# Patient Record
Sex: MALE | Race: WHITE | NOT HISPANIC OR LATINO | Employment: FULL TIME | URBAN - METROPOLITAN AREA
[De-identification: names, ages, dates, MRNs, and addresses within clinical notes are randomized per-mention and may not be internally consistent; named-entity substitution may affect disease eponyms.]

---

## 2020-10-31 ENCOUNTER — HOSPITAL ENCOUNTER (EMERGENCY)
Facility: HOSPITAL | Age: 60
Discharge: HOME | End: 2020-10-31
Attending: EMERGENCY MEDICINE | Admitting: EMERGENCY MEDICINE

## 2020-10-31 VITALS
DIASTOLIC BLOOD PRESSURE: 87 MMHG | HEIGHT: 70 IN | BODY MASS INDEX: 31.5 KG/M2 | SYSTOLIC BLOOD PRESSURE: 143 MMHG | HEART RATE: 79 BPM | TEMPERATURE: 97.6 F | OXYGEN SATURATION: 94 % | WEIGHT: 220 LBS | RESPIRATION RATE: 18 BRPM

## 2020-10-31 DIAGNOSIS — H53.9 VISUAL DISTURBANCE OF ONE EYE: Primary | ICD-10-CM

## 2020-10-31 DIAGNOSIS — I10 ESSENTIAL HYPERTENSION: ICD-10-CM

## 2020-10-31 PROCEDURE — 99283 EMERGENCY DEPT VISIT LOW MDM: CPT

## 2020-10-31 RX ADMIN — LISINOPRIL 30 MG: 20 TABLET ORAL at 19:35

## 2022-03-08 ENCOUNTER — APPOINTMENT (OUTPATIENT)
Dept: GENERAL RADIOLOGY | Facility: HOSPITAL | Age: 62
End: 2022-03-08

## 2022-03-08 ENCOUNTER — HOSPITAL ENCOUNTER (EMERGENCY)
Facility: HOSPITAL | Age: 62
Discharge: HOME | End: 2022-03-08
Attending: EMERGENCY MEDICINE | Admitting: EMERGENCY MEDICINE

## 2022-03-08 VITALS
DIASTOLIC BLOOD PRESSURE: 90 MMHG | HEART RATE: 82 BPM | TEMPERATURE: 96.8 F | RESPIRATION RATE: 20 BRPM | OXYGEN SATURATION: 99 % | SYSTOLIC BLOOD PRESSURE: 170 MMHG

## 2022-03-08 DIAGNOSIS — S61.411A LACERATION OF RIGHT HAND WITHOUT FOREIGN BODY, INITIAL ENCOUNTER: Primary | ICD-10-CM

## 2022-03-08 PROCEDURE — 73130 X-RAY EXAM OF HAND: CPT

## 2022-03-08 PROCEDURE — 63710000001 ONDANSETRON ODT 4 MG TABLET DISPERSIBLE: Performed by: NURSE PRACTITIONER

## 2022-03-08 PROCEDURE — 90715 TDAP VACCINE 7 YRS/> IM: CPT | Performed by: NURSE PRACTITIONER

## 2022-03-08 PROCEDURE — 0 LIDOCAINE 1 % SOLUTION: Performed by: NURSE PRACTITIONER

## 2022-03-08 PROCEDURE — 99283 EMERGENCY DEPT VISIT LOW MDM: CPT

## 2022-03-08 PROCEDURE — 25010000002 TETANUS-DIPHTH-ACELL PERTUSSIS 5-2.5-18.5 LF-MCG/0.5 SUSPENSION PREFILLED SYRINGE: Performed by: NURSE PRACTITIONER

## 2022-03-08 PROCEDURE — 90471 IMMUNIZATION ADMIN: CPT | Performed by: NURSE PRACTITIONER

## 2022-03-08 RX ORDER — ACETAMINOPHEN 500 MG
500 TABLET ORAL ONCE
Status: COMPLETED | OUTPATIENT
Start: 2022-03-08 | End: 2022-03-08

## 2022-03-08 RX ORDER — CEPHALEXIN 500 MG/1
500 CAPSULE ORAL 3 TIMES DAILY
Qty: 21 CAPSULE | Refills: 0 | Status: SHIPPED | OUTPATIENT
Start: 2022-03-08 | End: 2022-03-15

## 2022-03-08 RX ORDER — GINSENG 100 MG
1 CAPSULE ORAL 2 TIMES DAILY
Qty: 14 G | Refills: 0 | Status: SHIPPED | OUTPATIENT
Start: 2022-03-08 | End: 2022-03-15

## 2022-03-08 RX ORDER — ONDANSETRON 4 MG/1
4 TABLET, ORALLY DISINTEGRATING ORAL ONCE
Status: COMPLETED | OUTPATIENT
Start: 2022-03-08 | End: 2022-03-08

## 2022-03-08 RX ORDER — LIDOCAINE HYDROCHLORIDE 10 MG/ML
10 INJECTION, SOLUTION INFILTRATION; PERINEURAL ONCE
Status: COMPLETED | OUTPATIENT
Start: 2022-03-08 | End: 2022-03-08

## 2022-03-08 RX ADMIN — LIDOCAINE HYDROCHLORIDE 10 ML: 10 INJECTION, SOLUTION INFILTRATION; PERINEURAL at 21:20

## 2022-03-08 RX ADMIN — ONDANSETRON 4 MG: 4 TABLET, ORALLY DISINTEGRATING ORAL at 19:28

## 2022-03-08 RX ADMIN — ACETAMINOPHEN 500 MG: 500 TABLET ORAL at 19:28

## 2022-03-08 RX ADMIN — LIDOCAINE-EPINEPHRINE-TETRACAINE GEL 4-0.05-0.5% 3 ML: 4-0.05-0.5 GEL at 19:28

## 2022-03-08 RX ADMIN — TETANUS TOXOID, REDUCED DIPHTHERIA TOXOID AND ACELLULAR PERTUSSIS VACCINE, ADSORBED 0.5 ML: 5; 2.5; 8; 8; 2.5 SUSPENSION INTRAMUSCULAR at 19:28

## 2022-03-08 NOTE — ED TRIAGE NOTES
Patient states he was using a saw and cut his right hand. States that when he normally sees blood or gets hurt he gets lightheaded and nauseated.

## 2022-03-09 NOTE — ED PROVIDER NOTES
EMERGENCY DEPARTMENT ENCOUNTER    Room Number:  A01/01  Date of encounter:  3/8/2022  PCP: Provider, No Known  Historian: Patient      HPI:  Chief Complaint: Right hand laceration  A complete HPI/ROS/PMH/PSH/SH/FH are unobtainable due to: Nothing    Context: Louis Andino is a 61 y.o. male who presents to the ED c/o right hand laceration per JPA.  Patient states he was feeding a table saw with the plastic feeder.  The plastic feeder kicked back lacerating the palm of his hand.  Pain is said to be localized to the site of the laceration does not radiate.  Patient's last Tdap is unknown.  He is here for further evaluation wound closure.      PAST MEDICAL HISTORY  Active Ambulatory Problems     Diagnosis Date Noted   • No Active Ambulatory Problems     Resolved Ambulatory Problems     Diagnosis Date Noted   • No Resolved Ambulatory Problems     No Additional Past Medical History         PAST SURGICAL HISTORY  No past surgical history on file.      FAMILY HISTORY  No family history on file.      SOCIAL HISTORY  Social History     Socioeconomic History   • Marital status:          ALLERGIES  Patient has no known allergies.        REVIEW OF SYSTEMS  Review of Systems   Skin:        Hand laceration        All systems reviewed and negative except for those discussed in HPI.       PHYSICAL EXAM    I have reviewed the triage vital signs and nursing notes.    ED Triage Vitals   Temp Heart Rate Resp BP SpO2   03/08/22 1849 03/08/22 1849 03/08/22 1849 03/08/22 1850 03/08/22 1849   96.8 °F (36 °C) 80 18 (!) 176/111 96 %      Temp src Heart Rate Source Patient Position BP Location FiO2 (%)   -- -- -- -- --              Physical Exam  GENERAL: WDWN male no acute distress  HENT: nares patent  EYES: no scleral icterus  CV: regular rhythm, regular rate, no rubs or gallops  RESPIRATORY: normal effort, lungs CTA B  ABDOMEN: soft  MUSCULOSKELETAL: no deformity  NEURO: alert oriented x4, moves all extremities, follows  commands  SKIN: 4 and half centimeter laceration in a V-shaped flap to the volar surface of the right hand.  The laceration extends in the subcutaneous tissue but does not appear to have any major tendon or nerve involvement.        LAB RESULTS  No results found for this or any previous visit (from the past 24 hour(s)).    Ordered the above labs and independently reviewed the results.        RADIOLOGY  XR Hand 3+ View Right    Result Date: 3/8/2022  XR HAND 3+ VW RIGHT-clinical: Pulmonary laceration  FINDINGS: No fracture or cortical abnormality. No dislocation seen. Articulations are preserved. No radiopaque foreign body seen.  CONCLUSION: No acute osseous or articular abnormality.  This report was finalized on 3/8/2022 7:49 PM by Dr. Adrien Aranda M.D.        I ordered the above noted radiological studies. Reviewed by me and discussed with radiologist.  See dictation for official radiology interpretation.      PROCEDURES    Laceration Repair    Date/Time: 3/8/2022 9:42 PM  Performed by: Clifford Lindsey III, PA  Authorized by: Matt Buitrago MD     Consent:     Consent obtained:  Verbal    Consent given by:  Patient    Risks discussed:  Infection and pain  Universal protocol:     Patient identity confirmed:  Verbally with patient  Anesthesia:     Anesthesia method:  Local infiltration and topical application    Topical anesthetic:  LET    Local anesthetic:  Lidocaine 1% w/o epi  Laceration details:     Location:  Hand    Hand location:  R palm    Length (cm):  4.5  Pre-procedure details:     Preparation:  Patient was prepped and draped in usual sterile fashion and imaging obtained to evaluate for foreign bodies  Exploration:     Wound exploration: wound explored through full range of motion and entire depth of wound visualized    Treatment:     Area cleansed with:  Saline and Shur-Clens    Amount of cleaning:  Extensive    Irrigation solution:  Sterile saline    Irrigation volume:  1000    Irrigation  method:  Pressure wash    Debridement:  None  Skin repair:     Repair method:  Sutures    Suture size:  4-0    Suture material:  Nylon    Suture technique:  Simple interrupted    Number of sutures:  7  Approximation:     Approximation:  Close  Repair type:     Repair type:  Simple  Post-procedure details:     Dressing:  Adhesive bandage and antibiotic ointment    Procedure completion:  Tolerated well, no immediate complications          MEDICATIONS GIVEN IN ER    Medications   lidocaine (XYLOCAINE) 1 % injection 10 mL (has no administration in time range)   ondansetron ODT (ZOFRAN-ODT) disintegrating tablet 4 mg (4 mg Oral Given 3/8/22 1928)   acetaminophen (TYLENOL) tablet 500 mg (500 mg Oral Given 3/8/22 1928)   Tetanus-Diphth-Acell Pertussis (BOOSTRIX) injection 0.5 mL (0.5 mL Intramuscular Given 3/8/22 1928)   Lido-EPINEPHrine-Tetracaine 4-0.05-0.5 % gel 3 mL (3 mL Topical Given 3/8/22 1928)         PROGRESS, DATA ANALYSIS, CONSULTS, AND MEDICAL DECISION MAKING    All labs have been independently reviewed by me.  All radiology studies have been reviewed by me and discussed with radiologist dictating the report.   EKG's independently viewed and interpreted by me.  Discussion below represents my analysis of pertinent findings related to patient's condition, differential diagnosis, treatment plan and final disposition.    DDx includes but is not limited to: Hand laceration with tendon involvement, hand laceration with bony involvement, hand laceration with foreign body, uncomplicated hand laceration, hand laceration and major nerve involvement.  Will order three-view x-ray to rule out foreign body.    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  He states the saw kicked and he sustained a v shaped laceration to right palm.  He has no loss of sensation or motor function.  Tdad needs to be administered.  Will check right hand  imaging. Update Tdap and plan for repair of wound.  [EW]   2145 No foreign body or fracture present on 3 view of the right hand.  Plan to clean wound, close, set patient up with hand specialist, update the patient's Tdap, and DC home at this time.  We will have him return with any signs of infections or any further concerns. [RC]      ED Course User Index  [EW] Denisse Whitney APRN  [RC] Clifford Lindsey III, PA           PPE: The patient wore a surgical mask throughout the entire patient encounter. I wore an N95.    AS OF 21:46 EST VITALS:    BP - (!) 176/111  HR - 80  TEMP - 96.8 °F (36 °C)  O2 SATS - 96%        DIAGNOSIS  Final diagnoses:   Laceration of right hand without foreign body, initial encounter         DISPOSITION  DISCHARGE    Patient discharged in stable condition.    Reviewed implications of results, diagnosis, meds, responsibility to follow up, warning signs and symptoms of possible worsening, potential complications and reasons to return to ER.    Patient/Family voiced understanding of above instructions.    Discussed plan for discharge, as there is no emergent indication for admission. Patient referred to primary care provider for BP management due to today's BP. Pt/family is agreeable and understands need for follow up and repeat testing.  Pt is aware that discharge does not mean that nothing is wrong but it indicates no emergency is present that requires admission and they must continue care with follow-up as given below or physician of their choice.     FOLLOW-UP  Higinio Chauhan MD  [address] [facility], [place] [id number] [phone]    Schedule an appointment as soon as possible for a visit in 12 days  For further evaluation and treatment, For suture removal         Medication List      New Prescriptions    bacitracin 500 UNIT/GM ointment  Apply 1 application topically to the appropriate area as directed 2 (Two) Times a Day for 7 days.     cephalexin 500 MG  capsule  Commonly known as: KEFLEX  Take 1 capsule by mouth 3 (Three) Times a Day for 7 days.           Where to Get Your Medications      You can get these medications from any pharmacy    Bring a paper prescription for each of these medications  · bacitracin 500 UNIT/GM ointment  · cephalexin 500 MG capsule                Clifford Lindsey III, PA  03/08/22 4253

## 2022-03-09 NOTE — ED PROVIDER NOTES
I supervised care provided by the midlevel provider.   We have discussed this patient's history, physical exam, and treatment plan.  I have reviewed the note and personally saw and examined the patient and agree with the plan of care.   I have seen and evaluated this gentleman.  Patient was holding his plastic maurice.  He was using a table saw.  The table saw kicked back causing the plastic maurice to cut the palm of his right hand.  There was not much bleeding.  He was not cut by the saw or the sawblade.  This happened early this afternoon.  He denies any motor or sensory changes to his extremities.  Denies any pain at this time.    GENERAL: not distressed  HENT: nares patent  Head/neck/ face are symmetric without gross deformity or swelling  EYES: no scleral icterus  CV: regular rhythm, regular rate with intact distal pulses  RESPIRATORY: normal effort and no respiratory distress  ABDOMEN: soft and nontender  MUSCULOSKELETAL: Palmar aspect of right hand there is no active bleeding.  Has a laceration with the appearance of a flap.  He has intact Apley refill to all fingers.  There is no motor or sensory impairment.  There is no significant swelling and compartments are soft.  NEURO: alert and appropriate, moves all extremities, follows commands  SKIN: warm, dry    Vital signs and nursing notes reviewed.    Plan I talked with the patient and spouse at length.  Also looked at the x-ray and reviewed the radiologist report.  No fracture or foreign body seen.  Felipe is going to take a look and observe the depth of the laceration in verify that it is a flap.  He will irrigate the laceration well.  We will discharge the patient on antibiotics and follow-up with hand surgeon.  All questions answered.    We are currently under a pandemic from the COVID19 infection.  The patient presented to the emergency department by ambulance or personal vehicle. I followed the current protocols required by Infection Control at [facility] in my evaluation and treatment of the patient. The patient was wearing a face mask during my evaluation and throughout my encounter. During my whole encounter with this patient I used appropriate personal protective equipment.  This equipment consisted of eye protection, facemask, gown, and gloves.  I applied this equipment before entering the room.    When rest explored the laceration it was a skin flap.  It was superficial.  Please see his laceration report.       Matt Buitrago MD  03/08/22 4877

## 2022-03-09 NOTE — ED NOTES
Laceration wound care completed. Vasoline gauze applied, wrapped in kurlex, and wrapped in ace bandage. Dressing change instructions and supplies provided.     Lala Guerra RN  03/08/22 3112

## 2022-03-09 NOTE — DISCHARGE INSTRUCTIONS
Keep laceration clean and dry, apply antibiotic ointment once or twice daily, watch carefully for signs of infection, sutures need to be removed in 12 days. Return to care if any complications.

## 2023-11-04 ENCOUNTER — HOSPITAL ENCOUNTER (EMERGENCY)
Facility: HOSPITAL | Age: 63
Discharge: HOME | End: 2023-11-04
Attending: EMERGENCY MEDICINE | Admitting: EMERGENCY MEDICINE
Payer: MEDICARE

## 2023-11-04 ENCOUNTER — APPOINTMENT (OUTPATIENT)
Dept: CT IMAGING | Facility: HOSPITAL | Age: 63
End: 2023-11-04
Payer: MEDICARE

## 2023-11-04 VITALS
DIASTOLIC BLOOD PRESSURE: 97 MMHG | HEART RATE: 77 BPM | HEIGHT: 70 IN | SYSTOLIC BLOOD PRESSURE: 164 MMHG | RESPIRATION RATE: 17 BRPM | OXYGEN SATURATION: 95 % | WEIGHT: 220.02 LBS | TEMPERATURE: 98.3 F | BODY MASS INDEX: 31.5 KG/M2

## 2023-11-04 DIAGNOSIS — N28.89 LEFT KIDNEY MASS: Primary | ICD-10-CM

## 2023-11-04 LAB
ALBUMIN SERPL-MCNC: 3.8 G/DL (ref 3.5–5.2)
ALBUMIN/GLOB SERPL: 1.2 G/DL
ALP SERPL-CCNC: 79 U/L (ref 39–117)
ALT SERPL W P-5'-P-CCNC: 18 U/L (ref 1–41)
ANION GAP SERPL CALCULATED.3IONS-SCNC: 9.3 MMOL/L (ref 5–15)
AST SERPL-CCNC: 16 U/L (ref 1–40)
BACTERIA UR QL AUTO: ABNORMAL /HPF
BASOPHILS # BLD AUTO: 0.03 10*3/MM3 (ref 0–0.2)
BASOPHILS NFR BLD AUTO: 0.3 % (ref 0–1.5)
BILIRUB SERPL-MCNC: 0.3 MG/DL (ref 0–1.2)
BILIRUB UR QL STRIP: NEGATIVE
BUN SERPL-MCNC: 12 MG/DL (ref 8–23)
BUN/CREAT SERPL: 10.9 (ref 7–25)
CALCIUM SPEC-SCNC: 9.6 MG/DL (ref 8.6–10.5)
CHLORIDE SERPL-SCNC: 95 MMOL/L (ref 98–107)
CLARITY UR: CLEAR
CO2 SERPL-SCNC: 27.7 MMOL/L (ref 22–29)
COLOR UR: YELLOW
CREAT SERPL-MCNC: 1.1 MG/DL (ref 0.76–1.27)
DEPRECATED RDW RBC AUTO: 43.4 FL (ref 37–54)
EGFRCR SERPLBLD CKD-EPI 2021: 75 ML/MIN/1.73
EOSINOPHIL # BLD AUTO: 0.17 10*3/MM3 (ref 0–0.4)
EOSINOPHIL NFR BLD AUTO: 1.5 % (ref 0.3–6.2)
ERYTHROCYTE [DISTWIDTH] IN BLOOD BY AUTOMATED COUNT: 14.9 % (ref 12.3–15.4)
GLOBULIN UR ELPH-MCNC: 3.1 GM/DL
GLUCOSE SERPL-MCNC: 99 MG/DL (ref 65–99)
GLUCOSE UR STRIP-MCNC: NEGATIVE MG/DL
HCT VFR BLD AUTO: 34.3 % (ref 37.5–51)
HGB BLD-MCNC: 10.8 G/DL (ref 13–17.7)
HGB UR QL STRIP.AUTO: ABNORMAL
HYALINE CASTS UR QL AUTO: ABNORMAL /LPF
IMM GRANULOCYTES # BLD AUTO: 0.01 10*3/MM3 (ref 0–0.05)
IMM GRANULOCYTES NFR BLD AUTO: 0.1 % (ref 0–0.5)
KETONES UR QL STRIP: NEGATIVE
LEUKOCYTE ESTERASE UR QL STRIP.AUTO: NEGATIVE
LYMPHOCYTES # BLD AUTO: 2.52 10*3/MM3 (ref 0.7–3.1)
LYMPHOCYTES NFR BLD AUTO: 22.4 % (ref 19.6–45.3)
MCH RBC QN AUTO: 24.9 PG (ref 26.6–33)
MCHC RBC AUTO-ENTMCNC: 31.5 G/DL (ref 31.5–35.7)
MCV RBC AUTO: 79 FL (ref 79–97)
MONOCYTES # BLD AUTO: 1.38 10*3/MM3 (ref 0.1–0.9)
MONOCYTES NFR BLD AUTO: 12.3 % (ref 5–12)
NEUTROPHILS NFR BLD AUTO: 63.4 % (ref 42.7–76)
NEUTROPHILS NFR BLD AUTO: 7.14 10*3/MM3 (ref 1.7–7)
NITRITE UR QL STRIP: NEGATIVE
PH UR STRIP.AUTO: 6.5 [PH] (ref 5–8)
PLATELET # BLD AUTO: 341 10*3/MM3 (ref 140–450)
PMV BLD AUTO: 9.2 FL (ref 6–12)
POTASSIUM SERPL-SCNC: 4.4 MMOL/L (ref 3.5–5.2)
PROT SERPL-MCNC: 6.9 G/DL (ref 6–8.5)
PROT UR QL STRIP: NEGATIVE
RBC # BLD AUTO: 4.34 10*6/MM3 (ref 4.14–5.8)
RBC # UR STRIP: ABNORMAL /HPF
REF LAB TEST METHOD: ABNORMAL
SODIUM SERPL-SCNC: 132 MMOL/L (ref 136–145)
SP GR UR STRIP: 1.01 (ref 1–1.03)
SQUAMOUS #/AREA URNS HPF: ABNORMAL /HPF
UROBILINOGEN UR QL STRIP: ABNORMAL
WBC # UR STRIP: ABNORMAL /HPF
WBC NRBC COR # BLD: 11.25 10*3/MM3 (ref 3.4–10.8)

## 2023-11-04 PROCEDURE — 25510000001 IOPAMIDOL PER 1 ML: Performed by: EMERGENCY MEDICINE

## 2023-11-04 PROCEDURE — 80053 COMPREHEN METABOLIC PANEL: CPT | Performed by: EMERGENCY MEDICINE

## 2023-11-04 PROCEDURE — 85025 COMPLETE CBC W/AUTO DIFF WBC: CPT | Performed by: EMERGENCY MEDICINE

## 2023-11-04 PROCEDURE — 81001 URINALYSIS AUTO W/SCOPE: CPT | Performed by: EMERGENCY MEDICINE

## 2023-11-04 PROCEDURE — 99285 EMERGENCY DEPT VISIT HI MDM: CPT

## 2023-11-04 PROCEDURE — 36415 COLL VENOUS BLD VENIPUNCTURE: CPT

## 2023-11-04 PROCEDURE — 74176 CT ABD & PELVIS W/O CONTRAST: CPT

## 2023-11-04 PROCEDURE — 74177 CT ABD & PELVIS W/CONTRAST: CPT

## 2023-11-04 RX ORDER — SODIUM CHLORIDE 0.9 % (FLUSH) 0.9 %
10 SYRINGE (ML) INJECTION AS NEEDED
Status: DISCONTINUED | OUTPATIENT
Start: 2023-11-04 | End: 2023-11-04

## 2023-11-04 RX ADMIN — IOPAMIDOL 100 ML: 755 INJECTION, SOLUTION INTRAVENOUS at 21:49

## 2023-11-04 NOTE — FSED PROVIDER NOTE
Subjective   History of Present Illness  Patient is a very nice 62-year-old male.  He presents with 1 day history of ethan hematuria at home and some burning with urination.  He denies any passage of blood clots.  No fever no chills.  No flank pain.  No personal history of UTI, kidney stones, any other difficulties.  No trauma.  No fever no chills      Review of Systems  Constitutional: No fevers, chills, sweats unless otherwise documented in HPI  Eyes: No recent visual problems, eye discharge, eye pain, redness unless otherwise documented in HPI  HEENT: No ear pain, nasal congestion, sore throat, voice changes unless otherwise documented in HPI  Respiratory: No shortness of breath, cough, pain on breathing, sputum production unless otherwise documented in HPI  Cardiovascular: No chest pain, palpitations, syncope, orthopnea unless otherwise documented in HPI  Gastrointestinal: No nausea, vomiting, diarrhea, constipation unless otherwise documented in HPI  Genitourinary: No hematuria, dysuria, incontinence unless otherwise documented in HPI  Endocrine: Negative for excessive thirst, excessive hunger, excessive urination, heat or cold intolerance unless otherwise documented in HPI  Musculoskeletal: No back pain, neck pain, joint pain, muscle pain, decreased range of motion unless otherwise documented in HPI  Integumentary: No rash, pruritus, abrasion, lesions unless otherwise documented in HPI  Neurologic: No weakness, numbness, frequent headaches, tremors unless otherwise documented in HPI  Psychiatric: No anxiety, depression, mood changes, hallucinations unless otherwise documented in HPI        History reviewed. No pertinent past medical history.    No Known Allergies    History reviewed. No pertinent surgical history.    History reviewed. No pertinent family history.    Social History     Socioeconomic History    Marital status:            Objective   Physical Exam  General: Awake alert no acute  distress  Vital signs: Reviewed in nurses notes      HEENT: Pupils equal round responsive to light.  Extra-ocular movements are intact.  No scleral icterus.  Nasopharynx is clear.  Oropharynx is clear with moist mucous membranes.  No masses noted    Neck:   Supple without lymphadenopathy    Respiratory:   Nonlabored respirations.  Clear to auscultation bilaterally.  Equal breath sounds bilaterally.  No wheezes or stridor noted.    Cardiovascular: Regular rate and rhythm.  No murmur.  Equal pulses in bilateral lower extremities without edema.    Abdomen: Soft, nontender to palpation.  Normal bowel sounds noted.  No masses noted.    Back: No CVA tenderness    Skin:   Warm and dry.  No rashes noted    Neurological examination: Patient is awake alert oriented x4.  Speech is normal.  No facial palsy.  No focal motor or sensory deficits.    Procedures           ED Course      CT Abdomen Pelvis With Contrast    Result Date: 11/4/2023  CT ABDOMEN PELVIS W CONTRAST-  Radiation dose reduction techniques were utilized, including automated exposure control and exposure modulation based on body size.  Clinical: Left renal tumor  FINDINGS: Arising from the lower pole of the left kidney is again a sizable mass. It is heterogenous in attenuation with areas of contrast enhancement. The overall appearance is most consistent with renal cell carcinoma. There is uroepithelial thickening of the renal pelvis and proximal left ureter. The distal left ureter is satisfactory in appearance. Perinephric fat stranding as before. Enhancing left adrenal nodule. It is indeterminate.  CONCLUSION: Mass arising from the lower pole of the left kidney is most consistent with renal cell carcinoma with associated uroepithelial thickening of the pelvis and proximal ureter. Enhancing indeterminate left adrenal nodule, potential metastasis. No retroperitoneal lymphadenopathy. Urology consultation advised.     This report was finalized on 11/4/2023 10:13 PM  by Dr. Adrien Aranda M.D on Workstation: [id number]      CT Abdomen Pelvis Stone Protocol    Result Date: 11/4/2023  CT ABDOMEN PELVIS STONE PROTOCOL-  Radiation dose reduction techniques were utilized, including automated exposure control and exposure modulation based on body size.  Clinical: Hematuria, lower abdominal discomfort  COMPARISON: None  FINDINGS: 1. There is a mass arising from the lower pole of the left kidney which is suspicious for tumor, measuring 3.0 cm AP, 8.8 cm transverse and 7.1 cm cranial caudal. Dedicated renal protocol CT would be the best means for further evaluation. No left-sided obstructive uropathy or calculus seen. No renal hilar adenopathy. There is a retroaortic left renal vein. There is a left adrenal nodule which is indeterminant, measuring 19 mm. The right adrenal gland is satisfactory in appearance. There is perinephric fat stranding on the left. The right kidney and renal collecting system is normal. Mild bladder wall thickening/trabeculation. Prostate and seminal vesicles unremarkable.  2. The liver, gallbladder, pancreas and spleen are satisfactory in appearance. Diameter of the aorta is within normal limits.  3. Diverticulosis of the colon without diverticulitis. The small bowel is satisfactory in appearance. The stomach is collapsed contour within normal limits.  4. No lytic nor sclerotic bone lesion is demonstrated. There is considerable right hip joint degeneration. The remainder is unremarkable.    This report was finalized on 11/4/2023 9:02 PM by Dr. Adrien Aranda M.D on Workstation: [id number]              PLAN:  I did make a referral for [facility] in the [facility] system and also gave Mr. Andino appropriate informateion.                          Differential diagnosis includes kidney stone, mass, UTI  Medical Decision Making  Problems Addressed:  Left kidney mass: complicated acute illness or injury    Amount and/or Complexity of Data Reviewed  Labs:  ordered.  Radiology: ordered.    Risk  Prescription drug management.    The CAT scan was independently reviewed in addition to the review of the radiologist interpretation  Upon discharge patient noted to be very stable.  Appropriate treatment plan and return precautions discussed    Final diagnoses:   Left kidney mass       ED Disposition  ED Disposition       ED Disposition   Discharge    Condition   Stable    Comment   --               James Esquivel MD  [address] [place] [address] [place] [address]  [phone]          [facility]  [address] [place], [place] [id number] [phone]             Medication List      No changes were made to your prescriptions during this visit.

## 2023-11-05 NOTE — DISCHARGE INSTRUCTIONS
Today on the CAT scan you do have a large mass on the left kidney.  This does have the appearance of renal cell carcinoma.  The exact determination however cannot be obtained until a tissue diagnosis is obtained with a sample.    Please continue adequate fluid hydration over the next several days to prevent any blood in your urine from clotting.    I did send a referral to [facility] [place] [facility].  I also gave you their name and number as well as their specific physician that is on-call this weekend.  I would like you to be seen in the office this week to formulate a follow-up plan.    Please read all of the instructions in this handout.  If you receive prescriptions please fill them and take them as directed.  Please call your primary care physician for follow-up appointment in the next 5 to 7 days.  If you do not have a physician you may call the [facility] referral line at [phone].    You may return to the emergency department at any time for any concerns such as worsening symptoms.  If you received a work or school note it will be printed at the back of this packet.

## 2023-11-29 RX ORDER — FAMOTIDINE 40 MG/1
40 TABLET, FILM COATED ORAL DAILY
COMMUNITY
End: 2023-12-30 | Stop reason: HOSPADM

## 2023-11-29 RX ORDER — POTASSIUM CHLORIDE 20 MEQ/1
20 TABLET, EXTENDED RELEASE ORAL DAILY
COMMUNITY

## 2023-11-29 RX ORDER — MAGNESIUM OXIDE 400 MG/1
400 TABLET ORAL DAILY
COMMUNITY

## 2023-11-29 RX ORDER — LISINOPRIL 10 MG/1
15 TABLET ORAL 2 TIMES DAILY
COMMUNITY
End: 2023-12-30 | Stop reason: HOSPADM

## 2023-11-29 RX ORDER — BUDESONIDE AND FORMOTEROL FUMARATE DIHYDRATE 160; 4.5 UG/1; UG/1
2 AEROSOL RESPIRATORY (INHALATION)
COMMUNITY
Start: 2023-08-09

## 2023-11-29 RX ORDER — ASPIRIN 81 MG/1
81 TABLET ORAL DAILY
COMMUNITY

## 2023-11-29 RX ORDER — CELECOXIB 200 MG/1
200 CAPSULE ORAL DAILY PRN
COMMUNITY

## 2023-11-29 RX ORDER — HYDROCHLOROTHIAZIDE 25 MG/1
25 TABLET ORAL DAILY
COMMUNITY
End: 2023-12-30 | Stop reason: HOSPADM

## 2023-11-29 RX ORDER — OMEPRAZOLE 40 MG/1
40 CAPSULE, DELAYED RELEASE ORAL DAILY
COMMUNITY
End: 2023-12-30 | Stop reason: HOSPADM

## 2023-12-12 ENCOUNTER — LAB (OUTPATIENT)
Dept: LAB | Facility: HOSPITAL | Age: 63
End: 2023-12-12
Payer: COMMERCIAL

## 2023-12-12 ENCOUNTER — HOSPITAL ENCOUNTER (OUTPATIENT)
Dept: CARDIOLOGY | Facility: HOSPITAL | Age: 63
Discharge: HOME | End: 2023-12-12
Payer: COMMERCIAL

## 2023-12-12 LAB
ABO GROUP BLD: NORMAL
ANION GAP SERPL CALCULATED.3IONS-SCNC: 11 MMOL/L (ref 5–15)
BLD GP AB SCN SERPL QL: NEGATIVE
BUN SERPL-MCNC: 15 MG/DL (ref 8–23)
BUN/CREAT SERPL: 14.7 (ref 7–25)
CALCIUM SPEC-SCNC: 9.9 MG/DL (ref 8.6–10.5)
CHLORIDE SERPL-SCNC: 98 MMOL/L (ref 98–107)
CO2 SERPL-SCNC: 27 MMOL/L (ref 22–29)
CREAT SERPL-MCNC: 1.02 MG/DL (ref 0.76–1.27)
DEPRECATED RDW RBC AUTO: 39.1 FL (ref 37–54)
EGFRCR SERPLBLD CKD-EPI 2021: 81.6 ML/MIN/1.73
ERYTHROCYTE [DISTWIDTH] IN BLOOD BY AUTOMATED COUNT: 14.3 % (ref 12.3–15.4)
GLUCOSE SERPL-MCNC: 103 MG/DL (ref 65–99)
HCT VFR BLD AUTO: 33.9 % (ref 37.5–51)
HGB BLD-MCNC: 10.8 G/DL (ref 13–17.7)
MCH RBC QN AUTO: 24.7 PG (ref 26.6–33)
MCHC RBC AUTO-ENTMCNC: 31.9 G/DL (ref 31.5–35.7)
MCV RBC AUTO: 77.4 FL (ref 79–97)
PLATELET # BLD AUTO: 376 10*3/MM3 (ref 140–450)
PMV BLD AUTO: 9.7 FL (ref 6–12)
POTASSIUM SERPL-SCNC: 3.9 MMOL/L (ref 3.5–5.2)
RBC # BLD AUTO: 4.38 10*6/MM3 (ref 4.14–5.8)
RH BLD: POSITIVE
SODIUM SERPL-SCNC: 136 MMOL/L (ref 136–145)
T&S EXPIRATION DATE: NORMAL
WBC NRBC COR # BLD AUTO: 9.99 10*3/MM3 (ref 3.4–10.8)

## 2023-12-12 PROCEDURE — 86850 RBC ANTIBODY SCREEN: CPT

## 2023-12-12 PROCEDURE — 86900 BLOOD TYPING SEROLOGIC ABO: CPT

## 2023-12-12 PROCEDURE — 80048 BASIC METABOLIC PNL TOTAL CA: CPT

## 2023-12-12 PROCEDURE — 86923 COMPATIBILITY TEST ELECTRIC: CPT

## 2023-12-12 PROCEDURE — 86901 BLOOD TYPING SEROLOGIC RH(D): CPT

## 2023-12-12 PROCEDURE — 85027 COMPLETE CBC AUTOMATED: CPT

## 2023-12-12 PROCEDURE — 93005 ELECTROCARDIOGRAM TRACING: CPT | Performed by: UROLOGY

## 2023-12-15 ENCOUNTER — OFFICE VISIT (OUTPATIENT)
Dept: CARDIOLOGY | Facility: CLINIC | Age: 63
End: 2023-12-15
Payer: MEDICARE

## 2023-12-15 VITALS
SYSTOLIC BLOOD PRESSURE: 142 MMHG | HEIGHT: 70 IN | BODY MASS INDEX: 32.21 KG/M2 | HEART RATE: 94 BPM | OXYGEN SATURATION: 95 % | WEIGHT: 225 LBS | DIASTOLIC BLOOD PRESSURE: 86 MMHG

## 2023-12-15 DIAGNOSIS — I49.1 PREMATURE ATRIAL COMPLEXES: Primary | ICD-10-CM

## 2023-12-15 DIAGNOSIS — Z01.810 PREOP CARDIOVASCULAR EXAM: ICD-10-CM

## 2023-12-15 DIAGNOSIS — I45.2 BIFASCICULAR BLOCK: ICD-10-CM

## 2023-12-15 DIAGNOSIS — I10 PRIMARY HYPERTENSION: ICD-10-CM

## 2023-12-15 NOTE — Clinical Note
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 The surgery is being done at [facility] but he's [facility] and they're not on Epic.  Also, will you call Dr Andino and give him the patient liaison phone number to find a new PCP? I forgot to do that.  Jason szymanski

## 2023-12-15 NOTE — H&P (VIEW-ONLY)
I called Dr. Esquivel office to provide staff the update that he is cleared for surgery.  I was unable to get in contact with someone but I left a detailed VM on the confidential line relaying this information.  I encouraged them to call our office back if they had further questions.    I called and spoke with pt, providing him the pt appt liaison number for PCP- [phone].    Thank you,    Lisa STREET RN  Triage [facility]  12/15/23 13:29 EST

## 2023-12-15 NOTE — PROGRESS NOTES
I called Dr. Esquivel office to provide staff the update that he is cleared for surgery.  I was unable to get in contact with someone but I left a detailed VM on the confidential line relaying this information.  I encouraged them to call our office back if they had further questions.    I called and spoke with pt, providing him the pt appt liaison number for PCP- [phone].    Thank you,    Lisa STREET RN  Triage [facility]  12/15/23 13:29 EST

## 2023-12-15 NOTE — PROGRESS NOTES
Date of Office Visit: 12/15/23  Encounter Provider: Bradley Ahuja MD  Place of Service: [facility] CARDIOLOGY  Patient Name: Louis Andino  :1960    Chief Complaint   Patient presents with    Pre-op Exam       HPI:     Mr. Andino is 63 y.o. and presents today for urgent preoperative risk assessment. 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 He has hypertension. He has a long standing history of premature atrial contractions, going back to young adulthood. He only feels them when he drinks too much caffeine.     He has a large kidney/adrenal mass and is undergoing open resection in three days. An EKG was performed in [facility] on the  and it reported a right bundle branch block and left anterior fascicular block.  These were new when compared to an EKG from .    Chest pain, shortness of breath, significant leg swelling, persistent tachycardia, lightheadedness, or syncope. He has good functional status and no worrisome cardiac symptoms.     Past Medical History:   Diagnosis Date    Asthma     Emphysema/COPD     GERD (gastroesophageal reflux disease)     Hypertension     Prostate disease     Urination disorder        Past Surgical History:   Procedure Laterality Date    SKIN LESION EXCISION         Social History     Socioeconomic History    Marital status:     Number of children: 6   Tobacco Use    Smoking status: Never     Passive exposure: Past    Smokeless tobacco: Never    Tobacco comments:     SECOND HAND SMOKE EXPOSURE AS A CHILD    Vaping Use    Vaping Use: Never used   Substance and Sexual Activity    Alcohol use: Yes     Alcohol/week: 1.0 standard drink of alcohol     Types: 1 Glasses of wine per week     Comment: rare    Drug use: Not Currently    Sexual activity: Defer       History reviewed. No pertinent family history.    Review of Systems   All other systems reviewed and are negative.      No Known Allergies      Current Outpatient Medications:  "    aspirin 81 MG EC tablet, Take 1 tablet by mouth Daily., Disp: , Rfl:     celecoxib (CeleBREX) 200 MG capsule, Take 1 capsule by mouth Daily., Disp: , Rfl:     famotidine (PEPCID) 40 MG tablet, Take 1 tablet by mouth Daily., Disp: , Rfl:     hydroCHLOROthiazide (HYDRODIURIL) 25 MG tablet, Take 1 tablet by mouth Daily., Disp: , Rfl:     lisinopril (PRINIVIL,ZESTRIL) 10 MG tablet, Take 1.5 tablets by mouth 2 (Two) Times a Day., Disp: , Rfl:     magnesium oxide (MAG-OX) 400 MG tablet, Take 1 tablet by mouth Daily., Disp: , Rfl:     omeprazole (priLOSEC) 40 MG capsule, Take 1 capsule by mouth Daily., Disp: , Rfl:     potassium chloride (K-DUR,KLOR-CON) 20 MEQ CR tablet, Take 1 tablet by mouth Daily., Disp: , Rfl:     Symbicort 160-4.5 MCG/ACT inhaler, Inhale 2 puffs 2 (Two) Times a Day., Disp: , Rfl:     vitamin D3 125 MCG (5000 UT) capsule capsule, Take 1 capsule by mouth Daily., Disp: , Rfl:       Objective:     Vitals:    12/15/23 1218   BP: 142/86   Pulse: 94   SpO2: 95%   Weight: 102 kg (225 lb)   Height: 177.8 cm (70\")     Body mass index is 32.28 kg/m².    Vitals reviewed.   Constitutional:       Appearance: Well-developed and not in distress.   Eyes:      Conjunctiva/sclera: Conjunctivae normal.   HENT:      Head: Normocephalic.      Nose: Nose normal.   Neck:      Vascular: No JVD. JVD normal.      Lymphadenopathy: No cervical adenopathy.   Pulmonary:      Effort: Pulmonary effort is normal.      Breath sounds: Normal breath sounds.   Cardiovascular:      Normal rate. Regular rhythm.      Murmurs: There is no murmur.   Pulses:     Intact distal pulses.   Edema:     Peripheral edema absent.   Abdominal:      Palpations: Abdomen is soft.      Tenderness: There is no abdominal tenderness.   Musculoskeletal: Normal range of motion.      Cervical back: Normal range of motion. Skin:     General: Skin is warm and dry.      Findings: No rash.   Neurological:      General: No focal deficit present.      Mental " Status: Alert and oriented to person, place, and time.      Cranial Nerves: No cranial nerve deficit.   Psychiatric:         Behavior: Behavior normal.         Thought Content: Thought content normal.         Judgment: Judgment normal.         Procedures EKG --   I have personally reviewed EKG on 12/12/2023 and my interpretation of the tracing is as follows: SR, PAC, RBBB, LAFB        Assessment:       Diagnosis Plan   1. Premature atrial complexes        2. Preop cardiovascular exam        3. Bifascicular block        4. 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 He has hypertension. He was noted to have a bifascicular block (RBBB/LAFB) on EKG but denies symptoms of advanced conduction disease. He has good functional status and no worrisome cardiac symptoms.    We discussed that he will be at higher risk than a member of the general public for ultimately requiring a pacemaker, but that there is absolutely no reason to worry at this point in time.    He does not meet any criteria for additional cardiac testing prior to surgery per ACC AHA perioperative guidelines. He is considered to be at low risk of major adverse CV events with surgery.     Sincerely,       Bradley Ahuja MD

## 2023-12-15 NOTE — PAT
Notified Nisreen at Dr. Esquivel office that pt needs cardiac clearance prior to surgery on 12/18/2023 per anesthesia.

## 2023-12-15 NOTE — PROGRESS NOTES
RM:________     PCP: Provider, No Known    : 1960  AGE: 63 y.o.  EST PATIENT     REASON FOR VISIT/  CC:        BP Readings from Last 3 Encounters:   23 164/97   22 170/90   10/31/20 143/87      Wt Readings from Last 3 Encounters:   23 99.8 kg (220 lb 0.3 oz)   10/31/20 99.8 kg (220 lb)        WT: ____________ BP: __________L __________R HR______    CHEST PAIN: _____________    SOA: _____________PALPS: _______________     LIGHTHEADED: ___________FATIGUE: ________________ EDEMA __________    ALLERGIES:Patient has no known allergies. SMOKING HISTORY:  Social History     Tobacco Use    Smoking status: Never     Passive exposure: Past    Smokeless tobacco: Never    Tobacco comments:     SECOND HAND SMOKE EXPOSURE AS A CHILD    Vaping Use    Vaping Use: Never used   Substance Use Topics    Alcohol use: Yes     Alcohol/week: 1.0 standard drink of alcohol     Types: 1 Glasses of wine per week     Comment: rare    Drug use: Not Currently     CAFFEINE USE_________________  ALCOHOL ______________________

## 2023-12-16 LAB
QT INTERVAL: 400 MS
QTC INTERVAL: 470 MS

## 2023-12-17 ENCOUNTER — ANESTHESIA EVENT (OUTPATIENT)
Dept: PERIOP | Facility: HOSPITAL | Age: 63
End: 2023-12-17
Payer: COMMERCIAL

## 2023-12-17 NOTE — ANESTHESIA PREPROCEDURE EVALUATION
Anesthesia Evaluation     NPO Solid Status: > 8 hours  NPO Liquid Status: > 8 hours           Airway   Mallampati: II  TM distance: >3 FB  Neck ROM: full  No difficulty expected  Dental - normal exam     Pulmonary - normal exam   (+) COPD,  Cardiovascular - normal exam    ECG reviewed    (+) hypertension      Neuro/Psych  GI/Hepatic/Renal/Endo    (+) GERD    Musculoskeletal     Abdominal  - normal exam    Bowel sounds: normal.   Substance History      OB/GYN          Other        ROS/Med Hx Other: FINDINGS: Arising from the lower pole of the left kidney is again a  sizable mass. It is heterogenous in attenuation with areas of contrast  enhancement. The overall appearance is most consistent with renal cell  carcinoma.                       Anesthesia Plan    ASA 3     general     (Oxycontin, Tylenol ordered for preop)  intravenous induction     Anesthetic plan, risks, benefits, and alternatives have been provided, discussed and informed consent has been obtained with: patient.    Plan discussed with CRNA.        CODE STATUS:

## 2023-12-18 ENCOUNTER — APPOINTMENT (OUTPATIENT)
Dept: GENERAL RADIOLOGY | Facility: HOSPITAL | Age: 63
End: 2023-12-18
Payer: COMMERCIAL

## 2023-12-18 ENCOUNTER — ANESTHESIA (OUTPATIENT)
Dept: PERIOP | Facility: HOSPITAL | Age: 63
End: 2023-12-18
Payer: COMMERCIAL

## 2023-12-18 ENCOUNTER — HOSPITAL ENCOUNTER (INPATIENT)
Facility: HOSPITAL | Age: 63
LOS: 12 days | Discharge: HOME | End: 2023-12-30
Attending: UROLOGY | Admitting: UROLOGY
Payer: COMMERCIAL

## 2023-12-18 DIAGNOSIS — N28.89 OTHER SPECIFIED DISORDERS OF KIDNEY AND URETER: ICD-10-CM

## 2023-12-18 DIAGNOSIS — C64.2 RENAL CELL CARCINOMA OF LEFT KIDNEY: Primary | ICD-10-CM

## 2023-12-18 DIAGNOSIS — N28.89 RENAL MASS: ICD-10-CM

## 2023-12-18 PROBLEM — H43.811 VITREOUS DEGENERATION OF RIGHT EYE: Status: ACTIVE | Noted: 2023-10-12

## 2023-12-18 PROBLEM — I10 HYPERTENSION: Status: ACTIVE | Noted: 2023-10-12

## 2023-12-18 PROBLEM — J45.909 ASTHMA: Status: ACTIVE | Noted: 2023-10-12

## 2023-12-18 PROBLEM — R73.9 HYPERGLYCEMIA: Status: RESOLVED | Noted: 2023-10-12 | Resolved: 2023-12-18

## 2023-12-18 PROBLEM — N17.9 AKI (ACUTE KIDNEY INJURY): Status: ACTIVE | Noted: 2023-12-18

## 2023-12-18 PROBLEM — J45.909 ASTHMA: Chronic | Status: ACTIVE | Noted: 2023-10-12

## 2023-12-18 PROBLEM — N40.0 BPH (BENIGN PROSTATIC HYPERPLASIA): Chronic | Status: ACTIVE | Noted: 2023-12-18

## 2023-12-18 PROBLEM — K21.9 GASTROESOPHAGEAL REFLUX DISEASE: Status: ACTIVE | Noted: 2023-10-12

## 2023-12-18 PROBLEM — E78.5 HYPERLIPIDEMIA: Chronic | Status: ACTIVE | Noted: 2023-10-12

## 2023-12-18 PROBLEM — H43.811 VITREOUS DEGENERATION OF RIGHT EYE: Status: RESOLVED | Noted: 2023-10-12 | Resolved: 2023-12-18

## 2023-12-18 PROBLEM — R73.9 HYPERGLYCEMIA: Chronic | Status: ACTIVE | Noted: 2023-10-12

## 2023-12-18 PROBLEM — I10 HYPERTENSION: Chronic | Status: ACTIVE | Noted: 2023-10-12

## 2023-12-18 PROBLEM — R73.9 HYPERGLYCEMIA: Status: ACTIVE | Noted: 2023-10-12

## 2023-12-18 PROBLEM — J44.9 COPD (CHRONIC OBSTRUCTIVE PULMONARY DISEASE): Chronic | Status: ACTIVE | Noted: 2023-12-18

## 2023-12-18 PROBLEM — E78.5 HYPERLIPIDEMIA: Status: ACTIVE | Noted: 2023-10-12

## 2023-12-18 PROBLEM — J96.01 ACUTE RESPIRATORY FAILURE WITH HYPOXIA: Status: ACTIVE | Noted: 2023-12-18

## 2023-12-18 PROBLEM — K21.9 GASTROESOPHAGEAL REFLUX DISEASE: Chronic | Status: ACTIVE | Noted: 2023-10-12

## 2023-12-18 LAB
ALBUMIN SERPL-MCNC: 2.9 G/DL (ref 3.5–5.2)
ALBUMIN/GLOB SERPL: 1.2 G/DL
ALP SERPL-CCNC: 53 U/L (ref 39–117)
ALT SERPL W P-5'-P-CCNC: 64 U/L (ref 1–41)
ANION GAP SERPL CALCULATED.3IONS-SCNC: 11 MMOL/L (ref 5–15)
APTT PPP: 28.7 SECONDS (ref 24–31)
APTT PPP: 58.7 SECONDS (ref 24–31)
AST SERPL-CCNC: 67 U/L (ref 1–40)
BASE DEFICIT: ABNORMAL
BASE DEFICIT: ABNORMAL
BASE EXCESS BLDA CALC-SCNC: <0 MMOL/L (ref 0–3)
BASE EXCESS BLDA CALC-SCNC: <0 MMOL/L (ref 0–3)
BASOPHILS # BLD AUTO: 0 10*3/MM3 (ref 0–0.2)
BASOPHILS NFR BLD AUTO: 0.2 % (ref 0–1.5)
BILIRUB SERPL-MCNC: 1.4 MG/DL (ref 0–1.2)
BUN SERPL-MCNC: 14 MG/DL (ref 8–23)
BUN/CREAT SERPL: 9.5 (ref 7–25)
CA-I BLDA-SCNC: 0.87 MMOL/L (ref 1.12–1.32)
CA-I BLDA-SCNC: 1.14 MMOL/L (ref 1.12–1.32)
CA-I SERPL ISE-MCNC: 1.07 MMOL/L (ref 1.2–1.3)
CALCIUM SPEC-SCNC: 7.6 MG/DL (ref 8.6–10.5)
CHLORIDE SERPL-SCNC: 104 MMOL/L (ref 98–107)
CO2 BLDA-SCNC: 22 MMOL/L (ref 23–27)
CO2 BLDA-SCNC: 24 MMOL/L (ref 23–27)
CO2 SERPL-SCNC: 21 MMOL/L (ref 22–29)
CREAT SERPL-MCNC: 1.48 MG/DL (ref 0.76–1.27)
DEPRECATED RDW RBC AUTO: 49 FL (ref 37–54)
DEPRECATED RDW RBC AUTO: 49.4 FL (ref 37–54)
EGFRCR SERPLBLD CKD-EPI 2021: 52.2 ML/MIN/1.73
EOSINOPHIL # BLD AUTO: 0 10*3/MM3 (ref 0–0.4)
EOSINOPHIL NFR BLD AUTO: 0 % (ref 0.3–6.2)
ERYTHROCYTE [DISTWIDTH] IN BLOOD BY AUTOMATED COUNT: 17 % (ref 12.3–15.4)
ERYTHROCYTE [DISTWIDTH] IN BLOOD BY AUTOMATED COUNT: 17.5 % (ref 12.3–15.4)
FIBRINOGEN PPP-MCNC: 283 MG/DL (ref 210–450)
FIBRINOGEN PPP-MCNC: 381 MG/DL (ref 210–450)
GLOBULIN UR ELPH-MCNC: 2.5 GM/DL
GLUCOSE BLDC GLUCOMTR-MCNC: 188 MG/DL (ref 70–105)
GLUCOSE BLDC GLUCOMTR-MCNC: 216 MG/DL (ref 70–105)
GLUCOSE SERPL-MCNC: 169 MG/DL (ref 65–99)
HCO3 BLDA-SCNC: 20.8 MMOL/L (ref 22–26)
HCO3 BLDA-SCNC: 22.6 MMOL/L (ref 22–26)
HCT VFR BLD AUTO: 28.3 % (ref 37.5–51)
HCT VFR BLD AUTO: 30.6 % (ref 37.5–51)
HCT VFR BLDA CALC: 25 % (ref 38–51)
HCT VFR BLDA CALC: 30 % (ref 38–51)
HGB BLD-MCNC: 9.1 G/DL (ref 13–17.7)
HGB BLD-MCNC: 9.9 G/DL (ref 13–17.7)
HGB BLDA-MCNC: 10.2 G/DL (ref 12–17)
HGB BLDA-MCNC: 8.5 G/DL (ref 12–17)
INR PPP: 1.08 (ref 0.93–1.1)
INR PPP: 1.24 (ref 0.93–1.1)
LYMPHOCYTES # BLD AUTO: 0.9 10*3/MM3 (ref 0.7–3.1)
LYMPHOCYTES NFR BLD AUTO: 5.2 % (ref 19.6–45.3)
MAGNESIUM SERPL-MCNC: 1.5 MG/DL (ref 1.6–2.4)
MCH RBC QN AUTO: 25.8 PG (ref 26.6–33)
MCH RBC QN AUTO: 26.1 PG (ref 26.6–33)
MCHC RBC AUTO-ENTMCNC: 32.2 G/DL (ref 31.5–35.7)
MCHC RBC AUTO-ENTMCNC: 32.4 G/DL (ref 31.5–35.7)
MCV RBC AUTO: 79.6 FL (ref 79–97)
MCV RBC AUTO: 80.9 FL (ref 79–97)
MONOCYTES # BLD AUTO: 2 10*3/MM3 (ref 0.1–0.9)
MONOCYTES NFR BLD AUTO: 11.2 % (ref 5–12)
NEUTROPHILS NFR BLD AUTO: 14.6 10*3/MM3 (ref 1.7–7)
NEUTROPHILS NFR BLD AUTO: 83.4 % (ref 42.7–76)
NRBC BLD AUTO-RTO: 0 /100 WBC (ref 0–0.2)
PCO2 BLDA: 41.4 MM HG (ref 35–45)
PCO2 BLDA: 42.5 MM HG (ref 35–45)
PH BLDA: 7.3 PH UNITS (ref 7.35–7.45)
PH BLDA: 7.35 PH UNITS (ref 7.35–7.45)
PHOSPHATE SERPL-MCNC: 4.6 MG/DL (ref 2.5–4.5)
PLATELET # BLD AUTO: 227 10*3/MM3 (ref 140–450)
PLATELET # BLD AUTO: 228 10*3/MM3 (ref 140–450)
PMV BLD AUTO: 7.2 FL (ref 6–12)
PMV BLD AUTO: 7.4 FL (ref 6–12)
PO2 BLDA: 189 MM HG (ref 80–105)
PO2 BLDA: 216 MM HG (ref 80–105)
POTASSIUM BLDA-SCNC: 4.5 MMOL/L (ref 3.5–4.9)
POTASSIUM BLDA-SCNC: 4.7 MMOL/L (ref 3.5–4.9)
POTASSIUM SERPL-SCNC: 4.9 MMOL/L (ref 3.5–5.2)
PROT SERPL-MCNC: 5.4 G/DL (ref 6–8.5)
PROTHROMBIN TIME: 11.7 SECONDS (ref 9.6–11.7)
PROTHROMBIN TIME: 13.3 SECONDS (ref 9.6–11.7)
RBC # BLD AUTO: 3.49 10*6/MM3 (ref 4.14–5.8)
RBC # BLD AUTO: 3.85 10*6/MM3 (ref 4.14–5.8)
SAO2 % BLDCOA: 100 % (ref 95–98)
SAO2 % BLDCOA: 100 % (ref 95–98)
SODIUM BLD-SCNC: 134 MMOL/L (ref 138–146)
SODIUM BLD-SCNC: 136 MMOL/L (ref 138–146)
SODIUM SERPL-SCNC: 136 MMOL/L (ref 136–145)
WBC NRBC COR # BLD AUTO: 14.7 10*3/MM3 (ref 3.4–10.8)
WBC NRBC COR # BLD AUTO: 17.5 10*3/MM3 (ref 3.4–10.8)

## 2023-12-18 PROCEDURE — 25010000002 PROPOFOL 10 MG/ML EMULSION: Performed by: NURSE PRACTITIONER

## 2023-12-18 PROCEDURE — 25010000002 HYDROMORPHONE 1 MG/ML SOLUTION: Performed by: NURSE ANESTHETIST, CERTIFIED REGISTERED

## 2023-12-18 PROCEDURE — 83735 ASSAY OF MAGNESIUM: CPT | Performed by: NURSE PRACTITIONER

## 2023-12-18 PROCEDURE — 25810000003 SODIUM CHLORIDE 0.9 % SOLUTION: Performed by: NURSE ANESTHETIST, CERTIFIED REGISTERED

## 2023-12-18 PROCEDURE — 82947 ASSAY GLUCOSE BLOOD QUANT: CPT

## 2023-12-18 PROCEDURE — 88307 TISSUE EXAM BY PATHOLOGIST: CPT | Performed by: UROLOGY

## 2023-12-18 PROCEDURE — 25810000003 LACTATED RINGERS PER 1000 ML: Performed by: ANESTHESIOLOGY

## 2023-12-18 PROCEDURE — 85027 COMPLETE CBC AUTOMATED: CPT | Performed by: UROLOGY

## 2023-12-18 PROCEDURE — 25010000002 FUROSEMIDE PER 20 MG: Performed by: NURSE ANESTHETIST, CERTIFIED REGISTERED

## 2023-12-18 PROCEDURE — 25810000003 SODIUM CHLORIDE 0.9 % SOLUTION: Performed by: STUDENT IN AN ORGANIZED HEALTH CARE EDUCATION/TRAINING PROGRAM

## 2023-12-18 PROCEDURE — 25010000002 MIDAZOLAM PER 1 MG: Performed by: NURSE ANESTHETIST, CERTIFIED REGISTERED

## 2023-12-18 PROCEDURE — 94799 UNLISTED PULMONARY SVC/PX: CPT

## 2023-12-18 PROCEDURE — 71045 X-RAY EXAM CHEST 1 VIEW: CPT

## 2023-12-18 PROCEDURE — P9041 ALBUMIN (HUMAN),5%, 50ML: HCPCS | Performed by: NURSE ANESTHETIST, CERTIFIED REGISTERED

## 2023-12-18 PROCEDURE — 85014 HEMATOCRIT: CPT

## 2023-12-18 PROCEDURE — 25010000002 DEXAMETHASONE PER 1 MG: Performed by: NURSE ANESTHETIST, CERTIFIED REGISTERED

## 2023-12-18 PROCEDURE — 0TT10ZZ RESECTION OF LEFT KIDNEY, OPEN APPROACH: ICD-10-PCS | Performed by: UROLOGY

## 2023-12-18 PROCEDURE — 84132 ASSAY OF SERUM POTASSIUM: CPT

## 2023-12-18 PROCEDURE — 06C00ZZ EXTIRPATION OF MATTER FROM INFERIOR VENA CAVA, OPEN APPROACH: ICD-10-PCS | Performed by: UROLOGY

## 2023-12-18 PROCEDURE — P9059 PLASMA, FRZ BETWEEN 8-24HOUR: HCPCS

## 2023-12-18 PROCEDURE — C1751 CATH, INF, PER/CENT/MIDLINE: HCPCS

## 2023-12-18 PROCEDURE — 25010000002 PROPOFOL 200 MG/20ML EMULSION: Performed by: NURSE ANESTHETIST, CERTIFIED REGISTERED

## 2023-12-18 PROCEDURE — 82330 ASSAY OF CALCIUM: CPT

## 2023-12-18 PROCEDURE — 02HV33Z INSERTION OF INFUSION DEVICE INTO SUPERIOR VENA CAVA, PERCUTANEOUS APPROACH: ICD-10-PCS | Performed by: UROLOGY

## 2023-12-18 PROCEDURE — 86927 PLASMA FRESH FROZEN: CPT

## 2023-12-18 PROCEDURE — 84100 ASSAY OF PHOSPHORUS: CPT | Performed by: NURSE PRACTITIONER

## 2023-12-18 PROCEDURE — 84295 ASSAY OF SERUM SODIUM: CPT

## 2023-12-18 PROCEDURE — P9016 RBC LEUKOCYTES REDUCED: HCPCS

## 2023-12-18 PROCEDURE — 86900 BLOOD TYPING SEROLOGIC ABO: CPT

## 2023-12-18 PROCEDURE — 25810000003 SODIUM CHLORIDE 0.9 % SOLUTION: Performed by: UROLOGY

## 2023-12-18 PROCEDURE — 25810000003 LACTATED RINGERS PER 1000 ML: Performed by: NURSE ANESTHETIST, CERTIFIED REGISTERED

## 2023-12-18 PROCEDURE — 25010000002 CEFAZOLIN PER 500 MG: Performed by: UROLOGY

## 2023-12-18 PROCEDURE — 74018 RADEX ABDOMEN 1 VIEW: CPT

## 2023-12-18 PROCEDURE — 80053 COMPREHEN METABOLIC PANEL: CPT | Performed by: NURSE PRACTITIONER

## 2023-12-18 PROCEDURE — 85384 FIBRINOGEN ACTIVITY: CPT | Performed by: NURSE PRACTITIONER

## 2023-12-18 PROCEDURE — 85384 FIBRINOGEN ACTIVITY: CPT | Performed by: UROLOGY

## 2023-12-18 PROCEDURE — 88342 IMHCHEM/IMCYTCHM 1ST ANTB: CPT | Performed by: UROLOGY

## 2023-12-18 PROCEDURE — 94002 VENT MGMT INPAT INIT DAY: CPT

## 2023-12-18 PROCEDURE — 25010000002 ALBUMIN HUMAN 5% PER 50 ML: Performed by: NURSE ANESTHETIST, CERTIFIED REGISTERED

## 2023-12-18 PROCEDURE — 25010000002 BUPIVACAINE 0.5 % SOLUTION: Performed by: UROLOGY

## 2023-12-18 PROCEDURE — 85610 PROTHROMBIN TIME: CPT | Performed by: NURSE PRACTITIONER

## 2023-12-18 PROCEDURE — 25010000002 FENTANYL CITRATE (PF) 100 MCG/2ML SOLUTION: Performed by: NURSE ANESTHETIST, CERTIFIED REGISTERED

## 2023-12-18 PROCEDURE — 36430 TRANSFUSION BLD/BLD COMPNT: CPT

## 2023-12-18 PROCEDURE — 82803 BLOOD GASES ANY COMBINATION: CPT

## 2023-12-18 PROCEDURE — C1889 IMPLANT/INSERT DEVICE, NOC: HCPCS | Performed by: UROLOGY

## 2023-12-18 PROCEDURE — 82330 ASSAY OF CALCIUM: CPT | Performed by: NURSE PRACTITIONER

## 2023-12-18 PROCEDURE — 25010000002 MAGNESIUM SULFATE IN D5W 1G/100ML (PREMIX) 1-5 GM/100ML-% SOLUTION: Performed by: STUDENT IN AN ORGANIZED HEALTH CARE EDUCATION/TRAINING PROGRAM

## 2023-12-18 PROCEDURE — 85025 COMPLETE CBC W/AUTO DIFF WBC: CPT | Performed by: NURSE PRACTITIONER

## 2023-12-18 PROCEDURE — 85730 THROMBOPLASTIN TIME PARTIAL: CPT | Performed by: NURSE PRACTITIONER

## 2023-12-18 PROCEDURE — 85730 THROMBOPLASTIN TIME PARTIAL: CPT | Performed by: UROLOGY

## 2023-12-18 PROCEDURE — 85610 PROTHROMBIN TIME: CPT | Performed by: UROLOGY

## 2023-12-18 DEVICE — CURVED TIP INTELLIGENT RELOAD AND INTRODUCER
Type: IMPLANTABLE DEVICE | Site: ABDOMEN | Status: FUNCTIONAL
Brand: TRI-STAPLE 2.0

## 2023-12-18 DEVICE — CLIP LIGAT VASC HORIZON TI LG ORNG 6CT: Type: IMPLANTABLE DEVICE | Site: ABDOMEN | Status: FUNCTIONAL

## 2023-12-18 DEVICE — FLOSEAL WITH RECOTHROM - 10ML.
Type: IMPLANTABLE DEVICE | Site: ABDOMEN | Status: FUNCTIONAL
Brand: FLOSEAL HEMOSTATIC MATRIX

## 2023-12-18 DEVICE — SEAL HEMO SURG ARISTA/AH ABS/PWDR 3GM: Type: IMPLANTABLE DEVICE | Site: ABDOMEN | Status: FUNCTIONAL

## 2023-12-18 DEVICE — LIGACLIP MCA MULTIPLE CLIP APPLIERS, 20 MEDIUM CLIPS
Type: IMPLANTABLE DEVICE | Site: ABDOMEN | Status: FUNCTIONAL
Brand: LIGACLIP

## 2023-12-18 RX ORDER — ACETAMINOPHEN 325 MG/1
650 TABLET ORAL EVERY 4 HOURS PRN
Status: DISCONTINUED | OUTPATIENT
Start: 2023-12-18 | End: 2023-12-19

## 2023-12-18 RX ORDER — PROMETHAZINE HYDROCHLORIDE 25 MG/1
25 TABLET ORAL ONCE AS NEEDED
Status: DISCONTINUED | OUTPATIENT
Start: 2023-12-18 | End: 2023-12-18 | Stop reason: HOSPADM

## 2023-12-18 RX ORDER — PROMETHAZINE HYDROCHLORIDE 25 MG/1
25 SUPPOSITORY RECTAL ONCE AS NEEDED
Status: DISCONTINUED | OUTPATIENT
Start: 2023-12-18 | End: 2023-12-18 | Stop reason: HOSPADM

## 2023-12-18 RX ORDER — DIPHENHYDRAMINE HYDROCHLORIDE 50 MG/ML
12.5 INJECTION INTRAMUSCULAR; INTRAVENOUS ONCE AS NEEDED
Status: DISCONTINUED | OUTPATIENT
Start: 2023-12-18 | End: 2023-12-18 | Stop reason: HOSPADM

## 2023-12-18 RX ORDER — POLYETHYLENE GLYCOL 3350 17 G/17G
17 POWDER, FOR SOLUTION ORAL DAILY PRN
Status: DISCONTINUED | OUTPATIENT
Start: 2023-12-18 | End: 2023-12-18

## 2023-12-18 RX ORDER — SODIUM CHLORIDE 9 MG/ML
INJECTION, SOLUTION INTRAVENOUS CONTINUOUS PRN
Status: DISCONTINUED | OUTPATIENT
Start: 2023-12-18 | End: 2023-12-18 | Stop reason: SURG

## 2023-12-18 RX ORDER — SODIUM CHLORIDE 0.9 % (FLUSH) 0.9 %
10 SYRINGE (ML) INJECTION AS NEEDED
Status: DISCONTINUED | OUTPATIENT
Start: 2023-12-18 | End: 2023-12-30

## 2023-12-18 RX ORDER — POLYETHYLENE GLYCOL 3350 17 G/17G
17 POWDER, FOR SOLUTION ORAL DAILY PRN
Status: DISCONTINUED | OUTPATIENT
Start: 2023-12-18 | End: 2023-12-19

## 2023-12-18 RX ORDER — BISACODYL 5 MG/1
5 TABLET, DELAYED RELEASE ORAL DAILY PRN
Status: DISCONTINUED | OUTPATIENT
Start: 2023-12-18 | End: 2023-12-18

## 2023-12-18 RX ORDER — HYDROCODONE BITARTRATE AND ACETAMINOPHEN 7.5; 325 MG/1; MG/1
2 TABLET ORAL EVERY 4 HOURS PRN
Status: DISCONTINUED | OUTPATIENT
Start: 2023-12-18 | End: 2023-12-18 | Stop reason: HOSPADM

## 2023-12-18 RX ORDER — IPRATROPIUM BROMIDE AND ALBUTEROL SULFATE 2.5; .5 MG/3ML; MG/3ML
3 SOLUTION RESPIRATORY (INHALATION) EVERY 6 HOURS PRN
Status: DISCONTINUED | OUTPATIENT
Start: 2023-12-18 | End: 2023-12-30

## 2023-12-18 RX ORDER — ACETAMINOPHEN 325 MG/1
650 TABLET ORAL EVERY 4 HOURS PRN
Status: DISCONTINUED | OUTPATIENT
Start: 2023-12-18 | End: 2023-12-18

## 2023-12-18 RX ORDER — BUDESONIDE AND FORMOTEROL FUMARATE DIHYDRATE 160; 4.5 UG/1; UG/1
2 AEROSOL RESPIRATORY (INHALATION)
Status: DISCONTINUED | OUTPATIENT
Start: 2023-12-18 | End: 2023-12-18

## 2023-12-18 RX ORDER — PROCHLORPERAZINE EDISYLATE 5 MG/ML
10 INJECTION INTRAMUSCULAR; INTRAVENOUS ONCE AS NEEDED
Status: DISCONTINUED | OUTPATIENT
Start: 2023-12-18 | End: 2023-12-18 | Stop reason: HOSPADM

## 2023-12-18 RX ORDER — ACETAMINOPHEN 650 MG/1
650 SUPPOSITORY RECTAL EVERY 4 HOURS PRN
Status: DISCONTINUED | OUTPATIENT
Start: 2023-12-18 | End: 2023-12-30

## 2023-12-18 RX ORDER — MEPERIDINE HYDROCHLORIDE 25 MG/ML
12.5 INJECTION INTRAMUSCULAR; INTRAVENOUS; SUBCUTANEOUS
Status: DISCONTINUED | OUTPATIENT
Start: 2023-12-18 | End: 2023-12-18 | Stop reason: HOSPADM

## 2023-12-18 RX ORDER — SODIUM CHLORIDE 0.9 % (FLUSH) 0.9 %
10 SYRINGE (ML) INJECTION AS NEEDED
Status: DISCONTINUED | OUTPATIENT
Start: 2023-12-18 | End: 2023-12-18 | Stop reason: HOSPADM

## 2023-12-18 RX ORDER — HYDRALAZINE HYDROCHLORIDE 20 MG/ML
5 INJECTION INTRAMUSCULAR; INTRAVENOUS
Status: DISCONTINUED | OUTPATIENT
Start: 2023-12-18 | End: 2023-12-18 | Stop reason: HOSPADM

## 2023-12-18 RX ORDER — BISACODYL 5 MG/1
5 TABLET, DELAYED RELEASE ORAL DAILY PRN
Status: DISCONTINUED | OUTPATIENT
Start: 2023-12-18 | End: 2023-12-19

## 2023-12-18 RX ORDER — LIDOCAINE HYDROCHLORIDE 10 MG/ML
0.5 INJECTION, SOLUTION INFILTRATION; PERINEURAL ONCE AS NEEDED
Status: DISCONTINUED | OUTPATIENT
Start: 2023-12-18 | End: 2023-12-18 | Stop reason: HOSPADM

## 2023-12-18 RX ORDER — DEXAMETHASONE SODIUM PHOSPHATE 4 MG/ML
INJECTION, SOLUTION INTRA-ARTICULAR; INTRALESIONAL; INTRAMUSCULAR; INTRAVENOUS; SOFT TISSUE AS NEEDED
Status: DISCONTINUED | OUTPATIENT
Start: 2023-12-18 | End: 2023-12-18 | Stop reason: SURG

## 2023-12-18 RX ORDER — SODIUM CHLORIDE 0.9 % (FLUSH) 0.9 %
10 SYRINGE (ML) INJECTION EVERY 12 HOURS SCHEDULED
Status: DISCONTINUED | OUTPATIENT
Start: 2023-12-18 | End: 2023-12-25

## 2023-12-18 RX ORDER — BISACODYL 10 MG
10 SUPPOSITORY, RECTAL RECTAL DAILY PRN
Status: DISCONTINUED | OUTPATIENT
Start: 2023-12-18 | End: 2023-12-19

## 2023-12-18 RX ORDER — MIDAZOLAM HYDROCHLORIDE 1 MG/ML
INJECTION INTRAMUSCULAR; INTRAVENOUS AS NEEDED
Status: DISCONTINUED | OUTPATIENT
Start: 2023-12-18 | End: 2023-12-18 | Stop reason: SURG

## 2023-12-18 RX ORDER — ONDANSETRON 2 MG/ML
4 INJECTION INTRAMUSCULAR; INTRAVENOUS EVERY 6 HOURS PRN
Status: DISCONTINUED | OUTPATIENT
Start: 2023-12-18 | End: 2023-12-18

## 2023-12-18 RX ORDER — DOCUSATE SODIUM 100 MG/1
100 CAPSULE, LIQUID FILLED ORAL 2 TIMES DAILY PRN
Status: DISCONTINUED | OUTPATIENT
Start: 2023-12-18 | End: 2023-12-18

## 2023-12-18 RX ORDER — PANTOPRAZOLE SODIUM 40 MG/1
40 TABLET, DELAYED RELEASE ORAL
Status: DISCONTINUED | OUTPATIENT
Start: 2023-12-19 | End: 2023-12-19 | Stop reason: SDUPTHER

## 2023-12-18 RX ORDER — ONDANSETRON 2 MG/ML
4 INJECTION INTRAMUSCULAR; INTRAVENOUS EVERY 6 HOURS PRN
Status: DISCONTINUED | OUTPATIENT
Start: 2023-12-18 | End: 2023-12-30

## 2023-12-18 RX ORDER — SODIUM CHLORIDE, SODIUM LACTATE, POTASSIUM CHLORIDE, CALCIUM CHLORIDE 600; 310; 30; 20 MG/100ML; MG/100ML; MG/100ML; MG/100ML
INJECTION, SOLUTION INTRAVENOUS CONTINUOUS PRN
Status: DISCONTINUED | OUTPATIENT
Start: 2023-12-18 | End: 2023-12-18 | Stop reason: SURG

## 2023-12-18 RX ORDER — LIDOCAINE HYDROCHLORIDE 20 MG/ML
INJECTION, SOLUTION EPIDURAL; INFILTRATION; INTRACAUDAL; PERINEURAL AS NEEDED
Status: DISCONTINUED | OUTPATIENT
Start: 2023-12-18 | End: 2023-12-18 | Stop reason: SURG

## 2023-12-18 RX ORDER — PHENYLEPHRINE HCL IN 0.9% NACL 1 MG/10 ML
SYRINGE (ML) INTRAVENOUS AS NEEDED
Status: DISCONTINUED | OUTPATIENT
Start: 2023-12-18 | End: 2023-12-18 | Stop reason: SURG

## 2023-12-18 RX ORDER — ROCURONIUM BROMIDE 10 MG/ML
INJECTION, SOLUTION INTRAVENOUS AS NEEDED
Status: DISCONTINUED | OUTPATIENT
Start: 2023-12-18 | End: 2023-12-18 | Stop reason: SURG

## 2023-12-18 RX ORDER — HYDROCODONE BITARTRATE AND ACETAMINOPHEN 5; 325 MG/1; MG/1
1 TABLET ORAL EVERY 4 HOURS PRN
Status: DISCONTINUED | OUTPATIENT
Start: 2023-12-18 | End: 2023-12-18

## 2023-12-18 RX ORDER — FUROSEMIDE 10 MG/ML
INJECTION INTRAMUSCULAR; INTRAVENOUS AS NEEDED
Status: DISCONTINUED | OUTPATIENT
Start: 2023-12-18 | End: 2023-12-18 | Stop reason: SURG

## 2023-12-18 RX ORDER — HYDROCODONE BITARTRATE AND ACETAMINOPHEN 5; 325 MG/1; MG/1
1 TABLET ORAL ONCE AS NEEDED
Status: DISCONTINUED | OUTPATIENT
Start: 2023-12-18 | End: 2023-12-18 | Stop reason: HOSPADM

## 2023-12-18 RX ORDER — ENOXAPARIN SODIUM 100 MG/ML
40 INJECTION SUBCUTANEOUS DAILY
Status: DISCONTINUED | OUTPATIENT
Start: 2023-12-19 | End: 2023-12-28

## 2023-12-18 RX ORDER — HYDROCHLOROTHIAZIDE 12.5 MG/1
25 TABLET ORAL DAILY
Status: DISCONTINUED | OUTPATIENT
Start: 2023-12-18 | End: 2023-12-19

## 2023-12-18 RX ORDER — AMOXICILLIN 250 MG
2 CAPSULE ORAL 2 TIMES DAILY
Status: DISCONTINUED | OUTPATIENT
Start: 2023-12-18 | End: 2023-12-18

## 2023-12-18 RX ORDER — NITROGLYCERIN 0.4 MG/1
0.4 TABLET SUBLINGUAL
Status: DISCONTINUED | OUTPATIENT
Start: 2023-12-18 | End: 2023-12-30 | Stop reason: HOSPADM

## 2023-12-18 RX ORDER — SODIUM CHLORIDE 0.9 % (FLUSH) 0.9 %
20 SYRINGE (ML) INJECTION AS NEEDED
Status: DISCONTINUED | OUTPATIENT
Start: 2023-12-18 | End: 2023-12-30

## 2023-12-18 RX ORDER — NALOXONE HCL 0.4 MG/ML
0.1 VIAL (ML) INJECTION
Status: DISCONTINUED | OUTPATIENT
Start: 2023-12-18 | End: 2023-12-30 | Stop reason: HOSPADM

## 2023-12-18 RX ORDER — ONDANSETRON 4 MG/1
4 TABLET, FILM COATED ORAL EVERY 6 HOURS PRN
Status: DISCONTINUED | OUTPATIENT
Start: 2023-12-18 | End: 2023-12-30 | Stop reason: HOSPADM

## 2023-12-18 RX ORDER — SODIUM CHLORIDE 9 MG/ML
250 INJECTION, SOLUTION INTRAVENOUS CONTINUOUS
Status: DISCONTINUED | OUTPATIENT
Start: 2023-12-18 | End: 2023-12-19

## 2023-12-18 RX ORDER — MAGNESIUM SULFATE 1 G/100ML
1 INJECTION INTRAVENOUS ONCE
Status: COMPLETED | OUTPATIENT
Start: 2023-12-18 | End: 2023-12-18

## 2023-12-18 RX ORDER — ACETAMINOPHEN 325 MG/1
650 TABLET ORAL ONCE AS NEEDED
Status: DISCONTINUED | OUTPATIENT
Start: 2023-12-18 | End: 2023-12-18 | Stop reason: HOSPADM

## 2023-12-18 RX ORDER — NOREPINEPHRINE BITARTRATE/D5W 8 MG/250ML
PLASTIC BAG, INJECTION (ML) INTRAVENOUS CONTINUOUS PRN
Status: DISCONTINUED | OUTPATIENT
Start: 2023-12-18 | End: 2023-12-18 | Stop reason: SURG

## 2023-12-18 RX ORDER — SODIUM CHLORIDE 0.9 % (FLUSH) 0.9 %
10 SYRINGE (ML) INJECTION EVERY 12 HOURS SCHEDULED
Status: DISCONTINUED | OUTPATIENT
Start: 2023-12-18 | End: 2023-12-30

## 2023-12-18 RX ORDER — PROPOFOL 10 MG/ML
INJECTION, EMULSION INTRAVENOUS AS NEEDED
Status: DISCONTINUED | OUTPATIENT
Start: 2023-12-18 | End: 2023-12-18 | Stop reason: SURG

## 2023-12-18 RX ORDER — FENTANYL CITRATE 50 UG/ML
INJECTION, SOLUTION INTRAMUSCULAR; INTRAVENOUS AS NEEDED
Status: DISCONTINUED | OUTPATIENT
Start: 2023-12-18 | End: 2023-12-18 | Stop reason: SURG

## 2023-12-18 RX ORDER — ACETAMINOPHEN 650 MG/1
325 SUPPOSITORY RECTAL EVERY 4 HOURS PRN
Status: DISCONTINUED | OUTPATIENT
Start: 2023-12-18 | End: 2023-12-18 | Stop reason: HOSPADM

## 2023-12-18 RX ORDER — BUDESONIDE 0.5 MG/2ML
0.5 INHALANT ORAL
Status: DISCONTINUED | OUTPATIENT
Start: 2023-12-18 | End: 2023-12-30 | Stop reason: HOSPADM

## 2023-12-18 RX ORDER — SODIUM CHLORIDE, SODIUM LACTATE, POTASSIUM CHLORIDE, CALCIUM CHLORIDE 600; 310; 30; 20 MG/100ML; MG/100ML; MG/100ML; MG/100ML
1000 INJECTION, SOLUTION INTRAVENOUS CONTINUOUS
Status: DISCONTINUED | OUTPATIENT
Start: 2023-12-18 | End: 2023-12-19

## 2023-12-18 RX ORDER — ACETAMINOPHEN 650 MG/1
650 SUPPOSITORY RECTAL EVERY 4 HOURS PRN
Status: DISCONTINUED | OUTPATIENT
Start: 2023-12-18 | End: 2023-12-18

## 2023-12-18 RX ORDER — CHLORHEXIDINE GLUCONATE ORAL RINSE 1.2 MG/ML
15 SOLUTION DENTAL EVERY 12 HOURS SCHEDULED
Status: DISCONTINUED | OUTPATIENT
Start: 2023-12-18 | End: 2023-12-28

## 2023-12-18 RX ORDER — BISACODYL 10 MG
10 SUPPOSITORY, RECTAL RECTAL DAILY PRN
Status: DISCONTINUED | OUTPATIENT
Start: 2023-12-18 | End: 2023-12-18

## 2023-12-18 RX ORDER — LABETALOL HYDROCHLORIDE 5 MG/ML
5 INJECTION, SOLUTION INTRAVENOUS
Status: DISCONTINUED | OUTPATIENT
Start: 2023-12-18 | End: 2023-12-18 | Stop reason: HOSPADM

## 2023-12-18 RX ORDER — SODIUM CHLORIDE 9 MG/ML
40 INJECTION, SOLUTION INTRAVENOUS AS NEEDED
Status: DISCONTINUED | OUTPATIENT
Start: 2023-12-18 | End: 2023-12-30

## 2023-12-18 RX ORDER — OXYCODONE HCL 10 MG/1
10 TABLET, FILM COATED, EXTENDED RELEASE ORAL EVERY 12 HOURS SCHEDULED
Status: DISCONTINUED | OUTPATIENT
Start: 2023-12-18 | End: 2023-12-18 | Stop reason: HOSPADM

## 2023-12-18 RX ORDER — EPHEDRINE SULFATE 5 MG/ML
INJECTION INTRAVENOUS AS NEEDED
Status: DISCONTINUED | OUTPATIENT
Start: 2023-12-18 | End: 2023-12-18 | Stop reason: SURG

## 2023-12-18 RX ORDER — ACETAMINOPHEN 500 MG
1000 TABLET ORAL ONCE
Status: COMPLETED | OUTPATIENT
Start: 2023-12-18 | End: 2023-12-18

## 2023-12-18 RX ORDER — BUPIVACAINE HYDROCHLORIDE 5 MG/ML
INJECTION, SOLUTION PERINEURAL AS NEEDED
Status: DISCONTINUED | OUTPATIENT
Start: 2023-12-18 | End: 2023-12-18 | Stop reason: HOSPADM

## 2023-12-18 RX ORDER — NOREPINEPHRINE BITARTRATE 0.03 MG/ML
.02-.5 INJECTION, SOLUTION INTRAVENOUS
Status: DISCONTINUED | OUTPATIENT
Start: 2023-12-18 | End: 2023-12-19

## 2023-12-18 RX ORDER — AMOXICILLIN 250 MG
2 CAPSULE ORAL 2 TIMES DAILY
Status: DISCONTINUED | OUTPATIENT
Start: 2023-12-18 | End: 2023-12-19

## 2023-12-18 RX ORDER — ALBUMIN, HUMAN INJ 5% 5 %
SOLUTION INTRAVENOUS CONTINUOUS PRN
Status: DISCONTINUED | OUTPATIENT
Start: 2023-12-18 | End: 2023-12-18 | Stop reason: SURG

## 2023-12-18 RX ORDER — ONDANSETRON 4 MG/1
4 TABLET, FILM COATED ORAL EVERY 6 HOURS PRN
Status: DISCONTINUED | OUTPATIENT
Start: 2023-12-18 | End: 2023-12-18

## 2023-12-18 RX ADMIN — PROPOFOL INJECTABLE EMULSION 40 MCG/KG/MIN: 10 INJECTION, EMULSION INTRAVENOUS at 21:57

## 2023-12-18 RX ADMIN — DEXAMETHASONE SODIUM PHOSPHATE 4 MG: 4 INJECTION, SOLUTION INTRAMUSCULAR; INTRAVENOUS at 07:41

## 2023-12-18 RX ADMIN — ALBUMIN (HUMAN): 12.5 INJECTION, SOLUTION INTRAVENOUS at 08:52

## 2023-12-18 RX ADMIN — Medication 100 MCG: at 11:41

## 2023-12-18 RX ADMIN — SODIUM CHLORIDE 100 ML/HR: 9 INJECTION, SOLUTION INTRAVENOUS at 21:57

## 2023-12-18 RX ADMIN — MAGNESIUM SULFATE IN DEXTROSE 1 G: 10 INJECTION, SOLUTION INTRAVENOUS at 21:55

## 2023-12-18 RX ADMIN — FUROSEMIDE 20 MG: 10 INJECTION, SOLUTION INTRAMUSCULAR; INTRAVENOUS at 12:17

## 2023-12-18 RX ADMIN — Medication 10 ML: at 21:58

## 2023-12-18 RX ADMIN — Medication 100 MCG: at 07:53

## 2023-12-18 RX ADMIN — Medication 100 MCG: at 09:50

## 2023-12-18 RX ADMIN — MIDAZOLAM 2 MG: 1 INJECTION INTRAMUSCULAR; INTRAVENOUS at 07:30

## 2023-12-18 RX ADMIN — ALBUMIN (HUMAN): 12.5 INJECTION, SOLUTION INTRAVENOUS at 11:08

## 2023-12-18 RX ADMIN — ROCURONIUM BROMIDE 20 MG: 10 INJECTION, SOLUTION INTRAVENOUS at 10:21

## 2023-12-18 RX ADMIN — ROCURONIUM BROMIDE 10 MG: 10 INJECTION, SOLUTION INTRAVENOUS at 13:09

## 2023-12-18 RX ADMIN — ROCURONIUM BROMIDE 30 MG: 10 INJECTION, SOLUTION INTRAVENOUS at 08:09

## 2023-12-18 RX ADMIN — SODIUM CHLORIDE 1000 ML: 9 INJECTION, SOLUTION INTRAVENOUS at 21:56

## 2023-12-18 RX ADMIN — SODIUM CHLORIDE, SODIUM LACTATE, POTASSIUM CHLORIDE, AND CALCIUM CHLORIDE: .6; .31; .03; .02 INJECTION, SOLUTION INTRAVENOUS at 13:00

## 2023-12-18 RX ADMIN — EPHEDRINE SULFATE 5 MG: 5 INJECTION INTRAVENOUS at 07:53

## 2023-12-18 RX ADMIN — SODIUM CHLORIDE, SODIUM LACTATE, POTASSIUM CHLORIDE, AND CALCIUM CHLORIDE: .6; .31; .03; .02 INJECTION, SOLUTION INTRAVENOUS at 07:28

## 2023-12-18 RX ADMIN — Medication 100 MCG: at 10:44

## 2023-12-18 RX ADMIN — ROCURONIUM BROMIDE 20 MG: 10 INJECTION, SOLUTION INTRAVENOUS at 07:59

## 2023-12-18 RX ADMIN — ROCURONIUM BROMIDE 10 MG: 10 INJECTION, SOLUTION INTRAVENOUS at 11:01

## 2023-12-18 RX ADMIN — CEFAZOLIN 2 G: 2 INJECTION, POWDER, FOR SOLUTION INTRAMUSCULAR; INTRAVENOUS at 11:37

## 2023-12-18 RX ADMIN — Medication 100 MCG: at 11:44

## 2023-12-18 RX ADMIN — PROPOFOL INJECTABLE EMULSION 40 MCG/KG/MIN: 10 INJECTION, EMULSION INTRAVENOUS at 18:18

## 2023-12-18 RX ADMIN — LIDOCAINE HYDROCHLORIDE 100 MG: 20 INJECTION, SOLUTION EPIDURAL; INFILTRATION; INTRACAUDAL; PERINEURAL at 07:36

## 2023-12-18 RX ADMIN — ROCURONIUM BROMIDE 50 MG: 10 INJECTION, SOLUTION INTRAVENOUS at 07:36

## 2023-12-18 RX ADMIN — ROCURONIUM BROMIDE 10 MG: 10 INJECTION, SOLUTION INTRAVENOUS at 10:31

## 2023-12-18 RX ADMIN — HYDROMORPHONE HYDROCHLORIDE 1 MG: 1 INJECTION, SOLUTION INTRAMUSCULAR; INTRAVENOUS; SUBCUTANEOUS at 13:39

## 2023-12-18 RX ADMIN — Medication 100 MCG: at 08:41

## 2023-12-18 RX ADMIN — NOREPINEPHRINE BITARTRATE 0.04 MCG/KG/MIN: 8 INJECTION, SOLUTION INTRAVENOUS at 12:30

## 2023-12-18 RX ADMIN — OXYCODONE HYDROCHLORIDE 10 MG: 10 TABLET, FILM COATED, EXTENDED RELEASE ORAL at 06:49

## 2023-12-18 RX ADMIN — Medication 100 MCG: at 07:46

## 2023-12-18 RX ADMIN — ROCURONIUM BROMIDE 10 MG: 10 INJECTION, SOLUTION INTRAVENOUS at 09:37

## 2023-12-18 RX ADMIN — FENTANYL CITRATE 100 MCG: 50 INJECTION, SOLUTION INTRAMUSCULAR; INTRAVENOUS at 07:32

## 2023-12-18 RX ADMIN — SODIUM CHLORIDE 100 ML/HR: 9 INJECTION, SOLUTION INTRAVENOUS at 17:39

## 2023-12-18 RX ADMIN — FENTANYL CITRATE 50 MCG: 50 INJECTION, SOLUTION INTRAMUSCULAR; INTRAVENOUS at 07:59

## 2023-12-18 RX ADMIN — DOCUSATE SODIUM 50MG AND SENNOSIDES 8.6MG 2 TABLET: 8.6; 5 TABLET, FILM COATED ORAL at 21:54

## 2023-12-18 RX ADMIN — Medication 100 MCG: at 11:55

## 2023-12-18 RX ADMIN — CEFAZOLIN 2 G: 2 INJECTION, POWDER, FOR SOLUTION INTRAMUSCULAR; INTRAVENOUS at 07:37

## 2023-12-18 RX ADMIN — CHLORHEXIDINE GLUCONATE 15 ML: 1.2 RINSE ORAL at 21:58

## 2023-12-18 RX ADMIN — SODIUM CHLORIDE: 9 INJECTION, SOLUTION INTRAVENOUS at 07:44

## 2023-12-18 RX ADMIN — Medication 100 MCG: at 08:04

## 2023-12-18 RX ADMIN — Medication 100 MCG: at 09:34

## 2023-12-18 RX ADMIN — SODIUM CHLORIDE, POTASSIUM CHLORIDE, SODIUM LACTATE AND CALCIUM CHLORIDE 1000 ML: 600; 310; 30; 20 INJECTION, SOLUTION INTRAVENOUS at 06:47

## 2023-12-18 RX ADMIN — Medication 100 MCG: at 11:37

## 2023-12-18 RX ADMIN — ACETAMINOPHEN 1000 MG: 500 TABLET, FILM COATED ORAL at 06:48

## 2023-12-18 RX ADMIN — SODIUM CHLORIDE: 9 INJECTION, SOLUTION INTRAVENOUS at 11:40

## 2023-12-18 RX ADMIN — Medication 100 MCG: at 08:20

## 2023-12-18 RX ADMIN — FENTANYL CITRATE 50 MCG: 50 INJECTION, SOLUTION INTRAMUSCULAR; INTRAVENOUS at 13:38

## 2023-12-18 RX ADMIN — Medication 100 MCG: at 08:09

## 2023-12-18 RX ADMIN — PROPOFOL 200 MG: 10 INJECTION, EMULSION INTRAVENOUS at 07:36

## 2023-12-18 RX ADMIN — SODIUM CHLORIDE: 9 INJECTION, SOLUTION INTRAVENOUS at 13:01

## 2023-12-18 RX ADMIN — ROCURONIUM BROMIDE 10 MG: 10 INJECTION, SOLUTION INTRAVENOUS at 09:05

## 2023-12-18 RX ADMIN — Medication 100 MCG: at 08:45

## 2023-12-18 NOTE — ANESTHESIA POSTPROCEDURE EVALUATION
Patient: Louis Andino    Procedure Summary       Date: 12/18/23 Room / Location: [facility] OR  / [facility] MAIN OR    Anesthesia Start: 0727 Anesthesia Stop: 1425    Procedure: NEPHRECTOMY RADICAL WITH CAVAL THROMBECTOMY (Left: Abdomen) Diagnosis:       Other specified disorders of kidney and ureter      Renal mass      (Other specified disorders of kidney and ureter [N28.89])      (Renal mass [N28.89])    Surgeons: James Esquivel MD Provider: Migue Garces MD    Anesthesia Type: general ASA Status: 3            Anesthesia Type: general    Vitals  Vitals Value Taken Time   /69 12/18/23 1425   Temp 98.4 °F (36.9 °C) 12/18/23 1410   Pulse 90 12/18/23 1434   Resp 14 12/18/23 1425   SpO2 100 % 12/18/23 1434   Vitals shown include unfiled device data.        Post Anesthesia Care and Evaluation    Patient location during evaluation: PACU  Patient participation: complete - patient cannot participate  Level of consciousness: obtunded/minimal responses  Pain scale: See nurse's notes for pain score.  Pain management: adequate    Airway patency: patent  Anesthetic complications: No anesthetic complications  PONV Status: none  Cardiovascular status: acceptable  Respiratory status: acceptable, ventilator and ETT  Hydration status: acceptable    Comments: Patient seen and examined postoperatively; vital signs stable; SpO2 greater than or equal to 90%; cardiopulmonary status stable; nausea/vomiting adequately controlled; pain adequately controlled; no apparent anesthesia complications; patient discharged from anesthesia care when discharge criteria were met

## 2023-12-18 NOTE — ANESTHESIA PROCEDURE NOTES
Arterial Line      Patient location during procedure: OR  Start time: 12/18/2023 10:30 AM  Stop Time:12/18/2023 10:42 AM       Line placed for hemodynamic monitoring and ABGs/Labs/ISTAT.  Performed By   Anesthesiologist: Migue Garces MD   Preanesthetic Checklist  Completed: patient identified, IV checked, site marked, risks and benefits discussed, surgical consent, monitors and equipment checked, pre-op evaluation and timeout performed  Arterial Line Prep    Sterile Tech: cap, mask and gloves  Prep: ChloraPrep  Patient monitoring: blood pressure monitoring, continuous pulse oximetry and EKG  Arterial Line Procedure   Laterality:right  Location:  radial artery  Catheter size: 20 G   Guidance: ultrasound guided  Number of attempts: 2  Successful placement: yes Images: still images not obtained  Post Assessment   Dressing Type: occlusive dressing applied, secured with tape and wrist guard applied.   Complications no  Circ/Move/Sens Assessment: normal and unchanged.   Patient Tolerance: patient tolerated the procedure well with no apparent complications  Additional Notes  A-line place for significant blood loss due to large renal tumor

## 2023-12-18 NOTE — ANESTHESIA PROCEDURE NOTES
Airway  Urgency: elective    Date/Time: 12/18/2023 7:37 AM  Airway not difficult    General Information and Staff    Patient location during procedure: OR  Anesthesiologist: Migue Garces MD  CRNA/CAA: Marlon Dumont CRNA    Indications and Patient Condition  Indications for airway management: airway protection    Preoxygenated: yes  MILS maintained throughout  Mask difficulty assessment: 2 - vent by mask + OA or adjuvant +/- NMBA    Final Airway Details  Final airway type: endotracheal airway      Successful airway: ETT  Cuffed: yes   Successful intubation technique: video laryngoscopy  Endotracheal tube insertion site: oral  Blade: Brownlee  Blade size: 3  ETT size (mm): 7.5  Cormack-Lehane Classification: grade I - full view of glottis  Placement verified by: chest auscultation and capnometry   Cuff volume (mL): 8  Measured from: lips  ETT/EBT  to lips (cm): 23  Number of attempts at approach: 1  Assessment: lips, teeth, and gum same as pre-op and atraumatic intubation

## 2023-12-18 NOTE — OP NOTE
Urology Operative Note    12/18/2023    Louis Andino  63 y.o.  1960  male  [id number]      Surgeon(s) and Role:  James Esquivel MD - Primary, Dr MIGDALIA Jones MD - Assist, Dr. Baumann - Assist    Pre-operative Diagnosis: Large left renal mass    Post-operative Diagnosis: Same + IVC tumor thrombus    Complications: None    Procedures:    Left open radical nephrectomy and IVC thrombectomy    Indications   Louis Andino is a 63 y.o. male who was found to have a large left renal mass    During the informed consent process, the procedure was discussed in detail including but not limited to the risks of bleeding, infection, damage to surrounding structures and perioperative mortality.    Description of procedure:  The patient was properly identified in the preoperative holding area and taken to the operating room were general anesthesia was induced. The patient was positioned, secured to the table, prepped and draped in a sterile fashion. The patient was given antibiotics intravenously before the start of the surgery. After ensuring that all of the required equipment was ready and available a surgical timeout was performed.     Incision was made at the subcostal margin 2 fingerbreadths below the rib cage.  Layers sequentially dissected down through the fat and the fascia using Bovie cautery.  Once the muscle was divided the posterior sheath and peritoneum were incised sharply.  Colon was then swept medially and Bookwalter placed.  Good retraction was able to expose the kidney.  This was carefully dissected medially but there was significant sticky fat and accessory blood vessels making dissection difficult. The kidney was poorly mobile due to this.  The inferior pole was brought up and dissection continued towards the hilum.  Spleen was also retracted and out of the operative field.  The hilum was then identified with significant thick tissue surrounding and not easily dissected clear. There was persistent  oozing during the case and blood transfusions began after one liter of blood loss. Brisk bleeding was encountered at the hilum and pressure applied to control. At this point we called for extra assistance from Dr Baumann who arrived and scrubbed in. Dr Lopes with general surgery also scrubbed in prior to this to assist with controlling the bleeding. Clamp was placed and hemostasis was achieved across the hilum.  We then decided to kocherized the duodenum to get the renal vein at a more proximal point to better control the bleeding.  After kocherizing the duodenum and replacing the Bookwalter retractors the IVC was dissected clear and the right and left renal vein identified.  In the IVC with palpation there was tumor thrombus.  Therefore the IVC was skeletonized and Vesseloops passed inferiorly and superiorly to the tumor thrombus as well as around the right renal vein.  Attention was then turned back to the hilum and the Bookwalter was again repositioned.  The artery was then dissected there was some bleeding encountered off the aorta which was controlled with silk suture.  The artery was then identified and taken with a 45 mm vascular load stapler.  Multiple staple loads were taken to take out the medial upper pole attachments as well.  Once hemostasis was obtained and the artery had been ligated the Bookwalter was repositioned for the tumor thrombectomy.  Anesthesia was given time to prepare and blood was being transfused and labs checked regularly.  Vascular clamp was then placed inferiorly then superiorly on the IVC.  15 blade was then used to open the IVC in the longitudinal position.  The tumor thrombus then popped through the incision.  Care was taken to further open the IVC up down to the left renal vein and the tumor thrombus was removed.  4-0 Prolene was then used to close the IVC in a running fashion x 2.  The clamps were then removed there was minimal bleeding which was further controlled with clips  and additional Prolene.  Floseal was placed over the IVC and the Bookwalter repositioned to complete the nephrectomy.  The left renal vein was then free and hilum was completely free.  The upper pole attachments were then removed and the kidney was freed and sent to pathology.  The left adrenal gland was then removed separately using clips for hemostasis.  Multiple additional clips used to control further bleeding in the renal fossa.  There was no injury to the spleen, the pancreas was freed from the area of dissection and there was no further bleeding from the aorta.  Tamanna was then placed in the renal fossa.  Attention was turned back to the IVC to ensure adequate hemostasis.  There was additional bleeding seen which was then controlled by applying pressure and clips to some posterior bleeding.  Tamanna was then applied.  The bowel was then replaced in the anatomic position. The fascia was closed with looped PDS in 2 layers.  Staples applied to the skin.    The procedure was complicated by IVC tumor thrombus and large volume blood loss. The patient remained intubated and will be admitted to the ICU.     I was present and scrubbed for the entire procedure.     Specimens: Left kidney, left adrenal gland, IVC tumor thrombus    Estimated Blood Loss: 3500mL  Fluids: 5U PRBCs, 2U FFP         James Esquivel MD  [facility]  [facility], [address]  [place] [address] [phone]

## 2023-12-18 NOTE — BRIEF OP NOTE
NEPHRECTOMY RADICAL  Progress Note    Louis Andino  12/18/2023    Pre-op Diagnosis:   Other specified disorders of kidney and ureter [N28.89]  Renal mass [N28.89]       Post-Op Diagnosis Codes:     * Other specified disorders of kidney and ureter [N28.89]     * Renal mass [N28.89]    Procedure/CPT® Codes:        Procedure(s):  NEPHRECTOMY RADICAL WITH CAVAL THROMBECTOMY              Surgeon(s):  Feng Lopes MD Goodwin, MD Alvin Alanis Daniel, MD Witten, Jonathan L, MD    Anesthesia: General    Staff:   Circulator: Ilana Barrow RN; Cassie Carrasco RN  Scrub Person: Rhea Matos; Krystina Pfeiffer         Estimated Blood Loss: 3500 mL    Urine Voided: 450 mL    Specimens:                Specimens       ID Source Type Tests Collected By Collected At Frozen?    1 Blood, Arterial Line Blood CBC (NO DIFF)  FIBRINOGEN  PROTIME-INR  APTT   James Esquivel MD 12/18/23 1203     A Kidney, Left Tissue TISSUE PATHOLOGY EXAM   James Esquivel MD 12/18/23 1230 No    Description: LEFT KIDNEY TUMOR THROMBUS    B Kidney, Left Tissue TISSUE PATHOLOGY EXAM   James Esquivel MD 12/18/23 1300 No    Description: LEFT KIDNEY AND TUMOR THROMBIS    C Gland, Adrenal Left Tissue TISSUE PATHOLOGY EXAM   James Esquivel MD 12/18/23 1307 No                  Drains:   Urethral Catheter Silicone 16 Fr. (Active)   Daily Indications Placement by  physician 12/18/23 1455   Site Assessment Clean;Skin intact 12/18/23 1440   Collection Container Standard drainage bag 12/18/23 1455   Securement Method Securing device 12/18/23 1455   Catheter care complete Yes 12/18/23 1410       Findings: IVC thrombus        Complications: IVC thromus           James Esquivel MD     Date: 12/18/2023  Time: 14:57 EST

## 2023-12-19 ENCOUNTER — APPOINTMENT (OUTPATIENT)
Dept: GENERAL RADIOLOGY | Facility: HOSPITAL | Age: 63
End: 2023-12-19
Payer: COMMERCIAL

## 2023-12-19 LAB
ALBUMIN SERPL-MCNC: 2.7 G/DL (ref 3.5–5.2)
ALBUMIN/GLOB SERPL: 1.1 G/DL
ALP SERPL-CCNC: 50 U/L (ref 39–117)
ALT SERPL W P-5'-P-CCNC: 46 U/L (ref 1–41)
ANION GAP SERPL CALCULATED.3IONS-SCNC: 13 MMOL/L (ref 5–15)
ANION GAP SERPL CALCULATED.3IONS-SCNC: 9 MMOL/L (ref 5–15)
ARTERIAL PATENCY WRIST A: ABNORMAL
ARTERIAL PATENCY WRIST A: ABNORMAL
AST SERPL-CCNC: 59 U/L (ref 1–40)
ATMOSPHERIC PRESS: ABNORMAL MM[HG]
ATMOSPHERIC PRESS: ABNORMAL MM[HG]
BACTERIA UR QL AUTO: ABNORMAL /HPF
BASE EXCESS BLDA CALC-SCNC: -6 MMOL/L (ref 0–3)
BASE EXCESS BLDA CALC-SCNC: -6.4 MMOL/L (ref 0–3)
BASOPHILS # BLD AUTO: 0 10*3/MM3 (ref 0–0.2)
BASOPHILS NFR BLD AUTO: 0.1 % (ref 0–1.5)
BDY SITE: ABNORMAL
BDY SITE: ABNORMAL
BH BB BLOOD EXPIRATION DATE: NORMAL
BH BB BLOOD TYPE BARCODE: 6200
BH BB DISPENSE STATUS: NORMAL
BH BB PRODUCT CODE: NORMAL
BH BB UNIT NUMBER: NORMAL
BILIRUB SERPL-MCNC: 0.4 MG/DL (ref 0–1.2)
BILIRUB UR QL STRIP: NEGATIVE
BUN SERPL-MCNC: 18 MG/DL (ref 8–23)
BUN SERPL-MCNC: 18 MG/DL (ref 8–23)
BUN/CREAT SERPL: 10.5 (ref 7–25)
BUN/CREAT SERPL: 8.1 (ref 7–25)
CA-I SERPL ISE-MCNC: 1.04 MMOL/L (ref 1.2–1.3)
CALCIUM SPEC-SCNC: 7.4 MG/DL (ref 8.6–10.5)
CALCIUM SPEC-SCNC: 7.5 MG/DL (ref 8.6–10.5)
CHLORIDE SERPL-SCNC: 104 MMOL/L (ref 98–107)
CHLORIDE SERPL-SCNC: 106 MMOL/L (ref 98–107)
CHOLEST SERPL-MCNC: 75 MG/DL (ref 0–200)
CLARITY UR: CLEAR
CO2 BLDA-SCNC: 19.4 MMOL/L (ref 22–29)
CO2 BLDA-SCNC: 19.5 MMOL/L (ref 22–29)
CO2 SERPL-SCNC: 18 MMOL/L (ref 22–29)
CO2 SERPL-SCNC: 24 MMOL/L (ref 22–29)
COLOR UR: ABNORMAL
CREAT SERPL-MCNC: 1.71 MG/DL (ref 0.76–1.27)
CREAT SERPL-MCNC: 2.23 MG/DL (ref 0.76–1.27)
CROSSMATCH INTERPRETATION: NORMAL
D-LACTATE SERPL-SCNC: 1.5 MMOL/L (ref 0.5–2)
DEPRECATED RDW RBC AUTO: 50.3 FL (ref 37–54)
EGFRCR SERPLBLD CKD-EPI 2021: 31.9 ML/MIN/1.73
EGFRCR SERPLBLD CKD-EPI 2021: 43.9 ML/MIN/1.73
EOSINOPHIL # BLD AUTO: 0 10*3/MM3 (ref 0–0.4)
EOSINOPHIL NFR BLD AUTO: 0 % (ref 0.3–6.2)
ERYTHROCYTE [DISTWIDTH] IN BLOOD BY AUTOMATED COUNT: 17.2 % (ref 12.3–15.4)
GLOBULIN UR ELPH-MCNC: 2.4 GM/DL
GLUCOSE BLDC GLUCOMTR-MCNC: 106 MG/DL (ref 70–105)
GLUCOSE BLDC GLUCOMTR-MCNC: 139 MG/DL (ref 74–100)
GLUCOSE SERPL-MCNC: 108 MG/DL (ref 65–99)
GLUCOSE SERPL-MCNC: 139 MG/DL (ref 65–99)
GLUCOSE UR STRIP-MCNC: NEGATIVE MG/DL
GRAN CASTS URNS QL MICRO: ABNORMAL /LPF
HCO3 BLDA-SCNC: 18.4 MMOL/L (ref 21–28)
HCO3 BLDA-SCNC: 18.5 MMOL/L (ref 21–28)
HCT VFR BLD AUTO: 22.9 % (ref 37.5–51)
HCT VFR BLD AUTO: 27.3 % (ref 37.5–51)
HDLC SERPL-MCNC: 24 MG/DL (ref 40–60)
HEMODILUTION: NO
HEMODILUTION: NO
HGB BLD-MCNC: 7.4 G/DL (ref 13–17.7)
HGB BLD-MCNC: 8.7 G/DL (ref 13–17.7)
HGB UR QL STRIP.AUTO: ABNORMAL
HYALINE CASTS UR QL AUTO: ABNORMAL /LPF
INHALED O2 CONCENTRATION: 30 %
INHALED O2 CONCENTRATION: 30 %
KETONES UR QL STRIP: NEGATIVE
LDLC SERPL CALC-MCNC: 30 MG/DL (ref 0–100)
LDLC/HDLC SERPL: 1.22 {RATIO}
LEUKOCYTE ESTERASE UR QL STRIP.AUTO: NEGATIVE
LYMPHOCYTES # BLD AUTO: 2.1 10*3/MM3 (ref 0.7–3.1)
LYMPHOCYTES NFR BLD AUTO: 12.3 % (ref 19.6–45.3)
MAGNESIUM SERPL-MCNC: 1.8 MG/DL (ref 1.6–2.4)
MCH RBC QN AUTO: 25.6 PG (ref 26.6–33)
MCHC RBC AUTO-ENTMCNC: 31.9 G/DL (ref 31.5–35.7)
MCV RBC AUTO: 80.2 FL (ref 79–97)
MODALITY: ABNORMAL
MODALITY: ABNORMAL
MONOCYTES # BLD AUTO: 2 10*3/MM3 (ref 0.1–0.9)
MONOCYTES NFR BLD AUTO: 11.8 % (ref 5–12)
NEUTROPHILS NFR BLD AUTO: 12.7 10*3/MM3 (ref 1.7–7)
NEUTROPHILS NFR BLD AUTO: 75.8 % (ref 42.7–76)
NITRITE UR QL STRIP: NEGATIVE
NRBC BLD AUTO-RTO: 0 /100 WBC (ref 0–0.2)
PCO2 BLDA: 32.1 MM HG (ref 35–48)
PCO2 BLDA: 33 MM HG (ref 35–48)
PEEP RESPIRATORY: 5 CM[H2O]
PEEP RESPIRATORY: 5 CM[H2O]
PH BLDA: 7.36 PH UNITS (ref 7.35–7.45)
PH BLDA: 7.37 PH UNITS (ref 7.35–7.45)
PH UR STRIP.AUTO: 5.5 [PH] (ref 5–8)
PHOSPHATE SERPL-MCNC: 4.5 MG/DL (ref 2.5–4.5)
PLATELET # BLD AUTO: 203 10*3/MM3 (ref 140–450)
PMV BLD AUTO: 7.6 FL (ref 6–12)
PO2 BLDA: 106.8 MM HG (ref 83–108)
PO2 BLDA: 99 MM HG (ref 83–108)
POTASSIUM SERPL-SCNC: 4 MMOL/L (ref 3.5–5.2)
POTASSIUM SERPL-SCNC: 4.8 MMOL/L (ref 3.5–5.2)
PROT SERPL-MCNC: 5.1 G/DL (ref 6–8.5)
PROT UR QL STRIP: ABNORMAL
PSV: 8 CMH2O
RBC # BLD AUTO: 3.4 10*6/MM3 (ref 4.14–5.8)
RBC # UR STRIP: ABNORMAL /HPF
REF LAB TEST METHOD: ABNORMAL
RESPIRATORY RATE: 14
SAO2 % BLDCOA: 97.6 % (ref 94–98)
SAO2 % BLDCOA: 98 % (ref 94–98)
SODIUM SERPL-SCNC: 137 MMOL/L (ref 136–145)
SODIUM SERPL-SCNC: 137 MMOL/L (ref 136–145)
SP GR UR STRIP: 1.02 (ref 1–1.03)
SQUAMOUS #/AREA URNS HPF: ABNORMAL /HPF
TRIGL SERPL-MCNC: 109 MG/DL (ref 0–150)
TSH SERPL DL<=0.05 MIU/L-ACNC: 0.53 UIU/ML (ref 0.27–4.2)
UNIT  ABO: NORMAL
UNIT  RH: NORMAL
UROBILINOGEN UR QL STRIP: ABNORMAL
VENTILATOR MODE: ABNORMAL
VENTILATOR MODE: AC
VLDLC SERPL-MCNC: 21 MG/DL (ref 5–40)
VT ON VENT VENT: 500 ML
WBC # UR STRIP: ABNORMAL /HPF
WBC NRBC COR # BLD AUTO: 16.8 10*3/MM3 (ref 3.4–10.8)

## 2023-12-19 PROCEDURE — 25010000002 PROPOFOL 10 MG/ML EMULSION: Performed by: NURSE PRACTITIONER

## 2023-12-19 PROCEDURE — 25010000002 ENOXAPARIN PER 10 MG: Performed by: UROLOGY

## 2023-12-19 PROCEDURE — 80050 GENERAL HEALTH PANEL: CPT | Performed by: STUDENT IN AN ORGANIZED HEALTH CARE EDUCATION/TRAINING PROGRAM

## 2023-12-19 PROCEDURE — 94640 AIRWAY INHALATION TREATMENT: CPT

## 2023-12-19 PROCEDURE — 82948 REAGENT STRIP/BLOOD GLUCOSE: CPT

## 2023-12-19 PROCEDURE — P9016 RBC LEUKOCYTES REDUCED: HCPCS

## 2023-12-19 PROCEDURE — 94761 N-INVAS EAR/PLS OXIMETRY MLT: CPT

## 2023-12-19 PROCEDURE — 85014 HEMATOCRIT: CPT | Performed by: NURSE PRACTITIONER

## 2023-12-19 PROCEDURE — 25810000003 SODIUM CHLORIDE 0.9 % SOLUTION: Performed by: STUDENT IN AN ORGANIZED HEALTH CARE EDUCATION/TRAINING PROGRAM

## 2023-12-19 PROCEDURE — 94799 UNLISTED PULMONARY SVC/PX: CPT

## 2023-12-19 PROCEDURE — 81001 URINALYSIS AUTO W/SCOPE: CPT | Performed by: INTERNAL MEDICINE

## 2023-12-19 PROCEDURE — 0 DEXTROSE 5 % SOLUTION 1,000 ML FLEX CONT: Performed by: NURSE PRACTITIONER

## 2023-12-19 PROCEDURE — 80061 LIPID PANEL: CPT | Performed by: STUDENT IN AN ORGANIZED HEALTH CARE EDUCATION/TRAINING PROGRAM

## 2023-12-19 PROCEDURE — 84100 ASSAY OF PHOSPHORUS: CPT | Performed by: STUDENT IN AN ORGANIZED HEALTH CARE EDUCATION/TRAINING PROGRAM

## 2023-12-19 PROCEDURE — 82803 BLOOD GASES ANY COMBINATION: CPT

## 2023-12-19 PROCEDURE — 83735 ASSAY OF MAGNESIUM: CPT | Performed by: STUDENT IN AN ORGANIZED HEALTH CARE EDUCATION/TRAINING PROGRAM

## 2023-12-19 PROCEDURE — 71045 X-RAY EXAM CHEST 1 VIEW: CPT

## 2023-12-19 PROCEDURE — 82330 ASSAY OF CALCIUM: CPT | Performed by: STUDENT IN AN ORGANIZED HEALTH CARE EDUCATION/TRAINING PROGRAM

## 2023-12-19 PROCEDURE — 80048 BASIC METABOLIC PNL TOTAL CA: CPT | Performed by: NURSE PRACTITIONER

## 2023-12-19 PROCEDURE — 83605 ASSAY OF LACTIC ACID: CPT | Performed by: INTERNAL MEDICINE

## 2023-12-19 PROCEDURE — 36430 TRANSFUSION BLD/BLD COMPNT: CPT

## 2023-12-19 PROCEDURE — 85018 HEMOGLOBIN: CPT | Performed by: NURSE PRACTITIONER

## 2023-12-19 PROCEDURE — 25010000002 HYDROMORPHONE 1 MG/ML SOLUTION: Performed by: UROLOGY

## 2023-12-19 PROCEDURE — 94003 VENT MGMT INPAT SUBQ DAY: CPT

## 2023-12-19 PROCEDURE — 86900 BLOOD TYPING SEROLOGIC ABO: CPT

## 2023-12-19 PROCEDURE — 25010000002 CALCIUM GLUCONATE-NACL 1-0.675 GM/50ML-% SOLUTION: Performed by: NURSE PRACTITIONER

## 2023-12-19 PROCEDURE — 87040 BLOOD CULTURE FOR BACTERIA: CPT | Performed by: INTERNAL MEDICINE

## 2023-12-19 PROCEDURE — 25010000002 CEFTRIAXONE PER 250 MG: Performed by: INTERNAL MEDICINE

## 2023-12-19 RX ORDER — CALCIUM GLUCONATE 20 MG/ML
1000 INJECTION, SOLUTION INTRAVENOUS ONCE
Status: COMPLETED | OUTPATIENT
Start: 2023-12-19 | End: 2023-12-19

## 2023-12-19 RX ORDER — BISACODYL 5 MG/1
5 TABLET, DELAYED RELEASE ORAL DAILY PRN
Status: DISCONTINUED | OUTPATIENT
Start: 2023-12-19 | End: 2023-12-29

## 2023-12-19 RX ORDER — BISACODYL 10 MG
10 SUPPOSITORY, RECTAL RECTAL DAILY PRN
Status: DISCONTINUED | OUTPATIENT
Start: 2023-12-19 | End: 2023-12-29

## 2023-12-19 RX ORDER — SODIUM CHLORIDE 9 MG/ML
125 INJECTION, SOLUTION INTRAVENOUS CONTINUOUS
Status: DISCONTINUED | OUTPATIENT
Start: 2023-12-19 | End: 2023-12-30

## 2023-12-19 RX ORDER — AMOXICILLIN 250 MG
2 CAPSULE ORAL 2 TIMES DAILY
Status: DISCONTINUED | OUTPATIENT
Start: 2023-12-19 | End: 2023-12-29

## 2023-12-19 RX ORDER — CALCIUM CHLORIDE 100 MG/ML
1 INJECTION INTRAVENOUS; INTRAVENTRICULAR ONCE
Status: DISCONTINUED | OUTPATIENT
Start: 2023-12-19 | End: 2023-12-19

## 2023-12-19 RX ORDER — PANTOPRAZOLE SODIUM 40 MG/1
40 TABLET, DELAYED RELEASE ORAL
Status: DISCONTINUED | OUTPATIENT
Start: 2023-12-20 | End: 2023-12-20

## 2023-12-19 RX ORDER — OXYCODONE HYDROCHLORIDE 5 MG/1
5 TABLET ORAL EVERY 4 HOURS PRN
Status: DISCONTINUED | OUTPATIENT
Start: 2023-12-19 | End: 2023-12-20

## 2023-12-19 RX ORDER — POLYETHYLENE GLYCOL 3350 17 G/17G
17 POWDER, FOR SOLUTION ORAL DAILY PRN
Status: DISCONTINUED | OUTPATIENT
Start: 2023-12-19 | End: 2023-12-29

## 2023-12-19 RX ORDER — ACETAMINOPHEN 160 MG/5ML
650 SOLUTION ORAL EVERY 6 HOURS PRN
Status: DISCONTINUED | OUTPATIENT
Start: 2023-12-19 | End: 2023-12-30 | Stop reason: HOSPADM

## 2023-12-19 RX ADMIN — CEFTRIAXONE 2000 MG: 2 INJECTION, POWDER, FOR SOLUTION INTRAMUSCULAR; INTRAVENOUS at 11:17

## 2023-12-19 RX ADMIN — BUDESONIDE INHALATION 0.5 MG: 0.5 SUSPENSION RESPIRATORY (INHALATION) at 07:20

## 2023-12-19 RX ADMIN — CHLORHEXIDINE GLUCONATE 15 ML: 1.2 RINSE ORAL at 08:22

## 2023-12-19 RX ADMIN — SENNOSIDES AND DOCUSATE SODIUM 2 TABLET: 50; 8.6 TABLET ORAL at 22:40

## 2023-12-19 RX ADMIN — Medication 10 ML: at 08:22

## 2023-12-19 RX ADMIN — HYDROMORPHONE HYDROCHLORIDE 0.5 MG: 1 INJECTION, SOLUTION INTRAMUSCULAR; INTRAVENOUS; SUBCUTANEOUS at 06:11

## 2023-12-19 RX ADMIN — SODIUM CHLORIDE 1000 ML: 9 INJECTION, SOLUTION INTRAVENOUS at 02:10

## 2023-12-19 RX ADMIN — SODIUM CHLORIDE 125 ML/HR: 9 INJECTION, SOLUTION INTRAVENOUS at 22:40

## 2023-12-19 RX ADMIN — BUDESONIDE INHALATION 0.5 MG: 0.5 SUSPENSION RESPIRATORY (INHALATION) at 19:04

## 2023-12-19 RX ADMIN — Medication 10 ML: at 22:41

## 2023-12-19 RX ADMIN — HYDROMORPHONE HYDROCHLORIDE 0.5 MG: 1 INJECTION, SOLUTION INTRAMUSCULAR; INTRAVENOUS; SUBCUTANEOUS at 15:17

## 2023-12-19 RX ADMIN — HYDROMORPHONE HYDROCHLORIDE 0.5 MG: 1 INJECTION, SOLUTION INTRAMUSCULAR; INTRAVENOUS; SUBCUTANEOUS at 19:52

## 2023-12-19 RX ADMIN — LANSOPRAZOLE 15 MG: 15 TABLET, ORALLY DISINTEGRATING, DELAYED RELEASE ORAL at 05:20

## 2023-12-19 RX ADMIN — SODIUM CHLORIDE 250 ML/HR: 9 INJECTION, SOLUTION INTRAVENOUS at 08:18

## 2023-12-19 RX ADMIN — CHLORHEXIDINE GLUCONATE 15 ML: 1.2 RINSE ORAL at 23:33

## 2023-12-19 RX ADMIN — ENOXAPARIN SODIUM 40 MG: 100 INJECTION SUBCUTANEOUS at 15:16

## 2023-12-19 RX ADMIN — PROPOFOL INJECTABLE EMULSION 40 MCG/KG/MIN: 10 INJECTION, EMULSION INTRAVENOUS at 02:10

## 2023-12-19 RX ADMIN — HYDROMORPHONE HYDROCHLORIDE 0.5 MG: 1 INJECTION, SOLUTION INTRAMUSCULAR; INTRAVENOUS; SUBCUTANEOUS at 08:36

## 2023-12-19 RX ADMIN — SODIUM BICARBONATE 150 MEQ: 84 INJECTION, SOLUTION INTRAVENOUS at 10:39

## 2023-12-19 RX ADMIN — HYDROMORPHONE HYDROCHLORIDE 0.5 MG: 1 INJECTION, SOLUTION INTRAMUSCULAR; INTRAVENOUS; SUBCUTANEOUS at 17:11

## 2023-12-19 RX ADMIN — Medication 10 ML: at 08:21

## 2023-12-19 RX ADMIN — ACETAMINOPHEN 650 MG: 650 SOLUTION ORAL at 23:33

## 2023-12-19 RX ADMIN — CALCIUM GLUCONATE 1000 MG: 20 INJECTION, SOLUTION INTRAVENOUS at 15:16

## 2023-12-19 RX ADMIN — OXYCODONE HYDROCHLORIDE 5 MG: 5 TABLET ORAL at 21:30

## 2023-12-19 RX ADMIN — HYDROMORPHONE HYDROCHLORIDE 0.5 MG: 1 INJECTION, SOLUTION INTRAMUSCULAR; INTRAVENOUS; SUBCUTANEOUS at 11:18

## 2023-12-19 NOTE — PROGRESS NOTES
Critical Care Progress Note     Louis Andino : 1960 MRN:[id number] LOS:1  Rm: 2308/1     Principal Problem: Renal mass     Reason for follow up: All the medical problems listed below    Summary     Louis Andino is a 63 y.o. male with PMH of hypertension, hyperlipidemia, COPD, BPH, and GERD presented to the hospital for nephrectomy, and was admitted with a principal diagnosis of Renal mass.  HPI information is limited due to patient intubated and sedated at time of assessment; information obtained from chart review and patient's spouse at bedside.  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  Post procedure patient was to remain on vent overnight and admitted to the intensive care unit for further evaluation and treatment.     Significant Events     23 : Extubated this AM.  Downgrade out of ICU once off of Levophed.  If renal function continues to decline, will consult nephrology.    Assessment / Plan     Hypovolemic shock  Secondary to 3500 mL EBL in OR on .  Aggressive IVF resuscitation overnight.  Continue IVF with bicarb gtt.  Even though WBC count is elevated, unlikely septic response.  Blood cultures ordered, UA negative.  Given 1 dose of Rocephin, discontinued on .  Follow blood cultures.  Levophed gtt weaning, titrate for MAP > 65 mm Hg.  Will downgrade out of ICU once hemodynamically stable off vasopressors.    Acute blood loss anemia  Received 6 units of PRBC in OR.  Hemoglobin down to 8.7 from 10.8; recheck this afternoon.  Transfuse for hemoglobin < 7.0.  Check Iron profile in AM.    Metabolic acidosis  CO2 dropping on labs, continue to monitor and trend labs.  IVF changed to sodium bicarb gtt.    Acute Kidney Injury:   Remains nonoliguric. Baseline creatinine 1.02, up to 2.23.  If further elevation will consult nephrology.    Likely prerenal.  Possible intrinsic component due to ATN from infection, drugs and hypotension.   C/w IV fluids.   Monitor Input/Output very closely.   Net IO Since Admission: 8,861 mL [12/19/23 1421]     Renal mass, complicated by IVC thrombus  Underwent left open radical nephrectomy and IVC thrombectomy on 12/18.   following.  Follow pathology.  Analgesia as ordered.    Hypocalcemia  Monitor and trend labs.  Replacement ordered.    On  ventilation, post-operatively  Successfully extubated.  Wean oxygen supplementation as tolerated to maintain oxygen saturation > 90%.    Essential Hypertension: well controlled.   Hold hydrochlorothiazide & lisinopril, due to hypotension; resume when clinically appropriate.  Titrate medications as needed.    COPD: Chronic and stable, not in exacerbation.  Continue budesonide and duonebs as needed.  Dyslipidemia: On no statin therapy; LDL 30.  GERD: Continue PPI.    Disposition:  Remains on vasopressor, downgrade out of ICU if able to wean off vasopressors.    Code status:   Code Status (Patient has no pulse and is not breathing): CPR (Attempt to Resuscitate)  Medical Interventions (Patient has pulse or is breathing): Full Support       Nutrition:   NPO Diet NPO Type: Strict NPO   Patient isn't on Tube Feeding     DVT prophylaxis:  Medical and mechanical DVT prophylaxis orders are present.     Subjective / Review of systems     Review of Systems   Constitutional:  Negative for chills and fever.   HENT:  Negative for congestion and sore throat.    Respiratory:  Negative for cough and shortness of breath.    Cardiovascular:  Negative for chest pain and palpitations.   Gastrointestinal:  Positive for abdominal pain (post-operative.). Negative for nausea and vomiting.   Endocrine: Negative for heat intolerance and polyuria.   Genitourinary:  Negative for dysuria and urgency.   Musculoskeletal:  Negative for arthralgias and myalgias.   Skin:  Negative for rash and wound.   Neurological:   Negative for weakness and numbness.   Hematological:  Negative for adenopathy. Does not bruise/bleed easily.   Psychiatric/Behavioral:  Negative for confusion. The patient is not nervous/anxious.         Objective / Physical Exam     Vital signs:  Temp: 98.4 °F (36.9 °C)  BP: 122/76  Heart Rate: 112  Resp: 20  SpO2: 96 %  Weight: 106 kg (233 lb 7.5 oz)    Admission Weight: Weight: 101 kg (223 lb)  Current Weight: Weight: 106 kg (233 lb 7.5 oz)    Input/Output in last 24 hours:    Intake/Output Summary (Last 24 hours) at 12/19/2023 1411  Last data filed at 12/19/2023 1050  Gross per 24 hour   Intake 3499 ml   Output 850 ml   Net 2649 ml      Net IO Since Admission: 8,861 mL [12/19/23 1411]     Physical Exam  Vitals and nursing note reviewed.   Constitutional:       General: He is not in acute distress.     Appearance: He is obese. He is not ill-appearing, toxic-appearing or diaphoretic.   HENT:      Head: Normocephalic and atraumatic.      Nose: No congestion or rhinorrhea.      Mouth/Throat:      Mouth: Mucous membranes are moist.      Pharynx: Oropharynx is clear. No oropharyngeal exudate or posterior oropharyngeal erythema.   Eyes:      General: No scleral icterus.     Extraocular Movements: Extraocular movements intact.      Conjunctiva/sclera: Conjunctivae normal.      Pupils: Pupils are equal, round, and reactive to light.   Cardiovascular:      Rate and Rhythm: Regular rhythm. Tachycardia present.      Pulses: Normal pulses.      Heart sounds: Normal heart sounds. No murmur heard.     No gallop.   Pulmonary:      Effort: Pulmonary effort is normal. No respiratory distress.      Breath sounds: Normal breath sounds. No wheezing, rhonchi or rales.   Abdominal:      General: Bowel sounds are normal. There is no distension.      Palpations: Abdomen is soft.      Tenderness: There is no guarding or rebound.      Comments: Abdominal dressing with shadow drainage.  Generalized post-operative tenderness.    Musculoskeletal:         General: No tenderness.      Cervical back: Normal range of motion. No rigidity.      Right lower leg: No edema.      Left lower leg: No edema.   Skin:     General: Skin is warm and dry.   Neurological:      General: No focal deficit present.      Mental Status: He is alert and oriented to person, place, and time.   Psychiatric:         Mood and Affect: Mood normal.         Behavior: Behavior normal.         Thought Content: Thought content normal.         Judgment: Judgment normal.          Radiology and Labs     Results from last 7 days   Lab Units 12/19/23  0541 12/18/23  1722 12/18/23  1318 12/18/23  1203 12/18/23  1051   WBC 10*3/mm3 16.80* 17.50*  --  14.70*  --    HEMOGLOBIN g/dL 8.7* 9.9*  --  9.1*  --    HEMOGLOBIN, POC g/dL  --   --  8.5*  --  10.2*   HEMATOCRIT % 27.3* 30.6*  --  28.3*  --    HEMATOCRIT POC %  --   --  25*  --  30*   PLATELETS 10*3/mm3 203 228  --  227  --       Results from last 7 days   Lab Units 12/18/23  1722 12/18/23  1203   PROTIME Seconds 11.7 13.3*   INR  1.08 1.24*   APTT seconds 28.7 58.7*      Results from last 7 days   Lab Units 12/19/23  0541 12/18/23  1722   SODIUM mmol/L 137 136   POTASSIUM mmol/L 4.8 4.9   CHLORIDE mmol/L 106 104   CO2 mmol/L 18.0* 21.0*   BUN mg/dL 18 14   CREATININE mg/dL 2.23* 1.48*   GLUCOSE mg/dL 139* 169*   MAGNESIUM mg/dL 1.8 1.5*   PHOSPHORUS mg/dL 4.5 4.6*      Results from last 7 days   Lab Units 12/19/23  0541 12/18/23  1722   ALK PHOS U/L 50 53   AST (SGOT) U/L 59* 67*   ALT (SGPT) U/L 46* 64*     Results from last 7 days   Lab Units 12/19/23  0708 12/19/23  0400 12/18/23  1318 12/18/23  1051   PH, ARTERIAL pH units 7.355 7.370 7.300* 7.350   PCO2, ARTERIAL mm Hg 33.0* 32.1* 42.5 41.4   PO2 ART mm Hg 106.8 99.0 216.0* 189.0*   O2 SATURATION ART % 98.0 97.6 100.0* 100.0*   FIO2 % 30 30  --   --    HCO3 ART mmol/L 18.4* 18.5* 20.8* 22.6   BASE EXCESS ART mmol/L -6.4* -6.0* <0.0* <0.0*     XR Chest 1 View    Result Date:  12/19/2023  Impression: 1. Placement of a right upper extremity PICC with the tip at the mid SVC. 2. Placement of an esophagogastric tube with tip below the diaphragm. Stable ET tube above the greg.  3. No pneumothorax. 4. Minimal left basilar atelectasis. Electronically Signed: Cedrick Ordonez MD  12/19/2023 7:06 AM EST  Workstation ID: [id number]    XR Abdomen KUB    Result Date: 12/18/2023  Impression: NG tube projects over the expected position of the distal body, antrum of the stomach. Electronically Signed: Ervin Howell MD  12/18/2023 5:58 PM EST  Workstation ID: [id number]    XR Chest 1 View    Result Date: 12/18/2023  Impression: Endotracheal tube in place Mild right infrahilar discoid atelectasis. Otherwise clear lungs Electronically Signed: Farhat Fletcher MD  12/18/2023 3:06 PM EST  Workstation ID: [id number]    XR Lost Needle / Instrument    Result Date: 12/18/2023  Impression: Postsurgical changes with multiple surgical clips and skin staple line in the upper abdomen. No definite radiopaque instrument or other unexpected foreign body is identified. Results were called to Suzette Carrasco RN in the OR by Dr. Moya at 12/18/2023 2:00 PM EST. Electronically Signed: Nestor Moya  12/18/2023 2:10 PM EST  Workstation ID: [id number]       Current medications     Scheduled Meds:   budesonide, 0.5 mg, Nebulization, BID - RT  cefTRIAXone, 2,000 mg, Intravenous, Q24H  chlorhexidine, 15 mL, Mouth/Throat, Q12H  enoxaparin, 40 mg, Subcutaneous, Daily  lansoprazole, 15 mg, Oral, Q AM  senna-docusate sodium, 2 tablet, Nasogastric, BID  sodium chloride, 10 mL, Intravenous, Q12H  sodium chloride, 10 mL, Intravenous, Q12H  sodium chloride, 10 mL, Intravenous, Q12H  sodium chloride, 10 mL, Intravenous, Q12H        Continuous Infusions:   norepinephrine, 0.02-0.5 mcg/kg/min, Last Rate: 0.02 mcg/kg/min (12/19/23 1050)  sodium bicarbonate 8.4 % 150 mEq in dextrose (D5W) 5 % 1,000 mL infusion (greater than 75 mEq), 150 mEq, Last Rate:  150 mEq (12/19/23 1039)          Plan discussed with RN. Reviewed all other data in the last 24 hours, including but not limited to vitals, labs, microbiology, imaging and pertinent notes from other providers.  Plan also discussed with patient and family member at the bedside.      Nahun Torres, DUANE   Critical Care  12/19/23   14:11 EST

## 2023-12-19 NOTE — CASE MANAGEMENT/SOCIAL WORK
Discharge Planning Assessment   Jacques     Patient Name: Louis Andino  MRN: [id number]  Today's Date: 12/19/2023    Admit Date: 12/18/2023    Plan: DC Plan: Anticipate Routin Home with spouse. Wishes to set up his own PCP.   Discharge Needs Assessment       Row Name 12/19/23 1316       Living Environment    People in Home spouse    Name(s) of People in Home Dahlia Andino    Current Living Arrangements home    Potentially Unsafe Housing Conditions none    Primary Care Provided by self    Provides Primary Care For no one    Family Caregiver if Needed spouse    Quality of Family Relationships helpful;involved;supportive    Able to Return to Prior Arrangements yes       Resource/Environmental Concerns    Resource/Environmental Concerns none    Transportation Concerns none       Food Insecurity    Within the past 12 months, you worried that your food would run out before you got the money to buy more. Never true    Within the past 12 months, the food you bought just didn't last and you didn't have money to get more. Never true       Transition Planning    Patient/Family Anticipates Transition to home with family    Patient/Family Anticipated Services at Transition none    Transportation Anticipated car, drives self;family or friend will provide       Discharge Needs Assessment    Readmission Within the Last 30 Days no previous admission in last 30 days    Equipment Currently Used at Home nebulizer    Concerns to be Addressed discharge planning    Anticipated Changes Related to Illness none    Equipment Needed After Discharge none    Provided Post Acute Provider List? N/A    Provided Post Acute Provider Quality & Resource List? N/A                   Discharge Plan       Row Name 12/19/23 4452       Plan    Plan DC Plan: Anticipate Routin Home with spouse. Wishes to set up his own PCP.    Provided Post Acute Provider List? N/A    Provided Post Acute Provider Quality & Resource List? N/A    Plan Comments CM  spoke with patient spouse Dahlia at bedside to discuss admission assessment and discharge planning. Dahlia confirms patient currently looking for a new [employer] Internist and is working with [facility] to set it up. They would prefer to set up themselves. Spouse confirmed [facility]. Patient already enrolled in meds to bed program.  Spouse denies any difficulty affording medications at this time. Spouse denies any additional needs for services or DME at this time. CM will continue to follow for any further needs and adjust discharge plan accordingly. DC Barriers: cardiac monitoring, O2@4Lnc, PICC, Arterial line, IVF's, post op monitoring, and abnormal labs.               Expected Discharge Date and Time       Expected Discharge Date Expected Discharge Time    Dec 23, 2023            Demographic Summary       Row Name 12/19/23 5615       General Information    Admission Type inpatient    Arrived From home;operating room    Referral Source admission list    Reason for Consult discharge planning    Preferred Language English       Contact Information    Permission Granted to Share Info With                    Functional Status       Row Name 12/19/23 1315       Functional Status    Usual Activity Tolerance good    Current Activity Tolerance good       Physical Activity    On average, how many days per week do you engage in moderate to strenuous exercise (like a brisk walk)? 3 days    On average, how many minutes do you engage in exercise at this level? 120 min    Number of minutes of exercise per week 360       Functional Status, IADL    Medications independent    Meal Preparation independent    Housekeeping independent    Laundry independent    Shopping independent       Mental Status    General Appearance WDL WDL       Mental Status Summary    Recent Changes in Mental Status/Cognitive Functioning no changes       Employment/    Employment Status employed part-time;, previous  service    Current or Previous Occupation education    Employment/ Comments [employer] a few times a month           Current or Previous  Service active duty, past     [employer]               Luiza Joya, DELVIN     Office Phone: [phone]  Office Cell:     [phone]

## 2023-12-19 NOTE — PLAN OF CARE
Goal Outcome Evaluation:      Pt status and vital signs remained stable entire shift. Levophed gtt remains on, but has been titrated downwards. Pt remains on the ventilator, passed SAT and plan to extubate late today. Pt alert after the SAT occurred. No BM this shift, but decent urine output, although darker in color. Spouse and pt hopeful and ready for extubation. Restraints remain on and pt nodded in understanding about the reasons for them and that they will come off with extubation, remained without injury.

## 2023-12-19 NOTE — H&P
Critical Care History and Physical     Louis Andino : 1960 MRN:[id number] LOS:0 ROOM: 2308/1     Reason for admission: Renal mass     Assessment / Plan     Acute Respiratory Failure; intubated for surgery  -CXR Reviewed  -EKG Reviewed  -ABG, monitor as ordered  -BNP, monitor as ordered  -Duoneb  -Continue sedation and pain medication, titrate to effect  -Continue mechanical ventilation support with weaning as tolerated to baseline  -Titrate oxygen support delivery method for O2 SAT > 92%    Renal mass    --Status post left open radical nephrectomy with IVC thrombectomy    --3500 EBL  -Urology following    Kidney injury, acute  -UA reviewed  -Continue IVF resuscitation  -Monitor and trend labs  -Avoid nephrotoxic medications, hypotension, and NSAIDS  -Renally dose medications  -Monitor urine output  -Consider Nephrology consult if creatinine fails to improve    Essential Hypertension, Chronic, Controlledl; Hyperlipidemia  -Continue home medications as appropriate   - Monitor with routine vital signs     COPD, not in exacerbation  -Continue home inhalers as tolerated  -Incentive spirometry  -Titrate O2 for sats > 90%    GERD  -Continue PPI      Code Status (Patient has no pulse and is not breathing): CPR (Attempt to Resuscitate)  Medical Interventions (Patient has pulse or is breathing): Full Support       Nutrition:   NPO Diet NPO Type: Strict NPO     DVT prophylaxis:  Medical and mechanical DVT prophylaxis orders are present.     History of Present illness     Louis Andino is a 63 y.o. male with PMH of hypertension, hyperlipidemia, COPD, BPH, and GERD presented to the hospital for nephrectomy, and was admitted with a principal diagnosis of Renal mass.  HPI information is limited due to patient intubated and sedated at time of assessment; information obtained from chart review and patient's spouse at bedside.  Patient was scheduled for an left thrombectomy due to large mass surrounding left kidney  however procedure was complicated by IVC tumor thrombus and large volume blood loss.  Post procedure patient was to remain on vent overnight and admitted to the intensive care unit for further evaluation and treatment.      ACP: Patient unable to answer questions due to intubation; wife is decision-maker if he is unable he will remain full code with full intervention.    Patient was seen and examined on 12/18/23 at 20:11 EST .    Subjective / Review of systems     Review of Systems   Unable to perform ROS: Intubated        Past Medical/Surgical/Social/Family History & Allergies     Past Medical History:   Diagnosis Date    Asthma     Emphysema/COPD     GERD (gastroesophageal reflux disease)     Hypertension     Prostate disease       Past Surgical History:   Procedure Laterality Date    SKIN LESION EXCISION  1990      Social History     Socioeconomic History    Marital status:     Number of children: 6   Tobacco Use    Smoking status: Never     Passive exposure: Past    Smokeless tobacco: Never    Tobacco comments:     SECOND HAND SMOKE EXPOSURE AS A CHILD    Vaping Use    Vaping Use: Never used   Substance and Sexual Activity    Alcohol use: Yes     Alcohol/week: 1.0 standard drink of alcohol     Types: 1 Glasses of wine per week     Comment: rare    Drug use: Not Currently    Sexual activity: Defer      History reviewed. No pertinent family history.   No Known Allergies   Social Determinants of Health     Tobacco Use: Low Risk  (12/18/2023)    Patient History     Smoking Tobacco Use: Never     Smokeless Tobacco Use: Never     Passive Exposure: Past   Alcohol Use: Not on file   Financial Resource Strain: Not on file   Food Insecurity: Not on file   Transportation Needs: Not on file   Physical Activity: Not on file   Stress: Not on file   Social Connections: Unknown (10/11/2023)    Family and Community Support     Help with Day-to-Day Activities: Not on file     Lonely or Isolated: Not on file   Interpersonal  Safety: Not At Risk (12/18/2023)    Abuse Screen     Unsafe at Home or Work/School: no     Feels Threatened by Someone?: no     Does Anyone Keep You from Contacting Others or Doint Things Outside the Home?: no     Physical Sign of Abuse Present: no   Depression: Not on file   Housing Stability: Unknown (12/18/2023)    Housing Stability     Current Living Arrangements: home     Potentially Unsafe Housing Conditions: Not on file   Utilities: Not on file   Health Literacy: Unknown (10/11/2023)    Education     Help with school or training?: Not on file     Preferred Language: Not on file   Employment: Unknown (10/11/2023)    Employment     Do you want help finding or keeping work or a job?: Not on file   Disabilities: Not At Risk (12/18/2023)    Disabilities     Concentrating, Remembering, or Making Decisions Difficulty: no     Doing Errands Independently Difficulty: no        Home Medications     Prior to Admission medications    Medication Sig Start Date End Date Taking? Authorizing Provider   aspirin 81 MG EC tablet Take 1 tablet by mouth Daily.   Yes Toño Garcia MD   celecoxib (CeleBREX) 200 MG capsule Take 1 capsule by mouth Daily.   Yes Toño Garcia MD   famotidine (PEPCID) 40 MG tablet Take 1 tablet by mouth Daily.   Yes Toño Garcia MD   hydroCHLOROthiazide (HYDRODIURIL) 25 MG tablet Take 1 tablet by mouth Daily.   Yes Toño Garcia MD   lisinopril (PRINIVIL,ZESTRIL) 10 MG tablet Take 1.5 tablets by mouth 2 (Two) Times a Day.   Yes Toño Garcia MD   magnesium oxide (MAG-OX) 400 MG tablet Take 1 tablet by mouth Daily.   Yes Toño Garcia MD   omeprazole (priLOSEC) 40 MG capsule Take 1 capsule by mouth Daily.   Yes Toño Garcia MD   potassium chloride (K-DUR,KLOR-CON) 20 MEQ CR tablet Take 1 tablet by mouth Daily.   Yes Toño Garcia MD   Symbicort 160-4.5 MCG/ACT inhaler Inhale 2 puffs 2 (Two) Times a Day. 8/9/23  Yes Toño Garcia MD    vitamin D3 125 MCG (5000 UT) capsule capsule Take 1 capsule by mouth Daily.   Yes Provider, MD Toño        Objective / Physical Exam     Vital signs:  Temp: 98.1 °F (36.7 °C)  BP: 98/67  Heart Rate: 87  Resp: 17  SpO2: 100 %  Weight: 102 kg (224 lb 13.9 oz)    Admission Weight: Weight: 101 kg (223 lb)    Physical Exam  Vitals and nursing note reviewed.   Constitutional:       Appearance: Normal appearance.   HENT:      Head: Normocephalic.      Nose: Nose normal.      Mouth/Throat:      Mouth: Mucous membranes are moist.      Pharynx: Oropharynx is clear.   Eyes:      Pupils: Pupils are equal, round, and reactive to light.   Cardiovascular:      Rate and Rhythm: Normal rate and regular rhythm.   Pulmonary:      Comments: ET tube in place  Abdominal:      General: There is distension.      Comments: Transverse surgical incision with island dressing C, D, I   Musculoskeletal:         General: Normal range of motion.      Cervical back: Normal range of motion.   Skin:     General: Skin is warm and dry.      Capillary Refill: Capillary refill takes 2 to 3 seconds.   Neurological:      Comments: Intubated and sedated   Psychiatric:      Comments: Intubated and sedated          Labs     Results from last 7 days   Lab Units 12/18/23  1722 12/18/23  1318 12/18/23  1203 12/18/23  1051 12/12/23  1319   WBC 10*3/mm3 17.50*  --  14.70*  --  9.99   HEMATOCRIT % 30.6*  --  28.3*  --  33.9*   HEMATOCRIT POC %  --  25*  --  30*  --    PLATELETS 10*3/mm3 228  --  227  --  376      Results from last 7 days   Lab Units 12/18/23  1722 12/12/23  1319   SODIUM mmol/L 136 136   POTASSIUM mmol/L 4.9 3.9   CHLORIDE mmol/L 104 98   CO2 mmol/L 21.0* 27.0   BUN mg/dL 14 15   CREATININE mg/dL 1.48* 1.02        Imaging     XR Abdomen KUB    Result Date: 12/18/2023  Impression: NG tube projects over the expected position of the distal body, antrum of the stomach. Electronically Signed: Ervin Howell MD  12/18/2023 5:58 PM EST   Workstation ID: [id number]    XR Chest 1 View    Result Date: 12/18/2023  Impression: Endotracheal tube in place Mild right infrahilar discoid atelectasis. Otherwise clear lungs Electronically Signed: Farhat Fletcher MD  12/18/2023 3:06 PM EST  Workstation ID: [id number]    XR Lost Needle / Instrument    Result Date: 12/18/2023  Impression: Postsurgical changes with multiple surgical clips and skin staple line in the upper abdomen. No definite radiopaque instrument or other unexpected foreign body is identified. Results were called to Suzette Carrasco RN in the OR by Dr. Moya at 12/18/2023 2:00 PM EST. Electronically Signed: Nestor Moya  12/18/2023 2:10 PM EST  Workstation ID: [id number]       Chest X ray: My independent assessment showed no infiltrates or effusions        Current Medications     Scheduled Meds:  budesonide-formoterol, 2 puff, Inhalation, BID - RT  chlorhexidine, 15 mL, Mouth/Throat, Q12H  [START ON 12/19/2023] enoxaparin, 40 mg, Subcutaneous, Daily  hydroCHLOROthiazide, 25 mg, Oral, Daily  magnesium sulfate, 1 g, Intravenous, Once  [START ON 12/19/2023] pantoprazole, 40 mg, Oral, Q AM  senna-docusate sodium, 2 tablet, Oral, BID  sodium chloride, 1,000 mL, Intravenous, Once  sodium chloride, 10 mL, Intravenous, Q12H  sodium chloride, 10 mL, Intravenous, Q12H  sodium chloride, 10 mL, Intravenous, Q12H  sodium chloride, 10 mL, Intravenous, Q12H         Continuous Infusions:  lactated ringers, 1,000 mL, Last Rate: 1,000 mL (12/18/23 0647)  norepinephrine, 0.02-0.5 mcg/kg/min, Last Rate: 0.04 mcg/kg/min (12/18/23 1715)  propofol, 5-50 mcg/kg/min, Last Rate: 40 mcg/kg/min (12/18/23 1818)  sodium chloride, 100 mL/hr, Last Rate: 100 mL/hr (12/18/23 1739)         Plan discussed with RN. Reviewed all other data in the last 24 hours, including but not limited to vitals, labs, microbiology, imaging and pertinent notes from other providers.  Plan also discussed with patient's wife and son at the bedside.      Erika  DUANE Hawkins   Critical Care  12/18/23   20:11 EST

## 2023-12-19 NOTE — PAYOR COMM NOTE
"CLINICAL UPDATE 12/19/2023:      AUTH # [id number]   Louis Andino (63 y.o. Male) 1960        AUTHORIZATION PENDING:   PLEASE CALL OR FAX DETERMINATION TO CONTACT BELOW. THANK YOU.        Swapna Nobles RN MSN  /UR  [facility]  [phone] office  [phone] fax  [email or web address]    [facility]  NPI: [id number]  Tax: [id number]          Louis Andino (63 y.o. Male)       Date of Birth   1960    Social Security Number       Address   [address] [place] [address]    Home Phone   [phone]    MRN   [id number]       Holiness   Sikh    Marital Status                               Admission Date   12/18/23    Admission Type   Elective    Admitting Provider   James Esquivel MD    Attending Provider   Yuniel Lai MD    Department, Room/Bed   [facility] INTENSIVE CARE UNIT, 2308/1       Discharge Date       Discharge Disposition       Discharge Destination                                 Attending Provider: Yuniel Lai MD    Allergies: No Known Allergies    Isolation: None   Infection: None   Code Status: CPR    Ht: 177.8 cm (70\")   Wt: 106 kg (233 lb 7.5 oz)    Admission Cmt: None   Principal Problem: Renal mass [N28.89]                   Active Insurance as of 12/18/2023       Primary Coverage       Payor Plan Insurance Group Employer/Plan Group    ANTHEM BLUE CROSS ANTHEM BLUE CROSS BLUE SHIELD PPO [id number]       Payor Plan Address Payor Plan Phone Number Payor Plan Fax Number Effective Dates    [address] [phone]  1/1/2022 - None Entered    [facility] [id number]         Subscriber Name Subscriber Birth Date Member ID       MARK ANDINO [birth year]/1961 [id number]               Secondary Coverage       Payor Plan Insurance Group Employer/Plan Group    MISC MC SUP   MISC MC SUP              NGN       Coverage Address Coverage Phone Number Coverage Fax Number Effective Dates    [phone] " [phone]  2023 - None Entered    [facility] [id number]         Subscriber Name Subscriber Birth Date Member ID       LOUIS ANDINO 1960 [id number]               Tertiary Coverage       Payor Plan Insurance Group Employer/Plan Group    MEDICARE MEDICARE A & B        Payor Plan Address Payor Plan Phone Number Payor Plan Fax Number Effective Dates    [address] [phone]  2023 - None Entered    [facility] [id number]         Subscriber Name Subscriber Birth Date Member ID       LOUIS ANDINO 1960 [id number]                     Emergency Contacts        (Rel.) Home Phone Work Phone Mobile Phone    Dahlia Andino (Spouse) -- -- [phone]          23 1451  Inpatient Admission  Once     Completed     Level of Care: Critical Care  Diagnosis: Renal mass [319171]  Certification: I certify that inpatient hospital services are medically necessary for greater than 2 midnights.             History & Physical        Erika Hawkins APRN at 23       Attestation signed by Yuniel Lai MD at 23 0850    I have reviewed this documentation and agree.                    Critical Care History and Physical     Louis Andino : 1960 MRN:[id number] LOS:0 ROOM: [facility]/     Reason for admission: Renal mass     Assessment / Plan     Acute Respiratory Failure; intubated for surgery  -CXR Reviewed  -EKG Reviewed  -ABG, monitor as ordered  -BNP, monitor as ordered  -Duoneb  -Continue sedation and pain medication, titrate to effect  -Continue mechanical ventilation support with weaning as tolerated to baseline  -Titrate oxygen support delivery method for O2 SAT > 92%    Renal mass    --Status post left open radical nephrectomy with IVC thrombectomy    --3500 EBL  -Urology following    Kidney injury, acute  -UA reviewed  -Continue IVF resuscitation  -Monitor and trend labs  -Avoid nephrotoxic medications, hypotension, and NSAIDS  -Renally dose  medications  -Monitor urine output  -Consider Nephrology consult if creatinine fails to improve    Essential Hypertension, Chronic, Controlledl; Hyperlipidemia  -Continue home medications as appropriate   - Monitor with routine vital signs     COPD, not in exacerbation  -Continue home inhalers as tolerated  -Incentive spirometry  -Titrate O2 for sats > 90%    GERD  -Continue PPI      Code Status (Patient has no pulse and is not breathing): CPR (Attempt to Resuscitate)  Medical Interventions (Patient has pulse or is breathing): Full Support       Nutrition:   NPO Diet NPO Type: Strict NPO     DVT prophylaxis:  Medical and mechanical DVT prophylaxis orders are present.     History of Present illness     Louis Andino is a 63 y.o. male with PMH of hypertension, hyperlipidemia, COPD, BPH, and GERD presented to the hospital for nephrectomy, and was admitted with a principal diagnosis of Renal mass.  HPI information is limited due to patient intubated and sedated at time of assessment; information obtained from chart review and patient's spouse at bedside.  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  Post procedure patient was to remain on vent overnight and admitted to the intensive care unit for further evaluation and treatment.      ACP: Patient unable to answer questions due to intubation; wife is decision-maker if he is unable he will remain full code with full intervention.    Patient was seen and examined on 12/18/23 at 20:11 EST .    Subjective / Review of systems     Review of Systems   Unable to perform ROS: Intubated        Past Medical/Surgical/Social/Family History & Allergies     Past Medical History:   Diagnosis Date    Asthma     Emphysema/COPD     GERD (gastroesophageal reflux disease)     Hypertension     Prostate disease       Past Surgical History:   Procedure  Laterality Date    SKIN LESION EXCISION  1990      Social History     Socioeconomic History    Marital status:     Number of children: 6   Tobacco Use    Smoking status: Never     Passive exposure: Past    Smokeless tobacco: Never    Tobacco comments:     SECOND HAND SMOKE EXPOSURE AS A CHILD    Vaping Use    Vaping Use: Never used   Substance and Sexual Activity    Alcohol use: Yes     Alcohol/week: 1.0 standard drink of alcohol     Types: 1 Glasses of wine per week     Comment: rare    Drug use: Not Currently    Sexual activity: Defer      History reviewed. No pertinent family history.   No Known Allergies   Social Determinants of Health     Tobacco Use: Low Risk  (12/18/2023)    Patient History     Smoking Tobacco Use: Never     Smokeless Tobacco Use: Never     Passive Exposure: Past   Alcohol Use: Not on file   Financial Resource Strain: Not on file   Food Insecurity: Not on file   Transportation Needs: Not on file   Physical Activity: Not on file   Stress: Not on file   Social Connections: Unknown (10/11/2023)    Family and Community Support     Help with Day-to-Day Activities: Not on file     Lonely or Isolated: Not on file   Interpersonal Safety: Not At Risk (12/18/2023)    Abuse Screen     Unsafe at Home or Work/School: no     Feels Threatened by Someone?: no     Does Anyone Keep You from Contacting Others or Doint Things Outside the Home?: no     Physical Sign of Abuse Present: no   Depression: Not on file   Housing Stability: Unknown (12/18/2023)    Housing Stability     Current Living Arrangements: home     Potentially Unsafe Housing Conditions: Not on file   Utilities: Not on file   Health Literacy: Unknown (10/11/2023)    Education     Help with school or training?: Not on file     Preferred Language: Not on file   Employment: Unknown (10/11/2023)    Employment     Do you want help finding or keeping work or a job?: Not on file   Disabilities: Not At Risk (12/18/2023)    Disabilities      Concentrating, Remembering, or Making Decisions Difficulty: no     Doing Errands Independently Difficulty: no        Home Medications     Prior to Admission medications    Medication Sig Start Date End Date Taking? Authorizing Provider   aspirin 81 MG EC tablet Take 1 tablet by mouth Daily.   Yes Toño Garcia MD   celecoxib (CeleBREX) 200 MG capsule Take 1 capsule by mouth Daily.   Yes Toño Garcia MD   famotidine (PEPCID) 40 MG tablet Take 1 tablet by mouth Daily.   Yes Toño Garcia MD   hydroCHLOROthiazide (HYDRODIURIL) 25 MG tablet Take 1 tablet by mouth Daily.   Yes Toño Garcia MD   lisinopril (PRINIVIL,ZESTRIL) 10 MG tablet Take 1.5 tablets by mouth 2 (Two) Times a Day.   Yes Toño Garcia MD   magnesium oxide (MAG-OX) 400 MG tablet Take 1 tablet by mouth Daily.   Yes Toño Garcia MD   omeprazole (priLOSEC) 40 MG capsule Take 1 capsule by mouth Daily.   Yes Toño Garcia MD   potassium chloride (K-DUR,KLOR-CON) 20 MEQ CR tablet Take 1 tablet by mouth Daily.   Yes Toño Garcia MD   Symbicort 160-4.5 MCG/ACT inhaler Inhale 2 puffs 2 (Two) Times a Day. 8/9/23  Yes Toño Garcia MD   vitamin D3 125 MCG (5000 UT) capsule capsule Take 1 capsule by mouth Daily.   Yes Toño Garcia MD        Objective / Physical Exam     Vital signs:  Temp: 98.1 °F (36.7 °C)  BP: 98/67  Heart Rate: 87  Resp: 17  SpO2: 100 %  Weight: 102 kg (224 lb 13.9 oz)    Admission Weight: Weight: 101 kg (223 lb)    Physical Exam  Vitals and nursing note reviewed.   Constitutional:       Appearance: Normal appearance.   HENT:      Head: Normocephalic.      Nose: Nose normal.      Mouth/Throat:      Mouth: Mucous membranes are moist.      Pharynx: Oropharynx is clear.   Eyes:      Pupils: Pupils are equal, round, and reactive to light.   Cardiovascular:      Rate and Rhythm: Normal rate and regular rhythm.   Pulmonary:      Comments: ET tube in place  Abdominal:       General: There is distension.      Comments: Transverse surgical incision with island dressing C, D, I   Musculoskeletal:         General: Normal range of motion.      Cervical back: Normal range of motion.   Skin:     General: Skin is warm and dry.      Capillary Refill: Capillary refill takes 2 to 3 seconds.   Neurological:      Comments: Intubated and sedated   Psychiatric:      Comments: Intubated and sedated          Labs     Results from last 7 days   Lab Units 12/18/23  1722 12/18/23  1318 12/18/23  1203 12/18/23  1051 12/12/23  1319   WBC 10*3/mm3 17.50*  --  14.70*  --  9.99   HEMATOCRIT % 30.6*  --  28.3*  --  33.9*   HEMATOCRIT POC %  --  25*  --  30*  --    PLATELETS 10*3/mm3 228  --  227  --  376      Results from last 7 days   Lab Units 12/18/23  1722 12/12/23  1319   SODIUM mmol/L 136 136   POTASSIUM mmol/L 4.9 3.9   CHLORIDE mmol/L 104 98   CO2 mmol/L 21.0* 27.0   BUN mg/dL 14 15   CREATININE mg/dL 1.48* 1.02        Imaging     XR Abdomen KUB    Result Date: 12/18/2023  Impression: NG tube projects over the expected position of the distal body, antrum of the stomach. Electronically Signed: Ervin Howell MD  12/18/2023 5:58 PM EST  Workstation ID: [id number]    XR Chest 1 View    Result Date: 12/18/2023  Impression: Endotracheal tube in place Mild right infrahilar discoid atelectasis. Otherwise clear lungs Electronically Signed: Farhat Fletcher MD  12/18/2023 3:06 PM EST  Workstation ID: [id number]    XR Lost Needle / Instrument    Result Date: 12/18/2023  Impression: Postsurgical changes with multiple surgical clips and skin staple line in the upper abdomen. No definite radiopaque instrument or other unexpected foreign body is identified. Results were called to Suzette Carrasco RN in the OR by Dr. Moya at 12/18/2023 2:00 PM EST. Electronically Signed: Nestor Moya  12/18/2023 2:10 PM EST  Workstation ID: [id number]       Chest X ray: My independent assessment showed no infiltrates or  effusions        Current Medications     Scheduled Meds:  budesonide-formoterol, 2 puff, Inhalation, BID - RT  chlorhexidine, 15 mL, Mouth/Throat, Q12H  [START ON 12/19/2023] enoxaparin, 40 mg, Subcutaneous, Daily  hydroCHLOROthiazide, 25 mg, Oral, Daily  magnesium sulfate, 1 g, Intravenous, Once  [START ON 12/19/2023] pantoprazole, 40 mg, Oral, Q AM  senna-docusate sodium, 2 tablet, Oral, BID  sodium chloride, 1,000 mL, Intravenous, Once  sodium chloride, 10 mL, Intravenous, Q12H  sodium chloride, 10 mL, Intravenous, Q12H  sodium chloride, 10 mL, Intravenous, Q12H  sodium chloride, 10 mL, Intravenous, Q12H         Continuous Infusions:  lactated ringers, 1,000 mL, Last Rate: 1,000 mL (12/18/23 0647)  norepinephrine, 0.02-0.5 mcg/kg/min, Last Rate: 0.04 mcg/kg/min (12/18/23 1715)  propofol, 5-50 mcg/kg/min, Last Rate: 40 mcg/kg/min (12/18/23 1818)  sodium chloride, 100 mL/hr, Last Rate: 100 mL/hr (12/18/23 1739)         Plan discussed with RN. Reviewed all other data in the last 24 hours, including but not limited to vitals, labs, microbiology, imaging and pertinent notes from other providers.  Plan also discussed with patient's wife and son at the bedside.      DUANE Jimenez   Critical Care  12/18/23   20:11 EST       Electronically signed by Yuniel Lai MD at 12/19/23 0850       James Esquivel MD at 12/18/23 0718          H&P reviewed.  The patient was examined and there are no changes to the H&P   Electronically signed by James Esquivel MD at 12/18/23 0718   Source Note          I called Dr. Esquivel office to provide staff the update that he is cleared for surgery.  I was unable to get in contact with someone but I left a detailed VM on the confidential line relaying this information.  I encouraged them to call our office back if they had further questions.    I called and spoke with pt, providing him the pt appt liaison number for PCP- [phone].    Thank you,    Lisa STREET RN  Triage  [facility]  12/15/23 13:29 EST      Electronically signed by Lisa Gaytan, RN at 12/15/23 1329                 Lisa Gaytan RN at 12/15/23 1200          I called Dr. Esquivel office to provide staff the update that he is cleared for surgery.  I was unable to get in contact with someone but I left a detailed VM on the confidential line relaying this information.  I encouraged them to call our office back if they had further questions.    I called and spoke with pt, providing him the pt appt liaison number for PCP- [phone].    Thank you,    Lisa STREET RN  Triage [facility]  12/15/23 13:29 EST      Electronically signed by Lisa Gaytan RN at 12/15/23 1329          Operative/Procedure Notes (all)        James Esquivel MD at 12/18/23 0758  Version 1 of 1         NEPHRECTOMY RADICAL  Progress Note    Louis Thu  12/18/2023    Pre-op Diagnosis:   Other specified disorders of kidney and ureter [N28.89]  Renal mass [N28.89]       Post-Op Diagnosis Codes:     * Other specified disorders of kidney and ureter [N28.89]     * Renal mass [N28.89]    Procedure/CPT® Codes:        Procedure(s):  NEPHRECTOMY RADICAL WITH CAVAL THROMBECTOMY              Surgeon(s):  Feng Lopes MD Goodwin, MD Alvin Alanis Daniel, MD Witten, Ervin GILL MD    Anesthesia: General    Staff:   Circulator: Ilana Barrow RN; Cassie Carrasco RN  Scrub Person: Rhea Matos; Krystina Pfeiffer         Estimated Blood Loss: 3500 mL    Urine Voided: 450 mL    Specimens:                Specimens       ID Source Type Tests Collected By Collected At Frozen?    1 Blood, Arterial Line Blood CBC (NO DIFF)  FIBRINOGEN  PROTIME-INR  APTT   James Esquivel MD 12/18/23 1203     A Kidney, Left Tissue TISSUE PATHOLOGY EXAM   James Esquivel MD 12/18/23 1230 No    Description: LEFT KIDNEY TUMOR THROMBUS    B Kidney, Left Tissue TISSUE PATHOLOGY EXAM   James Esquivel MD 12/18/23 1300 No    Description: LEFT KIDNEY AND TUMOR THROMBIS    C  Gland, Adrenal Left Tissue TISSUE PATHOLOGY EXAM   James Esquivel MD 12/18/23 1307 No                  Drains:   Urethral Catheter Silicone 16 Fr. (Active)   Daily Indications Placement by  physician 12/18/23 1455   Site Assessment Clean;Skin intact 12/18/23 1440   Collection Container Standard drainage bag 12/18/23 1455   Securement Method Securing device 12/18/23 1455   Catheter care complete Yes 12/18/23 1410       Findings: IVC thrombus        Complications: IVC thromus           James Esquivel MD     Date: 12/18/2023  Time: 14:57 EST          Electronically signed by James Esquivel MD at 12/18/23 1458       James Esquivel MD at 12/18/23 6260  Version 1 of 1         Urology Operative Note    12/18/2023    Louis Andino  63 y.o.  1960  male  [id number]      Surgeon(s) and Role:  James Esquivel MD - Primary, Dr MIGDALIA Jones MD - Assist, Dr. Baumann - Assist    Pre-operative Diagnosis: Large left renal mass    Post-operative Diagnosis: Same + IVC tumor thrombus    Complications: None    Procedures:    Left open radical nephrectomy and IVC thrombectomy    Indications   Louis Andino is a 63 y.o. male who was found to have a large left renal mass    During the informed consent process, the procedure was discussed in detail including but not limited to the risks of bleeding, infection, damage to surrounding structures and perioperative mortality.    Description of procedure:  The patient was properly identified in the preoperative holding area and taken to the operating room were general anesthesia was induced. The patient was positioned, secured to the table, prepped and draped in a sterile fashion. The patient was given antibiotics intravenously before the start of the surgery. After ensuring that all of the required equipment was ready and available a surgical timeout was performed.     Incision was made at the subcostal margin 2 fingerbreadths below the rib cage.  Layers sequentially dissected  down through the fat and the fascia using Bovie cautery.  Once the muscle was divided the posterior sheath and peritoneum were incised sharply.  Colon was then swept medially and Bookwalter placed.  Good retraction was able to expose the kidney.  This was carefully dissected medially but there was significant sticky fat and accessory blood vessels making dissection difficult. The kidney was poorly mobile due to this.  The inferior pole was brought up and dissection continued towards the hilum.  Spleen was also retracted and out of the operative field.  The hilum was then identified with significant thick tissue surrounding and not easily dissected clear. There was persistent oozing during the case and blood transfusions began after one liter of blood loss. Brisk bleeding was encountered at the hilum and pressure applied to control. At this point we called for extra assistance from Dr Baumann who arrived and scrubbed in. Dr Lopes with general surgery also scrubbed in prior to this to assist with controlling the bleeding. Clamp was placed and hemostasis was achieved across the hilum.  We then decided to kocherized the duodenum to get the renal vein at a more proximal point to better control the bleeding.  After kocherizing the duodenum and replacing the Bookwalter retractors the IVC was dissected clear and the right and left renal vein identified.  In the IVC with palpation there was tumor thrombus.  Therefore the IVC was skeletonized and Vesseloops passed inferiorly and superiorly to the tumor thrombus as well as around the right renal vein.  Attention was then turned back to the hilum and the Bookwalter was again repositioned.  The artery was then dissected there was some bleeding encountered off the aorta which was controlled with silk suture.  The artery was then identified and taken with a 45 mm vascular load stapler.  Multiple staple loads were taken to take out the medial upper pole attachments as well.   Once hemostasis was obtained and the artery had been ligated the Bookwalter was repositioned for the tumor thrombectomy.  Anesthesia was given time to prepare and blood was being transfused and labs checked regularly.  Vascular clamp was then placed inferiorly then superiorly on the IVC.  15 blade was then used to open the IVC in the longitudinal position.  The tumor thrombus then popped through the incision.  Care was taken to further open the IVC up down to the left renal vein and the tumor thrombus was removed.  4-0 Prolene was then used to close the IVC in a running fashion x 2.  The clamps were then removed there was minimal bleeding which was further controlled with clips and additional Prolene.  Floseal was placed over the IVC and the Bookwalter repositioned to complete the nephrectomy.  The left renal vein was then free and hilum was completely free.  The upper pole attachments were then removed and the kidney was freed and sent to pathology.  The left adrenal gland was then removed separately using clips for hemostasis.  Multiple additional clips used to control further bleeding in the renal fossa.  There was no injury to the spleen, the pancreas was freed from the area of dissection and there was no further bleeding from the aorta.  Tamanna was then placed in the renal fossa.  Attention was turned back to the IVC to ensure adequate hemostasis.  There was additional bleeding seen which was then controlled by applying pressure and clips to some posterior bleeding.  Tamanna was then applied.  The bowel was then replaced in the anatomic position. The fascia was closed with looped PDS in 2 layers.  Staples applied to the skin.    The procedure was complicated by IVC tumor thrombus and large volume blood loss. The patient remained intubated and will be admitted to the ICU.     I was present and scrubbed for the entire procedure.     Specimens: Left kidney, left adrenal gland, IVC tumor thrombus    Estimated Blood  Loss: 3500mL  Fluids: 5U PRBCs, 2U FFP         James Esquivel MD  [facility]  [address], [address]  [place] [address]  [phone]            Electronically signed by James Esquivel MD at 23 1638          Physician Progress Notes (all)        James Esquivel MD at 23 0742            [facility] DAILY PROGRESS NOTE    Patient Identification  Name: Louis Andino  Age: 63 y.o.  Sex: male  :  1960  MRN: [id number]    Date: 2023             Subjective:  Interval History: Extubated this morning, +pain    Objective:    Scheduled Meds:budesonide, 0.5 mg, Nebulization, BID - RT  chlorhexidine, 15 mL, Mouth/Throat, Q12H  enoxaparin, 40 mg, Subcutaneous, Daily  hydroCHLOROthiazide, 25 mg, Oral, Daily  lansoprazole, 15 mg, Oral, Q AM  senna-docusate sodium, 2 tablet, Nasogastric, BID  sodium chloride, 10 mL, Intravenous, Q12H  sodium chloride, 10 mL, Intravenous, Q12H  sodium chloride, 10 mL, Intravenous, Q12H  sodium chloride, 10 mL, Intravenous, Q12H      Continuous Infusions:norepinephrine, 0.02-0.5 mcg/kg/min, Last Rate: 0.04 mcg/kg/min (23)  propofol, 5-50 mcg/kg/min, Last Rate: 40 mcg/kg/min (23)  sodium chloride, 250 mL/hr, Last Rate: 250 mL/hr (23)      PRN Meds:  acetaminophen **OR** acetaminophen    senna-docusate sodium **AND** polyethylene glycol **AND** bisacodyl **AND** bisacodyl    Calcium Replacement - Follow Nurse / BPA Driven Protocol    HYDROmorphone **AND** naloxone    influenza vaccine    ipratropium-albuterol    Magnesium Low Dose Replacement - Follow Nurse / BPA Driven Protocol    nitroglycerin    ondansetron **OR** ondansetron    Phosphorus Replacement - Follow Nurse / BPA Driven Protocol    Potassium Replacement - Follow Nurse / BPA Driven Protocol    sodium chloride    sodium chloride    sodium chloride    sodium chloride    sodium chloride    Vital signs in last 24 hours:  Temp:  [97.4 °F (36.3 °C)-98.4 °F (36.9 °C)]  "97.6 °F (36.4 °C)  Heart Rate:  [] 110  Resp:  [14-23] 15  BP: ()/(56-76) 98/67  FiO2 (%):  [0.5 %-70 %] 30 %    Intake/Output:    Intake/Output Summary (Last 24 hours) at 12/19/2023 0743  Last data filed at 12/19/2023 0100  Gross per 24 hour   Intake 66586 ml   Output 2987 ml   Net 7691 ml       Exam:  BP 98/67 (BP Location: Right arm, Patient Position: Lying)   Pulse 110   Temp 97.6 °F (36.4 °C) (Oral)   Resp 15   Ht 177.8 cm (70\")   Wt 102 kg (224 lb 13.9 oz)   SpO2 96%   BMI 32.27 kg/m²     General Appearance:    Alert, cooperative, NAD   Lungs:     Respirations unlabored, no audible wheezing    Heart:    No cyanosis   Abdomen:     Soft, ND, incision clean dry intact   :  Harley dark yellow            Data Review:  All labs (24hrs):   Recent Results (from the past 24 hour(s))   POC Chem 8, arterial (ISTAT)    Collection Time: 12/18/23 10:51 AM    Specimen: Arterial Blood   Result Value Ref Range    Ionized Calcium 1.14 1.12 - 1.32 mmol/L    pH, Arterial 7.350 7.350 - 7.450 pH units    pCO2, Arterial 41.4 35.0 - 45.0 mm Hg    pO2, Arterial 189.0 (H) 80.0 - 105.0 mm Hg    HCO3, Arterial 22.6 22.0 - 26.0 mmol/L    Base Excess, Arterial <0.0 (L) 0.0 - 3.0 mmol/L    Base Deficit      O2 Saturation, Arterial 100.0 (H) 95.0 - 98.0 %    Glucose 188 (H) 70 - 105 mg/dL    Sodium 134 (L) 138 - 146 mmol/L    POC Potassium 4.5 3.5 - 4.9 mmol/L    Hematocrit 30 (L) 38 - 51 %    Hemoglobin 10.2 (L) 12.0 - 17.0 g/dL    CO2 Content 24 23 - 27 mmol/L   CBC (No Diff)    Collection Time: 12/18/23 12:03 PM    Specimen: Blood, Arterial Line   Result Value Ref Range    WBC 14.70 (H) 3.40 - 10.80 10*3/mm3    RBC 3.49 (L) 4.14 - 5.80 10*6/mm3    Hemoglobin 9.1 (L) 13.0 - 17.7 g/dL    Hematocrit 28.3 (L) 37.5 - 51.0 %    MCV 80.9 79.0 - 97.0 fL    MCH 26.1 (L) 26.6 - 33.0 pg    MCHC 32.2 31.5 - 35.7 g/dL    RDW 17.5 (H) 12.3 - 15.4 %    RDW-SD 49.0 37.0 - 54.0 fl    MPV 7.2 6.0 - 12.0 fL    Platelets 227 140 - 450 " 10*3/mm3   Fibrinogen    Collection Time: 12/18/23 12:03 PM    Specimen: Blood, Arterial Line   Result Value Ref Range    Fibrinogen 283 210 - 450 mg/dL   Protime-INR    Collection Time: 12/18/23 12:03 PM    Specimen: Blood, Arterial Line   Result Value Ref Range    Protime 13.3 (H) 9.6 - 11.7 Seconds    INR 1.24 (H) 0.93 - 1.10   aPTT    Collection Time: 12/18/23 12:03 PM    Specimen: Blood, Arterial Line   Result Value Ref Range    PTT 58.7 (C) 24.0 - 31.0 seconds   POC Chem 8, arterial (ISTAT)    Collection Time: 12/18/23  1:18 PM    Specimen: Arterial Blood   Result Value Ref Range    Ionized Calcium 0.87 (C) 1.12 - 1.32 mmol/L    pH, Arterial 7.300 (L) 7.350 - 7.450 pH units    pCO2, Arterial 42.5 35.0 - 45.0 mm Hg    pO2, Arterial 216.0 (H) 80.0 - 105.0 mm Hg    HCO3, Arterial 20.8 (L) 22.0 - 26.0 mmol/L    Base Excess, Arterial <0.0 (L) 0.0 - 3.0 mmol/L    Base Deficit      O2 Saturation, Arterial 100.0 (H) 95.0 - 98.0 %    Glucose 216 (H) 70 - 105 mg/dL    Sodium 136 (L) 138 - 146 mmol/L    POC Potassium 4.7 3.5 - 4.9 mmol/L    Hematocrit 25 (L) 38 - 51 %    Hemoglobin 8.5 (L) 12.0 - 17.0 g/dL    CO2 Content 22 (L) 23 - 27 mmol/L   Comprehensive Metabolic Panel    Collection Time: 12/18/23  5:22 PM    Specimen: Blood   Result Value Ref Range    Glucose 169 (H) 65 - 99 mg/dL    BUN 14 8 - 23 mg/dL    Creatinine 1.48 (H) 0.76 - 1.27 mg/dL    Sodium 136 136 - 145 mmol/L    Potassium 4.9 3.5 - 5.2 mmol/L    Chloride 104 98 - 107 mmol/L    CO2 21.0 (L) 22.0 - 29.0 mmol/L    Calcium 7.6 (L) 8.6 - 10.5 mg/dL    Total Protein 5.4 (L) 6.0 - 8.5 g/dL    Albumin 2.9 (L) 3.5 - 5.2 g/dL    ALT (SGPT) 64 (H) 1 - 41 U/L    AST (SGOT) 67 (H) 1 - 40 U/L    Alkaline Phosphatase 53 39 - 117 U/L    Total Bilirubin 1.4 (H) 0.0 - 1.2 mg/dL    Globulin 2.5 gm/dL    A/G Ratio 1.2 g/dL    BUN/Creatinine Ratio 9.5 7.0 - 25.0    Anion Gap 11.0 5.0 - 15.0 mmol/L    eGFR 52.2 (L) >60.0 mL/min/1.73   Magnesium    Collection Time:  12/18/23  5:22 PM    Specimen: Blood   Result Value Ref Range    Magnesium 1.5 (L) 1.6 - 2.4 mg/dL   Phosphorus    Collection Time: 12/18/23  5:22 PM    Specimen: Blood   Result Value Ref Range    Phosphorus 4.6 (H) 2.5 - 4.5 mg/dL   Fibrinogen    Collection Time: 12/18/23  5:22 PM    Specimen: Blood   Result Value Ref Range    Fibrinogen 381 210 - 450 mg/dL   Protime-INR    Collection Time: 12/18/23  5:22 PM    Specimen: Blood   Result Value Ref Range    Protime 11.7 9.6 - 11.7 Seconds    INR 1.08 0.93 - 1.10   aPTT    Collection Time: 12/18/23  5:22 PM    Specimen: Blood   Result Value Ref Range    PTT 28.7 24.0 - 31.0 seconds   CBC Auto Differential    Collection Time: 12/18/23  5:22 PM    Specimen: Blood   Result Value Ref Range    WBC 17.50 (H) 3.40 - 10.80 10*3/mm3    RBC 3.85 (L) 4.14 - 5.80 10*6/mm3    Hemoglobin 9.9 (L) 13.0 - 17.7 g/dL    Hematocrit 30.6 (L) 37.5 - 51.0 %    MCV 79.6 79.0 - 97.0 fL    MCH 25.8 (L) 26.6 - 33.0 pg    MCHC 32.4 31.5 - 35.7 g/dL    RDW 17.0 (H) 12.3 - 15.4 %    RDW-SD 49.4 37.0 - 54.0 fl    MPV 7.4 6.0 - 12.0 fL    Platelets 228 140 - 450 10*3/mm3    Neutrophil % 83.4 (H) 42.7 - 76.0 %    Lymphocyte % 5.2 (L) 19.6 - 45.3 %    Monocyte % 11.2 5.0 - 12.0 %    Eosinophil % 0.0 (L) 0.3 - 6.2 %    Basophil % 0.2 0.0 - 1.5 %    Neutrophils, Absolute 14.60 (H) 1.70 - 7.00 10*3/mm3    Lymphocytes, Absolute 0.90 0.70 - 3.10 10*3/mm3    Monocytes, Absolute 2.00 (H) 0.10 - 0.90 10*3/mm3    Eosinophils, Absolute 0.00 0.00 - 0.40 10*3/mm3    Basophils, Absolute 0.00 0.00 - 0.20 10*3/mm3    nRBC 0.0 0.0 - 0.2 /100 WBC   Calcium, Ionized    Collection Time: 12/18/23  5:23 PM    Specimen: Blood   Result Value Ref Range    Ionized Calcium 1.07 (L) 1.20 - 1.30 mmol/L   Prepare RBC, 2 Units    Collection Time: 12/19/23  2:00 AM   Result Value Ref Range    Product Code P4875Y66     Unit Number [id number]     UNIT  ABO A     UNIT  RH POS     Crossmatch Interpretation Compatible     Dispense  Status PT     Blood Expiration Date 202401222359     Blood Type Barcode 6200     Product Code M1885C36     Unit Number [id number]     UNIT  ABO A     UNIT  RH POS     Crossmatch Interpretation Compatible     Dispense Status PT     Blood Expiration Date 202401222359     Blood Type Barcode 6200     Product Code F0002J76     Unit Number [id number]     UNIT  ABO A     UNIT  RH POS     Crossmatch Interpretation Compatible     Dispense Status PT     Blood Expiration Date 202401222359     Blood Type Barcode 6200     Product Code M4770L19     Unit Number [id number]     UNIT  ABO A     UNIT  RH POS     Crossmatch Interpretation Compatible     Dispense Status PT     Blood Expiration Date 202401222359     Blood Type Barcode 6200     Product Code J5216Z19     Unit Number [id number]     UNIT  ABO A     UNIT  RH POS     Crossmatch Interpretation Compatible     Dispense Status PT     Blood Expiration Date 202401162359     Blood Type Barcode 6200     Product Code A5869L72     Unit Number [id number]     UNIT  ABO A     UNIT  RH POS     Crossmatch Interpretation Compatible     Dispense Status RE     Blood Expiration Date 202401172359     Blood Type Barcode 6200    Prepare Fresh Frozen Plasma, 2 Units    Collection Time: 12/19/23  2:00 AM   Result Value Ref Range    Product Code M4618M36     Unit Number [id number]     UNIT  ABO A     UNIT  RH POS     Dispense Status PT     Blood Expiration Date 202312222359     Blood Type Barcode 6200     Product Code U5953H53     Unit Number [id number]     UNIT  ABO A     UNIT  RH POS     Dispense Status PT     Blood Expiration Date 202312222359     Blood Type Barcode 6200    Blood Gas, Arterial -    Collection Time: 12/19/23  4:00 AM    Specimen: Arterial Blood   Result Value Ref Range    Site Arterial Line     Devin's Test N/A     pH, Arterial 7.370 7.350 - 7.450 pH units    pCO2, Arterial 32.1 (L) 35.0 - 48.0 mm Hg    pO2, Arterial 99.0 83.0 - 108.0 mm Hg     HCO3, Arterial 18.5 (L) 21.0 - 28.0 mmol/L    Base Excess, Arterial -6.0 (L) 0.0 - 3.0 mmol/L    O2 Saturation, Arterial 97.6 94.0 - 98.0 %    CO2 Content 19.5 (L) 22 - 29 mmol/L    Barometric Pressure for Blood Gas      Modality Adult Vent     FIO2 30 %    Ventilator Mode AC     Set Tidal Volume 500     PEEP 5     Hemodilution No     Respiratory Rate 14    POC Glucose Once    Collection Time: 12/19/23  4:00 AM    Specimen: Arterial Blood   Result Value Ref Range    Glucose 139 (H) 74 - 100 mg/dL   Magnesium    Collection Time: 12/19/23  5:41 AM    Specimen: Blood   Result Value Ref Range    Magnesium 1.8 1.6 - 2.4 mg/dL   Phosphorus    Collection Time: 12/19/23  5:41 AM    Specimen: Blood   Result Value Ref Range    Phosphorus 4.5 2.5 - 4.5 mg/dL   Comprehensive Metabolic Panel    Collection Time: 12/19/23  5:41 AM    Specimen: Blood   Result Value Ref Range    Glucose 139 (H) 65 - 99 mg/dL    BUN 18 8 - 23 mg/dL    Creatinine 2.23 (H) 0.76 - 1.27 mg/dL    Sodium 137 136 - 145 mmol/L    Potassium 4.8 3.5 - 5.2 mmol/L    Chloride 106 98 - 107 mmol/L    CO2 18.0 (L) 22.0 - 29.0 mmol/L    Calcium 7.4 (L) 8.6 - 10.5 mg/dL    Total Protein 5.1 (L) 6.0 - 8.5 g/dL    Albumin 2.7 (L) 3.5 - 5.2 g/dL    ALT (SGPT) 46 (H) 1 - 41 U/L    AST (SGOT) 59 (H) 1 - 40 U/L    Alkaline Phosphatase 50 39 - 117 U/L    Total Bilirubin 0.4 0.0 - 1.2 mg/dL    Globulin 2.4 gm/dL    A/G Ratio 1.1 g/dL    BUN/Creatinine Ratio 8.1 7.0 - 25.0    Anion Gap 13.0 5.0 - 15.0 mmol/L    eGFR 31.9 (L) >60.0 mL/min/1.73   Calcium, Ionized    Collection Time: 12/19/23  5:41 AM    Specimen: Blood   Result Value Ref Range    Ionized Calcium 1.04 (L) 1.20 - 1.30 mmol/L   Lipid Panel    Collection Time: 12/19/23  5:41 AM    Specimen: Blood   Result Value Ref Range    Total Cholesterol 75 0 - 200 mg/dL    Triglycerides 109 0 - 150 mg/dL    HDL Cholesterol 24 (L) 40 - 60 mg/dL    LDL Cholesterol  30 0 - 100 mg/dL    VLDL Cholesterol 21 5 - 40 mg/dL    LDL/HDL  Ratio 1.22    TSH    Collection Time: 12/19/23  5:41 AM    Specimen: Blood   Result Value Ref Range    TSH 0.532 0.270 - 4.200 uIU/mL   CBC Auto Differential    Collection Time: 12/19/23  5:41 AM    Specimen: Blood   Result Value Ref Range    WBC 16.80 (H) 3.40 - 10.80 10*3/mm3    RBC 3.40 (L) 4.14 - 5.80 10*6/mm3    Hemoglobin 8.7 (L) 13.0 - 17.7 g/dL    Hematocrit 27.3 (L) 37.5 - 51.0 %    MCV 80.2 79.0 - 97.0 fL    MCH 25.6 (L) 26.6 - 33.0 pg    MCHC 31.9 31.5 - 35.7 g/dL    RDW 17.2 (H) 12.3 - 15.4 %    RDW-SD 50.3 37.0 - 54.0 fl    MPV 7.6 6.0 - 12.0 fL    Platelets 203 140 - 450 10*3/mm3    Neutrophil % 75.8 42.7 - 76.0 %    Lymphocyte % 12.3 (L) 19.6 - 45.3 %    Monocyte % 11.8 5.0 - 12.0 %    Eosinophil % 0.0 (L) 0.3 - 6.2 %    Basophil % 0.1 0.0 - 1.5 %    Neutrophils, Absolute 12.70 (H) 1.70 - 7.00 10*3/mm3    Lymphocytes, Absolute 2.10 0.70 - 3.10 10*3/mm3    Monocytes, Absolute 2.00 (H) 0.10 - 0.90 10*3/mm3    Eosinophils, Absolute 0.00 0.00 - 0.40 10*3/mm3    Basophils, Absolute 0.00 0.00 - 0.20 10*3/mm3    nRBC 0.0 0.0 - 0.2 /100 WBC   Blood Gas, Arterial -    Collection Time: 12/19/23  7:08 AM    Specimen: Arterial Blood   Result Value Ref Range    Site Arterial Line     Devin's Test N/A     pH, Arterial 7.355 7.350 - 7.450 pH units    pCO2, Arterial 33.0 (L) 35.0 - 48.0 mm Hg    pO2, Arterial 106.8 83.0 - 108.0 mm Hg    HCO3, Arterial 18.4 (L) 21.0 - 28.0 mmol/L    Base Excess, Arterial -6.4 (L) 0.0 - 3.0 mmol/L    O2 Saturation, Arterial 98.0 94.0 - 98.0 %    CO2 Content 19.4 (L) 22 - 29 mmol/L    Barometric Pressure for Blood Gas      Modality Adult Vent     FIO2 30 %    Ventilator Mode PS     PEEP 5     PSV 8 cmH2O    Hemodilution No       Imaging Results (Last 24 Hours)       Procedure Component Value Units Date/Time    XR Chest 1 View [[id number]] Collected: 12/19/23 0702     Updated: 12/19/23 0708    Narrative:      XR CHEST 1 VW    Date of Exam: 12/19/2023 1:28 AM EST    Indication: Intubated  Patient    Comparison: 12/18/2023    Findings:  ET tube above the greg. Esophagogastric tube tip below the diaphragm. Placement of a right upper extremity PICC tip is at the mid SVC. Heart size normal. Negative for pneumothorax or pleural effusion. Minimal left basilar atelectasis. Skinfold   suspected overlying the right apex.      Impression:      Impression:  1. Placement of a right upper extremity PICC with the tip at the mid SVC.  2. Placement of an esophagogastric tube with tip below the diaphragm. Stable ET tube above the greg.   3. No pneumothorax.  4. Minimal left basilar atelectasis.        Electronically Signed: Cedrick Ordonez MD    12/19/2023 7:06 AM EST    Workstation ID: [id number]    XR Abdomen KUB [[id number]] Collected: 12/18/23 1757     Updated: 12/18/23 1800    Narrative:      XR ABDOMEN KUB    Date of Exam: 12/18/2023 5:55 PM EST    Indication: OGT    Comparison: CT abdomen pelvis 11/4/2023.    Findings:   NG tube projects over the right upper abdomen in the expected position of the distal body, antrum of the stomach.    Postsurgical changes are noted of the abdomen. There is a nonspecific nonobstructive bowel gas pattern. Osseous structures are unremarkable        Impression:      Impression:  NG tube projects over the expected position of the distal body, antrum of the stomach.      Electronically Signed: Ervin Howell MD    12/18/2023 5:58 PM EST    Workstation ID: [id number]    XR Chest 1 View [[id number]] Collected: 12/18/23 1505     Updated: 12/18/23 1508    Narrative:      XR CHEST 1 VW    Date of Exam: 12/18/2023 2:58 PM EST    Indication: et tube placement    Comparison: None available.    Findings:  There is an endotracheal tube with the tip 3.6 cm above the greg. The cardiopulmonary silhouette is borderline in size. Mild right infrahilar discoid atelectasis. The rest of the lungs are clear. There are no pleural effusions      Impression:      Impression:  Endotracheal tube in  place    Mild right infrahilar discoid atelectasis. Otherwise clear lungs      Electronically Signed: Farhat Fletcher MD    12/18/2023 3:06 PM EST    Workstation ID: [id number]    XR Lost Needle / Instrument [[id number]] Collected: 12/18/23 1406     Updated: 12/18/23 1412    Narrative:      XR LOST NEEDLE/INSTRUMENT    Date of Exam: 12/18/2023 1:50 PM EST    Indication: Instrument count. Left radical nephrectomy. Needle and sponge counts were correct,    Comparison: CT abdomen pelvis November 4, 2023.    Findings:  Upper abdominal skin staple line. Multiple surgical clips are seen in the mid upper and left upper abdomen consistent with history of left nephrectomy. Harley catheter is present in the pelvis. No definite radiopaque instrument or other definite   unexpected foreign body is identified. Visualized bowel gas pattern appears grossly unremarkable.      Impression:      Impression:  Postsurgical changes with multiple surgical clips and skin staple line in the upper abdomen. No definite radiopaque instrument or other unexpected foreign body is identified.        Results were called to Suzette Carrasco RN in the OR by Dr. Moya at 12/18/2023 2:00 PM EST.    Electronically Signed: Nestor Moya    12/18/2023 2:10 PM EST    Workstation ID: [id number]             Assessment:    Renal mass    Asthma    Gastroesophageal reflux disease    Hyperlipidemia    Hypertension    BPH (benign prostatic hyperplasia)    COPD (chronic obstructive pulmonary disease)    KARON (acute kidney injury)    Acute respiratory failure with hypoxia      Left nephrectomy and IVC thrombectomy 12/18    Plan:    Extubated this morning  Hemoglobin overall stable 8.7  KARON, monitor for recovery, continue IV fluids consider nephrology consult  Start Lovenox prophylaxis today  SCDs  Up to chair, slowly increase activity  Pain control  Ok to start clears today if no sig nausea or vomiting after extubation    James Esquivel MD  [facility]  [address],  [address]  [place] [address]  Office: [phone]  Available via [employer] Secure Chat  12/19/23  07:43 EST       Electronically signed by James Esquivel MD at 12/19/23 7006       Consult Notes (all)    No notes of this type exist for this encounter.

## 2023-12-19 NOTE — PROGRESS NOTES
[facility] DAILY PROGRESS NOTE    Patient Identification  Name: Louis Andino  Age: 63 y.o.  Sex: male  :  1960  MRN: [id number]    Date: 2023             Subjective:  Interval History: Extubated this morning, +pain    Objective:    Scheduled Meds:budesonide, 0.5 mg, Nebulization, BID - RT  chlorhexidine, 15 mL, Mouth/Throat, Q12H  enoxaparin, 40 mg, Subcutaneous, Daily  hydroCHLOROthiazide, 25 mg, Oral, Daily  lansoprazole, 15 mg, Oral, Q AM  senna-docusate sodium, 2 tablet, Nasogastric, BID  sodium chloride, 10 mL, Intravenous, Q12H  sodium chloride, 10 mL, Intravenous, Q12H  sodium chloride, 10 mL, Intravenous, Q12H  sodium chloride, 10 mL, Intravenous, Q12H      Continuous Infusions:norepinephrine, 0.02-0.5 mcg/kg/min, Last Rate: 0.04 mcg/kg/min (23)  propofol, 5-50 mcg/kg/min, Last Rate: 40 mcg/kg/min (23)  sodium chloride, 250 mL/hr, Last Rate: 250 mL/hr (23)      PRN Meds:  acetaminophen **OR** acetaminophen    senna-docusate sodium **AND** polyethylene glycol **AND** bisacodyl **AND** bisacodyl    Calcium Replacement - Follow Nurse / BPA Driven Protocol    HYDROmorphone **AND** naloxone    influenza vaccine    ipratropium-albuterol    Magnesium Low Dose Replacement - Follow Nurse / BPA Driven Protocol    nitroglycerin    ondansetron **OR** ondansetron    Phosphorus Replacement - Follow Nurse / BPA Driven Protocol    Potassium Replacement - Follow Nurse / BPA Driven Protocol    sodium chloride    sodium chloride    sodium chloride    sodium chloride    sodium chloride    Vital signs in last 24 hours:  Temp:  [97.4 °F (36.3 °C)-98.4 °F (36.9 °C)] 97.6 °F (36.4 °C)  Heart Rate:  [] 110  Resp:  [14-23] 15  BP: ()/(56-76) 98/67  FiO2 (%):  [0.5 %-70 %] 30 %    Intake/Output:    Intake/Output Summary (Last 24 hours) at 2023 0743  Last data filed at 2023 0100  Gross per 24 hour   Intake 72925 ml   Output 2987 ml   Net 7691 ml  "      Exam:  BP 98/67 (BP Location: Right arm, Patient Position: Lying)   Pulse 110   Temp 97.6 °F (36.4 °C) (Oral)   Resp 15   Ht 177.8 cm (70\")   Wt 102 kg (224 lb 13.9 oz)   SpO2 96%   BMI 32.27 kg/m²     General Appearance:    Alert, cooperative, NAD   Lungs:     Respirations unlabored, no audible wheezing    Heart:    No cyanosis   Abdomen:     Soft, ND, incision clean dry intact   :  Harley dark yellow            Data Review:  All labs (24hrs):   Recent Results (from the past 24 hour(s))   POC Chem 8, arterial (ISTAT)    Collection Time: 12/18/23 10:51 AM    Specimen: Arterial Blood   Result Value Ref Range    Ionized Calcium 1.14 1.12 - 1.32 mmol/L    pH, Arterial 7.350 7.350 - 7.450 pH units    pCO2, Arterial 41.4 35.0 - 45.0 mm Hg    pO2, Arterial 189.0 (H) 80.0 - 105.0 mm Hg    HCO3, Arterial 22.6 22.0 - 26.0 mmol/L    Base Excess, Arterial <0.0 (L) 0.0 - 3.0 mmol/L    Base Deficit      O2 Saturation, Arterial 100.0 (H) 95.0 - 98.0 %    Glucose 188 (H) 70 - 105 mg/dL    Sodium 134 (L) 138 - 146 mmol/L    POC Potassium 4.5 3.5 - 4.9 mmol/L    Hematocrit 30 (L) 38 - 51 %    Hemoglobin 10.2 (L) 12.0 - 17.0 g/dL    CO2 Content 24 23 - 27 mmol/L   CBC (No Diff)    Collection Time: 12/18/23 12:03 PM    Specimen: Blood, Arterial Line   Result Value Ref Range    WBC 14.70 (H) 3.40 - 10.80 10*3/mm3    RBC 3.49 (L) 4.14 - 5.80 10*6/mm3    Hemoglobin 9.1 (L) 13.0 - 17.7 g/dL    Hematocrit 28.3 (L) 37.5 - 51.0 %    MCV 80.9 79.0 - 97.0 fL    MCH 26.1 (L) 26.6 - 33.0 pg    MCHC 32.2 31.5 - 35.7 g/dL    RDW 17.5 (H) 12.3 - 15.4 %    RDW-SD 49.0 37.0 - 54.0 fl    MPV 7.2 6.0 - 12.0 fL    Platelets 227 140 - 450 10*3/mm3   Fibrinogen    Collection Time: 12/18/23 12:03 PM    Specimen: Blood, Arterial Line   Result Value Ref Range    Fibrinogen 283 210 - 450 mg/dL   Protime-INR    Collection Time: 12/18/23 12:03 PM    Specimen: Blood, Arterial Line   Result Value Ref Range    Protime 13.3 (H) 9.6 - 11.7 Seconds    " INR 1.24 (H) 0.93 - 1.10   aPTT    Collection Time: 12/18/23 12:03 PM    Specimen: Blood, Arterial Line   Result Value Ref Range    PTT 58.7 (C) 24.0 - 31.0 seconds   POC Chem 8, arterial (ISTAT)    Collection Time: 12/18/23  1:18 PM    Specimen: Arterial Blood   Result Value Ref Range    Ionized Calcium 0.87 (C) 1.12 - 1.32 mmol/L    pH, Arterial 7.300 (L) 7.350 - 7.450 pH units    pCO2, Arterial 42.5 35.0 - 45.0 mm Hg    pO2, Arterial 216.0 (H) 80.0 - 105.0 mm Hg    HCO3, Arterial 20.8 (L) 22.0 - 26.0 mmol/L    Base Excess, Arterial <0.0 (L) 0.0 - 3.0 mmol/L    Base Deficit      O2 Saturation, Arterial 100.0 (H) 95.0 - 98.0 %    Glucose 216 (H) 70 - 105 mg/dL    Sodium 136 (L) 138 - 146 mmol/L    POC Potassium 4.7 3.5 - 4.9 mmol/L    Hematocrit 25 (L) 38 - 51 %    Hemoglobin 8.5 (L) 12.0 - 17.0 g/dL    CO2 Content 22 (L) 23 - 27 mmol/L   Comprehensive Metabolic Panel    Collection Time: 12/18/23  5:22 PM    Specimen: Blood   Result Value Ref Range    Glucose 169 (H) 65 - 99 mg/dL    BUN 14 8 - 23 mg/dL    Creatinine 1.48 (H) 0.76 - 1.27 mg/dL    Sodium 136 136 - 145 mmol/L    Potassium 4.9 3.5 - 5.2 mmol/L    Chloride 104 98 - 107 mmol/L    CO2 21.0 (L) 22.0 - 29.0 mmol/L    Calcium 7.6 (L) 8.6 - 10.5 mg/dL    Total Protein 5.4 (L) 6.0 - 8.5 g/dL    Albumin 2.9 (L) 3.5 - 5.2 g/dL    ALT (SGPT) 64 (H) 1 - 41 U/L    AST (SGOT) 67 (H) 1 - 40 U/L    Alkaline Phosphatase 53 39 - 117 U/L    Total Bilirubin 1.4 (H) 0.0 - 1.2 mg/dL    Globulin 2.5 gm/dL    A/G Ratio 1.2 g/dL    BUN/Creatinine Ratio 9.5 7.0 - 25.0    Anion Gap 11.0 5.0 - 15.0 mmol/L    eGFR 52.2 (L) >60.0 mL/min/1.73   Magnesium    Collection Time: 12/18/23  5:22 PM    Specimen: Blood   Result Value Ref Range    Magnesium 1.5 (L) 1.6 - 2.4 mg/dL   Phosphorus    Collection Time: 12/18/23  5:22 PM    Specimen: Blood   Result Value Ref Range    Phosphorus 4.6 (H) 2.5 - 4.5 mg/dL   Fibrinogen    Collection Time: 12/18/23  5:22 PM    Specimen: Blood   Result  Value Ref Range    Fibrinogen 381 210 - 450 mg/dL   Protime-INR    Collection Time: 12/18/23  5:22 PM    Specimen: Blood   Result Value Ref Range    Protime 11.7 9.6 - 11.7 Seconds    INR 1.08 0.93 - 1.10   aPTT    Collection Time: 12/18/23  5:22 PM    Specimen: Blood   Result Value Ref Range    PTT 28.7 24.0 - 31.0 seconds   CBC Auto Differential    Collection Time: 12/18/23  5:22 PM    Specimen: Blood   Result Value Ref Range    WBC 17.50 (H) 3.40 - 10.80 10*3/mm3    RBC 3.85 (L) 4.14 - 5.80 10*6/mm3    Hemoglobin 9.9 (L) 13.0 - 17.7 g/dL    Hematocrit 30.6 (L) 37.5 - 51.0 %    MCV 79.6 79.0 - 97.0 fL    MCH 25.8 (L) 26.6 - 33.0 pg    MCHC 32.4 31.5 - 35.7 g/dL    RDW 17.0 (H) 12.3 - 15.4 %    RDW-SD 49.4 37.0 - 54.0 fl    MPV 7.4 6.0 - 12.0 fL    Platelets 228 140 - 450 10*3/mm3    Neutrophil % 83.4 (H) 42.7 - 76.0 %    Lymphocyte % 5.2 (L) 19.6 - 45.3 %    Monocyte % 11.2 5.0 - 12.0 %    Eosinophil % 0.0 (L) 0.3 - 6.2 %    Basophil % 0.2 0.0 - 1.5 %    Neutrophils, Absolute 14.60 (H) 1.70 - 7.00 10*3/mm3    Lymphocytes, Absolute 0.90 0.70 - 3.10 10*3/mm3    Monocytes, Absolute 2.00 (H) 0.10 - 0.90 10*3/mm3    Eosinophils, Absolute 0.00 0.00 - 0.40 10*3/mm3    Basophils, Absolute 0.00 0.00 - 0.20 10*3/mm3    nRBC 0.0 0.0 - 0.2 /100 WBC   Calcium, Ionized    Collection Time: 12/18/23  5:23 PM    Specimen: Blood   Result Value Ref Range    Ionized Calcium 1.07 (L) 1.20 - 1.30 mmol/L   Prepare RBC, 2 Units    Collection Time: 12/19/23  2:00 AM   Result Value Ref Range    Product Code Z7621L13     Unit Number [id number]     UNIT  ABO A     UNIT  RH POS     Crossmatch Interpretation Compatible     Dispense Status PT     Blood Expiration Date 202401222359     Blood Type Barcode 6200     Product Code Z7430J97     Unit Number [id number]     UNIT  ABO A     UNIT  RH POS     Crossmatch Interpretation Compatible     Dispense Status PT     Blood Expiration Date 202401222359     Blood Type Barcode 6200     Product  Code N7151Z25     Unit Number [id number]     UNIT  ABO A     UNIT  RH POS     Crossmatch Interpretation Compatible     Dispense Status PT     Blood Expiration Date 202401222359     Blood Type Barcode 6200     Product Code A4886T56     Unit Number [id number]     UNIT  ABO A     UNIT  RH POS     Crossmatch Interpretation Compatible     Dispense Status PT     Blood Expiration Date 202401222359     Blood Type Barcode 6200     Product Code H0844C32     Unit Number [id number]     UNIT  ABO A     UNIT  RH POS     Crossmatch Interpretation Compatible     Dispense Status PT     Blood Expiration Date 202401162359     Blood Type Barcode 6200     Product Code S6740T06     Unit Number [id number]     UNIT  ABO A     UNIT  RH POS     Crossmatch Interpretation Compatible     Dispense Status RE     Blood Expiration Date 202401172359     Blood Type Barcode 6200    Prepare Fresh Frozen Plasma, 2 Units    Collection Time: 12/19/23  2:00 AM   Result Value Ref Range    Product Code U6026N74     Unit Number [id number]     UNIT  ABO A     UNIT  RH POS     Dispense Status PT     Blood Expiration Date 202312222359     Blood Type Barcode 6200     Product Code B4535B65     Unit Number [id number]     UNIT  ABO A     UNIT  RH POS     Dispense Status PT     Blood Expiration Date 202312222359     Blood Type Barcode 6200    Blood Gas, Arterial -    Collection Time: 12/19/23  4:00 AM    Specimen: Arterial Blood   Result Value Ref Range    Site Arterial Line     Devin's Test N/A     pH, Arterial 7.370 7.350 - 7.450 pH units    pCO2, Arterial 32.1 (L) 35.0 - 48.0 mm Hg    pO2, Arterial 99.0 83.0 - 108.0 mm Hg    HCO3, Arterial 18.5 (L) 21.0 - 28.0 mmol/L    Base Excess, Arterial -6.0 (L) 0.0 - 3.0 mmol/L    O2 Saturation, Arterial 97.6 94.0 - 98.0 %    CO2 Content 19.5 (L) 22 - 29 mmol/L    Barometric Pressure for Blood Gas      Modality Adult Vent     FIO2 30 %    Ventilator Mode AC     Set Tidal Volume 500     PEEP 5      Hemodilution No     Respiratory Rate 14    POC Glucose Once    Collection Time: 12/19/23  4:00 AM    Specimen: Arterial Blood   Result Value Ref Range    Glucose 139 (H) 74 - 100 mg/dL   Magnesium    Collection Time: 12/19/23  5:41 AM    Specimen: Blood   Result Value Ref Range    Magnesium 1.8 1.6 - 2.4 mg/dL   Phosphorus    Collection Time: 12/19/23  5:41 AM    Specimen: Blood   Result Value Ref Range    Phosphorus 4.5 2.5 - 4.5 mg/dL   Comprehensive Metabolic Panel    Collection Time: 12/19/23  5:41 AM    Specimen: Blood   Result Value Ref Range    Glucose 139 (H) 65 - 99 mg/dL    BUN 18 8 - 23 mg/dL    Creatinine 2.23 (H) 0.76 - 1.27 mg/dL    Sodium 137 136 - 145 mmol/L    Potassium 4.8 3.5 - 5.2 mmol/L    Chloride 106 98 - 107 mmol/L    CO2 18.0 (L) 22.0 - 29.0 mmol/L    Calcium 7.4 (L) 8.6 - 10.5 mg/dL    Total Protein 5.1 (L) 6.0 - 8.5 g/dL    Albumin 2.7 (L) 3.5 - 5.2 g/dL    ALT (SGPT) 46 (H) 1 - 41 U/L    AST (SGOT) 59 (H) 1 - 40 U/L    Alkaline Phosphatase 50 39 - 117 U/L    Total Bilirubin 0.4 0.0 - 1.2 mg/dL    Globulin 2.4 gm/dL    A/G Ratio 1.1 g/dL    BUN/Creatinine Ratio 8.1 7.0 - 25.0    Anion Gap 13.0 5.0 - 15.0 mmol/L    eGFR 31.9 (L) >60.0 mL/min/1.73   Calcium, Ionized    Collection Time: 12/19/23  5:41 AM    Specimen: Blood   Result Value Ref Range    Ionized Calcium 1.04 (L) 1.20 - 1.30 mmol/L   Lipid Panel    Collection Time: 12/19/23  5:41 AM    Specimen: Blood   Result Value Ref Range    Total Cholesterol 75 0 - 200 mg/dL    Triglycerides 109 0 - 150 mg/dL    HDL Cholesterol 24 (L) 40 - 60 mg/dL    LDL Cholesterol  30 0 - 100 mg/dL    VLDL Cholesterol 21 5 - 40 mg/dL    LDL/HDL Ratio 1.22    TSH    Collection Time: 12/19/23  5:41 AM    Specimen: Blood   Result Value Ref Range    TSH 0.532 0.270 - 4.200 uIU/mL   CBC Auto Differential    Collection Time: 12/19/23  5:41 AM    Specimen: Blood   Result Value Ref Range    WBC 16.80 (H) 3.40 - 10.80 10*3/mm3    RBC 3.40 (L) 4.14 - 5.80  10*6/mm3    Hemoglobin 8.7 (L) 13.0 - 17.7 g/dL    Hematocrit 27.3 (L) 37.5 - 51.0 %    MCV 80.2 79.0 - 97.0 fL    MCH 25.6 (L) 26.6 - 33.0 pg    MCHC 31.9 31.5 - 35.7 g/dL    RDW 17.2 (H) 12.3 - 15.4 %    RDW-SD 50.3 37.0 - 54.0 fl    MPV 7.6 6.0 - 12.0 fL    Platelets 203 140 - 450 10*3/mm3    Neutrophil % 75.8 42.7 - 76.0 %    Lymphocyte % 12.3 (L) 19.6 - 45.3 %    Monocyte % 11.8 5.0 - 12.0 %    Eosinophil % 0.0 (L) 0.3 - 6.2 %    Basophil % 0.1 0.0 - 1.5 %    Neutrophils, Absolute 12.70 (H) 1.70 - 7.00 10*3/mm3    Lymphocytes, Absolute 2.10 0.70 - 3.10 10*3/mm3    Monocytes, Absolute 2.00 (H) 0.10 - 0.90 10*3/mm3    Eosinophils, Absolute 0.00 0.00 - 0.40 10*3/mm3    Basophils, Absolute 0.00 0.00 - 0.20 10*3/mm3    nRBC 0.0 0.0 - 0.2 /100 WBC   Blood Gas, Arterial -    Collection Time: 12/19/23  7:08 AM    Specimen: Arterial Blood   Result Value Ref Range    Site Arterial Line     Devin's Test N/A     pH, Arterial 7.355 7.350 - 7.450 pH units    pCO2, Arterial 33.0 (L) 35.0 - 48.0 mm Hg    pO2, Arterial 106.8 83.0 - 108.0 mm Hg    HCO3, Arterial 18.4 (L) 21.0 - 28.0 mmol/L    Base Excess, Arterial -6.4 (L) 0.0 - 3.0 mmol/L    O2 Saturation, Arterial 98.0 94.0 - 98.0 %    CO2 Content 19.4 (L) 22 - 29 mmol/L    Barometric Pressure for Blood Gas      Modality Adult Vent     FIO2 30 %    Ventilator Mode PS     PEEP 5     PSV 8 cmH2O    Hemodilution No       Imaging Results (Last 24 Hours)       Procedure Component Value Units Date/Time    XR Chest 1 View [[id number]] Collected: 12/19/23 0702     Updated: 12/19/23 0708    Narrative:      XR CHEST 1 VW    Date of Exam: 12/19/2023 1:28 AM EST    Indication: Intubated Patient    Comparison: 12/18/2023    Findings:  ET tube above the greg. Esophagogastric tube tip below the diaphragm. Placement of a right upper extremity PICC tip is at the mid SVC. Heart size normal. Negative for pneumothorax or pleural effusion. Minimal left basilar atelectasis. Skinfold   suspected  overlying the right apex.      Impression:      Impression:  1. Placement of a right upper extremity PICC with the tip at the mid SVC.  2. Placement of an esophagogastric tube with tip below the diaphragm. Stable ET tube above the greg.   3. No pneumothorax.  4. Minimal left basilar atelectasis.        Electronically Signed: Cedrick Ordonez MD    12/19/2023 7:06 AM EST    Workstation ID: [id number]    XR Abdomen KUB [[id number]] Collected: 12/18/23 1757     Updated: 12/18/23 1800    Narrative:      XR ABDOMEN KUB    Date of Exam: 12/18/2023 5:55 PM EST    Indication: OGT    Comparison: CT abdomen pelvis 11/4/2023.    Findings:   NG tube projects over the right upper abdomen in the expected position of the distal body, antrum of the stomach.    Postsurgical changes are noted of the abdomen. There is a nonspecific nonobstructive bowel gas pattern. Osseous structures are unremarkable        Impression:      Impression:  NG tube projects over the expected position of the distal body, antrum of the stomach.      Electronically Signed: Ervin Howell MD    12/18/2023 5:58 PM EST    Workstation ID: [id number]    XR Chest 1 View [[id number]] Collected: 12/18/23 1505     Updated: 12/18/23 1508    Narrative:      XR CHEST 1 VW    Date of Exam: 12/18/2023 2:58 PM EST    Indication: et tube placement    Comparison: None available.    Findings:  There is an endotracheal tube with the tip 3.6 cm above the greg. The cardiopulmonary silhouette is borderline in size. Mild right infrahilar discoid atelectasis. The rest of the lungs are clear. There are no pleural effusions      Impression:      Impression:  Endotracheal tube in place    Mild right infrahilar discoid atelectasis. Otherwise clear lungs      Electronically Signed: Farhat Fletcher MD    12/18/2023 3:06 PM EST    Workstation ID: [id number]    XR Lost Needle / Instrument [[id number]] Collected: 12/18/23 1406     Updated: 12/18/23 1412    Narrative:      XR LOST  NEEDLE/INSTRUMENT    Date of Exam: 12/18/2023 1:50 PM EST    Indication: Instrument count. Left radical nephrectomy. Needle and sponge counts were correct,    Comparison: CT abdomen pelvis November 4, 2023.    Findings:  Upper abdominal skin staple line. Multiple surgical clips are seen in the mid upper and left upper abdomen consistent with history of left nephrectomy. Harley catheter is present in the pelvis. No definite radiopaque instrument or other definite   unexpected foreign body is identified. Visualized bowel gas pattern appears grossly unremarkable.      Impression:      Impression:  Postsurgical changes with multiple surgical clips and skin staple line in the upper abdomen. No definite radiopaque instrument or other unexpected foreign body is identified.        Results were called to Suzette Carrasco RN in the OR by Dr. Moya at 12/18/2023 2:00 PM EST.    Electronically Signed: Nestor Moya    12/18/2023 2:10 PM EST    Workstation ID: [id number]             Assessment:    Renal mass    Asthma    Gastroesophageal reflux disease    Hyperlipidemia    Hypertension    BPH (benign prostatic hyperplasia)    COPD (chronic obstructive pulmonary disease)    KARON (acute kidney injury)    Acute respiratory failure with hypoxia      Left nephrectomy and IVC thrombectomy 12/18    Plan:    Extubated this morning  Hemoglobin overall stable 8.7  KARON, monitor for recovery, continue IV fluids consider nephrology consult  Start Lovenox prophylaxis today  SCDs  Up to chair, slowly increase activity  Pain control  Ok to start clears today if no sig nausea or vomiting after extubation    James Esquivel MD  [facility]  [facility], [address]  [place] [address]  Office: [phone]  Available via [email or web address] Secure Chat  12/19/23  07:43 EST

## 2023-12-19 NOTE — CONSULTS
PICC Line Insertion Procedure Note    Procedure: Insertion of #5 FR/16G PICC    Indications:  Pt./ Preference, Other (vesicants)    Active Time Out:  Correct patient: Yes  Correct procedure: Yes  Correct site: Yes  Verified with: Pt's RN    Procedure Details:  Informed consent was obtained for the procedure.  Risk include, but are not limited to infection, air embolism, catheter tip moving, catheter blockage and phlebitis.     Maximum sterile technique was used including antiseptics, cap, gloves, gown, hand hygiene, mask, and sheet.    Ultrasound Guidance: Yes    #5 FR/16G PICC inserted to the R Brachial vein per hospital protocol by Misael Ashley RN.   Non-pulsatile blood return: yes    Lot #: [id number]  Expiration date: 2025-01-31    Complications:  none    Findings:  Catheter inserted to 40 cm, with 2 cm exposed.   Mid upper arm circumference is 335 cm.   Catheter was flushed with 30 cc NS and sterile dressing applied.  Patient tolerated procedure well.  PICC tip verified by:       [x] Sapiens 3cg       [] Chest X-ray    Recommendations:  Verbal and/or written Care/Maintenance instructions provided to patient.   Primary nurse notified.    Caio Ashley RN  12/18/23  19:48 EST

## 2023-12-20 ENCOUNTER — APPOINTMENT (OUTPATIENT)
Dept: GENERAL RADIOLOGY | Facility: HOSPITAL | Age: 63
End: 2023-12-20
Payer: COMMERCIAL

## 2023-12-20 LAB
ALBUMIN SERPL-MCNC: 2.6 G/DL (ref 3.5–5.2)
ALBUMIN/GLOB SERPL: 1 G/DL
ALP SERPL-CCNC: 51 U/L (ref 39–117)
ALT SERPL W P-5'-P-CCNC: 40 U/L (ref 1–41)
ANION GAP SERPL CALCULATED.3IONS-SCNC: 10 MMOL/L (ref 5–15)
ARTERIAL PATENCY WRIST A: ABNORMAL
AST SERPL-CCNC: 86 U/L (ref 1–40)
ATMOSPHERIC PRESS: ABNORMAL MM[HG]
BASE EXCESS BLDA CALC-SCNC: -2 MMOL/L (ref 0–3)
BASOPHILS # BLD AUTO: 0 10*3/MM3 (ref 0–0.2)
BASOPHILS NFR BLD AUTO: 0.2 % (ref 0–1.5)
BDY SITE: ABNORMAL
BILIRUB SERPL-MCNC: 0.7 MG/DL (ref 0–1.2)
BUN SERPL-MCNC: 16 MG/DL (ref 8–23)
BUN/CREAT SERPL: 10.3 (ref 7–25)
CA-I SERPL ISE-MCNC: 1.12 MMOL/L (ref 1.2–1.3)
CALCIUM SPEC-SCNC: 7.7 MG/DL (ref 8.6–10.5)
CHLORIDE SERPL-SCNC: 102 MMOL/L (ref 98–107)
CO2 BLDA-SCNC: 24.1 MMOL/L (ref 22–29)
CO2 SERPL-SCNC: 24 MMOL/L (ref 22–29)
CREAT SERPL-MCNC: 1.56 MG/DL (ref 0.76–1.27)
DEPRECATED RDW RBC AUTO: 49.4 FL (ref 37–54)
EGFRCR SERPLBLD CKD-EPI 2021: 49 ML/MIN/1.73
EOSINOPHIL # BLD AUTO: 0 10*3/MM3 (ref 0–0.4)
EOSINOPHIL NFR BLD AUTO: 0 % (ref 0.3–6.2)
ERYTHROCYTE [DISTWIDTH] IN BLOOD BY AUTOMATED COUNT: 17.4 % (ref 12.3–15.4)
GLOBULIN UR ELPH-MCNC: 2.6 GM/DL
GLUCOSE SERPL-MCNC: 97 MG/DL (ref 65–99)
HCO3 BLDA-SCNC: 22.9 MMOL/L (ref 21–28)
HCT VFR BLD AUTO: 23.4 % (ref 37.5–51)
HCT VFR BLD AUTO: 24.2 % (ref 37.5–51)
HCT VFR BLD AUTO: 24.4 % (ref 37.5–51)
HCT VFR BLD AUTO: 27.3 % (ref 37.5–51)
HEMODILUTION: NO
HGB BLD-MCNC: 7.5 G/DL (ref 13–17.7)
HGB BLD-MCNC: 7.8 G/DL (ref 13–17.7)
HGB BLD-MCNC: 7.9 G/DL (ref 13–17.7)
HGB BLD-MCNC: 8.7 G/DL (ref 13–17.7)
INHALED O2 CONCENTRATION: 21 %
IRON 24H UR-MRATE: 12 MCG/DL (ref 59–158)
IRON SATN MFR SERPL: 8 % (ref 20–50)
LYMPHOCYTES # BLD AUTO: 1.2 10*3/MM3 (ref 0.7–3.1)
LYMPHOCYTES NFR BLD AUTO: 6.9 % (ref 19.6–45.3)
MAGNESIUM SERPL-MCNC: 1.7 MG/DL (ref 1.6–2.4)
MCH RBC QN AUTO: 26.7 PG (ref 26.6–33)
MCHC RBC AUTO-ENTMCNC: 32.6 G/DL (ref 31.5–35.7)
MCV RBC AUTO: 81.8 FL (ref 79–97)
MODALITY: ABNORMAL
MONOCYTES # BLD AUTO: 2 10*3/MM3 (ref 0.1–0.9)
MONOCYTES NFR BLD AUTO: 11.7 % (ref 5–12)
NEUTROPHILS NFR BLD AUTO: 14.2 10*3/MM3 (ref 1.7–7)
NEUTROPHILS NFR BLD AUTO: 81.2 % (ref 42.7–76)
NRBC BLD AUTO-RTO: 0 /100 WBC (ref 0–0.2)
NT-PROBNP SERPL-MCNC: 499.9 PG/ML (ref 0–900)
PCO2 BLDA: 38.4 MM HG (ref 35–48)
PH BLDA: 7.38 PH UNITS (ref 7.35–7.45)
PHOSPHATE SERPL-MCNC: 2.7 MG/DL (ref 2.5–4.5)
PLATELET # BLD AUTO: 157 10*3/MM3 (ref 140–450)
PMV BLD AUTO: 7.6 FL (ref 6–12)
PO2 BLDA: 57.3 MM HG (ref 83–108)
POTASSIUM SERPL-SCNC: 3.8 MMOL/L (ref 3.5–5.2)
PROT SERPL-MCNC: 5.2 G/DL (ref 6–8.5)
RBC # BLD AUTO: 2.96 10*6/MM3 (ref 4.14–5.8)
SAO2 % BLDCOA: 89 % (ref 94–98)
SODIUM SERPL-SCNC: 136 MMOL/L (ref 136–145)
TIBC SERPL-MCNC: 145 MCG/DL (ref 298–536)
TRANSFERRIN SERPL-MCNC: 97 MG/DL (ref 200–360)
WBC NRBC COR # BLD AUTO: 17.5 10*3/MM3 (ref 3.4–10.8)

## 2023-12-20 PROCEDURE — 94799 UNLISTED PULMONARY SVC/PX: CPT

## 2023-12-20 PROCEDURE — 83540 ASSAY OF IRON: CPT | Performed by: NURSE PRACTITIONER

## 2023-12-20 PROCEDURE — 82330 ASSAY OF CALCIUM: CPT | Performed by: STUDENT IN AN ORGANIZED HEALTH CARE EDUCATION/TRAINING PROGRAM

## 2023-12-20 PROCEDURE — 25810000003 SODIUM CHLORIDE 0.9 % SOLUTION: Performed by: STUDENT IN AN ORGANIZED HEALTH CARE EDUCATION/TRAINING PROGRAM

## 2023-12-20 PROCEDURE — 83735 ASSAY OF MAGNESIUM: CPT | Performed by: STUDENT IN AN ORGANIZED HEALTH CARE EDUCATION/TRAINING PROGRAM

## 2023-12-20 PROCEDURE — 94761 N-INVAS EAR/PLS OXIMETRY MLT: CPT

## 2023-12-20 PROCEDURE — 94664 DEMO&/EVAL PT USE INHALER: CPT

## 2023-12-20 PROCEDURE — 80053 COMPREHEN METABOLIC PANEL: CPT | Performed by: STUDENT IN AN ORGANIZED HEALTH CARE EDUCATION/TRAINING PROGRAM

## 2023-12-20 PROCEDURE — 25010000002 CALCIUM GLUCONATE 2-0.675 GM/100ML-% SOLUTION: Performed by: INTERNAL MEDICINE

## 2023-12-20 PROCEDURE — 85014 HEMATOCRIT: CPT | Performed by: STUDENT IN AN ORGANIZED HEALTH CARE EDUCATION/TRAINING PROGRAM

## 2023-12-20 PROCEDURE — 84466 ASSAY OF TRANSFERRIN: CPT | Performed by: NURSE PRACTITIONER

## 2023-12-20 PROCEDURE — 71045 X-RAY EXAM CHEST 1 VIEW: CPT

## 2023-12-20 PROCEDURE — 83880 ASSAY OF NATRIURETIC PEPTIDE: CPT | Performed by: INTERNAL MEDICINE

## 2023-12-20 PROCEDURE — 85018 HEMOGLOBIN: CPT | Performed by: STUDENT IN AN ORGANIZED HEALTH CARE EDUCATION/TRAINING PROGRAM

## 2023-12-20 PROCEDURE — 25010000002 ONDANSETRON PER 1 MG: Performed by: STUDENT IN AN ORGANIZED HEALTH CARE EDUCATION/TRAINING PROGRAM

## 2023-12-20 PROCEDURE — 82803 BLOOD GASES ANY COMBINATION: CPT

## 2023-12-20 PROCEDURE — 84100 ASSAY OF PHOSPHORUS: CPT | Performed by: STUDENT IN AN ORGANIZED HEALTH CARE EDUCATION/TRAINING PROGRAM

## 2023-12-20 PROCEDURE — 25010000002 ENOXAPARIN PER 10 MG: Performed by: UROLOGY

## 2023-12-20 PROCEDURE — 25010000002 HYDROMORPHONE 1 MG/ML SOLUTION: Performed by: UROLOGY

## 2023-12-20 PROCEDURE — 85025 COMPLETE CBC W/AUTO DIFF WBC: CPT | Performed by: STUDENT IN AN ORGANIZED HEALTH CARE EDUCATION/TRAINING PROGRAM

## 2023-12-20 RX ORDER — LOSARTAN POTASSIUM 50 MG/1
50 TABLET ORAL
Status: DISCONTINUED | OUTPATIENT
Start: 2023-12-21 | End: 2023-12-30 | Stop reason: HOSPADM

## 2023-12-20 RX ORDER — OXYCODONE HYDROCHLORIDE 5 MG/1
5 TABLET ORAL EVERY 4 HOURS PRN
Status: DISPENSED | OUTPATIENT
Start: 2023-12-20 | End: 2023-12-27

## 2023-12-20 RX ORDER — LOSARTAN POTASSIUM 50 MG/1
50 TABLET ORAL
Status: DISCONTINUED | OUTPATIENT
Start: 2023-12-20 | End: 2023-12-20

## 2023-12-20 RX ORDER — PANTOPRAZOLE SODIUM 40 MG/1
40 TABLET, DELAYED RELEASE ORAL
Status: DISCONTINUED | OUTPATIENT
Start: 2023-12-20 | End: 2023-12-28

## 2023-12-20 RX ORDER — HYDRALAZINE HYDROCHLORIDE 20 MG/ML
20 INJECTION INTRAMUSCULAR; INTRAVENOUS EVERY 6 HOURS PRN
Status: DISCONTINUED | OUTPATIENT
Start: 2023-12-20 | End: 2023-12-30

## 2023-12-20 RX ORDER — CALCIUM GLUCONATE 20 MG/ML
2000 INJECTION, SOLUTION INTRAVENOUS ONCE
Status: COMPLETED | OUTPATIENT
Start: 2023-12-20 | End: 2023-12-20

## 2023-12-20 RX ORDER — METOPROLOL SUCCINATE 25 MG/1
25 TABLET, EXTENDED RELEASE ORAL
Status: DISCONTINUED | OUTPATIENT
Start: 2023-12-20 | End: 2023-12-30 | Stop reason: HOSPADM

## 2023-12-20 RX ORDER — SUCRALFATE 1 G/1
1 TABLET ORAL
Status: DISCONTINUED | OUTPATIENT
Start: 2023-12-20 | End: 2023-12-24

## 2023-12-20 RX ADMIN — OXYCODONE HYDROCHLORIDE 5 MG: 5 TABLET ORAL at 15:36

## 2023-12-20 RX ADMIN — PANTOPRAZOLE SODIUM 40 MG: 40 TABLET, DELAYED RELEASE ORAL at 06:08

## 2023-12-20 RX ADMIN — Medication 10 ML: at 08:54

## 2023-12-20 RX ADMIN — CALCIUM GLUCONATE 2000 MG: 20 INJECTION, SOLUTION INTRAVENOUS at 11:34

## 2023-12-20 RX ADMIN — Medication 10 ML: at 08:55

## 2023-12-20 RX ADMIN — HYDROMORPHONE HYDROCHLORIDE 0.5 MG: 1 INJECTION, SOLUTION INTRAMUSCULAR; INTRAVENOUS; SUBCUTANEOUS at 01:52

## 2023-12-20 RX ADMIN — Medication 10 ML: at 20:05

## 2023-12-20 RX ADMIN — HYDROMORPHONE HYDROCHLORIDE 0.5 MG: 1 INJECTION, SOLUTION INTRAMUSCULAR; INTRAVENOUS; SUBCUTANEOUS at 21:41

## 2023-12-20 RX ADMIN — OXYCODONE HYDROCHLORIDE 5 MG: 5 TABLET ORAL at 10:33

## 2023-12-20 RX ADMIN — METOPROLOL SUCCINATE 25 MG: 25 TABLET, EXTENDED RELEASE ORAL at 11:34

## 2023-12-20 RX ADMIN — SENNOSIDES AND DOCUSATE SODIUM 2 TABLET: 50; 8.6 TABLET ORAL at 20:44

## 2023-12-20 RX ADMIN — BUDESONIDE INHALATION 0.5 MG: 0.5 SUSPENSION RESPIRATORY (INHALATION) at 07:10

## 2023-12-20 RX ADMIN — SODIUM CHLORIDE 125 ML/HR: 9 INJECTION, SOLUTION INTRAVENOUS at 07:11

## 2023-12-20 RX ADMIN — HYDROMORPHONE HYDROCHLORIDE 0.5 MG: 1 INJECTION, SOLUTION INTRAMUSCULAR; INTRAVENOUS; SUBCUTANEOUS at 13:01

## 2023-12-20 RX ADMIN — PANTOPRAZOLE SODIUM 40 MG: 40 TABLET, DELAYED RELEASE ORAL at 17:18

## 2023-12-20 RX ADMIN — ENOXAPARIN SODIUM 40 MG: 100 INJECTION SUBCUTANEOUS at 15:36

## 2023-12-20 RX ADMIN — SUCRALFATE 1 G: 1 TABLET ORAL at 11:01

## 2023-12-20 RX ADMIN — HYDROMORPHONE HYDROCHLORIDE 0.5 MG: 1 INJECTION, SOLUTION INTRAMUSCULAR; INTRAVENOUS; SUBCUTANEOUS at 17:21

## 2023-12-20 RX ADMIN — BUDESONIDE INHALATION 0.5 MG: 0.5 SUSPENSION RESPIRATORY (INHALATION) at 20:26

## 2023-12-20 RX ADMIN — HYDROMORPHONE HYDROCHLORIDE 0.5 MG: 1 INJECTION, SOLUTION INTRAMUSCULAR; INTRAVENOUS; SUBCUTANEOUS at 07:59

## 2023-12-20 RX ADMIN — SODIUM CHLORIDE 125 ML/HR: 9 INJECTION, SOLUTION INTRAVENOUS at 23:51

## 2023-12-20 RX ADMIN — HYDROMORPHONE HYDROCHLORIDE 0.5 MG: 1 INJECTION, SOLUTION INTRAMUSCULAR; INTRAVENOUS; SUBCUTANEOUS at 19:51

## 2023-12-20 RX ADMIN — ONDANSETRON 4 MG: 2 INJECTION INTRAMUSCULAR; INTRAVENOUS at 04:46

## 2023-12-20 RX ADMIN — SENNOSIDES AND DOCUSATE SODIUM 2 TABLET: 50; 8.6 TABLET ORAL at 08:54

## 2023-12-20 RX ADMIN — SODIUM CHLORIDE 125 ML/HR: 9 INJECTION, SOLUTION INTRAVENOUS at 15:36

## 2023-12-20 RX ADMIN — CHLORHEXIDINE GLUCONATE 15 ML: 1.2 RINSE ORAL at 20:44

## 2023-12-20 RX ADMIN — SUCRALFATE 1 G: 1 TABLET ORAL at 17:18

## 2023-12-20 RX ADMIN — HYDROMORPHONE HYDROCHLORIDE 0.5 MG: 1 INJECTION, SOLUTION INTRAMUSCULAR; INTRAVENOUS; SUBCUTANEOUS at 23:51

## 2023-12-20 NOTE — PLAN OF CARE
Goal Outcome Evaluation:         Pt remained alert and oriented entire shift. Nasal cannula weaned off. Pt status and vital signs stable entire shift. Decent urine output but no BM this shift. Pt complained of pain for majority of shift related to incision in abdomen, treated with pain meds. Pt educated about importance of self-weaning of pain meds due to approaching discharge closer every shift.

## 2023-12-20 NOTE — PROGRESS NOTES
"[facility] MEDICINE SERVICE  DAILY PROGRESS NOTE    NAME: Louis Andino  : 1960  MRN: [id number]      LOS: 2 days     PROVIDER OF SERVICE: Clifford Sanchez MD    Chief Complaint: Renal mass    Subjective:     Interval History:  History taken from: patient  Patient Complaints: renal mass    Per the documentation by ICU, dated 2023,\"Louis Andino is a 63 y.o. male with PMH of hypertension, hyperlipidemia, COPD, BPH, and GERD presented to the hospital for nephrectomy, and was admitted with a principal diagnosis of Renal mass.  HPI information is limited due to patient intubated and sedated at time of assessment; information obtained from chart review and patient's spouse at bedside.  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  Post procedure patient was to remain on vent overnight and admitted to the intensive care unit for further evaluation and treatment.  \"     2023-Patient extubated on the morning of 2023 and Levophed weaned   23 patient seen and examined in bed no acute distress, vital signs blood pressure stable, heart rate 105, discussed with RN.  Patient complaining of heartburn.  On Protonix and Carafate added.      Review of Systems  12 point ROS reviewed and negative except as mentioned above    Objective:     Vital Signs  Temp:  [97.9 °F (36.6 °C)-99.3 °F (37.4 °C)] 98 °F (36.7 °C)  Heart Rate:  [] 105  Resp:  [18-20] 20  BP: ()/(46-77) 119/72  Arterial Line BP: (111-143)/(46-58) 143/46  Flow (L/min):  [1.5-2] 1.5   Body mass index is 33.5 kg/m².    Physical Exam  Constitutional:       General: He is not in acute distress.     Appearance: He is obese. He is not toxic-appearing.   HENT:      Head: Normocephalic and atraumatic.      Nose: Nose normal. No congestion.      Mouth/Throat:      Pharynx: Oropharynx is " clear. No oropharyngeal exudate.   Eyes:      General: No scleral icterus.  Cardiovascular:      Rate and Rhythm: Normal rate and regular rhythm.      Heart sounds: No murmur heard.     No friction rub. No gallop.   Pulmonary:      Effort: No respiratory distress.      Breath sounds: No wheezing or rales.      Comments: Patient on 1.5L NC  Abdominal:      General: There is no distension.      Tenderness: There is no abdominal tenderness. There is no guarding.   Musculoskeletal:         General: No swelling or deformity.      Cervical back: Normal range of motion. No rigidity.      Right lower leg: No edema.      Left lower leg: No edema.   Skin:     Coloration: Skin is pale. Skin is not jaundiced.      Findings: No bruising or lesion.   Neurological:      General: No focal deficit present.      Mental Status: He is alert and oriented to person, place, and time.      Motor: Weakness present.   Scheduled Meds   budesonide, 0.5 mg, Nebulization, BID - RT  chlorhexidine, 15 mL, Mouth/Throat, Q12H  enoxaparin, 40 mg, Subcutaneous, Daily  pantoprazole, 40 mg, Oral, Q AM  senna-docusate sodium, 2 tablet, Nasogastric, BID  sodium chloride, 10 mL, Intravenous, Q12H  sodium chloride, 10 mL, Intravenous, Q12H  sodium chloride, 10 mL, Intravenous, Q12H  sodium chloride, 10 mL, Intravenous, Q12H       PRN Meds     acetaminophen    [DISCONTINUED] acetaminophen **OR** acetaminophen    senna-docusate sodium **AND** polyethylene glycol **AND** bisacodyl **AND** bisacodyl    Calcium Replacement - Follow Nurse / BPA Driven Protocol    HYDROmorphone **AND** naloxone    influenza vaccine    ipratropium-albuterol    Magnesium Low Dose Replacement - Follow Nurse / BPA Driven Protocol    nitroglycerin    ondansetron **OR** ondansetron    oxyCODONE    Phosphorus Replacement - Follow Nurse / BPA Driven Protocol    Potassium Replacement - Follow Nurse / BPA Driven Protocol    sodium chloride    sodium chloride    sodium chloride    sodium  chloride    sodium chloride   Infusions  sodium chloride, 125 mL/hr, Last Rate: 125 mL/hr (12/20/23 0711)          Diagnostic Data    Results from last 7 days   Lab Units 12/20/23  0606   WBC 10*3/mm3 17.50*   HEMOGLOBIN g/dL 7.9*   HEMATOCRIT % 24.2*   PLATELETS 10*3/mm3 157   GLUCOSE mg/dL 97   CREATININE mg/dL 1.56*   BUN mg/dL 16   SODIUM mmol/L 136   POTASSIUM mmol/L 3.8   AST (SGOT) U/L 86*   ALT (SGPT) U/L 40   ALK PHOS U/L 51   BILIRUBIN mg/dL 0.7   ANION GAP mmol/L 10.0       XR Chest 1 View    Result Date: 12/19/2023  Impression: 1. Placement of a right upper extremity PICC with the tip at the mid SVC. 2. Placement of an esophagogastric tube with tip below the diaphragm. Stable ET tube above the greg.  3. No pneumothorax. 4. Minimal left basilar atelectasis. Electronically Signed: Cedrick Ordonez MD  12/19/2023 7:06 AM EST  Workstation ID: [id number]    XR Abdomen KUB    Result Date: 12/18/2023  Impression: NG tube projects over the expected position of the distal body, antrum of the stomach. Electronically Signed: Ervin Howell MD  12/18/2023 5:58 PM EST  Workstation ID: [id number]    XR Chest 1 View    Result Date: 12/18/2023  Impression: Endotracheal tube in place Mild right infrahilar discoid atelectasis. Otherwise clear lungs Electronically Signed: Farhat Fletcher MD  12/18/2023 3:06 PM EST  Workstation ID: [id number]    XR Lost Needle / Instrument    Result Date: 12/18/2023  Impression: Postsurgical changes with multiple surgical clips and skin staple line in the upper abdomen. No definite radiopaque instrument or other unexpected foreign body is identified. Results were called to Suzette Carrasco RN in the OR by Dr. Moya at 12/18/2023 2:00 PM EST. Electronically Signed: Nestor Moya  12/18/2023 2:10 PM EST  Workstation ID: [id number]       I reviewed the patient's new clinical results.    Assessment/Plan:     Active and Resolved Problems  Active Hospital Problems    Diagnosis  POA    **Renal mass [N28.89]   Yes    BPH (benign prostatic hyperplasia) [N40.0]  Yes    COPD (chronic obstructive pulmonary disease) [J44.9]  Yes    KARON (acute kidney injury) [N17.9]  Yes    Acute respiratory failure with hypoxia [J96.01]  Yes    Asthma [J45.909]  Yes    Gastroesophageal reflux disease [K21.9]  Yes    Hyperlipidemia [E78.5]  Yes    Hypertension [I10]  Yes      Resolved Hospital Problems   No resolved problems to display.       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     #Acute hypoxic respiratory failure  #COPD    - Patient remained intubated post op, was transferred to ICU    - Patient extubated on 12/19/2023    - Dueoneb QID    - Pulmicort BID    - wean oxygen as tolerated     #Hypovolemic shock    - 2/2 blood loss from surgery    - Levophed weaned    - cleared for downgrade from ICU     #Acute blood loss anemia    - 2/2 blood loss from surgery    - s/p 5u prbc and 2u FFP given per op note    - Hgb 7.4 this afternoon, no overt bleeding, 1u prbc ordered in ICU     - h/h q6h    - transfuse to maintain hgb > 7.0    - pt     #KARON  #Metabolic acidosis    - suspect 2/2 blood loss and possible post op ATN    - bicarb improved from 18 > 24    - Cr 2.23 > 1.71, base Cr is 1.0    - as bicarb normalized, changed IVF to NS @ 125 hour    - monitor    - check ionized calcium and replace PRN     #GERD    - PPI     #HTN    - hold hypertensive meds due to recent shock, monitor             DVT prophylaxis:  Medical and mechanical DVT prophylaxis orders are present.     Code status is   Code Status and Medical Interventions:   Ordered at: 12/18/23 2010     Code Status (Patient has no pulse and is not breathing):    CPR (Attempt to Resuscitate)      Medical Interventions (Patient has pulse or is breathing):    Full Support       Plan for disposition: Per clinical course in 2 days    Time: 30 minutes    Signature: Electronically signed by Clifford Sanchez MD, 12/20/23, 08:06 EST.  [facility]ist Team

## 2023-12-20 NOTE — CASE MANAGEMENT/SOCIAL WORK
Continued Stay Note   [facility]     Patient Name: Louis Andino  MRN: [id number]  Today's Date: 12/20/2023    Admit Date: 12/18/2023    Plan: DC Plan: Anticipate Routine Home with spouse. Wishes to set up his own PCP.   Discharge Plan       Row Name 12/20/23 1436       Plan    Plan DC Plan: Anticipate Routine Home with spouse. Wishes to set up his own PCP.    Provided Post Acute Provider List? N/A    Provided Post Acute Provider Quality & Resource List? N/A    Plan Comments CM reviewed chart documentation to obtain clinical updates. CM will continue to follow for any further needs and adjust discharge plan accordingly. DC Barriers: Cardiac monitoring, O2@1.5 liters nc, PICC, F/C, IVF's, elevated WBC count trending up, and pending cultures.               Expected Discharge Date and Time       Expected Discharge Date Expected Discharge Time    Dec 22, 2023               Luiza Joya RN .    Office Phone: [phone]  Office Cell:     [phone]

## 2023-12-20 NOTE — PROGRESS NOTES
[facility] DAILY PROGRESS NOTE    Patient Identification  Name: Louis Andino  Age: 63 y.o.  Sex: male  :  1960  MRN: [id number]    Date: 2023             Subjective:  Interval History: Cr down, no appetite, no nausea, UOP good    Objective:    Scheduled Meds:budesonide, 0.5 mg, Nebulization, BID - RT  chlorhexidine, 15 mL, Mouth/Throat, Q12H  enoxaparin, 40 mg, Subcutaneous, Daily  [START ON 2023] losartan, 50 mg, Oral, Q24H  metoprolol succinate XL, 25 mg, Oral, Q24H  pantoprazole, 40 mg, Oral, BID AC  senna-docusate sodium, 2 tablet, Nasogastric, BID  sodium chloride, 10 mL, Intravenous, Q12H  sodium chloride, 10 mL, Intravenous, Q12H  sodium chloride, 10 mL, Intravenous, Q12H  sodium chloride, 10 mL, Intravenous, Q12H  sucralfate, 1 g, Oral, TID AC      Continuous Infusions:sodium chloride, 125 mL/hr, Last Rate: 125 mL/hr (23 1536)      PRN Meds:  acetaminophen    [DISCONTINUED] acetaminophen **OR** acetaminophen    senna-docusate sodium **AND** polyethylene glycol **AND** bisacodyl **AND** bisacodyl    Calcium Replacement - Follow Nurse / BPA Driven Protocol    hydrALAZINE    HYDROmorphone **AND** naloxone    influenza vaccine    ipratropium-albuterol    Magnesium Low Dose Replacement - Follow Nurse / BPA Driven Protocol    nitroglycerin    ondansetron **OR** ondansetron    oxyCODONE    Phosphorus Replacement - Follow Nurse / BPA Driven Protocol    Potassium Replacement - Follow Nurse / BPA Driven Protocol    sodium chloride    sodium chloride    sodium chloride    sodium chloride    sodium chloride    Vital signs in last 24 hours:  Temp:  [97.9 °F (36.6 °C)-99.8 °F (37.7 °C)] 99 °F (37.2 °C)  Heart Rate:  [] 94  Resp:  [18-20] 20  BP: ()/() 145/73  Arterial Line BP: (111-143)/(46-58) 143/46    Intake/Output:    Intake/Output Summary (Last 24 hours) at 2023 1652  Last data filed at 2023 1300  Gross per 24 hour   Intake 5141.55 ml   Output 1200 ml  "  Net 3941.55 ml       Exam:  /73   Pulse 94   Temp 99 °F (37.2 °C) (Oral)   Resp 20   Ht 177.8 cm (70\")   Wt 106 kg (233 lb 7.5 oz)   SpO2 93%   BMI 33.50 kg/m²     General Appearance:    Alert, cooperative, NAD   Lungs:     Respirations unlabored, no audible wheezing    Heart:    No cyanosis   Abdomen:     Soft, ND, incision clean dry intact   :  Harley clear yellow            Data Review:  All labs (24hrs):   Recent Results (from the past 24 hour(s))   Hemoglobin & Hematocrit, Blood    Collection Time: 12/19/23  5:59 PM    Specimen: Blood   Result Value Ref Range    Hemoglobin 7.4 (L) 13.0 - 17.7 g/dL    Hematocrit 22.9 (L) 37.5 - 51.0 %   Basic Metabolic Panel    Collection Time: 12/19/23  5:59 PM    Specimen: Blood   Result Value Ref Range    Glucose 108 (H) 65 - 99 mg/dL    BUN 18 8 - 23 mg/dL    Creatinine 1.71 (H) 0.76 - 1.27 mg/dL    Sodium 137 136 - 145 mmol/L    Potassium 4.0 3.5 - 5.2 mmol/L    Chloride 104 98 - 107 mmol/L    CO2 24.0 22.0 - 29.0 mmol/L    Calcium 7.5 (L) 8.6 - 10.5 mg/dL    BUN/Creatinine Ratio 10.5 7.0 - 25.0    Anion Gap 9.0 5.0 - 15.0 mmol/L    eGFR 43.9 (L) >60.0 mL/min/1.73   Prepare RBC, 1 Units    Collection Time: 12/19/23  8:34 PM   Result Value Ref Range    Product Code P3242R17     Unit Number [id number]     UNIT  ABO A     UNIT  RH POS     Crossmatch Interpretation Compatible     Dispense Status IS     Blood Expiration Date [id number]     Blood Type Barcode 6200    Hemoglobin & Hematocrit, Blood    Collection Time: 12/20/23  2:00 AM    Specimen: Blood   Result Value Ref Range    Hemoglobin 7.8 (L) 13.0 - 17.7 g/dL    Hematocrit 24.4 (L) 37.5 - 51.0 %   Blood Gas, Arterial -    Collection Time: 12/20/23  4:04 AM    Specimen: Arterial Blood   Result Value Ref Range    Site Arterial Line     Devin's Test N/A     pH, Arterial 7.383 7.350 - 7.450 pH units    pCO2, Arterial 38.4 35.0 - 48.0 mm Hg    pO2, Arterial 57.3 (L) 83.0 - 108.0 mm Hg    HCO3, Arterial " 22.9 21.0 - 28.0 mmol/L    Base Excess, Arterial -2.0 (L) 0.0 - 3.0 mmol/L    O2 Saturation, Arterial 89.0 (L) 94.0 - 98.0 %    CO2 Content 24.1 22 - 29 mmol/L    Barometric Pressure for Blood Gas      Modality Room Air     FIO2 21 %    Hemodilution No    Magnesium    Collection Time: 12/20/23  6:06 AM    Specimen: Blood   Result Value Ref Range    Magnesium 1.7 1.6 - 2.4 mg/dL   Phosphorus    Collection Time: 12/20/23  6:06 AM    Specimen: Blood   Result Value Ref Range    Phosphorus 2.7 2.5 - 4.5 mg/dL   Comprehensive Metabolic Panel    Collection Time: 12/20/23  6:06 AM    Specimen: Blood   Result Value Ref Range    Glucose 97 65 - 99 mg/dL    BUN 16 8 - 23 mg/dL    Creatinine 1.56 (H) 0.76 - 1.27 mg/dL    Sodium 136 136 - 145 mmol/L    Potassium 3.8 3.5 - 5.2 mmol/L    Chloride 102 98 - 107 mmol/L    CO2 24.0 22.0 - 29.0 mmol/L    Calcium 7.7 (L) 8.6 - 10.5 mg/dL    Total Protein 5.2 (L) 6.0 - 8.5 g/dL    Albumin 2.6 (L) 3.5 - 5.2 g/dL    ALT (SGPT) 40 1 - 41 U/L    AST (SGOT) 86 (H) 1 - 40 U/L    Alkaline Phosphatase 51 39 - 117 U/L    Total Bilirubin 0.7 0.0 - 1.2 mg/dL    Globulin 2.6 gm/dL    A/G Ratio 1.0 g/dL    BUN/Creatinine Ratio 10.3 7.0 - 25.0    Anion Gap 10.0 5.0 - 15.0 mmol/L    eGFR 49.0 (L) >60.0 mL/min/1.73   Calcium, Ionized    Collection Time: 12/20/23  6:06 AM    Specimen: Blood   Result Value Ref Range    Ionized Calcium 1.12 (L) 1.20 - 1.30 mmol/L   Iron Profile    Collection Time: 12/20/23  6:06 AM    Specimen: Blood   Result Value Ref Range    Iron 12 (L) 59 - 158 mcg/dL    Iron Saturation (TSAT) 8 (L) 20 - 50 %    Transferrin 97 (L) 200 - 360 mg/dL    TIBC 145 (L) 298 - 536 mcg/dL   CBC Auto Differential    Collection Time: 12/20/23  6:06 AM    Specimen: Blood   Result Value Ref Range    WBC 17.50 (H) 3.40 - 10.80 10*3/mm3    RBC 2.96 (L) 4.14 - 5.80 10*6/mm3    Hemoglobin 7.9 (L) 13.0 - 17.7 g/dL    Hematocrit 24.2 (L) 37.5 - 51.0 %    MCV 81.8 79.0 - 97.0 fL    MCH 26.7 26.6 - 33.0 pg     MCHC 32.6 31.5 - 35.7 g/dL    RDW 17.4 (H) 12.3 - 15.4 %    RDW-SD 49.4 37.0 - 54.0 fl    MPV 7.6 6.0 - 12.0 fL    Platelets 157 140 - 450 10*3/mm3    Neutrophil % 81.2 (H) 42.7 - 76.0 %    Lymphocyte % 6.9 (L) 19.6 - 45.3 %    Monocyte % 11.7 5.0 - 12.0 %    Eosinophil % 0.0 (L) 0.3 - 6.2 %    Basophil % 0.2 0.0 - 1.5 %    Neutrophils, Absolute 14.20 (H) 1.70 - 7.00 10*3/mm3    Lymphocytes, Absolute 1.20 0.70 - 3.10 10*3/mm3    Monocytes, Absolute 2.00 (H) 0.10 - 0.90 10*3/mm3    Eosinophils, Absolute 0.00 0.00 - 0.40 10*3/mm3    Basophils, Absolute 0.00 0.00 - 0.20 10*3/mm3    nRBC 0.0 0.0 - 0.2 /100 WBC   BNP    Collection Time: 12/20/23  6:06 AM    Specimen: Blood   Result Value Ref Range    proBNP 499.9 0.0 - 900.0 pg/mL   Hemoglobin & Hematocrit, Blood    Collection Time: 12/20/23 12:12 PM    Specimen: Blood   Result Value Ref Range    Hemoglobin 7.5 (L) 13.0 - 17.7 g/dL    Hematocrit 23.4 (L) 37.5 - 51.0 %      Imaging Results (Last 24 Hours)       ** No results found for the last 24 hours. **             Assessment:    Renal mass    Asthma    Gastroesophageal reflux disease    Hyperlipidemia    Hypertension    BPH (benign prostatic hyperplasia)    COPD (chronic obstructive pulmonary disease)    KARON (acute kidney injury)    Acute respiratory failure with hypoxia      Left nephrectomy and IVC thrombectomy 12/18    Plan:    Hgb stable in 7s, suspect may need more pRBCs in the next day or 2  KARON, improved, cont IVJ  Cont Lovenox prophylaxis  SCDs  Up to chair, slowly increase activity  Pain control  Clears  Harley removal once more recovered and active, likely tomorrow    James Esquivel MD  [facility]  [address], [address]  [place] [address]  Office: [phone]  Available via Epic Secure Chat  12/20/23  16:52 EST

## 2023-12-20 NOTE — CONSULTS
"[facility] Medicine Services  Consult Note    Patient Name: Louis Andino  : 1960  MRN: [id number]  Primary Care Physician:  Provider, No Known  Referring Physician: James Esquivel MD  Date of admission: 2023  Date and Time of Care: 2023 at 2100    Inpatient Hospitalist Consult  Consult performed by: Xavier Boston DO  Consult ordered by: Nahun Torres APRN            Reason for Consult/ Chief Complaint : medical management    Consult Requested By: Dr. Lai    Subjective:     History of Present Illness:   Per the documentation by ICU, dated 2023,  \"Louis Andino is a 63 y.o. male with PMH of hypertension, hyperlipidemia, COPD, BPH, and GERD presented to the hospital for nephrectomy, and was admitted with a principal diagnosis of Renal mass.  HPI information is limited due to patient intubated and sedated at time of assessment; information obtained from chart review and patient's spouse at bedside.  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  Post procedure patient was to remain on vent overnight and admitted to the intensive care unit for further evaluation and treatment.  \"    2023  Patient extubated on the morning of 2023 and Levophed weaned    Review of Systems:   Review of Systems  12 point ROS reviewed and negative except as mentioned above      Personal History:     Past Medical History:   Diagnosis Date    Asthma     Emphysema/COPD     GERD (gastroesophageal reflux disease)     Hyperglycemia 10/12/2023    Hyperlipidemia 10/12/2023    Hypertension     Prostate disease     Vitreous degeneration of right eye     Formatting of this note might be different from the original. Retinal tear and detachment warning symptoms reviewed and patient instructed to call immediately if increasing floaters, flashes, or decreasing " peripheral vision. No retinal detachment or retinal tear noted. Recheck in 1 month with dilated exam.       Past Surgical History:   Procedure Laterality Date    NEPHRECTOMY Left 12/18/2023    Procedure: NEPHRECTOMY RADICAL WITH CAVAL THROMBECTOMY;  Surgeon: James Esquivel MD;  Location: [facility] MAIN OR;  Service: Urology;  Laterality: Left;    SKIN LESION EXCISION  1990       Family History: family history is not on file. Otherwise pertinent FHx was reviewed and not pertinent to current issue.    Social History:  reports that he has never smoked. He has been exposed to tobacco smoke. He has never used smokeless tobacco. He reports current alcohol use of about 1.0 standard drink of alcohol per week. He reports that he does not currently use drugs.    Home Medications:   aspirin, budesonide-formoterol, celecoxib, famotidine, hydroCHLOROthiazide, lisinopril, magnesium oxide, omeprazole, potassium chloride, and vitamin D3    Allergies:  No Known Allergies      Objective:     Vital Signs  Temp:  [97.6 °F (36.4 °C)-99.3 °F (37.4 °C)] 99 °F (37.2 °C)  Heart Rate:  [] 119  Resp:  [15-23] 20  BP: ()/(48-77) 111/49  Arterial Line BP: (111)/(49) 111/49  Flow (L/min):  [1.5-4] 1.5  FiO2 (%):  [30 %] 30 %   Body mass index is 33.5 kg/m².    Physical Exam  Physical Exam  Constitutional:       General: He is not in acute distress.     Appearance: He is obese. He is not toxic-appearing.   HENT:      Head: Normocephalic and atraumatic.      Nose: Nose normal. No congestion.      Mouth/Throat:      Pharynx: Oropharynx is clear. No oropharyngeal exudate.   Eyes:      General: No scleral icterus.  Cardiovascular:      Rate and Rhythm: Normal rate and regular rhythm.      Heart sounds: No murmur heard.     No friction rub. No gallop.   Pulmonary:      Effort: No respiratory distress.      Breath sounds: No wheezing or rales.      Comments: Patient on 1.5L NC  Abdominal:      General: There is no distension.      Tenderness:  There is no abdominal tenderness. There is no guarding.   Musculoskeletal:         General: No swelling or deformity.      Cervical back: Normal range of motion. No rigidity.      Right lower leg: No edema.      Left lower leg: No edema.   Skin:     Coloration: Skin is pale. Skin is not jaundiced.      Findings: No bruising or lesion.   Neurological:      General: No focal deficit present.      Mental Status: He is alert and oriented to person, place, and time.      Motor: Weakness present.         Scheduled Meds   budesonide, 0.5 mg, Nebulization, BID - RT  chlorhexidine, 15 mL, Mouth/Throat, Q12H  enoxaparin, 40 mg, Subcutaneous, Daily  [START ON 12/20/2023] pantoprazole, 40 mg, Oral, Q AM  senna-docusate sodium, 2 tablet, Nasogastric, BID  sodium chloride, 10 mL, Intravenous, Q12H  sodium chloride, 10 mL, Intravenous, Q12H  sodium chloride, 10 mL, Intravenous, Q12H  sodium chloride, 10 mL, Intravenous, Q12H       PRN Meds     acetaminophen **OR** acetaminophen    senna-docusate sodium **AND** polyethylene glycol **AND** bisacodyl **AND** bisacodyl    Calcium Replacement - Follow Nurse / BPA Driven Protocol    HYDROmorphone **AND** naloxone    influenza vaccine    ipratropium-albuterol    Magnesium Low Dose Replacement - Follow Nurse / BPA Driven Protocol    nitroglycerin    ondansetron **OR** ondansetron    oxyCODONE    Phosphorus Replacement - Follow Nurse / BPA Driven Protocol    Potassium Replacement - Follow Nurse / BPA Driven Protocol    sodium chloride    sodium chloride    sodium chloride    sodium chloride    sodium chloride   Infusions  sodium bicarbonate 8.4 % 150 mEq in dextrose (D5W) 5 % 1,000 mL infusion (greater than 75 mEq), 150 mEq, Last Rate: 150 mEq (12/19/23 1039)          Diagnostic Data    Results from last 7 days   Lab Units 12/19/23  1759 12/19/23  0541   WBC 10*3/mm3  --  16.80*   HEMOGLOBIN g/dL 7.4* 8.7*   HEMATOCRIT % 22.9* 27.3*   PLATELETS 10*3/mm3  --  203   GLUCOSE mg/dL 108* 139*    CREATININE mg/dL 1.71* 2.23*   BUN mg/dL 18 18   SODIUM mmol/L 137 137   POTASSIUM mmol/L 4.0 4.8   AST (SGOT) U/L  --  59*   ALT (SGPT) U/L  --  46*   ALK PHOS U/L  --  50   BILIRUBIN mg/dL  --  0.4   ANION GAP mmol/L 9.0 13.0       XR Chest 1 View    Result Date: 12/19/2023  Impression: 1. Placement of a right upper extremity PICC with the tip at the mid SVC. 2. Placement of an esophagogastric tube with tip below the diaphragm. Stable ET tube above the greg.  3. No pneumothorax. 4. Minimal left basilar atelectasis. Electronically Signed: Cedrick Ordonez MD  12/19/2023 7:06 AM EST  Workstation ID: [id number]    XR Abdomen KUB    Result Date: 12/18/2023  Impression: NG tube projects over the expected position of the distal body, antrum of the stomach. Electronically Signed: Ervin Howell MD  12/18/2023 5:58 PM EST  Workstation ID: [id number]    XR Chest 1 View    Result Date: 12/18/2023  Impression: Endotracheal tube in place Mild right infrahilar discoid atelectasis. Otherwise clear lungs Electronically Signed: Farhat Fletcher MD  12/18/2023 3:06 PM EST  Workstation ID: [id number]    XR Lost Needle / Instrument    Result Date: 12/18/2023  Impression: Postsurgical changes with multiple surgical clips and skin staple line in the upper abdomen. No definite radiopaque instrument or other unexpected foreign body is identified. Results were called to Suzette Carrasco RN in the OR by Dr. Moya at 12/18/2023 2:00 PM EST. Electronically Signed: Nestor Moya  12/18/2023 2:10 PM EST  Workstation ID: [id number]       I reviewed the patient's new clinical results.  I reviewed the patient's new imaging results and agree with the interpretation.  I reviewed the patient's other test results and agree with the interpretation  I personally viewed and interpreted the patient's EKG/Telemetry data    Assessment/Plan:     Active and Resolved Problems  Active Hospital Problems    Diagnosis  POA    **Renal mass [N28.89]  Yes    BPH (benign  prostatic hyperplasia) [N40.0]  Yes    COPD (chronic obstructive pulmonary disease) [J44.9]  Yes    KARON (acute kidney injury) [N17.9]  Yes    Acute respiratory failure with hypoxia [J96.01]  Yes    Asthma [J45.909]  Yes    Gastroesophageal reflux disease [K21.9]  Yes    Hyperlipidemia [E78.5]  Yes    Hypertension [I10]  Yes      Resolved Hospital Problems   No resolved problems to display.       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 and large volume blood loss    - extensive blood loss during surgery    - s/p 5u prbc and 2u FFP given per op note    - Patient remained intubated post op and admitted to ICU    #Acute hypoxic respiratory failure  #COPD    - Patient remained intubated post op, was transferred to ICU    - Patient extubated on 12/19/2023    - Dueoneb QID    - Pulmicort BID    - wean oxygen as tolerated    #Hypovolemic shock    - 2/2 blood loss from surgery    - Levophed weaned    - cleared for downgrade from ICU    #Acute blood loss anemia    - 2/2 blood loss from surgery    - s/p 5u prbc and 2u FFP given per op note    - Hgb 7.4 this afternoon, no overt bleeding, 1u prbc ordered in ICU     - h/h q6h    - transfuse to maintain hgb > 7.0    - pt    #KARON  #Metabolic acidosis    - suspect 2/2 blood loss and possible post op ATN    - bicarb improved from 18 > 24    - Cr 2.23 > 1.71, base Cr is 1.0    - as bicarb normalized, changed IVF to NS @ 125 hour    - monitor    - check ionized calcium and replace PRN    #GERD    - PPI    #HTN    - hold hypertensive meds due to recent shock, monitor        DVT prophylaxis: Lovenox  Medical and mechanical DVT prophylaxis orders are present.     Code status is   Code Status and Medical Interventions:   Ordered at: 12/18/23 2010     Code Status (Patient has no pulse and is not breathing):    CPR (Attempt to Resuscitate)     Medical Interventions (Patient has pulse or is  breathing):    Full Support       Plan for disposition:home in 2-3 days        Signature: Electronically signed by Xavier Boston DO, 12/19/23, 20:52 EST.  [facility] Hospitalist Team

## 2023-12-20 NOTE — PLAN OF CARE
Goal Outcome Evaluation:     Patient has had a lot of pain today. He's received several different pain medications. His BP has been high a couple times. Dr. Sanchez ordered some scheduled and PRN BP medications. Wife has been at bedside all day and helping pt turn.

## 2023-12-21 ENCOUNTER — APPOINTMENT (OUTPATIENT)
Dept: CT IMAGING | Facility: HOSPITAL | Age: 63
End: 2023-12-21
Payer: COMMERCIAL

## 2023-12-21 LAB
ALBUMIN SERPL-MCNC: 3 G/DL (ref 3.5–5.2)
ALBUMIN/GLOB SERPL: 0.9 G/DL
ALP SERPL-CCNC: 400 U/L (ref 39–117)
ALT SERPL W P-5'-P-CCNC: 42 U/L (ref 1–41)
ANION GAP SERPL CALCULATED.3IONS-SCNC: 8 MMOL/L (ref 5–15)
AST SERPL-CCNC: 84 U/L (ref 1–40)
BASOPHILS # BLD AUTO: 0.1 10*3/MM3 (ref 0–0.2)
BASOPHILS NFR BLD AUTO: 0.3 % (ref 0–1.5)
BH BB BLOOD EXPIRATION DATE: NORMAL
BH BB BLOOD TYPE BARCODE: 6200
BH BB DISPENSE STATUS: NORMAL
BH BB PRODUCT CODE: NORMAL
BH BB UNIT NUMBER: NORMAL
BILIRUB SERPL-MCNC: 1.1 MG/DL (ref 0–1.2)
BUN SERPL-MCNC: 16 MG/DL (ref 8–23)
BUN/CREAT SERPL: 10.9 (ref 7–25)
CA-I SERPL ISE-MCNC: 1.19 MMOL/L (ref 1.2–1.3)
CALCIUM SPEC-SCNC: 8.7 MG/DL (ref 8.6–10.5)
CHLORIDE SERPL-SCNC: 104 MMOL/L (ref 98–107)
CO2 SERPL-SCNC: 25 MMOL/L (ref 22–29)
CREAT SERPL-MCNC: 1.47 MG/DL (ref 0.76–1.27)
CROSSMATCH INTERPRETATION: NORMAL
DEPRECATED RDW RBC AUTO: 49.9 FL (ref 37–54)
EGFRCR SERPLBLD CKD-EPI 2021: 52.6 ML/MIN/1.73
EOSINOPHIL # BLD AUTO: 0 10*3/MM3 (ref 0–0.4)
EOSINOPHIL NFR BLD AUTO: 0.2 % (ref 0.3–6.2)
ERYTHROCYTE [DISTWIDTH] IN BLOOD BY AUTOMATED COUNT: 17.1 % (ref 12.3–15.4)
GLOBULIN UR ELPH-MCNC: 3.3 GM/DL
GLUCOSE SERPL-MCNC: 80 MG/DL (ref 65–99)
HCT VFR BLD AUTO: 22.9 % (ref 37.5–51)
HGB BLD-MCNC: 7.3 G/DL (ref 13–17.7)
LYMPHOCYTES # BLD AUTO: 1.5 10*3/MM3 (ref 0.7–3.1)
LYMPHOCYTES NFR BLD AUTO: 6.5 % (ref 19.6–45.3)
MAGNESIUM SERPL-MCNC: 2.2 MG/DL (ref 1.6–2.4)
MCH RBC QN AUTO: 26.6 PG (ref 26.6–33)
MCHC RBC AUTO-ENTMCNC: 31.9 G/DL (ref 31.5–35.7)
MCV RBC AUTO: 83.2 FL (ref 79–97)
MONOCYTES # BLD AUTO: 2.2 10*3/MM3 (ref 0.1–0.9)
MONOCYTES NFR BLD AUTO: 9.6 % (ref 5–12)
NEUTROPHILS NFR BLD AUTO: 19.4 10*3/MM3 (ref 1.7–7)
NEUTROPHILS NFR BLD AUTO: 83.4 % (ref 42.7–76)
NRBC BLD AUTO-RTO: 0.1 /100 WBC (ref 0–0.2)
PHOSPHATE SERPL-MCNC: 2.8 MG/DL (ref 2.5–4.5)
PLATELET # BLD AUTO: 225 10*3/MM3 (ref 140–450)
PMV BLD AUTO: 8.4 FL (ref 6–12)
POTASSIUM SERPL-SCNC: 4.3 MMOL/L (ref 3.5–5.2)
PROT SERPL-MCNC: 6.3 G/DL (ref 6–8.5)
RBC # BLD AUTO: 2.75 10*6/MM3 (ref 4.14–5.8)
SODIUM SERPL-SCNC: 137 MMOL/L (ref 136–145)
UNIT  ABO: NORMAL
UNIT  RH: NORMAL
WBC NRBC COR # BLD AUTO: 23.3 10*3/MM3 (ref 3.4–10.8)

## 2023-12-21 PROCEDURE — 80053 COMPREHEN METABOLIC PANEL: CPT | Performed by: STUDENT IN AN ORGANIZED HEALTH CARE EDUCATION/TRAINING PROGRAM

## 2023-12-21 PROCEDURE — 83735 ASSAY OF MAGNESIUM: CPT | Performed by: STUDENT IN AN ORGANIZED HEALTH CARE EDUCATION/TRAINING PROGRAM

## 2023-12-21 PROCEDURE — 25810000003 SODIUM CHLORIDE 0.9 % SOLUTION: Performed by: STUDENT IN AN ORGANIZED HEALTH CARE EDUCATION/TRAINING PROGRAM

## 2023-12-21 PROCEDURE — 74176 CT ABD & PELVIS W/O CONTRAST: CPT

## 2023-12-21 PROCEDURE — 85025 COMPLETE CBC W/AUTO DIFF WBC: CPT | Performed by: STUDENT IN AN ORGANIZED HEALTH CARE EDUCATION/TRAINING PROGRAM

## 2023-12-21 PROCEDURE — 25010000002 HYDROMORPHONE 1 MG/ML SOLUTION: Performed by: UROLOGY

## 2023-12-21 PROCEDURE — 94799 UNLISTED PULMONARY SVC/PX: CPT

## 2023-12-21 PROCEDURE — 84100 ASSAY OF PHOSPHORUS: CPT | Performed by: STUDENT IN AN ORGANIZED HEALTH CARE EDUCATION/TRAINING PROGRAM

## 2023-12-21 PROCEDURE — 82330 ASSAY OF CALCIUM: CPT | Performed by: STUDENT IN AN ORGANIZED HEALTH CARE EDUCATION/TRAINING PROGRAM

## 2023-12-21 PROCEDURE — 25010000002 CALCIUM GLUCONATE 2-0.675 GM/100ML-% SOLUTION: Performed by: INTERNAL MEDICINE

## 2023-12-21 PROCEDURE — 97162 PT EVAL MOD COMPLEX 30 MIN: CPT

## 2023-12-21 PROCEDURE — 25010000002 CEFTRIAXONE PER 250 MG: Performed by: INTERNAL MEDICINE

## 2023-12-21 PROCEDURE — 25010000002 HYDRALAZINE PER 20 MG: Performed by: INTERNAL MEDICINE

## 2023-12-21 PROCEDURE — 87040 BLOOD CULTURE FOR BACTERIA: CPT | Performed by: INTERNAL MEDICINE

## 2023-12-21 PROCEDURE — 94761 N-INVAS EAR/PLS OXIMETRY MLT: CPT

## 2023-12-21 PROCEDURE — 25010000002 ENOXAPARIN PER 10 MG: Performed by: UROLOGY

## 2023-12-21 PROCEDURE — 94664 DEMO&/EVAL PT USE INHALER: CPT

## 2023-12-21 RX ORDER — CALCIUM GLUCONATE 20 MG/ML
2000 INJECTION, SOLUTION INTRAVENOUS ONCE
Status: COMPLETED | OUTPATIENT
Start: 2023-12-21 | End: 2023-12-21

## 2023-12-21 RX ADMIN — BUDESONIDE INHALATION 0.5 MG: 0.5 SUSPENSION RESPIRATORY (INHALATION) at 07:17

## 2023-12-21 RX ADMIN — PANTOPRAZOLE SODIUM 40 MG: 40 TABLET, DELAYED RELEASE ORAL at 08:53

## 2023-12-21 RX ADMIN — LOSARTAN POTASSIUM 50 MG: 50 TABLET, FILM COATED ORAL at 08:53

## 2023-12-21 RX ADMIN — SUCRALFATE 1 G: 1 TABLET ORAL at 16:43

## 2023-12-21 RX ADMIN — HYDROMORPHONE HYDROCHLORIDE 0.5 MG: 1 INJECTION, SOLUTION INTRAMUSCULAR; INTRAVENOUS; SUBCUTANEOUS at 12:54

## 2023-12-21 RX ADMIN — HYDROMORPHONE HYDROCHLORIDE 0.5 MG: 1 INJECTION, SOLUTION INTRAMUSCULAR; INTRAVENOUS; SUBCUTANEOUS at 08:39

## 2023-12-21 RX ADMIN — HYDRALAZINE HYDROCHLORIDE 20 MG: 20 INJECTION INTRAMUSCULAR; INTRAVENOUS at 22:12

## 2023-12-21 RX ADMIN — HYDROMORPHONE HYDROCHLORIDE 0.5 MG: 1 INJECTION, SOLUTION INTRAMUSCULAR; INTRAVENOUS; SUBCUTANEOUS at 02:02

## 2023-12-21 RX ADMIN — HYDROMORPHONE HYDROCHLORIDE 0.5 MG: 1 INJECTION, SOLUTION INTRAMUSCULAR; INTRAVENOUS; SUBCUTANEOUS at 10:46

## 2023-12-21 RX ADMIN — HYDROMORPHONE HYDROCHLORIDE 0.5 MG: 1 INJECTION, SOLUTION INTRAMUSCULAR; INTRAVENOUS; SUBCUTANEOUS at 18:11

## 2023-12-21 RX ADMIN — HYDROMORPHONE HYDROCHLORIDE 0.5 MG: 1 INJECTION, SOLUTION INTRAMUSCULAR; INTRAVENOUS; SUBCUTANEOUS at 06:10

## 2023-12-21 RX ADMIN — Medication 10 ML: at 20:34

## 2023-12-21 RX ADMIN — HYDROMORPHONE HYDROCHLORIDE 0.5 MG: 1 INJECTION, SOLUTION INTRAMUSCULAR; INTRAVENOUS; SUBCUTANEOUS at 03:56

## 2023-12-21 RX ADMIN — BUDESONIDE INHALATION 0.5 MG: 0.5 SUSPENSION RESPIRATORY (INHALATION) at 19:09

## 2023-12-21 RX ADMIN — SENNOSIDES AND DOCUSATE SODIUM 2 TABLET: 50; 8.6 TABLET ORAL at 20:21

## 2023-12-21 RX ADMIN — OXYCODONE HYDROCHLORIDE 5 MG: 5 TABLET ORAL at 22:08

## 2023-12-21 RX ADMIN — PANTOPRAZOLE SODIUM 40 MG: 40 TABLET, DELAYED RELEASE ORAL at 16:43

## 2023-12-21 RX ADMIN — HYDROMORPHONE HYDROCHLORIDE 0.5 MG: 1 INJECTION, SOLUTION INTRAMUSCULAR; INTRAVENOUS; SUBCUTANEOUS at 20:18

## 2023-12-21 RX ADMIN — CEFTRIAXONE 2000 MG: 2 INJECTION, POWDER, FOR SOLUTION INTRAMUSCULAR; INTRAVENOUS at 08:54

## 2023-12-21 RX ADMIN — OXYCODONE HYDROCHLORIDE 5 MG: 5 TABLET ORAL at 08:59

## 2023-12-21 RX ADMIN — Medication 10 ML: at 22:13

## 2023-12-21 RX ADMIN — SODIUM CHLORIDE 125 ML/HR: 9 INJECTION, SOLUTION INTRAVENOUS at 18:11

## 2023-12-21 RX ADMIN — METOPROLOL SUCCINATE 25 MG: 25 TABLET, EXTENDED RELEASE ORAL at 08:53

## 2023-12-21 RX ADMIN — SENNOSIDES AND DOCUSATE SODIUM 2 TABLET: 50; 8.6 TABLET ORAL at 08:53

## 2023-12-21 RX ADMIN — SUCRALFATE 1 G: 1 TABLET ORAL at 11:47

## 2023-12-21 RX ADMIN — OXYCODONE HYDROCHLORIDE 5 MG: 5 TABLET ORAL at 12:54

## 2023-12-21 RX ADMIN — CHLORHEXIDINE GLUCONATE 15 ML: 1.2 RINSE ORAL at 20:21

## 2023-12-21 RX ADMIN — CHLORHEXIDINE GLUCONATE 15 ML: 1.2 RINSE ORAL at 08:54

## 2023-12-21 RX ADMIN — SUCRALFATE 1 G: 1 TABLET ORAL at 08:54

## 2023-12-21 RX ADMIN — OXYCODONE HYDROCHLORIDE 5 MG: 5 TABLET ORAL at 16:43

## 2023-12-21 RX ADMIN — CALCIUM GLUCONATE 2000 MG: 20 INJECTION, SOLUTION INTRAVENOUS at 09:03

## 2023-12-21 RX ADMIN — ENOXAPARIN SODIUM 40 MG: 100 INJECTION SUBCUTANEOUS at 15:34

## 2023-12-21 RX ADMIN — HYDROMORPHONE HYDROCHLORIDE 0.5 MG: 1 INJECTION, SOLUTION INTRAMUSCULAR; INTRAVENOUS; SUBCUTANEOUS at 15:35

## 2023-12-21 NOTE — CONSULTS
Infectious Diseases Consult Note    Referring Provider: Clifford Sanchez MD    Reason for Consultation: Leukocytosis    Patient Care Team:  Provider, No Known as PCP - General    Chief complaint weakness    Subjective     History of present illness:      This is 63-year-old male who was diagnosed recently with left renal mass suspected to be renal cell carcinoma who required left radical nephrectomy on December 18, 2023.  The patient was on the ventilator for a day after the procedure and was extubated after that.  His white count has been elevated.  The patient hemoglobin dropped and required to have blood product transfusion.  The patient is hemodynamically stable requiring no vasopressors.  The patient denied having any significant complaints today except for chronic back and hip pain.  The patient has been constipated since admission    Review of Systems   Review of Systems   Constitutional:  Positive for fatigue.   HENT: Negative.     Eyes: Negative.    Respiratory: Negative.     Cardiovascular: Negative.    Gastrointestinal: Negative.    Genitourinary: Negative.    Musculoskeletal: Negative.    Skin: Negative.    Neurological: Negative.    Hematological: Negative.    Psychiatric/Behavioral: Negative.         Medications  Medications Prior to Admission   Medication Sig Dispense Refill Last Dose    aspirin 81 MG EC tablet Take 1 tablet by mouth Daily.   12/11/2023    celecoxib (CeleBREX) 200 MG capsule Take 1 capsule by mouth Daily As Needed for Mild Pain.   12/11/2023    famotidine (PEPCID) 40 MG tablet Take 1 tablet by mouth Daily.   12/17/2023    hydroCHLOROthiazide (HYDRODIURIL) 25 MG tablet Take 1 tablet by mouth Daily.   12/17/2023    magnesium oxide (MAG-OX) 400 MG tablet Take 1 tablet by mouth Daily.   12/11/2023    omeprazole (priLOSEC) 40 MG capsule Take 1 capsule by mouth Daily.   12/17/2023    potassium chloride (K-DUR,KLOR-CON) 20 MEQ CR tablet Take 1 tablet by mouth Daily.   12/17/2023     Symbicort 160-4.5 MCG/ACT inhaler Inhale 2 puffs 2 (Two) Times a Day.   12/17/2023    vitamin D3 125 MCG (5000 UT) capsule capsule Take 1 capsule by mouth Daily.   12/17/2023    lisinopril (PRINIVIL,ZESTRIL) 10 MG tablet Take 1.5 tablets by mouth 2 (Two) Times a Day.          History  Past Medical History:   Diagnosis Date    Asthma     Emphysema/COPD     GERD (gastroesophageal reflux disease)     Hyperglycemia 10/12/2023    Hyperlipidemia 10/12/2023    Hypertension     Prostate disease     Vitreous degeneration of right eye     Formatting of this note might be different from the original. Retinal tear and detachment warning symptoms reviewed and patient instructed to call immediately if increasing floaters, flashes, or decreasing peripheral vision. No retinal detachment or retinal tear noted. Recheck in 1 month with dilated exam.     Past Surgical History:   Procedure Laterality Date    NEPHRECTOMY Left 12/18/2023    Procedure: NEPHRECTOMY RADICAL WITH CAVAL THROMBECTOMY;  Surgeon: James Esquivel MD;  Location: [facility];  Service: Urology;  Laterality: Left;    SKIN LESION EXCISION  1990       Family History  History reviewed. No pertinent family history.    Social History   reports that he has never smoked. He has been exposed to tobacco smoke. He has never used smokeless tobacco. He reports current alcohol use of about 1.0 standard drink of alcohol per week. He reports that he does not currently use drugs.    Allergies  Patient has no known allergies.    Objective     Vital Signs   Vital Signs (last 24 hours)         12/20 0700  12/21 0659 12/21 0700  12/21 1316   Most Recent      Temp (°F) 99 -  99.8    97.6 -  97.8     97.6 (36.4) 12/21 1200    Heart Rate 76 -  109    78 -  90     84 12/21 1000    Resp 12 -  20      16     16 12/21 0717    /51 -  186/71    124/64 -  144/71     124/64 12/21 1000    SpO2 (%) 90 -  100    96 -  98     98 12/21 1000    Flow (L/min) 1.5 -  4.5    2 -  4     2 12/21 1145             Physical Exam:  Physical Exam  Vitals and nursing note reviewed.   Constitutional:       Appearance: He is well-developed.   HENT:      Head: Normocephalic and atraumatic.   Eyes:      Pupils: Pupils are equal, round, and reactive to light.   Cardiovascular:      Rate and Rhythm: Normal rate and regular rhythm.      Heart sounds: Normal heart sounds.   Pulmonary:      Effort: Pulmonary effort is normal. No respiratory distress.      Breath sounds: Normal breath sounds. No wheezing or rales.   Abdominal:      General: Bowel sounds are normal. There is no distension.      Palpations: Abdomen is soft. There is no mass.      Tenderness: There is abdominal tenderness. There is no guarding or rebound.   Musculoskeletal:         General: No deformity. Normal range of motion.      Cervical back: Normal range of motion and neck supple.   Skin:     General: Skin is warm.      Findings: No erythema or rash.   Neurological:      Mental Status: He is alert and oriented to person, place, and time.      Cranial Nerves: No cranial nerve deficit.         Microbiology  Microbiology Results (last 10 days)       Procedure Component Value - Date/Time    Blood Culture - Blood, Arm, Left [[id number]]  (Normal) Collected: 12/19/23 1107    Lab Status: Preliminary result Specimen: Blood from Arm, Left Updated: 12/21/23 1145     Blood Culture No growth at 2 days    Blood Culture - Blood, Arm, Right [[id number]]  (Normal) Collected: 12/19/23 1050    Lab Status: Preliminary result Specimen: Blood from Arm, Right Updated: 12/21/23 1145     Blood Culture No growth at 2 days            Laboratory  Results from last 7 days   Lab Units 12/21/23  0345   WBC 10*3/mm3 23.30*   HEMOGLOBIN g/dL 7.3*   HEMATOCRIT % 22.9*   PLATELETS 10*3/mm3 225     Results from last 7 days   Lab Units 12/21/23  0345   SODIUM mmol/L 137   POTASSIUM mmol/L 4.3   CHLORIDE mmol/L 104   CO2 mmol/L 25.0   BUN mg/dL 16   CREATININE mg/dL 1.47*   GLUCOSE mg/dL 80    CALCIUM mg/dL 8.7     Results from last 7 days   Lab Units 12/21/23  0345   SODIUM mmol/L 137   POTASSIUM mmol/L 4.3   CHLORIDE mmol/L 104   CO2 mmol/L 25.0   BUN mg/dL 16   CREATININE mg/dL 1.47*   GLUCOSE mg/dL 80   CALCIUM mg/dL 8.7                   Radiology  Imaging Results (Last 72 Hours)       Procedure Component Value Units Date/Time    XR Chest 1 View [[id number]] Collected: 12/19/23 0702     Updated: 12/19/23 0708    Narrative:      XR CHEST 1 VW    Date of Exam: 12/19/2023 1:28 AM EST    Indication: Intubated Patient    Comparison: 12/18/2023    Findings:  ET tube above the greg. Esophagogastric tube tip below the diaphragm. Placement of a right upper extremity PICC tip is at the mid SVC. Heart size normal. Negative for pneumothorax or pleural effusion. Minimal left basilar atelectasis. Skinfold   suspected overlying the right apex.      Impression:      Impression:  1. Placement of a right upper extremity PICC with the tip at the mid SVC.  2. Placement of an esophagogastric tube with tip below the diaphragm. Stable ET tube above the greg.   3. No pneumothorax.  4. Minimal left basilar atelectasis.        Electronically Signed: Cedrick Ordonez MD    12/19/2023 7:06 AM EST    Workstation ID: [id number]    XR Abdomen KUB [[id number]] Collected: 12/18/23 1757     Updated: 12/18/23 1800    Narrative:      XR ABDOMEN KUB    Date of Exam: 12/18/2023 5:55 PM EST    Indication: OGT    Comparison: CT abdomen pelvis 11/4/2023.    Findings:   NG tube projects over the right upper abdomen in the expected position of the distal body, antrum of the stomach.    Postsurgical changes are noted of the abdomen. There is a nonspecific nonobstructive bowel gas pattern. Osseous structures are unremarkable        Impression:      Impression:  NG tube projects over the expected position of the distal body, antrum of the stomach.      Electronically Signed: Ervin Howell MD    12/18/2023 5:58 PM EST    Workstation ID: [id number]     XR Chest 1 View [[id number]] Collected: 12/18/23 1505     Updated: 12/18/23 1508    Narrative:      XR CHEST 1 VW    Date of Exam: 12/18/2023 2:58 PM EST    Indication: et tube placement    Comparison: None available.    Findings:  There is an endotracheal tube with the tip 3.6 cm above the greg. The cardiopulmonary silhouette is borderline in size. Mild right infrahilar discoid atelectasis. The rest of the lungs are clear. There are no pleural effusions      Impression:      Impression:  Endotracheal tube in place    Mild right infrahilar discoid atelectasis. Otherwise clear lungs      Electronically Signed: Farhat Fletcher MD    12/18/2023 3:06 PM EST    Workstation ID: [id number]    XR Lost Needle / Instrument [[id number]] Collected: 12/18/23 1406     Updated: 12/18/23 1412    Narrative:      XR LOST NEEDLE/INSTRUMENT    Date of Exam: 12/18/2023 1:50 PM EST    Indication: Instrument count. Left radical nephrectomy. Needle and sponge counts were correct,    Comparison: CT abdomen pelvis November 4, 2023.    Findings:  Upper abdominal skin staple line. Multiple surgical clips are seen in the mid upper and left upper abdomen consistent with history of left nephrectomy. Harley catheter is present in the pelvis. No definite radiopaque instrument or other definite   unexpected foreign body is identified. Visualized bowel gas pattern appears grossly unremarkable.      Impression:      Impression:  Postsurgical changes with multiple surgical clips and skin staple line in the upper abdomen. No definite radiopaque instrument or other unexpected foreign body is identified.        Results were called to Suzette Carrasco RN in the OR by Dr. Moya at 12/18/2023 2:00 PM EST.    Electronically Signed: Nestor Moya    12/18/2023 2:10 PM EST    Workstation ID: [id number]            Cardiology      Results Review:  I have reviewed all clinical data, test, lab, and imaging results.       Schedule Meds  budesonide, 0.5 mg, Nebulization,  BID - RT  cefTRIAXone, 2,000 mg, Intravenous, Q24H  chlorhexidine, 15 mL, Mouth/Throat, Q12H  enoxaparin, 40 mg, Subcutaneous, Daily  losartan, 50 mg, Oral, Q24H  metoprolol succinate XL, 25 mg, Oral, Q24H  pantoprazole, 40 mg, Oral, BID AC  senna-docusate sodium, 2 tablet, Nasogastric, BID  sodium chloride, 10 mL, Intravenous, Q12H  sodium chloride, 10 mL, Intravenous, Q12H  sodium chloride, 10 mL, Intravenous, Q12H  sodium chloride, 10 mL, Intravenous, Q12H  sucralfate, 1 g, Oral, TID AC        Infusion Meds  sodium chloride, 125 mL/hr, Last Rate: 125 mL/hr (12/20/23 6137)        PRN Meds    acetaminophen    [DISCONTINUED] acetaminophen **OR** acetaminophen    senna-docusate sodium **AND** polyethylene glycol **AND** bisacodyl **AND** bisacodyl    Calcium Replacement - Follow Nurse / BPA Driven Protocol    hydrALAZINE    HYDROmorphone **AND** naloxone    influenza vaccine    ipratropium-albuterol    Magnesium Low Dose Replacement - Follow Nurse / BPA Driven Protocol    nitroglycerin    ondansetron **OR** ondansetron    oxyCODONE    Phosphorus Replacement - Follow Nurse / BPA Driven Protocol    Potassium Replacement - Follow Nurse / BPA Driven Protocol    sodium chloride    sodium chloride    sodium chloride    sodium chloride    sodium chloride      Assessment & Plan       Assessment    Reactive leukocytosis.  Patient has no obvious clinical signs of sepsis.  Concerning for intra abdomen cause such as post surgical hematoma    S/p left radical nephrectomy on December 18, 2023 secondary to left kidney mass probably renal cell carcinoma    History of borderline diabetes mellitus    History of degenerative joint disease with a chronic back and hip pain    Elevated creatinine.  Patient is s/p recent left-sided nephrectomy    Plan    Waiting on repeat blood culture results  Repeat labs in a.m.  Request CT scan of abdomen pelvis without contrast  Can discontinue antibiotics tomorrow if blood cultures are negative and CT  scan shows no signs of infection  Case was discussed with the patient and his wife at bedside    Jcarlos Wilkins MD  12/21/23  13:16 EST    Note is dictated utilizing voice recognition software/Dragon     Eucrisa Counseling: Patient may experience a mild burning sensation during topical application. Eucrisa is not approved in children less than 2 years of age.

## 2023-12-21 NOTE — THERAPY EVALUATION
Patient Name: Louis Andino  : 1960    MRN: [id number]                              Today's Date: 2023       Admit Date: 2023    Visit Dx:     ICD-10-CM ICD-9-CM   1. Other specified disorders of kidney and ureter  N28.89 593.89   2. Renal mass  N28.89 593.9     Patient Active Problem List   Diagnosis    Renal mass    Asthma    Gastroesophageal reflux disease    Hyperlipidemia    Hypertension    BPH (benign prostatic hyperplasia)    COPD (chronic obstructive pulmonary disease)    KARON (acute kidney injury)    Acute respiratory failure with hypoxia     Past Medical History:   Diagnosis Date    Asthma     Emphysema/COPD     GERD (gastroesophageal reflux disease)     Hyperglycemia 10/12/2023    Hyperlipidemia 10/12/2023    Hypertension     Prostate disease     Vitreous degeneration of right eye     Formatting of this note might be different from the original. Retinal tear and detachment warning symptoms reviewed and patient instructed to call immediately if increasing floaters, flashes, or decreasing peripheral vision. No retinal detachment or retinal tear noted. Recheck in 1 month with dilated exam.     Past Surgical History:   Procedure Laterality Date    NEPHRECTOMY Left 2023    Procedure: NEPHRECTOMY RADICAL WITH CAVAL THROMBECTOMY;  Surgeon: James Esquivel MD;  Location: [facility];  Service: Urology;  Laterality: Left;    SKIN LESION EXCISION        General Information       Row Name 23 1548          Physical Therapy Time and Intention    Document Type evaluation  -OD     Mode of Treatment physical therapy  -OD       Row Name 23 1548          General Information    Patient Profile Reviewed yes  -OD     Prior Level of Function independent:;ADL's  -OD     Existing Precautions/Restrictions fall;oxygen therapy device and L/min  -OD     Barriers to Rehab medically complex  -OD       Row Name 23 1548          Living Environment    People in Home spouse  -OD        Row Name 12/21/23 1548          Cognition    Orientation Status (Cognition) oriented x 4  -OD       Row Name 12/21/23 1548          Safety Issues, Functional Mobility    Impairments Affecting Function (Mobility) balance;endurance/activity tolerance;strength  -OD               User Key  (r) = Recorded By, (t) = Taken By, (c) = Cosigned By      Initials Name Provider Type    OD Isabel Portillo, PT Physical Therapist                   Mobility       Row Name 12/21/23 1551          Bed Mobility    Bed Mobility bed mobility (all) activities  -OD     All Activities, [place] (Bed Mobility) moderate assist (50% patient effort)  -OD     Comment, (Bed Mobility) RN reports pt drowsy d/t dose of dilaudid. Pt able to roll bilat with Tushar, modA given for attempting to sit EOB, but pt began to desat.  -OD               User Key  (r) = Recorded By, (t) = Taken By, (c) = Cosigned By      Initials Name Provider Type    OD Isabel Portillo, PT Physical Therapist                   Obj/Interventions       Row Name 12/21/23 1552          Range of Motion Comprehensive    General Range of Motion no range of motion deficits identified  -OD       Row Name 12/21/23 1552          Strength Comprehensive (MMT)    General Manual Muscle Testing (MMT) Assessment lower extremity strength deficits identified  -OD     Comment, General Manual Muscle Testing (MMT) Assessment No formal MMT, but pt demo weakness BLE  -OD       Row Name 12/21/23 1552          Sensory Assessment (Somatosensory)    Sensory Assessment (Somatosensory) unable/difficult to assess  -OD               User Key  (r) = Recorded By, (t) = Taken By, (c) = Cosigned By      Initials Name Provider Type    OD Isabel Portillo PT Physical Therapist                   Goals/Plan       Row Name 12/21/23 1559          Bed Mobility Goal 1 (PT)    Activity/Assistive Device (Bed Mobility Goal 1, PT) bed mobility activities, all  -OD     [place] Level/Cues Needed (Bed Mobility Goal 1, PT)  independent  -OD     Time Frame (Bed Mobility Goal 1, PT) long term goal (LTG);2 weeks  -OD       Row Name 12/21/23 1551          Transfer Goal 1 (PT)    Activity/Assistive Device (Transfer Goal 1, PT) transfers, all  -OD     [place] Level/Cues Needed (Transfer Goal 1, PT) independent  -OD     Time Frame (Transfer Goal 1, PT) long term goal (LTG);2 weeks  -OD       Row Name 12/21/23 1550          Gait Training Goal 1 (PT)    Activity/Assistive Device (Gait Training Goal 1, PT) gait (walking locomotion)  -OD     [place] Level (Gait Training Goal 1, PT) independent  -OD     Distance (Gait Training Goal 1, PT) 100 feet  -OD     Time Frame (Gait Training Goal 1, PT) long term goal (LTG);2 weeks  -OD       Row Name 12/21/23 1555          Therapy Assessment/Plan (PT)    Planned Therapy Interventions (PT) balance training;bed mobility training;gait training;home exercise program;neuromuscular re-education;postural re-education;transfer training;ROM (range of motion);stair training;strengthening;stretching;patient/family education  -OD               User Key  (r) = Recorded By, (t) = Taken By, (c) = Cosigned By      Initials Name Provider Type    OD Isabel Portillo, PT Physical Therapist                   Clinical Impression       Row Name 12/21/23 1559          Pain    Pretreatment Pain Rating 0/10 - no pain  -OD     Posttreatment Pain Rating 0/10 - no pain  -OD       Row Name 12/21/23 155          Plan of Care Review    Plan of Care Reviewed With patient  -OD     Progress no change  -OD     Outcome Evaluation Louis Andino is a 62 y/o M admitted to [facility] on 12/18/23 d/t having L renal mass with plans for nephrectomy, 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 Pt is s/p open radical nephrectomy and IVC thrombectomy with Dr. Esquivel and transferred to ICU. PMH includes HTN, HLD, COPD, BPH, GERD. Pt limited in mobility and answering PLOF questions this day d/t recent dose of dilaudid. Pt  reports he lives with his spouse, who works full-time. He reports indep with mobility and ADLs at baseline, pt denies use of AD. Pt requires min-modA with bed mobility for rolling bilat, and attempting to sit EOB. Pt began desat'ing, so repositioned pt supine and donned the NC. Pt kept his eyes closed most of the session, and was pleasant throughout. Repositioned bed into chair position for improving upright tolerance. PT will follow up for OOB mobility assessment. Recommend home with home health PT at this time, but pending further mobility assessment regarding safety at home.  -OD       Row Name 12/21/23 0377          Therapy Assessment/Plan (PT)    Rehab Potential (PT) good, to achieve stated therapy goals  -OD     Criteria for Skilled Interventions Met (PT) meets criteria;yes  -OD     Therapy Frequency (PT) 3 times/wk  -OD     Predicted Duration of Therapy Intervention (PT) until d/c  -OD       Row Name 12/21/23 7074          Vital Signs    Pre SpO2 (%) 78  -OD     O2 Delivery Pre Treatment room air  -OD     Intra SpO2 (%) 96  -OD     O2 Delivery Intra Treatment nasal cannula  2L  -OD     Post SpO2 (%) 96  -OD     O2 Delivery Post Treatment nasal cannula  -OD     Pre Patient Position Side Lying  -OD     Intra Patient Position Side Lying  -OD     Post Patient Position Supine  -OD       Row Name 12/21/23 1955          Positioning and Restraints    Pre-Treatment Position in bed  -OD     Post Treatment Position bed  -OD     In Bed notified nsg;call light within reach;supine;encouraged to call for assist;exit alarm on  -OD               User Key  (r) = Recorded By, (t) = Taken By, (c) = Cosigned By      Initials Name Provider Type    OD Isabel Portillo, PT Physical Therapist                   Outcome Measures       Row Name 12/21/23 8616          How much help from another person do you currently need...    Turning from your back to your side while in flat bed without using bedrails? 3  -OD     Moving from lying on  back to sitting on the side of a flat bed without bedrails? 3  -OD     Moving to and from a bed to a chair (including a wheelchair)? 2  -OD     Standing up from a chair using your arms (e.g., wheelchair, bedside chair)? 2  -OD     Climbing 3-5 steps with a railing? 2  -OD     To walk in hospital room? 2  -OD     AM-PAC 6 Clicks Score (PT) 14  -OD     Highest Level of Mobility Goal 4 --> Transfer to chair/commode  -OD       Row Name 12/21/23 1559          Functional Assessment    Outcome Measure Options AM-PAC 6 Clicks Basic Mobility (PT)  -OD               User Key  (r) = Recorded By, (t) = Taken By, (c) = Cosigned By      Initials Name Provider Type    OD Isabel Portillo, PT Physical Therapist                                 Physical Therapy Education       Title: PT OT SLP Therapies (Done)       Topic: Physical Therapy (Done)       Point: Mobility training (Done)       Learning Progress Summary             Patient Acceptance, E, VU by OD at 12/21/2023 1600                         Point: Home exercise program (Done)       Learning Progress Summary             Patient Acceptance, E, VU by OD at 12/21/2023 1600                         Point: Body mechanics (Done)       Learning Progress Summary             Patient Acceptance, E, VU by OD at 12/21/2023 1600                         Point: Precautions (Done)       Learning Progress Summary             Patient Acceptance, E, VU by OD at 12/21/2023 1600                                         User Key       Initials Effective Dates Name Provider Type Discipline    OD 05/11/23 -  Isabel Portillo, DANIEL Physical Therapist PT                  PT Recommendation and Plan  Planned Therapy Interventions (PT): balance training, bed mobility training, gait training, home exercise program, neuromuscular re-education, postural re-education, transfer training, ROM (range of motion), stair training, strengthening, stretching, patient/family education  Plan of Care Reviewed With:  patient  Progress: no change  Outcome Evaluation: Louis Andino is a 62 y/o M admitted to [facility] on 12/18/23 d/t having L renal mass with plans for nephrectomy, 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 Pt is s/p open radical nephrectomy and IVC thrombectomy with Dr. Esquivel and transferred to ICU. PMH includes HTN, HLD, COPD, BPH, GERD. Pt limited in mobility and answering PLOF questions this day d/t recent dose of dilaudid. Pt reports he lives with his spouse, who works full-time. He reports indep with mobility and ADLs at baseline, pt denies use of AD. Pt requires min-modA with bed mobility for rolling bilat, and attempting to sit EOB. Pt began desat'ing, so repositioned pt supine and donned the NC. Pt kept his eyes closed most of the session, and was pleasant throughout. Repositioned bed into chair position for improving upright tolerance. PT will follow up for OOB mobility assessment. Recommend home with home health PT at this time, but pending further mobility assessment regarding safety at home.     Time Calculation:         PT Charges       Row Name 12/21/23 1600             Time Calculation    Start Time 1039  -OD      Stop Time 1057  -OD      Time Calculation (min) 18 min  -OD      PT Received On 12/21/23  -OD      PT - Next Appointment 12/22/23  -OD      PT Goal Re-Cert Due Date 01/04/24  -OD         Time Calculation- PT    Total Timed Code Minutes- PT 0 minute(s)  -OD                User Key  (r) = Recorded By, (t) = Taken By, (c) = Cosigned By      Initials Name Provider Type    OD Isabel Portillo, PT Physical Therapist                  Therapy Charges for Today       Code Description Service Date Service Provider Modifiers Qty    [id number]  PT EVAL MOD COMPLEXITY 4 12/21/2023 Isabel Portillo, PT GP 1            PT G-Codes  Outcome Measure Options: AM-PAC 6 Clicks Basic Mobility (PT)  AM-PAC 6 Clicks Score (PT): 14  PT Discharge Summary  Anticipated Discharge Disposition (PT):  home with home health, other (see comments) (pending furhter mobility as pt was on dilaudid and not appropriate.)    Isabel Malcolm, PT  12/21/2023

## 2023-12-21 NOTE — PROGRESS NOTES
[facility] DAILY PROGRESS NOTE    Patient Identification  Name: Louis Andino  Age: 63 y.o.  Sex: male  :  1960  MRN: [id number]    Date: 2023             Subjective:  Interval History: Cr down, no appetite, no nausea, UOP good    Objective:    Scheduled Meds:budesonide, 0.5 mg, Nebulization, BID - RT  cefTRIAXone, 2,000 mg, Intravenous, Q24H  chlorhexidine, 15 mL, Mouth/Throat, Q12H  enoxaparin, 40 mg, Subcutaneous, Daily  losartan, 50 mg, Oral, Q24H  metoprolol succinate XL, 25 mg, Oral, Q24H  pantoprazole, 40 mg, Oral, BID AC  senna-docusate sodium, 2 tablet, Nasogastric, BID  sodium chloride, 10 mL, Intravenous, Q12H  sodium chloride, 10 mL, Intravenous, Q12H  sodium chloride, 10 mL, Intravenous, Q12H  sodium chloride, 10 mL, Intravenous, Q12H  sucralfate, 1 g, Oral, TID AC      Continuous Infusions:sodium chloride, 125 mL/hr, Last Rate: 125 mL/hr (23 7631)      PRN Meds:  acetaminophen    [DISCONTINUED] acetaminophen **OR** acetaminophen    senna-docusate sodium **AND** polyethylene glycol **AND** bisacodyl **AND** bisacodyl    Calcium Replacement - Follow Nurse / BPA Driven Protocol    hydrALAZINE    HYDROmorphone **AND** naloxone    influenza vaccine    ipratropium-albuterol    Magnesium Low Dose Replacement - Follow Nurse / BPA Driven Protocol    nitroglycerin    ondansetron **OR** ondansetron    oxyCODONE    Phosphorus Replacement - Follow Nurse / BPA Driven Protocol    Potassium Replacement - Follow Nurse / BPA Driven Protocol    sodium chloride    sodium chloride    sodium chloride    sodium chloride    sodium chloride    Vital signs in last 24 hours:  Temp:  [97.6 °F (36.4 °C)-99.6 °F (37.6 °C)] 97.6 °F (36.4 °C)  Heart Rate:  [] 84  Resp:  [12-16] 16  BP: (106-168)/() 124/64    Intake/Output:    Intake/Output Summary (Last 24 hours) at 2023 1235  Last data filed at 2023 0000  Gross per 24 hour   Intake 2742.55 ml   Output 1600 ml   Net 1142.55 ml  "      Exam:  /64   Pulse 84   Temp 97.6 °F (36.4 °C) (Axillary)   Resp 16   Ht 177.8 cm (70\")   Wt 106 kg (233 lb 7.5 oz)   SpO2 98%   BMI 33.50 kg/m²     General Appearance:    Alert, cooperative, NAD   Lungs:     Respirations unlabored, no audible wheezing    Heart:    No cyanosis   Abdomen:     Soft, ND, incision clean dry intact   :  Har[place] clear yellow            Data Review:  All labs (24hrs):   Recent Results (from the past 24 hour(s))   Hemoglobin & Hematocrit, Blood    Collection Time: 12/20/23  7:55 PM    Specimen: Blood   Result Value Ref Range    Hemoglobin 8.7 (L) 13.0 - 17.7 g/dL    Hematocrit 27.3 (L) 37.5 - 51.0 %   Prepare RBC, 1 Units    Collection Time: 12/21/23  2:00 AM   Result Value Ref Range    Product Code A3230O14     Unit Number [id number]     UNIT  ABO A     UNIT  RH POS     Crossmatch Interpretation Compatible     Dispense Status PT     Blood Expiration Date [id number]     Blood Type Barcode 6200    Magnesium    Collection Time: 12/21/23  3:45 AM    Specimen: Blood   Result Value Ref Range    Magnesium 2.2 1.6 - 2.4 mg/dL   Phosphorus    Collection Time: 12/21/23  3:45 AM    Specimen: Blood   Result Value Ref Range    Phosphorus 2.8 2.5 - 4.5 mg/dL   Comprehensive Metabolic Panel    Collection Time: 12/21/23  3:45 AM    Specimen: Blood   Result Value Ref Range    Glucose 80 65 - 99 mg/dL    BUN 16 8 - 23 mg/dL    Creatinine 1.47 (H) 0.76 - 1.27 mg/dL    Sodium 137 136 - 145 mmol/L    Potassium 4.3 3.5 - 5.2 mmol/L    Chloride 104 98 - 107 mmol/L    CO2 25.0 22.0 - 29.0 mmol/L    Calcium 8.7 8.6 - 10.5 mg/dL    Total Protein 6.3 6.0 - 8.5 g/dL    Albumin 3.0 (L) 3.5 - 5.2 g/dL    ALT (SGPT) 42 (H) 1 - 41 U/L    AST (SGOT) 84 (H) 1 - 40 U/L    Alkaline Phosphatase 400 (H) 39 - 117 U/L    Total Bilirubin 1.1 0.0 - 1.2 mg/dL    Globulin 3.3 gm/dL    A/G Ratio 0.9 g/dL    BUN/Creatinine Ratio 10.9 7.0 - 25.0    Anion Gap 8.0 5.0 - 15.0 mmol/L    eGFR 52.6 (L) >60.0 " mL/min/1.73   Calcium, Ionized    Collection Time: 12/21/23  3:45 AM    Specimen: Blood   Result Value Ref Range    Ionized Calcium 1.19 (L) 1.20 - 1.30 mmol/L   CBC Auto Differential    Collection Time: 12/21/23  3:45 AM    Specimen: Blood   Result Value Ref Range    WBC 23.30 (H) 3.40 - 10.80 10*3/mm3    RBC 2.75 (L) 4.14 - 5.80 10*6/mm3    Hemoglobin 7.3 (L) 13.0 - 17.7 g/dL    Hematocrit 22.9 (L) 37.5 - 51.0 %    MCV 83.2 79.0 - 97.0 fL    MCH 26.6 26.6 - 33.0 pg    MCHC 31.9 31.5 - 35.7 g/dL    RDW 17.1 (H) 12.3 - 15.4 %    RDW-SD 49.9 37.0 - 54.0 fl    MPV 8.4 6.0 - 12.0 fL    Platelets 225 140 - 450 10*3/mm3    Neutrophil % 83.4 (H) 42.7 - 76.0 %    Lymphocyte % 6.5 (L) 19.6 - 45.3 %    Monocyte % 9.6 5.0 - 12.0 %    Eosinophil % 0.2 (L) 0.3 - 6.2 %    Basophil % 0.3 0.0 - 1.5 %    Neutrophils, Absolute 19.40 (H) 1.70 - 7.00 10*3/mm3    Lymphocytes, Absolute 1.50 0.70 - 3.10 10*3/mm3    Monocytes, Absolute 2.20 (H) 0.10 - 0.90 10*3/mm3    Eosinophils, Absolute 0.00 0.00 - 0.40 10*3/mm3    Basophils, Absolute 0.10 0.00 - 0.20 10*3/mm3    nRBC 0.1 0.0 - 0.2 /100 WBC      Imaging Results (Last 24 Hours)       ** No results found for the last 24 hours. **             Assessment:    Renal mass    Asthma    Gastroesophageal reflux disease    Hyperlipidemia    Hypertension    BPH (benign prostatic hyperplasia)    COPD (chronic obstructive pulmonary disease)    KARON (acute kidney injury)    Acute respiratory failure with hypoxia      Left nephrectomy and IVC thrombectomy 12/18    Plan:    Hgb stable cont to monitor and transfuse PRN  KARON, improved  Cont Lovenox prophylaxis  SCDs  Up to chair, slowly increase activity  Pain control  Clears  Har[place] removal once more recovered and active, this afternoon vs tomorrow    James Esquivel MD  [facility]  [address], [address]  [place] [address]  Office: [phone]  Available via Epic Secure Chat  12/21/23  12:35 EST

## 2023-12-21 NOTE — PROGRESS NOTES
"[facility] MEDICINE SERVICE  DAILY PROGRESS NOTE    NAME: Louis Andino  : 1960  MRN: [id number]      LOS: 3 days     PROVIDER OF SERVICE: Clifford Sanchez MD    Chief Complaint: Renal mass    Subjective:     Interval History:  History taken from: patient  Patient Complaints: renal mass    Per the documentation by ICU, dated 2023,\"Louis Andino is a 63 y.o. male with PMH of hypertension, hyperlipidemia, COPD, BPH, and GERD presented to the hospital for nephrectomy, and was admitted with a principal diagnosis of Renal mass.  HPI information is limited due to patient intubated and sedated at time of assessment; information obtained from chart review and patient's spouse at bedside.  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  Post procedure patient was to remain on vent overnight and admitted to the intensive care unit for further evaluation and treatment.  \"     2023-Patient extubated on the morning of 2023 and Levophed weaned  23 patient seen and examined in bed no acute distress, vital signs blood pressure stable, heart rate 105, discussed with RN.  Patient complaining of heartburn.  On Protonix and Carafate added.  23 patient seen and examined in bed no acute distress feels better today, pain better controlled.  Vital signs stable, discussed with RN.  Urine looks clear.      Review of Systems  12 point ROS reviewed and negative except as mentioned above    Objective:     Vital Signs  Temp:  [97.6 °F (36.4 °C)-99.6 °F (37.6 °C)] 97.6 °F (36.4 °C)  Heart Rate:  [] 84  Resp:  [12-16] 16  BP: (106-166)/() 124/64  Flow (L/min):  [1.5-4.5] 2   Body mass index is 33.5 kg/m².    Physical Exam  Constitutional:       General: He is not in acute distress.     Appearance: He is obese. He is not toxic-appearing.   HENT:      " Head: Normocephalic and atraumatic.      Nose: Nose normal. No congestion.      Mouth/Throat:      Pharynx: Oropharynx is clear. No oropharyngeal exudate.   Eyes:      General: No scleral icterus.  Cardiovascular:      Rate and Rhythm: Normal rate and regular rhythm.      Heart sounds: No murmur heard.     No friction rub. No gallop.   Pulmonary:      Effort: No respiratory distress.      Breath sounds: No wheezing or rales.      Comments: Patient on 1.5L NC  Abdominal:      General: There is no distension.      Tenderness: There is no abdominal tenderness. There is no guarding.   Musculoskeletal:         General: No swelling or deformity.      Cervical back: Normal range of motion. No rigidity.      Right lower leg: No edema.      Left lower leg: No edema.   Skin:     Coloration: Skin is pale. Skin is not jaundiced.      Findings: No bruising or lesion.   Neurological:      General: No focal deficit present.      Mental Status: He is alert and oriented to person, place, and time.      Motor: Weakness present.   Scheduled Meds   budesonide, 0.5 mg, Nebulization, BID - RT  cefTRIAXone, 2,000 mg, Intravenous, Q24H  chlorhexidine, 15 mL, Mouth/Throat, Q12H  enoxaparin, 40 mg, Subcutaneous, Daily  losartan, 50 mg, Oral, Q24H  metoprolol succinate XL, 25 mg, Oral, Q24H  pantoprazole, 40 mg, Oral, BID AC  senna-docusate sodium, 2 tablet, Nasogastric, BID  sodium chloride, 10 mL, Intravenous, Q12H  sodium chloride, 10 mL, Intravenous, Q12H  sodium chloride, 10 mL, Intravenous, Q12H  sodium chloride, 10 mL, Intravenous, Q12H  sucralfate, 1 g, Oral, TID AC       PRN Meds     acetaminophen    [DISCONTINUED] acetaminophen **OR** acetaminophen    senna-docusate sodium **AND** polyethylene glycol **AND** bisacodyl **AND** bisacodyl    Calcium Replacement - Follow Nurse / BPA Driven Protocol    hydrALAZINE    HYDROmorphone **AND** naloxone    influenza vaccine    ipratropium-albuterol    Magnesium Low Dose Replacement - Follow  Nurse / BPA Driven Protocol    nitroglycerin    ondansetron **OR** ondansetron    oxyCODONE    Phosphorus Replacement - Follow Nurse / BPA Driven Protocol    Potassium Replacement - Follow Nurse / BPA Driven Protocol    sodium chloride    sodium chloride    sodium chloride    sodium chloride    sodium chloride   Infusions  sodium chloride, 125 mL/hr, Last Rate: 125 mL/hr (12/20/23 5181)          Diagnostic Data    Results from last 7 days   Lab Units 12/21/23  0345   WBC 10*3/mm3 23.30*   HEMOGLOBIN g/dL 7.3*   HEMATOCRIT % 22.9*   PLATELETS 10*3/mm3 225   GLUCOSE mg/dL 80   CREATININE mg/dL 1.47*   BUN mg/dL 16   SODIUM mmol/L 137   POTASSIUM mmol/L 4.3   AST (SGOT) U/L 84*   ALT (SGPT) U/L 42*   ALK PHOS U/L 400*   BILIRUBIN mg/dL 1.1   ANION GAP mmol/L 8.0       No radiology results for the last day      I reviewed the patient's new clinical results.    Assessment/Plan:     Active and Resolved Problems  Active Hospital Problems    Diagnosis  POA    **Renal mass [N28.89]  Yes    BPH (benign prostatic hyperplasia) [N40.0]  Yes    COPD (chronic obstructive pulmonary disease) [J44.9]  Yes    KARON (acute kidney injury) [N17.9]  Yes    Acute respiratory failure with hypoxia [J96.01]  Yes    Asthma [J45.909]  Yes    Gastroesophageal reflux disease [K21.9]  Yes    Hyperlipidemia [E78.5]  Yes    Hypertension [I10]  Yes      Resolved Hospital Problems   No resolved problems to display.       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 and large volume blood loss    - extensive blood loss during surgery    - s/p 5u prbc and 2u FFP given per op note    -  #Acute hypoxic respiratory failure  #COPD    - Patient remained intubated post op, was transferred to ICU    - Patient extubated on 12/19/2023    - Dueoneb QID    - Pulmicort BID    - wean oxygen as tolerated     #Hypovolemic shock    - 2/2 blood loss from surgery    - Levophed  weaned    - cleared for downgrade from ICU     #Acute blood loss anemia    - 2/2 blood loss from surgery    - s/p 5u prbc and 2u FFP given per op note    - Hgb 7.4 this afternoon, no overt bleeding, 1u prbc ordered in ICU     - h/h q6h    - transfuse to maintain hgb > 7.0    - pt     #KARON  #Metabolic acidosis    - suspect 2/2 blood loss and possible post op ATN    - bicarb improved from 18 > 24    - Cr 2.23 > 1.71, base Cr is 1.0    - as bicarb normalized, changed IVF to NS @ 125 hour    - monitor    - check ionized calcium and replace PRN     #GERD    - PPI     #HTN    - hold hypertensive meds due to recent shock, monitor        DVT prophylaxis:  Medical and mechanical DVT prophylaxis orders are present.     Code status is   Code Status and Medical Interventions:   Ordered at: 12/18/23 2010     Code Status (Patient has no pulse and is not breathing):    CPR (Attempt to Resuscitate)     Medical Interventions (Patient has pulse or is breathing):    Full Support       Plan for disposition: Per clinical course in 2 days    Time: 30 minutes    Signature: Electronically signed by Clifford Sanchez MD, 12/21/23, 13:41 EST.  [facility] Hospitalist Team

## 2023-12-21 NOTE — CASE MANAGEMENT/SOCIAL WORK
Continued Stay Note  MILLIE Jacques     Patient Name: Louis Andino  MRN: [id number]  Today's Date: 12/21/2023    Admit Date: 12/18/2023    Plan: DC Plan: Anticipate Routine Home with spouse. Wishes to set up his own PCP.   Discharge Plan       Row Name 12/21/23 1435       Plan    Plan DC Plan: Anticipate Routine Home with spouse. Wishes to set up his own PCP.    Provided Post Acute Provider List? N/A    Provided Post Acute Provider Quality & Resource List? N/A    Plan Comments CM reviewed chart documentation to obtain clinical updates. MD anticipates DC readiness in approximately two days. CM will continue to follow for any further needs and adjust discharge plan accordingly. DC Barriers: Cardiac monitoring, O2@4L nc, IVF's, IV abx, Monitor H/H, elevated WBC's and pending cultures.               Expected Discharge Date and Time       Expected Discharge Date Expected Discharge Time    Dec 23, 2023               Luiza Joya RN     Office Phone: [phone]  Office Cell:     [phone]

## 2023-12-21 NOTE — PLAN OF CARE
Goal Outcome Evaluation:  Plan of Care Reviewed With: patient        Progress: no change  Outcome Evaluation: Louis Andino is a 62 y/o M admitted to [facility] on 12/18/23 d/t having L renal mass with plans for nephrectomy, 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 Pt is s/p open radical nephrectomy and IVC thrombectomy with Dr. Esquivel and transferred to ICU. PMH includes HTN, HLD, COPD, BPH, GERD. Pt limited in mobility and answering PLOF questions this day d/t recent dose of dilaudid. Pt reports he lives with his spouse, who works full-time. He reports indep with mobility and ADLs at baseline, pt denies use of AD. Pt requires min-modA with bed mobility for rolling bilat, and attempting to sit EOB. Pt began desat'ing, so repositioned pt supine and donned the NC. Pt kept his eyes closed most of the session, and was pleasant throughout. Repositioned bed into chair position for improving upright tolerance. PT will follow up for OOB mobility assessment. Recommend home with home health PT at this time, but pending further mobility assessment regarding safety at home.      Anticipated Discharge Disposition (PT): home with home health, other (see comments) (pending furhter mobility as pt was on dilaudid and not appropriate.)

## 2023-12-22 LAB
ALBUMIN SERPL-MCNC: 2.6 G/DL (ref 3.5–5.2)
ALBUMIN/GLOB SERPL: 0.8 G/DL
ALP SERPL-CCNC: 119 U/L (ref 39–117)
ALT SERPL W P-5'-P-CCNC: 54 U/L (ref 1–41)
ANION GAP SERPL CALCULATED.3IONS-SCNC: 9 MMOL/L (ref 5–15)
AST SERPL-CCNC: 71 U/L (ref 1–40)
BASOPHILS # BLD AUTO: 0.1 10*3/MM3 (ref 0–0.2)
BASOPHILS NFR BLD AUTO: 0.4 % (ref 0–1.5)
BILIRUB SERPL-MCNC: 2.7 MG/DL (ref 0–1.2)
BUN SERPL-MCNC: 16 MG/DL (ref 8–23)
BUN/CREAT SERPL: 12.9 (ref 7–25)
CA-I SERPL ISE-MCNC: 1.21 MMOL/L (ref 1.2–1.3)
CALCIUM SPEC-SCNC: 8.6 MG/DL (ref 8.6–10.5)
CHLORIDE SERPL-SCNC: 103 MMOL/L (ref 98–107)
CO2 SERPL-SCNC: 25 MMOL/L (ref 22–29)
CREAT SERPL-MCNC: 1.24 MG/DL (ref 0.76–1.27)
DEPRECATED RDW RBC AUTO: 50.8 FL (ref 37–54)
EGFRCR SERPLBLD CKD-EPI 2021: 64.5 ML/MIN/1.73
EOSINOPHIL # BLD AUTO: 0.2 10*3/MM3 (ref 0–0.4)
EOSINOPHIL NFR BLD AUTO: 1.1 % (ref 0.3–6.2)
ERYTHROCYTE [DISTWIDTH] IN BLOOD BY AUTOMATED COUNT: 16.9 % (ref 12.3–15.4)
GLOBULIN UR ELPH-MCNC: 3.4 GM/DL
GLUCOSE SERPL-MCNC: 77 MG/DL (ref 65–99)
HCT VFR BLD AUTO: 24.9 % (ref 37.5–51)
HGB BLD-MCNC: 7.9 G/DL (ref 13–17.7)
LYMPHOCYTES # BLD AUTO: 1.1 10*3/MM3 (ref 0.7–3.1)
LYMPHOCYTES NFR BLD AUTO: 6.6 % (ref 19.6–45.3)
MAGNESIUM SERPL-MCNC: 2.2 MG/DL (ref 1.6–2.4)
MCH RBC QN AUTO: 25.7 PG (ref 26.6–33)
MCHC RBC AUTO-ENTMCNC: 31.5 G/DL (ref 31.5–35.7)
MCV RBC AUTO: 81.5 FL (ref 79–97)
MONOCYTES # BLD AUTO: 1.9 10*3/MM3 (ref 0.1–0.9)
MONOCYTES NFR BLD AUTO: 11 % (ref 5–12)
NEUTROPHILS NFR BLD AUTO: 13.8 10*3/MM3 (ref 1.7–7)
NEUTROPHILS NFR BLD AUTO: 80.9 % (ref 42.7–76)
NRBC BLD AUTO-RTO: 0.1 /100 WBC (ref 0–0.2)
PHOSPHATE SERPL-MCNC: 2 MG/DL (ref 2.5–4.5)
PHOSPHATE SERPL-MCNC: 2.2 MG/DL (ref 2.5–4.5)
PLATELET # BLD AUTO: 235 10*3/MM3 (ref 140–450)
PMV BLD AUTO: 7.4 FL (ref 6–12)
POTASSIUM SERPL-SCNC: 3.8 MMOL/L (ref 3.5–5.2)
PROT SERPL-MCNC: 6 G/DL (ref 6–8.5)
RBC # BLD AUTO: 3.06 10*6/MM3 (ref 4.14–5.8)
SODIUM SERPL-SCNC: 137 MMOL/L (ref 136–145)
WBC NRBC COR # BLD AUTO: 17.1 10*3/MM3 (ref 3.4–10.8)

## 2023-12-22 PROCEDURE — 25010000002 HYDROMORPHONE 1 MG/ML SOLUTION: Performed by: UROLOGY

## 2023-12-22 PROCEDURE — 80053 COMPREHEN METABOLIC PANEL: CPT | Performed by: STUDENT IN AN ORGANIZED HEALTH CARE EDUCATION/TRAINING PROGRAM

## 2023-12-22 PROCEDURE — 94799 UNLISTED PULMONARY SVC/PX: CPT

## 2023-12-22 PROCEDURE — 84100 ASSAY OF PHOSPHORUS: CPT | Performed by: STUDENT IN AN ORGANIZED HEALTH CARE EDUCATION/TRAINING PROGRAM

## 2023-12-22 PROCEDURE — 94761 N-INVAS EAR/PLS OXIMETRY MLT: CPT

## 2023-12-22 PROCEDURE — 25810000003 SODIUM CHLORIDE 0.9 % SOLUTION: Performed by: STUDENT IN AN ORGANIZED HEALTH CARE EDUCATION/TRAINING PROGRAM

## 2023-12-22 PROCEDURE — 83735 ASSAY OF MAGNESIUM: CPT | Performed by: STUDENT IN AN ORGANIZED HEALTH CARE EDUCATION/TRAINING PROGRAM

## 2023-12-22 PROCEDURE — 25010000002 HYDRALAZINE PER 20 MG: Performed by: INTERNAL MEDICINE

## 2023-12-22 PROCEDURE — 85025 COMPLETE CBC W/AUTO DIFF WBC: CPT | Performed by: STUDENT IN AN ORGANIZED HEALTH CARE EDUCATION/TRAINING PROGRAM

## 2023-12-22 PROCEDURE — 25010000002 CEFTRIAXONE PER 250 MG: Performed by: INTERNAL MEDICINE

## 2023-12-22 PROCEDURE — 94664 DEMO&/EVAL PT USE INHALER: CPT

## 2023-12-22 PROCEDURE — 84100 ASSAY OF PHOSPHORUS: CPT | Performed by: INTERNAL MEDICINE

## 2023-12-22 PROCEDURE — 82330 ASSAY OF CALCIUM: CPT | Performed by: STUDENT IN AN ORGANIZED HEALTH CARE EDUCATION/TRAINING PROGRAM

## 2023-12-22 PROCEDURE — 25010000002 ENOXAPARIN PER 10 MG: Performed by: UROLOGY

## 2023-12-22 RX ORDER — OXYCODONE HYDROCHLORIDE AND ACETAMINOPHEN 5; 325 MG/1; MG/1
1-2 TABLET ORAL EVERY 6 HOURS PRN
Qty: 30 TABLET | Refills: 0 | Status: SHIPPED | OUTPATIENT
Start: 2023-12-22

## 2023-12-22 RX ORDER — DOCUSATE SODIUM 100 MG/1
100 CAPSULE, LIQUID FILLED ORAL 2 TIMES DAILY PRN
Qty: 30 CAPSULE | Refills: 1 | Status: SHIPPED | OUTPATIENT
Start: 2023-12-22

## 2023-12-22 RX ADMIN — SODIUM CHLORIDE 125 ML/HR: 9 INJECTION, SOLUTION INTRAVENOUS at 02:01

## 2023-12-22 RX ADMIN — OXYCODONE HYDROCHLORIDE 5 MG: 5 TABLET ORAL at 23:26

## 2023-12-22 RX ADMIN — SUCRALFATE 1 G: 1 TABLET ORAL at 16:38

## 2023-12-22 RX ADMIN — HYDRALAZINE HYDROCHLORIDE 20 MG: 20 INJECTION INTRAMUSCULAR; INTRAVENOUS at 22:30

## 2023-12-22 RX ADMIN — HYDROMORPHONE HYDROCHLORIDE 0.5 MG: 1 INJECTION, SOLUTION INTRAMUSCULAR; INTRAVENOUS; SUBCUTANEOUS at 04:16

## 2023-12-22 RX ADMIN — OXYCODONE HYDROCHLORIDE 5 MG: 5 TABLET ORAL at 14:32

## 2023-12-22 RX ADMIN — PANTOPRAZOLE SODIUM 40 MG: 40 TABLET, DELAYED RELEASE ORAL at 16:38

## 2023-12-22 RX ADMIN — Medication 10 ML: at 00:04

## 2023-12-22 RX ADMIN — ENOXAPARIN SODIUM 40 MG: 100 INJECTION SUBCUTANEOUS at 16:38

## 2023-12-22 RX ADMIN — HYDROMORPHONE HYDROCHLORIDE 0.5 MG: 1 INJECTION, SOLUTION INTRAMUSCULAR; INTRAVENOUS; SUBCUTANEOUS at 21:20

## 2023-12-22 RX ADMIN — SUCRALFATE 1 G: 1 TABLET ORAL at 08:12

## 2023-12-22 RX ADMIN — SENNOSIDES AND DOCUSATE SODIUM 2 TABLET: 50; 8.6 TABLET ORAL at 08:12

## 2023-12-22 RX ADMIN — HYDROMORPHONE HYDROCHLORIDE 0.5 MG: 1 INJECTION, SOLUTION INTRAMUSCULAR; INTRAVENOUS; SUBCUTANEOUS at 00:04

## 2023-12-22 RX ADMIN — Medication 10 ML: at 20:25

## 2023-12-22 RX ADMIN — SUCRALFATE 1 G: 1 TABLET ORAL at 10:32

## 2023-12-22 RX ADMIN — CEFTRIAXONE 2000 MG: 2 INJECTION, POWDER, FOR SOLUTION INTRAMUSCULAR; INTRAVENOUS at 08:11

## 2023-12-22 RX ADMIN — BUDESONIDE INHALATION 0.5 MG: 0.5 SUSPENSION RESPIRATORY (INHALATION) at 18:37

## 2023-12-22 RX ADMIN — LOSARTAN POTASSIUM 50 MG: 50 TABLET, FILM COATED ORAL at 08:13

## 2023-12-22 RX ADMIN — HYDROMORPHONE HYDROCHLORIDE 0.5 MG: 1 INJECTION, SOLUTION INTRAMUSCULAR; INTRAVENOUS; SUBCUTANEOUS at 15:37

## 2023-12-22 RX ADMIN — Medication 10 ML: at 08:13

## 2023-12-22 RX ADMIN — Medication 2 PACKET: at 08:12

## 2023-12-22 RX ADMIN — HYDROMORPHONE HYDROCHLORIDE 0.5 MG: 1 INJECTION, SOLUTION INTRAMUSCULAR; INTRAVENOUS; SUBCUTANEOUS at 12:37

## 2023-12-22 RX ADMIN — OXYCODONE HYDROCHLORIDE 5 MG: 5 TABLET ORAL at 06:33

## 2023-12-22 RX ADMIN — BUDESONIDE INHALATION 0.5 MG: 0.5 SUSPENSION RESPIRATORY (INHALATION) at 07:36

## 2023-12-22 RX ADMIN — OXYCODONE HYDROCHLORIDE 5 MG: 5 TABLET ORAL at 10:32

## 2023-12-22 RX ADMIN — Medication 10 ML: at 08:14

## 2023-12-22 RX ADMIN — METOPROLOL SUCCINATE 25 MG: 25 TABLET, EXTENDED RELEASE ORAL at 08:12

## 2023-12-22 RX ADMIN — OXYCODONE HYDROCHLORIDE 5 MG: 5 TABLET ORAL at 18:33

## 2023-12-22 RX ADMIN — PANTOPRAZOLE SODIUM 40 MG: 40 TABLET, DELAYED RELEASE ORAL at 08:12

## 2023-12-22 RX ADMIN — CHLORHEXIDINE GLUCONATE 15 ML: 1.2 RINSE ORAL at 08:13

## 2023-12-22 RX ADMIN — HYDROMORPHONE HYDROCHLORIDE 0.5 MG: 1 INJECTION, SOLUTION INTRAMUSCULAR; INTRAVENOUS; SUBCUTANEOUS at 08:08

## 2023-12-22 RX ADMIN — OXYCODONE HYDROCHLORIDE 5 MG: 5 TABLET ORAL at 02:05

## 2023-12-22 RX ADMIN — Medication 10 ML: at 04:16

## 2023-12-22 NOTE — DISCHARGE PLACEMENT REQUEST
"Louis Andino (63 y.o. Male)       Date of Birth   1960    Social Security Number       Address   [address] [place] [address]    Home Phone   [phone]    MRN   [id number]       Congregation   [employer]    Marital Status                               Admission Date   12/18/23    Admission Type   Elective    Admitting Provider   James Esquivel MD    Attending Provider   Clifford Sanchez MD    Department, Room/Bed   [facility] INTENSIVE CARE UNIT, 2308/1       Discharge Date       Discharge Disposition       Discharge Destination                                 Attending Provider: Clifford Sanchez MD    Allergies: No Known Allergies    Isolation: None   Infection: None   Code Status: CPR    Ht: 177.8 cm (70\")   Wt: 106 kg (233 lb 7.5 oz)    Admission Cmt: None   Principal Problem: Renal mass [N28.89]                   Active Insurance as of 12/18/2023       Primary Coverage       Payor Plan Insurance Group Employer/Plan Group    ANTHEM BLUE CROSS ANTHEM BLUE CROSS BLUE SHIELD PPO [id number]       Payor Plan Address Payor Plan Phone Number Payor Plan Fax Number Effective Dates    [address] [phone]  1/1/2022 - None Entered    [facility] [address]         Subscriber Name Subscriber Birth Date Member ID       MARK ANDINO [birth year]1961 [id number]               Secondary Coverage       Payor Plan Insurance Group Employer/Plan Group    MISC MC SUP   MISC MC SUP              [id number]       Coverage Address Coverage Phone Number Coverage Fax Number Effective Dates    [address] [phone]  11/1/2023 - None Entered    [facility] [id number]         Subscriber Name Subscriber Birth Date Member ID       LOUIS ANDINO 1960 [id number]               Tertiary Coverage       Payor Plan Insurance Group Employer/Plan Group    MEDICARE MEDICARE A & B        Payor Plan Address Payor Plan Phone Number Payor Plan Fax Number Effective Dates    [address] [phone]  " 2023 - None Entered    [facility] [id number]         Subscriber Name Subscriber Birth Date Member ID       LOUIS ANDINO 1960 [id number]                     Emergency Contacts        (Rel.) Home Phone Work Phone Mobile Phone    Dahlia Andino (Spouse) -- -- [phone]                 History & Physical        Erika Hawkins APRN at 23       Attestation signed by Yuniel Lai MD at 23 0810    I have reviewed this documentation and agree.                    Critical Care History and Physical     Louis Andino : 1960 MRN:[id number] LOS:0 ROOM: 2308/1     Reason for admission: Renal mass     Assessment / Plan     Acute Respiratory Failure; intubated for surgery  -CXR Reviewed  -EKG Reviewed  -ABG, monitor as ordered  -BNP, monitor as ordered  -Duoneb  -Continue sedation and pain medication, titrate to effect  -Continue mechanical ventilation support with weaning as tolerated to baseline  -Titrate oxygen support delivery method for O2 SAT > 92%    Renal mass    --Status post left open radical nephrectomy with IVC thrombectomy    --3500 EBL  -Urology following    Kidney injury, acute  -UA reviewed  -Continue IVF resuscitation  -Monitor and trend labs  -Avoid nephrotoxic medications, hypotension, and NSAIDS  -Renally dose medications  -Monitor urine output  -Consider Nephrology consult if creatinine fails to improve    Essential Hypertension, Chronic, Controlledl; Hyperlipidemia  -Continue home medications as appropriate   - Monitor with routine vital signs     COPD, not in exacerbation  -Continue home inhalers as tolerated  -Incentive spirometry  -Titrate O2 for sats > 90%    GERD  -Continue PPI      Code Status (Patient has no pulse and is not breathing): CPR (Attempt to Resuscitate)  Medical Interventions (Patient has pulse or is breathing): Full Support       Nutrition:   NPO Diet NPO Type: Strict NPO     DVT prophylaxis:  Medical and mechanical DVT  prophylaxis orders are present.     History of Present illness     Louis Andino is a 63 y.o. male with PMH of hypertension, hyperlipidemia, COPD, BPH, and GERD presented to the hospital for nephrectomy, and was admitted with a principal diagnosis of Renal mass.  HPI information is limited due to patient intubated and sedated at time of assessment; information obtained from chart review and patient's spouse at bedside.  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  Post procedure patient was to remain on vent overnight and admitted to the intensive care unit for further evaluation and treatment.      ACP: Patient unable to answer questions due to intubation; wife is decision-maker if he is unable he will remain full code with full intervention.    Patient was seen and examined on 12/18/23 at 20:11 EST .    Subjective / Review of systems     Review of Systems   Unable to perform ROS: Intubated        Past Medical/Surgical/Social/Family History & Allergies     Past Medical History:   Diagnosis Date    Asthma     Emphysema/COPD     GERD (gastroesophageal reflux disease)     Hypertension     Prostate disease       Past Surgical History:   Procedure Laterality Date    SKIN LESION EXCISION  1990      Social History     Socioeconomic History    Marital status:     Number of children: 6   Tobacco Use    Smoking status: Never     Passive exposure: Past    Smokeless tobacco: Never    Tobacco comments:     SECOND HAND SMOKE EXPOSURE AS A CHILD    Vaping Use    Vaping Use: Never used   Substance and Sexual Activity    Alcohol use: Yes     Alcohol/week: 1.0 standard drink of alcohol     Types: 1 Glasses of wine per week     Comment: rare    Drug use: Not Currently    Sexual activity: Defer      History reviewed. No pertinent family history.   No Known Allergies   Social Determinants of  Health     Tobacco Use: Low Risk  (12/18/2023)    Patient History     Smoking Tobacco Use: Never     Smokeless Tobacco Use: Never     Passive Exposure: Past   Alcohol Use: Not on file   Financial Resource Strain: Not on file   Food Insecurity: Not on file   Transportation Needs: Not on file   Physical Activity: Not on file   Stress: Not on file   Social Connections: Unknown (10/11/2023)    Family and Community Support     Help with Day-to-Day Activities: Not on file     Lonely or Isolated: Not on file   Interpersonal Safety: Not At Risk (12/18/2023)    Abuse Screen     Unsafe at Home or Work/School: no     Feels Threatened by Someone?: no     Does Anyone Keep You from Contacting Others or Doint Things Outside the Home?: no     Physical Sign of Abuse Present: no   Depression: Not on file   Housing Stability: Unknown (12/18/2023)    Housing Stability     Current Living Arrangements: home     Potentially Unsafe Housing Conditions: Not on file   Utilities: Not on file   Health Literacy: Unknown (10/11/2023)    Education     Help with school or training?: Not on file     Preferred Language: Not on file   Employment: Unknown (10/11/2023)    Employment     Do you want help finding or keeping work or a job?: Not on file   Disabilities: Not At Risk (12/18/2023)    Disabilities     Concentrating, Remembering, or Making Decisions Difficulty: no     Doing Errands Independently Difficulty: no        Home Medications     Prior to Admission medications    Medication Sig Start Date End Date Taking? Authorizing Provider   aspirin 81 MG EC tablet Take 1 tablet by mouth Daily.   Yes Toño Garcia MD   celecoxib (CeleBREX) 200 MG capsule Take 1 capsule by mouth Daily.   Yes Toño Garcia MD   famotidine (PEPCID) 40 MG tablet Take 1 tablet by mouth Daily.   Yes Toño Garcia MD   hydroCHLOROthiazide (HYDRODIURIL) 25 MG tablet Take 1 tablet by mouth Daily.   Yes Toño Garcia MD   lisinopril  (PRINIVIL,ZESTRIL) 10 MG tablet Take 1.5 tablets by mouth 2 (Two) Times a Day.   Yes Toño Garcia MD   magnesium oxide (MAG-OX) 400 MG tablet Take 1 tablet by mouth Daily.   Yes Toño Garcia MD   omeprazole (priLOSEC) 40 MG capsule Take 1 capsule by mouth Daily.   Yes Toño Garcia MD   potassium chloride (K-DUR,KLOR-CON) 20 MEQ CR tablet Take 1 tablet by mouth Daily.   Yes Toño Garcia MD   Symbicort 160-4.5 MCG/ACT inhaler Inhale 2 puffs 2 (Two) Times a Day. 8/9/23  Yes Toño Garcia MD   vitamin D3 125 MCG (5000 UT) capsule capsule Take 1 capsule by mouth Daily.   Yes Toño Garcia MD        Objective / Physical Exam     Vital signs:  Temp: 98.1 °F (36.7 °C)  BP: 98/67  Heart Rate: 87  Resp: 17  SpO2: 100 %  Weight: 102 kg (224 lb 13.9 oz)    Admission Weight: Weight: 101 kg (223 lb)    Physical Exam  Vitals and nursing note reviewed.   Constitutional:       Appearance: Normal appearance.   HENT:      Head: Normocephalic.      Nose: Nose normal.      Mouth/Throat:      Mouth: Mucous membranes are moist.      Pharynx: Oropharynx is clear.   Eyes:      Pupils: Pupils are equal, round, and reactive to light.   Cardiovascular:      Rate and Rhythm: Normal rate and regular rhythm.   Pulmonary:      Comments: ET tube in place  Abdominal:      General: There is distension.      Comments: Transverse surgical incision with island dressing C, D, I   Musculoskeletal:         General: Normal range of motion.      Cervical back: Normal range of motion.   Skin:     General: Skin is warm and dry.      Capillary Refill: Capillary refill takes 2 to 3 seconds.   Neurological:      Comments: Intubated and sedated   Psychiatric:      Comments: Intubated and sedated          Labs     Results from last 7 days   Lab Units 12/18/23  1722 12/18/23  1318 12/18/23  1203 12/18/23  1051 12/12/23  1319   WBC 10*3/mm3 17.50*  --  14.70*  --  9.99   HEMATOCRIT % 30.6*  --  28.3*  --  33.9*    HEMATOCRIT POC %  --  25*  --  30*  --    PLATELETS 10*3/mm3 228  --  227  --  376      Results from last 7 days   Lab Units 12/18/23  1722 12/12/23  1319   SODIUM mmol/L 136 136   POTASSIUM mmol/L 4.9 3.9   CHLORIDE mmol/L 104 98   CO2 mmol/L 21.0* 27.0   BUN mg/dL 14 15   CREATININE mg/dL 1.48* 1.02        Imaging     XR Abdomen KUB    Result Date: 12/18/2023  Impression: NG tube projects over the expected position of the distal body, antrum of the stomach. Electronically Signed: Ervin Howell MD  12/18/2023 5:58 PM EST  Workstation ID: [id number]    XR Chest 1 View    Result Date: 12/18/2023  Impression: Endotracheal tube in place Mild right infrahilar discoid atelectasis. Otherwise clear lungs Electronically Signed: Farhat Fletcher MD  12/18/2023 3:06 PM EST  Workstation ID: [id number]    XR Lost Needle / Instrument    Result Date: 12/18/2023  Impression: Postsurgical changes with multiple surgical clips and skin staple line in the upper abdomen. No definite radiopaque instrument or other unexpected foreign body is identified. Results were called to Suzette Carrasco RN in the OR by Dr. Moya at 12/18/2023 2:00 PM EST. Electronically Signed: Nestor Moya  12/18/2023 2:10 PM EST  Workstation ID: [id number]       Chest X ray: My independent assessment showed no infiltrates or effusions        Current Medications     Scheduled Meds:  budesonide-formoterol, 2 puff, Inhalation, BID - RT  chlorhexidine, 15 mL, Mouth/Throat, Q12H  [START ON 12/19/2023] enoxaparin, 40 mg, Subcutaneous, Daily  hydroCHLOROthiazide, 25 mg, Oral, Daily  magnesium sulfate, 1 g, Intravenous, Once  [START ON 12/19/2023] pantoprazole, 40 mg, Oral, Q AM  senna-docusate sodium, 2 tablet, Oral, BID  sodium chloride, 1,000 mL, Intravenous, Once  sodium chloride, 10 mL, Intravenous, Q12H  sodium chloride, 10 mL, Intravenous, Q12H  sodium chloride, 10 mL, Intravenous, Q12H  sodium chloride, 10 mL, Intravenous, Q12H         Continuous Infusions:  lactated  ringers, 1,000 mL, Last Rate: 1,000 mL (12/18/23 0647)  norepinephrine, 0.02-0.5 mcg/kg/min, Last Rate: 0.04 mcg/kg/min (12/18/23 1715)  propofol, 5-50 mcg/kg/min, Last Rate: 40 mcg/kg/min (12/18/23 1818)  sodium chloride, 100 mL/hr, Last Rate: 100 mL/hr (12/18/23 1739)         Plan discussed with RN. Reviewed all other data in the last 24 hours, including but not limited to vitals, labs, microbiology, imaging and pertinent notes from other providers.  Plan also discussed with patient's wife and son at the bedside.      DUANE Jimenez   Critical Care  12/18/23   20:11 EST       Electronically signed by Yuniel Lai MD at 12/19/23 0850       James Esquivel MD at 12/18/23 0718          H&P reviewed.  The patient was examined and there are no changes to the H&P   Electronically signed by James Esquivel MD at 12/18/23 0718   Source Note          I called Dr. Esquivel office to provide staff the update that he is cleared for surgery.  I was unable to get in contact with someone but I left a detailed VM on the confidential line relaying this information.  I encouraged them to call our office back if they had further questions.    I called and spoke with pt, providing him the pt appt liaison number for PCP- [phone].    Thank you,    Lisa STREET RN  Triage LC  12/15/23 13:29 EST      Electronically signed by Lisa Gaytan RN at 12/15/23 1329                 Lisa Gaytan RN at 12/15/23 1200          I called Dr. Esquivel office to provide staff the update that he is cleared for surgery.  I was unable to get in contact with someone but I left a detailed VM on the confidential line relaying this information.  I encouraged them to call our office back if they had further questions.    I called and spoke with pt, providing him the pt appt liaison number for PCP- [phone].    Thank you,    Lisa STREET RN  Triage [facility]  12/15/23 13:29 EST      Electronically signed by Lisa Gaytan RN at 12/15/23 5511

## 2023-12-22 NOTE — PROGRESS NOTES
[facility] DAILY PROGRESS NOTE    Patient Identification  Name: Louis Andino  Age: 63 y.o.  Sex: male  :  1960  MRN: [id number]    Date: 2023             Subjective:  Interval History: Cr down, no appetite, no nausea, positive flatus, UOP good    Objective:    Scheduled Meds:budesonide, 0.5 mg, Nebulization, BID - RT  cefTRIAXone, 2,000 mg, Intravenous, Q24H  chlorhexidine, 15 mL, Mouth/Throat, Q12H  enoxaparin, 40 mg, Subcutaneous, Daily  losartan, 50 mg, Oral, Q24H  metoprolol succinate XL, 25 mg, Oral, Q24H  pantoprazole, 40 mg, Oral, BID AC  senna-docusate sodium, 2 tablet, Nasogastric, BID  sodium chloride, 10 mL, Intravenous, Q12H  sodium chloride, 10 mL, Intravenous, Q12H  sodium chloride, 10 mL, Intravenous, Q12H  sodium chloride, 10 mL, Intravenous, Q12H  sucralfate, 1 g, Oral, TID AC      Continuous Infusions:sodium chloride, 125 mL/hr, Last Rate: 125 mL/hr (23 0201)      PRN Meds:  acetaminophen    [DISCONTINUED] acetaminophen **OR** acetaminophen    senna-docusate sodium **AND** polyethylene glycol **AND** bisacodyl **AND** bisacodyl    Calcium Replacement - Follow Nurse / BPA Driven Protocol    hydrALAZINE    HYDROmorphone **AND** naloxone    influenza vaccine    ipratropium-albuterol    Magnesium Low Dose Replacement - Follow Nurse / BPA Driven Protocol    nitroglycerin    ondansetron **OR** ondansetron    oxyCODONE    Phosphorus Replacement - Follow Nurse / BPA Driven Protocol    Potassium Replacement - Follow Nurse / BPA Driven Protocol    sodium chloride    sodium chloride    sodium chloride    sodium chloride    sodium chloride    Vital signs in last 24 hours:  Temp:  [97.6 °F (36.4 °C)-98.7 °F (37.1 °C)] 98.6 °F (37 °C)  Heart Rate:  [] 89  Resp:  [9-16] 15  BP: (124-176)/(63-95) 176/95    Intake/Output:    Intake/Output Summary (Last 24 hours) at 2023 0816  Last data filed at 2023 0545  Gross per 24 hour   Intake 5039 ml   Output 2535 ml   Net 2504  "ml       Exam:  /95   Pulse 89   Temp 98.6 °F (37 °C) (Oral)   Resp 15   Ht 177.8 cm (70\")   Wt 106 kg (233 lb 7.5 oz)   SpO2 97%   BMI 33.50 kg/m²     General Appearance:    Alert, cooperative, NAD   Lungs:     Respirations unlabored, no audible wheezing    Heart:    No cyanosis   Abdomen:     Soft, ND, incision clean dry intact   :  Harley clear yellow            Data Review:  All labs (24hrs):   Recent Results (from the past 24 hour(s))   Magnesium    Collection Time: 12/22/23  6:25 AM    Specimen: Blood   Result Value Ref Range    Magnesium 2.2 1.6 - 2.4 mg/dL   Phosphorus    Collection Time: 12/22/23  6:25 AM    Specimen: Blood   Result Value Ref Range    Phosphorus 2.2 (L) 2.5 - 4.5 mg/dL   Comprehensive Metabolic Panel    Collection Time: 12/22/23  6:25 AM    Specimen: Blood   Result Value Ref Range    Glucose 77 65 - 99 mg/dL    BUN 16 8 - 23 mg/dL    Creatinine 1.24 0.76 - 1.27 mg/dL    Sodium 137 136 - 145 mmol/L    Potassium 3.8 3.5 - 5.2 mmol/L    Chloride 103 98 - 107 mmol/L    CO2 25.0 22.0 - 29.0 mmol/L    Calcium 8.6 8.6 - 10.5 mg/dL    Total Protein 6.0 6.0 - 8.5 g/dL    Albumin 2.6 (L) 3.5 - 5.2 g/dL    ALT (SGPT) 54 (H) 1 - 41 U/L    AST (SGOT) 71 (H) 1 - 40 U/L    Alkaline Phosphatase 119 (H) 39 - 117 U/L    Total Bilirubin 2.7 (H) 0.0 - 1.2 mg/dL    Globulin 3.4 gm/dL    A/G Ratio 0.8 g/dL    BUN/Creatinine Ratio 12.9 7.0 - 25.0    Anion Gap 9.0 5.0 - 15.0 mmol/L    eGFR 64.5 >60.0 mL/min/1.73   Calcium, Ionized    Collection Time: 12/22/23  6:25 AM    Specimen: Blood   Result Value Ref Range    Ionized Calcium 1.21 1.20 - 1.30 mmol/L   CBC Auto Differential    Collection Time: 12/22/23  6:25 AM    Specimen: Blood   Result Value Ref Range    WBC 17.10 (H) 3.40 - 10.80 10*3/mm3    RBC 3.06 (L) 4.14 - 5.80 10*6/mm3    Hemoglobin 7.9 (L) 13.0 - 17.7 g/dL    Hematocrit 24.9 (L) 37.5 - 51.0 %    MCV 81.5 79.0 - 97.0 fL    MCH 25.7 (L) 26.6 - 33.0 pg    MCHC 31.5 31.5 - 35.7 g/dL    RDW " 16.9 (H) 12.3 - 15.4 %    RDW-SD 50.8 37.0 - 54.0 fl    MPV 7.4 6.0 - 12.0 fL    Platelets 235 140 - 450 10*3/mm3    Neutrophil % 80.9 (H) 42.7 - 76.0 %    Lymphocyte % 6.6 (L) 19.6 - 45.3 %    Monocyte % 11.0 5.0 - 12.0 %    Eosinophil % 1.1 0.3 - 6.2 %    Basophil % 0.4 0.0 - 1.5 %    Neutrophils, Absolute 13.80 (H) 1.70 - 7.00 10*3/mm3    Lymphocytes, Absolute 1.10 0.70 - 3.10 10*3/mm3    Monocytes, Absolute 1.90 (H) 0.10 - 0.90 10*3/mm3    Eosinophils, Absolute 0.20 0.00 - 0.40 10*3/mm3    Basophils, Absolute 0.10 0.00 - 0.20 10*3/mm3    nRBC 0.1 0.0 - 0.2 /100 WBC      Imaging Results (Last 24 Hours)       Procedure Component Value Units Date/Time    CT Abdomen Pelvis Without Contrast [[id number]] Collected: 12/21/23 1639     Updated: 12/21/23 1653    Narrative:      CT ABDOMEN PELVIS WO CONTRAST    Date of Exam: 12/21/2023 4:31 PM EST    Indication: Severe leukocytosis after nephrectomy.  Possible hematoma.    Comparison: CT abdomen pelvis 11/4/2023.    Technique: Axial CT images were obtained of the abdomen and pelvis without the administration of contrast. Sagittal and coronal reconstructions were performed.  Automated exposure control and iterative reconstruction methods were used.      Findings:  Midline anterior abdominal wall skin staples are present. Left nephrectomy changes are present. 5.5 x 7.0 x 9.4 cm fluid collection is seen within the left nephrectomy bed with tiny locules of air, favored to represent postoperative seroma.    Locules of fluid is seen surrounding the pancreatic head, with adjacent peripancreatic stranding. A fluid collection medial to the pancreatic head measures 2.5 x 4.7 cm (series 5 image 67, and another anterior to the pancreatic head measures 3.0 x 3.9 cm   (series 5 image 74). Surgical clips are seen adjacent to the IVC.    Small quantity ascites is seen adjacent to the liver, extending along the paracolic gutters, and layering dependently within the pelvis.    There is  moderate to generalized gas distention of large and small bowel loops suggesting postoperative ileus, without transition zone or indication of high-grade bowel obstruction.    High density material is seen within the gallbladder which may represent sludge. No gallstones are identified.  The liver, spleen, right adrenal, and right kidney have a normal noncontrast appearance. Query left adrenalectomy. Urinary bladder is decompressed by Harley catheter. There is mild distal left ureterectasis. Prostate and rectum are within the limits.   Small fat-containing inguinal hernias. Generalized body wall edema.    Small bilateral pleural effusions are present. Dense airspace disease in the right middle lobe with air bronchograms, and within the medial bilateral lower lobes, may represent atelectasis or developing pneumonia. Heart size is mildly enlarged. Coronary   calcifications are present.    No acute or suspicious osseous abnormalities are identified.        Impression:      Impression:    1. Left nephrectomy. Query left adrenalectomy. Surgical changes adjacent to the IVC.  2. Fluid collections in the right upper quadrant of the abdomen adjacent to the pancreas, and fluid collection within the left nephrectomy bed. These are thought most likely to represent postoperative seromas or hematomas which have evolved into   low-density products. Small quantity ascites is seen within the abdomen and pelvis.  3. Dense right middle lobe and bibasilar airspace disease may represent atelectasis or developing pneumonia. Small bilateral pleural effusions.  4. Generalized ileus pattern of the large and small bowel.      Electronically Signed: Esther Valentine MD    12/21/2023 4:51 PM EST    Workstation ID: [id number]             Assessment:    Renal mass    Asthma    Gastroesophageal reflux disease    Hyperlipidemia    Hypertension    BPH (benign prostatic hyperplasia)    COPD (chronic obstructive pulmonary disease)    KARON (acute kidney  injury)    Acute respiratory failure with hypoxia      Left nephrectomy and IVC thrombectomy 12/18    Plan:    Hgb stable slightly up today  KARON, improved  Cont Lovenox prophylaxis  SCDs  Up to chair, needs to increase activity  Remove Harley today  Pain control  Continue clears until appetite returns  CT with expected postoperative changes, mild ileus  Appreciate consultants care    James Esquivel MD  [facility]  [address], [address]  [place] [address]  Office: [phone]  Available via [facility] Secure Chat  12/22/23  08:16 EST

## 2023-12-22 NOTE — PROGRESS NOTES
"[facility] MEDICINE SERVICE  DAILY PROGRESS NOTE    NAME: Louis Andino  : 1960  MRN: [id number]      LOS: 4 days     PROVIDER OF SERVICE: Clifford Sanchez MD    Chief Complaint: Renal mass    Subjective:     Interval History:  History taken from: patient  Patient Complaints: renal mass    Per the documentation by ICU, dated 2023,\"Louis Andino is a 63 y.o. male with PMH of hypertension, hyperlipidemia, COPD, BPH, and GERD presented to the hospital for nephrectomy, and was admitted with a principal diagnosis of Renal mass.  HPI information is limited due to patient intubated and sedated at time of assessment; information obtained from chart review and patient's spouse at bedside.  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  Post procedure patient was to remain on vent overnight and admitted to the intensive care unit for further evaluation and treatment.  \"     2023-Patient extubated on the morning of 2023 and Levophed weaned  23 patient seen and examined in bed no acute distress, vital signs blood pressure stable, heart rate 105, discussed with RN.  Patient complaining of heartburn.  On Protonix and Carafate added.  23 patient seen and examined in bed no acute distress feels better today, pain better controlled.  Vital signs stable, discussed with RN.  Urine looks clear.  23 patient seen and examined in bed no acute distress, vital signs stable, patient complaining about shoulder pain back pain.  Discussed with RN.  Apparently has been out of bed.  Harley catheter discontinued.  Tolerating antibiotics.  Discussed with ID discussed with family    Review of Systems  12 point ROS reviewed and negative except as mentioned above    Objective:     Vital Signs  Temp:  [98.2 °F (36.8 °C)-99.3 °F (37.4 °C)] 99.3 °F (37.4 °C)  Heart " Rate:  [] 85  Resp:  [9-18] 18  BP: (130-176)/() 142/79  Flow (L/min):  [1] 1   Body mass index is 33.5 kg/m².    Physical Exam  Constitutional:       General: He is not in acute distress.     Appearance: He is obese. He is not toxic-appearing.   HENT:      Head: Normocephalic and atraumatic.      Nose: Nose normal. No congestion.      Mouth/Throat:      Pharynx: Oropharynx is clear. No oropharyngeal exudate.   Eyes:      General: No scleral icterus.  Cardiovascular:      Rate and Rhythm: Normal rate and regular rhythm.      Heart sounds: No murmur heard.     No friction rub. No gallop.   Pulmonary:      Effort: No respiratory distress.      Breath sounds: No wheezing or rales.      Comments: Patient on 1.5L NC  Abdominal:      General: There is no distension.      Tenderness: There is no abdominal tenderness. There is no guarding.   Musculoskeletal:         General: No swelling or deformity.      Cervical back: Normal range of motion. No rigidity.      Right lower leg: No edema.      Left lower leg: No edema.   Skin:     Coloration: Skin is pale. Skin is not jaundiced.      Findings: No bruising or lesion.   Neurological:      General: No focal deficit present.      Mental Status: He is alert and oriented to person, place, and time.      Motor: Weakness present.   Scheduled Meds   budesonide, 0.5 mg, Nebulization, BID - RT  [START ON 12/23/2023] cefTRIAXone, 2,000 mg, Intravenous, Q24H  chlorhexidine, 15 mL, Mouth/Throat, Q12H  enoxaparin, 40 mg, Subcutaneous, Daily  losartan, 50 mg, Oral, Q24H  metoprolol succinate XL, 25 mg, Oral, Q24H  pantoprazole, 40 mg, Oral, BID AC  senna-docusate sodium, 2 tablet, Nasogastric, BID  sodium chloride, 10 mL, Intravenous, Q12H  sodium chloride, 10 mL, Intravenous, Q12H  sodium chloride, 10 mL, Intravenous, Q12H  sodium chloride, 10 mL, Intravenous, Q12H  sucralfate, 1 g, Oral, TID AC       PRN Meds     acetaminophen    [DISCONTINUED] acetaminophen **OR**  acetaminophen    senna-docusate sodium **AND** polyethylene glycol **AND** bisacodyl **AND** bisacodyl    Calcium Replacement - Follow Nurse / BPA Driven Protocol    hydrALAZINE    HYDROmorphone **AND** naloxone    influenza vaccine    ipratropium-albuterol    Magnesium Low Dose Replacement - Follow Nurse / BPA Driven Protocol    nitroglycerin    ondansetron **OR** ondansetron    oxyCODONE    Phosphorus Replacement - Follow Nurse / BPA Driven Protocol    Potassium Replacement - Follow Nurse / BPA Driven Protocol    sodium chloride    sodium chloride    sodium chloride    sodium chloride    sodium chloride   Infusions  sodium chloride, 125 mL/hr, Last Rate: 125 mL/hr (12/22/23 0201)          Diagnostic Data    Results from last 7 days   Lab Units 12/22/23  0625   WBC 10*3/mm3 17.10*   HEMOGLOBIN g/dL 7.9*   HEMATOCRIT % 24.9*   PLATELETS 10*3/mm3 235   GLUCOSE mg/dL 77   CREATININE mg/dL 1.24   BUN mg/dL 16   SODIUM mmol/L 137   POTASSIUM mmol/L 3.8   AST (SGOT) U/L 71*   ALT (SGPT) U/L 54*   ALK PHOS U/L 119*   BILIRUBIN mg/dL 2.7*   ANION GAP mmol/L 9.0       CT Abdomen Pelvis Without Contrast    Result Date: 12/21/2023  Impression: 1. Left nephrectomy. Query left adrenalectomy. Surgical changes adjacent to the IVC. 2. Fluid collections in the right upper quadrant of the abdomen adjacent to the pancreas, and fluid collection within the left nephrectomy bed. These are thought most likely to represent postoperative seromas or hematomas which have evolved into low-density products. Small quantity ascites is seen within the abdomen and pelvis. 3. Dense right middle lobe and bibasilar airspace disease may represent atelectasis or developing pneumonia. Small bilateral pleural effusions. 4. Generalized ileus pattern of the large and small bowel. Electronically Signed: Esther Valentine MD  12/21/2023 4:51 PM EST  Workstation ID: [id number]       I reviewed the patient's new clinical results.    Assessment/Plan:     Active and  Resolved Problems  Active Hospital Problems    Diagnosis  POA    **Renal mass [N28.89]  Yes    BPH (benign prostatic hyperplasia) [N40.0]  Yes    COPD (chronic obstructive pulmonary disease) [J44.9]  Yes    KARON (acute kidney injury) [N17.9]  Yes    Acute respiratory failure with hypoxia [J96.01]  Yes    Asthma [J45.909]  Yes    Gastroesophageal reflux disease [K21.9]  Yes    Hyperlipidemia [E78.5]  Yes    Hypertension [I10]  Yes      Resolved Hospital Problems   No resolved problems to display.       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 and large volume blood loss    - extensive blood loss during surgery    - s/p 5u prbc and 2u FFP given per op note    -  #Acute hypoxic respiratory failure  #COPD    - Patient remained intubated post op, was transferred to ICU    - Patient extubated on 12/19/2023    - Dueoneb QID    - Pulmicort BID    - wean oxygen as tolerated     #Hypovolemic shock    - 2/2 blood loss from surgery    - Levophed weaned    - cleared for downgrade from ICU     #Acute blood loss anemia    - 2/2 blood loss from surgery    - s/p 5u prbc and 2u FFP given per op note    - Hgb 7.4 this afternoon, no overt bleeding, 1u prbc ordered in ICU     - h/h q6h    - transfuse to maintain hgb > 7.0    - pt     #KARON  #Metabolic acidosis    - suspect 2/2 blood loss and possible post op ATN    - bicarb improved from 18 > 24    - Cr 2.23 > 1.71, base Cr is 1.0    - as bicarb normalized, changed IVF to NS @ 125 hour    - monitor    - ionized calcium 1.2, replace PRN     #GERD    - PPI     #HTN    - hold hypertensive meds due to recent shock, monitor        DVT prophylaxis:  Medical and mechanical DVT prophylaxis orders are present.     Code status is   Code Status and Medical Interventions:   Ordered at: 12/18/23 2010     Code Status (Patient has no pulse and is not breathing):    CPR (Attempt to Resuscitate)     Medical  Interventions (Patient has pulse or is breathing):    Full Support       Plan for disposition: Per clinical course in 2 days    Time: 30 minutes    Signature: Electronically signed by Clifford Sanchez MD, 12/22/23, 13:45 EST.  [facility] Hospitalist Team

## 2023-12-22 NOTE — CASE MANAGEMENT/SOCIAL WORK
Continued Stay Note   Jacques     Patient Name: Louis Andino  MRN: [id number]  Today's Date: 12/22/2023    Admit Date: 12/18/2023    Plan: DC Plan: Home with [facility] pending acceptance. Lives with spouse. Wishes to set up his own PCP.   Discharge Plan       Row Name 12/22/23 1624       Plan    Plan DC Plan: Home with [facility] pending acceptance. Lives with spouse. Wishes to set up his own PCP.    Patient/Family in Agreement with Plan yes    Provided Post Acute Provider List? Yes    Post Acute Provider List Home Health    Provided Post Acute Provider Quality & Resource List? Yes    Post Acute Provider Quality and Resource List Home Health    Delivered To Patient;Support Person    Method of Delivery In person    Plan Comments CM reviewed chart documentation to obtain clinical updates. PT/OT recommending home with home health at [place]. CM spoke with patient spouse at bedside and discussed options available for home health services. Patient spouse selected 1) [facility] and 2) [facility]. CM placed referrals to both services and both have declined secondary to inadequate staffing. CM placed referral to [facility] and awaiting confirmation of acceptance at this time. CM informed patient and spouse of status. Dr. Esquivel has agreed to be following provider for Home Health order, and states patient will go home on oral antibiotics. CM will continue to follow for any further needs and adjust discharge plan accordingly. DC Barriers: Cardiac monitor, O2@1L nc, IVF's, IV abx, monitor labs, and monitor Ileus (needs BM).               Expected Discharge Date and Time       Expected Discharge Date Expected Discharge Time    Dec 24, 2023               Luiza Joya RN     Office Phone: [phone]  Office Cell:     [phone]

## 2023-12-22 NOTE — PROGRESS NOTES
Infectious Diseases Progress Note      LOS: 4 days   Patient Care Team:  Provider, Emerald Known as PCP - General    Chief Complaint: Weakness    Subjective     Had no fever during the last 24 hours.  He remained hemodynamically stable.  He is currently on room air.    Review of Systems:   Review of Systems   Constitutional:  Positive for fatigue.   HENT: Negative.     Eyes: Negative.    Respiratory: Negative.     Cardiovascular: Negative.    Gastrointestinal: Negative.    Genitourinary: Negative.    Musculoskeletal: Negative.    Skin: Negative.    Neurological: Negative.    Hematological: Negative.    Psychiatric/Behavioral: Negative.          Objective     Vital Signs  Temp:  [98.2 °F (36.8 °C)-98.7 °F (37.1 °C)] 98.3 °F (36.8 °C)  Heart Rate:  [] 75  Resp:  [9-18] 18  BP: (130-176)/() 133/70    Physical Exam:  Physical Exam  Vitals and nursing note reviewed.   Constitutional:       Appearance: He is well-developed.   HENT:      Head: Normocephalic and atraumatic.   Eyes:      Pupils: Pupils are equal, round, and reactive to light.   Cardiovascular:      Rate and Rhythm: Normal rate and regular rhythm.      Heart sounds: Normal heart sounds.   Pulmonary:      Effort: Pulmonary effort is normal. No respiratory distress.      Breath sounds: Rales present. No wheezing.   Abdominal:      General: Bowel sounds are normal. There is no distension.      Palpations: Abdomen is soft. There is no mass.      Tenderness: There is no abdominal tenderness. There is no guarding or rebound.   Musculoskeletal:         General: No deformity. Normal range of motion.      Cervical back: Normal range of motion and neck supple.   Skin:     General: Skin is warm.      Findings: No erythema or rash.   Neurological:      Mental Status: He is alert and oriented to person, place, and time.      Cranial Nerves: No cranial nerve deficit.          Results Review:    I have reviewed all clinical data, test, lab, and imaging results.      Radiology  CT Abdomen Pelvis Without Contrast    Result Date: 12/21/2023  CT ABDOMEN PELVIS WO CONTRAST Date of Exam: 12/21/2023 4:31 PM EST Indication: Severe leukocytosis after nephrectomy.  Possible hematoma. Comparison: CT abdomen pelvis 11/4/2023. Technique: Axial CT images were obtained of the abdomen and pelvis without the administration of contrast. Sagittal and coronal reconstructions were performed.  Automated exposure control and iterative reconstruction methods were used. Findings: Midline anterior abdominal wall skin staples are present. Left nephrectomy changes are present. 5.5 x 7.0 x 9.4 cm fluid collection is seen within the left nephrectomy bed with tiny locules of air, favored to represent postoperative seroma. Locules of fluid is seen surrounding the pancreatic head, with adjacent peripancreatic stranding. A fluid collection medial to the pancreatic head measures 2.5 x 4.7 cm (series 5 image 67, and another anterior to the pancreatic head measures 3.0 x 3.9 cm  (series 5 image 74). Surgical clips are seen adjacent to the IVC. Small quantity ascites is seen adjacent to the liver, extending along the paracolic gutters, and layering dependently within the pelvis. There is moderate to generalized gas distention of large and small bowel loops suggesting postoperative ileus, without transition zone or indication of high-grade bowel obstruction. High density material is seen within the gallbladder which may represent sludge. No gallstones are identified. The liver, spleen, right adrenal, and right kidney have a normal noncontrast appearance. Query left adrenalectomy. Urinary bladder is decompressed by Harley catheter. There is mild distal left ureterectasis. Prostate and rectum are within the limits. Small fat-containing inguinal hernias. Generalized body wall edema. Small bilateral pleural effusions are present. Dense airspace disease in the right middle lobe with air bronchograms, and within the  medial bilateral lower lobes, may represent atelectasis or developing pneumonia. Heart size is mildly enlarged. Coronary calcifications are present. No acute or suspicious osseous abnormalities are identified.     Impression: 1. Left nephrectomy. Query left adrenalectomy. Surgical changes adjacent to the IVC. 2. Fluid collections in the right upper quadrant of the abdomen adjacent to the pancreas, and fluid collection within the left nephrectomy bed. These are thought most likely to represent postoperative seromas or hematomas which have evolved into low-density products. Small quantity ascites is seen within the abdomen and pelvis. 3. Dense right middle lobe and bibasilar airspace disease may represent atelectasis or developing pneumonia. Small bilateral pleural effusions. 4. Generalized ileus pattern of the large and small bowel. Electronically Signed: Esther Valentine MD  12/21/2023 4:51 PM EST  Workstation ID: [id number]     Cardiology    Laboratory    Results from last 7 days   Lab Units 12/22/23  0625 12/21/23  0345 12/20/23  1955 12/20/23  1212 12/20/23  0606 12/20/23  0200 12/19/23  1759 12/19/23  0541 12/18/23  1722 12/18/23  1318 12/18/23  1203   WBC 10*3/mm3 17.10* 23.30*  --   --  17.50*  --   --  16.80* 17.50*  --  14.70*   HEMOGLOBIN g/dL 7.9* 7.3* 8.7* 7.5* 7.9* 7.8* 7.4* 8.7* 9.9*  --  9.1*   HEMOGLOBIN, POC   --   --   --   --   --   --   --   --   --    < >  --    HEMATOCRIT % 24.9* 22.9* 27.3* 23.4* 24.2* 24.4* 22.9* 27.3* 30.6*  --  28.3*   HEMATOCRIT POC   --   --   --   --   --   --   --   --   --    < >  --    PLATELETS 10*3/mm3 235 225  --   --  157  --   --  203 228  --  227    < > = values in this interval not displayed.     Results from last 7 days   Lab Units 12/22/23  0625 12/21/23 0345 12/20/23  0606 12/19/23  1759 12/19/23  0541 12/18/23  1722   SODIUM mmol/L 137 137 136 137 137 136   POTASSIUM mmol/L 3.8 4.3 3.8 4.0 4.8 4.9   CHLORIDE mmol/L 103 104 102 104 106 104   CO2 mmol/L 25.0 25.0  24.0 24.0 18.0* 21.0*   BUN mg/dL 16 16 16 18 18 14   CREATININE mg/dL 1.24 1.47* 1.56* 1.71* 2.23* 1.48*   GLUCOSE mg/dL 77 80 97 108* 139* 169*   ALBUMIN g/dL 2.6* 3.0* 2.6*  --  2.7* 2.9*   BILIRUBIN mg/dL 2.7* 1.1 0.7  --  0.4 1.4*   ALK PHOS U/L 119* 400* 51  --  50 53   AST (SGOT) U/L 71* 84* 86*  --  59* 67*   ALT (SGPT) U/L 54* 42* 40  --  46* 64*   CALCIUM mg/dL 8.6 8.7 7.7* 7.5* 7.4* 7.6*                 Microbiology   Microbiology Results (last 10 days)       Procedure Component Value - Date/Time    Blood Culture - Blood, Hand, Right [[id number]]  (Normal) Collected: 12/21/23 0710    Lab Status: Preliminary result Specimen: Blood from Hand, Right Updated: 12/22/23 0802     Blood Culture No growth at 24 hours    Blood Culture - Blood, Hand, Left [[id number]]  (Normal) Collected: 12/21/23 0710    Lab Status: Preliminary result Specimen: Blood from Hand, Left Updated: 12/22/23 0802     Blood Culture No growth at 24 hours    Narrative:      Less than seven (7) mL's of blood was collected.  Insufficient quantity may yield false negative results.    Blood Culture - Blood, Arm, Left [[id number]]  (Normal) Collected: 12/19/23 1107    Lab Status: Preliminary result Specimen: Blood from Arm, Left Updated: 12/22/23 1145     Blood Culture No growth at 3 days    Blood Culture - Blood, Arm, Right [[id number]]  (Normal) Collected: 12/19/23 1050    Lab Status: Preliminary result Specimen: Blood from Arm, Right Updated: 12/22/23 1145     Blood Culture No growth at 3 days            Medication Review:       Schedule Meds  budesonide, 0.5 mg, Nebulization, BID - RT  [START ON 12/23/2023] cefTRIAXone, 2,000 mg, Intravenous, Q24H  chlorhexidine, 15 mL, Mouth/Throat, Q12H  enoxaparin, 40 mg, Subcutaneous, Daily  losartan, 50 mg, Oral, Q24H  metoprolol succinate XL, 25 mg, Oral, Q24H  pantoprazole, 40 mg, Oral, BID AC  senna-docusate sodium, 2 tablet, Nasogastric, BID  sodium chloride, 10 mL, Intravenous, Q12H  sodium chloride, 10  mL, Intravenous, Q12H  sodium chloride, 10 mL, Intravenous, Q12H  sodium chloride, 10 mL, Intravenous, Q12H  sucralfate, 1 g, Oral, TID AC        Infusion Meds  sodium chloride, 125 mL/hr, Last Rate: 125 mL/hr (12/22/23 0201)        PRN Meds    acetaminophen    [DISCONTINUED] acetaminophen **OR** acetaminophen    senna-docusate sodium **AND** polyethylene glycol **AND** bisacodyl **AND** bisacodyl    Calcium Replacement - Follow Nurse / BPA Driven Protocol    hydrALAZINE    HYDROmorphone **AND** naloxone    influenza vaccine    ipratropium-albuterol    Magnesium Low Dose Replacement - Follow Nurse / BPA Driven Protocol    nitroglycerin    ondansetron **OR** ondansetron    oxyCODONE    Phosphorus Replacement - Follow Nurse / BPA Driven Protocol    Potassium Replacement - Follow Nurse / BPA Driven Protocol    sodium chloride    sodium chloride    sodium chloride    sodium chloride    sodium chloride        Assessment & Plan       Antimicrobial Therapy   1.  IV ceftriaxone        2.        3.        4.        5.            Assessment     Reactive leukocytosis.  Patient has no obvious clinical signs of sepsis.  Probably secondary to intra-abdominal hematoma versus pneumonia    Left lower lobe infiltrate consistent with pneumonia.  Currently on room air     S/p left radical nephrectomy on December 18, 2023 secondary to left kidney mass probably renal cell carcinoma.  CT scan of abdomen pelvis showed collection at the nephrectomy site which possibly seroma versus hematoma     History of borderline diabetes mellitus     History of degenerative joint disease with a chronic back and hip pain     Elevated creatinine.  Patient is s/p recent left-sided nephrectomy.  Resolved     Plan     Waiting on repeat blood culture results  Repeat labs in a.m.  Continue IV ceftriaxone 2 g daily for probably 7 days  Encourage incentive spirometer        Jcarlos Wilkins MD  12/22/23  12:08 EST    Note is dictated utilizing voice recognition  software/Jennifer

## 2023-12-22 NOTE — CONSULTS
Nutrition Services    Patient Name: Louis Andino  YOB: 1960  MRN: [id number]  Admission date: 12/18/2023    Comment:  -- Diet advancement per MD.  RD to continue to monitor for need for nutrition support, recommendations below.      -- Add Boost Breeze BID (provides 500 kcals, 18 g protein if consumed)       PPE Documentation        PPE Worn By Provider gloves   PPE Worn By Patient  None      CLINICAL NUTRITION ASSESSMENT      Reason for Assessment 12/22: NPO/liq x 4 days      H&P      Past Medical History:   Diagnosis Date    Asthma     Emphysema/COPD     GERD (gastroesophageal reflux disease)     Hyperglycemia 10/12/2023    Hyperlipidemia 10/12/2023    Hypertension     Prostate disease     Vitreous degeneration of right eye     Formatting of this note might be different from the original. Retinal tear and detachment warning symptoms reviewed and patient instructed to call immediately if increasing floaters, flashes, or decreasing peripheral vision. No retinal detachment or retinal tear noted. Recheck in 1 month with dilated exam.       Past Surgical History:   Procedure Laterality Date    NEPHRECTOMY Left 12/18/2023    Procedure: NEPHRECTOMY RADICAL WITH CAVAL THROMBECTOMY;  Surgeon: James Esquivel MD;  Location: [facility];  Service: Urology;  Laterality: Left;    SKIN LESION EXCISION  1990        Current Problems   Acute Respiratory Failure  - intubated for surgery, extubated 12/19    Renal mass  - Urology following  - S/P nephrectomy 12/18    Kidney injury     Essential Hypertension     COPD     GERD       Encounter Information        Trending Narrative     12/22: Admitted for planned nephrectomy 12/18.  RD visited patient at bedside.  Patient laying on side, seemed uncomfortable.  RD spoke mostly to spouse.  Noted with Urdu ices at bedside as patient likes them.  Patient does not like broth.  RED encouraged Boost Breeze supplement and brought patient a Peach flavor to try.  RED  "alerted RN of where to find more if patient likes.  RD will continue to defer diet advancement to MD and continue to monitor.  NFPE completed and not consistent with nutrition diagnosis of malnutrition at this time using AND/ASPEN criteria.  Noted Phos replaced today.         Anthropometrics        Current Height, Weight Height: 177.8 cm (70\")  Weight: 106 kg (233 lb 7.5 oz) (12/19/23 0600)       Usual Body Weight (UBW) Unable to obtain from patient        Trending Weight Hx     This admission: 12/22: 233#             PTA: Stable weight history     Wt Readings from Last 30 Encounters:   12/19/23 0600 106 kg (233 lb 7.5 oz)   12/18/23 1718 102 kg (224 lb 13.9 oz)   12/18/23 0540 100 kg (221 lb)   12/04/23 1427 101 kg (223 lb)   12/15/23 1218 102 kg (225 lb)   11/04/23 1913 99.8 kg (220 lb 0.3 oz)   10/31/20 1916 99.8 kg (220 lb)      BMI kg/m2 Body mass index is 33.5 kg/m².       Labs        Pertinent Labs    Results from last 7 days   Lab Units 12/22/23  0625 12/21/23  0345 12/20/23  0606   SODIUM mmol/L 137 137 136   POTASSIUM mmol/L 3.8 4.3 3.8   CHLORIDE mmol/L 103 104 102   CO2 mmol/L 25.0 25.0 24.0   BUN mg/dL 16 16 16   CREATININE mg/dL 1.24 1.47* 1.56*   CALCIUM mg/dL 8.6 8.7 7.7*   BILIRUBIN mg/dL 2.7* 1.1 0.7   ALK PHOS U/L 119* 400* 51   ALT (SGPT) U/L 54* 42* 40   AST (SGOT) U/L 71* 84* 86*   GLUCOSE mg/dL 77 80 97     Results from last 7 days   Lab Units 12/22/23  0625 12/21/23  0345 12/20/23  1212 12/20/23  0606 12/19/23  1759 12/19/23  0541   MAGNESIUM mg/dL 2.2 2.2  --  1.7  --  1.8   PHOSPHORUS mg/dL 2.2* 2.8  --  2.7  --  4.5   HEMOGLOBIN g/dL 7.9* 7.3*   < > 7.9*   < > 8.7*   HEMATOCRIT % 24.9* 22.9*   < > 24.2*   < > 27.3*   TRIGLYCERIDES mg/dL  --   --   --   --   --  109    < > = values in this interval not displayed.     No results found for: \"HGBA1C\"     Medications    Scheduled Medications budesonide, 0.5 mg, Nebulization, BID - RT  [START ON 12/23/2023] cefTRIAXone, 2,000 mg, Intravenous, " Q24H  chlorhexidine, 15 mL, Mouth/Throat, Q12H  enoxaparin, 40 mg, Subcutaneous, Daily  losartan, 50 mg, Oral, Q24H  metoprolol succinate XL, 25 mg, Oral, Q24H  pantoprazole, 40 mg, Oral, BID AC  senna-docusate sodium, 2 tablet, Nasogastric, BID  sodium chloride, 10 mL, Intravenous, Q12H  sodium chloride, 10 mL, Intravenous, Q12H  sodium chloride, 10 mL, Intravenous, Q12H  sodium chloride, 10 mL, Intravenous, Q12H  sucralfate, 1 g, Oral, TID AC        Infusions sodium chloride, 125 mL/hr, Last Rate: 125 mL/hr (12/22/23 0201)        PRN Medications   acetaminophen    [DISCONTINUED] acetaminophen **OR** acetaminophen    senna-docusate sodium **AND** polyethylene glycol **AND** bisacodyl **AND** bisacodyl    Calcium Replacement - Follow Nurse / BPA Driven Protocol    hydrALAZINE    HYDROmorphone **AND** naloxone    influenza vaccine    ipratropium-albuterol    Magnesium Low Dose Replacement - Follow Nurse / BPA Driven Protocol    nitroglycerin    ondansetron **OR** ondansetron    oxyCODONE    Phosphorus Replacement - Follow Nurse / BPA Driven Protocol    Potassium Replacement - Follow Nurse / BPA Driven Protocol    sodium chloride    sodium chloride    sodium chloride    sodium chloride    sodium chloride     Physical Findings        Trending Physical   Appearance, NFPE 12/22: NFPE completed and not consistent with nutrition diagnosis of malnutrition at this time using AND/ASPEN criteria      --  Edema  1+ dependent, generalized, feet  2+ periorbital      Bowel Function No BM since admission (x 4 days ago) - bowel regimen noted      Tubes No feeding tube      Chewing/Swallowing Unknown baseline     Skin Incision      --  Estimated/Assessed Needs    Estimated 12/22/23   Energy Requirements    EST Needs, Method, Wt used 8471-3273 kcal/day (25-30 kcal/day of IBW 77.2 kg)       Protein Requirements    EST Needs, Method, Wt used 77-92 g/day (1.0-1.2 g/kg of IBW 77.2 kg)       Fluid Requirements     Estimated Needs (mL/day)  1 mL/kcal, will monitor hydration status      Current Nutrition Orders & Evaluation of Intake       Oral Nutrition     Food Allergies NKFA   Current PO Diet Diet: Liquid Diets; Clear Liquid; Fluid Consistency: Thin (IDDSI 0)   Supplement None ordered    PO Evaluation     Trending % PO Intake 12/22: Minimal related to liquids    --  Enteral Nutrition    Enteral Route    Order, Modulars, Flushes    Residual/Tolerance    TF Observation         Parenteral Nutrition     TPN Route    Total # Days on TPN    TPN Order, Lipid Details    MVI & Trace Element Freq    TPN Observation         Nutrition Course Details    PO Diets: NPO 12/18  CLD 12/19 to current 12/22   Nutrition Support:   Monitor for need for nutrition support      Nutritional Risk Screening        NRS-2002 Score          Nutrition Diagnosis         Nutrition Dx Problem 1 Inadequate energy intake related to clinical course as evidenced by CLD.        Nutrition Dx Problem 2        Intervention Goal         Intervention Goal(s) Diet advancement per MD  Accept ONS     Nutrition Intervention        RD Action Add ONS  Completed NFPE     Nutrition Prescription          Diet Prescription CLD   Supplement Prescription Boost Breeze BID   --  Enteral Prescription   Initial Goal:  *initial goal conservative d/t risk of RFS     Clinimix 5/20 1L volume + hold lipids      End Goal:    Clinimix 5/20 2000 mL volume + 250 mL 20% lipids 3x/week      Amino Acids   400 kcals (100 g, 107%)    Dextrose  1360 kcals (400 g)    Lipids  215 kcals/day    Total Calories  1975 kcals (105% lower end)    Glucose Infusion Rate 2.62 mg/kg/min    Wt used for  kg          TPN Prescription      Monitor/Evaluation        Monitor Per protocol, I&O, PO intake, Supplement intake, Pertinent labs, Weight, Skin status, GI status, Symptoms, Hemodynamic stability       Electronically signed by:  Capri Rider RD  12/22/23 12:19 EST

## 2023-12-22 NOTE — PLAN OF CARE
Goal Outcome Evaluation:      Pt is alert and oriented. Harley removed today per Urology MD. UOP- 700mL. Pt received several PRNs for pain throughout shift.

## 2023-12-23 LAB
ALBUMIN SERPL-MCNC: 2.9 G/DL (ref 3.5–5.2)
ALBUMIN/GLOB SERPL: 1 G/DL
ALP SERPL-CCNC: 220 U/L (ref 39–117)
ALT SERPL W P-5'-P-CCNC: 60 U/L (ref 1–41)
ANION GAP SERPL CALCULATED.3IONS-SCNC: 13 MMOL/L (ref 5–15)
AST SERPL-CCNC: 63 U/L (ref 1–40)
BASOPHILS # BLD AUTO: 0 10*3/MM3 (ref 0–0.2)
BASOPHILS NFR BLD AUTO: 0.3 % (ref 0–1.5)
BILIRUB SERPL-MCNC: 3.2 MG/DL (ref 0–1.2)
BILIRUB UR QL STRIP: ABNORMAL
BUN SERPL-MCNC: 19 MG/DL (ref 8–23)
BUN/CREAT SERPL: 15.1 (ref 7–25)
CA-I SERPL ISE-MCNC: 1.21 MMOL/L (ref 1.2–1.3)
CALCIUM SPEC-SCNC: 8.6 MG/DL (ref 8.6–10.5)
CHLORIDE SERPL-SCNC: 98 MMOL/L (ref 98–107)
CLARITY UR: CLEAR
CO2 SERPL-SCNC: 25 MMOL/L (ref 22–29)
COLOR UR: ABNORMAL
CREAT SERPL-MCNC: 1.26 MG/DL (ref 0.76–1.27)
DEPRECATED RDW RBC AUTO: 49 FL (ref 37–54)
EGFRCR SERPLBLD CKD-EPI 2021: 63.3 ML/MIN/1.73
EOSINOPHIL # BLD AUTO: 0.2 10*3/MM3 (ref 0–0.4)
EOSINOPHIL NFR BLD AUTO: 1.4 % (ref 0.3–6.2)
ERYTHROCYTE [DISTWIDTH] IN BLOOD BY AUTOMATED COUNT: 17.1 % (ref 12.3–15.4)
GLOBULIN UR ELPH-MCNC: 2.9 GM/DL
GLUCOSE SERPL-MCNC: 81 MG/DL (ref 65–99)
GLUCOSE UR STRIP-MCNC: NEGATIVE MG/DL
HCT VFR BLD AUTO: 24.7 % (ref 37.5–51)
HGB BLD-MCNC: 7.9 G/DL (ref 13–17.7)
HGB UR QL STRIP.AUTO: NEGATIVE
KETONES UR QL STRIP: ABNORMAL
LEUKOCYTE ESTERASE UR QL STRIP.AUTO: ABNORMAL
LYMPHOCYTES # BLD AUTO: 1.1 10*3/MM3 (ref 0.7–3.1)
LYMPHOCYTES NFR BLD AUTO: 7.6 % (ref 19.6–45.3)
MAGNESIUM SERPL-MCNC: 2 MG/DL (ref 1.6–2.4)
MCH RBC QN AUTO: 26.4 PG (ref 26.6–33)
MCHC RBC AUTO-ENTMCNC: 32.1 G/DL (ref 31.5–35.7)
MCV RBC AUTO: 82.1 FL (ref 79–97)
MONOCYTES # BLD AUTO: 1.6 10*3/MM3 (ref 0.1–0.9)
MONOCYTES NFR BLD AUTO: 10.9 % (ref 5–12)
NEUTROPHILS NFR BLD AUTO: 11.7 10*3/MM3 (ref 1.7–7)
NEUTROPHILS NFR BLD AUTO: 79.8 % (ref 42.7–76)
NITRITE UR QL STRIP: NEGATIVE
NRBC BLD AUTO-RTO: 0.1 /100 WBC (ref 0–0.2)
PH UR STRIP.AUTO: 5.5 [PH] (ref 5–8)
PHOSPHATE SERPL-MCNC: 2 MG/DL (ref 2.5–4.5)
PLATELET # BLD AUTO: 310 10*3/MM3 (ref 140–450)
PMV BLD AUTO: 7.5 FL (ref 6–12)
POTASSIUM SERPL-SCNC: 3.3 MMOL/L (ref 3.5–5.2)
PROT SERPL-MCNC: 5.8 G/DL (ref 6–8.5)
PROT UR QL STRIP: ABNORMAL
RBC # BLD AUTO: 3.01 10*6/MM3 (ref 4.14–5.8)
SODIUM SERPL-SCNC: 136 MMOL/L (ref 136–145)
SP GR UR STRIP: 1.01 (ref 1–1.03)
UROBILINOGEN UR QL STRIP: ABNORMAL
WBC NRBC COR # BLD AUTO: 14.6 10*3/MM3 (ref 3.4–10.8)

## 2023-12-23 PROCEDURE — 25010000002 ENOXAPARIN PER 10 MG: Performed by: UROLOGY

## 2023-12-23 PROCEDURE — 25010000002 CEFTRIAXONE PER 250 MG: Performed by: INTERNAL MEDICINE

## 2023-12-23 PROCEDURE — 82330 ASSAY OF CALCIUM: CPT | Performed by: STUDENT IN AN ORGANIZED HEALTH CARE EDUCATION/TRAINING PROGRAM

## 2023-12-23 PROCEDURE — 25010000002 HYDROMORPHONE 1 MG/ML SOLUTION: Performed by: UROLOGY

## 2023-12-23 PROCEDURE — 80053 COMPREHEN METABOLIC PANEL: CPT | Performed by: STUDENT IN AN ORGANIZED HEALTH CARE EDUCATION/TRAINING PROGRAM

## 2023-12-23 PROCEDURE — 25010000002 HYDRALAZINE PER 20 MG: Performed by: INTERNAL MEDICINE

## 2023-12-23 PROCEDURE — 25010000002 ONDANSETRON PER 1 MG: Performed by: STUDENT IN AN ORGANIZED HEALTH CARE EDUCATION/TRAINING PROGRAM

## 2023-12-23 PROCEDURE — 85025 COMPLETE CBC W/AUTO DIFF WBC: CPT | Performed by: STUDENT IN AN ORGANIZED HEALTH CARE EDUCATION/TRAINING PROGRAM

## 2023-12-23 PROCEDURE — 84100 ASSAY OF PHOSPHORUS: CPT | Performed by: STUDENT IN AN ORGANIZED HEALTH CARE EDUCATION/TRAINING PROGRAM

## 2023-12-23 PROCEDURE — 94799 UNLISTED PULMONARY SVC/PX: CPT

## 2023-12-23 PROCEDURE — 83735 ASSAY OF MAGNESIUM: CPT | Performed by: STUDENT IN AN ORGANIZED HEALTH CARE EDUCATION/TRAINING PROGRAM

## 2023-12-23 PROCEDURE — 81001 URINALYSIS AUTO W/SCOPE: CPT | Performed by: INTERNAL MEDICINE

## 2023-12-23 RX ORDER — METOPROLOL SUCCINATE 25 MG/1
50 TABLET, EXTENDED RELEASE ORAL
Qty: 30 TABLET | Refills: 1 | Status: SHIPPED | OUTPATIENT
Start: 2023-12-24

## 2023-12-23 RX ORDER — POLYETHYLENE GLYCOL 3350 17 G/17G
17 POWDER, FOR SOLUTION ORAL DAILY PRN
Qty: 510 G | Refills: 0 | Status: SHIPPED | OUTPATIENT
Start: 2023-12-23

## 2023-12-23 RX ORDER — CEFDINIR 300 MG/1
300 CAPSULE ORAL 2 TIMES DAILY
Qty: 14 CAPSULE | Refills: 0 | Status: SHIPPED | OUTPATIENT
Start: 2023-12-23 | End: 2023-12-30

## 2023-12-23 RX ORDER — SUCRALFATE 1 G/1
1 TABLET ORAL
Qty: 90 TABLET | Refills: 0 | Status: SHIPPED | OUTPATIENT
Start: 2023-12-23

## 2023-12-23 RX ORDER — IPRATROPIUM BROMIDE AND ALBUTEROL 20; 100 UG/1; UG/1
1 SPRAY, METERED RESPIRATORY (INHALATION) 4 TIMES DAILY PRN
Qty: 4 G | Refills: 1 | Status: SHIPPED | OUTPATIENT
Start: 2023-12-23

## 2023-12-23 RX ORDER — AMOXICILLIN 250 MG
1 CAPSULE ORAL DAILY
Qty: 30 TABLET | Refills: 1 | Status: SHIPPED | OUTPATIENT
Start: 2023-12-23 | End: 2023-12-30 | Stop reason: HOSPADM

## 2023-12-23 RX ORDER — PANTOPRAZOLE SODIUM 40 MG/1
40 TABLET, DELAYED RELEASE ORAL DAILY
Qty: 30 TABLET | Refills: 0 | Status: SHIPPED | OUTPATIENT
Start: 2023-12-23

## 2023-12-23 RX ORDER — POTASSIUM CHLORIDE 20 MEQ/1
40 TABLET, EXTENDED RELEASE ORAL EVERY 4 HOURS
Status: COMPLETED | OUTPATIENT
Start: 2023-12-23 | End: 2023-12-23

## 2023-12-23 RX ORDER — LOSARTAN POTASSIUM 100 MG/1
100 TABLET ORAL
Qty: 30 TABLET | Refills: 0 | Status: SHIPPED | OUTPATIENT
Start: 2023-12-24

## 2023-12-23 RX ORDER — ONDANSETRON 4 MG/1
4 TABLET, FILM COATED ORAL EVERY 6 HOURS PRN
Qty: 30 TABLET | Refills: 0 | Status: SHIPPED | OUTPATIENT
Start: 2023-12-23

## 2023-12-23 RX ADMIN — HYDROMORPHONE HYDROCHLORIDE 0.5 MG: 1 INJECTION, SOLUTION INTRAMUSCULAR; INTRAVENOUS; SUBCUTANEOUS at 10:36

## 2023-12-23 RX ADMIN — Medication 10 ML: at 21:06

## 2023-12-23 RX ADMIN — Medication 10 ML: at 08:15

## 2023-12-23 RX ADMIN — CHLORHEXIDINE GLUCONATE 15 ML: 1.2 RINSE ORAL at 08:12

## 2023-12-23 RX ADMIN — HYDROMORPHONE HYDROCHLORIDE 0.5 MG: 1 INJECTION, SOLUTION INTRAMUSCULAR; INTRAVENOUS; SUBCUTANEOUS at 14:00

## 2023-12-23 RX ADMIN — POTASSIUM CHLORIDE 40 MEQ: 1500 TABLET, EXTENDED RELEASE ORAL at 12:19

## 2023-12-23 RX ADMIN — SUCRALFATE 1 G: 1 TABLET ORAL at 12:19

## 2023-12-23 RX ADMIN — BUDESONIDE INHALATION 0.5 MG: 0.5 SUSPENSION RESPIRATORY (INHALATION) at 18:20

## 2023-12-23 RX ADMIN — CEFTRIAXONE 2000 MG: 2 INJECTION, POWDER, FOR SOLUTION INTRAMUSCULAR; INTRAVENOUS at 08:12

## 2023-12-23 RX ADMIN — CHLORHEXIDINE GLUCONATE 15 ML: 1.2 RINSE ORAL at 21:05

## 2023-12-23 RX ADMIN — POTASSIUM CHLORIDE 40 MEQ: 1500 TABLET, EXTENDED RELEASE ORAL at 08:09

## 2023-12-23 RX ADMIN — OXYCODONE HYDROCHLORIDE 5 MG: 5 TABLET ORAL at 23:30

## 2023-12-23 RX ADMIN — PANTOPRAZOLE SODIUM 40 MG: 40 TABLET, DELAYED RELEASE ORAL at 08:11

## 2023-12-23 RX ADMIN — LOSARTAN POTASSIUM 50 MG: 50 TABLET, FILM COATED ORAL at 08:09

## 2023-12-23 RX ADMIN — OXYCODONE HYDROCHLORIDE 5 MG: 5 TABLET ORAL at 03:56

## 2023-12-23 RX ADMIN — OXYCODONE HYDROCHLORIDE 5 MG: 5 TABLET ORAL at 19:35

## 2023-12-23 RX ADMIN — Medication 10 ML: at 08:16

## 2023-12-23 RX ADMIN — HYDROMORPHONE HYDROCHLORIDE 0.5 MG: 1 INJECTION, SOLUTION INTRAMUSCULAR; INTRAVENOUS; SUBCUTANEOUS at 21:58

## 2023-12-23 RX ADMIN — ENOXAPARIN SODIUM 40 MG: 100 INJECTION SUBCUTANEOUS at 17:00

## 2023-12-23 RX ADMIN — Medication 2 PACKET: at 10:09

## 2023-12-23 RX ADMIN — ONDANSETRON 4 MG: 2 INJECTION INTRAMUSCULAR; INTRAVENOUS at 10:09

## 2023-12-23 RX ADMIN — OXYCODONE HYDROCHLORIDE 5 MG: 5 TABLET ORAL at 12:19

## 2023-12-23 RX ADMIN — HYDRALAZINE HYDROCHLORIDE 20 MG: 20 INJECTION INTRAMUSCULAR; INTRAVENOUS at 11:01

## 2023-12-23 RX ADMIN — HYDROMORPHONE HYDROCHLORIDE 0.5 MG: 1 INJECTION, SOLUTION INTRAMUSCULAR; INTRAVENOUS; SUBCUTANEOUS at 05:54

## 2023-12-23 RX ADMIN — PANTOPRAZOLE SODIUM 40 MG: 40 TABLET, DELAYED RELEASE ORAL at 17:02

## 2023-12-23 RX ADMIN — METOPROLOL SUCCINATE 25 MG: 25 TABLET, EXTENDED RELEASE ORAL at 08:09

## 2023-12-23 RX ADMIN — HYDROMORPHONE HYDROCHLORIDE 0.5 MG: 1 INJECTION, SOLUTION INTRAMUSCULAR; INTRAVENOUS; SUBCUTANEOUS at 17:02

## 2023-12-23 RX ADMIN — HYDRALAZINE HYDROCHLORIDE 20 MG: 20 INJECTION INTRAMUSCULAR; INTRAVENOUS at 03:58

## 2023-12-23 RX ADMIN — OXYCODONE HYDROCHLORIDE 5 MG: 5 TABLET ORAL at 08:19

## 2023-12-23 RX ADMIN — HYDROMORPHONE HYDROCHLORIDE 0.5 MG: 1 INJECTION, SOLUTION INTRAMUSCULAR; INTRAVENOUS; SUBCUTANEOUS at 01:57

## 2023-12-23 RX ADMIN — SUCRALFATE 1 G: 1 TABLET ORAL at 08:11

## 2023-12-23 NOTE — PLAN OF CARE
Goal Outcome Evaluation:  Plan of Care Reviewed With: patient         Pt. Able to make needs known. Personal items and call light within reach. Plan of care ongoing.

## 2023-12-23 NOTE — PROGRESS NOTES
Infectious Diseases Progress Note      LOS: 5 days   Patient Care Team:  Provider, Emerald Known as PCP - General    Chief Complaint: Weakness    Subjective     Had no fever during the last 24 hours.  He remained hemodynamically stable.  He is currently on room air.    Review of Systems:   Review of Systems   Constitutional:  Positive for fatigue.   HENT: Negative.     Eyes: Negative.    Respiratory: Negative.     Cardiovascular: Negative.    Gastrointestinal: Negative.    Genitourinary: Negative.    Musculoskeletal: Negative.    Skin: Negative.    Neurological: Negative.    Hematological: Negative.    Psychiatric/Behavioral: Negative.          Objective     Vital Signs  Temp:  [97.6 °F (36.4 °C)-98.6 °F (37 °C)] 98.3 °F (36.8 °C)  Heart Rate:  [] 92  Resp:  [13-18] 13  BP: (142-187)/() 163/100    Physical Exam:  Physical Exam  Vitals and nursing note reviewed.   Constitutional:       Appearance: He is well-developed.   HENT:      Head: Normocephalic and atraumatic.   Eyes:      Pupils: Pupils are equal, round, and reactive to light.   Cardiovascular:      Rate and Rhythm: Normal rate and regular rhythm.      Heart sounds: Normal heart sounds.   Pulmonary:      Effort: Pulmonary effort is normal. No respiratory distress.      Breath sounds: Rales present. No wheezing.   Abdominal:      General: Bowel sounds are normal. There is no distension.      Palpations: Abdomen is soft. There is no mass.      Tenderness: There is no abdominal tenderness. There is no guarding or rebound.   Musculoskeletal:         General: No deformity. Normal range of motion.      Cervical back: Normal range of motion and neck supple.   Skin:     General: Skin is warm.      Findings: No erythema or rash.   Neurological:      Mental Status: He is alert and oriented to person, place, and time.      Cranial Nerves: No cranial nerve deficit.          Results Review:    I have reviewed all clinical data, test, lab, and imaging results.      Radiology  No Radiology Exams Resulted Within Past 24 Hours    Cardiology    Laboratory    Results from last 7 days   Lab Units 12/23/23  0654 12/22/23  0625 12/21/23 0345 12/20/23 1955 12/20/23  1212 12/20/23  0606 12/20/23  0200 12/19/23  1759 12/19/23  0541 12/18/23  1722 12/18/23  1318 12/18/23  1203   WBC 10*3/mm3 14.60* 17.10* 23.30*  --   --  17.50*  --   --  16.80* 17.50*  --  14.70*   HEMOGLOBIN g/dL 7.9* 7.9* 7.3* 8.7* 7.5* 7.9* 7.8*   < > 8.7* 9.9*  --  9.1*   HEMOGLOBIN, POC   --   --   --   --   --   --   --   --   --   --    < >  --    HEMATOCRIT % 24.7* 24.9* 22.9* 27.3* 23.4* 24.2* 24.4*   < > 27.3* 30.6*  --  28.3*   HEMATOCRIT POC   --   --   --   --   --   --   --   --   --   --    < >  --    PLATELETS 10*3/mm3 310 235 225  --   --  157  --   --  203 228  --  227    < > = values in this interval not displayed.     Results from last 7 days   Lab Units 12/23/23  0654 12/22/23  0625 12/21/23 0345 12/20/23  0606 12/19/23  1759 12/19/23  0541 12/18/23  1722   SODIUM mmol/L 136 137 137 136 137 137 136   POTASSIUM mmol/L 3.3* 3.8 4.3 3.8 4.0 4.8 4.9   CHLORIDE mmol/L 98 103 104 102 104 106 104   CO2 mmol/L 25.0 25.0 25.0 24.0 24.0 18.0* 21.0*   BUN mg/dL 19 16 16 16 18 18 14   CREATININE mg/dL 1.26 1.24 1.47* 1.56* 1.71* 2.23* 1.48*   GLUCOSE mg/dL 81 77 80 97 108* 139* 169*   ALBUMIN g/dL 2.9* 2.6* 3.0* 2.6*  --  2.7* 2.9*   BILIRUBIN mg/dL 3.2* 2.7* 1.1 0.7  --  0.4 1.4*   ALK PHOS U/L 220* 119* 400* 51  --  50 53   AST (SGOT) U/L 63* 71* 84* 86*  --  59* 67*   ALT (SGPT) U/L 60* 54* 42* 40  --  46* 64*   CALCIUM mg/dL 8.6 8.6 8.7 7.7* 7.5* 7.4* 7.6*                 Microbiology   Microbiology Results (last 10 days)       Procedure Component Value - Date/Time    Blood Culture - Blood, Hand, Right [[id number]]  (Normal) Collected: 12/21/23 0710    Lab Status: Preliminary result Specimen: Blood from Hand, Right Updated: 12/23/23 0802     Blood Culture No growth at 2 days    Blood Culture - Blood,  Hand, Left [[id number]]  (Normal) Collected: 12/21/23 0710    Lab Status: Preliminary result Specimen: Blood from Hand, Left Updated: 12/23/23 0802     Blood Culture No growth at 2 days    Narrative:      Less than seven (7) mL's of blood was collected.  Insufficient quantity may yield false negative results.    Blood Culture - Blood, Arm, Left [[id number]]  (Normal) Collected: 12/19/23 1107    Lab Status: Preliminary result Specimen: Blood from Arm, Left Updated: 12/23/23 1146     Blood Culture No growth at 4 days    Blood Culture - Blood, Arm, Right [[id number]]  (Normal) Collected: 12/19/23 1050    Lab Status: Preliminary result Specimen: Blood from Arm, Right Updated: 12/23/23 1146     Blood Culture No growth at 4 days            Medication Review:       Schedule Meds  budesonide, 0.5 mg, Nebulization, BID - RT  cefTRIAXone, 2,000 mg, Intravenous, Q24H  chlorhexidine, 15 mL, Mouth/Throat, Q12H  enoxaparin, 40 mg, Subcutaneous, Daily  losartan, 50 mg, Oral, Q24H  metoprolol succinate XL, 25 mg, Oral, Q24H  pantoprazole, 40 mg, Oral, BID AC  senna-docusate sodium, 2 tablet, Nasogastric, BID  sodium chloride, 10 mL, Intravenous, Q12H  sodium chloride, 10 mL, Intravenous, Q12H  sodium chloride, 10 mL, Intravenous, Q12H  sodium chloride, 10 mL, Intravenous, Q12H  sucralfate, 1 g, Oral, TID AC        Infusion Meds  sodium chloride, 125 mL/hr, Last Rate: 125 mL/hr (12/22/23 0201)        PRN Meds    acetaminophen    [DISCONTINUED] acetaminophen **OR** acetaminophen    senna-docusate sodium **AND** polyethylene glycol **AND** bisacodyl **AND** bisacodyl    Calcium Replacement - Follow Nurse / BPA Driven Protocol    hydrALAZINE    HYDROmorphone **AND** naloxone    influenza vaccine    ipratropium-albuterol    Magnesium Low Dose Replacement - Follow Nurse / BPA Driven Protocol    nitroglycerin    ondansetron **OR** ondansetron    oxyCODONE    Phosphorus Replacement - Follow Nurse / BPA Driven Protocol    Potassium Replacement  - Follow Nurse / BPA Driven Protocol    sodium chloride    sodium chloride    sodium chloride    sodium chloride    sodium chloride        Assessment & Plan       Antimicrobial Therapy   1.  IV ceftriaxone        2.        3.        4.        5.            Assessment     Reactive leukocytosis.  Patient has no obvious clinical signs of sepsis.  Probably secondary to intra-abdominal hematoma versus pneumonia.  White blood cell count is trending down.  Blood cultures from 12/21/2023 are negative so far    Left lower lobe infiltrate consistent with pneumonia.  Currently on room air     S/p left radical nephrectomy on December 18, 2023 secondary to left kidney mass probably renal cell carcinoma.  CT scan of abdomen pelvis showed collection at the nephrectomy site which possibly seroma versus hematoma     History of borderline diabetes mellitus     History of degenerative joint disease with a chronic back and hip pain     Elevated creatinine.  Patient is s/p recent left-sided nephrectomy.  Resolved     Plan     Continue IV ceftriaxone 2 g daily for probably 7 days  Encourage incentive spirometer  Any supportive care  A.m. labs        Patricia Knight, APRN  12/23/23  14:16 EST    Note is dictated utilizing voice recognition software/Dragon

## 2023-12-23 NOTE — PROGRESS NOTES
"    [facility] MEDICINE SERVICE  DAILY PROGRESS NOTE    NAME: Louis Andino  : 1960  MRN: [id number]      LOS: 5 days     PROVIDER OF SERVICE: Clifford Sanchez MD    Chief Complaint: Renal mass    Subjective:     Interval History:  History taken from: patient  Patient Complaints: renal mass    Per the documentation by ICU, dated 2023,\"Louis Andino is a 63 y.o. male with PMH of hypertension, hyperlipidemia, COPD, BPH, and GERD presented to the hospital for nephrectomy, and was admitted with a principal diagnosis of Renal mass.  HPI information is limited due to patient intubated and sedated at time of assessment; information obtained from chart review and patient's spouse at bedside.  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  Post procedure patient was to remain on vent overnight and admitted to the intensive care unit for further evaluation and treatment.  \"     2023-Patient extubated on the morning of 2023 and Levophed weaned  23 patient seen and examined in bed no acute distress, vital signs blood pressure stable, heart rate 105, discussed with RN.  Patient complaining of heartburn.  On Protonix and Carafate added.  23 patient seen and examined in bed no acute distress feels better today, pain better controlled.  Vital signs stable, discussed with RN.  Urine looks clear.  23 patient seen and examined in bed no acute distress, vital signs stable, patient complaining about shoulder pain back pain.  Discussed with RN.  Apparently has been out of bed.  Harley catheter discontinued.  Tolerating antibiotics.  Discussed with ID discussed with family  23 patient seen and examined in bed no acute distress patient is complaining of pain weakness nausea.  Will transfer to the surgical unit.  Discussed with RN.    Review of " Systems  12 point ROS reviewed and negative except as mentioned above    Objective:     Vital Signs  Temp:  [97.6 °F (36.4 °C)-98.6 °F (37 °C)] 98.3 °F (36.8 °C)  Heart Rate:  [] 92  Resp:  [18] 18  BP: (146-187)/() 163/82   Body mass index is 33.5 kg/m².    Physical Exam  Constitutional:       General: He is not in acute distress.     Appearance: He is obese. He is not toxic-appearing.   HENT:      Head: Normocephalic and atraumatic.      Nose: Nose normal. No congestion.      Mouth/Throat:      Pharynx: Oropharynx is clear. No oropharyngeal exudate.   Eyes:      General: No scleral icterus.  Cardiovascular:      Rate and Rhythm: Normal rate and regular rhythm.      Heart sounds: No murmur heard.     No friction rub. No gallop.   Pulmonary:      Effort: No respiratory distress.      Breath sounds: No wheezing or rales.      Comments: Patient on 1.5L NC  Abdominal:      General: There is no distension.      Tenderness: There is no abdominal tenderness. There is no guarding.   Musculoskeletal:         General: No swelling or deformity.      Cervical back: Normal range of motion. No rigidity.      Right lower leg: No edema.      Left lower leg: No edema.   Skin:     Coloration: Skin is pale. Skin is not jaundiced.      Findings: No bruising or lesion.   Neurological:      General: No focal deficit present.      Mental Status: He is alert and oriented to person, place, and time.      Motor: Weakness present.   Scheduled Meds   budesonide, 0.5 mg, Nebulization, BID - RT  cefTRIAXone, 2,000 mg, Intravenous, Q24H  chlorhexidine, 15 mL, Mouth/Throat, Q12H  enoxaparin, 40 mg, Subcutaneous, Daily  losartan, 50 mg, Oral, Q24H  metoprolol succinate XL, 25 mg, Oral, Q24H  pantoprazole, 40 mg, Oral, BID AC  senna-docusate sodium, 2 tablet, Nasogastric, BID  sodium chloride, 10 mL, Intravenous, Q12H  sodium chloride, 10 mL, Intravenous, Q12H  sodium chloride, 10 mL, Intravenous, Q12H  sodium chloride, 10 mL,  Intravenous, Q12H  sucralfate, 1 g, Oral, TID AC       PRN Meds     acetaminophen    [DISCONTINUED] acetaminophen **OR** acetaminophen    senna-docusate sodium **AND** polyethylene glycol **AND** bisacodyl **AND** bisacodyl    Calcium Replacement - Follow Nurse / BPA Driven Protocol    hydrALAZINE    HYDROmorphone **AND** naloxone    influenza vaccine    ipratropium-albuterol    Magnesium Low Dose Replacement - Follow Nurse / BPA Driven Protocol    nitroglycerin    ondansetron **OR** ondansetron    oxyCODONE    Phosphorus Replacement - Follow Nurse / BPA Driven Protocol    Potassium Replacement - Follow Nurse / BPA Driven Protocol    sodium chloride    sodium chloride    sodium chloride    sodium chloride    sodium chloride   Infusions  sodium chloride, 125 mL/hr, Last Rate: 125 mL/hr (12/22/23 0201)          Diagnostic Data    Results from last 7 days   Lab Units 12/23/23  0654   WBC 10*3/mm3 14.60*   HEMOGLOBIN g/dL 7.9*   HEMATOCRIT % 24.7*   PLATELETS 10*3/mm3 310   GLUCOSE mg/dL 81   CREATININE mg/dL 1.26   BUN mg/dL 19   SODIUM mmol/L 136   POTASSIUM mmol/L 3.3*   AST (SGOT) U/L 63*   ALT (SGPT) U/L 60*   ALK PHOS U/L 220*   BILIRUBIN mg/dL 3.2*   ANION GAP mmol/L 13.0       CT Abdomen Pelvis Without Contrast    Result Date: 12/21/2023  Impression: 1. Left nephrectomy. Query left adrenalectomy. Surgical changes adjacent to the IVC. 2. Fluid collections in the right upper quadrant of the abdomen adjacent to the pancreas, and fluid collection within the left nephrectomy bed. These are thought most likely to represent postoperative seromas or hematomas which have evolved into low-density products. Small quantity ascites is seen within the abdomen and pelvis. 3. Dense right middle lobe and bibasilar airspace disease may represent atelectasis or developing pneumonia. Small bilateral pleural effusions. 4. Generalized ileus pattern of the large and small bowel. Electronically Signed: Esther Valentine MD  12/21/2023  4:51 PM EST  Workstation ID: [id number]       I reviewed the patient's new clinical results.    Assessment/Plan:     Active and Resolved Problems  Active Hospital Problems    Diagnosis  POA    **Renal mass [N28.89]  Yes    BPH (benign prostatic hyperplasia) [N40.0]  Yes    COPD (chronic obstructive pulmonary disease) [J44.9]  Yes    KARON (acute kidney injury) [N17.9]  Yes    Acute respiratory failure with hypoxia [J96.01]  Yes    Asthma [J45.909]  Yes    Gastroesophageal reflux disease [K21.9]  Yes    Hyperlipidemia [E78.5]  Yes    Hypertension [I10]  Yes      Resolved Hospital Problems   No resolved problems to display.       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 and large volume blood loss    - extensive blood loss during surgery    - s/p 5u prbc and 2u FFP given per op note    -  #Acute hypoxic respiratory failure  #COPD    - Patient remained intubated post op, was transferred to ICU    - Patient extubated on 12/19/2023    - Dueoneb QID    - Pulmicort BID    - wean oxygen as tolerated     #Hypovolemic shock    - 2/2 blood loss from surgery    - Levophed weaned    - cleared for downgrade from ICU     #Acute blood loss anemia    - 2/2 blood loss from surgery    - s/p 5u prbc and 2u FFP given per op note    - Hgb 7.4 this afternoon, no overt bleeding, 1u prbc ordered in ICU     - h/h q6h    - transfuse to maintain hgb > 7.0    - pt     #KARON  #Metabolic acidosis    - suspect 2/2 blood loss and possible post op ATN    - bicarb improved from 18 > 24    - Cr 2.23 > 1.71, base Cr is 1.0    - as bicarb normalized, changed IVF to NS @ 125 hour    - monitor    - ionized calcium 1.2, replace PRN     #GERD    - PPI     #HTN    - hold hypertensive meds due to recent shock, monitor        DVT prophylaxis:  Medical and mechanical DVT prophylaxis orders are present.     Code status is   Code Status and Medical Interventions:    Ordered at: 12/18/23 2010     Code Status (Patient has no pulse and is not breathing):    CPR (Attempt to Resuscitate)     Medical Interventions (Patient has pulse or is breathing):    Full Support       Plan for disposition: Per clinical course in 2 days    Time: 30 minutes    Signature: Electronically signed by Clifford Sanchez MD, 12/23/23, 12:40 EST.  [facility], [place], operated by [facility] Hospitalist Team

## 2023-12-23 NOTE — CASE MANAGEMENT/SOCIAL WORK
Continued Stay Note   Jacques     Patient Name: Louis Andino  MRN: [id number]  Today's Date: 12/23/2023    Admit Date: 12/18/2023    Plan: Home with spouse. Referral to [facility], pending acceptance. Wishes to arrange own PCP.   Discharge Plan       Row Name 12/23/23 1738       Plan    Plan Home with spouse. Referral to [facility], pending acceptance. Wishes to arrange own PCP.    Plan Comments Zachariah  declined. Referral to [facility], pending acceptance. Liaison notified via text.                      Expected Discharge Date and Time       Expected Discharge Date Expected Discharge Time    Dec 25, 2023               Sallie Grajeda RN

## 2023-12-23 NOTE — PROGRESS NOTES
Urology Progress Note    Patient Identification:  Name:  Louis Andino  Age:  63 y.o.  Sex:  male  :  1960  MRN:  [id number]    Chief Complaint:  Renal Mass     Patient resting in bed, wife at bedside    AVSS    NAD   Pain controlled  Still complains of nausea  No appetite, discussed increasing his clears today    Reports weakness, difficulty standing  Discussed having PT see him today again    Urinating spontaneously    Incision C/D and staples intact  Slight abdominal distention, not firm    Cr stable at 1.26  Hgb stable at 7.9  WBC 14.6 with slight decrease from 17.1      Problem List:  Patient Active Problem List   Diagnosis    Renal mass    Asthma    Gastroesophageal reflux disease    Hyperlipidemia    Hypertension    BPH (benign prostatic hyperplasia)    COPD (chronic obstructive pulmonary disease)    KARON (acute kidney injury)    Acute respiratory failure with hypoxia     Past Medical History:  Past Medical History:   Diagnosis Date    Asthma     Emphysema/COPD     GERD (gastroesophageal reflux disease)     Hyperglycemia 10/12/2023    Hyperlipidemia 10/12/2023    Hypertension     Prostate disease     Vitreous degeneration of right eye     Formatting of this note might be different from the original. Retinal tear and detachment warning symptoms reviewed and patient instructed to call immediately if increasing floaters, flashes, or decreasing peripheral vision. No retinal detachment or retinal tear noted. Recheck in 1 month with dilated exam.     Past Surgical History:  Past Surgical History:   Procedure Laterality Date    NEPHRECTOMY Left 2023    Procedure: NEPHRECTOMY RADICAL WITH CAVAL THROMBECTOMY;  Surgeon: James Esquivel MD;  Location: [facility];  Service: Urology;  Laterality: Left;    SKIN LESION EXCISION       Home Meds:  Medications Prior to Admission   Medication Sig Dispense Refill Last Dose    aspirin 81 MG EC tablet Take 1 tablet by mouth Daily.   2023    celecoxib  (CeleBREX) 200 MG capsule Take 1 capsule by mouth Daily As Needed for Mild Pain.   12/11/2023    famotidine (PEPCID) 40 MG tablet Take 1 tablet by mouth Daily.   12/17/2023    hydroCHLOROthiazide (HYDRODIURIL) 25 MG tablet Take 1 tablet by mouth Daily.   12/17/2023    magnesium oxide (MAG-OX) 400 MG tablet Take 1 tablet by mouth Daily.   12/11/2023    omeprazole (priLOSEC) 40 MG capsule Take 1 capsule by mouth Daily.   12/17/2023    potassium chloride (K-DUR,KLOR-CON) 20 MEQ CR tablet Take 1 tablet by mouth Daily.   12/17/2023    Symbicort 160-4.5 MCG/ACT inhaler Inhale 2 puffs 2 (Two) Times a Day.   12/17/2023    vitamin D3 125 MCG (5000 UT) capsule capsule Take 1 capsule by mouth Daily.   12/17/2023    lisinopril (PRINIVIL,ZESTRIL) 10 MG tablet Take 1.5 tablets by mouth 2 (Two) Times a Day.        Current Meds:    Current Facility-Administered Medications:     acetaminophen (TYLENOL) 160 MG/5ML oral solution 650 mg, 650 mg, Oral, Q6H PRN, Xavier Boston DO, 650 mg at 12/19/23 2333    [DISCONTINUED] acetaminophen (TYLENOL) tablet 650 mg, 650 mg, Oral, Q4H PRN **OR** acetaminophen (TYLENOL) suppository 650 mg, 650 mg, Rectal, Q4H PRN, Erika Hawkins APRN    sennosides-docusate (PERICOLACE) 8.6-50 MG per tablet 2 tablet, 2 tablet, Nasogastric, BID, 2 tablet at 12/22/23 0812 **AND** polyethylene glycol (MIRALAX) packet 17 g, 17 g, Oral, Daily PRN **AND** bisacodyl (DULCOLAX) EC tablet 5 mg, 5 mg, Oral, Daily PRN **AND** bisacodyl (DULCOLAX) suppository 10 mg, 10 mg, Rectal, Daily PRN, Yuniel Lai MD    budesonide (PULMICORT) nebulizer solution 0.5 mg, 0.5 mg, Nebulization, BID - RT, Yuniel Lai MD, 0.5 mg at 12/22/23 1837    Calcium Replacement - Follow Nurse / BPA Driven Protocol, , Does not apply, PRN, Erika Hawkins APRN    cefTRIAXone (ROCEPHIN) 2,000 mg in sodium chloride 0.9 % 100 mL IVPB, 2,000 mg, Intravenous, Q24H, Jcarlos Wilkins MD, Last Rate: 200 mL/hr at 12/23/23 0812, 2,000 mg at 12/23/23  0812    chlorhexidine (PERIDEX) 0.12 % solution 15 mL, 15 mL, Mouth/Throat, Q12H, Erika Hawkins APRN, 15 mL at 12/23/23 0812    Enoxaparin Sodium (LOVENOX) syringe 40 mg, 40 mg, Subcutaneous, Daily, James Esquivel MD, 40 mg at 12/22/23 1638    hydrALAZINE (APRESOLINE) injection 20 mg, 20 mg, Intravenous, Q6H PRN, Clifford Sanchez MD, 20 mg at 12/23/23 0358    HYDROmorphone (DILAUDID) injection 0.5 mg, 0.5 mg, Intravenous, Q2H PRN, 0.5 mg at 12/23/23 0554 **AND** naloxone (NARCAN) injection 0.1 mg, 0.1 mg, Intravenous, Q5 Min PRN, James Esquivel MD    Influenza Vac High-Dose Quad (FLUZONE HIGH DOSE) injection 0.7 mL, 0.7 mL, Intramuscular, During Hospitalization, Nahun Torres APRN    ipratropium-albuterol (DUO-NEB) nebulizer solution 3 mL, 3 mL, Nebulization, Q6H PRN, Erika Hawkins APRN    losartan (COZAAR) tablet 50 mg, 50 mg, Oral, Q24H, Clifford Sanchez MD, 50 mg at 12/23/23 0809    Magnesium Low Dose Replacement - Follow Nurse / BPA Driven Protocol, , Does not apply, PRN, Erika Hawkins APRN    metoprolol succinate XL (TOPROL-XL) 24 hr tablet 25 mg, 25 mg, Oral, Q24H, Clifford Sanchez MD, 25 mg at 12/23/23 0809    nitroglycerin (NITROSTAT) SL tablet 0.4 mg, 0.4 mg, Sublingual, Q5 Min PRN, Erika Hawkins APRN    ondansetron (ZOFRAN) tablet 4 mg, 4 mg, Oral, Q6H PRN **OR** ondansetron (ZOFRAN) injection 4 mg, 4 mg, Intravenous, Q6H PRN, Erika Hawkins APRN, 4 mg at 12/20/23 0446    oxyCODONE (ROXICODONE) immediate release tablet 5 mg, 5 mg, Oral, Q4H PRN, Clifford Sanchez MD, 5 mg at 12/23/23 0819    pantoprazole (PROTONIX) EC tablet 40 mg, 40 mg, Oral, BID AC, Clifford Sanchez MD, 40 mg at 12/23/23 0811    Phosphorus Replacement - Follow Nurse / BPA Driven Protocol, , Does not apply, PRN, Erika Hawkins APRN    potassium & sodium phosphates (PHOS-NAK) 280-160-250 MG packet 2 packet, 2 packet, Oral, Once, Clifford Sanchez MD    potassium chloride (K-DUR,KLOR-CON) CR  "tablet 40 mEq, 40 mEq, Oral, Q4H, Clifford Sanchez MD, 40 mEq at 23 0809    Potassium Replacement - Follow Nurse / BPA Driven Protocol, , Does not apply, PRN, Erika Hawkins APRN    sodium chloride 0.9 % flush 10 mL, 10 mL, Intravenous, Q12H, James Esquivel MD, 10 mL at 23 0816    sodium chloride 0.9 % flush 10 mL, 10 mL, Intravenous, PRN, James Esquivel MD    sodium chloride 0.9 % flush 10 mL, 10 mL, Intravenous, Q12H, Yuniel Lai MD, 10 mL at 23 0816    sodium chloride 0.9 % flush 10 mL, 10 mL, Intravenous, Q12H, Yuniel Lai MD, 10 mL at 23 0815    sodium chloride 0.9 % flush 10 mL, 10 mL, Intravenous, Q12H, Yuniel Lai MD, 10 mL at 23 0815    sodium chloride 0.9 % flush 10 mL, 10 mL, Intravenous, PATTIN, Yuniel Lai MD, 10 mL at 23 0416    sodium chloride 0.9 % flush 20 mL, 20 mL, Intravenous, Joelle ZAVALETA Mahmud, MD    sodium chloride 0.9 % infusion 40 mL, 40 mL, Intravenous, PRN, James Esquivel MD    sodium chloride 0.9 % infusion 40 mL, 40 mL, Intravenous, PRNJoelle Mahmud, MD    sodium chloride 0.9 % infusion, 125 mL/hr, Intravenous, Continuous, Xavier Boston, , Last Rate: 125 mL/hr at 23 0201, 125 mL/hr at 23 0201    sucralfate (CARAFATE) tablet 1 g, 1 g, Oral, TID AC, Clifford Sanchez MD, 1 g at 23 0811  Allergies:  Patient has no known allergies.      Objective:  tMax 24 hours:  Temp (24hrs), Av.4 °F (36.9 °C), Min:97.6 °F (36.4 °C), Max:99.3 °F (37.4 °C)    Vital Sign Ranges:  Temp:  [97.6 °F (36.4 °C)-99.3 °F (37.4 °C)] 98.6 °F (37 °C)  Heart Rate:  [] 92  Resp:  [18] 18  BP: (142-187)/() 163/82  Intake and Output Last 3 Shifts:  I/O last 3 completed shifts:  In: 2812 [P.O.:360; I.V.:2452]  Out: 3610 [Urine:3610]      /82   Pulse 92   Temp 98.6 °F (37 °C) (Oral)   Resp 18   Ht 177.8 cm (70\")   Wt 106 kg (233 lb 7.5 oz)   SpO2 92%   BMI 33.50 kg/m²      Data Review:  All labs (24hrs):    Lab " Results (last 24 hours)       Procedure Component Value Units Date/Time    Blood Culture - Blood, Hand, Right [[id number]]  (Normal) Collected: 12/21/23 0710    Specimen: Blood from Hand, Right Updated: 12/23/23 0802     Blood Culture No growth at 2 days    Blood Culture - Blood, Hand, Left [[id number]]  (Normal) Collected: 12/21/23 0710    Specimen: Blood from Hand, Left Updated: 12/23/23 0802     Blood Culture No growth at 2 days    Narrative:      Less than seven (7) mL's of blood was collected.  Insufficient quantity may yield false negative results.    Magnesium [[id number]]  (Normal) Collected: 12/23/23 0654    Specimen: Blood Updated: 12/23/23 0738     Magnesium 2.0 mg/dL     Comprehensive Metabolic Panel [[id number]]  (Abnormal) Collected: 12/23/23 0654    Specimen: Blood Updated: 12/23/23 0738     Glucose 81 mg/dL      BUN 19 mg/dL      Creatinine 1.26 mg/dL      Sodium 136 mmol/L      Potassium 3.3 mmol/L      Chloride 98 mmol/L      CO2 25.0 mmol/L      Calcium 8.6 mg/dL      Total Protein 5.8 g/dL      Albumin 2.9 g/dL      ALT (SGPT) 60 U/L      AST (SGOT) 63 U/L      Alkaline Phosphatase 220 U/L      Total Bilirubin 3.2 mg/dL      Globulin 2.9 gm/dL      A/G Ratio 1.0 g/dL      BUN/Creatinine Ratio 15.1     Anion Gap 13.0 mmol/L      eGFR 63.3 mL/min/1.73     Narrative:      GFR Normal >60  Chronic Kidney Disease <60  Kidney Failure <15      Phosphorus [[id number]]  (Abnormal) Collected: 12/23/23 0654    Specimen: Blood Updated: 12/23/23 0736     Phosphorus 2.0 mg/dL     CBC & Differential [[id number]]  (Abnormal) Collected: 12/23/23 0654    Specimen: Blood Updated: 12/23/23 0722    Narrative:      The following orders were created for panel order CBC & Differential.  Procedure                               Abnormality         Status                     ---------                               -----------         ------                     CBC Auto Differential[[id number]]        Abnormal            Final  result                 Please view results for these tests on the individual orders.    CBC Auto Differential [[id number]]  (Abnormal) Collected: 12/23/23 0654    Specimen: Blood Updated: 12/23/23 0722     WBC 14.60 10*3/mm3      RBC 3.01 10*6/mm3      Hemoglobin 7.9 g/dL      Hematocrit 24.7 %      MCV 82.1 fL      MCH 26.4 pg      MCHC 32.1 g/dL      RDW 17.1 %      RDW-SD 49.0 fl      MPV 7.5 fL      Platelets 310 10*3/mm3      Neutrophil % 79.8 %      Lymphocyte % 7.6 %      Monocyte % 10.9 %      Eosinophil % 1.4 %      Basophil % 0.3 %      Neutrophils, Absolute 11.70 10*3/mm3      Lymphocytes, Absolute 1.10 10*3/mm3      Monocytes, Absolute 1.60 10*3/mm3      Eosinophils, Absolute 0.20 10*3/mm3      Basophils, Absolute 0.00 10*3/mm3      nRBC 0.1 /100 WBC     Calcium, Ionized [[id number]]  (Normal) Collected: 12/23/23 0654    Specimen: Blood Updated: 12/23/23 0720     Ionized Calcium 1.21 mmol/L     Phosphorus [[id number]]  (Abnormal) Collected: 12/22/23 1847    Specimen: Blood Updated: 12/22/23 1918     Phosphorus 2.0 mg/dL     Blood Culture - Blood, Arm, Right [[id number]]  (Normal) Collected: 12/19/23 1050    Specimen: Blood from Arm, Right Updated: 12/22/23 1145     Blood Culture No growth at 3 days    Blood Culture - Blood, Arm, Left [[id number]]  (Normal) Collected: 12/19/23 1107    Specimen: Blood from Arm, Left Updated: 12/22/23 1145     Blood Culture No growth at 3 days          Radiology:   Imaging Results (Last 72 Hours)       Procedure Component Value Units Date/Time    CT Abdomen Pelvis Without Contrast [[id number]] Collected: 12/21/23 1639     Updated: 12/21/23 1653    Narrative:      CT ABDOMEN PELVIS WO CONTRAST    Date of Exam: 12/21/2023 4:31 PM EST    Indication: Severe leukocytosis after nephrectomy.  Possible hematoma.    Comparison: CT abdomen pelvis 11/4/2023.    Technique: Axial CT images were obtained of the abdomen and pelvis without the administration of contrast. Sagittal and coronal  reconstructions were performed.  Automated exposure control and iterative reconstruction methods were used.      Findings:  Midline anterior abdominal wall skin staples are present. Left nephrectomy changes are present. 5.5 x 7.0 x 9.4 cm fluid collection is seen within the left nephrectomy bed with tiny locules of air, favored to represent postoperative seroma.    Locules of fluid is seen surrounding the pancreatic head, with adjacent peripancreatic stranding. A fluid collection medial to the pancreatic head measures 2.5 x 4.7 cm (series 5 image 67, and another anterior to the pancreatic head measures 3.0 x 3.9 cm   (series 5 image 74). Surgical clips are seen adjacent to the IVC.    Small quantity ascites is seen adjacent to the liver, extending along the paracolic gutters, and layering dependently within the pelvis.    There is moderate to generalized gas distention of large and small bowel loops suggesting postoperative ileus, without transition zone or indication of high-grade bowel obstruction.    High density material is seen within the gallbladder which may represent sludge. No gallstones are identified.  The liver, spleen, right adrenal, and right kidney have a normal noncontrast appearance. Query left adrenalectomy. Urinary bladder is decompressed by Harley catheter. There is mild distal left ureterectasis. Prostate and rectum are within the limits.   Small fat-containing inguinal hernias. Generalized body wall edema.    Small bilateral pleural effusions are present. Dense airspace disease in the right middle lobe with air bronchograms, and within the medial bilateral lower lobes, may represent atelectasis or developing pneumonia. Heart size is mildly enlarged. Coronary   calcifications are present.    No acute or suspicious osseous abnormalities are identified.        Impression:      Impression:    1. Left nephrectomy. Query left adrenalectomy. Surgical changes adjacent to the IVC.  2. Fluid collections  in the right upper quadrant of the abdomen adjacent to the pancreas, and fluid collection within the left nephrectomy bed. These are thought most likely to represent postoperative seromas or hematomas which have evolved into   low-density products. Small quantity ascites is seen within the abdomen and pelvis.  3. Dense right middle lobe and bibasilar airspace disease may represent atelectasis or developing pneumonia. Small bilateral pleural effusions.  4. Generalized ileus pattern of the large and small bowel.      Electronically Signed: Esther Valentine MD    12/21/2023 4:51 PM EST    Workstation ID: [id number]            Assessment:  Renal mass    Asthma    Gastroesophageal reflux disease    Hyperlipidemia    Hypertension    BPH (benign prostatic hyperplasia)    COPD (chronic obstructive pulmonary disease)    KARON (acute kidney injury)    Acute respiratory failure with hypoxia    Blood loss with surgery/Anemia    Plan:  Hgb stable   KARON, improved  Cont Lovenox prophylaxis  SCDs  Up to chair, needs to increase activity, PT to see today  Continue pain control  Continue clears until appetite returns then advance his diet slowly  CT with expected postoperative changes, mild ileus  Appreciate consultants care      Bisi Best, DUANE  12/23/2023  10:08 EST

## 2023-12-24 LAB
ALBUMIN SERPL-MCNC: 2.8 G/DL (ref 3.5–5.2)
ALBUMIN/GLOB SERPL: 0.8 G/DL
ALP SERPL-CCNC: 269 U/L (ref 39–117)
ALT SERPL W P-5'-P-CCNC: 50 U/L (ref 1–41)
ANION GAP SERPL CALCULATED.3IONS-SCNC: 12 MMOL/L (ref 5–15)
ANISOCYTOSIS BLD QL: ABNORMAL
AST SERPL-CCNC: 47 U/L (ref 1–40)
BACTERIA SPEC AEROBE CULT: NORMAL
BACTERIA SPEC AEROBE CULT: NORMAL
BACTERIA UR QL AUTO: NORMAL /HPF
BASOPHILS # BLD MANUAL: 0.13 10*3/MM3 (ref 0–0.2)
BASOPHILS NFR BLD MANUAL: 1 % (ref 0–1.5)
BILIRUB SERPL-MCNC: 1.7 MG/DL (ref 0–1.2)
BUN SERPL-MCNC: 20 MG/DL (ref 8–23)
BUN/CREAT SERPL: 14.6 (ref 7–25)
CA-I SERPL ISE-MCNC: 1.21 MMOL/L (ref 1.2–1.3)
CALCIUM SPEC-SCNC: 8.7 MG/DL (ref 8.6–10.5)
CHLORIDE SERPL-SCNC: 99 MMOL/L (ref 98–107)
CO2 SERPL-SCNC: 26 MMOL/L (ref 22–29)
CREAT SERPL-MCNC: 1.37 MG/DL (ref 0.76–1.27)
DEPRECATED RDW RBC AUTO: 53.8 FL (ref 37–54)
EGFRCR SERPLBLD CKD-EPI 2021: 57.2 ML/MIN/1.73
EOSINOPHIL # BLD MANUAL: 0.26 10*3/MM3 (ref 0–0.4)
EOSINOPHIL NFR BLD MANUAL: 2 % (ref 0.3–6.2)
ERYTHROCYTE [DISTWIDTH] IN BLOOD BY AUTOMATED COUNT: 17.8 % (ref 12.3–15.4)
GLOBULIN UR ELPH-MCNC: 3.4 GM/DL
GLUCOSE SERPL-MCNC: 83 MG/DL (ref 65–99)
HCT VFR BLD AUTO: 25.9 % (ref 37.5–51)
HGB BLD-MCNC: 8.2 G/DL (ref 13–17.7)
HYALINE CASTS UR QL AUTO: NORMAL /LPF
LARGE PLATELETS: ABNORMAL
LYMPHOCYTES # BLD MANUAL: 1.31 10*3/MM3 (ref 0.7–3.1)
LYMPHOCYTES NFR BLD MANUAL: 14 % (ref 5–12)
MAGNESIUM SERPL-MCNC: 2.2 MG/DL (ref 1.6–2.4)
MCH RBC QN AUTO: 25.9 PG (ref 26.6–33)
MCHC RBC AUTO-ENTMCNC: 31.7 G/DL (ref 31.5–35.7)
MCV RBC AUTO: 81.7 FL (ref 79–97)
MONOCYTES # BLD: 1.83 10*3/MM3 (ref 0.1–0.9)
MYELOCYTES NFR BLD MANUAL: 1 % (ref 0–0)
NEUTROPHILS # BLD AUTO: 9.43 10*3/MM3 (ref 1.7–7)
NEUTROPHILS NFR BLD MANUAL: 72 % (ref 42.7–76)
PHOSPHATE SERPL-MCNC: 2 MG/DL (ref 2.5–4.5)
PLATELET # BLD AUTO: 311 10*3/MM3 (ref 140–450)
PMV BLD AUTO: 7.8 FL (ref 6–12)
POTASSIUM SERPL-SCNC: 3.6 MMOL/L (ref 3.5–5.2)
POTASSIUM SERPL-SCNC: 3.7 MMOL/L (ref 3.5–5.2)
PROT SERPL-MCNC: 6.2 G/DL (ref 6–8.5)
RBC # BLD AUTO: 3.17 10*6/MM3 (ref 4.14–5.8)
RBC # UR STRIP: NORMAL /HPF
REF LAB TEST METHOD: NORMAL
SCAN SLIDE: NORMAL
SODIUM SERPL-SCNC: 137 MMOL/L (ref 136–145)
SQUAMOUS #/AREA URNS HPF: NORMAL /HPF
VARIANT LYMPHS NFR BLD MANUAL: 10 % (ref 19.6–45.3)
WBC # UR STRIP: NORMAL /HPF
WBC MORPH BLD: NORMAL
WBC NRBC COR # BLD AUTO: 13.1 10*3/MM3 (ref 3.4–10.8)

## 2023-12-24 PROCEDURE — 25010000002 ENOXAPARIN PER 10 MG: Performed by: UROLOGY

## 2023-12-24 PROCEDURE — 82330 ASSAY OF CALCIUM: CPT | Performed by: STUDENT IN AN ORGANIZED HEALTH CARE EDUCATION/TRAINING PROGRAM

## 2023-12-24 PROCEDURE — 80053 COMPREHEN METABOLIC PANEL: CPT | Performed by: STUDENT IN AN ORGANIZED HEALTH CARE EDUCATION/TRAINING PROGRAM

## 2023-12-24 PROCEDURE — 25010000002 CEFTRIAXONE PER 250 MG: Performed by: INTERNAL MEDICINE

## 2023-12-24 PROCEDURE — 94799 UNLISTED PULMONARY SVC/PX: CPT

## 2023-12-24 PROCEDURE — 97110 THERAPEUTIC EXERCISES: CPT

## 2023-12-24 PROCEDURE — 84100 ASSAY OF PHOSPHORUS: CPT | Performed by: INTERNAL MEDICINE

## 2023-12-24 PROCEDURE — 25010000002 HYDROMORPHONE 1 MG/ML SOLUTION: Performed by: UROLOGY

## 2023-12-24 PROCEDURE — 85007 BL SMEAR W/DIFF WBC COUNT: CPT | Performed by: STUDENT IN AN ORGANIZED HEALTH CARE EDUCATION/TRAINING PROGRAM

## 2023-12-24 PROCEDURE — 83735 ASSAY OF MAGNESIUM: CPT | Performed by: STUDENT IN AN ORGANIZED HEALTH CARE EDUCATION/TRAINING PROGRAM

## 2023-12-24 PROCEDURE — 84132 ASSAY OF SERUM POTASSIUM: CPT | Performed by: INTERNAL MEDICINE

## 2023-12-24 PROCEDURE — 94761 N-INVAS EAR/PLS OXIMETRY MLT: CPT

## 2023-12-24 PROCEDURE — 85025 COMPLETE CBC W/AUTO DIFF WBC: CPT | Performed by: STUDENT IN AN ORGANIZED HEALTH CARE EDUCATION/TRAINING PROGRAM

## 2023-12-24 PROCEDURE — 94664 DEMO&/EVAL PT USE INHALER: CPT

## 2023-12-24 RX ORDER — ALUMINA, MAGNESIA, AND SIMETHICONE 2400; 2400; 240 MG/30ML; MG/30ML; MG/30ML
15 SUSPENSION ORAL EVERY 6 HOURS PRN
Status: DISCONTINUED | OUTPATIENT
Start: 2023-12-24 | End: 2023-12-30

## 2023-12-24 RX ADMIN — BUDESONIDE INHALATION 0.5 MG: 0.5 SUSPENSION RESPIRATORY (INHALATION) at 20:31

## 2023-12-24 RX ADMIN — CEFTRIAXONE 2000 MG: 2 INJECTION, POWDER, FOR SOLUTION INTRAMUSCULAR; INTRAVENOUS at 07:41

## 2023-12-24 RX ADMIN — HYDROMORPHONE HYDROCHLORIDE 0.5 MG: 1 INJECTION, SOLUTION INTRAMUSCULAR; INTRAVENOUS; SUBCUTANEOUS at 05:40

## 2023-12-24 RX ADMIN — METOPROLOL SUCCINATE 25 MG: 25 TABLET, EXTENDED RELEASE ORAL at 07:41

## 2023-12-24 RX ADMIN — LOSARTAN POTASSIUM 50 MG: 50 TABLET, FILM COATED ORAL at 07:41

## 2023-12-24 RX ADMIN — SENNOSIDES AND DOCUSATE SODIUM 2 TABLET: 50; 8.6 TABLET ORAL at 21:04

## 2023-12-24 RX ADMIN — BUDESONIDE INHALATION 0.5 MG: 0.5 SUSPENSION RESPIRATORY (INHALATION) at 07:23

## 2023-12-24 RX ADMIN — OXYCODONE HYDROCHLORIDE 5 MG: 5 TABLET ORAL at 11:33

## 2023-12-24 RX ADMIN — HYDROMORPHONE HYDROCHLORIDE 0.5 MG: 1 INJECTION, SOLUTION INTRAMUSCULAR; INTRAVENOUS; SUBCUTANEOUS at 01:34

## 2023-12-24 RX ADMIN — PANTOPRAZOLE SODIUM 40 MG: 40 TABLET, DELAYED RELEASE ORAL at 16:55

## 2023-12-24 RX ADMIN — OXYCODONE HYDROCHLORIDE 5 MG: 5 TABLET ORAL at 21:04

## 2023-12-24 RX ADMIN — OXYCODONE HYDROCHLORIDE 5 MG: 5 TABLET ORAL at 04:15

## 2023-12-24 RX ADMIN — HYDROMORPHONE HYDROCHLORIDE 0.5 MG: 1 INJECTION, SOLUTION INTRAMUSCULAR; INTRAVENOUS; SUBCUTANEOUS at 13:50

## 2023-12-24 RX ADMIN — PANTOPRAZOLE SODIUM 40 MG: 40 TABLET, DELAYED RELEASE ORAL at 07:41

## 2023-12-24 RX ADMIN — CHLORHEXIDINE GLUCONATE 15 ML: 1.2 RINSE ORAL at 07:41

## 2023-12-24 RX ADMIN — HYDROMORPHONE HYDROCHLORIDE 0.5 MG: 1 INJECTION, SOLUTION INTRAMUSCULAR; INTRAVENOUS; SUBCUTANEOUS at 07:42

## 2023-12-24 RX ADMIN — Medication 10 ML: at 21:04

## 2023-12-24 RX ADMIN — OXYCODONE HYDROCHLORIDE 5 MG: 5 TABLET ORAL at 16:55

## 2023-12-24 RX ADMIN — ENOXAPARIN SODIUM 40 MG: 100 INJECTION SUBCUTANEOUS at 16:55

## 2023-12-24 RX ADMIN — HYDROMORPHONE HYDROCHLORIDE 0.5 MG: 1 INJECTION, SOLUTION INTRAMUSCULAR; INTRAVENOUS; SUBCUTANEOUS at 09:55

## 2023-12-24 RX ADMIN — Medication 2 PACKET: at 09:55

## 2023-12-24 NOTE — THERAPY TREATMENT NOTE
"Subjective: Pt agreeable to therapeutic plan of care. Pt stated he just got back to bed and comfortable position and did not want to move. Family asked what exercises they could assist him with at a later time.    Objective:     Bed mobility - N/A or Not attempted.  Transfers - N/A or Not attempted.  Ambulation -  feet N/A or Not attempted.        Vitals: Desaturates when off 2L O2 , incr BP    Pain:  c/o back pain, chronic neck and R hip pain  Intervention for pain: Therapeutic Presence    Education: Provided education on the importance of mobility in the acute care setting and Verbal/Tactile Cues, went over exs all 4 exts they could assist him with and issued pink tb for UE resistance.     Assessment: Louis Andino presents with functional mobility impairments which indicate the need for skilled intervention. Did not Tolerate much this  session but went over HEP he and family could work on when he felt better. Plans on home with HH and assist. Will continue to follow and progress as tolerated.     Plan/Recommendations:   If medically appropriate, Low Intensity Therapy recommended post-acute care - This is recommended as therapy feels this patient would require 2-3 visits per week. OP or HH would be the best option depending on patient's home bound status. Consider, if the patient has other  \"skilled\" needs such as wounds, IV antibiotics, etc. Combined with \"low intensity\" could also equate to a SNF. If patient is medically complex, consider LTAC. Pt requires no DME at discharge.     Pt desires Home with family assist and and Home Health at discharge. Pt cooperative; agreeable to therapeutic recommendations and plan of care.       Post-Tx Position: Call light and personal items within reach. Pt was L sidelying with ice packs on neck  PPE: gloves   "

## 2023-12-24 NOTE — PLAN OF CARE
Assessment: Louis Andino presents with functional mobility impairments which indicate the need for skilled intervention. Did not Tolerate much this  session but went over HEP he and family could work on when he felt better. Plans on home with HH and assist. Will continue to follow and progress as tolerated.

## 2023-12-24 NOTE — PLAN OF CARE
Goal Outcome Evaluation:         Pt alert and able to make needs known. Complaints of pain treated with PRN pain medication. Stand by assist to stand and use urinal at bedside. Patient is passing flatus, has had bowel movements. Tolerating full liquid diet. Incision remains clean, dry, and intact. Wife is at bedside and actively participates in care. Plan of care ongoing.

## 2023-12-24 NOTE — PLAN OF CARE
Goal Outcome Evaluation:               Pt takes prn pain med on schedule. No distress but while asleep does go to 80s on pulse ox. Abd with dsg with no acute bleeding. Abd soft and tender. No falls. No s/s of prob. Family present.

## 2023-12-24 NOTE — PROGRESS NOTES
"    [facility] MEDICINE SERVICE  DAILY PROGRESS NOTE    NAME: Louis Andino  : 1960  MRN: [id number]      LOS: 6 days     PROVIDER OF SERVICE: Clifford Sanchez MD    Chief Complaint: Renal mass    Subjective:     Interval History:  History taken from: patient  Patient Complaints: renal mass    Per the documentation by ICU, dated 2023,\"Louis Andino is a 63 y.o. male with PMH of hypertension, hyperlipidemia, COPD, BPH, and GERD presented to the hospital for nephrectomy, and was admitted with a principal diagnosis of Renal mass.  HPI information is limited due to patient intubated and sedated at time of assessment; information obtained from chart review and patient's spouse at bedside.  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  Post procedure patient was to remain on vent overnight and admitted to the intensive care unit for further evaluation and treatment.  \"     2023-Patient extubated on the morning of 2023 and Levophed weaned  23 patient seen and examined in bed no acute distress, vital signs blood pressure stable, heart rate 105, discussed with RN.  Patient complaining of heartburn.  On Protonix and Carafate added.  23 patient seen and examined in bed no acute distress feels better today, pain better controlled.  Vital signs stable, discussed with RN.  Urine looks clear.  23 patient seen and examined in bed no acute distress, vital signs stable, patient complaining about shoulder pain back pain.  Discussed with RN.  Apparently has been out of bed.  Harley catheter discontinued.  Tolerating antibiotics.  Discussed with ID discussed with family  23 patient seen and examined in bed no acute distress patient is complaining of pain weakness nausea.  Will transfer to the surgical unit.  Discussed with RN.  2023 " patient seen and examined in bed no acute distress, says feels better today, vital signs stable, pain well-controlled, discussed with RN.  Patient still not mobilizing too well.    Review of Systems  12 point ROS reviewed and negative except as mentioned above    Objective:     Vital Signs  Temp:  [98 °F (36.7 °C)-98.2 °F (36.8 °C)] 98.2 °F (36.8 °C)  Heart Rate:  [] 87  Resp:  [15-18] 18  BP: (121-163)/(64-81) 163/81  Flow (L/min):  [2] 2   Body mass index is 33.47 kg/m².    Physical Exam  Constitutional:       General: He is not in acute distress.     Appearance: He is obese. He is not toxic-appearing.   HENT:      Head: Normocephalic and atraumatic.      Nose: Nose normal. No congestion.      Mouth/Throat:      Pharynx: Oropharynx is clear. No oropharyngeal exudate.   Eyes:      General: No scleral icterus.  Cardiovascular:      Rate and Rhythm: Normal rate and regular rhythm.      Heart sounds: No murmur heard.     No friction rub. No gallop.   Pulmonary:      Effort: No respiratory distress.      Breath sounds: No wheezing or rales.      Comments: Patient on 1.5L NC  Abdominal:      General: There is no distension.      Tenderness: There is no abdominal tenderness. There is no guarding.   Musculoskeletal:         General: No swelling or deformity.      Cervical back: Normal range of motion. No rigidity.      Right lower leg: No edema.      Left lower leg: No edema.   Skin:     Coloration: Skin is pale. Skin is not jaundiced.      Findings: No bruising or lesion.   Neurological:      General: No focal deficit present.      Mental Status: He is alert and oriented to person, place, and time.      Motor: Weakness present.   Scheduled Meds   budesonide, 0.5 mg, Nebulization, BID - RT  cefTRIAXone, 2,000 mg, Intravenous, Q24H  chlorhexidine, 15 mL, Mouth/Throat, Q12H  enoxaparin, 40 mg, Subcutaneous, Daily  losartan, 50 mg, Oral, Q24H  metoprolol succinate XL, 25 mg, Oral, Q24H  pantoprazole, 40 mg, Oral, BID  AC  senna-docusate sodium, 2 tablet, Nasogastric, BID  sodium chloride, 10 mL, Intravenous, Q12H  sodium chloride, 10 mL, Intravenous, Q12H  sodium chloride, 10 mL, Intravenous, Q12H  sodium chloride, 10 mL, Intravenous, Q12H       PRN Meds     acetaminophen    [DISCONTINUED] acetaminophen **OR** acetaminophen    aluminum-magnesium hydroxide-simethicone    senna-docusate sodium **AND** polyethylene glycol **AND** bisacodyl **AND** bisacodyl    Calcium Replacement - Follow Nurse / BPA Driven Protocol    hydrALAZINE    HYDROmorphone **AND** naloxone    influenza vaccine    ipratropium-albuterol    Magnesium Low Dose Replacement - Follow Nurse / BPA Driven Protocol    nitroglycerin    ondansetron **OR** ondansetron    oxyCODONE    Phosphorus Replacement - Follow Nurse / BPA Driven Protocol    Potassium Replacement - Follow Nurse / BPA Driven Protocol    sodium chloride    sodium chloride    sodium chloride    sodium chloride    sodium chloride   Infusions  sodium chloride, 125 mL/hr, Last Rate: 125 mL/hr (12/22/23 0201)          Diagnostic Data    Results from last 7 days   Lab Units 12/24/23  0442   WBC 10*3/mm3 13.10*   HEMOGLOBIN g/dL 8.2*   HEMATOCRIT % 25.9*   PLATELETS 10*3/mm3 311   GLUCOSE mg/dL 83   CREATININE mg/dL 1.37*   BUN mg/dL 20   SODIUM mmol/L 137   POTASSIUM mmol/L 3.6  3.7   AST (SGOT) U/L 47*   ALT (SGPT) U/L 50*   ALK PHOS U/L 269*   BILIRUBIN mg/dL 1.7*   ANION GAP mmol/L 12.0       No radiology results for the last day      I reviewed the patient's new clinical results.    Assessment/Plan:     Active and Resolved Problems  Active Hospital Problems    Diagnosis  POA    **Renal mass [N28.89]  Yes    BPH (benign prostatic hyperplasia) [N40.0]  Yes    COPD (chronic obstructive pulmonary disease) [J44.9]  Yes    KARON (acute kidney injury) [N17.9]  Yes    Acute respiratory failure with hypoxia [J96.01]  Yes    Asthma [J45.909]  Yes    Gastroesophageal reflux disease [K21.9]  Yes    Hyperlipidemia  [E78.5]  Yes    Hypertension [I10]  Yes      Resolved Hospital Problems   No resolved problems to display.       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 and large volume blood loss    - extensive blood loss during surgery    - s/p 5u prbc and 2u FFP given per op note    -  #Acute hypoxic respiratory failure  #COPD    - Patient remained intubated post op, was transferred to ICU-Patient extubated on 12/19/2023    - Dueoneb QID    - Pulmicort BID    - wean oxygen as tolerated     #Hypovolemic shock    - 2/2 blood loss from surgery    - Levophed weaned    - cleared for downgrade from ICU     #Acute blood loss anemia    - 2/2 blood loss from surgery    - s/p 5u prbc and 2u FFP given per op note    - Hgb 7.4 this afternoon, no overt bleeding, 1u prbc ordered in ICU     - h/h qday    - transfuse to maintain hgb > 7.0    - pt     #KARON  #Metabolic acidosis    - suspect 2/2 blood loss and possible post op ATN    - bicarb improved from 18 > 24    - Cr 2.23 > 1.71, base Cr is 1.0    - as bicarb normalized, changed IVF to NS @ 125 hour    - monitor    - ionized calcium 1.2, replace PRN     #GERD    - PPI     #HTN    - hold hypertensive meds due to recent shock, monitor      # Weakness, PT OT.  Out of bed to chair.    DVT prophylaxis:  Medical and mechanical DVT prophylaxis orders are present.     Code status is   Code Status and Medical Interventions:   Ordered at: 12/18/23 2010     Code Status (Patient has no pulse and is not breathing):    CPR (Attempt to Resuscitate)     Medical Interventions (Patient has pulse or is breathing):    Full Support       Plan for disposition: Per clinical course in 2 days    Time: 30 minutes    Signature: Electronically signed by Clifford Sanchez MD, 12/24/23, 15:10 EST.  [facility] Hospitalist Team

## 2023-12-24 NOTE — PROGRESS NOTES
Infectious Diseases Progress Note      LOS: 6 days   Patient Care Team:  Radha, Emerald Known as PCP - General    Chief Complaint: Weakness    Subjective     Had no fever during the last 24 hours.  He remained hemodynamically stable.  He is currently on 2 L of oxygen via nasal cannula    Review of Systems:   Review of Systems   Constitutional:  Positive for fatigue.   HENT: Negative.     Eyes: Negative.    Respiratory: Negative.     Cardiovascular: Negative.    Gastrointestinal: Negative.    Genitourinary: Negative.    Musculoskeletal: Negative.    Skin: Negative.    Neurological: Negative.    Hematological: Negative.    Psychiatric/Behavioral: Negative.          Objective     Vital Signs  Temp:  [97.7 °F (36.5 °C)-98.2 °F (36.8 °C)] 98.2 °F (36.8 °C)  Heart Rate:  [] 87  Resp:  [15-18] 18  BP: (121-171)/() 163/81    Physical Exam:  Physical Exam  Vitals and nursing note reviewed.   Constitutional:       Appearance: He is well-developed.   HENT:      Head: Normocephalic and atraumatic.   Eyes:      Pupils: Pupils are equal, round, and reactive to light.   Cardiovascular:      Rate and Rhythm: Normal rate and regular rhythm.      Heart sounds: Normal heart sounds.   Pulmonary:      Effort: Pulmonary effort is normal. No respiratory distress.      Breath sounds: Rales present. No wheezing.   Abdominal:      General: Bowel sounds are normal. There is no distension.      Palpations: Abdomen is soft. There is no mass.      Tenderness: There is no abdominal tenderness. There is no guarding or rebound.   Musculoskeletal:         General: No deformity. Normal range of motion.      Cervical back: Normal range of motion and neck supple.   Skin:     General: Skin is warm.      Findings: No erythema or rash.   Neurological:      Mental Status: He is alert and oriented to person, place, and time.      Cranial Nerves: No cranial nerve deficit.          Results Review:    I have reviewed all clinical data, test, lab, and  imaging results.     Radiology  No Radiology Exams Resulted Within Past 24 Hours    Cardiology    Laboratory    Results from last 7 days   Lab Units 12/24/23  0442 12/23/23  0654 12/22/23  0625 12/21/23  0345 12/20/23  1955 12/20/23  1212 12/20/23  0606 12/19/23  1759 12/19/23  0541 12/18/23  1722   WBC 10*3/mm3 13.10* 14.60* 17.10* 23.30*  --   --  17.50*  --  16.80* 17.50*   HEMOGLOBIN g/dL 8.2* 7.9* 7.9* 7.3* 8.7* 7.5* 7.9*   < > 8.7* 9.9*   HEMATOCRIT % 25.9* 24.7* 24.9* 22.9* 27.3* 23.4* 24.2*   < > 27.3* 30.6*   PLATELETS 10*3/mm3 311 310 235 225  --   --  157  --  203 228    < > = values in this interval not displayed.     Results from last 7 days   Lab Units 12/24/23  0442 12/23/23  0654 12/22/23  0625 12/21/23  0345 12/20/23  0606 12/19/23  1759 12/19/23  0541 12/18/23  1722   SODIUM mmol/L 137 136 137 137 136 137 137 136   POTASSIUM mmol/L 3.6  3.7 3.3* 3.8 4.3 3.8 4.0 4.8 4.9   CHLORIDE mmol/L 99 98 103 104 102 104 106 104   CO2 mmol/L 26.0 25.0 25.0 25.0 24.0 24.0 18.0* 21.0*   BUN mg/dL 20 19 16 16 16 18 18 14   CREATININE mg/dL 1.37* 1.26 1.24 1.47* 1.56* 1.71* 2.23* 1.48*   GLUCOSE mg/dL 83 81 77 80 97 108* 139* 169*   ALBUMIN g/dL 2.8* 2.9* 2.6* 3.0* 2.6*  --  2.7* 2.9*   BILIRUBIN mg/dL 1.7* 3.2* 2.7* 1.1 0.7  --  0.4 1.4*   ALK PHOS U/L 269* 220* 119* 400* 51  --  50 53   AST (SGOT) U/L 47* 63* 71* 84* 86*  --  59* 67*   ALT (SGPT) U/L 50* 60* 54* 42* 40  --  46* 64*   CALCIUM mg/dL 8.7 8.6 8.6 8.7 7.7* 7.5* 7.4* 7.6*                 Microbiology   Microbiology Results (last 10 days)       Procedure Component Value - Date/Time    Blood Culture - Blood, Hand, Right [[id number]]  (Normal) Collected: 12/21/23 0710    Lab Status: Preliminary result Specimen: Blood from Hand, Right Updated: 12/24/23 0801     Blood Culture No growth at 3 days    Blood Culture - Blood, Hand, Left [[id number]]  (Normal) Collected: 12/21/23 0710    Lab Status: Preliminary result Specimen: Blood from Hand, Left Updated:  12/24/23 0801     Blood Culture No growth at 3 days    Narrative:      Less than seven (7) mL's of blood was collected.  Insufficient quantity may yield false negative results.    Blood Culture - Blood, Arm, Left [[id number]]  (Normal) Collected: 12/19/23 1107    Lab Status: Final result Specimen: Blood from Arm, Left Updated: 12/24/23 1146     Blood Culture No growth at 5 days    Blood Culture - Blood, Arm, Right [[id number]]  (Normal) Collected: 12/19/23 1050    Lab Status: Final result Specimen: Blood from Arm, Right Updated: 12/24/23 1146     Blood Culture No growth at 5 days            Medication Review:       Schedule Meds  budesonide, 0.5 mg, Nebulization, BID - RT  cefTRIAXone, 2,000 mg, Intravenous, Q24H  chlorhexidine, 15 mL, Mouth/Throat, Q12H  enoxaparin, 40 mg, Subcutaneous, Daily  losartan, 50 mg, Oral, Q24H  metoprolol succinate XL, 25 mg, Oral, Q24H  pantoprazole, 40 mg, Oral, BID AC  senna-docusate sodium, 2 tablet, Nasogastric, BID  sodium chloride, 10 mL, Intravenous, Q12H  sodium chloride, 10 mL, Intravenous, Q12H  sodium chloride, 10 mL, Intravenous, Q12H  sodium chloride, 10 mL, Intravenous, Q12H        Infusion Meds  sodium chloride, 125 mL/hr, Last Rate: 125 mL/hr (12/22/23 0201)        PRN Meds    acetaminophen    [DISCONTINUED] acetaminophen **OR** acetaminophen    aluminum-magnesium hydroxide-simethicone    senna-docusate sodium **AND** polyethylene glycol **AND** bisacodyl **AND** bisacodyl    Calcium Replacement - Follow Nurse / BPA Driven Protocol    hydrALAZINE    HYDROmorphone **AND** naloxone    influenza vaccine    ipratropium-albuterol    Magnesium Low Dose Replacement - Follow Nurse / BPA Driven Protocol    nitroglycerin    ondansetron **OR** ondansetron    oxyCODONE    Phosphorus Replacement - Follow Nurse / BPA Driven Protocol    Potassium Replacement - Follow Nurse / BPA Driven Protocol    sodium chloride    sodium chloride    sodium chloride    sodium chloride    sodium  chloride        Assessment & Plan       Antimicrobial Therapy   1.  IV ceftriaxone        2.        3.        4.        5.            Assessment     Reactive leukocytosis.  Patient has no obvious clinical signs of sepsis.  Probably secondary to intra-abdominal hematoma versus pneumonia.  Blood cultures from 12/21/2023 are negative so far  White count is trending down slowly    Left lower lobe infiltrate consistent with pneumonia.  Currently on 2 L of oxygen via nasal cannula     S/p left radical nephrectomy on December 18, 2023 secondary to left kidney mass probably renal cell carcinoma.  CT scan of abdomen pelvis showed collection at the nephrectomy site which possibly seroma versus hematoma     History of borderline diabetes mellitus     History of degenerative joint disease with a chronic back and hip pain     Elevated creatinine.  Patient is s/p recent left-sided nephrectomy.  Resolved     Plan     Continue IV ceftriaxone 2 g daily for probably 7 days, may de-escalate to p.o. soon  Encourage incentive spirometer  Any supportive care  A.m. labs        Jcarlos Wilkins MD  12/24/23  13:39 EST    Note is dictated utilizing voice recognition software/Dragon

## 2023-12-24 NOTE — PROGRESS NOTES
POD 6 left nephrectomy and caval thrombectomy    Tolerating clear  (+) BM  Minimal ambulation    AFVSS  S/ND/abby tender  Wound c/d/I  Neg Homans      Cr 1.4  Hgb 8.2 (7.9)    Po 440    A/P:  Full liquids  Monitor Hgn and Cr  Improving

## 2023-12-25 LAB
ALBUMIN SERPL-MCNC: 2.7 G/DL (ref 3.5–5.2)
ALBUMIN/GLOB SERPL: 1 G/DL
ALP SERPL-CCNC: 124 U/L (ref 39–117)
ALT SERPL W P-5'-P-CCNC: 38 U/L (ref 1–41)
ANION GAP SERPL CALCULATED.3IONS-SCNC: 10 MMOL/L (ref 5–15)
ANISOCYTOSIS BLD QL: ABNORMAL
AST SERPL-CCNC: 33 U/L (ref 1–40)
BILIRUB SERPL-MCNC: 1.4 MG/DL (ref 0–1.2)
BUN SERPL-MCNC: 17 MG/DL (ref 8–23)
BUN/CREAT SERPL: 13.5 (ref 7–25)
CA-I SERPL ISE-MCNC: 1.18 MMOL/L (ref 1.2–1.3)
CALCIUM SPEC-SCNC: 8.2 MG/DL (ref 8.6–10.5)
CHLORIDE SERPL-SCNC: 98 MMOL/L (ref 98–107)
CO2 SERPL-SCNC: 28 MMOL/L (ref 22–29)
CREAT SERPL-MCNC: 1.26 MG/DL (ref 0.76–1.27)
DEPRECATED RDW RBC AUTO: 50.8 FL (ref 37–54)
EGFRCR SERPLBLD CKD-EPI 2021: 63.3 ML/MIN/1.73
EOSINOPHIL # BLD MANUAL: 0.25 10*3/MM3 (ref 0–0.4)
EOSINOPHIL NFR BLD MANUAL: 2 % (ref 0.3–6.2)
ERYTHROCYTE [DISTWIDTH] IN BLOOD BY AUTOMATED COUNT: 18 % (ref 12.3–15.4)
GLOBULIN UR ELPH-MCNC: 2.8 GM/DL
GLUCOSE SERPL-MCNC: 115 MG/DL (ref 65–99)
HCT VFR BLD AUTO: 21.6 % (ref 37.5–51)
HCT VFR BLD AUTO: 22.5 % (ref 37.5–51)
HGB BLD-MCNC: 7.2 G/DL (ref 13–17.7)
HGB BLD-MCNC: 7.2 G/DL (ref 13–17.7)
LARGE PLATELETS: ABNORMAL
LYMPHOCYTES # BLD MANUAL: 0.64 10*3/MM3 (ref 0.7–3.1)
LYMPHOCYTES NFR BLD MANUAL: 9 % (ref 5–12)
MAGNESIUM SERPL-MCNC: 1.8 MG/DL (ref 1.6–2.4)
MCH RBC QN AUTO: 26.6 PG (ref 26.6–33)
MCHC RBC AUTO-ENTMCNC: 33 G/DL (ref 31.5–35.7)
MCV RBC AUTO: 80.5 FL (ref 79–97)
METAMYELOCYTES NFR BLD MANUAL: 1 % (ref 0–0)
MONOCYTES # BLD: 1.14 10*3/MM3 (ref 0.1–0.9)
MYELOCYTES NFR BLD MANUAL: 2 % (ref 0–0)
NEUTROPHILS # BLD AUTO: 10.29 10*3/MM3 (ref 1.7–7)
NEUTROPHILS NFR BLD MANUAL: 80 % (ref 42.7–76)
NEUTS BAND NFR BLD MANUAL: 1 % (ref 0–5)
PHOSPHATE SERPL-MCNC: 2.1 MG/DL (ref 2.5–4.5)
PHOSPHATE SERPL-MCNC: 2.2 MG/DL (ref 2.5–4.5)
PLATELET # BLD AUTO: 347 10*3/MM3 (ref 140–450)
PMV BLD AUTO: 6.9 FL (ref 6–12)
POTASSIUM SERPL-SCNC: 3.5 MMOL/L (ref 3.5–5.2)
POTASSIUM SERPL-SCNC: 3.9 MMOL/L (ref 3.5–5.2)
PROT SERPL-MCNC: 5.5 G/DL (ref 6–8.5)
RBC # BLD AUTO: 2.69 10*6/MM3 (ref 4.14–5.8)
SCAN SLIDE: NORMAL
SODIUM SERPL-SCNC: 136 MMOL/L (ref 136–145)
VARIANT LYMPHS NFR BLD MANUAL: 5 % (ref 19.6–45.3)
WBC MORPH BLD: NORMAL
WBC NRBC COR # BLD AUTO: 12.7 10*3/MM3 (ref 3.4–10.8)

## 2023-12-25 PROCEDURE — 83735 ASSAY OF MAGNESIUM: CPT | Performed by: STUDENT IN AN ORGANIZED HEALTH CARE EDUCATION/TRAINING PROGRAM

## 2023-12-25 PROCEDURE — 94761 N-INVAS EAR/PLS OXIMETRY MLT: CPT

## 2023-12-25 PROCEDURE — 94664 DEMO&/EVAL PT USE INHALER: CPT

## 2023-12-25 PROCEDURE — 84100 ASSAY OF PHOSPHORUS: CPT | Performed by: INTERNAL MEDICINE

## 2023-12-25 PROCEDURE — 84132 ASSAY OF SERUM POTASSIUM: CPT | Performed by: INTERNAL MEDICINE

## 2023-12-25 PROCEDURE — 85007 BL SMEAR W/DIFF WBC COUNT: CPT | Performed by: STUDENT IN AN ORGANIZED HEALTH CARE EDUCATION/TRAINING PROGRAM

## 2023-12-25 PROCEDURE — 25010000002 HYDROMORPHONE 1 MG/ML SOLUTION: Performed by: UROLOGY

## 2023-12-25 PROCEDURE — 94799 UNLISTED PULMONARY SVC/PX: CPT

## 2023-12-25 PROCEDURE — 80053 COMPREHEN METABOLIC PANEL: CPT | Performed by: STUDENT IN AN ORGANIZED HEALTH CARE EDUCATION/TRAINING PROGRAM

## 2023-12-25 PROCEDURE — 25010000002 CEFTRIAXONE PER 250 MG: Performed by: INTERNAL MEDICINE

## 2023-12-25 PROCEDURE — 85025 COMPLETE CBC W/AUTO DIFF WBC: CPT | Performed by: STUDENT IN AN ORGANIZED HEALTH CARE EDUCATION/TRAINING PROGRAM

## 2023-12-25 PROCEDURE — 85014 HEMATOCRIT: CPT | Performed by: NURSE PRACTITIONER

## 2023-12-25 PROCEDURE — 82330 ASSAY OF CALCIUM: CPT | Performed by: STUDENT IN AN ORGANIZED HEALTH CARE EDUCATION/TRAINING PROGRAM

## 2023-12-25 PROCEDURE — 25010000002 ENOXAPARIN PER 10 MG: Performed by: UROLOGY

## 2023-12-25 PROCEDURE — 85018 HEMOGLOBIN: CPT | Performed by: NURSE PRACTITIONER

## 2023-12-25 RX ORDER — POTASSIUM CHLORIDE 1.5 G/1.58G
40 POWDER, FOR SOLUTION ORAL EVERY 4 HOURS
Status: COMPLETED | OUTPATIENT
Start: 2023-12-25 | End: 2023-12-25

## 2023-12-25 RX ORDER — CEFDINIR 300 MG/1
300 CAPSULE ORAL EVERY 12 HOURS SCHEDULED
Qty: 6 CAPSULE | Refills: 0 | Status: DISPENSED | OUTPATIENT
Start: 2023-12-25 | End: 2023-12-28

## 2023-12-25 RX ORDER — OXYCODONE HCL 10 MG/1
10 TABLET, FILM COATED, EXTENDED RELEASE ORAL EVERY 12 HOURS SCHEDULED
Status: DISCONTINUED | OUTPATIENT
Start: 2023-12-25 | End: 2023-12-30

## 2023-12-25 RX ADMIN — OXYCODONE HYDROCHLORIDE 5 MG: 5 TABLET ORAL at 09:47

## 2023-12-25 RX ADMIN — Medication 10 ML: at 09:05

## 2023-12-25 RX ADMIN — HYDROMORPHONE HYDROCHLORIDE 0.5 MG: 1 INJECTION, SOLUTION INTRAMUSCULAR; INTRAVENOUS; SUBCUTANEOUS at 06:16

## 2023-12-25 RX ADMIN — BUDESONIDE INHALATION 0.5 MG: 0.5 SUSPENSION RESPIRATORY (INHALATION) at 07:43

## 2023-12-25 RX ADMIN — Medication 2 PACKET: at 06:04

## 2023-12-25 RX ADMIN — SENNOSIDES AND DOCUSATE SODIUM 2 TABLET: 50; 8.6 TABLET ORAL at 09:04

## 2023-12-25 RX ADMIN — OXYCODONE HYDROCHLORIDE 10 MG: 10 TABLET, FILM COATED, EXTENDED RELEASE ORAL at 12:21

## 2023-12-25 RX ADMIN — CEFDINIR 300 MG: 300 CAPSULE ORAL at 14:18

## 2023-12-25 RX ADMIN — OXYCODONE HYDROCHLORIDE 5 MG: 5 TABLET ORAL at 01:19

## 2023-12-25 RX ADMIN — METOPROLOL SUCCINATE 25 MG: 25 TABLET, EXTENDED RELEASE ORAL at 09:05

## 2023-12-25 RX ADMIN — OXYCODONE HYDROCHLORIDE 10 MG: 10 TABLET, FILM COATED, EXTENDED RELEASE ORAL at 21:19

## 2023-12-25 RX ADMIN — CHLORHEXIDINE GLUCONATE 15 ML: 1.2 RINSE ORAL at 09:04

## 2023-12-25 RX ADMIN — PANTOPRAZOLE SODIUM 40 MG: 40 TABLET, DELAYED RELEASE ORAL at 09:05

## 2023-12-25 RX ADMIN — OXYCODONE HYDROCHLORIDE 5 MG: 5 TABLET ORAL at 16:59

## 2023-12-25 RX ADMIN — PANTOPRAZOLE SODIUM 40 MG: 40 TABLET, DELAYED RELEASE ORAL at 16:59

## 2023-12-25 RX ADMIN — CHLORHEXIDINE GLUCONATE 15 ML: 1.2 RINSE ORAL at 21:19

## 2023-12-25 RX ADMIN — CEFTRIAXONE 2000 MG: 2 INJECTION, POWDER, FOR SOLUTION INTRAMUSCULAR; INTRAVENOUS at 09:04

## 2023-12-25 RX ADMIN — LOSARTAN POTASSIUM 50 MG: 50 TABLET, FILM COATED ORAL at 09:04

## 2023-12-25 RX ADMIN — ENOXAPARIN SODIUM 40 MG: 100 INJECTION SUBCUTANEOUS at 16:59

## 2023-12-25 RX ADMIN — SENNOSIDES AND DOCUSATE SODIUM 2 TABLET: 50; 8.6 TABLET ORAL at 21:19

## 2023-12-25 RX ADMIN — ACETAMINOPHEN 650 MG: 650 SOLUTION ORAL at 00:24

## 2023-12-25 RX ADMIN — Medication 10 ML: at 21:20

## 2023-12-25 RX ADMIN — POTASSIUM CHLORIDE 40 MEQ: 1.5 POWDER, FOR SOLUTION ORAL at 09:05

## 2023-12-25 RX ADMIN — OXYCODONE HYDROCHLORIDE 5 MG: 5 TABLET ORAL at 06:04

## 2023-12-25 RX ADMIN — POTASSIUM CHLORIDE 40 MEQ: 1.5 POWDER, FOR SOLUTION ORAL at 06:04

## 2023-12-25 NOTE — PROGRESS NOTES
Infectious Diseases Progress Note      LOS: 7 days   Patient Care Team:  Provider, Emerald Known as PCP - General    Chief Complaint: Weakness    Subjective     Had no fever during the last 24 hours.  He remained hemodynamically stable.  He is currently on room air    Review of Systems:   Review of Systems   Constitutional:  Positive for fatigue.   HENT: Negative.     Eyes: Negative.    Respiratory: Negative.     Cardiovascular: Negative.    Gastrointestinal: Negative.    Genitourinary: Negative.    Musculoskeletal: Negative.    Skin: Negative.    Neurological: Negative.    Hematological: Negative.    Psychiatric/Behavioral: Negative.          Objective     Vital Signs  Temp:  [98.1 °F (36.7 °C)-99.8 °F (37.7 °C)] 99.8 °F (37.7 °C)  Heart Rate:  [75-91] 87  Resp:  [12-20] 18  BP: (138-160)/(73-88) 160/81    Physical Exam:  Physical Exam  Vitals and nursing note reviewed.   Constitutional:       Appearance: He is well-developed.   HENT:      Head: Normocephalic and atraumatic.   Eyes:      Pupils: Pupils are equal, round, and reactive to light.   Cardiovascular:      Rate and Rhythm: Normal rate and regular rhythm.      Heart sounds: Normal heart sounds.   Pulmonary:      Effort: Pulmonary effort is normal. No respiratory distress.      Breath sounds: Rales present. No wheezing.   Abdominal:      General: Bowel sounds are normal. There is no distension.      Palpations: Abdomen is soft. There is no mass.      Tenderness: There is no abdominal tenderness. There is no guarding or rebound.   Musculoskeletal:         General: No deformity. Normal range of motion.      Cervical back: Normal range of motion and neck supple.   Skin:     General: Skin is warm.      Findings: No erythema or rash.   Neurological:      Mental Status: He is alert and oriented to person, place, and time.      Cranial Nerves: No cranial nerve deficit.          Results Review:    I have reviewed all clinical data, test, lab, and imaging results.      Radiology  No Radiology Exams Resulted Within Past 24 Hours    Cardiology    Laboratory    Results from last 7 days   Lab Units 12/25/23  0219 12/24/23  0442 12/23/23  0654 12/22/23  0625 12/21/23  0345 12/20/23  1955 12/20/23  1212 12/20/23  0606 12/19/23  1759 12/19/23  0541   WBC 10*3/mm3 12.70* 13.10* 14.60* 17.10* 23.30*  --   --  17.50*  --  16.80*   HEMOGLOBIN g/dL 7.2* 8.2* 7.9* 7.9* 7.3* 8.7* 7.5* 7.9*   < > 8.7*   HEMATOCRIT % 21.6* 25.9* 24.7* 24.9* 22.9* 27.3* 23.4* 24.2*   < > 27.3*   PLATELETS 10*3/mm3 347 311 310 235 225  --   --  157  --  203    < > = values in this interval not displayed.     Results from last 7 days   Lab Units 12/25/23  0219 12/24/23  0442 12/23/23  0654 12/22/23  0625 12/21/23  0345 12/20/23  0606 12/19/23  1759 12/19/23  0541   SODIUM mmol/L 136 137 136 137 137 136 137 137   POTASSIUM mmol/L 3.5 3.6  3.7 3.3* 3.8 4.3 3.8 4.0 4.8   CHLORIDE mmol/L 98 99 98 103 104 102 104 106   CO2 mmol/L 28.0 26.0 25.0 25.0 25.0 24.0 24.0 18.0*   BUN mg/dL 17 20 19 16 16 16 18 18   CREATININE mg/dL 1.26 1.37* 1.26 1.24 1.47* 1.56* 1.71* 2.23*   GLUCOSE mg/dL 115* 83 81 77 80 97 108* 139*   ALBUMIN g/dL 2.7* 2.8* 2.9* 2.6* 3.0* 2.6*  --  2.7*   BILIRUBIN mg/dL 1.4* 1.7* 3.2* 2.7* 1.1 0.7  --  0.4   ALK PHOS U/L 124* 269* 220* 119* 400* 51  --  50   AST (SGOT) U/L 33 47* 63* 71* 84* 86*  --  59*   ALT (SGPT) U/L 38 50* 60* 54* 42* 40  --  46*   CALCIUM mg/dL 8.2* 8.7 8.6 8.6 8.7 7.7* 7.5* 7.4*                 Microbiology   Microbiology Results (last 10 days)       Procedure Component Value - Date/Time    Blood Culture - Blood, Hand, Right [[id number]]  (Normal) Collected: 12/21/23 0710    Lab Status: Preliminary result Specimen: Blood from Hand, Right Updated: 12/25/23 0800     Blood Culture No growth at 4 days    Blood Culture - Blood, Hand, Left [[id number]]  (Normal) Collected: 12/21/23 0710    Lab Status: Preliminary result Specimen: Blood from Hand, Left Updated: 12/25/23 0800     Blood  Culture No growth at 4 days    Narrative:      Less than seven (7) mL's of blood was collected.  Insufficient quantity may yield false negative results.    Blood Culture - Blood, Arm, Left [[id number]]  (Normal) Collected: 12/19/23 1107    Lab Status: Final result Specimen: Blood from Arm, Left Updated: 12/24/23 1146     Blood Culture No growth at 5 days    Blood Culture - Blood, Arm, Right [[id number]]  (Normal) Collected: 12/19/23 1050    Lab Status: Final result Specimen: Blood from Arm, Right Updated: 12/24/23 1146     Blood Culture No growth at 5 days            Medication Review:       Schedule Meds  budesonide, 0.5 mg, Nebulization, BID - RT  cefTRIAXone, 1,000 mg, Intravenous, Q24H  chlorhexidine, 15 mL, Mouth/Throat, Q12H  enoxaparin, 40 mg, Subcutaneous, Daily  losartan, 50 mg, Oral, Q24H  metoprolol succinate XL, 25 mg, Oral, Q24H  oxyCODONE, 10 mg, Oral, Q12H  pantoprazole, 40 mg, Oral, BID AC  senna-docusate sodium, 2 tablet, Nasogastric, BID  sodium chloride, 10 mL, Intravenous, Q12H        Infusion Meds  sodium chloride, 125 mL/hr, Last Rate: 125 mL/hr (12/22/23 0201)        PRN Meds    acetaminophen    [DISCONTINUED] acetaminophen **OR** acetaminophen    aluminum-magnesium hydroxide-simethicone    senna-docusate sodium **AND** polyethylene glycol **AND** bisacodyl **AND** bisacodyl    Calcium Replacement - Follow Nurse / BPA Driven Protocol    hydrALAZINE    HYDROmorphone **AND** naloxone    influenza vaccine    ipratropium-albuterol    Magnesium Low Dose Replacement - Follow Nurse / BPA Driven Protocol    nitroglycerin    ondansetron **OR** ondansetron    oxyCODONE    Phosphorus Replacement - Follow Nurse / BPA Driven Protocol    Potassium Replacement - Follow Nurse / BPA Driven Protocol    sodium chloride    sodium chloride    sodium chloride    sodium chloride    sodium chloride        Assessment & Plan       Antimicrobial Therapy   1.  IV  ceftriaxone        2.        3.        4.        5.            Assessment     Reactive leukocytosis.  Patient has no obvious clinical signs of sepsis.  Probably secondary to intra-abdominal hematoma versus pneumonia.  Blood cultures from 12/21/2023 are negative so far  White count is trending down slowly    Left lower lobe infiltrate consistent with pneumonia.  Currently on room air     S/p left radical nephrectomy on December 18, 2023 secondary to left kidney mass probably renal cell carcinoma.  CT scan of abdomen pelvis showed collection at the nephrectomy site which possibly seroma versus hematoma     History of borderline diabetes mellitus     History of degenerative joint disease with a chronic back and hip pain     Elevated creatinine.  Patient is s/p recent left-sided nephrectomy.  Resolved     Plan     Discontinue IV ceftriaxone  Omnicef 300 mg p.o. twice daily for 3 days  Encourage incentive spirometer  Any supportive care  A.m. labs        Jcarlos Wilkins MD  12/25/23  12:21 EST    Note is dictated utilizing voice recognition software/Dragon

## 2023-12-25 NOTE — PROGRESS NOTES
POD 7 left nephrectomy/caval thrombectomy    Tolerating full liquids  Some issues with pain control    99.8/99.8 VSS  S/ND/abby tender  Wound c/di  Neg homans, trace seb LE edema    Cr 1.3  Hgb 7.2    A/P:  Pain control  Reg diet  Will likley need rehab  Anticipate discharge 2-3 days

## 2023-12-25 NOTE — PLAN OF CARE
Goal Outcome Evaluation:  Plan of Care Reviewed With: patient, spouse        Progress: no change

## 2023-12-25 NOTE — PROGRESS NOTES
"    [facility] MEDICINE SERVICE  DAILY PROGRESS NOTE    NAME: Louis Andino  : 1960  MRN: [id number]      LOS: 7 days     PROVIDER OF SERVICE: Clifford Sanchez MD    Chief Complaint: Renal mass    Subjective:     Interval History:  History taken from: patient  Patient Complaints: renal mass    Per the documentation by ICU, dated 2023,\"Louis Andino is a 63 y.o. male with PMH of hypertension, hyperlipidemia, COPD, BPH, and GERD presented to the hospital for nephrectomy, and was admitted with a principal diagnosis of Renal mass.  HPI information is limited due to patient intubated and sedated at time of assessment; information obtained from chart review and patient's spouse at bedside.  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  Post procedure patient was to remain on vent overnight and admitted to the intensive care unit for further evaluation and treatment.  \"     2023-Patient extubated on the morning of 2023 and Levophed weaned  23 patient seen and examined in bed no acute distress, vital signs blood pressure stable, heart rate 105, discussed with RN.  Patient complaining of heartburn.  On Protonix and Carafate added.  23 patient seen and examined in bed no acute distress feels better today, pain better controlled.  Vital signs stable, discussed with RN.  Urine looks clear.  23 patient seen and examined in bed no acute distress, vital signs stable, patient complaining about shoulder pain back pain.  Discussed with RN.  Apparently has been out of bed.  Harley catheter discontinued.  Tolerating antibiotics.  Discussed with ID discussed with family  23 patient seen and examined in bed no acute distress patient is complaining of pain weakness nausea.  Will transfer to the surgical unit.  Discussed with RN.  2023 " patient seen and examined in bed no acute distress, says feels better today, vital signs stable, pain well-controlled, discussed with RN.  Patient still not mobilizing too well.  12/25/23 seen in bed, c/o shoulder pain, back pain, per wife he got up but felt dizzy. AMELIE RN vss, consult pt/ot    Review of Systems  12 point ROS reviewed and negative except as mentioned above    Objective:     Vital Signs  Temp:  [98.1 °F (36.7 °C)-99.8 °F (37.7 °C)] 99.8 °F (37.7 °C)  Heart Rate:  [75-91] 87  Resp:  [12-20] 18  BP: (138-160)/(73-88) 160/81  Flow (L/min):  [2] 2   Body mass index is 33.47 kg/m².    Physical Exam  Constitutional:       General: He is not in acute distress.     Appearance: He is obese. He is not toxic-appearing.   HENT:      Head: Normocephalic and atraumatic.      Nose: Nose normal. No congestion.      Mouth/Throat:      Pharynx: Oropharynx is clear. No oropharyngeal exudate.   Eyes:      General: No scleral icterus.  Cardiovascular:      Rate and Rhythm: Normal rate and regular rhythm.      Heart sounds: No murmur heard.     No friction rub. No gallop.   Pulmonary:      Effort: No respiratory distress.      Breath sounds: No wheezing or rales.      Comments: Patient on 1.5L NC  Abdominal:      General: There is no distension.      Tenderness: There is no abdominal tenderness. There is no guarding.   Musculoskeletal:         General: No swelling or deformity.      Cervical back: Normal range of motion. No rigidity.      Right lower leg: No edema.      Left lower leg: No edema.   Skin:     Coloration: Skin is pale. Skin is not jaundiced.      Findings: No bruising or lesion.   Neurological:      General: No focal deficit present.      Mental Status: He is alert and oriented to person, place, and time.      Motor: Weakness present.   Scheduled Meds   budesonide, 0.5 mg, Nebulization, BID - RT  cefdinir, 300 mg, Oral, Q12H  chlorhexidine, 15 mL, Mouth/Throat, Q12H  enoxaparin, 40 mg, Subcutaneous,  Daily  losartan, 50 mg, Oral, Q24H  metoprolol succinate XL, 25 mg, Oral, Q24H  oxyCODONE, 10 mg, Oral, Q12H  pantoprazole, 40 mg, Oral, BID AC  senna-docusate sodium, 2 tablet, Nasogastric, BID  sodium chloride, 10 mL, Intravenous, Q12H       PRN Meds     acetaminophen    [DISCONTINUED] acetaminophen **OR** acetaminophen    aluminum-magnesium hydroxide-simethicone    senna-docusate sodium **AND** polyethylene glycol **AND** bisacodyl **AND** bisacodyl    Calcium Replacement - Follow Nurse / BPA Driven Protocol    hydrALAZINE    HYDROmorphone **AND** naloxone    influenza vaccine    ipratropium-albuterol    Magnesium Low Dose Replacement - Follow Nurse / BPA Driven Protocol    nitroglycerin    ondansetron **OR** ondansetron    oxyCODONE    Phosphorus Replacement - Follow Nurse / BPA Driven Protocol    Potassium Replacement - Follow Nurse / BPA Driven Protocol    sodium chloride    sodium chloride    sodium chloride    sodium chloride    sodium chloride   Infusions  sodium chloride, 125 mL/hr, Last Rate: 125 mL/hr (12/22/23 0201)          Diagnostic Data    Results from last 7 days   Lab Units 12/25/23  0219   WBC 10*3/mm3 12.70*   HEMOGLOBIN g/dL 7.2*   HEMATOCRIT % 21.6*   PLATELETS 10*3/mm3 347   GLUCOSE mg/dL 115*   CREATININE mg/dL 1.26   BUN mg/dL 17   SODIUM mmol/L 136   POTASSIUM mmol/L 3.5   AST (SGOT) U/L 33   ALT (SGPT) U/L 38   ALK PHOS U/L 124*   BILIRUBIN mg/dL 1.4*   ANION GAP mmol/L 10.0       No radiology results for the last day      I reviewed the patient's new clinical results.    Assessment/Plan:     Active and Resolved Problems  Active Hospital Problems    Diagnosis  POA    **Renal mass [N28.89]  Yes    BPH (benign prostatic hyperplasia) [N40.0]  Yes    COPD (chronic obstructive pulmonary disease) [J44.9]  Yes    KARON (acute kidney injury) [N17.9]  Yes    Acute respiratory failure with hypoxia [J96.01]  Yes    Asthma [J45.909]  Yes    Gastroesophageal reflux disease [K21.9]  Yes    Hyperlipidemia  [E78.5]  Yes    Hypertension [I10]  Yes      Resolved Hospital Problems   No resolved problems to display.       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 and large volume blood loss    - extensive blood loss during surgery    - s/p 5u prbc and 2u FFP given per op note    -  #Acute hypoxic respiratory failure  #COPD    - Patient remained intubated post op, was transferred to ICU-Patient extubated on 12/19/2023    - Dueoneb QID    - Pulmicort BID    - wean oxygen as tolerated     #Hypovolemic shock    - 2/2 blood loss from surgery    - Levophed weaned    - cleared for downgrade from ICU     #Acute blood loss anemia    - 2/2 blood loss from surgery    - s/p 5u prbc and 2u FFP given per op note    - Hgb 7.4 this afternoon, no overt bleeding, 1u prbc ordered in ICU     - h/h qday    - transfuse to maintain hgb > 7.0    - pt     #KARON  #Metabolic acidosis    - suspect 2/2 blood loss and possible post op ATN    - bicarb improved from 18 > 24    - Cr 2.23 > 1.71, base Cr is 1.0    - as bicarb normalized, changed IVF to NS @ 125 hour    - monitor    - ionized calcium 1.2, replace PRN     #GERD    - PPI     #HTN    - hold hypertensive meds due to recent shock, monitor      # Weakness, PT OT.  Out of bed to chair.    #obesity advised weight  loss    DVT prophylaxis:  Medical and mechanical DVT prophylaxis orders are present.     Code status is   Code Status and Medical Interventions:   Ordered at: 12/18/23 2010     Code Status (Patient has no pulse and is not breathing):    CPR (Attempt to Resuscitate)     Medical Interventions (Patient has pulse or is breathing):    Full Support       Plan for disposition: Per clinical course in 2 days    Time: 30 minutes    Signature: Electronically signed by Clifford Sanchez MD, 12/25/23, 13:37 EST.  [facility]ist Team

## 2023-12-26 LAB
ALBUMIN SERPL-MCNC: 2.5 G/DL (ref 3.5–5.2)
ALBUMIN/GLOB SERPL: 0.9 G/DL
ALP SERPL-CCNC: 109 U/L (ref 39–117)
ALT SERPL W P-5'-P-CCNC: 31 U/L (ref 1–41)
ANION GAP SERPL CALCULATED.3IONS-SCNC: 9 MMOL/L (ref 5–15)
ANISOCYTOSIS BLD QL: ABNORMAL
AST SERPL-CCNC: 30 U/L (ref 1–40)
BACTERIA SPEC AEROBE CULT: NORMAL
BACTERIA SPEC AEROBE CULT: NORMAL
BASOPHILS # BLD MANUAL: 0.24 10*3/MM3 (ref 0–0.2)
BASOPHILS NFR BLD MANUAL: 2 % (ref 0–1.5)
BILIRUB SERPL-MCNC: 0.8 MG/DL (ref 0–1.2)
BUN SERPL-MCNC: 15 MG/DL (ref 8–23)
BUN/CREAT SERPL: 11.1 (ref 7–25)
CA-I SERPL ISE-MCNC: 1.19 MMOL/L (ref 1.2–1.3)
CALCIUM SPEC-SCNC: 8.5 MG/DL (ref 8.6–10.5)
CHLORIDE SERPL-SCNC: 99 MMOL/L (ref 98–107)
CO2 SERPL-SCNC: 28 MMOL/L (ref 22–29)
CREAT SERPL-MCNC: 1.35 MG/DL (ref 0.76–1.27)
DEPRECATED RDW RBC AUTO: 50.8 FL (ref 37–54)
EGFRCR SERPLBLD CKD-EPI 2021: 58.3 ML/MIN/1.73
EOSINOPHIL # BLD MANUAL: 0.36 10*3/MM3 (ref 0–0.4)
EOSINOPHIL NFR BLD MANUAL: 3 % (ref 0.3–6.2)
ERYTHROCYTE [DISTWIDTH] IN BLOOD BY AUTOMATED COUNT: 18.1 % (ref 12.3–15.4)
GLOBULIN UR ELPH-MCNC: 2.8 GM/DL
GLUCOSE SERPL-MCNC: 95 MG/DL (ref 65–99)
HCT VFR BLD AUTO: 21.9 % (ref 37.5–51)
HGB BLD-MCNC: 7.6 G/DL (ref 13–17.7)
LYMPHOCYTES # BLD MANUAL: 1.9 10*3/MM3 (ref 0.7–3.1)
LYMPHOCYTES NFR BLD MANUAL: 5 % (ref 5–12)
MAGNESIUM SERPL-MCNC: 1.8 MG/DL (ref 1.6–2.4)
MCH RBC QN AUTO: 28.2 PG (ref 26.6–33)
MCHC RBC AUTO-ENTMCNC: 34.8 G/DL (ref 31.5–35.7)
MCV RBC AUTO: 80.9 FL (ref 79–97)
METAMYELOCYTES NFR BLD MANUAL: 1 % (ref 0–0)
MONOCYTES # BLD: 0.6 10*3/MM3 (ref 0.1–0.9)
NEUTROPHILS # BLD AUTO: 8.69 10*3/MM3 (ref 1.7–7)
NEUTROPHILS NFR BLD MANUAL: 68 % (ref 42.7–76)
NEUTS BAND NFR BLD MANUAL: 5 % (ref 0–5)
PHOSPHATE SERPL-MCNC: 2.7 MG/DL (ref 2.5–4.5)
PLAT MORPH BLD: NORMAL
PLATELET # BLD AUTO: 380 10*3/MM3 (ref 140–450)
PMV BLD AUTO: 7 FL (ref 6–12)
POTASSIUM SERPL-SCNC: 3.8 MMOL/L (ref 3.5–5.2)
PROT SERPL-MCNC: 5.3 G/DL (ref 6–8.5)
RBC # BLD AUTO: 2.7 10*6/MM3 (ref 4.14–5.8)
SCAN SLIDE: NORMAL
SODIUM SERPL-SCNC: 136 MMOL/L (ref 136–145)
VARIANT LYMPHS NFR BLD MANUAL: 16 % (ref 19.6–45.3)
WBC MORPH BLD: NORMAL
WBC NRBC COR # BLD AUTO: 11.9 10*3/MM3 (ref 3.4–10.8)

## 2023-12-26 PROCEDURE — 94799 UNLISTED PULMONARY SVC/PX: CPT

## 2023-12-26 PROCEDURE — 83735 ASSAY OF MAGNESIUM: CPT | Performed by: STUDENT IN AN ORGANIZED HEALTH CARE EDUCATION/TRAINING PROGRAM

## 2023-12-26 PROCEDURE — 85007 BL SMEAR W/DIFF WBC COUNT: CPT | Performed by: STUDENT IN AN ORGANIZED HEALTH CARE EDUCATION/TRAINING PROGRAM

## 2023-12-26 PROCEDURE — 97165 OT EVAL LOW COMPLEX 30 MIN: CPT

## 2023-12-26 PROCEDURE — 25010000002 ENOXAPARIN PER 10 MG: Performed by: UROLOGY

## 2023-12-26 PROCEDURE — 82330 ASSAY OF CALCIUM: CPT | Performed by: STUDENT IN AN ORGANIZED HEALTH CARE EDUCATION/TRAINING PROGRAM

## 2023-12-26 PROCEDURE — 25010000002 HYDRALAZINE PER 20 MG: Performed by: INTERNAL MEDICINE

## 2023-12-26 PROCEDURE — 85025 COMPLETE CBC W/AUTO DIFF WBC: CPT | Performed by: STUDENT IN AN ORGANIZED HEALTH CARE EDUCATION/TRAINING PROGRAM

## 2023-12-26 PROCEDURE — 80053 COMPREHEN METABOLIC PANEL: CPT | Performed by: STUDENT IN AN ORGANIZED HEALTH CARE EDUCATION/TRAINING PROGRAM

## 2023-12-26 PROCEDURE — 94664 DEMO&/EVAL PT USE INHALER: CPT

## 2023-12-26 PROCEDURE — 84100 ASSAY OF PHOSPHORUS: CPT | Performed by: STUDENT IN AN ORGANIZED HEALTH CARE EDUCATION/TRAINING PROGRAM

## 2023-12-26 RX ADMIN — OXYCODONE HYDROCHLORIDE 5 MG: 5 TABLET ORAL at 15:54

## 2023-12-26 RX ADMIN — ENOXAPARIN SODIUM 40 MG: 100 INJECTION SUBCUTANEOUS at 15:54

## 2023-12-26 RX ADMIN — BUDESONIDE INHALATION 0.5 MG: 0.5 SUSPENSION RESPIRATORY (INHALATION) at 20:20

## 2023-12-26 RX ADMIN — Medication 10 ML: at 20:34

## 2023-12-26 RX ADMIN — OXYCODONE HYDROCHLORIDE 10 MG: 10 TABLET, FILM COATED, EXTENDED RELEASE ORAL at 20:34

## 2023-12-26 RX ADMIN — CEFDINIR 300 MG: 300 CAPSULE ORAL at 08:03

## 2023-12-26 RX ADMIN — OXYCODONE HYDROCHLORIDE 10 MG: 10 TABLET, FILM COATED, EXTENDED RELEASE ORAL at 08:03

## 2023-12-26 RX ADMIN — Medication 10 ML: at 08:04

## 2023-12-26 RX ADMIN — LOSARTAN POTASSIUM 50 MG: 50 TABLET, FILM COATED ORAL at 08:03

## 2023-12-26 RX ADMIN — PANTOPRAZOLE SODIUM 40 MG: 40 TABLET, DELAYED RELEASE ORAL at 08:03

## 2023-12-26 RX ADMIN — PANTOPRAZOLE SODIUM 40 MG: 40 TABLET, DELAYED RELEASE ORAL at 17:00

## 2023-12-26 RX ADMIN — CHLORHEXIDINE GLUCONATE 15 ML: 1.2 RINSE ORAL at 20:34

## 2023-12-26 RX ADMIN — IPRATROPIUM BROMIDE AND ALBUTEROL SULFATE 3 ML: .5; 3 SOLUTION RESPIRATORY (INHALATION) at 12:27

## 2023-12-26 RX ADMIN — CHLORHEXIDINE GLUCONATE 15 ML: 1.2 RINSE ORAL at 08:03

## 2023-12-26 RX ADMIN — HYDRALAZINE HYDROCHLORIDE 20 MG: 20 INJECTION INTRAMUSCULAR; INTRAVENOUS at 12:47

## 2023-12-26 RX ADMIN — BUDESONIDE INHALATION 0.5 MG: 0.5 SUSPENSION RESPIRATORY (INHALATION) at 12:28

## 2023-12-26 RX ADMIN — CEFDINIR 300 MG: 300 CAPSULE ORAL at 20:34

## 2023-12-26 RX ADMIN — METOPROLOL SUCCINATE 25 MG: 25 TABLET, EXTENDED RELEASE ORAL at 08:03

## 2023-12-26 RX ADMIN — OXYCODONE HYDROCHLORIDE 5 MG: 5 TABLET ORAL at 01:21

## 2023-12-26 NOTE — THERAPY EVALUATION
Patient Name: Louis Andino  : 1960    MRN: [id number]                              Today's Date: 2023       Admit Date: 2023    Visit Dx:     ICD-10-CM ICD-9-CM   1. Other specified disorders of kidney and ureter  N28.89 593.89   2. Renal mass  N28.89 593.9     Patient Active Problem List   Diagnosis    Renal mass    Asthma    Gastroesophageal reflux disease    Hyperlipidemia    Hypertension    BPH (benign prostatic hyperplasia)    COPD (chronic obstructive pulmonary disease)    KARON (acute kidney injury)    Acute respiratory failure with hypoxia     Past Medical History:   Diagnosis Date    Asthma     Emphysema/COPD     GERD (gastroesophageal reflux disease)     Hyperglycemia 10/12/2023    Hyperlipidemia 10/12/2023    Hypertension     Prostate disease     Vitreous degeneration of right eye     Formatting of this note might be different from the original. Retinal tear and detachment warning symptoms reviewed and patient instructed to call immediately if increasing floaters, flashes, or decreasing peripheral vision. No retinal detachment or retinal tear noted. Recheck in 1 month with dilated exam.     Past Surgical History:   Procedure Laterality Date    NEPHRECTOMY Left 2023    Procedure: NEPHRECTOMY RADICAL WITH CAVAL THROMBECTOMY;  Surgeon: James Esquivel MD;  Location: [facility];  Service: Urology;  Laterality: Left;    SKIN LESION EXCISION        General Information       Row Name 23 0854          General Information    Prior Level of Function independent:  goes to [employer] 3 days/wk. Has limited ex misael due to hip OA, cervical nerve impingement. Sees chiropractor. Wanting hip replacement.  -     Existing Precautions/Restrictions fall  but spouse is assisting pt with mobility to the bathroom, using RW and not able to find comfortable spot for gait belt.  -     Barriers to Rehab medically complex  -       Row Name 23 0838          Living Environment     People in Home spouse  she is off work till the new year  -       Row Name 12/26/23 0838          Cognition    Orientation Status (Cognition) oriented x 4  -       Row Name 12/26/23 0838          Safety Issues, Functional Mobility    Safety Issues Affecting Function (Mobility) judgment;problem-solving;safety precautions follow-through/compliance  -     Impairments Affecting Function (Mobility) endurance/activity tolerance;balance;pain  hip pain & pt reports lightheadedness but not as if he will faint  -               User Key  (r) = Recorded By, (t) = Taken By, (c) = Cosigned By      Initials Name Provider Type     Steffi Curran OT Occupational Therapist                     Mobility/ADL's       Row Name 12/26/23 0841          Bed Mobility    Comment, (Bed Mobility) up in chair  -       Row Name 12/26/23 0841          Functional Mobility    Functional Mobility- Comment CGA to bathroom w/ spouse  -       Row Name 12/26/23 0841          Activities of Daily Living    BADL Assessment/Intervention toileting;grooming  -       Row Name 12/26/23 0841          Toileting Assessment/Training    [place] Level (Toileting) toileting skills;minimum assist (75% patient effort)  -     Comment, (Toileting) spouse assisting with many things  -       Row Name 12/26/23 0841          Grooming Assessment/Training    [place] Level (Grooming) grooming skills;standby assist  -               User Key  (r) = Recorded By, (t) = Taken By, (c) = Cosigned By      Initials Name Provider Type     Steffi Curran OT Occupational Therapist                   Obj/Interventions       Row Name 12/26/23 0842          Range of Motion Comprehensive    General Range of Motion no range of motion deficits identified  -       Row Name 12/26/23 0842          Strength Comprehensive (MMT)    General Manual Muscle Testing (MMT) Assessment no strength deficits identified  -               User Key  (r) = Recorded By, (t) =  Taken By, (c) = Cosigned By      Initials Name Provider Type     Steffi Curran, OT Occupational Therapist                   Goals/Plan       Row Name 12/26/23 0846          Bed Mobility Goal 1 (OT)    Activity/Assistive Device (Bed Mobility Goal 1, OT) bed mobility activities, all  -MH     [place] Level/Cues Needed (Bed Mobility Goal 1, OT) independent  -     Time Frame (Bed Mobility Goal 1, OT) 1 week  -       Row Name 12/26/23 0846          Transfer Goal 1 (OT)    Activity/Assistive Device (Transfer Goal 1, OT) transfers, all  -MH     [place] Level/Cues Needed (Transfer Goal 1, OT) modified independence  -     Time Frame (Transfer Goal 1, OT) 1 week  -       Row Name 12/26/23 0846          Bathing Goal 1 (OT)    Activity/Device (Bathing Goal 1, OT) bathing skills, all  -MH     [place] Level/Cues Needed (Bathing Goal 1, OT) set-up required  -     Time Frame (Bathing Goal 1, OT) 2 weeks  -       Row Name 12/26/23 0846          Dressing Goal 1 (OT)    Activity/Device (Dressing Goal 1, OT) dressing skills, all  -MH     [place]/Cues Needed (Dressing Goal 1, OT) set-up required  -     Time Frame (Dressing Goal 1, OT) 10 days  -       Row Name 12/26/23 0846          Therapy Assessment/Plan (OT)    Planned Therapy Interventions (OT) activity tolerance training;adaptive equipment training;BADL retraining;occupation/activity based interventions;patient/caregiver education/training;transfer/mobility retraining;ROM/therapeutic exercise  -               User Key  (r) = Recorded By, (t) = Taken By, (c) = Cosigned By      Initials Name Provider Type     Steffi Curran, OT Occupational Therapist                   Clinical Impression       Row Name 12/26/23 0842          Pain Assessment    Pretreatment Pain Rating 5/10  -     Posttreatment Pain Rating 5/10  -     Pain Location - Side/Orientation Right  -     Pain Location - hip  -       Row Name 12/26/23 0842          Plan of Care  Review    Plan of Care Reviewed With patient;spouse  -     Progress improving  -     Outcome Evaluation Pt with renal cancer admitted for planned left open radical nephrectomy. IVC thrombectomy also done. Pt remained mechanically ventilated for several days post-op due to ongoing respiratory failure and large volume blood loss during surgery. Pt has started to mobilize though only with spouse assisting. She assists with many things. She is off work and will be home with him until the new year when she returns to work. OT is recommending [facility] at d/c. They have necessary DME. Pt states he does wish for continued OT treatments but had just been in the bathroom for 7-8 minutes with his spouse and was painful and had been lightheaded so he declined to get up again this am. Will FU tomorrow for increasing activity tolerance.  -       Row Name 12/26/23 0842          Therapy Assessment/Plan (OT)    Rehab Potential (OT) good, to achieve stated therapy goals  -     Criteria for Skilled Therapeutic Interventions Met (OT) skilled treatment is necessary  -     Therapy Frequency (OT) 5 times/wk  -     Predicted Duration of Therapy Intervention (OT) until d/c  -       Row Name 12/26/23 0842          Therapy Plan Review/Discharge Plan (OT)    Anticipated Discharge Disposition (OT) home with home health  -       Row Name 12/26/23 0842          Vital Signs    O2 Delivery Pre Treatment room air  -       Row Name 12/26/23 0842          Positioning and Restraints    Pre-Treatment Position sitting in chair/recliner  -     Post Treatment Position chair  -     In Chair notified nsg;reclined;exit alarm on;with family/caregiver;call light within reach  -               User Key  (r) = Recorded By, (t) = Taken By, (c) = Cosigned By      Initials Name Provider Type    Steffi Segura, OT Occupational Therapist                   Outcome Measures       Row Name 12/26/23 0847 12/26/23 0800       How much help from another  person do you currently need...    Turning from your back to your side while in flat bed without using bedrails? 3  - 3  -KL    Moving from lying on back to sitting on the side of a flat bed without bedrails? 3  - 3  -KL    Moving to and from a bed to a chair (including a wheelchair)? 3  - 3  -KL    Standing up from a chair using your arms (e.g., wheelchair, bedside chair)? 3  - 3  -KL    Climbing 3-5 steps with a railing? 3  - 3  -KL    To walk in hospital room? 3  - 3  -KL    AM-PAC 6 Clicks Score (PT) 18  - 18  -KL    Highest Level of Mobility Goal 6 --> Walk 10 steps or more  - 6 --> Walk 10 steps or more  -              User Key  (r) = Recorded By, (t) = Taken By, (c) = Cosigned By      Initials Name Provider Type    Steffi Segura OT Occupational Therapist    Brooklyn Bourgeois, RN Registered Nurse                    Occupational Therapy Education       Title: PT OT SLP Therapies (In Progress)       Topic: Occupational Therapy (In Progress)       Point: ADL training (Done)       Description:   Instruct learner(s) on proper safety adaptation and remediation techniques during self care or transfers.   Instruct in proper use of assistive devices.                  Learning Progress Summary             Patient Acceptance, E, VU,NR by  at 12/26/2023 0848   Family Acceptance, E, VU,NR by  at 12/26/2023 0848                         Point: Home exercise program (Not Started)       Description:   Instruct learner(s) on appropriate technique for monitoring, assisting and/or progressing therapeutic exercises/activities.                  Learner Progress:  Not documented in this visit.              Point: Precautions (Done)       Description:   Instruct learner(s) on prescribed precautions during self-care and functional transfers.                  Learning Progress Summary             Patient Acceptance, E, VU,NR by  at 12/26/2023 0848   Family Acceptance, E, VU,NR by  at 12/26/2023 0848                          Point: Body mechanics (Not Started)       Description:   Instruct learner(s) on proper positioning and spine alignment during self-care, functional mobility activities and/or exercises.                  Learner Progress:  Not documented in this visit.                              User Key       Initials Effective Dates Name Provider Type Discipline     06/16/21 -  Steffi Curran, ARLENE Occupational Therapist OT                  OT Recommendation and Plan  Planned Therapy Interventions (OT): activity tolerance training, adaptive equipment training, BADL retraining, occupation/activity based interventions, patient/caregiver education/training, transfer/mobility retraining, ROM/therapeutic exercise  Therapy Frequency (OT): 5 times/wk  Plan of Care Review  Plan of Care Reviewed With: patient, spouse  Progress: improving  Outcome Evaluation: Pt with renal cancer admitted for planned left open radical nephrectomy. IVC thrombectomy also done. Pt remained mechanically ventilated for several days post-op due to ongoing respiratory failure and large volume blood loss during surgery. Pt has started to mobilize though only with spouse assisting. She assists with many things. She is off work and will be home with him until the new year when she returns to work. OT is recommending [facility] at d/c. They have necessary DME. Pt states he does wish for continued OT treatments but had just been in the bathroom for 7-8 minutes with his spouse and was painful and had been lightheaded so he declined to get up again this am. Will FU tomorrow for increasing activity tolerance.     Time Calculation:         Time Calculation- OT       Row Name 12/26/23 0848             Time Calculation-     OT Start Time 0819  -      OT Stop Time 0831  -      OT Time Calculation (min) 12 min  -      Total Timed Code Minutes- OT 0 minute(s)  -      OT Received On 12/26/23  -      OT - Next Appointment 12/27/23  -      OT Goal  Re-Cert Due Date 01/09/24  -                User Key  (r) = Recorded By, (t) = Taken By, (c) = Cosigned By      Initials Name Provider Type     Steffi Curran OT Occupational Therapist                  Therapy Charges for Today       Code Description Service Date Service Provider Modifiers Qty    [id number]  OT EVAL LOW COMPLEXITY 2 12/26/2023 Steffi Curran OT GO 1                 Steffi Curran OT  12/26/2023

## 2023-12-26 NOTE — CASE MANAGEMENT/SOCIAL WORK
Continued Stay Note   [facility]     Patient Name: Louis Andino  MRN: [id number]  Today's Date: 12/26/2023    Admit Date: 12/18/2023    Plan: Home with spouse. [facility] referrals sent for HH - all declined.   Discharge Plan       Row Name 12/26/23 1847       Plan    Plan Home with spouse. [facility] referrals sent for HH - all declined.    Plan Comments [facility] referrals sent for HH - all declined.  CM will follow for possible alternatives 12/27.                 Expected Discharge Date and Time       Expected Discharge Date Expected Discharge Time    Dec 27, 2023               GEORGINA Hwang RN  SIPS/ICU   O: [phone]  C: [phone]  [email or web address]

## 2023-12-26 NOTE — PLAN OF CARE
Goal Outcome Evaluation:  Plan of Care Reviewed With: patient, spouse        Progress: improving  Outcome Evaluation: Pt with renal cancer admitted for planned left open radical nephrectomy. IVC thrombectomy also done. Pt remained mechanically ventilated for several days post-op due to ongoing respiratory failure and large volume blood loss during surgery. Pt has started to mobilize though only with spouse assisting. She assists with many things. She is off work and will be home with him until the new year when she returns to work. OT is recommending [facility] at d/c. They have necessary DME. Pt states he does wish for continued OT treatments but had just been in the bathroom for 7-8 minutes with his spouse and was painful and had been lightheaded so he declined to get up again this am. Will FU tomorrow for increasing activity tolerance.      Anticipated Discharge Disposition (OT): home with home health

## 2023-12-26 NOTE — PLAN OF CARE
Problem: Fall Injury Risk  Goal: Absence of Fall and Fall-Related Injury  Outcome: Ongoing, Progressing  Intervention: Identify and Manage Contributors  Recent Flowsheet Documentation  Taken 12/25/2023 2016 by Rita Guerra, RN  Medication Review/Management: medications reviewed  Self-Care Promotion: independence encouraged  Intervention: Promote Injury-Free Environment  Recent Flowsheet Documentation  Taken 12/25/2023 2016 by Rita Guerra, RN  Safety Promotion/Fall Prevention:   safety round/check completed   fall prevention program maintained   Goal Outcome Evaluation:           Progress: improving

## 2023-12-26 NOTE — PROGRESS NOTES
[facility] DAILY PROGRESS NOTE    Patient Identification  Name: Louis Andino  Age: 63 y.o.  Sex: male  :  1960  MRN: [id number]    Date: 2023             Subjective:  Interval History: Labs stable, pain is improved, getting around better, positive bowel movement and tolerating diet    Objective:    Scheduled Meds:budesonide, 0.5 mg, Nebulization, BID - RT  cefdinir, 300 mg, Oral, Q12H  chlorhexidine, 15 mL, Mouth/Throat, Q12H  enoxaparin, 40 mg, Subcutaneous, Daily  losartan, 50 mg, Oral, Q24H  metoprolol succinate XL, 25 mg, Oral, Q24H  oxyCODONE, 10 mg, Oral, Q12H  pantoprazole, 40 mg, Oral, BID AC  senna-docusate sodium, 2 tablet, Nasogastric, BID  sodium chloride, 10 mL, Intravenous, Q12H      Continuous Infusions:sodium chloride, 125 mL/hr, Last Rate: 125 mL/hr (23 0201)      PRN Meds:  acetaminophen    [DISCONTINUED] acetaminophen **OR** acetaminophen    aluminum-magnesium hydroxide-simethicone    senna-docusate sodium **AND** polyethylene glycol **AND** bisacodyl **AND** bisacodyl    Calcium Replacement - Follow Nurse / BPA Driven Protocol    hydrALAZINE    influenza vaccine    ipratropium-albuterol    Magnesium Low Dose Replacement - Follow Nurse / BPA Driven Protocol    [] HYDROmorphone **AND** naloxone    nitroglycerin    ondansetron **OR** ondansetron    oxyCODONE    Phosphorus Replacement - Follow Nurse / BPA Driven Protocol    Potassium Replacement - Follow Nurse / BPA Driven Protocol    sodium chloride    sodium chloride    sodium chloride    sodium chloride    sodium chloride    Vital signs in last 24 hours:  Temp:  [97.9 °F (36.6 °C)-98.5 °F (36.9 °C)] 97.9 °F (36.6 °C)  Heart Rate:  [76-91] 76  Resp:  [14-19] 18  BP: (150-180)/(70-87) 180/77    Intake/Output:    Intake/Output Summary (Last 24 hours) at 2023 1301  Last data filed at 2023 0532  Gross per 24 hour   Intake 720 ml   Output 1300 ml   Net -580 ml       Exam:  /77 (BP Location: Left  "arm, Patient Position: Lying)   Pulse 76   Temp 97.9 °F (36.6 °C) (Oral)   Resp 18   Ht 177.8 cm (70\")   Wt 106 kg (233 lb 4 oz)   SpO2 99%   BMI 33.47 kg/m²     General Appearance:    Alert, cooperative, NAD   Lungs:     Respirations unlabored, no audible wheezing    Heart:    No cyanosis   Abdomen:     Soft, ND, incision clean dry intact   :   Harley out            Data Review:  All labs (24hrs):   Recent Results (from the past 24 hour(s))   Phosphorus    Collection Time: 12/25/23  4:44 PM    Specimen: Blood   Result Value Ref Range    Phosphorus 2.2 (L) 2.5 - 4.5 mg/dL   Potassium    Collection Time: 12/25/23  4:44 PM    Specimen: Blood   Result Value Ref Range    Potassium 3.9 3.5 - 5.2 mmol/L   Hemoglobin & Hematocrit, Blood    Collection Time: 12/25/23  4:44 PM    Specimen: Blood   Result Value Ref Range    Hemoglobin 7.2 (L) 13.0 - 17.7 g/dL    Hematocrit 22.5 (L) 37.5 - 51.0 %   Magnesium    Collection Time: 12/26/23  4:46 AM    Specimen: Blood   Result Value Ref Range    Magnesium 1.8 1.6 - 2.4 mg/dL   Phosphorus    Collection Time: 12/26/23  4:46 AM    Specimen: Blood   Result Value Ref Range    Phosphorus 2.7 2.5 - 4.5 mg/dL   Comprehensive Metabolic Panel    Collection Time: 12/26/23  4:46 AM    Specimen: Blood   Result Value Ref Range    Glucose 95 65 - 99 mg/dL    BUN 15 8 - 23 mg/dL    Creatinine 1.35 (H) 0.76 - 1.27 mg/dL    Sodium 136 136 - 145 mmol/L    Potassium 3.8 3.5 - 5.2 mmol/L    Chloride 99 98 - 107 mmol/L    CO2 28.0 22.0 - 29.0 mmol/L    Calcium 8.5 (L) 8.6 - 10.5 mg/dL    Total Protein 5.3 (L) 6.0 - 8.5 g/dL    Albumin 2.5 (L) 3.5 - 5.2 g/dL    ALT (SGPT) 31 1 - 41 U/L    AST (SGOT) 30 1 - 40 U/L    Alkaline Phosphatase 109 39 - 117 U/L    Total Bilirubin 0.8 0.0 - 1.2 mg/dL    Globulin 2.8 gm/dL    A/G Ratio 0.9 g/dL    BUN/Creatinine Ratio 11.1 7.0 - 25.0    Anion Gap 9.0 5.0 - 15.0 mmol/L    eGFR 58.3 (L) >60.0 mL/min/1.73   Calcium, Ionized    Collection Time: 12/26/23  4:46 " AM    Specimen: Blood   Result Value Ref Range    Ionized Calcium 1.19 (L) 1.20 - 1.30 mmol/L   CBC Auto Differential    Collection Time: 12/26/23  4:46 AM    Specimen: Blood   Result Value Ref Range    WBC 11.90 (H) 3.40 - 10.80 10*3/mm3    RBC 2.70 (L) 4.14 - 5.80 10*6/mm3    Hemoglobin 7.6 (L) 13.0 - 17.7 g/dL    Hematocrit 21.9 (L) 37.5 - 51.0 %    MCV 80.9 79.0 - 97.0 fL    MCH 28.2 26.6 - 33.0 pg    MCHC 34.8 31.5 - 35.7 g/dL    RDW 18.1 (H) 12.3 - 15.4 %    RDW-SD 50.8 37.0 - 54.0 fl    MPV 7.0 6.0 - 12.0 fL    Platelets 380 140 - 450 10*3/mm3   Scan Slide    Collection Time: 12/26/23  4:46 AM    Specimen: Blood   Result Value Ref Range    Scan Slide     Manual Differential    Collection Time: 12/26/23  4:46 AM    Specimen: Blood   Result Value Ref Range    Neutrophil % 68.0 42.7 - 76.0 %    Lymphocyte % 16.0 (L) 19.6 - 45.3 %    Monocyte % 5.0 5.0 - 12.0 %    Eosinophil % 3.0 0.3 - 6.2 %    Basophil % 2.0 (H) 0.0 - 1.5 %    Bands %  5.0 0.0 - 5.0 %    Metamyelocyte % 1.0 (H) 0.0 - 0.0 %    Neutrophils Absolute 8.69 (H) 1.70 - 7.00 10*3/mm3    Lymphocytes Absolute 1.90 0.70 - 3.10 10*3/mm3    Monocytes Absolute 0.60 0.10 - 0.90 10*3/mm3    Eosinophils Absolute 0.36 0.00 - 0.40 10*3/mm3    Basophils Absolute 0.24 (H) 0.00 - 0.20 10*3/mm3    Anisocytosis Slight/1+ None Seen    WBC Morphology Normal Normal    Platelet Morphology Normal Normal      Imaging Results (Last 24 Hours)       ** No results found for the last 24 hours. **             Assessment:    Renal mass    Asthma    Gastroesophageal reflux disease    Hyperlipidemia    Hypertension    BPH (benign prostatic hyperplasia)    COPD (chronic obstructive pulmonary disease)    KARON (acute kidney injury)    Acute respiratory failure with hypoxia      Left nephrectomy and IVC thrombectomy 12/18    Plan:    Hgb stable  KARON, improved  Cont Lovenox prophylaxis  SCDs  Continue to increase activity, appreciate physical and Occupational Therapy input  Pain control  is improved  Continue regular diet  Appreciate consultants care, discharge once activity level okay for home    James Esquivel MD  [facility]  [address], [address]  [place] [address]  Office: [phone]  Available via [email or web address] Secure Chat  12/26/23  13:01 EST

## 2023-12-26 NOTE — CONSULTS
Pt lying in bed, resting on side (s/p kidney removal). Pt spouse at bedside. Pt engaged chp in deep, theological dialogue (pt has masters in sacred literature). Chp offered emotional support and facilitated prayer as requested.  to continue providing routine visits at least once weekly through admission. Additional support available as needed/ requested.

## 2023-12-26 NOTE — PROGRESS NOTES
Infectious Diseases Progress Note      LOS: 8 days   Patient Care Team:  Radha, Emerald Known as PCP - General    Chief Complaint: Weakness    Subjective     Had no fever during the last 24 hours.  He remained hemodynamically stable.  He is currently on room air    Review of Systems:   Review of Systems   Constitutional:  Positive for fatigue.   HENT: Negative.     Eyes: Negative.    Respiratory: Negative.     Cardiovascular: Negative.    Gastrointestinal: Negative.    Genitourinary: Negative.    Musculoskeletal: Negative.    Skin: Negative.    Neurological: Negative.    Hematological: Negative.    Psychiatric/Behavioral: Negative.          Objective     Vital Signs  Temp:  [97.9 °F (36.6 °C)-98.5 °F (36.9 °C)] 97.9 °F (36.6 °C)  Heart Rate:  [76-91] 76  Resp:  [14-19] 18  BP: (144-180)/(70-87) 144/72    Physical Exam:  Physical Exam  Vitals and nursing note reviewed.   Constitutional:       Appearance: He is well-developed.   HENT:      Head: Normocephalic and atraumatic.   Eyes:      Pupils: Pupils are equal, round, and reactive to light.   Cardiovascular:      Rate and Rhythm: Normal rate and regular rhythm.      Heart sounds: Normal heart sounds.   Pulmonary:      Effort: Pulmonary effort is normal. No respiratory distress.      Breath sounds: Rales present. No wheezing.   Abdominal:      General: Bowel sounds are normal. There is no distension.      Palpations: Abdomen is soft. There is no mass.      Tenderness: There is no abdominal tenderness. There is no guarding or rebound.   Musculoskeletal:         General: No deformity. Normal range of motion.      Cervical back: Normal range of motion and neck supple.   Skin:     General: Skin is warm.      Findings: No erythema or rash.   Neurological:      Mental Status: He is alert and oriented to person, place, and time.      Cranial Nerves: No cranial nerve deficit.          Results Review:    I have reviewed all clinical data, test, lab, and imaging results.      Radiology  No Radiology Exams Resulted Within Past 24 Hours    Cardiology    Laboratory    Results from last 7 days   Lab Units 12/26/23  0446 12/25/23  1644 12/25/23  0219 12/24/23  0442 12/23/23  0654 12/22/23  0625 12/21/23  0345 12/20/23  1212 12/20/23  0606   WBC 10*3/mm3 11.90*  --  12.70* 13.10* 14.60* 17.10* 23.30*  --  17.50*   HEMOGLOBIN g/dL 7.6* 7.2* 7.2* 8.2* 7.9* 7.9* 7.3*   < > 7.9*   HEMATOCRIT % 21.9* 22.5* 21.6* 25.9* 24.7* 24.9* 22.9*   < > 24.2*   PLATELETS 10*3/mm3 380  --  347 311 310 235 225  --  157    < > = values in this interval not displayed.     Results from last 7 days   Lab Units 12/26/23  0446 12/25/23  1644 12/25/23  0219 12/24/23  0442 12/23/23  0654 12/22/23  0625 12/21/23  0345 12/20/23  0606   SODIUM mmol/L 136  --  136 137 136 137 137 136   POTASSIUM mmol/L 3.8 3.9 3.5 3.6  3.7 3.3* 3.8 4.3 3.8   CHLORIDE mmol/L 99  --  98 99 98 103 104 102   CO2 mmol/L 28.0  --  28.0 26.0 25.0 25.0 25.0 24.0   BUN mg/dL 15  --  17 20 19 16 16 16   CREATININE mg/dL 1.35*  --  1.26 1.37* 1.26 1.24 1.47* 1.56*   GLUCOSE mg/dL 95  --  115* 83 81 77 80 97   ALBUMIN g/dL 2.5*  --  2.7* 2.8* 2.9* 2.6* 3.0* 2.6*   BILIRUBIN mg/dL 0.8  --  1.4* 1.7* 3.2* 2.7* 1.1 0.7   ALK PHOS U/L 109  --  124* 269* 220* 119* 400* 51   AST (SGOT) U/L 30  --  33 47* 63* 71* 84* 86*   ALT (SGPT) U/L 31  --  38 50* 60* 54* 42* 40   CALCIUM mg/dL 8.5*  --  8.2* 8.7 8.6 8.6 8.7 7.7*                 Microbiology   Microbiology Results (last 10 days)       Procedure Component Value - Date/Time    Blood Culture - Blood, Hand, Right [[id number]]  (Normal) Collected: 12/21/23 0710    Lab Status: Final result Specimen: Blood from Hand, Right Updated: 12/26/23 0800     Blood Culture No growth at 5 days    Blood Culture - Blood, Hand, Left [[id number]]  (Normal) Collected: 12/21/23 0710    Lab Status: Final result Specimen: Blood from Hand, Left Updated: 12/26/23 0800     Blood Culture No growth at 5 days    Narrative:      Less  than seven (7) mL's of blood was collected.  Insufficient quantity may yield false negative results.    Blood Culture - Blood, Arm, Left [[id number]]  (Normal) Collected: 23 1107    Lab Status: Final result Specimen: Blood from Arm, Left Updated: 23 1146     Blood Culture No growth at 5 days    Blood Culture - Blood, Arm, Right [[id number]]  (Normal) Collected: 23 1050    Lab Status: Final result Specimen: Blood from Arm, Right Updated: 23 1146     Blood Culture No growth at 5 days            Medication Review:       Schedule Meds  budesonide, 0.5 mg, Nebulization, BID - RT  cefdinir, 300 mg, Oral, Q12H  chlorhexidine, 15 mL, Mouth/Throat, Q12H  enoxaparin, 40 mg, Subcutaneous, Daily  losartan, 50 mg, Oral, Q24H  metoprolol succinate XL, 25 mg, Oral, Q24H  oxyCODONE, 10 mg, Oral, Q12H  pantoprazole, 40 mg, Oral, BID AC  senna-docusate sodium, 2 tablet, Nasogastric, BID  sodium chloride, 10 mL, Intravenous, Q12H        Infusion Meds  sodium chloride, 125 mL/hr, Last Rate: 125 mL/hr (23 0201)        PRN Meds    acetaminophen    [DISCONTINUED] acetaminophen **OR** acetaminophen    aluminum-magnesium hydroxide-simethicone    senna-docusate sodium **AND** polyethylene glycol **AND** bisacodyl **AND** bisacodyl    Calcium Replacement - Follow Nurse / BPA Driven Protocol    hydrALAZINE    influenza vaccine    ipratropium-albuterol    Magnesium Low Dose Replacement - Follow Nurse / BPA Driven Protocol    [] HYDROmorphone **AND** naloxone    nitroglycerin    ondansetron **OR** ondansetron    oxyCODONE    Phosphorus Replacement - Follow Nurse / BPA Driven Protocol    Potassium Replacement - Follow Nurse / BPA Driven Protocol    sodium chloride    sodium chloride    sodium chloride    sodium chloride    sodium chloride        Assessment & Plan       Antimicrobial Therapy   1.  P.o. Omnicef        2.        3.        4.        5.            Assessment     Reactive leukocytosis.  Patient has no  obvious clinical signs of sepsis.  Probably secondary to intra-abdominal hematoma versus pneumonia.  Blood cultures from 12/21/2023 are negative so far  White count is trending down slowly    Left lower lobe infiltrate consistent with pneumonia.  Currently on room air     S/p left radical nephrectomy on December 18, 2023 secondary to left kidney mass probably renal cell carcinoma.  CT scan of abdomen pelvis showed collection at the nephrectomy site which possibly seroma versus hematoma     History of borderline diabetes mellitus     History of degenerative joint disease with a chronic back and hip pain     Elevated creatinine.  Patient is s/p recent left-sided nephrectomy.  Resolved     Plan       Continue Omnicef 300 mg p.o. twice daily for 3 days  Encourage incentive spirometer  Not much to add from infectious disease point.  I will sign off today.  Please call for any question        Jcarlos Wiklins MD  12/26/23  13:47 EST    Note is dictated utilizing voice recognition software/Dragon

## 2023-12-26 NOTE — PROGRESS NOTES
[facility] Hospital Medicine Services   Daily Progress Note    Patient Name: Louis Andino  : 1960  MRN: [id number]  Primary Care Physician:  Provider, No Known  Date of admission: 2023  Date of Service: 2023      Subjective      Patient is laying in bed, feeling better, still feeling weak, afebrile          Objective      Vitals:   Temp:  [97.9 °F (36.6 °C)-98.5 °F (36.9 °C)] 97.9 °F (36.6 °C)  Heart Rate:  [76-91] 76  Resp:  [14-19] 18  BP: (150-180)/(70-87) 180/77  Flow (L/min):  [0] 0    Physical Exam       General:  Alert and oriented , no  distress  Eyes:  Pupils are equal, round and reactive to light. Extraocular movements are intact. Normal conjunctivae, vision unchanged    HENT:  Normocephalic, atraumatic   Respiratory: Decrease in breathing sounds anteriorly bilaterally. No wheezing  Cardiovascular: Regular rate, Regular, S1 auscultated. S2 auscultated , No edema   Gastrointestinal: Soft. Nontender, nondistended. Normal bowel sounds  Musculoskeletal: No tenderness   Neurologic: Alert, oriented. No focal defect   Psychiatric: Cooperative , appropriate mood and affect, nonsuicidal           Result Review    Result Review:  I have personally reviewed the results from the time of this admission to 2023 13:04 EST and agree with these findings:  [x]  Laboratory  []  Microbiology  []  Radiology  []  EKG/Telemetry   []  Cardiology/Vascular   []  Pathology  []  Old records  []  Other:  Most notable findings include:     Wounds (last 24 hours)       LDA Wound       Row Name 23 1200 23 0800 23       Wound 23 0758 Left upper abdomen Incision    Wound - Properties Group Placement Date: 23  -MS Placement Time: 758MS Side: Left  -MS Orientation: upper  -MS Location: abdomen  -MS Primary Wound Type: Incision  -MS    Dressing Appearance open to air  -KL open to air  -KL dry;intact;open to air  -SM    Closure Sta[place];Open to air  -KL [place];Open to  air;Approximated  -KL [place];Open to air  -SM    Base clean;dry  -KL blanchable;dry;clean  -KL blanchable  -SM    Periwound -- dry;intact  -KL dry;intact  -SM    Periwound Temperature -- -- warm  -SM    Periwound Skin Turgor -- -- firm  -SM    Drainage Amount none  -KL none  -KL none  -SM    Retired Wound - Properties Group Placement Date: 12/18/23  -MS Placement Time: 0758 -MS Side: Left  -MS Orientation: upper  -MS Location: abdomen  -MS Primary Wound Type: Incision  -MS    Retired Wound - Properties Group Date first assessed: 12/18/23  -MS Time first assessed: 0758  -MS Side: Left  -MS Location: abdomen  -MS Primary Wound Type: Incision  -MS              User Key  (r) = Recorded By, (t) = Taken By, (c) = Cosigned By      Initials Name Provider Type    Brooklyn Bourgeois RN Registered Nurse    Cassie Hidalgo RN Registered Nurse    Rita Giron RN Registered Nurse                      Assessment & Plan          budesonide, 0.5 mg, Nebulization, BID - RT  cefdinir, 300 mg, Oral, Q12H  chlorhexidine, 15 mL, Mouth/Throat, Q12H  enoxaparin, 40 mg, Subcutaneous, Daily  losartan, 50 mg, Oral, Q24H  metoprolol succinate XL, 25 mg, Oral, Q24H  oxyCODONE, 10 mg, Oral, Q12H  pantoprazole, 40 mg, Oral, BID AC  senna-docusate sodium, 2 tablet, Nasogastric, BID  sodium chloride, 10 mL, Intravenous, Q12H       sodium chloride, 125 mL/hr, Last Rate: 125 mL/hr (12/22/23 0201)         Active Hospital Problems:  Active Hospital Problems    Diagnosis     **Renal mass     BPH (benign prostatic hyperplasia)     COPD (chronic obstructive pulmonary disease)     KARON (acute kidney injury)     Acute respiratory failure with hypoxia     Asthma     Gastroesophageal reflux disease     Hyperlipidemia     Hypertension      Plan:     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 and large volume blood loss    - extensive blood loss during  surgery    - s/p 5u prbc and 2u FFP given per op note    -Urology following    #Acute hypoxic respiratory failure  #COPD    - Patient remained intubated post op, was transferred to ICU-Patient extubated on 12/19/2023    - Dueoneb QID    - Pulmicort BID    - wean oxygen as tolerated     #Hypovolemic shock    - 2/2 blood loss from surgery    - Levophed Weaned  -Continue Omnicef per ID recommendation       #Acute blood loss anemia    - 2/2 blood loss from surgery    - s/p 5u prbc and 2u FFP given per op note      - h/h qday    - transfuse to maintain hgb > 7.0    - pt     #KARON  #Metabolic acidosis    - suspect 2/2 blood loss and possible post op ATN    - bicarb improved from 18 > 24    - Cr 1.35    - monitor     #GERD    - PPI     #HTN    - hold hypertensive meds due to recent shock, monitor      # Weakness, PT OT.      #obesity advised weight  loss  DVT prophylaxis:  Medical and mechanical DVT prophylaxis orders are present.    CODE STATUS:    Code Status (Patient has no pulse and is not breathing): CPR (Attempt to Resuscitate)  Medical Interventions (Patient has pulse or is breathing): Full Support      Disposition:  I expect patient to be discharged over the weekend as the patient ambulating more.      Electronically signed by Yuniel Lai MD, 12/26/23, 13:04 EST.  [facility] Hospitalist Team

## 2023-12-26 NOTE — PLAN OF CARE
Goal Outcome Evaluation:  Plan of Care Reviewed With: patient        Progress: improving  Outcome Evaluation: Abed at this time resting. Wife has been at bedside most of the day. Sat in chair this morning. Tolerating regular diet. Having bowel movements. Incision clean and well approximated. Has not requested any PRN medications so far during the shift. Able to make needs known. Will monitor

## 2023-12-27 LAB
ALBUMIN SERPL-MCNC: 2.7 G/DL (ref 3.5–5.2)
ALBUMIN/GLOB SERPL: 0.9 G/DL
ALP SERPL-CCNC: 93 U/L (ref 39–117)
ALT SERPL W P-5'-P-CCNC: 29 U/L (ref 1–41)
ANION GAP SERPL CALCULATED.3IONS-SCNC: 9 MMOL/L (ref 5–15)
ANISOCYTOSIS BLD QL: ABNORMAL
AST SERPL-CCNC: 27 U/L (ref 1–40)
BILIRUB SERPL-MCNC: 0.9 MG/DL (ref 0–1.2)
BUN SERPL-MCNC: 14 MG/DL (ref 8–23)
BUN/CREAT SERPL: 11 (ref 7–25)
CA-I SERPL ISE-MCNC: 1.19 MMOL/L (ref 1.2–1.3)
CALCIUM SPEC-SCNC: 8.3 MG/DL (ref 8.6–10.5)
CHLORIDE SERPL-SCNC: 98 MMOL/L (ref 98–107)
CO2 SERPL-SCNC: 29 MMOL/L (ref 22–29)
CREAT SERPL-MCNC: 1.27 MG/DL (ref 0.76–1.27)
DEPRECATED RDW RBC AUTO: 51.6 FL (ref 37–54)
EGFRCR SERPLBLD CKD-EPI 2021: 62.7 ML/MIN/1.73
EOSINOPHIL # BLD MANUAL: 0.1 10*3/MM3 (ref 0–0.4)
EOSINOPHIL NFR BLD MANUAL: 1 % (ref 0.3–6.2)
ERYTHROCYTE [DISTWIDTH] IN BLOOD BY AUTOMATED COUNT: 18.6 % (ref 12.3–15.4)
GLOBULIN UR ELPH-MCNC: 2.9 GM/DL
GLUCOSE SERPL-MCNC: 100 MG/DL (ref 65–99)
HCT VFR BLD AUTO: 22.5 % (ref 37.5–51)
HGB BLD-MCNC: 7.3 G/DL (ref 13–17.7)
LAB AP CASE REPORT: NORMAL
LAB AP DIAGNOSIS COMMENT: NORMAL
LAB AP SYNOPTIC CHECKLIST: NORMAL
LYMPHOCYTES # BLD MANUAL: 1.55 10*3/MM3 (ref 0.7–3.1)
LYMPHOCYTES NFR BLD MANUAL: 5 % (ref 5–12)
MAGNESIUM SERPL-MCNC: 2 MG/DL (ref 1.6–2.4)
MCH RBC QN AUTO: 26.3 PG (ref 26.6–33)
MCHC RBC AUTO-ENTMCNC: 32.6 G/DL (ref 31.5–35.7)
MCV RBC AUTO: 80.6 FL (ref 79–97)
METAMYELOCYTES NFR BLD MANUAL: 1 % (ref 0–0)
MONOCYTES # BLD: 0.52 10*3/MM3 (ref 0.1–0.9)
NEUTROPHILS # BLD AUTO: 8.03 10*3/MM3 (ref 1.7–7)
NEUTROPHILS NFR BLD MANUAL: 78 % (ref 42.7–76)
NRBC SPEC MANUAL: 1 /100 WBC (ref 0–0.2)
PATH REPORT.FINAL DX SPEC: NORMAL
PATH REPORT.GROSS SPEC: NORMAL
PHOSPHATE SERPL-MCNC: 2.9 MG/DL (ref 2.5–4.5)
PLATELET # BLD AUTO: 406 10*3/MM3 (ref 140–450)
PMV BLD AUTO: 7.4 FL (ref 6–12)
POTASSIUM SERPL-SCNC: 3.6 MMOL/L (ref 3.5–5.2)
PROT SERPL-MCNC: 5.6 G/DL (ref 6–8.5)
RBC # BLD AUTO: 2.79 10*6/MM3 (ref 4.14–5.8)
SCAN SLIDE: NORMAL
SMALL PLATELETS BLD QL SMEAR: ADEQUATE
SODIUM SERPL-SCNC: 136 MMOL/L (ref 136–145)
VARIANT LYMPHS NFR BLD MANUAL: 15 % (ref 19.6–45.3)
WBC MORPH BLD: NORMAL
WBC NRBC COR # BLD AUTO: 10.3 10*3/MM3 (ref 3.4–10.8)

## 2023-12-27 PROCEDURE — 94799 UNLISTED PULMONARY SVC/PX: CPT

## 2023-12-27 PROCEDURE — 94761 N-INVAS EAR/PLS OXIMETRY MLT: CPT

## 2023-12-27 PROCEDURE — 83735 ASSAY OF MAGNESIUM: CPT | Performed by: STUDENT IN AN ORGANIZED HEALTH CARE EDUCATION/TRAINING PROGRAM

## 2023-12-27 PROCEDURE — 84100 ASSAY OF PHOSPHORUS: CPT | Performed by: STUDENT IN AN ORGANIZED HEALTH CARE EDUCATION/TRAINING PROGRAM

## 2023-12-27 PROCEDURE — 85025 COMPLETE CBC W/AUTO DIFF WBC: CPT | Performed by: STUDENT IN AN ORGANIZED HEALTH CARE EDUCATION/TRAINING PROGRAM

## 2023-12-27 PROCEDURE — 80053 COMPREHEN METABOLIC PANEL: CPT | Performed by: STUDENT IN AN ORGANIZED HEALTH CARE EDUCATION/TRAINING PROGRAM

## 2023-12-27 PROCEDURE — 97116 GAIT TRAINING THERAPY: CPT

## 2023-12-27 PROCEDURE — 85007 BL SMEAR W/DIFF WBC COUNT: CPT | Performed by: STUDENT IN AN ORGANIZED HEALTH CARE EDUCATION/TRAINING PROGRAM

## 2023-12-27 PROCEDURE — 94664 DEMO&/EVAL PT USE INHALER: CPT

## 2023-12-27 PROCEDURE — 97530 THERAPEUTIC ACTIVITIES: CPT

## 2023-12-27 PROCEDURE — 97535 SELF CARE MNGMENT TRAINING: CPT

## 2023-12-27 PROCEDURE — 82330 ASSAY OF CALCIUM: CPT | Performed by: STUDENT IN AN ORGANIZED HEALTH CARE EDUCATION/TRAINING PROGRAM

## 2023-12-27 PROCEDURE — 25010000002 ENOXAPARIN PER 10 MG: Performed by: UROLOGY

## 2023-12-27 RX ORDER — POTASSIUM CHLORIDE 20 MEQ/1
40 TABLET, EXTENDED RELEASE ORAL EVERY 4 HOURS
Status: COMPLETED | OUTPATIENT
Start: 2023-12-27 | End: 2023-12-27

## 2023-12-27 RX ADMIN — LOSARTAN POTASSIUM 50 MG: 50 TABLET, FILM COATED ORAL at 08:02

## 2023-12-27 RX ADMIN — Medication 10 ML: at 08:03

## 2023-12-27 RX ADMIN — OXYCODONE HYDROCHLORIDE 10 MG: 10 TABLET, FILM COATED, EXTENDED RELEASE ORAL at 08:03

## 2023-12-27 RX ADMIN — ENOXAPARIN SODIUM 40 MG: 100 INJECTION SUBCUTANEOUS at 16:54

## 2023-12-27 RX ADMIN — CHLORHEXIDINE GLUCONATE 15 ML: 1.2 RINSE ORAL at 21:08

## 2023-12-27 RX ADMIN — CEFDINIR 300 MG: 300 CAPSULE ORAL at 21:07

## 2023-12-27 RX ADMIN — CHLORHEXIDINE GLUCONATE 15 ML: 1.2 RINSE ORAL at 08:05

## 2023-12-27 RX ADMIN — Medication 10 ML: at 21:08

## 2023-12-27 RX ADMIN — PANTOPRAZOLE SODIUM 40 MG: 40 TABLET, DELAYED RELEASE ORAL at 16:54

## 2023-12-27 RX ADMIN — POTASSIUM CHLORIDE 40 MEQ: 1500 TABLET, EXTENDED RELEASE ORAL at 08:02

## 2023-12-27 RX ADMIN — METOPROLOL SUCCINATE 25 MG: 25 TABLET, EXTENDED RELEASE ORAL at 08:02

## 2023-12-27 RX ADMIN — OXYCODONE HYDROCHLORIDE 10 MG: 10 TABLET, FILM COATED, EXTENDED RELEASE ORAL at 21:07

## 2023-12-27 RX ADMIN — POTASSIUM CHLORIDE 40 MEQ: 1500 TABLET, EXTENDED RELEASE ORAL at 11:41

## 2023-12-27 RX ADMIN — SENNOSIDES AND DOCUSATE SODIUM 2 TABLET: 50; 8.6 TABLET ORAL at 21:07

## 2023-12-27 RX ADMIN — BUDESONIDE INHALATION 0.5 MG: 0.5 SUSPENSION RESPIRATORY (INHALATION) at 21:00

## 2023-12-27 RX ADMIN — BUDESONIDE INHALATION 0.5 MG: 0.5 SUSPENSION RESPIRATORY (INHALATION) at 06:49

## 2023-12-27 RX ADMIN — CEFDINIR 300 MG: 300 CAPSULE ORAL at 08:02

## 2023-12-27 RX ADMIN — OXYCODONE HYDROCHLORIDE 5 MG: 5 TABLET ORAL at 05:24

## 2023-12-27 RX ADMIN — PANTOPRAZOLE SODIUM 40 MG: 40 TABLET, DELAYED RELEASE ORAL at 08:02

## 2023-12-27 NOTE — PLAN OF CARE
Problem: Adult Inpatient Plan of Care  Goal: Optimal Comfort and Wellbeing  Outcome: Ongoing, Progressing  Intervention: Monitor Pain and Promote Comfort  Recent Flowsheet Documentation  Taken 12/26/2023 2000 by Rita Guerra RN  Pain Management Interventions:   care clustered   pain management plan reviewed with patient/caregiver   diversional activity provided   see MAR  Intervention: Provide Person-Centered Care  Recent Flowsheet Documentation  Taken 12/26/2023 2000 by Rita Guerra RN  Trust Relationship/Rapport:   care explained   choices provided   questions answered   questions encouraged     Problem: Skin Injury Risk Increased  Goal: Skin Health and Integrity  Outcome: Ongoing, Progressing  Intervention: Optimize Skin Protection  Recent Flowsheet Documentation  Taken 12/26/2023 2000 by Rita Guerra, RN  Pressure Reduction Techniques: frequent weight shift encouraged  Pressure Reduction Devices: pressure-redistributing mattress utilized  Skin Protection: adhesive use limited   Goal Outcome Evaluation:           Progress: improving

## 2023-12-27 NOTE — PLAN OF CARE
Goal Outcome Evaluation:  Plan of Care Reviewed With: patient        Progress: improving  Outcome Evaluation: Currently sitting in chair at bedside. Wife has been at bedside during the shift. Few complaints of any pain today and states he is feeling better today. Tolerating diet well. Urinating without issues. Incision clean with staples intact. Will continue to monitor

## 2023-12-27 NOTE — PROGRESS NOTES
[facility] DAILY PROGRESS NOTE    Patient Identification  Name: Louis Andino  Age: 63 y.o.  Sex: male  :  1960  MRN: [id number]    Date: 2023             Subjective:  Interval History: Labs stable, pain is improved, getting around better, positive bowel movement and tolerating diet    Objective:    Scheduled Meds:budesonide, 0.5 mg, Nebulization, BID - RT  cefdinir, 300 mg, Oral, Q12H  chlorhexidine, 15 mL, Mouth/Throat, Q12H  enoxaparin, 40 mg, Subcutaneous, Daily  losartan, 50 mg, Oral, Q24H  metoprolol succinate XL, 25 mg, Oral, Q24H  oxyCODONE, 10 mg, Oral, Q12H  pantoprazole, 40 mg, Oral, BID AC  senna-docusate sodium, 2 tablet, Nasogastric, BID  sodium chloride, 10 mL, Intravenous, Q12H      Continuous Infusions:sodium chloride, 125 mL/hr, Last Rate: 125 mL/hr (23 0201)      PRN Meds:  acetaminophen    [DISCONTINUED] acetaminophen **OR** acetaminophen    aluminum-magnesium hydroxide-simethicone    senna-docusate sodium **AND** polyethylene glycol **AND** bisacodyl **AND** bisacodyl    Calcium Replacement - Follow Nurse / BPA Driven Protocol    hydrALAZINE    influenza vaccine    ipratropium-albuterol    Magnesium Low Dose Replacement - Follow Nurse / BPA Driven Protocol    [] HYDROmorphone **AND** naloxone    nitroglycerin    ondansetron **OR** ondansetron    Phosphorus Replacement - Follow Nurse / BPA Driven Protocol    Potassium Replacement - Follow Nurse / BPA Driven Protocol    sodium chloride    sodium chloride    sodium chloride    sodium chloride    sodium chloride    Vital signs in last 24 hours:  Temp:  [98.3 °F (36.8 °C)-99.2 °F (37.3 °C)] 98.4 °F (36.9 °C)  Heart Rate:  [75-93] 75  Resp:  [14-19] 19  BP: (102-170)/(64-74) 102/64    Intake/Output:    Intake/Output Summary (Last 24 hours) at 2023 1803  Last data filed at 2023 1207  Gross per 24 hour   Intake 240 ml   Output 700 ml   Net -460 ml       Exam:  /64 (BP Location: Right arm, Patient  "Position: Lying)   Pulse 75   Temp 98.4 °F (36.9 °C) (Oral)   Resp 19   Ht 177.8 cm (70\")   Wt 106 kg (233 lb 4 oz)   SpO2 94%   BMI 33.47 kg/m²     General Appearance:    Alert, cooperative, NAD   Lungs:     Respirations unlabored, no audible wheezing    Heart:    No cyanosis   Abdomen:     Soft, ND, incision clean dry intact   :   Harley out            Data Review:  All labs (24hrs):   Recent Results (from the past 24 hour(s))   Magnesium    Collection Time: 12/27/23  5:16 AM    Specimen: Blood   Result Value Ref Range    Magnesium 2.0 1.6 - 2.4 mg/dL   Phosphorus    Collection Time: 12/27/23  5:16 AM    Specimen: Blood   Result Value Ref Range    Phosphorus 2.9 2.5 - 4.5 mg/dL   Comprehensive Metabolic Panel    Collection Time: 12/27/23  5:16 AM    Specimen: Blood   Result Value Ref Range    Glucose 100 (H) 65 - 99 mg/dL    BUN 14 8 - 23 mg/dL    Creatinine 1.27 0.76 - 1.27 mg/dL    Sodium 136 136 - 145 mmol/L    Potassium 3.6 3.5 - 5.2 mmol/L    Chloride 98 98 - 107 mmol/L    CO2 29.0 22.0 - 29.0 mmol/L    Calcium 8.3 (L) 8.6 - 10.5 mg/dL    Total Protein 5.6 (L) 6.0 - 8.5 g/dL    Albumin 2.7 (L) 3.5 - 5.2 g/dL    ALT (SGPT) 29 1 - 41 U/L    AST (SGOT) 27 1 - 40 U/L    Alkaline Phosphatase 93 39 - 117 U/L    Total Bilirubin 0.9 0.0 - 1.2 mg/dL    Globulin 2.9 gm/dL    A/G Ratio 0.9 g/dL    BUN/Creatinine Ratio 11.0 7.0 - 25.0    Anion Gap 9.0 5.0 - 15.0 mmol/L    eGFR 62.7 >60.0 mL/min/1.73   Calcium, Ionized    Collection Time: 12/27/23  5:16 AM    Specimen: Blood   Result Value Ref Range    Ionized Calcium 1.19 (L) 1.20 - 1.30 mmol/L   CBC Auto Differential    Collection Time: 12/27/23  5:16 AM    Specimen: Blood   Result Value Ref Range    WBC 10.30 3.40 - 10.80 10*3/mm3    RBC 2.79 (L) 4.14 - 5.80 10*6/mm3    Hemoglobin 7.3 (L) 13.0 - 17.7 g/dL    Hematocrit 22.5 (L) 37.5 - 51.0 %    MCV 80.6 79.0 - 97.0 fL    MCH 26.3 (L) 26.6 - 33.0 pg    MCHC 32.6 31.5 - 35.7 g/dL    RDW 18.6 (H) 12.3 - 15.4 %    " RDW-SD 51.6 37.0 - 54.0 fl    MPV 7.4 6.0 - 12.0 fL    Platelets 406 140 - 450 10*3/mm3   Scan Slide    Collection Time: 12/27/23  5:16 AM    Specimen: Blood   Result Value Ref Range    Scan Slide     Manual Differential    Collection Time: 12/27/23  5:16 AM    Specimen: Blood   Result Value Ref Range    Neutrophil % 78.0 (H) 42.7 - 76.0 %    Lymphocyte % 15.0 (L) 19.6 - 45.3 %    Monocyte % 5.0 5.0 - 12.0 %    Eosinophil % 1.0 0.3 - 6.2 %    Metamyelocyte % 1.0 (H) 0.0 - 0.0 %    Neutrophils Absolute 8.03 (H) 1.70 - 7.00 10*3/mm3    Lymphocytes Absolute 1.55 0.70 - 3.10 10*3/mm3    Monocytes Absolute 0.52 0.10 - 0.90 10*3/mm3    Eosinophils Absolute 0.10 0.00 - 0.40 10*3/mm3    nRBC 1.0 (H) 0.0 - 0.2 /100 WBC    Anisocytosis Slight/1+ None Seen    WBC Morphology Normal Normal    Platelet Estimate Adequate Normal      Imaging Results (Last 24 Hours)       ** No results found for the last 24 hours. **             Assessment:    Renal mass    Asthma    Gastroesophageal reflux disease    Hyperlipidemia    Hypertension    BPH (benign prostatic hyperplasia)    COPD (chronic obstructive pulmonary disease)    KARON (acute kidney injury)    Acute respiratory failure with hypoxia      Left nephrectomy and IVC thrombectomy 12/18    Plan:    Hgb stable  KARON, improved  Cont Lovenox prophylaxis  SCDs  Continue to increase activity, appreciate physical and Occupational Therapy input, plan discharge tomorrow vs Friday  Pain control is improved  Continue regular diet  Appreciate consultants care    James Esquivel MD  [facility]  [address] [facility], [address]  [place] [address]  Office: [phone]  Available via [employer]  12/27/23  18:03 EST

## 2023-12-27 NOTE — CASE MANAGEMENT/SOCIAL WORK
Continued Stay Note  MILLIE Hanna     Patient Name: Louis Andino  MRN: [id number]  Today's Date: 12/27/2023    Admit Date: 12/18/2023    Plan: Home with spouse - [facility], accepted.   Discharge Plan       Row Name 12/27/23 1655       Plan    Plan Home with spouse - [facility], accepted.    Patient/Family in Agreement with Plan yes    Plan Comments Plan to dc home with wife - [facility], accepted, spoke with Rosanna at [facility]- requested order so CM can fax info to [facility]. AL Barrier:  advancing diet.                 Expected Discharge Date and Time       Expected Discharge Date Expected Discharge Time    Dec 27, 2023               GEORGINA Hwang RN  [facility]/ICU   O: [phone]  C: [phone]  [email or web address]

## 2023-12-27 NOTE — PLAN OF CARE
"Goal Outcome Evaluation:      Assessment: Louis Andino presents with functional mobility impairments in functional mobility, gait, and endurance which indicate the need for skilled intervention. PT noted low (but stable) Hgb, likely contributing to poor activity tolerance. Will continue to follow and progress as tolerated.     Plan/Recommendations:   If medically appropriate, Low Intensity Therapy recommended post-acute care - This is recommended as therapy feels this patient would require 2-3 visits per week. OP or HH would be the best option depending on patient's home bound status. Consider, if the patient has other  \"skilled\" needs such as wounds, IV antibiotics, etc. Combined with \"low intensity\" could also equate to a SNF. If patient is medically complex, consider LTAC. Pt requires no DME at discharge, he has his wife's RW.     Pt desires Home with Home Health at discharge, but verbalizes understanding regarding no accepting agencies. Pt cooperative; agreeable to therapeutic recommendations and plan of care.                   "

## 2023-12-27 NOTE — PROGRESS NOTES
[facility] Medicine Services   Daily Progress Note    Patient Name: Louis Andino  : 1960  MRN: [id number]  Primary Care Physician:  Provider, No Known  Date of admission: 2023  Date of Service: 2023       Subjective      Patient  is lying in bed, feeling better but still feeling weak, afebrile           Objective      Vitals:   Temp:  [97.9 °F (36.6 °C)-99.2 °F (37.3 °C)] 98.3 °F (36.8 °C)  Heart Rate:  [76-93] 78  Resp:  [14-18] 16  BP: (133-180)/(68-77) 170/74    Physical Exam       General:  Alert and oriented , no  distress  Eyes:  Pupils are equal, round and reactive to light. Extraocular movements are intact. Normal conjunctivae, vision unchanged    HENT:  Normocephalic, atraumatic   Respiratory: Decrease in breathing sounds anteriorly bilaterally. No wheezing  Cardiovascular: Regular rate, Regular, S1 auscultated. S2 auscultated , No edema   Gastrointestinal: Soft. Nontender, nondistended. Normal bowel sounds  Musculoskeletal: No tenderness   Neurologic: Alert, oriented. No focal defect   Psychiatric: Cooperative , appropriate mood and affect, nonsuicidal           Result Review    Result Review:  I have personally reviewed the results from the time of this admission to 2023 10:13 EST and agree with these findings:  [x]  Laboratory  []  Microbiology  []  Radiology  []  EKG/Telemetry   []  Cardiology/Vascular   []  Pathology  []  Old records  []  Other:  Most notable findings include:     Wounds (last 24 hours)       LDA Wound       Row Name 23 0800 23 0000 23 2000       Wound 23 0758 Left upper abdomen Incision    Wound - Properties Group Placement Date: 23  -MS Placement Time: 075  -MS Side: Left  -MS Orientation: upper  -MS Location: abdomen  -MS Primary Wound Type: Incision  -MS    Dressing Appearance -- -- open to air  -SM    Closure Staples;Open to air;Approximated  -KL Janette;Open to air  -SM Staples;Open to air  -SM    Base  blanchable;dry;clean  -KL clean;dry  -SM clean;dry  -SM    Periwound dry;intact  -KL dry;intact  -SM dry;intact  -SM    Periwound Temperature -- warm  -SM warm  -SM    Periwound Skin Turgor -- firm  -SM firm  -SM    Drainage Characteristics/Odor -- bleeding controlled  -SM bleeding controlled  -SM    Drainage Amount none  -KL none  -SM none  -SM    Retired Wound - Properties Group Placement Date: 12/18/23  -MS Placement Time: 0758 -MS Side: Left  -MS Orientation: upper  -MS Location: abdomen  -MS Primary Wound Type: Incision  -MS    Retired Wound - Properties Group Date first assessed: 12/18/23  -MS Time first assessed: 0758  -MS Side: Left  -MS Location: abdomen  -MS Primary Wound Type: Incision  -MS      Row Name 12/26/23 1600 12/26/23 1200          Wound 12/18/23 0758 Left upper abdomen Incision    Wound - Properties Group Placement Date: 12/18/23  -MS Placement Time: 0758  -MS Side: Left  -MS Orientation: upper  -MS Location: abdomen  -MS Primary Wound Type: Incision  -MS    Dressing Appearance open to air  -KL open to air  -KL     Closure Staples;Open to air  -KL Staples;Open to air  -KL     Base clean;dry  -KL clean;dry  -KL     Drainage Amount none  -KL none  -KL     Retired Wound - Properties Group Placement Date: 12/18/23  -MS Placement Time: 0758  -MS Side: Left  -MS Orientation: upper  -MS Location: abdomen  -MS Primary Wound Type: Incision  -MS    Retired Wound - Properties Group Date first assessed: 12/18/23  -MS Time first assessed: 0758  -MS Side: Left  -MS Location: abdomen  -MS Primary Wound Type: Incision  -MS              User Key  (r) = Recorded By, (t) = Taken By, (c) = Cosigned By      Initials Name Provider Type    Brooklyn Bourgeois RN Registered Nurse    Cassie Hidalgo RN Registered Nurse    Rita Giron RN Registered Nurse                      Assessment & Plan          budesonide, 0.5 mg, Nebulization, BID - RT  cefdinir, 300 mg, Oral, Q12H  chlorhexidine, 15 mL, Mouth/Throat,  Q12H  enoxaparin, 40 mg, Subcutaneous, Daily  losartan, 50 mg, Oral, Q24H  metoprolol succinate XL, 25 mg, Oral, Q24H  oxyCODONE, 10 mg, Oral, Q12H  pantoprazole, 40 mg, Oral, BID AC  potassium chloride, 40 mEq, Oral, Q4H  senna-docusate sodium, 2 tablet, Nasogastric, BID  sodium chloride, 10 mL, Intravenous, Q12H       sodium chloride, 125 mL/hr, Last Rate: 125 mL/hr (12/22/23 0201)         Active Hospital Problems:  Active Hospital Problems    Diagnosis     **Renal mass     BPH (benign prostatic hyperplasia)     COPD (chronic obstructive pulmonary disease)     KARON (acute kidney injury)     Acute respiratory failure with hypoxia     Asthma     Gastroesophageal reflux disease     Hyperlipidemia     Hypertension      Plan:     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 and large volume blood loss    - extensive blood loss during surgery    - s/p 5u prbc and 2u FFP given per op note    -Urology following     #Acute hypoxic respiratory failure  #COPD    - Patient remained intubated post op, was transferred to ICU-Patient extubated on 12/19/2023    - Dueoneb QID    - Pulmicort BID    - wean oxygen as tolerated     #Hypovolemic shock    - 2/2 blood loss from surgery    - Levophed Weaned  -Continue Omnicef per ID recommendation        #Acute blood loss anemia    - 2/2 blood loss from surgery    - s/p 5u prbc and 2u FFP given per op note      - h/h qday    - transfuse to maintain hgb > 7.0    - pt     #KARON  #Metabolic acidosis    - suspect 2/2 blood loss and possible post op ATN    - bicarb improved from 18 > 24    - Cr 1.35    - monitor     #GERD    - PPI     #HTN    - hold hypertensive meds due to recent shock, monitor      # Weakness, PT OT.   Possible discharge to home vs. SNF    Discussed with the pt and his wife at the bedside.     Also discussed with the nurse      #obesity advised weight  loss  DVT prophylaxis:  Medical  and mechanical DVT prophylaxis orders are present.    DVT prophylaxis:  Medical and mechanical DVT prophylaxis orders are present.    CODE STATUS:    Code Status (Patient has no pulse and is not breathing): CPR (Attempt to Resuscitate)  Medical Interventions (Patient has pulse or is breathing): Full Support      Disposition:  I expect patient to be discharged 2 days.      Electronically signed by Yuniel Lai MD, 12/27/23, 10:13 EST.  [facility]

## 2023-12-27 NOTE — DISCHARGE PLACEMENT REQUEST
"Louis Andino (63 y.o. Male)       Date of Birth   1960    Social Security Number       Address   [address] [place] [address]    Home Phone   [phone]    MRN   [id number]       Anglican   Mandaeism    Marital Status                               Admission Date   12/18/23    Admission Type   Elective    Admitting Provider   James Esquivel MD    Attending Provider   Yuniel Lai MD    Department, Room/Bed   [facility] SURGICAL INPATIENT, 4132/1       Discharge Date       Discharge Disposition       Discharge Destination                                 Attending Provider: Yuniel Lai MD    Allergies: No Known Allergies    Isolation: None   Infection: None   Code Status: CPR    Ht: 177.8 cm (70\")   Wt: 106 kg (233 lb 4 oz)    Admission Cmt: None   Principal Problem: Renal mass [N28.89]                   Active Insurance as of 12/18/2023       Primary Coverage       Payor Plan Insurance Group Employer/Plan Group    ANTHEM BLUE CROSS ANTHEM BLUE CROSS BLUE SHIELD PPO [id number]       Payor Plan Address Payor Plan Phone Number Payor Plan Fax Number Effective Dates    [address] [phone]  1/1/2022 - None Entered    [facility] [id number]         Subscriber Name Subscriber Birth Date Member ID       MARK ANDINO [birth year]1961 [id number]               Secondary Coverage       Payor Plan Insurance Group Employer/Plan Group    MISC MC SUP   MISC MC SUP              NGN       Coverage Address Coverage Phone Number Coverage Fax Number Effective Dates    [address] [phone]  11/1/2023 - None Entered    [facility] [id number]         Subscriber Name Subscriber Birth Date Member ID       LOUIS ANDINO 1960 [id number]               Tertiary Coverage       Payor Plan Insurance Group Employer/Plan Group    MEDICARE MEDICARE A & B        Payor Plan Address Payor Plan Phone Number Payor Plan Fax Number Effective Dates    [address] [phone]  11/1/2023 - " None Entered    [facility] [id number]         Subscriber Name Subscriber Birth Date Member ID       LOUIS ANDINO 1960 [id number]                     Emergency Contacts        (Rel.) Home Phone Work Phone Mobile Phone    Dahlia Andino (Spouse) -- -- [phone]                 History & Physical        Erika Hawkins APRN at 23       Attestation signed by Yuniel Lai MD at 23 0850    I have reviewed this documentation and agree.                    Critical Care History and Physical     Louis Andino : 1960 MRN:[id number] LOS:0 ROOM: 2308/1     Reason for admission: Renal mass     Assessment / Plan     Acute Respiratory Failure; intubated for surgery  -CXR Reviewed  -EKG Reviewed  -ABG, monitor as ordered  -BNP, monitor as ordered  -Duoneb  -Continue sedation and pain medication, titrate to effect  -Continue mechanical ventilation support with weaning as tolerated to baseline  -Titrate oxygen support delivery method for O2 SAT > 92%    Renal mass    --Status post left open radical nephrectomy with IVC thrombectomy    --3500 EBL  -Urology following    Kidney injury, acute  -UA reviewed  -Continue IVF resuscitation  -Monitor and trend labs  -Avoid nephrotoxic medications, hypotension, and NSAIDS  -Renally dose medications  -Monitor urine output  -Consider Nephrology consult if creatinine fails to improve    Essential Hypertension, Chronic, Controlledl; Hyperlipidemia  -Continue home medications as appropriate   - Monitor with routine vital signs     COPD, not in exacerbation  -Continue home inhalers as tolerated  -Incentive spirometry  -Titrate O2 for sats > 90%    GERD  -Continue PPI      Code Status (Patient has no pulse and is not breathing): CPR (Attempt to Resuscitate)  Medical Interventions (Patient has pulse or is breathing): Full Support       Nutrition:   NPO Diet NPO Type: Strict NPO     DVT prophylaxis:  Medical and mechanical DVT prophylaxis  orders are present.     History of Present illness     Louis Andino is a 63 y.o. male with PMH of hypertension, hyperlipidemia, COPD, BPH, and GERD presented to the hospital for nephrectomy, and was admitted with a principal diagnosis of Renal mass.  HPI information is limited due to patient intubated and sedated at time of assessment; information obtained from chart review and patient's spouse at bedside.  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  Post procedure patient was to remain on vent overnight and admitted to the intensive care unit for further evaluation and treatment.      ACP: Patient unable to answer questions due to intubation; wife is decision-maker if he is unable he will remain full code with full intervention.    Patient was seen and examined on 12/18/23 at 20:11 EST .    Subjective / Review of systems     Review of Systems   Unable to perform ROS: Intubated        Past Medical/Surgical/Social/Family History & Allergies     Past Medical History:   Diagnosis Date    Asthma     Emphysema/COPD     GERD (gastroesophageal reflux disease)     Hypertension     Prostate disease       Past Surgical History:   Procedure Laterality Date    SKIN LESION EXCISION  1990      Social History     Socioeconomic History    Marital status:     Number of children: 6   Tobacco Use    Smoking status: Never     Passive exposure: Past    Smokeless tobacco: Never    Tobacco comments:     SECOND HAND SMOKE EXPOSURE AS A CHILD    Vaping Use    Vaping Use: Never used   Substance and Sexual Activity    Alcohol use: Yes     Alcohol/week: 1.0 standard drink of alcohol     Types: 1 Glasses of wine per week     Comment: rare    Drug use: Not Currently    Sexual activity: Defer      History reviewed. No pertinent family history.   No Known Allergies   Social Determinants of Health      Tobacco Use: Low Risk  (12/18/2023)    Patient History     Smoking Tobacco Use: Never     Smokeless Tobacco Use: Never     Passive Exposure: Past   Alcohol Use: Not on file   Financial Resource Strain: Not on file   Food Insecurity: Not on file   Transportation Needs: Not on file   Physical Activity: Not on file   Stress: Not on file   Social Connections: Unknown (10/11/2023)    Family and Community Support     Help with Day-to-Day Activities: Not on file     Lonely or Isolated: Not on file   Interpersonal Safety: Not At Risk (12/18/2023)    Abuse Screen     Unsafe at Home or Work/School: no     Feels Threatened by Someone?: no     Does Anyone Keep You from Contacting Others or Doint Things Outside the Home?: no     Physical Sign of Abuse Present: no   Depression: Not on file   Housing Stability: Unknown (12/18/2023)    Housing Stability     Current Living Arrangements: home     Potentially Unsafe Housing Conditions: Not on file   Utilities: Not on file   Health Literacy: Unknown (10/11/2023)    Education     Help with school or training?: Not on file     Preferred Language: Not on file   Employment: Unknown (10/11/2023)    Employment     Do you want help finding or keeping work or a job?: Not on file   Disabilities: Not At Risk (12/18/2023)    Disabilities     Concentrating, Remembering, or Making Decisions Difficulty: no     Doing Errands Independently Difficulty: no        Home Medications     Prior to Admission medications    Medication Sig Start Date End Date Taking? Authorizing Provider   aspirin 81 MG EC tablet Take 1 tablet by mouth Daily.   Yes Toño Garcia MD   celecoxib (CeleBREX) 200 MG capsule Take 1 capsule by mouth Daily.   Yes Toño Garcia MD   famotidine (PEPCID) 40 MG tablet Take 1 tablet by mouth Daily.   Yes Toño Garcia MD   hydroCHLOROthiazide (HYDRODIURIL) 25 MG tablet Take 1 tablet by mouth Daily.   Yes Toño Garcia MD   lisinopril (PRINIVIL,ZESTRIL) 10  MG tablet Take 1.5 tablets by mouth 2 (Two) Times a Day.   Yes Toño Garcia MD   magnesium oxide (MAG-OX) 400 MG tablet Take 1 tablet by mouth Daily.   Yes Toño Garcia MD   omeprazole (priLOSEC) 40 MG capsule Take 1 capsule by mouth Daily.   Yes Toño Garcia MD   potassium chloride (K-DUR,KLOR-CON) 20 MEQ CR tablet Take 1 tablet by mouth Daily.   Yes Toño Garcia MD   Symbicort 160-4.5 MCG/ACT inhaler Inhale 2 puffs 2 (Two) Times a Day. 8/9/23  Yes Toño Garcia MD   vitamin D3 125 MCG (5000 UT) capsule capsule Take 1 capsule by mouth Daily.   Yes Radha, MD Toño        Objective / Physical Exam     Vital signs:  Temp: 98.1 °F (36.7 °C)  BP: 98/67  Heart Rate: 87  Resp: 17  SpO2: 100 %  Weight: 102 kg (224 lb 13.9 oz)    Admission Weight: Weight: 101 kg (223 lb)    Physical Exam  Vitals and nursing note reviewed.   Constitutional:       Appearance: Normal appearance.   HENT:      Head: Normocephalic.      Nose: Nose normal.      Mouth/Throat:      Mouth: Mucous membranes are moist.      Pharynx: Oropharynx is clear.   Eyes:      Pupils: Pupils are equal, round, and reactive to light.   Cardiovascular:      Rate and Rhythm: Normal rate and regular rhythm.   Pulmonary:      Comments: ET tube in place  Abdominal:      General: There is distension.      Comments: Transverse surgical incision with island dressing C, D, I   Musculoskeletal:         General: Normal range of motion.      Cervical back: Normal range of motion.   Skin:     General: Skin is warm and dry.      Capillary Refill: Capillary refill takes 2 to 3 seconds.   Neurological:      Comments: Intubated and sedated   Psychiatric:      Comments: Intubated and sedated          Labs     Results from last 7 days   Lab Units 12/18/23  1722 12/18/23  1318 12/18/23  1203 12/18/23  1051 12/12/23  1319   WBC 10*3/mm3 17.50*  --  14.70*  --  9.99   HEMATOCRIT % 30.6*  --  28.3*  --  33.9*   HEMATOCRIT POC %  --   25*  --  30*  --    PLATELETS 10*3/mm3 228  --  227  --  376      Results from last 7 days   Lab Units 12/18/23  1722 12/12/23  1319   SODIUM mmol/L 136 136   POTASSIUM mmol/L 4.9 3.9   CHLORIDE mmol/L 104 98   CO2 mmol/L 21.0* 27.0   BUN mg/dL 14 15   CREATININE mg/dL 1.48* 1.02        Imaging     XR Abdomen KUB    Result Date: 12/18/2023  Impression: NG tube projects over the expected position of the distal body, antrum of the stomach. Electronically Signed: Ervin Howell MD  12/18/2023 5:58 PM EST  Workstation ID: [id number]    XR Chest 1 View    Result Date: 12/18/2023  Impression: Endotracheal tube in place Mild right infrahilar discoid atelectasis. Otherwise clear lungs Electronically Signed: Farhat Fletcher MD  12/18/2023 3:06 PM EST  Workstation ID: [id number]    XR Lost Needle / Instrument    Result Date: 12/18/2023  Impression: Postsurgical changes with multiple surgical clips and skin staple line in the upper abdomen. No definite radiopaque instrument or other unexpected foreign body is identified. Results were called to Suzette Carrasco RN in the OR by Dr. Moya at 12/18/2023 2:00 PM EST. Electronically Signed: Nestor Moya  12/18/2023 2:10 PM EST  Workstation ID: [id number]       Chest X ray: My independent assessment showed no infiltrates or effusions        Current Medications     Scheduled Meds:  budesonide-formoterol, 2 puff, Inhalation, BID - RT  chlorhexidine, 15 mL, Mouth/Throat, Q12H  [START ON 12/19/2023] enoxaparin, 40 mg, Subcutaneous, Daily  hydroCHLOROthiazide, 25 mg, Oral, Daily  magnesium sulfate, 1 g, Intravenous, Once  [START ON 12/19/2023] pantoprazole, 40 mg, Oral, Q AM  senna-docusate sodium, 2 tablet, Oral, BID  sodium chloride, 1,000 mL, Intravenous, Once  sodium chloride, 10 mL, Intravenous, Q12H  sodium chloride, 10 mL, Intravenous, Q12H  sodium chloride, 10 mL, Intravenous, Q12H  sodium chloride, 10 mL, Intravenous, Q12H         Continuous Infusions:  lactated ringers, 1,000 mL, Last  Rate: 1,000 mL (12/18/23 0647)  norepinephrine, 0.02-0.5 mcg/kg/min, Last Rate: 0.04 mcg/kg/min (12/18/23 1715)  propofol, 5-50 mcg/kg/min, Last Rate: 40 mcg/kg/min (12/18/23 1818)  sodium chloride, 100 mL/hr, Last Rate: 100 mL/hr (12/18/23 1739)         Plan discussed with RN. Reviewed all other data in the last 24 hours, including but not limited to vitals, labs, microbiology, imaging and pertinent notes from other providers.  Plan also discussed with patient's wife and son at the bedside.      DUANE Jimenez   Critical Care  12/18/23   20:11 EST       Electronically signed by Yuniel Lai MD at 12/19/23 0850       James Esquivel MD at 12/18/23 0718          H&P reviewed.  The patient was examined and there are no changes to the H&P   Electronically signed by James Esquivel MD at 12/18/23 0718   Source Note          I called Dr. Esquivel office to provide staff the update that he is cleared for surgery.  I was unable to get in contact with someone but I left a detailed VM on the confidential line relaying this information.  I encouraged them to call our office back if they had further questions.    I called and spoke with pt, providing him the pt appt liaison number for PCP- [phone].    Thank you,    Lisa STREET RN  Triage LC  12/15/23 13:29 EST      Electronically signed by Lisa Gaytan RN at 12/15/23 1327                 Lisa Gaytan RN at 12/15/23 1200          I called Dr. Esquivel office to provide staff the update that he is cleared for surgery.  I was unable to get in contact with someone but I left a detailed VM on the confidential line relaying this information.  I encouraged them to call our office back if they had further questions.    I called and spoke with pt, providing him the pt appt liaison number for PCP- [phone].    Thank you,    Lisa STREET RN  Triage [facility]  12/15/23 13:29 EST      Electronically signed by Lisa Gaytan RN at 12/15/23 1329          Physician Progress  Notes (last 48 hours)        Yuniel Lai MD at 23 1013              [facility] Medicine Services   Daily Progress Note    Patient Name: Louis Andino  : 1960  MRN: [id number]  Primary Care Physician:  Provider, No Known  Date of admission: 2023  Date of Service: 2023       Subjective      Patient  is lying in bed, feeling better but still feeling weak, afebrile           Objective      Vitals:   Temp:  [97.9 °F (36.6 °C)-99.2 °F (37.3 °C)] 98.3 °F (36.8 °C)  Heart Rate:  [76-93] 78  Resp:  [14-18] 16  BP: (133-180)/(68-77) 170/74    Physical Exam       General:  Alert and oriented , no  distress  Eyes:  Pupils are equal, round and reactive to light. Extraocular movements are intact. Normal conjunctivae, vision unchanged    HENT:  Normocephalic, atraumatic   Respiratory: Decrease in breathing sounds anteriorly bilaterally. No wheezing  Cardiovascular: Regular rate, Regular, S1 auscultated. S2 auscultated , No edema   Gastrointestinal: Soft. Nontender, nondistended. Normal bowel sounds  Musculoskeletal: No tenderness   Neurologic: Alert, oriented. No focal defect   Psychiatric: Cooperative , appropriate mood and affect, nonsuicidal           Result Review    Result Review:  I have personally reviewed the results from the time of this admission to 2023 10:13 EST and agree with these findings:  [x]  Laboratory  []  Microbiology  []  Radiology  []  EKG/Telemetry   []  Cardiology/Vascular   []  Pathology  []  Old records  []  Other:  Most notable findings include:     Wounds (last 24 hours)       LDA Wound       Row Name 23 0800 23 0000 23 2000       Wound 23 0758 Left upper abdomen Incision    Wound - Properties Group Placement Date: 23  -MS Placement Time: 758MS Side: Left  -MS Orientation: upper  -MS Location: abdomen  -MS Primary Wound Type: Incision  -MS    Dressing Appearance -- -- open to air  -SM    Closure Staples;Open to  air;Approximated  -KL [place];Open to air  -SM Staples;Open to air  -SM    Base blanchable;dry;clean  -KL clean;dry  -SM clean;dry  -SM    Periwound dry;intact  -KL dry;intact  -SM dry;intact  -SM    Periwound Temperature -- warm  -SM warm  -SM    Periwound Skin Turgor -- firm  -SM firm  -SM    Drainage Characteristics/Odor -- bleeding controlled  -SM bleeding controlled  -SM    Drainage Amount none  -KL none  -SM none  -SM    Retired Wound - Properties Group Placement Date: 12/18/23 -MS Placement Time: 0758 -MS Side: Left  -MS Orientation: upper  -MS Location: abdomen  -MS Primary Wound Type: Incision  -MS    Retired Wound - Properties Group Date first assessed: 12/18/23 -MS Time first assessed: 0758  -MS Side: Left  -MS Location: abdomen  -MS Primary Wound Type: Incision  -MS      Row Name 12/26/23 1600 12/26/23 1200          Wound 12/18/23 0758 Left upper abdomen Incision    Wound - Properties Group Placement Date: 12/18/23 -MS Placement Time: 0758 -MS Side: Left  -MS Orientation: upper  -MS Location: abdomen  -MS Primary Wound Type: Incision  -MS    Dressing Appearance open to air  -KL open to air  -KL     Closure Staples;Open to air  -KL Staples;Open to air  -KL     Base clean;dry  -KL clean;dry  -KL     Drainage Amount none  -KL none  -KL     Retired Wound - Properties Group Placement Date: 12/18/23  -MS Placement Time: 0758  -MS Side: Left  -MS Orientation: upper  -MS Location: abdomen  -MS Primary Wound Type: Incision  -MS    Retired Wound - Properties Group Date first assessed: 12/18/23 -MS Time first assessed: 0758  -MS Side: Left  -MS Location: abdomen  -MS Primary Wound Type: Incision  -MS              User Key  (r) = Recorded By, (t) = Taken By, (c) = Cosigned By      Initials Name Provider Type    Brooklyn Bourgeois RN Registered Nurse    Cassie Hidalgo RN Registered Nurse    Rita Giron RN Registered Nurse                      Assessment & Plan          budesonide, 0.5 mg, Nebulization,  BID - RT  cefdinir, 300 mg, Oral, Q12H  chlorhexidine, 15 mL, Mouth/Throat, Q12H  enoxaparin, 40 mg, Subcutaneous, Daily  losartan, 50 mg, Oral, Q24H  metoprolol succinate XL, 25 mg, Oral, Q24H  oxyCODONE, 10 mg, Oral, Q12H  pantoprazole, 40 mg, Oral, BID AC  potassium chloride, 40 mEq, Oral, Q4H  senna-docusate sodium, 2 tablet, Nasogastric, BID  sodium chloride, 10 mL, Intravenous, Q12H       sodium chloride, 125 mL/hr, Last Rate: 125 mL/hr (12/22/23 0201)         Active Hospital Problems:  Active Hospital Problems    Diagnosis     **Renal mass     BPH (benign prostatic hyperplasia)     COPD (chronic obstructive pulmonary disease)     KARON (acute kidney injury)     Acute respiratory failure with hypoxia     Asthma     Gastroesophageal reflux disease     Hyperlipidemia     Hypertension      Plan:     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 and large volume blood loss    - extensive blood loss during surgery    - s/p 5u prbc and 2u FFP given per op note    -Urology following     #Acute hypoxic respiratory failure  #COPD    - Patient remained intubated post op, was transferred to ICU-Patient extubated on 12/19/2023    - Dueoneb QID    - Pulmicort BID    - wean oxygen as tolerated     #Hypovolemic shock    - 2/2 blood loss from surgery    - Levophed Weaned  -Continue Omnicef per ID recommendation        #Acute blood loss anemia    - 2/2 blood loss from surgery    - s/p 5u prbc and 2u FFP given per op note      - h/h qday    - transfuse to maintain hgb > 7.0    - pt     #KARON  #Metabolic acidosis    - suspect 2/2 blood loss and possible post op ATN    - bicarb improved from 18 > 24    - Cr 1.35    - monitor     #GERD    - PPI     #HTN    - hold hypertensive meds due to recent shock, monitor      # Weakness, PT OT.   Possible discharge to home vs. SNF    Discussed with the pt and his wife at the bedside.     Also discussed  with the nurse      #obesity advised weight  loss  DVT prophylaxis:  Medical and mechanical DVT prophylaxis orders are present.    DVT prophylaxis:  Medical and mechanical DVT prophylaxis orders are present.    CODE STATUS:    Code Status (Patient has no pulse and is not breathing): CPR (Attempt to Resuscitate)  Medical Interventions (Patient has pulse or is breathing): Full Support      Disposition:  I expect patient to be discharged 2 days.      Electronically signed by Yuniel Lai MD, 12/27/23, 10:13 EST.  [facility] Hospitalist Team              Electronically signed by Yuniel Lai MD at 12/27/23 1505       Jcarlos Wilkins MD at 12/26/23 1347          Infectious Diseases Progress Note      LOS: 8 days   Patient Care Team:  Provider, No Known as PCP - General    Chief Complaint: Weakness    Subjective     Had no fever during the last 24 hours.  He remained hemodynamically stable.  He is currently on room air    Review of Systems:   Review of Systems   Constitutional:  Positive for fatigue.   HENT: Negative.     Eyes: Negative.    Respiratory: Negative.     Cardiovascular: Negative.    Gastrointestinal: Negative.    Genitourinary: Negative.    Musculoskeletal: Negative.    Skin: Negative.    Neurological: Negative.    Hematological: Negative.    Psychiatric/Behavioral: Negative.          Objective     Vital Signs  Temp:  [97.9 °F (36.6 °C)-98.5 °F (36.9 °C)] 97.9 °F (36.6 °C)  Heart Rate:  [76-91] 76  Resp:  [14-19] 18  BP: (144-180)/(70-87) 144/72    Physical Exam:  Physical Exam  Vitals and nursing note reviewed.   Constitutional:       Appearance: He is well-developed.   HENT:      Head: Normocephalic and atraumatic.   Eyes:      Pupils: Pupils are equal, round, and reactive to light.   Cardiovascular:      Rate and Rhythm: Normal rate and regular rhythm.      Heart sounds: Normal heart sounds.   Pulmonary:      Effort: Pulmonary effort is normal. No respiratory distress.      Breath sounds: Rales  present. No wheezing.   Abdominal:      General: Bowel sounds are normal. There is no distension.      Palpations: Abdomen is soft. There is no mass.      Tenderness: There is no abdominal tenderness. There is no guarding or rebound.   Musculoskeletal:         General: No deformity. Normal range of motion.      Cervical back: Normal range of motion and neck supple.   Skin:     General: Skin is warm.      Findings: No erythema or rash.   Neurological:      Mental Status: He is alert and oriented to person, place, and time.      Cranial Nerves: No cranial nerve deficit.          Results Review:    I have reviewed all clinical data, test, lab, and imaging results.     Radiology  No Radiology Exams Resulted Within Past 24 Hours    Cardiology    Laboratory    Results from last 7 days   Lab Units 12/26/23  0446 12/25/23  1644 12/25/23  0219 12/24/23  0442 12/23/23  0654 12/22/23  0625 12/21/23  0345 12/20/23  1212 12/20/23  0606   WBC 10*3/mm3 11.90*  --  12.70* 13.10* 14.60* 17.10* 23.30*  --  17.50*   HEMOGLOBIN g/dL 7.6* 7.2* 7.2* 8.2* 7.9* 7.9* 7.3*   < > 7.9*   HEMATOCRIT % 21.9* 22.5* 21.6* 25.9* 24.7* 24.9* 22.9*   < > 24.2*   PLATELETS 10*3/mm3 380  --  347 311 310 235 225  --  157    < > = values in this interval not displayed.     Results from last 7 days   Lab Units 12/26/23  0446 12/25/23  1644 12/25/23  0219 12/24/23  0442 12/23/23  0654 12/22/23  0625 12/21/23  0345 12/20/23  0606   SODIUM mmol/L 136  --  136 137 136 137 137 136   POTASSIUM mmol/L 3.8 3.9 3.5 3.6  3.7 3.3* 3.8 4.3 3.8   CHLORIDE mmol/L 99  --  98 99 98 103 104 102   CO2 mmol/L 28.0  --  28.0 26.0 25.0 25.0 25.0 24.0   BUN mg/dL 15  --  17 20 19 16 16 16   CREATININE mg/dL 1.35*  --  1.26 1.37* 1.26 1.24 1.47* 1.56*   GLUCOSE mg/dL 95  --  115* 83 81 77 80 97   ALBUMIN g/dL 2.5*  --  2.7* 2.8* 2.9* 2.6* 3.0* 2.6*   BILIRUBIN mg/dL 0.8  --  1.4* 1.7* 3.2* 2.7* 1.1 0.7   ALK PHOS U/L 109  --  124* 269* 220* 119* 400* 51   AST (SGOT) U/L 30  --   33 47* 63* 71* 84* 86*   ALT (SGPT) U/L 31  --  38 50* 60* 54* 42* 40   CALCIUM mg/dL 8.5*  --  8.2* 8.7 8.6 8.6 8.7 7.7*                 Microbiology   Microbiology Results (last 10 days)       Procedure Component Value - Date/Time    Blood Culture - Blood, Hand, Right [[id number]]  (Normal) Collected: 12/21/23 0710    Lab Status: Final result Specimen: Blood from Hand, Right Updated: 12/26/23 0800     Blood Culture No growth at 5 days    Blood Culture - Blood, Hand, Left [[id number]]  (Normal) Collected: 12/21/23 0710    Lab Status: Final result Specimen: Blood from Hand, Left Updated: 12/26/23 0800     Blood Culture No growth at 5 days    Narrative:      Less than seven (7) mL's of blood was collected.  Insufficient quantity may yield false negative results.    Blood Culture - Blood, Arm, Left [[id number]]  (Normal) Collected: 12/19/23 1107    Lab Status: Final result Specimen: Blood from Arm, Left Updated: 12/24/23 1146     Blood Culture No growth at 5 days    Blood Culture - Blood, Arm, Right [[id number]]  (Normal) Collected: 12/19/23 1050    Lab Status: Final result Specimen: Blood from Arm, Right Updated: 12/24/23 1146     Blood Culture No growth at 5 days            Medication Review:       Schedule Meds  budesonide, 0.5 mg, Nebulization, BID - RT  cefdinir, 300 mg, Oral, Q12H  chlorhexidine, 15 mL, Mouth/Throat, Q12H  enoxaparin, 40 mg, Subcutaneous, Daily  losartan, 50 mg, Oral, Q24H  metoprolol succinate XL, 25 mg, Oral, Q24H  oxyCODONE, 10 mg, Oral, Q12H  pantoprazole, 40 mg, Oral, BID AC  senna-docusate sodium, 2 tablet, Nasogastric, BID  sodium chloride, 10 mL, Intravenous, Q12H        Infusion Meds  sodium chloride, 125 mL/hr, Last Rate: 125 mL/hr (12/22/23 0201)        PRN Meds    acetaminophen    [DISCONTINUED] acetaminophen **OR** acetaminophen    aluminum-magnesium hydroxide-simethicone    senna-docusate sodium **AND** polyethylene glycol **AND** bisacodyl **AND** bisacodyl    Calcium Replacement -  Follow Nurse / BPA Driven Protocol    hydrALAZINE    influenza vaccine    ipratropium-albuterol    Magnesium Low Dose Replacement - Follow Nurse / BPA Driven Protocol    [] HYDROmorphone **AND** naloxone    nitroglycerin    ondansetron **OR** ondansetron    oxyCODONE    Phosphorus Replacement - Follow Nurse / BPA Driven Protocol    Potassium Replacement - Follow Nurse / BPA Driven Protocol    sodium chloride    sodium chloride    sodium chloride    sodium chloride    sodium chloride        Assessment & Plan       Antimicrobial Therapy   1.  P.o. Omnicef        2.        3.        4.        5.            Assessment     Reactive leukocytosis.  Patient has no obvious clinical signs of sepsis.  Probably secondary to intra-abdominal hematoma versus pneumonia.  Blood cultures from 2023 are negative so far  White count is trending down slowly    Left lower lobe infiltrate consistent with pneumonia.  Currently on room air     S/p left radical nephrectomy on 2023 secondary to left kidney mass probably renal cell carcinoma.  CT scan of abdomen pelvis showed collection at the nephrectomy site which possibly seroma versus hematoma     History of borderline diabetes mellitus     History of degenerative joint disease with a chronic back and hip pain     Elevated creatinine.  Patient is s/p recent left-sided nephrectomy.  Resolved     Plan       Continue Omnicef 300 mg p.o. twice daily for 3 days  Encourage incentive spirometer  Not much to add from infectious disease point.  I will sign off today.  Please call for any question        Jcarlos Wilkins MD  23  13:47 EST    Note is dictated utilizing voice recognition software/Dragon      Electronically signed by Jcarlos Wilkins MD at 23 1347       Yuniel Lai MD at 23 1304              [facility] Medicine Services   Daily Progress Note    Patient Name: Louis Andino  : 1960  MRN: [id number]  Primary Care Physician:   Provider, No Known  Date of admission: 12/18/2023  Date of Service: 12/26/2023      Subjective      Patient is laying in bed, feeling better, still feeling weak, afebrile          Objective      Vitals:   Temp:  [97.9 °F (36.6 °C)-98.5 °F (36.9 °C)] 97.9 °F (36.6 °C)  Heart Rate:  [76-91] 76  Resp:  [14-19] 18  BP: (150-180)/(70-87) 180/77  Flow (L/min):  [0] 0    Physical Exam       General:  Alert and oriented , no  distress  Eyes:  Pupils are equal, round and reactive to light. Extraocular movements are intact. Normal conjunctivae, vision unchanged    HENT:  Normocephalic, atraumatic   Respiratory: Decrease in breathing sounds anteriorly bilaterally. No wheezing  Cardiovascular: Regular rate, Regular, S1 auscultated. S2 auscultated , No edema   Gastrointestinal: Soft. Nontender, nondistended. Normal bowel sounds  Musculoskeletal: No tenderness   Neurologic: Alert, oriented. No focal defect   Psychiatric: Cooperative , appropriate mood and affect, nonsuicidal           Result Review    Result Review:  I have personally reviewed the results from the time of this admission to 12/26/2023 13:04 EST and agree with these findings:  [x]  Laboratory  []  Microbiology  []  Radiology  []  EKG/Telemetry   []  Cardiology/Vascular   []  Pathology  []  Old records  []  Other:  Most notable findings include:     Wounds (last 24 hours)       LDA Wound       Row Name 12/26/23 1200 12/26/23 0800 12/25/23 2016       Wound 12/18/23 0758 Left upper abdomen Incision    Wound - Properties Group Placement Date: 12/18/23  -MS Placement Time: 0758  -MS Side: Left  -MS Orientation: upper  -MS Location: abdomen  -MS Primary Wound Type: Incision  -MS    Dressing Appearance open to air  -KL open to air  -KL dry;intact;open to air  -SM    Closure Staples;Open to air  -KL Staples;Open to air;Approximated  -KL Janette;Open to air  -SM    Base clean;dry  -KL blanchable;dry;clean  -KL blanchable  -SM    Periwound -- dry;intact  -KL dry;intact  -SM     Periwound Temperature -- -- warm  -SM    Periwound Skin Turgor -- -- firm  -SM    Drainage Amount none  -KL none  -KL none  -SM    Retired Wound - Properties Group Placement Date: 12/18/23  -MS Placement Time: 0758 -MS Side: Left  -MS Orientation: upper  -MS Location: abdomen  -MS Primary Wound Type: Incision  -MS    Retired Wound - Properties Group Date first assessed: 12/18/23  -MS Time first assessed: 0758 -MS Side: Left  -MS Location: abdomen  -MS Primary Wound Type: Incision  -MS              User Key  (r) = Recorded By, (t) = Taken By, (c) = Cosigned By      Initials Name Provider Type    Brooklyn Bourgeois RN Registered Nurse    Cassie Hidalgo RN Registered Nurse    Rita Giron RN Registered Nurse                      Assessment & Plan          budesonide, 0.5 mg, Nebulization, BID - RT  cefdinir, 300 mg, Oral, Q12H  chlorhexidine, 15 mL, Mouth/Throat, Q12H  enoxaparin, 40 mg, Subcutaneous, Daily  losartan, 50 mg, Oral, Q24H  metoprolol succinate XL, 25 mg, Oral, Q24H  oxyCODONE, 10 mg, Oral, Q12H  pantoprazole, 40 mg, Oral, BID AC  senna-docusate sodium, 2 tablet, Nasogastric, BID  sodium chloride, 10 mL, Intravenous, Q12H       sodium chloride, 125 mL/hr, Last Rate: 125 mL/hr (12/22/23 0201)         Active Hospital Problems:  Active Hospital Problems    Diagnosis     **Renal mass     BPH (benign prostatic hyperplasia)     COPD (chronic obstructive pulmonary disease)     KARON (acute kidney injury)     Acute respiratory failure with hypoxia     Asthma     Gastroesophageal reflux disease     Hyperlipidemia     Hypertension      Plan:     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 and large volume blood loss    - extensive blood loss during surgery    - s/p 5u prbc and 2u FFP given per op note    -Urology following    #Acute hypoxic respiratory failure  #COPD    - Patient remained intubated post op, was  transferred to ICU-Patient extubated on 2023    - Dueoneb QID    - Pulmicort BID    - wean oxygen as tolerated     #Hypovolemic shock    - 2/2 blood loss from surgery    - Levophed Weaned  -Continue Omnicef per ID recommendation       #Acute blood loss anemia    - 2/2 blood loss from surgery    - s/p 5u prbc and 2u FFP given per op note      - h/h qday    - transfuse to maintain hgb > 7.0    - pt     #KARON  #Metabolic acidosis    - suspect 2/2 blood loss and possible post op ATN    - bicarb improved from 18 > 24    - Cr 1.35    - monitor     #GERD    - PPI     #HTN    - hold hypertensive meds due to recent shock, monitor      # Weakness, PT OT.      #obesity advised weight  loss  DVT prophylaxis:  Medical and mechanical DVT prophylaxis orders are present.    CODE STATUS:    Code Status (Patient has no pulse and is not breathing): CPR (Attempt to Resuscitate)  Medical Interventions (Patient has pulse or is breathing): Full Support      Disposition:  I expect patient to be discharged over the weekend as the patient ambulating more.      Electronically signed by Yuniel Lai MD, 23, 13:04 [facility].  [facility]              Electronically signed by Yuniel Lai MD at 23 1403       James Esquivel MD at 23 1300            [facility] DAILY PROGRESS NOTE    Patient Identification  Name: Louis Andino  Age: 63 y.o.  Sex: male  :  1960  MRN: [id number]    Date: 2023             Subjective:  Interval History: Labs stable, pain is improved, getting around better, positive bowel movement and tolerating diet    Objective:    Scheduled Meds:budesonide, 0.5 mg, Nebulization, BID - RT  cefdinir, 300 mg, Oral, Q12H  chlorhexidine, 15 mL, Mouth/Throat, Q12H  enoxaparin, 40 mg, Subcutaneous, Daily  losartan, 50 mg, Oral, Q24H  metoprolol succinate XL, 25 mg, Oral, Q24H  oxyCODONE, 10 mg, Oral, Q12H  pantoprazole, 40 mg, Oral, BID AC  senna-docusate sodium, 2 tablet,  "Nasogastric, BID  sodium chloride, 10 mL, Intravenous, Q12H      Continuous Infusions:sodium chloride, 125 mL/hr, Last Rate: 125 mL/hr (23 0201)      PRN Meds:  acetaminophen    [DISCONTINUED] acetaminophen **OR** acetaminophen    aluminum-magnesium hydroxide-simethicone    senna-docusate sodium **AND** polyethylene glycol **AND** bisacodyl **AND** bisacodyl    Calcium Replacement - Follow Nurse / BPA Driven Protocol    hydrALAZINE    influenza vaccine    ipratropium-albuterol    Magnesium Low Dose Replacement - Follow Nurse / BPA Driven Protocol    [] HYDROmorphone **AND** naloxone    nitroglycerin    ondansetron **OR** ondansetron    oxyCODONE    Phosphorus Replacement - Follow Nurse / BPA Driven Protocol    Potassium Replacement - Follow Nurse / BPA Driven Protocol    sodium chloride    sodium chloride    sodium chloride    sodium chloride    sodium chloride    Vital signs in last 24 hours:  Temp:  [97.9 °F (36.6 °C)-98.5 °F (36.9 °C)] 97.9 °F (36.6 °C)  Heart Rate:  [76-91] 76  Resp:  [14-19] 18  BP: (150-180)/(70-87) 180/77    Intake/Output:    Intake/Output Summary (Last 24 hours) at 2023 1301  Last data filed at 2023 0532  Gross per 24 hour   Intake 720 ml   Output 1300 ml   Net -580 ml       Exam:  /77 (BP Location: Left arm, Patient Position: Lying)   Pulse 76   Temp 97.9 °F (36.6 °C) (Oral)   Resp 18   Ht 177.8 cm (70\")   Wt 106 kg (233 lb 4 oz)   SpO2 99%   BMI 33.47 kg/m²     General Appearance:    Alert, cooperative, NAD   Lungs:     Respirations unlabored, no audible wheezing    Heart:    No cyanosis   Abdomen:     Soft, ND, incision clean dry intact   :   Harley out            Data Review:  All labs (24hrs):   Recent Results (from the past 24 hour(s))   Phosphorus    Collection Time: 23  4:44 PM    Specimen: Blood   Result Value Ref Range    Phosphorus 2.2 (L) 2.5 - 4.5 mg/dL   Potassium    Collection Time: 23  4:44 PM    Specimen: Blood   Result " Value Ref Range    Potassium 3.9 3.5 - 5.2 mmol/L   Hemoglobin & Hematocrit, Blood    Collection Time: 12/25/23  4:44 PM    Specimen: Blood   Result Value Ref Range    Hemoglobin 7.2 (L) 13.0 - 17.7 g/dL    Hematocrit 22.5 (L) 37.5 - 51.0 %   Magnesium    Collection Time: 12/26/23  4:46 AM    Specimen: Blood   Result Value Ref Range    Magnesium 1.8 1.6 - 2.4 mg/dL   Phosphorus    Collection Time: 12/26/23  4:46 AM    Specimen: Blood   Result Value Ref Range    Phosphorus 2.7 2.5 - 4.5 mg/dL   Comprehensive Metabolic Panel    Collection Time: 12/26/23  4:46 AM    Specimen: Blood   Result Value Ref Range    Glucose 95 65 - 99 mg/dL    BUN 15 8 - 23 mg/dL    Creatinine 1.35 (H) 0.76 - 1.27 mg/dL    Sodium 136 136 - 145 mmol/L    Potassium 3.8 3.5 - 5.2 mmol/L    Chloride 99 98 - 107 mmol/L    CO2 28.0 22.0 - 29.0 mmol/L    Calcium 8.5 (L) 8.6 - 10.5 mg/dL    Total Protein 5.3 (L) 6.0 - 8.5 g/dL    Albumin 2.5 (L) 3.5 - 5.2 g/dL    ALT (SGPT) 31 1 - 41 U/L    AST (SGOT) 30 1 - 40 U/L    Alkaline Phosphatase 109 39 - 117 U/L    Total Bilirubin 0.8 0.0 - 1.2 mg/dL    Globulin 2.8 gm/dL    A/G Ratio 0.9 g/dL    BUN/Creatinine Ratio 11.1 7.0 - 25.0    Anion Gap 9.0 5.0 - 15.0 mmol/L    eGFR 58.3 (L) >60.0 mL/min/1.73   Calcium, Ionized    Collection Time: 12/26/23  4:46 AM    Specimen: Blood   Result Value Ref Range    Ionized Calcium 1.19 (L) 1.20 - 1.30 mmol/L   CBC Auto Differential    Collection Time: 12/26/23  4:46 AM    Specimen: Blood   Result Value Ref Range    WBC 11.90 (H) 3.40 - 10.80 10*3/mm3    RBC 2.70 (L) 4.14 - 5.80 10*6/mm3    Hemoglobin 7.6 (L) 13.0 - 17.7 g/dL    Hematocrit 21.9 (L) 37.5 - 51.0 %    MCV 80.9 79.0 - 97.0 fL    MCH 28.2 26.6 - 33.0 pg    MCHC 34.8 31.5 - 35.7 g/dL    RDW 18.1 (H) 12.3 - 15.4 %    RDW-SD 50.8 37.0 - 54.0 fl    MPV 7.0 6.0 - 12.0 fL    Platelets 380 140 - 450 10*3/mm3   Scan Slide    Collection Time: 12/26/23  4:46 AM    Specimen: Blood   Result Value Ref Range    Scan Slide      Manual Differential    Collection Time: 12/26/23  4:46 AM    Specimen: Blood   Result Value Ref Range    Neutrophil % 68.0 42.7 - 76.0 %    Lymphocyte % 16.0 (L) 19.6 - 45.3 %    Monocyte % 5.0 5.0 - 12.0 %    Eosinophil % 3.0 0.3 - 6.2 %    Basophil % 2.0 (H) 0.0 - 1.5 %    Bands %  5.0 0.0 - 5.0 %    Metamyelocyte % 1.0 (H) 0.0 - 0.0 %    Neutrophils Absolute 8.69 (H) 1.70 - 7.00 10*3/mm3    Lymphocytes Absolute 1.90 0.70 - 3.10 10*3/mm3    Monocytes Absolute 0.60 0.10 - 0.90 10*3/mm3    Eosinophils Absolute 0.36 0.00 - 0.40 10*3/mm3    Basophils Absolute 0.24 (H) 0.00 - 0.20 10*3/mm3    Anisocytosis Slight/1+ None Seen    WBC Morphology Normal Normal    Platelet Morphology Normal Normal      Imaging Results (Last 24 Hours)       ** No results found for the last 24 hours. **             Assessment:    Renal mass    Asthma    Gastroesophageal reflux disease    Hyperlipidemia    Hypertension    BPH (benign prostatic hyperplasia)    COPD (chronic obstructive pulmonary disease)    KARON (acute kidney injury)    Acute respiratory failure with hypoxia      Left nephrectomy and IVC thrombectomy 12/18    Plan:    Hgb stable  KARON, improved  Cont Lovenox prophylaxis  SCDs  Continue to increase activity, appreciate physical and Occupational Therapy input  Pain control is improved  Continue regular diet  Appreciate consultants care, discharge once activity level okay for home    James Esquivel MD  [facility]  [address], [address]  [place] [address]  Office: [phone]  Available via Epic Secure Chat  12/26/23  13:01 EST       Electronically signed by James Esquivel MD at 12/26/23 1302          Physical Therapy Notes (last 48 hours)        Maranda Melton, PT at 12/27/23 1605  Version 1 of 1         Subjective: Pt agreeable to therapeutic plan of care. Reports being pt in the chair ~2.5 hrs prior to PT arrival.     Objective:     Bed mobility - Min-A with RLE into bed during sit to supine. Supine scooting  "completed independently.   Transfers - SBA and with rolling walker, increased time and guarded movements. Cues for hand placement.   Ambulation - 20 feet x2 CGA and with rolling walker. Distance limited by bathroom needs and then pt c/o fatigue and needs to return to bed.    Vitals: WNL    Pain: 2 VAS   Location: incision, also chronic pain in shoulder, back, R hip  Intervention for pain: Repositioned    Education: Provided education on the importance of mobility in the acute care setting, Verbal/Tactile Cues, Transfer Training, Gait Training, and Energy conservation strategies    Assessment: Louis Andino presents with functional mobility impairments in functional mobility, gait, and endurance which indicate the need for skilled intervention. PT noted low (but stable) Hgb, likely contributing to poor activity tolerance. Will continue to follow and progress as tolerated.     Plan/Recommendations:   If medically appropriate, Low Intensity Therapy recommended post-acute care - This is recommended as therapy feels this patient would require 2-3 visits per week. OP or HH would be the best option depending on patient's home bound status. Consider, if the patient has other  \"skilled\" needs such as wounds, IV antibiotics, etc. Combined with \"low intensity\" could also equate to a SNF. If patient is medically complex, consider LTAC. Pt requires no DME at discharge, he has his wife's RW.     Pt desires Home with Home Health at discharge, but verbalizes understanding regarding no accepting agencies. Pt cooperative; agreeable to therapeutic recommendations and plan of care.         Basic Mobility 6-click:  Rollin = Total, A lot = 2, A little = 3; 4 = None  Supine>Sit:   1 = Total, A lot = 2, A little = 3; 4 = None   Sit>Stand with arms:  1 = Total, A lot = 2, A little = 3; 4 = None  Bed>Chair:   1 = Total, A lot = 2, A little = 3; 4 = None  Ambulate in room:  1 = Total, A lot = 2, A little = 3; 4 = None  3-5 " "Steps with railin = Total, A lot = 2, A little = 3; 4 = None  Score: 17    Modified [place]: N/A = No pre-op stroke/TIA    Post-Tx Position: Supine with HOB Elevated, Alarms activated, and Call light and personal items within reach  PPE: gloves       Electronically signed by Maranda Melton, PT at 23       Maranda Melton, PT at 23  Version 1 of 1         Goal Outcome Evaluation:      Assessment: Louis Andino presents with functional mobility impairments in functional mobility, gait, and endurance which indicate the need for skilled intervention. PT noted low (but stable) Hgb, likely contributing to poor activity tolerance. Will continue to follow and progress as tolerated.     Plan/Recommendations:   If medically appropriate, Low Intensity Therapy recommended post-acute care - This is recommended as therapy feels this patient would require 2-3 visits per week. OP or HH would be the best option depending on patient's home bound status. Consider, if the patient has other  \"skilled\" needs such as wounds, IV antibiotics, etc. Combined with \"low intensity\" could also equate to a SNF. If patient is medically complex, consider LTAC. Pt requires no DME at discharge, he has his wife's RW.     Pt desires Home with Home Health at discharge, but verbalizes understanding regarding no accepting agencies. Pt cooperative; agreeable to therapeutic recommendations and plan of care.                     Electronically signed by Maranda Melton, PT at 23          Occupational Therapy Notes (last 48 hours)        Steffi Curran, OT at 23 0849          Patient Name: Louis Andino  : 1960    MRN: [id number]                              Today's Date: 2023       Admit Date: 2023    Visit Dx:     ICD-10-CM ICD-9-CM   1. Other specified disorders of kidney and ureter  N28.89 593.89   2. Renal mass  N28.89 593.9     Patient Active Problem List   Diagnosis    Renal mass    " Asthma    Gastroesophageal reflux disease    Hyperlipidemia    Hypertension    BPH (benign prostatic hyperplasia)    COPD (chronic obstructive pulmonary disease)    KARON (acute kidney injury)    Acute respiratory failure with hypoxia     Past Medical History:   Diagnosis Date    Asthma     Emphysema/COPD     GERD (gastroesophageal reflux disease)     Hyperglycemia 10/12/2023    Hyperlipidemia 10/12/2023    Hypertension     Prostate disease     Vitreous degeneration of right eye     Formatting of this note might be different from the original. Retinal tear and detachment warning symptoms reviewed and patient instructed to call immediately if increasing floaters, flashes, or decreasing peripheral vision. No retinal detachment or retinal tear noted. Recheck in 1 month with dilated exam.     Past Surgical History:   Procedure Laterality Date    NEPHRECTOMY Left 12/18/2023    Procedure: NEPHRECTOMY RADICAL WITH CAVAL THROMBECTOMY;  Surgeon: James Esquivel MD;  Location[facility] MAIN OR;  Service: Urology;  Laterality: Left;    SKIN LESION EXCISION  1990      General Information       Row Name 12/26/23 0838          General Information    Prior Level of Function independent:  goes to [employer] 3 days/wk. Has limited ex misael due to hip OA, cervical nerve impingement. Sees chiropractor. Wanting hip replacement.  -     Existing Precautions/Restrictions fall  but spouse is assisting pt with mobility to the bathroom, using RW and not able to find comfortable spot for gait belt.  -     Barriers to Rehab medically complex  -       Row Name 12/26/23 0845          Living Environment    People in Home spouse  she is off work till the new year  -       Row Name 12/26/23 0838          Cognition    Orientation Status (Cognition) oriented x 4  -       Row Name 12/26/23 0824          Safety Issues, Functional Mobility    Safety Issues Affecting Function (Mobility) judgment;problem-solving;safety precautions  follow-through/compliance  -     Impairments Affecting Function (Mobility) endurance/activity tolerance;balance;pain  hip pain & pt reports lightheadedness but not as if he will faint  -               User Key  (r) = Recorded By, (t) = Taken By, (c) = Cosigned By      Initials Name Provider Type     Steffi Curran OT Occupational Therapist                     Mobility/ADL's       Row Name 12/26/23 0841          Bed Mobility    Comment, (Bed Mobility) up in chair  -       Row Name 12/26/23 0841          Functional Mobility    Functional Mobility- Comment CGA to bathroom w/ spouse  -       Row Name 12/26/23 0841          Activities of Daily Living    BADL Assessment/Intervention toileting;grooming  -       Row Name 12/26/23 0841          Toileting Assessment/Training    [place] Level (Toileting) toileting skills;minimum assist (75% patient effort)  -     Comment, (Toileting) spouse assisting with many things  -       Row Name 12/26/23 0841          Grooming Assessment/Training    [place] Level (Grooming) grooming skills;standby assist  -               User Key  (r) = Recorded By, (t) = Taken By, (c) = Cosigned By      Initials Name Provider Type     Steffi Curran, ARLENE Occupational Therapist                   Obj/Interventions       Row Name 12/26/23 0842          Range of Motion Comprehensive    General Range of Motion no range of motion deficits identified  -       Row Name 12/26/23 0842          Strength Comprehensive (MMT)    General Manual Muscle Testing (MMT) Assessment no strength deficits identified  -               User Key  (r) = Recorded By, (t) = Taken By, (c) = Cosigned By      Initials Name Provider Type     Steffi Curran OT Occupational Therapist                   Goals/Plan       Row Name 12/26/23 0846          Bed Mobility Goal 1 (OT)    Activity/Assistive Device (Bed Mobility Goal 1, OT) bed mobility activities, all  -     [place] Level/Cues Needed (Bed  Mobility Goal 1, OT) independent  -     Time Frame (Bed Mobility Goal 1, OT) 1 week  -       Row Name 12/26/23 0846          Transfer Goal 1 (OT)    Activity/Assistive Device (Transfer Goal 1, OT) transfers, all  -     [place] Level/Cues Needed (Transfer Goal 1, OT) modified independence  -     Time Frame (Transfer Goal 1, OT) 1 week  -       Row Name 12/26/23 0846          Bathing Goal 1 (OT)    Activity/Device (Bathing Goal 1, OT) bathing skills, all  -     [place] Level/Cues Needed (Bathing Goal 1, OT) set-up required  -     Time Frame (Bathing Goal 1, OT) 2 weeks  -       Row Name 12/26/23 0846          Dressing Goal 1 (OT)    Activity/Device (Dressing Goal 1, OT) dressing skills, all  -     [place]/Cues Needed (Dressing Goal 1, OT) set-up required  -     Time Frame (Dressing Goal 1, OT) 10 days  -       Row Name 12/26/23 0846          Therapy Assessment/Plan (OT)    Planned Therapy Interventions (OT) activity tolerance training;adaptive equipment training;BADL retraining;occupation/activity based interventions;patient/caregiver education/training;transfer/mobility retraining;ROM/therapeutic exercise  -               User Key  (r) = Recorded By, (t) = Taken By, (c) = Cosigned By      Initials Name Provider Type     Steffi Curran, OT Occupational Therapist                   Clinical Impression       Row Name 12/26/23 0842          Pain Assessment    Pretreatment Pain Rating 5/10  -     Posttreatment Pain Rating 5/10  -     Pain Location - Side/Orientation Right  -     Pain Location - hip  -       Row Name 12/26/23 0842          Plan of Care Review    Plan of Care Reviewed With patient;spouse  -     Progress improving  -     Outcome Evaluation Pt with renal cancer admitted for planned left open radical nephrectomy. IVC thrombectomy also done. Pt remained mechanically ventilated for several days post-op due to ongoing respiratory failure and large volume blood  loss during surgery. Pt has started to mobilize though only with spouse assisting. She assists with many things. She is off work and will be home with him until the new year when she returns to work. OT is recommending HHC at d/c. They have necessary DME. Pt states he does wish for continued OT treatments but had just been in the bathroom for 7-8 minutes with his spouse and was painful and had been lightheaded so he declined to get up again this am. Will FU tomorrow for increasing activity tolerance.  -       Row Name 12/26/23 0842          Therapy Assessment/Plan (OT)    Rehab Potential (OT) good, to achieve stated therapy goals  -     Criteria for Skilled Therapeutic Interventions Met (OT) skilled treatment is necessary  -     Therapy Frequency (OT) 5 times/wk  -     Predicted Duration of Therapy Intervention (OT) until d/c  -       Row Name 12/26/23 0842          Therapy Plan Review/Discharge Plan (OT)    Anticipated Discharge Disposition (OT) home with home health  -       Row Name 12/26/23 0842          Vital Signs    O2 Delivery Pre Treatment room air  -       Row Name 12/26/23 0842          Positioning and Restraints    Pre-Treatment Position sitting in chair/recliner  -     Post Treatment Position chair  -MH     In Chair notified nsg;reclined;exit alarm on;with family/caregiver;call light within reach  -               User Key  (r) = Recorded By, (t) = Taken By, (c) = Cosigned By      Initials Name Provider Type     Steffi Curran, OT Occupational Therapist                   Outcome Measures       Row Name 12/26/23 0847 12/26/23 0800       How much help from another person do you currently need...    Turning from your back to your side while in flat bed without using bedrails? 3  - 3  -KL    Moving from lying on back to sitting on the side of a flat bed without bedrails? 3  - 3  -KL    Moving to and from a bed to a chair (including a wheelchair)? 3  - 3  -KL    Standing up from a  chair using your arms (e.g., wheelchair, bedside chair)? 3  - 3  -KL    Climbing 3-5 steps with a railing? 3  - 3  -KL    To walk in hospital room? 3  - 3  -KL    AM-PAC 6 Clicks Score (PT) 18  - 18  -KL    Highest Level of Mobility Goal 6 --> Walk 10 steps or more  - 6 --> Walk 10 steps or more  -              User Key  (r) = Recorded By, (t) = Taken By, (c) = Cosigned By      Initials Name Provider Type    Steffi Segura, OT Occupational Therapist    Brooklyn Bourgeois RN Registered Nurse                    Occupational Therapy Education       Title: PT OT SLP Therapies (In Progress)       Topic: Occupational Therapy (In Progress)       Point: ADL training (Done)       Description:   Instruct learner(s) on proper safety adaptation and remediation techniques during self care or transfers.   Instruct in proper use of assistive devices.                  Learning Progress Summary             Patient Acceptance, E, VU,NR by  at 12/26/2023 0848   Family Acceptance, E, VU,NR by  at 12/26/2023 0848                         Point: Home exercise program (Not Started)       Description:   Instruct learner(s) on appropriate technique for monitoring, assisting and/or progressing therapeutic exercises/activities.                  Learner Progress:  Not documented in this visit.              Point: Precautions (Done)       Description:   Instruct learner(s) on prescribed precautions during self-care and functional transfers.                  Learning Progress Summary             Patient Acceptance, E, VU,NR by  at 12/26/2023 0848   Family Acceptance, E, VU,NR by  at 12/26/2023 0848                         Point: Body mechanics (Not Started)       Description:   Instruct learner(s) on proper positioning and spine alignment during self-care, functional mobility activities and/or exercises.                  Learner Progress:  Not documented in this visit.                              User Key       Initials  Effective Dates Name Provider Type Discipline     06/16/21 -  Steffi Curran OT Occupational Therapist OT                  OT Recommendation and Plan  Planned Therapy Interventions (OT): activity tolerance training, adaptive equipment training, BADL retraining, occupation/activity based interventions, patient/caregiver education/training, transfer/mobility retraining, ROM/therapeutic exercise  Therapy Frequency (OT): 5 times/wk  Plan of Care Review  Plan of Care Reviewed With: patient, spouse  Progress: improving  Outcome Evaluation: Pt with renal cancer admitted for planned left open radical nephrectomy. IVC thrombectomy also done. Pt remained mechanically ventilated for several days post-op due to ongoing respiratory failure and large volume blood loss during surgery. Pt has started to mobilize though only with spouse assisting. She assists with many things. She is off work and will be home with him until the new year when she returns to work. OT is recommending HHC at d/c. They have necessary DME. Pt states he does wish for continued OT treatments but had just been in the bathroom for 7-8 minutes with his spouse and was painful and had been lightheaded so he declined to get up again this am. Will FU tomorrow for increasing activity tolerance.     Time Calculation:         Time Calculation- OT       Row Name 12/26/23 0848             Time Calculation-     OT Start Time 0819  -      OT Stop Time 0831  -      OT Time Calculation (min) 12 min  -      Total Timed Code Minutes- OT 0 minute(s)  -      OT Received On 12/26/23  -      OT - Next Appointment 12/27/23  -      OT Goal Re-Cert Due Date 01/09/24  -                User Key  (r) = Recorded By, (t) = Taken By, (c) = Cosigned By      Initials Name Provider Type     Steffi Curran OT Occupational Therapist                  Therapy Charges for Today       Code Description Service Date Service Provider Modifiers Qty    [id number]  OT NEYDA LOW  COMPLEXITY 2 12/26/2023 Steffi Curran OT GO 1                 Steffi Curran OT  12/26/2023    Electronically signed by Steffi Curran OT at 12/26/23 0849       Steffi Curran OT at 12/26/23 0848          Goal Outcome Evaluation:  Plan of Care Reviewed With: patient, spouse        Progress: improving  Outcome Evaluation: Pt with renal cancer admitted for planned left open radical nephrectomy. IVC thrombectomy also done. Pt remained mechanically ventilated for several days post-op due to ongoing respiratory failure and large volume blood loss during surgery. Pt has started to mobilize though only with spouse assisting. She assists with many things. She is off work and will be home with him until the new year when she returns to work. OT is recommending HHC at d/c. They have necessary DME. Pt states he does wish for continued OT treatments but had just been in the bathroom for 7-8 minutes with his spouse and was painful and had been lightheaded so he declined to get up again this am. Will FU tomorrow for increasing activity tolerance.      Anticipated Discharge Disposition (OT): home with home health    Electronically signed by Steffi Curran OT at 12/26/23 0848       GEORGINA Hwang RN  SIPS/ICU   O: [phone]  C: [phone]  [email or web address] Referral Source

## 2023-12-27 NOTE — PLAN OF CARE
"Goal Outcome Evaluation:            Assessment: Louis Andino presents with ADL impairments affecting function including balance, endurance / activity tolerance, pain, and strength. Demonstrated functioning below baseline abilities indicate the need for continued skilled intervention while inpatient. Tolerating session today without incident. Will continue to follow and progress as tolerated.      Plan/Recommendations:   Low Intensity Therapy recommended post-acute care - This is recommended as therapy feels this patient would require 2-3 visits per week. OP or HH would be the best option depending on patient's home bound status. Consider, if the patient has other  \"skilled\" needs such as wounds, IV antibiotics, etc. Combined with \"low intensity\" could also equate to a SNF. If patient is medically complex, consider LTAC..            "

## 2023-12-27 NOTE — THERAPY TREATMENT NOTE
"Subjective: Pt agreeable to therapeutic plan of care.  Cognition: arousal/alertness: Alert and Attentive    Objective:     Bed Mobility: CGA   Functional Transfers: CGA     Balance: dynamic and standing CGA  Functional Ambulation: CGA and with rolling walker    Lower Body Dressing: Dependent  ADL Position: supine  ADL Comments: Donning sock     Toileting: Supervision  ADL Position: supported standing  ADL Comments: Urinating while standing over toilet     Vitals: WNL    Pain: 5 FACES  Location: Abdomen  Interventions for pain: N/A  Education: Provided education on the importance of mobility in the acute care setting      Assessment: Louis Andino presents with ADL impairments affecting function including balance, endurance / activity tolerance, pain, and strength. Demonstrated functioning below baseline abilities indicate the need for continued skilled intervention while inpatient. Tolerating session today without incident. Will continue to follow and progress as tolerated.     Plan/Recommendations:   Low Intensity Therapy recommended post-acute care - This is recommended as therapy feels this patient would require 2-3 visits per week. OP or HH would be the best option depending on patient's home bound status. Consider, if the patient has other  \"skilled\" needs such as wounds, IV antibiotics, etc. Combined with \"low intensity\" could also equate to a SNF. If patient is medically complex, consider LTAC..    Pt desires Home with Home Health at discharge. Pt cooperative; agreeable to therapeutic recommendations and plan of care.     Modified [place]: N/A = No pre-op stroke/TIA    Post-Tx Position: Up in Chair, Alarms activated, and Call light and personal items within reach  PPE: gloves    "

## 2023-12-27 NOTE — PROGRESS NOTES
"Nutrition Services    Patient Name: Louis Andino  YOB: 1960  MRN: [id number]  Admission date: 12/18/2023    PPE Documentation        PPE Worn By Provider Did not enter room for this encounter   PPE Worn By Patient  N/A     PROGRESS NOTE      Encounter Information: Checking in on PO intake.        PO Diet: Diet: Regular/House Diet; Texture: Regular Texture (IDDSI 7); Fluid Consistency: Thin (IDDSI 0)   PO Supplements: Boost Breeze BID (provides 500 kcals, 18 g protein if consumed)      PO Intake:  50%       Current nutrition support: -   Nutrition support review: -       Labs (reviewed below): Reviewed, management per attending.         GI Function:  + BM on 12/25       Nutrition Intervention Updates: Continue current diet. Encourage good PO intake.        Results from last 7 days   Lab Units 12/27/23 0516 12/26/23 0446 12/25/23  1644 12/25/23  0219   SODIUM mmol/L 136 136  --  136   POTASSIUM mmol/L 3.6 3.8 3.9 3.5   CHLORIDE mmol/L 98 99  --  98   CO2 mmol/L 29.0 28.0  --  28.0   BUN mg/dL 14 15  --  17   CREATININE mg/dL 1.27 1.35*  --  1.26   CALCIUM mg/dL 8.3* 8.5*  --  8.2*   BILIRUBIN mg/dL 0.9 0.8  --  1.4*   ALK PHOS U/L 93 109  --  124*   ALT (SGPT) U/L 29 31  --  38   AST (SGOT) U/L 27 30  --  33   GLUCOSE mg/dL 100* 95  --  115*     Results from last 7 days   Lab Units 12/27/23  0516 12/26/23 0446 12/25/23  1644 12/25/23  0219   MAGNESIUM mg/dL 2.0 1.8  --  1.8   PHOSPHORUS mg/dL 2.9 2.7   < > 2.1*   HEMOGLOBIN g/dL 7.3* 7.6*   < > 7.2*   HEMATOCRIT % 22.5* 21.9*   < > 21.6*    < > = values in this interval not displayed.     No results found for: \"COVID19\"  No results found for: \"HGBA1C\"    RD to follow up per protocol.    Electronically signed by:  Shahana Humphreys RD  12/27/23 11:07 EST    "

## 2023-12-27 NOTE — THERAPY TREATMENT NOTE
"Subjective: Pt agreeable to therapeutic plan of care. Reports being pt in the chair ~2.5 hrs prior to PT arrival.     Objective:     Bed mobility - Min-A with RLE into bed during sit to supine. Supine scooting completed independently.   Transfers - SBA and with rolling walker, increased time and guarded movements. Cues for hand placement.   Ambulation - 20 feet x2 CGA and with rolling walker. Distance limited by bathroom needs and then pt c/o fatigue and needs to return to bed.    Vitals: WNL    Pain: 2 VAS   Location: incision, also chronic pain in shoulder, back, R hip  Intervention for pain: Repositioned    Education: Provided education on the importance of mobility in the acute care setting, Verbal/Tactile Cues, Transfer Training, Gait Training, and Energy conservation strategies    Assessment: Louis Andino presents with functional mobility impairments in functional mobility, gait, and endurance which indicate the need for skilled intervention. PT noted low (but stable) Hgb, likely contributing to poor activity tolerance. Will continue to follow and progress as tolerated.     Plan/Recommendations:   If medically appropriate, Low Intensity Therapy recommended post-acute care - This is recommended as therapy feels this patient would require 2-3 visits per week. OP or HH would be the best option depending on patient's home bound status. Consider, if the patient has other  \"skilled\" needs such as wounds, IV antibiotics, etc. Combined with \"low intensity\" could also equate to a SNF. If patient is medically complex, consider LTAC. Pt requires no DME at discharge, he has his wife's RW.     Pt desires Home with Home Health at discharge, but verbalizes understanding regarding no accepting agencies. Pt cooperative; agreeable to therapeutic recommendations and plan of care.         Basic Mobility 6-click:  Rollin = Total, A lot = 2, A little = 3; 4 = None  Supine>Sit:   1 = Total, A lot = 2, A little = 3; " 4 = None   Sit>Stand with arms:  1 = Total, A lot = 2, A little = 3; 4 = None  Bed>Chair:   1 = Total, A lot = 2, A little = 3; 4 = None  Ambulate in room:  1 = Total, A lot = 2, A little = 3; 4 = None  3-5 Steps with railin = Total, A lot = 2, A little = 3; 4 = None  Score: 17    Modified [place]: N/A = No pre-op stroke/TIA    Post-Tx Position: Supine with HOB Elevated, Alarms activated, and Call light and personal items within reach  PPE: gloves

## 2023-12-27 NOTE — PAYOR COMM NOTE
"Louis Andino (63 y.o. Male)  1960  Ref #[id number]   Clinical UD. MD has not Rounded yet.    AUTHORIZATION PENDING  PLEASE FORWARD DETERMINATION TO FOLLOWING CONTACT:    BEBO SANTOS LPN UR  Utilization Review Nurse  [facility]  Direct & confidential phone # [phone]  Fax # [phone]        Date of Birth   1960    Social Security Number       Address   [address] [place] [address]    Home Phone   [phone]    MRN   [id number]       [employer]ism   M[employer]onism    Marital Status                               Admission Date   23    Admission Type   Elective    Admitting Provider   James Esquivel MD    Attending Provider   Yuniel Lai MD    Department, Room/Bed   [facility] SURGICAL INPATIENT,        Discharge Date       Discharge Disposition       Discharge Destination                                 Attending Provider: Yuniel Lai MD    Allergies: No Known Allergies    Isolation: None   Infection: None   Code Status: CPR    Ht: 177.8 cm (70\")   Wt: 106 kg (233 lb 4 oz)    Admission Cmt: None   Principal Problem: Renal mass [N28.89]                   Active Insurance as of 2023       Primary Coverage       Payor Plan Insurance Group Employer/Plan Group    ANTHEM BLUE CROSS ANTHEM BLUE CROSS BLUE SHIELD PPO [id number]       Payor Plan Address Payor Plan Phone Number Payor Plan Fax Number Effective Dates    [address] [phone]  2022 - None Entered    [facility] [id number]         Subscriber Name Subscriber Birth Date Member ID       MARK ANDINO 1963 [id number]               Secondary Coverage       Payor Plan Insurance Group Employer/Plan Group    MISC MC SUP   MISC MC SUP              [id number]       Coverage Address Coverage Phone Number Coverage Fax Number Effective Dates    [address] [phone]  2023 - None Entered    [facility] [id number]         Subscriber Name Subscriber Birth Date Member " ID       YESIKA ANDINO 1960 [id number]               Tertiary Coverage       Payor Plan Insurance Group Employer/Plan Group    MEDICARE MEDICARE A & B        Payor Plan Address Payor Plan Phone Number Payor Plan Fax Number Effective Dates    [address] [phone]  2023 - None Entered    [facility] [id number]         Subscriber Name Subscriber Birth Date Member ID       YESIKA ANDINO 1960 [id number]                     Emergency Contacts        (Rel.) Home Phone Work Phone Mobile Phone    Dahlia Andino (Spouse) -- -- [phone]                 Physician Progress Notes (last 24 hours)        Yuniel Lai MD at [facility]              [facility] Medicine Services   Daily Progress Note    Patient Name: Yesika Andino  : 1960  MRN: [id number]  Primary Care Physician:  Provider, No Known  Date of admission: 2023  Date of Service: 2023       Subjective      Patient  is lying in bed, feeling better but still feeling weak, afebrile           Objective      Vitals:   Temp:  [97.9 °F (36.6 °C)-99.2 °F (37.3 °C)] 98.3 °F (36.8 °C)  Heart Rate:  [76-93] 78  Resp:  [14-18] 16  BP: (133-180)/(68-77) 170/74    Physical Exam       General:  Alert and oriented , no  distress  Eyes:  Pupils are equal, round and reactive to light. Extraocular movements are intact. Normal conjunctivae, vision unchanged    HENT:  Normocephalic, atraumatic   Respiratory: Decrease in breathing sounds anteriorly bilaterally. No wheezing  Cardiovascular: Regular rate, Regular, S1 auscultated. S2 auscultated , No edema   Gastrointestinal: Soft. Nontender, nondistended. Normal bowel sounds  Musculoskeletal: No tenderness   Neurologic: Alert, oriented. No focal defect   Psychiatric: Cooperative , appropriate mood and affect, nonsuicidal           Result Review    Result Review:  I have personally reviewed the results from the time of this admission to 2023 10:13 EST and  agree with these findings:  [x]  Laboratory  []  Microbiology  []  Radiology  []  EKG/Telemetry   []  Cardiology/Vascular   []  Pathology  []  Old records  []  Other:  Most notable findings include:     Wounds (last 24 hours)       LDA Wound       Row Name 12/27/23 0800 12/27/23 0000 12/26/23 2000       Wound 12/18/23 0758 Left upper abdomen Incision    Wound - Properties Group Placement Date: 12/18/23  -MS Placement Time: 0758  -MS Side: Left  -MS Orientation: upper  -MS Location: abdomen  -MS Primary Wound Type: Incision  -MS    Dressing Appearance -- -- open to air  -SM    Closure Staples;Open to air;Approximated  -KL Janette;Open to air  -SM Staples;Open to air  -SM    Base blanchable;dry;clean  -KL clean;dry  -SM clean;dry  -SM    Periwound dry;intact  -KL dry;intact  -SM dry;intact  -SM    Periwound Temperature -- warm  -SM warm  -SM    Periwound Skin Turgor -- firm  -SM firm  -SM    Drainage Characteristics/Odor -- bleeding controlled  -SM bleeding controlled  -SM    Drainage Amount none  -KL none  -SM none  -SM    Retired Wound - Properties Group Placement Date: 12/18/23  -MS Placement Time: 0758  -MS Side: Left  -MS Orientation: upper  -MS Location: abdomen  -MS Primary Wound Type: Incision  -MS    Retired Wound - Properties Group Date first assessed: 12/18/23  -MS Time first assessed: 0758  -MS Side: Left  -MS Location: abdomen  -MS Primary Wound Type: Incision  -MS      Row Name 12/26/23 1600 12/26/23 1200          Wound 12/18/23 0758 Left upper abdomen Incision    Wound - Properties Group Placement Date: 12/18/23  -MS Placement Time: 0758  -MS Side: Left  -MS Orientation: upper  -MS Location: abdomen  -MS Primary Wound Type: Incision  -MS    Dressing Appearance open to air  -KL open to air  -KL     Closure Staples;Open to air  -KL Staples;Open to air  -KL     Base clean;dry  -KL clean;dry  -KL     Drainage Amount none  -KL none  -KL     Retired Wound - Properties Group Placement Date: 12/18/23  -MS  Placement Time: 0758 -MS Side: Left  -MS Orientation: upper  -MS Location: abdomen  -MS Primary Wound Type: Incision  -MS    Retired Wound - Properties Group Date first assessed: 12/18/23  -MS Time first assessed: 0758 -MS Side: Left  -MS Location: abdomen  -MS Primary Wound Type: Incision  -MS              User Key  (r) = Recorded By, (t) = Taken By, (c) = Cosigned By      Initials Name Provider Type    Brooklyn Bourgeois RN Registered Nurse    Cassie Hidalgo RN Registered Nurse    Rita Giron RN Registered Nurse                      Assessment & Plan          budesonide, 0.5 mg, Nebulization, BID - RT  cefdinir, 300 mg, Oral, Q12H  chlorhexidine, 15 mL, Mouth/Throat, Q12H  enoxaparin, 40 mg, Subcutaneous, Daily  losartan, 50 mg, Oral, Q24H  metoprolol succinate XL, 25 mg, Oral, Q24H  oxyCODONE, 10 mg, Oral, Q12H  pantoprazole, 40 mg, Oral, BID AC  potassium chloride, 40 mEq, Oral, Q4H  senna-docusate sodium, 2 tablet, Nasogastric, BID  sodium chloride, 10 mL, Intravenous, Q12H       sodium chloride, 125 mL/hr, Last Rate: 125 mL/hr (12/22/23 0201)         Active Hospital Problems:  Active Hospital Problems    Diagnosis     **Renal mass     BPH (benign prostatic hyperplasia)     COPD (chronic obstructive pulmonary disease)     KARON (acute kidney injury)     Acute respiratory failure with hypoxia     Asthma     Gastroesophageal reflux disease     Hyperlipidemia     Hypertension      Plan:     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 and large volume blood loss    - extensive blood loss during surgery    - s/p 5u prbc and 2u FFP given per op note    -Urology following     #Acute hypoxic respiratory failure  #COPD    - Patient remained intubated post op, was transferred to ICU-Patient extubated on 12/19/2023    - Dueoneb QID    - Pulmicort BID    - wean oxygen as tolerated     #Hypovolemic shock    - 2/2 blood  loss from surgery    - Levophed Weaned  -Continue Omnicef per ID recommendation        #Acute blood loss anemia    - 2/2 blood loss from surgery    - s/p 5u prbc and 2u FFP given per op note      - h/h qday    - transfuse to maintain hgb > 7.0    - pt     #KARON  #Metabolic acidosis    - suspect 2/2 blood loss and possible post op ATN    - bicarb improved from 18 > 24    - Cr 1.35    - monitor     #GERD    - PPI     #HTN    - hold hypertensive meds due to recent shock, monitor      # Weakness, PT OT.   Possible discharge to home vs. SNF    Discussed with the pt and his wife at the bedside.     Also discussed with the nurse      #obesity advised weight  loss  DVT prophylaxis:  Medical and mechanical DVT prophylaxis orders are present.    DVT prophylaxis:  Medical and mechanical DVT prophylaxis orders are present.    CODE STATUS:    Code Status (Patient has no pulse and is not breathing): CPR (Attempt to Resuscitate)  Medical Interventions (Patient has pulse or is breathing): Full Support      Disposition:  I expect patient to be discharged 2 days.      Electronically signed by Yuniel Lai MD, 12/27/23, 10:13 EST.  [facility] Hospitalist Team              Electronically signed by Yuniel Lai MD at 12/27/23 2213

## 2023-12-28 ENCOUNTER — INPATIENT HOSPITAL (OUTPATIENT)
Dept: URBAN - METROPOLITAN AREA HOSPITAL 84 | Facility: HOSPITAL | Age: 63
End: 2023-12-28

## 2023-12-28 DIAGNOSIS — K62.5 HEMORRHAGE OF ANUS AND RECTUM: ICD-10-CM

## 2023-12-28 DIAGNOSIS — D62 ACUTE POSTHEMORRHAGIC ANEMIA: ICD-10-CM

## 2023-12-28 LAB
ABO GROUP BLD: NORMAL
ALBUMIN SERPL-MCNC: 2.6 G/DL (ref 3.5–5.2)
ALBUMIN/GLOB SERPL: 0.9 G/DL
ALP SERPL-CCNC: 85 U/L (ref 39–117)
ALT SERPL W P-5'-P-CCNC: 30 U/L (ref 1–41)
ANION GAP SERPL CALCULATED.3IONS-SCNC: 8 MMOL/L (ref 5–15)
ANISOCYTOSIS BLD QL: ABNORMAL
AST SERPL-CCNC: 30 U/L (ref 1–40)
BILIRUB SERPL-MCNC: 0.7 MG/DL (ref 0–1.2)
BLD GP AB SCN SERPL QL: NEGATIVE
BUN SERPL-MCNC: 14 MG/DL (ref 8–23)
BUN/CREAT SERPL: 11.4 (ref 7–25)
CA-I SERPL ISE-MCNC: 1.22 MMOL/L (ref 1.2–1.3)
CALCIUM SPEC-SCNC: 8.3 MG/DL (ref 8.6–10.5)
CHLORIDE SERPL-SCNC: 100 MMOL/L (ref 98–107)
CO2 SERPL-SCNC: 28 MMOL/L (ref 22–29)
CREAT SERPL-MCNC: 1.23 MG/DL (ref 0.76–1.27)
DEPRECATED RDW RBC AUTO: 51.2 FL (ref 37–54)
EGFRCR SERPLBLD CKD-EPI 2021: 65.2 ML/MIN/1.73
EOSINOPHIL # BLD MANUAL: 0.1 10*3/MM3 (ref 0–0.4)
EOSINOPHIL NFR BLD MANUAL: 1 % (ref 0.3–6.2)
ERYTHROCYTE [DISTWIDTH] IN BLOOD BY AUTOMATED COUNT: 18.3 % (ref 12.3–15.4)
GLOBULIN UR ELPH-MCNC: 2.8 GM/DL
GLUCOSE SERPL-MCNC: 107 MG/DL (ref 65–99)
HCT VFR BLD AUTO: 20.7 % (ref 37.5–51)
HCT VFR BLD AUTO: 24.1 % (ref 37.5–51)
HGB BLD-MCNC: 6.7 G/DL (ref 13–17.7)
HGB BLD-MCNC: 7.8 G/DL (ref 13–17.7)
LYMPHOCYTES # BLD MANUAL: 1.02 10*3/MM3 (ref 0.7–3.1)
LYMPHOCYTES NFR BLD MANUAL: 5 % (ref 5–12)
MAGNESIUM SERPL-MCNC: 1.8 MG/DL (ref 1.6–2.4)
MCH RBC QN AUTO: 26.1 PG (ref 26.6–33)
MCHC RBC AUTO-ENTMCNC: 32.5 G/DL (ref 31.5–35.7)
MCV RBC AUTO: 80.3 FL (ref 79–97)
MONOCYTES # BLD: 0.51 10*3/MM3 (ref 0.1–0.9)
NEUTROPHILS # BLD AUTO: 8.57 10*3/MM3 (ref 1.7–7)
NEUTROPHILS NFR BLD MANUAL: 78 % (ref 42.7–76)
NEUTS BAND NFR BLD MANUAL: 6 % (ref 0–5)
PHOSPHATE SERPL-MCNC: 2.9 MG/DL (ref 2.5–4.5)
PLAT MORPH BLD: NORMAL
PLATELET # BLD AUTO: 421 10*3/MM3 (ref 140–450)
PMV BLD AUTO: 7.3 FL (ref 6–12)
POTASSIUM SERPL-SCNC: 4.3 MMOL/L (ref 3.5–5.2)
PROT SERPL-MCNC: 5.4 G/DL (ref 6–8.5)
RBC # BLD AUTO: 2.57 10*6/MM3 (ref 4.14–5.8)
RH BLD: POSITIVE
SCAN SLIDE: NORMAL
SODIUM SERPL-SCNC: 136 MMOL/L (ref 136–145)
T&S EXPIRATION DATE: NORMAL
VARIANT LYMPHS NFR BLD MANUAL: 10 % (ref 19.6–45.3)
WBC MORPH BLD: NORMAL
WBC NRBC COR # BLD AUTO: 10.2 10*3/MM3 (ref 3.4–10.8)

## 2023-12-28 PROCEDURE — 36430 TRANSFUSION BLD/BLD COMPNT: CPT

## 2023-12-28 PROCEDURE — 99222 1ST HOSP IP/OBS MODERATE 55: CPT

## 2023-12-28 PROCEDURE — 85025 COMPLETE CBC W/AUTO DIFF WBC: CPT | Performed by: STUDENT IN AN ORGANIZED HEALTH CARE EDUCATION/TRAINING PROGRAM

## 2023-12-28 PROCEDURE — 86901 BLOOD TYPING SEROLOGIC RH(D): CPT | Performed by: PHYSICIAN ASSISTANT

## 2023-12-28 PROCEDURE — 84100 ASSAY OF PHOSPHORUS: CPT | Performed by: STUDENT IN AN ORGANIZED HEALTH CARE EDUCATION/TRAINING PROGRAM

## 2023-12-28 PROCEDURE — 82330 ASSAY OF CALCIUM: CPT | Performed by: STUDENT IN AN ORGANIZED HEALTH CARE EDUCATION/TRAINING PROGRAM

## 2023-12-28 PROCEDURE — 94799 UNLISTED PULMONARY SVC/PX: CPT

## 2023-12-28 PROCEDURE — 80053 COMPREHEN METABOLIC PANEL: CPT | Performed by: STUDENT IN AN ORGANIZED HEALTH CARE EDUCATION/TRAINING PROGRAM

## 2023-12-28 PROCEDURE — 86900 BLOOD TYPING SEROLOGIC ABO: CPT

## 2023-12-28 PROCEDURE — 85007 BL SMEAR W/DIFF WBC COUNT: CPT | Performed by: STUDENT IN AN ORGANIZED HEALTH CARE EDUCATION/TRAINING PROGRAM

## 2023-12-28 PROCEDURE — 85018 HEMOGLOBIN: CPT | Performed by: PHYSICIAN ASSISTANT

## 2023-12-28 PROCEDURE — 83735 ASSAY OF MAGNESIUM: CPT | Performed by: STUDENT IN AN ORGANIZED HEALTH CARE EDUCATION/TRAINING PROGRAM

## 2023-12-28 PROCEDURE — 85014 HEMATOCRIT: CPT | Performed by: PHYSICIAN ASSISTANT

## 2023-12-28 PROCEDURE — 86923 COMPATIBILITY TEST ELECTRIC: CPT

## 2023-12-28 PROCEDURE — 86900 BLOOD TYPING SEROLOGIC ABO: CPT | Performed by: PHYSICIAN ASSISTANT

## 2023-12-28 PROCEDURE — 86850 RBC ANTIBODY SCREEN: CPT | Performed by: PHYSICIAN ASSISTANT

## 2023-12-28 PROCEDURE — 25010000002 ENOXAPARIN PER 10 MG: Performed by: STUDENT IN AN ORGANIZED HEALTH CARE EDUCATION/TRAINING PROGRAM

## 2023-12-28 PROCEDURE — P9016 RBC LEUKOCYTES REDUCED: HCPCS

## 2023-12-28 RX ORDER — HYDROCORTISONE ACETATE 25 MG/1
25 SUPPOSITORY RECTAL 2 TIMES DAILY
Status: DISCONTINUED | OUTPATIENT
Start: 2023-12-28 | End: 2023-12-30 | Stop reason: HOSPADM

## 2023-12-28 RX ORDER — POLYETHYLENE GLYCOL 3350 17 G/17G
17 POWDER, FOR SOLUTION ORAL DAILY
Status: DISCONTINUED | OUTPATIENT
Start: 2023-12-28 | End: 2023-12-30 | Stop reason: HOSPADM

## 2023-12-28 RX ORDER — LORAZEPAM 0.5 MG/1
0.5 TABLET ORAL EVERY 6 HOURS PRN
Status: DISCONTINUED | OUTPATIENT
Start: 2023-12-28 | End: 2023-12-30 | Stop reason: HOSPADM

## 2023-12-28 RX ORDER — PANTOPRAZOLE SODIUM 40 MG/10ML
40 INJECTION, POWDER, LYOPHILIZED, FOR SOLUTION INTRAVENOUS EVERY 12 HOURS SCHEDULED
Status: DISCONTINUED | OUTPATIENT
Start: 2023-12-28 | End: 2023-12-30 | Stop reason: HOSPADM

## 2023-12-28 RX ORDER — ENOXAPARIN SODIUM 100 MG/ML
40 INJECTION SUBCUTANEOUS DAILY
Status: DISCONTINUED | OUTPATIENT
Start: 2023-12-28 | End: 2023-12-30

## 2023-12-28 RX ADMIN — SENNOSIDES AND DOCUSATE SODIUM 2 TABLET: 50; 8.6 TABLET ORAL at 21:27

## 2023-12-28 RX ADMIN — PANTOPRAZOLE SODIUM 40 MG: 40 INJECTION, POWDER, FOR SOLUTION INTRAVENOUS at 21:27

## 2023-12-28 RX ADMIN — LOSARTAN POTASSIUM 50 MG: 50 TABLET, FILM COATED ORAL at 08:10

## 2023-12-28 RX ADMIN — Medication 10 ML: at 21:27

## 2023-12-28 RX ADMIN — HYDROCORTISONE ACETATE 25 MG: 25 SUPPOSITORY RECTAL at 11:37

## 2023-12-28 RX ADMIN — HYDROCORTISONE ACETATE 25 MG: 25 SUPPOSITORY RECTAL at 21:27

## 2023-12-28 RX ADMIN — METOPROLOL SUCCINATE 25 MG: 25 TABLET, EXTENDED RELEASE ORAL at 08:11

## 2023-12-28 RX ADMIN — PANTOPRAZOLE SODIUM 40 MG: 40 INJECTION, POWDER, FOR SOLUTION INTRAVENOUS at 08:11

## 2023-12-28 RX ADMIN — POLYETHYLENE GLYCOL 3350 17 G: 17 POWDER, FOR SOLUTION ORAL at 11:37

## 2023-12-28 RX ADMIN — OXYCODONE HYDROCHLORIDE 10 MG: 10 TABLET, FILM COATED, EXTENDED RELEASE ORAL at 21:27

## 2023-12-28 RX ADMIN — ENOXAPARIN SODIUM 40 MG: 100 INJECTION SUBCUTANEOUS at 17:29

## 2023-12-28 RX ADMIN — SENNOSIDES AND DOCUSATE SODIUM 2 TABLET: 50; 8.6 TABLET ORAL at 08:11

## 2023-12-28 RX ADMIN — Medication 10 ML: at 08:11

## 2023-12-28 RX ADMIN — LORAZEPAM 0.5 MG: 0.5 TABLET ORAL at 08:11

## 2023-12-28 RX ADMIN — BUDESONIDE INHALATION 0.5 MG: 0.5 SUSPENSION RESPIRATORY (INHALATION) at 20:42

## 2023-12-28 RX ADMIN — OXYCODONE HYDROCHLORIDE 10 MG: 10 TABLET, FILM COATED, EXTENDED RELEASE ORAL at 08:11

## 2023-12-28 NOTE — PLAN OF CARE
Goal Outcome Evaluation:              Outcome Evaluation: pt got a unit of PRBC's today and hgb came up to 7.8. Pt was having a little bit of rectal bleeding and GI consult was placed. Seems to be from hemorrhoid and new meds were ordered. Pt was having some anxiety and was given Ativan and really seemed to help. No plans for discharge at this time.

## 2023-12-28 NOTE — PLAN OF CARE
Goal Outcome Evaluation:         C/o generalized weakness. Hemoglobin is 6.7, receiving blood transfusion. Rechecked hemoglobin/hematocrit 4 hours post transfusion. Voided well throughout the shift. Incision site is dry and intact, open to air. Plan of care is ongoing. Call light is within reach. Wife at bedside.

## 2023-12-28 NOTE — SIGNIFICANT NOTE
Nursing called to inform me that hemoglobin recheck was 6.7.  1 unit of PRBCs is ordered at this time.    Electronically signed by QI Valdes, 12/28/23, 12:59 AM EST.

## 2023-12-28 NOTE — PROGRESS NOTES
"  [facility] DAILY PROGRESS NOTE    Patient Identification  Name: Louis Andino  Age: 63 y.o.  Sex: male  :  1960  MRN: [id number]    Date: 2023             Subjective:  Interval History: Hgb down, BRB per rectum    Objective:    Scheduled Meds:budesonide, 0.5 mg, Nebulization, BID - RT  cefdinir, 300 mg, Oral, Q12H  losartan, 50 mg, Oral, Q24H  metoprolol succinate XL, 25 mg, Oral, Q24H  oxyCODONE, 10 mg, Oral, Q12H  pantoprazole, 40 mg, Intravenous, Q12H  senna-docusate sodium, 2 tablet, Nasogastric, BID  sodium chloride, 10 mL, Intravenous, Q12H      Continuous Infusions:sodium chloride, 125 mL/hr, Last Rate: 125 mL/hr (23 0201)      PRN Meds:  acetaminophen    [DISCONTINUED] acetaminophen **OR** acetaminophen    aluminum-magnesium hydroxide-simethicone    senna-docusate sodium **AND** polyethylene glycol **AND** bisacodyl **AND** bisacodyl    Calcium Replacement - Follow Nurse / BPA Driven Protocol    hydrALAZINE    influenza vaccine    ipratropium-albuterol    LORazepam    Magnesium Low Dose Replacement - Follow Nurse / BPA Driven Protocol    [] HYDROmorphone **AND** naloxone    nitroglycerin    ondansetron **OR** ondansetron    Phosphorus Replacement - Follow Nurse / BPA Driven Protocol    Potassium Replacement - Follow Nurse / BPA Driven Protocol    sodium chloride    sodium chloride    sodium chloride    sodium chloride    sodium chloride    Vital signs in last 24 hours:  Temp:  [98 °F (36.7 °C)-98.8 °F (37.1 °C)] 98 °F (36.7 °C)  Heart Rate:  [70-81] 75  Resp:  [15-20] 20  BP: (102-160)/(64-84) 146/75    Intake/Output:    Intake/Output Summary (Last 24 hours) at 2023 0835  Last data filed at 2023 0800  Gross per 24 hour   Intake 739.17 ml   Output 1500 ml   Net -760.83 ml       Exam:  /75   Pulse 75   Temp 98 °F (36.7 °C) (Oral)   Resp 20   Ht 177.8 cm (70\")   Wt 106 kg (233 lb 4 oz)   SpO2 96%   BMI 33.47 kg/m²     General Appearance:    Alert, " cooperative, NAD   Lungs:     Respirations unlabored, no audible wheezing    Heart:    No cyanosis   Abdomen:     Soft, ND, incision clean dry intact   :   Harley out            Data Review:  All labs (24hrs):   Recent Results (from the past 24 hour(s))   Magnesium    Collection Time: 12/27/23 11:51 PM    Specimen: Arm, Left; Blood   Result Value Ref Range    Magnesium 1.8 1.6 - 2.4 mg/dL   Phosphorus    Collection Time: 12/27/23 11:51 PM    Specimen: Arm, Left; Blood   Result Value Ref Range    Phosphorus 2.9 2.5 - 4.5 mg/dL   Comprehensive Metabolic Panel    Collection Time: 12/27/23 11:51 PM    Specimen: Arm, Left; Blood   Result Value Ref Range    Glucose 107 (H) 65 - 99 mg/dL    BUN 14 8 - 23 mg/dL    Creatinine 1.23 0.76 - 1.27 mg/dL    Sodium 136 136 - 145 mmol/L    Potassium 4.3 3.5 - 5.2 mmol/L    Chloride 100 98 - 107 mmol/L    CO2 28.0 22.0 - 29.0 mmol/L    Calcium 8.3 (L) 8.6 - 10.5 mg/dL    Total Protein 5.4 (L) 6.0 - 8.5 g/dL    Albumin 2.6 (L) 3.5 - 5.2 g/dL    ALT (SGPT) 30 1 - 41 U/L    AST (SGOT) 30 1 - 40 U/L    Alkaline Phosphatase 85 39 - 117 U/L    Total Bilirubin 0.7 0.0 - 1.2 mg/dL    Globulin 2.8 gm/dL    A/G Ratio 0.9 g/dL    BUN/Creatinine Ratio 11.4 7.0 - 25.0    Anion Gap 8.0 5.0 - 15.0 mmol/L    eGFR 65.2 >60.0 mL/min/1.73   CBC Auto Differential    Collection Time: 12/27/23 11:51 PM    Specimen: Arm, Left; Blood   Result Value Ref Range    WBC 10.20 3.40 - 10.80 10*3/mm3    RBC 2.57 (L) 4.14 - 5.80 10*6/mm3    Hemoglobin 6.7 (C) 13.0 - 17.7 g/dL    Hematocrit 20.7 (C) 37.5 - 51.0 %    MCV 80.3 79.0 - 97.0 fL    MCH 26.1 (L) 26.6 - 33.0 pg    MCHC 32.5 31.5 - 35.7 g/dL    RDW 18.3 (H) 12.3 - 15.4 %    RDW-SD 51.2 37.0 - 54.0 fl    MPV 7.3 6.0 - 12.0 fL    Platelets 421 140 - 450 10*3/mm3   Scan Slide    Collection Time: 12/27/23 11:51 PM    Specimen: Arm, Left; Blood   Result Value Ref Range    Scan Slide     Manual Differential    Collection Time: 12/27/23 11:51 PM    Specimen: Arm,  Left; Blood   Result Value Ref Range    Neutrophil % 78.0 (H) 42.7 - 76.0 %    Lymphocyte % 10.0 (L) 19.6 - 45.3 %    Monocyte % 5.0 5.0 - 12.0 %    Eosinophil % 1.0 0.3 - 6.2 %    Bands %  6.0 (H) 0.0 - 5.0 %    Neutrophils Absolute 8.57 (H) 1.70 - 7.00 10*3/mm3    Lymphocytes Absolute 1.02 0.70 - 3.10 10*3/mm3    Monocytes Absolute 0.51 0.10 - 0.90 10*3/mm3    Eosinophils Absolute 0.10 0.00 - 0.40 10*3/mm3    Anisocytosis Slight/1+ None Seen    WBC Morphology Normal Normal    Platelet Morphology Normal Normal   Type & Screen    Collection Time: 12/28/23  2:08 AM    Specimen: Arm, Left; Blood   Result Value Ref Range    ABO Type A     RH type Positive     Antibody Screen Negative     T&S Expiration Date 12/31/2023 11:59:59 PM    Calcium, Ionized    Collection Time: 12/28/23  2:09 AM    Specimen: Arm, Left; Blood   Result Value Ref Range    Ionized Calcium 1.22 1.20 - 1.30 mmol/L   Prepare RBC, 1 Units    Collection Time: 12/28/23  3:58 AM   Result Value Ref Range    Product Code X6788V41     Unit Number [id number]     UNIT  ABO A     UNIT  RH POS     Crossmatch Interpretation Compatible     Dispense Status IS     Blood Expiration Date [id number]     Blood Type Barcode 6200       Imaging Results (Last 24 Hours)       ** No results found for the last 24 hours. **             Assessment:    Renal mass    Asthma    Gastroesophageal reflux disease    Hyperlipidemia    Hypertension    BPH (benign prostatic hyperplasia)    COPD (chronic obstructive pulmonary disease)    KARON (acute kidney injury)    Acute respiratory failure with hypoxia      Left nephrectomy and IVC thrombectomy 12/18    Plan:    Hgb down, 1U transfusing, recheck  KARON, improved  Cont Lovenox prophylaxis  SCDs  Continue to increase activity, appreciate physical and Occupational Therapy input  Discharge when hemoglobin stable and rectal bleeding resolved  Pain control is improved  Continue regular diet  Appreciate consultants care    James Esquivel,  MD  [facility]  [address], [address]  [place] [address]  Office: [phone]  Available via [facility] Secure Chat  12/28/23  08:35 EST

## 2023-12-28 NOTE — NURSING NOTE
Message sent to Dr. Esquivel and Dr. Trammell, when pt went to restroom he thought he had to pass gas and a small amount of bright red blood came out of his rectum. Pt does have a hemorrhoid however it is difficult to see if the bleed is coming from the internal side of that. Due to the low hgb and need for transfusion messages were sent to both physicians. During this event pt did not have any change in status but did state this hemorrhoids had never bled before.

## 2023-12-28 NOTE — CASE MANAGEMENT/SOCIAL WORK
Continued Stay Note  MILLIE Hanna     Patient Name: Louis Andino  MRN: [id number]  Today's Date: 12/28/2023    Admit Date: 12/18/2023    Plan: Home with spouse - [facility], accepted.   Discharge Plan       Row Name 12/28/23 1504       Plan    Plan Home with spouse - [facility], accepted.    Plan Comments Home with spouse - [facility], accepted - faxed order/notes to Rosanna at [facility] [phone] - Dr James Esquivel agreed to follow patient since he does not have an ordering MD.  DC Barriers:  1uPRBC HGB 6.7 up to 7.8.                 Expected Discharge Date and Time       Expected Discharge Date Expected Discharge Time    Dec 29, 2023               GEORGINA Hwang RN  [facility]/ICU   O: [phone]  C: [phone]  [email or web address]

## 2023-12-28 NOTE — CONSULTS
GI CONSULT  NOTE:    Referring Provider:  Dr. Trammell    Chief complaint: renal mass    Subjective .     History of present illness: Louis Andino is a 63 y.o. male who presented to the hospital on 12/18 for nephrectomy due to a renal mass.  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  Required 5 units PRBC and 2 units FFP during the procedure.  GI has been consulted for rectal bleeding and continued anemia.  Bedside RN reports a small to moderate amount of bright red blood that was noticed when patient passed gas this morning.  He has not had a bowel movement since his surgery.  He is using tux wipes on the area.  Wife is at bedside and assists with history.  States he occasionally has seen some bright red blood on the toilet paper, but has not seen this swelling before.  He has not had a colonoscopy.  Denies rectal pain.  He does have some generalized lower abdominal pain.  No nausea or vomiting.    Endo History:  No history of EGD/colonoscopy    Past Medical History:  Past Medical History:   Diagnosis Date    Asthma     Emphysema/COPD     GERD (gastroesophageal reflux disease)     Hyperglycemia 10/12/2023    Hyperlipidemia 10/12/2023    Hypertension     Prostate disease     Vitreous degeneration of right eye     Formatting of this note might be different from the original. Retinal tear and detachment warning symptoms reviewed and patient instructed to call immediately if increasing floaters, flashes, or decreasing peripheral vision. No retinal detachment or retinal tear noted. Recheck in 1 month with dilated exam.       Past Surgical History:  Past Surgical History:   Procedure Laterality Date    NEPHRECTOMY Left 12/18/2023    Procedure: NEPHRECTOMY RADICAL WITH CAVAL THROMBECTOMY;  Surgeon: James Esquivel MD;  [facility] MAIN OR;  Service: Urology;  Laterality: Left;    SKIN LESION EXCISION  1990       Social History:  Social History     Tobacco Use    Smoking status: Never      Passive exposure: Past    Smokeless tobacco: Never    Tobacco comments:     SECOND HAND SMOKE EXPOSURE AS A CHILD    Vaping Use    Vaping Use: Never used   Substance Use Topics    Alcohol use: Yes     Alcohol/week: 1.0 standard drink of alcohol     Types: 1 Glasses of wine per week     Comment: rare    Drug use: Not Currently       Family History:  History reviewed. No pertinent family history.    Medications:  Medications Prior to Admission   Medication Sig Dispense Refill Last Dose    aspirin 81 MG EC tablet Take 1 tablet by mouth Daily.   12/11/2023    celecoxib (CeleBREX) 200 MG capsule Take 1 capsule by mouth Daily As Needed for Mild Pain.   12/11/2023    famotidine (PEPCID) 40 MG tablet Take 1 tablet by mouth Daily.   12/17/2023    hydroCHLOROthiazide (HYDRODIURIL) 25 MG tablet Take 1 tablet by mouth Daily.   12/17/2023    magnesium oxide (MAG-OX) 400 MG tablet Take 1 tablet by mouth Daily.   12/11/2023    omeprazole (priLOSEC) 40 MG capsule Take 1 capsule by mouth Daily.   12/17/2023    potassium chloride (K-DUR,KLOR-CON) 20 MEQ CR tablet Take 1 tablet by mouth Daily.   12/17/2023    Symbicort 160-4.5 MCG/ACT inhaler Inhale 2 puffs 2 (Two) Times a Day.   12/17/2023    vitamin D3 125 MCG (5000 UT) capsule capsule Take 1 capsule by mouth Daily.   12/17/2023    lisinopril (PRINIVIL,ZESTRIL) 10 MG tablet Take 1.5 tablets by mouth 2 (Two) Times a Day.          Scheduled Meds:budesonide, 0.5 mg, Nebulization, BID - RT  losartan, 50 mg, Oral, Q24H  metoprolol succinate XL, 25 mg, Oral, Q24H  oxyCODONE, 10 mg, Oral, Q12H  pantoprazole, 40 mg, Intravenous, Q12H  senna-docusate sodium, 2 tablet, Nasogastric, BID  sodium chloride, 10 mL, Intravenous, Q12H      Continuous Infusions:sodium chloride, 125 mL/hr, Last Rate: 125 mL/hr (12/22/23 0201)      PRN Meds:.  acetaminophen    [DISCONTINUED] acetaminophen **OR** acetaminophen    aluminum-magnesium hydroxide-simethicone    senna-docusate sodium **AND** polyethylene  glycol **AND** bisacodyl **AND** bisacodyl    Calcium Replacement - Follow Nurse / BPA Driven Protocol    hydrALAZINE    influenza vaccine    ipratropium-albuterol    LORazepam    Magnesium Low Dose Replacement - Follow Nurse / BPA Driven Protocol    [] HYDROmorphone **AND** naloxone    nitroglycerin    ondansetron **OR** ondansetron    Phosphorus Replacement - Follow Nurse / BPA Driven Protocol    Potassium Replacement - Follow Nurse / BPA Driven Protocol    sodium chloride    sodium chloride    sodium chloride    sodium chloride    sodium chloride    ALLERGIES:  Patient has no known allergies.    ROS:  The following systems were reviewed and negative;   Constitution:  No fevers, chills, no unintentional weight loss  Skin: no rash, no jaundice  Eyes:  No blurry vision, no eye pain  HENT:  No change in hearing or smell  Resp:  No dyspnea or cough  CV:  No chest pain or palpitations  :  No dysuria, hematuria  Musculoskeletal:  No leg cramps or arthralgias  Neuro:  No tremor, no numbness  Psych:  No depression or confusion    Objective     Vital Signs:   Vitals:    23 2103 23 0350 23 0424 23 0717   BP: 160/84 121/68 155/81 146/75   BP Location: Right arm      Patient Position: Sitting      Pulse: 81 77 70 75   Resp: 17 15 15 20   Temp: 98.6 °F (37 °C) 98.8 °F (37.1 °C) 98.5 °F (36.9 °C) 98 °F (36.7 °C)   TempSrc: Oral Oral  Oral   SpO2: 94% 95% 95% 96%   Weight:       Height:           Physical Exam:       General Appearance:    Awake and alert, in no acute distress   Head:    Normocephalic, without obvious abnormality, atraumatic   Throat:   No oral lesions, no thrush, oral mucosa moist   Lungs:     Respirations regular, even and unlabored   Chest Wall:    No abnormalities observed   Abdomen:     Soft, non-tender, no rebound or guarding, non-distended, no hepatosplenomegaly   Rectal:     Deferred   Extremities:   Moves all extremities, no edema, no cyanosis   Pulses:   Pulses palpable  "and equal bilaterally   Skin:   No rash, no jaundice, normal palpation       Neurologic:   Cranial nerves 2 - 12 grossly intact, no asterixis       Results Review:   I reviewed the patient's labs and imaging.  CBC    Results from last 7 days   Lab Units 12/28/23  1055 12/27/23  2351 12/27/23  0516 12/26/23  0446 12/25/23  1644 12/25/23  0219 12/24/23  0442 12/23/23  0654 12/22/23  0625   WBC 10*3/mm3  --  10.20 10.30 11.90*  --  12.70* 13.10* 14.60* 17.10*   HEMOGLOBIN g/dL 7.8* 6.7* 7.3* 7.6* 7.2* 7.2* 8.2* 7.9* 7.9*   PLATELETS 10*3/mm3  --  421 406 380  --  347 311 310 235     CMP   Results from last 7 days   Lab Units 12/27/23  2351 12/27/23  0516 12/26/23  0446 12/25/23  1644 12/25/23  0219 12/24/23  0442 12/23/23  0654 12/22/23  1847 12/22/23  0625   SODIUM mmol/L 136 136 136  --  136 137 136  --  137   POTASSIUM mmol/L 4.3 3.6 3.8 3.9 3.5 3.6  3.7 3.3*  --  3.8   CHLORIDE mmol/L 100 98 99  --  98 99 98  --  103   CO2 mmol/L 28.0 29.0 28.0  --  28.0 26.0 25.0  --  25.0   BUN mg/dL 14 14 15  --  17 20 19  --  16   CREATININE mg/dL 1.23 1.27 1.35*  --  1.26 1.37* 1.26  --  1.24   GLUCOSE mg/dL 107* 100* 95  --  115* 83 81  --  77   ALBUMIN g/dL 2.6* 2.7* 2.5*  --  2.7* 2.8* 2.9*  --  2.6*   BILIRUBIN mg/dL 0.7 0.9 0.8  --  1.4* 1.7* 3.2*  --  2.7*   ALK PHOS U/L 85 93 109  --  124* 269* 220*  --  119*   AST (SGOT) U/L 30 27 30  --  33 47* 63*  --  71*   ALT (SGPT) U/L 30 29 31  --  38 50* 60*  --  54*   MAGNESIUM mg/dL 1.8 2.0 1.8  --  1.8 2.2 2.0  --  2.2   PHOSPHORUS mg/dL 2.9 2.9 2.7 2.2* 2.1* 2.0* 2.0*   < > 2.2*    < > = values in this interval not displayed.     Cr Clearance Estimated Creatinine Clearance: 73 mL/min (by C-G formula based on SCr of 1.23 mg/dL).  Coag     HbA1C No results found for: \"HGBA1C\"  Blood Glucose No results found for: \"POCGLU\"  Infection     UA    Results from last 7 days   Lab Units 12/23/23  8966   NITRITE UA  Negative   WBC UA /HPF 0-2   BACTERIA UA /HPF None Seen   SQUAM " EPITHEL UA /HPF 0-2     Imaging Results (Last 72 Hours)       ** No results found for the last 72 hours. **            ASSESSMENT AND PLAN:    63-year-old male admitted for nephrectomy due to renal mass.  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  He has received multiple transfusions.  GI consulted for rectal bleeding and anemia.     -Acute blood loss anemia  -Rectal bleeding  -Renal mass s/p nephrectomy with IVC tumor thrombus  -COPD  -colon cancer screening    Plan  Source of rectal bleeding is most likely hemorrhoidal especially with his constipation since his surgery.  Recommend topical treatment with hydrocortisone cream and laxatives to keep stool soft.  Continue to monitor H&H and transfuse for hemoglobin less than 7. Anemia likely secondary to his major surgery during which he lost a large amount of blood.   If hemoglobin continues to drop and rectal bleeding worsens, we could plan for colonoscopy as an inpatient.  Otherwise recommend colon cancer screening outpatient.  Continue supportive care.    I discussed the patients findings and my recommendations with the patient.    We appreciate the referral    Electronically signed by DUANE Macdonald, 12/28/23, 11:23 AM EST.

## 2023-12-28 NOTE — PAYOR COMM NOTE
"This is clinical update for Louis Andino   Reference/Auth#: [id number]     EXTENDED AUTHORIZATION PENDING:     Please call or fax determination to contact below.   Thank you.    RANDALL Paulino, RN, CCM  Utilization Review Nurse  [facility]  Direct & confidential phone # [phone]  Fax # [phone]        Louis Andino (63 y.o. Male)       Date of Birth   1960    Social Security Number       Address   [address] [place] [address]    Home Phone   [phone]    MRN   [id number]       Mormonism   Congregational    Marital Status                               Admission Date   12/18/23    Admission Type   Elective    Admitting Provider   James Esquivel MD    Attending Provider   Yovani Trammell MD    Department, Room/Bed   [facility] SURGICAL INPATIENT, 4132/1       Discharge Date       Discharge Disposition       Discharge Destination                                 Attending Provider: Yovani Trammell MD    Allergies: No Known Allergies    Isolation: None   Infection: None   Code Status: CPR    Ht: 177.8 cm (70\")   Wt: 106 kg (233 lb 4 oz)    Admission Cmt: None   Principal Problem: Renal mass [N28.89]                   Active Insurance as of 12/18/2023       Primary Coverage       Payor Plan Insurance Group Employer/Plan Group    ANTHEM BLUE CROSS ANTHEM BLUE CROSS BLUE SHIELD PPO [id number]       Payor Plan Address Payor Plan Phone Number Payor Plan Fax Number Effective Dates    [phone]  1/1/2022 - None Entered    [facility] [id number]         Subscriber Name Subscriber Birth Date Member ID       MARK ANDINO [birth year]/18/1961 [id number]               Secondary Coverage       Payor Plan Insurance Group Employer/Plan Group    MISC MC SUP   MISC MC SUP              NGN       Coverage Address Coverage Phone Number Coverage Fax Number Effective Dates    [address] [phone]  11/1/2023 - None Entered    [facility] [id number]    "      Subscriber Name Subscriber Birth Date Member ID       YESIKA ANDINO 1960 [id number]               Tertiary Coverage       Payor Plan Insurance Group Employer/Plan Group    MEDICARE MEDICARE A & B        Payor Plan Address Payor Plan Phone Number Payor Plan Fax Number Effective Dates    [address] [phone]  2023 - None Entered    [facility] [id number]         Subscriber Name Subscriber Birth Date Member ID       YESIKA ANDINO 1960 [id number]                     Emergency Contacts        (Rel.) Home Phone Work Phone Mobile Phone    Dahlia Andino (Spouse) -- -- [phone]              Operative/Procedure Notes (last 48 hours)  Notes from 23 1313 through 23 1313   No notes of this type exist for this encounter.          Physician Progress Notes (last 48 hours)        James Esquivel MD at 23 0835            [facility] DAILY PROGRESS NOTE    Patient Identification  Name: Yesika Andino  Age: 63 y.o.  Sex: male  :  1960  MRN: [id number]    Date: 2023             Subjective:  Interval History: Hgb down, BRB per rectum    Objective:    Scheduled Meds:budesonide, 0.5 mg, Nebulization, BID - RT  cefdinir, 300 mg, Oral, Q12H  losartan, 50 mg, Oral, Q24H  metoprolol succinate XL, 25 mg, Oral, Q24H  oxyCODONE, 10 mg, Oral, Q12H  pantoprazole, 40 mg, Intravenous, Q12H  senna-docusate sodium, 2 tablet, Nasogastric, BID  sodium chloride, 10 mL, Intravenous, Q12H      Continuous Infusions:sodium chloride, 125 mL/hr, Last Rate: 125 mL/hr (23 0201)      PRN Meds:  acetaminophen    [DISCONTINUED] acetaminophen **OR** acetaminophen    aluminum-magnesium hydroxide-simethicone    senna-docusate sodium **AND** polyethylene glycol **AND** bisacodyl **AND** bisacodyl    Calcium Replacement - Follow Nurse / BPA Driven Protocol    hydrALAZINE    influenza vaccine    ipratropium-albuterol    LORazepam    Magnesium Low Dose Replacement - Follow  "Nurse / BPA Driven Protocol    [] HYDROmorphone **AND** naloxone    nitroglycerin    ondansetron **OR** ondansetron    Phosphorus Replacement - Follow Nurse / BPA Driven Protocol    Potassium Replacement - Follow Nurse / BPA Driven Protocol    sodium chloride    sodium chloride    sodium chloride    sodium chloride    sodium chloride    Vital signs in last 24 hours:  Temp:  [98 °F (36.7 °C)-98.8 °F (37.1 °C)] 98 °F (36.7 °C)  Heart Rate:  [70-81] 75  Resp:  [15-20] 20  BP: (102-160)/(64-84) 146/75    Intake/Output:    Intake/Output Summary (Last 24 hours) at 2023 0835  Last data filed at 2023 0800  Gross per 24 hour   Intake 739.17 ml   Output 1500 ml   Net -760.83 ml       Exam:  /75   Pulse 75   Temp 98 °F (36.7 °C) (Oral)   Resp 20   Ht 177.8 cm (70\")   Wt 106 kg (233 lb 4 oz)   SpO2 96%   BMI 33.47 kg/m²     General Appearance:    Alert, cooperative, NAD   Lungs:     Respirations unlabored, no audible wheezing    Heart:    No cyanosis   Abdomen:     Soft, ND, incision clean dry intact   :   Harley out            Data Review:  All labs (24hrs):   Recent Results (from the past 24 hour(s))   Magnesium    Collection Time: 23 11:51 PM    Specimen: Arm, Left; Blood   Result Value Ref Range    Magnesium 1.8 1.6 - 2.4 mg/dL   Phosphorus    Collection Time: 23 11:51 PM    Specimen: Arm, Left; Blood   Result Value Ref Range    Phosphorus 2.9 2.5 - 4.5 mg/dL   Comprehensive Metabolic Panel    Collection Time: 23 11:51 PM    Specimen: Arm, Left; Blood   Result Value Ref Range    Glucose 107 (H) 65 - 99 mg/dL    BUN 14 8 - 23 mg/dL    Creatinine 1.23 0.76 - 1.27 mg/dL    Sodium 136 136 - 145 mmol/L    Potassium 4.3 3.5 - 5.2 mmol/L    Chloride 100 98 - 107 mmol/L    CO2 28.0 22.0 - 29.0 mmol/L    Calcium 8.3 (L) 8.6 - 10.5 mg/dL    Total Protein 5.4 (L) 6.0 - 8.5 g/dL    Albumin 2.6 (L) 3.5 - 5.2 g/dL    ALT (SGPT) 30 1 - 41 U/L    AST (SGOT) 30 1 - 40 U/L    Alkaline " Phosphatase 85 39 - 117 U/L    Total Bilirubin 0.7 0.0 - 1.2 mg/dL    Globulin 2.8 gm/dL    A/G Ratio 0.9 g/dL    BUN/Creatinine Ratio 11.4 7.0 - 25.0    Anion Gap 8.0 5.0 - 15.0 mmol/L    eGFR 65.2 >60.0 mL/min/1.73   CBC Auto Differential    Collection Time: 12/27/23 11:51 PM    Specimen: Arm, Left; Blood   Result Value Ref Range    WBC 10.20 3.40 - 10.80 10*3/mm3    RBC 2.57 (L) 4.14 - 5.80 10*6/mm3    Hemoglobin 6.7 (C) 13.0 - 17.7 g/dL    Hematocrit 20.7 (C) 37.5 - 51.0 %    MCV 80.3 79.0 - 97.0 fL    MCH 26.1 (L) 26.6 - 33.0 pg    MCHC 32.5 31.5 - 35.7 g/dL    RDW 18.3 (H) 12.3 - 15.4 %    RDW-SD 51.2 37.0 - 54.0 fl    MPV 7.3 6.0 - 12.0 fL    Platelets 421 140 - 450 10*3/mm3   Scan Slide    Collection Time: 12/27/23 11:51 PM    Specimen: Arm, Left; Blood   Result Value Ref Range    Scan Slide     Manual Differential    Collection Time: 12/27/23 11:51 PM    Specimen: Arm, Left; Blood   Result Value Ref Range    Neutrophil % 78.0 (H) 42.7 - 76.0 %    Lymphocyte % 10.0 (L) 19.6 - 45.3 %    Monocyte % 5.0 5.0 - 12.0 %    Eosinophil % 1.0 0.3 - 6.2 %    Bands %  6.0 (H) 0.0 - 5.0 %    Neutrophils Absolute 8.57 (H) 1.70 - 7.00 10*3/mm3    Lymphocytes Absolute 1.02 0.70 - 3.10 10*3/mm3    Monocytes Absolute 0.51 0.10 - 0.90 10*3/mm3    Eosinophils Absolute 0.10 0.00 - 0.40 10*3/mm3    Anisocytosis Slight/1+ None Seen    WBC Morphology Normal Normal    Platelet Morphology Normal Normal   Type & Screen    Collection Time: 12/28/23  2:08 AM    Specimen: Arm, Left; Blood   Result Value Ref Range    ABO Type A     RH type Positive     Antibody Screen Negative     T&S Expiration Date 12/31/2023 11:59:59 PM    Calcium, Ionized    Collection Time: 12/28/23  2:09 AM    Specimen: Arm, Left; Blood   Result Value Ref Range    Ionized Calcium 1.22 1.20 - 1.30 mmol/L   Prepare RBC, 1 Units    Collection Time: 12/28/23  3:58 AM   Result Value Ref Range    Product Code L2751T39     Unit Number [id number]     UNIT  ABO A      UNIT  RH POS     Crossmatch Interpretation Compatible     Dispense Status IS     Blood Expiration Date 930461273847     Blood Type Barcode 6200       Imaging Results (Last 24 Hours)       ** No results found for the last 24 hours. **             Assessment:    Renal mass    Asthma    Gastroesophageal reflux disease    Hyperlipidemia    Hypertension    BPH (benign prostatic hyperplasia)    COPD (chronic obstructive pulmonary disease)    KARON (acute kidney injury)    Acute respiratory failure with hypoxia      Left nephrectomy and IVC thrombectomy     Plan:    Hgb down, 1U transfusing, recheck  KARON, improved  Cont Lovenox prophylaxis  SCDs  Continue to increase activity, appreciate physical and Occupational Therapy input  Discharge when hemoglobin stable and rectal bleeding resolved  Pain control is improved  Continue regular diet  Appreciate consultants care    James Esquivel MD  [facility]  [address] [place] [address], [address]  [place] [address]  Office: [phone]  Available via Epic Secure Chat  23  08:35 EST       Electronically signed by James Esquivel MD at 23 0836       James Esquivel MD at 23 8183            [facility] DAILY PROGRESS NOTE    Patient Identification  Name: Louis Andino  Age: 63 y.o.  Sex: male  :  1960  MRN: [id number]    Date: 2023             Subjective:  Interval History: Labs stable, pain is improved, getting around better, positive bowel movement and tolerating diet    Objective:    Scheduled Meds:budesonide, 0.5 mg, Nebulization, BID - RT  cefdinir, 300 mg, Oral, Q12H  chlorhexidine, 15 mL, Mouth/Throat, Q12H  enoxaparin, 40 mg, Subcutaneous, Daily  losartan, 50 mg, Oral, Q24H  metoprolol succinate XL, 25 mg, Oral, Q24H  oxyCODONE, 10 mg, Oral, Q12H  pantoprazole, 40 mg, Oral, BID AC  senna-docusate sodium, 2 tablet, Nasogastric, BID  sodium chloride, 10 mL, Intravenous, Q12H      Continuous Infusions:sodium chloride, 125 mL/hr, Last Rate:  "125 mL/hr (23 0201)      PRN Meds:  acetaminophen    [DISCONTINUED] acetaminophen **OR** acetaminophen    aluminum-magnesium hydroxide-simethicone    senna-docusate sodium **AND** polyethylene glycol **AND** bisacodyl **AND** bisacodyl    Calcium Replacement - Follow Nurse / BPA Driven Protocol    hydrALAZINE    influenza vaccine    ipratropium-albuterol    Magnesium Low Dose Replacement - Follow Nurse / BPA Driven Protocol    [] HYDROmorphone **AND** naloxone    nitroglycerin    ondansetron **OR** ondansetron    Phosphorus Replacement - Follow Nurse / BPA Driven Protocol    Potassium Replacement - Follow Nurse / BPA Driven Protocol    sodium chloride    sodium chloride    sodium chloride    sodium chloride    sodium chloride    Vital signs in last 24 hours:  Temp:  [98.3 °F (36.8 °C)-99.2 °F (37.3 °C)] 98.4 °F (36.9 °C)  Heart Rate:  [75-93] 75  Resp:  [14-19] 19  BP: (102-170)/(64-74) 102/64    Intake/Output:    Intake/Output Summary (Last 24 hours) at 2023 1803  Last data filed at 2023 1207  Gross per 24 hour   Intake 240 ml   Output 700 ml   Net -460 ml       Exam:  /64 (BP Location: Right arm, Patient Position: Lying)   Pulse 75   Temp 98.4 °F (36.9 °C) (Oral)   Resp 19   Ht 177.8 cm (70\")   Wt 106 kg (233 lb 4 oz)   SpO2 94%   BMI 33.47 kg/m²     General Appearance:    Alert, cooperative, NAD   Lungs:     Respirations unlabored, no audible wheezing    Heart:    No cyanosis   Abdomen:     Soft, ND, incision clean dry intact   :   Harley out            Data Review:  All labs (24hrs):   Recent Results (from the past 24 hour(s))   Magnesium    Collection Time: 23  5:16 AM    Specimen: Blood   Result Value Ref Range    Magnesium 2.0 1.6 - 2.4 mg/dL   Phosphorus    Collection Time: 23  5:16 AM    Specimen: Blood   Result Value Ref Range    Phosphorus 2.9 2.5 - 4.5 mg/dL   Comprehensive Metabolic Panel    Collection Time: 23  5:16 AM    Specimen: Blood   Result " Value Ref Range    Glucose 100 (H) 65 - 99 mg/dL    BUN 14 8 - 23 mg/dL    Creatinine 1.27 0.76 - 1.27 mg/dL    Sodium 136 136 - 145 mmol/L    Potassium 3.6 3.5 - 5.2 mmol/L    Chloride 98 98 - 107 mmol/L    CO2 29.0 22.0 - 29.0 mmol/L    Calcium 8.3 (L) 8.6 - 10.5 mg/dL    Total Protein 5.6 (L) 6.0 - 8.5 g/dL    Albumin 2.7 (L) 3.5 - 5.2 g/dL    ALT (SGPT) 29 1 - 41 U/L    AST (SGOT) 27 1 - 40 U/L    Alkaline Phosphatase 93 39 - 117 U/L    Total Bilirubin 0.9 0.0 - 1.2 mg/dL    Globulin 2.9 gm/dL    A/G Ratio 0.9 g/dL    BUN/Creatinine Ratio 11.0 7.0 - 25.0    Anion Gap 9.0 5.0 - 15.0 mmol/L    eGFR 62.7 >60.0 mL/min/1.73   Calcium, Ionized    Collection Time: 12/27/23  5:16 AM    Specimen: Blood   Result Value Ref Range    Ionized Calcium 1.19 (L) 1.20 - 1.30 mmol/L   CBC Auto Differential    Collection Time: 12/27/23  5:16 AM    Specimen: Blood   Result Value Ref Range    WBC 10.30 3.40 - 10.80 10*3/mm3    RBC 2.79 (L) 4.14 - 5.80 10*6/mm3    Hemoglobin 7.3 (L) 13.0 - 17.7 g/dL    Hematocrit 22.5 (L) 37.5 - 51.0 %    MCV 80.6 79.0 - 97.0 fL    MCH 26.3 (L) 26.6 - 33.0 pg    MCHC 32.6 31.5 - 35.7 g/dL    RDW 18.6 (H) 12.3 - 15.4 %    RDW-SD 51.6 37.0 - 54.0 fl    MPV 7.4 6.0 - 12.0 fL    Platelets 406 140 - 450 10*3/mm3   Scan Slide    Collection Time: 12/27/23  5:16 AM    Specimen: Blood   Result Value Ref Range    Scan Slide     Manual Differential    Collection Time: 12/27/23  5:16 AM    Specimen: Blood   Result Value Ref Range    Neutrophil % 78.0 (H) 42.7 - 76.0 %    Lymphocyte % 15.0 (L) 19.6 - 45.3 %    Monocyte % 5.0 5.0 - 12.0 %    Eosinophil % 1.0 0.3 - 6.2 %    Metamyelocyte % 1.0 (H) 0.0 - 0.0 %    Neutrophils Absolute 8.03 (H) 1.70 - 7.00 10*3/mm3    Lymphocytes Absolute 1.55 0.70 - 3.10 10*3/mm3    Monocytes Absolute 0.52 0.10 - 0.90 10*3/mm3    Eosinophils Absolute 0.10 0.00 - 0.40 10*3/mm3    nRBC 1.0 (H) 0.0 - 0.2 /100 WBC    Anisocytosis Slight/1+ None Seen    WBC Morphology Normal Normal     Platelet Estimate Adequate Normal      Imaging Results (Last 24 Hours)       ** No results found for the last 24 hours. **             Assessment:    Renal mass    Asthma    Gastroesophageal reflux disease    Hyperlipidemia    Hypertension    BPH (benign prostatic hyperplasia)    COPD (chronic obstructive pulmonary disease)    KARON (acute kidney injury)    Acute respiratory failure with hypoxia      Left nephrectomy and IVC thrombectomy     Plan:    Hgb stable  KARON, improved  Cont Lovenox prophylaxis  SCDs  Continue to increase activity, appreciate physical and Occupational Therapy input, plan discharge tomorrow vs Friday  Pain control is improved  Continue regular diet  Appreciate consultants care    James Esquivel MD  [facility]  [address] [facility], [address]  [place] [address]  Office: [phone]  Available via KLab Secure Chat  23  18:03 EST       Electronically signed by James Esquivel MD at 23 1803       Yuniel Lai MD at 23 1013              [facility] Medicine Services   Daily Progress Note    Patient Name: Louis Andino  : 1960  MRN: [id number]  Primary Care Physician:  Provider, No Known  Date of admission: 2023  Date of Service: 2023       Subjective      Patient  is lying in bed, feeling better but still feeling weak, afebrile           Objective      Vitals:   Temp:  [97.9 °F (36.6 °C)-99.2 °F (37.3 °C)] 98.3 °F (36.8 °C)  Heart Rate:  [76-93] 78  Resp:  [14-18] 16  BP: (133-180)/(68-77) 170/74    Physical Exam       General:  Alert and oriented , no  distress  Eyes:  Pupils are equal, round and reactive to light. Extraocular movements are intact. Normal conjunctivae, vision unchanged    HENT:  Normocephalic, atraumatic   Respiratory: Decrease in breathing sounds anteriorly bilaterally. No wheezing  Cardiovascular: Regular rate, Regular, S1 auscultated. S2 auscultated , No edema   Gastrointestinal: Soft. Nontender, nondistended. Normal  bowel sounds  Musculoskeletal: No tenderness   Neurologic: Alert, oriented. No focal defect   Psychiatric: Cooperative , appropriate mood and affect, nonsuicidal           Result Review    Result Review:  I have personally reviewed the results from the time of this admission to 12/27/2023 10:13 EST and agree with these findings:  [x]  Laboratory  []  Microbiology  []  Radiology  []  EKG/Telemetry   []  Cardiology/Vascular   []  Pathology  []  Old records  []  Other:  Most notable findings include:     Wounds (last 24 hours)       LDA Wound       Row Name 12/27/23 0800 12/27/23 0000 12/26/23 2000       Wound 12/18/23 0758 Left upper abdomen Incision    Wound - Properties Group Placement Date: 12/18/23  -MS Placement Time: 0758 -MS Side: Left  -MS Orientation: upper  -MS Location: abdomen  -MS Primary Wound Type: Incision  -MS    Dressing Appearance -- -- open to air  -SM    Closure Staples;Open to air;Approximated  -KL Janette;Open to air  -SM Staples;Open to air  -SM    Base blanchable;dry;clean  -KL clean;dry  -SM clean;dry  -SM    Periwound dry;intact  -KL dry;intact  -SM dry;intact  -SM    Periwound Temperature -- warm  -SM warm  -SM    Periwound Skin Turgor -- firm  -SM firm  -SM    Drainage Characteristics/Odor -- bleeding controlled  -SM bleeding controlled  -SM    Drainage Amount none  -KL none  -SM none  -SM    Retired Wound - Properties Group Placement Date: 12/18/23  -MS Placement Time: 0758 -MS Side: Left  -MS Orientation: upper  -MS Location: abdomen  -MS Primary Wound Type: Incision  -MS    Retired Wound - Properties Group Date first assessed: 12/18/23  -MS Time first assessed: 0758  -MS Side: Left  -MS Location: abdomen  -MS Primary Wound Type: Incision  -MS      Row Name 12/26/23 1600 12/26/23 1200          Wound 12/18/23 0758 Left upper abdomen Incision    Wound - Properties Group Placement Date: 12/18/23  -MS Placement Time: 0758 -MS Side: Left  -MS Orientation: upper  -MS Location: abdomen  -MS  Primary Wound Type: Incision  -MS    Dressing Appearance open to air  -KL open to air  -KL     Closure Staples;Open to air  -KL Staples;Open to air  -KL     Base clean;dry  -KL clean;dry  -KL     Drainage Amount none  -KL none  -KL     Retired Wound - Properties Group Placement Date: 12/18/23  -MS Placement Time: 0758 -MS Side: Left  -MS Orientation: upper  -MS Location: abdomen  -MS Primary Wound Type: Incision  -MS    Retired Wound - Properties Group Date first assessed: 12/18/23  -MS Time first assessed: 0758 -MS Side: Left  -MS Location: abdomen  -MS Primary Wound Type: Incision  -MS              User Key  (r) = Recorded By, (t) = Taken By, (c) = Cosigned By      Initials Name Provider Type    Brooklyn Bourgeois RN Registered Nurse    Cassie Hidalgo RN Registered Nurse    Rita Giron RN Registered Nurse                      Assessment & Plan          budesonide, 0.5 mg, Nebulization, BID - RT  cefdinir, 300 mg, Oral, Q12H  chlorhexidine, 15 mL, Mouth/Throat, Q12H  enoxaparin, 40 mg, Subcutaneous, Daily  losartan, 50 mg, Oral, Q24H  metoprolol succinate XL, 25 mg, Oral, Q24H  oxyCODONE, 10 mg, Oral, Q12H  pantoprazole, 40 mg, Oral, BID AC  potassium chloride, 40 mEq, Oral, Q4H  senna-docusate sodium, 2 tablet, Nasogastric, BID  sodium chloride, 10 mL, Intravenous, Q12H       sodium chloride, 125 mL/hr, Last Rate: 125 mL/hr (12/22/23 0201)         Active Hospital Problems:  Active Hospital Problems    Diagnosis     **Renal mass     BPH (benign prostatic hyperplasia)     COPD (chronic obstructive pulmonary disease)     KARON (acute kidney injury)     Acute respiratory failure with hypoxia     Asthma     Gastroesophageal reflux disease     Hyperlipidemia     Hypertension      Plan:     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 and large volume blood loss    - extensive blood loss during surgery    - s/p 5u prbc  and 2u FFP given per op note    -Urology following     #Acute hypoxic respiratory failure  #COPD    - Patient remained intubated post op, was transferred to ICU-Patient extubated on 12/19/2023    - Dueoneb QID    - Pulmicort BID    - wean oxygen as tolerated     #Hypovolemic shock    - 2/2 blood loss from surgery    - Levophed Weaned  -Continue Omnicef per ID recommendation        #Acute blood loss anemia    - 2/2 blood loss from surgery    - s/p 5u prbc and 2u FFP given per op note      - h/h qday    - transfuse to maintain hgb > 7.0    - pt     #KARON  #Metabolic acidosis    - suspect 2/2 blood loss and possible post op ATN    - bicarb improved from 18 > 24    - Cr 1.35    - monitor     #GERD    - PPI     #HTN    - hold hypertensive meds due to recent shock, monitor      # Weakness, PT OT.   Possible discharge to home vs. SNF    Discussed with the pt and his wife at the bedside.     Also discussed with the nurse      #obesity advised weight  loss  DVT prophylaxis:  Medical and mechanical DVT prophylaxis orders are present.    DVT prophylaxis:  Medical and mechanical DVT prophylaxis orders are present.    CODE STATUS:    Code Status (Patient has no pulse and is not breathing): CPR (Attempt to Resuscitate)  Medical Interventions (Patient has pulse or is breathing): Full Support      Disposition:  I expect patient to be discharged 2 days.      Electronically signed by Yuniel Lai MD, 12/27/23, 10:13 [facility].  [facility] Hospitalist Team              Electronically signed by Yuniel Lai MD at 12/27/23 1505       Jcarlos Wilkins MD at 12/26/23 1347          Infectious Diseases Progress Note      LOS: 8 days   Patient Care Team:  Provider, Emerald Known as PCP - General    Chief Complaint: Weakness    Subjective     Had no fever during the last 24 hours.  He remained hemodynamically stable.  He is currently on room air    Review of Systems:   Review of Systems   Constitutional:  Positive for fatigue.   HENT: Negative.      Eyes: Negative.    Respiratory: Negative.     Cardiovascular: Negative.    Gastrointestinal: Negative.    Genitourinary: Negative.    Musculoskeletal: Negative.    Skin: Negative.    Neurological: Negative.    Hematological: Negative.    Psychiatric/Behavioral: Negative.          Objective     Vital Signs  Temp:  [97.9 °F (36.6 °C)-98.5 °F (36.9 °C)] 97.9 °F (36.6 °C)  Heart Rate:  [76-91] 76  Resp:  [14-19] 18  BP: (144-180)/(70-87) 144/72    Physical Exam:  Physical Exam  Vitals and nursing note reviewed.   Constitutional:       Appearance: He is well-developed.   HENT:      Head: Normocephalic and atraumatic.   Eyes:      Pupils: Pupils are equal, round, and reactive to light.   Cardiovascular:      Rate and Rhythm: Normal rate and regular rhythm.      Heart sounds: Normal heart sounds.   Pulmonary:      Effort: Pulmonary effort is normal. No respiratory distress.      Breath sounds: Rales present. No wheezing.   Abdominal:      General: Bowel sounds are normal. There is no distension.      Palpations: Abdomen is soft. There is no mass.      Tenderness: There is no abdominal tenderness. There is no guarding or rebound.   Musculoskeletal:         General: No deformity. Normal range of motion.      Cervical back: Normal range of motion and neck supple.   Skin:     General: Skin is warm.      Findings: No erythema or rash.   Neurological:      Mental Status: He is alert and oriented to person, place, and time.      Cranial Nerves: No cranial nerve deficit.          Results Review:    I have reviewed all clinical data, test, lab, and imaging results.     Radiology  No Radiology Exams Resulted Within Past 24 Hours    Cardiology    Laboratory    Results from last 7 days   Lab Units 12/26/23  0446 12/25/23  1644 12/25/23  0219 12/24/23  0442 12/23/23  0654 12/22/23  0625 12/21/23  0345 12/20/23  1212 12/20/23  0606   WBC 10*3/mm3 11.90*  --  12.70* 13.10* 14.60* 17.10* 23.30*  --  17.50*   HEMOGLOBIN g/dL 7.6* 7.2*  7.2* 8.2* 7.9* 7.9* 7.3*   < > 7.9*   HEMATOCRIT % 21.9* 22.5* 21.6* 25.9* 24.7* 24.9* 22.9*   < > 24.2*   PLATELETS 10*3/mm3 380  --  347 311 310 235 225  --  157    < > = values in this interval not displayed.     Results from last 7 days   Lab Units 12/26/23  0446 12/25/23  1644 12/25/23  0219 12/24/23  0442 12/23/23  0654 12/22/23  0625 12/21/23  0345 12/20/23  0606   SODIUM mmol/L 136  --  136 137 136 137 137 136   POTASSIUM mmol/L 3.8 3.9 3.5 3.6  3.7 3.3* 3.8 4.3 3.8   CHLORIDE mmol/L 99  --  98 99 98 103 104 102   CO2 mmol/L 28.0  --  28.0 26.0 25.0 25.0 25.0 24.0   BUN mg/dL 15  --  17 20 19 16 16 16   CREATININE mg/dL 1.35*  --  1.26 1.37* 1.26 1.24 1.47* 1.56*   GLUCOSE mg/dL 95  --  115* 83 81 77 80 97   ALBUMIN g/dL 2.5*  --  2.7* 2.8* 2.9* 2.6* 3.0* 2.6*   BILIRUBIN mg/dL 0.8  --  1.4* 1.7* 3.2* 2.7* 1.1 0.7   ALK PHOS U/L 109  --  124* 269* 220* 119* 400* 51   AST (SGOT) U/L 30  --  33 47* 63* 71* 84* 86*   ALT (SGPT) U/L 31  --  38 50* 60* 54* 42* 40   CALCIUM mg/dL 8.5*  --  8.2* 8.7 8.6 8.6 8.7 7.7*                 Microbiology   Microbiology Results (last 10 days)       Procedure Component Value - Date/Time    Blood Culture - Blood, Hand, Right [[id number]]  (Normal) Collected: 12/21/23 0710    Lab Status: Final result Specimen: Blood from Hand, Right Updated: 12/26/23 0800     Blood Culture No growth at 5 days    Blood Culture - Blood, Hand, Left [[id number]]  (Normal) Collected: 12/21/23 0710    Lab Status: Final result Specimen: Blood from Hand, Left Updated: 12/26/23 0800     Blood Culture No growth at 5 days    Narrative:      Less than seven (7) mL's of blood was collected.  Insufficient quantity may yield false negative results.    Blood Culture - Blood, Arm, Left [[id number]]  (Normal) Collected: 12/19/23 1107    Lab Status: Final result Specimen: Blood from Arm, Left Updated: 12/24/23 1146     Blood Culture No growth at 5 days    Blood Culture - Blood, Arm, Right [[id number]]  (Normal)  Collected: 23 1050    Lab Status: Final result Specimen: Blood from Arm, Right Updated: 23 1146     Blood Culture No growth at 5 days            Medication Review:       Schedule Meds  budesonide, 0.5 mg, Nebulization, BID - RT  cefdinir, 300 mg, Oral, Q12H  chlorhexidine, 15 mL, Mouth/Throat, Q12H  enoxaparin, 40 mg, Subcutaneous, Daily  losartan, 50 mg, Oral, Q24H  metoprolol succinate XL, 25 mg, Oral, Q24H  oxyCODONE, 10 mg, Oral, Q12H  pantoprazole, 40 mg, Oral, BID AC  senna-docusate sodium, 2 tablet, Nasogastric, BID  sodium chloride, 10 mL, Intravenous, Q12H        Infusion Meds  sodium chloride, 125 mL/hr, Last Rate: 125 mL/hr (23 0201)        PRN Meds    acetaminophen    [DISCONTINUED] acetaminophen **OR** acetaminophen    aluminum-magnesium hydroxide-simethicone    senna-docusate sodium **AND** polyethylene glycol **AND** bisacodyl **AND** bisacodyl    Calcium Replacement - Follow Nurse / BPA Driven Protocol    hydrALAZINE    influenza vaccine    ipratropium-albuterol    Magnesium Low Dose Replacement - Follow Nurse / BPA Driven Protocol    [] HYDROmorphone **AND** naloxone    nitroglycerin    ondansetron **OR** ondansetron    oxyCODONE    Phosphorus Replacement - Follow Nurse / BPA Driven Protocol    Potassium Replacement - Follow Nurse / BPA Driven Protocol    sodium chloride    sodium chloride    sodium chloride    sodium chloride    sodium chloride        Assessment & Plan       Antimicrobial Therapy   1.  P.o. Omnicef        2.        3.        4.        5.            Assessment     Reactive leukocytosis.  Patient has no obvious clinical signs of sepsis.  Probably secondary to intra-abdominal hematoma versus pneumonia.  Blood cultures from 2023 are negative so far  White count is trending down slowly    Left lower lobe infiltrate consistent with pneumonia.  Currently on room air     S/p left radical nephrectomy on 2023 secondary to left kidney mass probably  renal cell carcinoma.  CT scan of abdomen pelvis showed collection at the nephrectomy site which possibly seroma versus hematoma     History of borderline diabetes mellitus     History of degenerative joint disease with a chronic back and hip pain     Elevated creatinine.  Patient is s/p recent left-sided nephrectomy.  Resolved     Plan       Continue Omnicef 300 mg p.o. twice daily for 3 days  Encourage incentive spirometer  Not much to add from infectious disease point.  I will sign off today.  Please call for any question        Jcarlos Wilkins MD  12/26/23  13:47 EST    Note is dictated utilizing voice recognition software/Dragon      Electronically signed by Jcarlos Wilkins MD at 12/26/23 1347          Consult Notes (last 48 hours)        Mary Swan APRN at 12/28/23 1123        Consult Orders    1. Inpatient Gastroenterology Consult [[id number]] ordered by Yovani Trammell MD at 12/28/23 0742              Attestation signed by MANPREET Owens MD at 12/28/23 1228    Electronically signed on 12/28/23 12:25 EST by PER Owens MD  The patient was seen and examined with an APRN. I personally performed the entire medical decision making, and physical exam. Labs and imaging and outpatient records reviewed, ordered.    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  GI is consulted for rectal bleeding and anemia.  His nurse reports small amount of bright red blood per rectum this morning.  Has been constipated without a bowel movement since surgery.  Has history of hemorrhoids and uses Tucks wipes at home.  Has never had a colonoscopy.  Denies rectal pain.  On exam has large abdominal scar with some mild tenderness in the lower abdomen.  Labs reviewed hemoglobin 7.8 most recently, was 6.7 yesterday.    Suspect significant anemia is due to blood loss from  recent surgery.  Scant rectal bleeding likely due to hemorrhoids in the setting of constipation.  Recommend topical hydrocortisone and fiber supplement/mild laxative to keep stools soft and prevent constipation.  Would recommend colonoscopy as an outpatient when he is further out from his recent major surgery, we can arrange for this.  If ongoing/worsening rectal bleeding or significant decline in hemoglobin could consider inpatient colonoscopy.  Would likely benefit from IV iron infusion. OK for diet.                   GI CONSULT  NOTE:    Referring Provider:  Dr. Trammell    Chief complaint: renal mass    Subjective .     History of present illness: Louis Andino is a 63 y.o. male who presented to the hospital on 12/18 for nephrectomy due to a renal mass.  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  Required 5 units PRBC and 2 units FFP during the procedure.  GI has been consulted for rectal bleeding and continued anemia.  Bedside RN reports a small to moderate amount of bright red blood that was noticed when patient passed gas this morning.  He has not had a bowel movement since his surgery.  He is using tux wipes on the area.  Wife is at bedside and assists with history.  States he occasionally has seen some bright red blood on the toilet paper, but has not seen this swelling before.  He has not had a colonoscopy.  Denies rectal pain.  He does have some generalized lower abdominal pain.  No nausea or vomiting.    Endo History:  No history of EGD/colonoscopy    Past Medical History:  Past Medical History:   Diagnosis Date    Asthma     Emphysema/COPD     GERD (gastroesophageal reflux disease)     Hyperglycemia 10/12/2023    Hyperlipidemia 10/12/2023    Hypertension     Prostate disease     Vitreous degeneration of right eye     Formatting of this note might be different from the original. Retinal tear and detachment warning symptoms reviewed and patient instructed to call  immediately if increasing floaters, flashes, or decreasing peripheral vision. No retinal detachment or retinal tear noted. Recheck in 1 month with dilated exam.       Past Surgical History:  Past Surgical History:   Procedure Laterality Date    NEPHRECTOMY Left 12/18/2023    Procedure: NEPHRECTOMY RADICAL WITH CAVAL THROMBECTOMY;  Surgeon: James Esquivel MD;  Location: [facility] MAIN OR;  Service: Urology;  Laterality: Left;    SKIN LESION EXCISION  1990       Social History:  Social History     Tobacco Use    Smoking status: Never     Passive exposure: Past    Smokeless tobacco: Never    Tobacco comments:     SECOND HAND SMOKE EXPOSURE AS A CHILD    Vaping Use    Vaping Use: Never used   Substance Use Topics    Alcohol use: Yes     Alcohol/week: 1.0 standard drink of alcohol     Types: 1 Glasses of wine per week     Comment: rare    Drug use: Not Currently       Family History:  History reviewed. No pertinent family history.    Medications:  Medications Prior to Admission   Medication Sig Dispense Refill Last Dose    aspirin 81 MG EC tablet Take 1 tablet by mouth Daily.   12/11/2023    celecoxib (CeleBREX) 200 MG capsule Take 1 capsule by mouth Daily As Needed for Mild Pain.   12/11/2023    famotidine (PEPCID) 40 MG tablet Take 1 tablet by mouth Daily.   12/17/2023    hydroCHLOROthiazide (HYDRODIURIL) 25 MG tablet Take 1 tablet by mouth Daily.   12/17/2023    magnesium oxide (MAG-OX) 400 MG tablet Take 1 tablet by mouth Daily.   12/11/2023    omeprazole (priLOSEC) 40 MG capsule Take 1 capsule by mouth Daily.   12/17/2023    potassium chloride (K-DUR,KLOR-CON) 20 MEQ CR tablet Take 1 tablet by mouth Daily.   12/17/2023    Symbicort 160-4.5 MCG/ACT inhaler Inhale 2 puffs 2 (Two) Times a Day.   12/17/2023    vitamin D3 125 MCG (5000 UT) capsule capsule Take 1 capsule by mouth Daily.   12/17/2023    lisinopril (PRINIVIL,ZESTRIL) 10 MG tablet Take 1.5 tablets by mouth 2 (Two) Times a Day.          Scheduled  Meds:budesonide, 0.5 mg, Nebulization, BID - RT  losartan, 50 mg, Oral, Q24H  metoprolol succinate XL, 25 mg, Oral, Q24H  oxyCODONE, 10 mg, Oral, Q12H  pantoprazole, 40 mg, Intravenous, Q12H  senna-docusate sodium, 2 tablet, Nasogastric, BID  sodium chloride, 10 mL, Intravenous, Q12H      Continuous Infusions:sodium chloride, 125 mL/hr, Last Rate: 125 mL/hr (23 0201)      PRN Meds:.  acetaminophen    [DISCONTINUED] acetaminophen **OR** acetaminophen    aluminum-magnesium hydroxide-simethicone    senna-docusate sodium **AND** polyethylene glycol **AND** bisacodyl **AND** bisacodyl    Calcium Replacement - Follow Nurse / BPA Driven Protocol    hydrALAZINE    influenza vaccine    ipratropium-albuterol    LORazepam    Magnesium Low Dose Replacement - Follow Nurse / BPA Driven Protocol    [] HYDROmorphone **AND** naloxone    nitroglycerin    ondansetron **OR** ondansetron    Phosphorus Replacement - Follow Nurse / BPA Driven Protocol    Potassium Replacement - Follow Nurse / BPA Driven Protocol    sodium chloride    sodium chloride    sodium chloride    sodium chloride    sodium chloride    ALLERGIES:  Patient has no known allergies.    ROS:  The following systems were reviewed and negative;   Constitution:  No fevers, chills, no unintentional weight loss  Skin: no rash, no jaundice  Eyes:  No blurry vision, no eye pain  HENT:  No change in hearing or smell  Resp:  No dyspnea or cough  CV:  No chest pain or palpitations  :  No dysuria, hematuria  Musculoskeletal:  No leg cramps or arthralgias  Neuro:  No tremor, no numbness  Psych:  No depression or confusion    Objective     Vital Signs:   Vitals:    23 2103 23 0350 23 0424 23 0717   BP: 160/84 121/68 155/81 146/75   BP Location: Right arm      Patient Position: Sitting      Pulse: 81 77 70 75   Resp:  15 15 20   Temp: 98.6 °F (37 °C) 98.8 °F (37.1 °C) 98.5 °F (36.9 °C) 98 °F (36.7 °C)   TempSrc: Oral Oral  Oral   SpO2: 94% 95%  95% 96%   Weight:       Height:           Physical Exam:       General Appearance:    Awake and alert, in no acute distress   Head:    Normocephalic, without obvious abnormality, atraumatic   Throat:   No oral lesions, no thrush, oral mucosa moist   Lungs:     Respirations regular, even and unlabored   Chest Wall:    No abnormalities observed   Abdomen:     Soft, non-tender, no rebound or guarding, non-distended, no hepatosplenomegaly   Rectal:     Deferred   Extremities:   Moves all extremities, no edema, no cyanosis   Pulses:   Pulses palpable and equal bilaterally   Skin:   No rash, no jaundice, normal palpation       Neurologic:   Cranial nerves 2 - 12 grossly intact, no asterixis       Results Review:   I reviewed the patient's labs and imaging.  CBC    Results from last 7 days   Lab Units 12/28/23  1055 12/27/23  2351 12/27/23  0516 12/26/23  0446 12/25/23  1644 12/25/23  0219 12/24/23  0442 12/23/23  0654 12/22/23  0625   WBC 10*3/mm3  --  10.20 10.30 11.90*  --  12.70* 13.10* 14.60* 17.10*   HEMOGLOBIN g/dL 7.8* 6.7* 7.3* 7.6* 7.2* 7.2* 8.2* 7.9* 7.9*   PLATELETS 10*3/mm3  --  421 406 380  --  347 311 310 235     CMP   Results from last 7 days   Lab Units 12/27/23  2351 12/27/23  0516 12/26/23  0446 12/25/23  1644 12/25/23  0219 12/24/23  0442 12/23/23  0654 12/22/23  1847 12/22/23  0625   SODIUM mmol/L 136 136 136  --  136 137 136  --  137   POTASSIUM mmol/L 4.3 3.6 3.8 3.9 3.5 3.6  3.7 3.3*  --  3.8   CHLORIDE mmol/L 100 98 99  --  98 99 98  --  103   CO2 mmol/L 28.0 29.0 28.0  --  28.0 26.0 25.0  --  25.0   BUN mg/dL 14 14 15  --  17 20 19  --  16   CREATININE mg/dL 1.23 1.27 1.35*  --  1.26 1.37* 1.26  --  1.24   GLUCOSE mg/dL 107* 100* 95  --  115* 83 81  --  77   ALBUMIN g/dL 2.6* 2.7* 2.5*  --  2.7* 2.8* 2.9*  --  2.6*   BILIRUBIN mg/dL 0.7 0.9 0.8  --  1.4* 1.7* 3.2*  --  2.7*   ALK PHOS U/L 85 93 109  --  124* 269* 220*  --  119*   AST (SGOT) U/L 30 27 30  --  33 47* 63*  --  71*   ALT (SGPT) U/L  "30 29 31  --  38 50* 60*  --  54*   MAGNESIUM mg/dL 1.8 2.0 1.8  --  1.8 2.2 2.0  --  2.2   PHOSPHORUS mg/dL 2.9 2.9 2.7 2.2* 2.1* 2.0* 2.0*   < > 2.2*    < > = values in this interval not displayed.     Cr Clearance Estimated Creatinine Clearance: 73 mL/min (by C-G formula based on SCr of 1.23 mg/dL).  Coag     HbA1C No results found for: \"HGBA1C\"  Blood Glucose No results found for: \"POCGLU\"  Infection     UA    Results from last 7 days   Lab Units 12/23/23  2319   NITRITE UA  Negative   WBC UA /HPF 0-2   BACTERIA UA /HPF None Seen   SQUAM EPITHEL UA /HPF 0-2     Imaging Results (Last 72 Hours)       ** No results found for the last 72 hours. **            ASSESSMENT AND PLAN:    63-year-old male admitted for nephrectomy due to renal mass.  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  He has received multiple transfusions.  GI consulted for rectal bleeding and anemia.     -Acute blood loss anemia  -Rectal bleeding  -Renal mass s/p nephrectomy with IVC tumor thrombus  -COPD  -colon cancer screening    Plan  Source of rectal bleeding is most likely hemorrhoidal especially with his constipation since his surgery.  Recommend topical treatment with hydrocortisone cream and laxatives to keep stool soft.  Continue to monitor H&H and transfuse for hemoglobin less than 7. Anemia likely secondary to his major surgery during which he lost a large amount of blood.   If hemoglobin continues to drop and rectal bleeding worsens, we could plan for colonoscopy as an inpatient.  Otherwise recommend colon cancer screening outpatient.  Continue supportive care.    I discussed the patients findings and my recommendations with the patient.    We appreciate the referral    Electronically signed by DUANE Macdonald, 12/28/23, 11:23 AM EST.               Electronically signed by MANPREET Owens MD at 12/28/23 1228       "

## 2023-12-28 NOTE — PROGRESS NOTES
[facility] MEDICINE SERVICE  DAILY PROGRESS NOTE    NAME: Louis Andino  : 1960  MRN: [id number]      LOS: 10 days     PROVIDER OF SERVICE: Yovani Trammell MD    Chief Complaint: Renal mass    Subjective:     Interval History:  History taken from: patient  Patient Complaints: Upon standing Some mild rectal bleeding.  GI team was consulted and likely bleeding from hemorrhoids started on topical hydrocortisone and fiber supplementation  Patient Denies: Hematemesis    Review of Systems:   Review of Systems  14 point review of system unremarkable except mentioned above  Objective:     Vital Signs  Temp:  [97.7 °F (36.5 °C)-98.8 °F (37.1 °C)] 98.2 °F (36.8 °C)  Heart Rate:  [70-81] 70  Resp:  [15-22] 22  BP: (121-180)/(68-95) 180/95   Body mass index is 33.47 kg/m².    Physical Exam  Physical Exam  Constitutional:       Appearance: He is obese.   HENT:      Head: Atraumatic.      Mouth/Throat:      Mouth: Mucous membranes are moist.   Eyes:      Pupils: Pupils are equal, round, and reactive to light.   Cardiovascular:      Rate and Rhythm: Normal rate and regular rhythm.      Pulses: Normal pulses.      Heart sounds: Normal heart sounds.   Pulmonary:      Effort: Pulmonary effort is normal.      Breath sounds: Normal breath sounds.   Abdominal:      General: Bowel sounds are normal.      Palpations: Abdomen is soft.      Comments: Clean surgical wound on abd   Musculoskeletal:         General: Normal range of motion.      Cervical back: Neck supple.   Skin:     General: Skin is warm.   Neurological:      General: No focal deficit present.      Mental Status: He is alert and oriented to person, place, and time.   Psychiatric:         Mood and Affect: Mood normal.         Behavior: Behavior normal.         Scheduled Meds   budesonide, 0.5 mg, Nebulization, BID - RT  enoxaparin, 40 mg, Subcutaneous, Daily  hydrocortisone, 25 mg, Rectal, BID  losartan, 50 mg, Oral, Q24H  metoprolol succinate XL, 25 mg,  Oral, Q24H  oxyCODONE, 10 mg, Oral, Q12H  pantoprazole, 40 mg, Intravenous, Q12H  polyethylene glycol, 17 g, Oral, Daily  senna-docusate sodium, 2 tablet, Nasogastric, BID  sodium chloride, 10 mL, Intravenous, Q12H       PRN Meds     acetaminophen    [DISCONTINUED] acetaminophen **OR** acetaminophen    aluminum-magnesium hydroxide-simethicone    senna-docusate sodium **AND** polyethylene glycol **AND** bisacodyl **AND** bisacodyl    Calcium Replacement - Follow Nurse / BPA Driven Protocol    hydrALAZINE    influenza vaccine    ipratropium-albuterol    LORazepam    Magnesium Low Dose Replacement - Follow Nurse / BPA Driven Protocol    [] HYDROmorphone **AND** naloxone    nitroglycerin    ondansetron **OR** ondansetron    Phosphorus Replacement - Follow Nurse / BPA Driven Protocol    Potassium Replacement - Follow Nurse / BPA Driven Protocol    sodium chloride    sodium chloride    sodium chloride    sodium chloride    sodium chloride   Infusions  sodium chloride, 125 mL/hr, Last Rate: 125 mL/hr (23 0201)          Diagnostic Data    Results from last 7 days   Lab Units 23  1055 23  2351   WBC 10*3/mm3  --  10.20   HEMOGLOBIN g/dL 7.8* 6.7*   HEMATOCRIT % 24.1* 20.7*   PLATELETS 10*3/mm3  --  421   GLUCOSE mg/dL  --  107*   CREATININE mg/dL  --  1.23   BUN mg/dL  --  14   SODIUM mmol/L  --  136   POTASSIUM mmol/L  --  4.3   AST (SGOT) U/L  --  30   ALT (SGPT) U/L  --  30   ALK PHOS U/L  --  85   BILIRUBIN mg/dL  --  0.7   ANION GAP mmol/L  --  8.0       No radiology results for the last day      I reviewed the patient's new clinical results.    Assessment/Plan:     Active and Resolved Problems  Active Hospital Problems    Diagnosis  POA    **Renal mass [N28.89]  Yes    BPH (benign prostatic hyperplasia) [N40.0]  Yes    COPD (chronic obstructive pulmonary disease) [J44.9]  Yes    KARON (acute kidney injury) [N17.9]  Yes    Acute respiratory failure with hypoxia [J96.01]  Yes    Asthma [J45.909]   Yes    Gastroesophageal reflux disease [K21.9]  Yes    Hyperlipidemia [E78.5]  Yes    Hypertension [I10]  Yes      Resolved Hospital Problems   No resolved problems to display.     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 and large volume blood loss    - extensive blood loss during surgery    - s/p 5u prbc and 2u FFP given per op note    -Urology following     #Acute hypoxic respiratory failure  #COPD    - Patient remained intubated post op, was transferred to ICU-Patient extubated on 12/19/2023    - Dueoneb QID    - Pulmicort BID    - wean oxygen as tolerated     #Hypovolemic shock    - 2/2 blood loss from surgery    - Levophed Weaned  -Continue Omnicef per ID recommendation        #Acute blood loss anemia    - 2/2 blood loss from surgery    - s/p 5u prbc and 2u FFP given per op note      - h/h qday    - transfuse to maintain hgb > 7.0    - pt     #KARON  #Metabolic acidosis    - suspect 2/2 blood loss and possible post op ATN    - bicarb improved from 18 > 24    - Cr 1.35->1.23 trended down     - monitor     #GERD    - PPI     #HTN    - hold hypertensive meds due to recent shock, monitor      # Weakness, PT OT.   Possible discharge to home vs. SNF      DVT prophylaxis:  Medical and mechanical DVT prophylaxis orders are present.     Code status is   Code Status and Medical Interventions:   Ordered at: 12/18/23 2010     Code Status (Patient has no pulse and is not breathing):    CPR (Attempt to Resuscitate)     Medical Interventions (Patient has pulse or is breathing):    Full Support       Plan for disposition:SNF vs home in 2-3 days     Time: 32 minutes    Signature: Electronically signed by Yovani Trammell MD, 12/28/23, 17:40 EST.  [facility]ist [facility]

## 2023-12-28 NOTE — DISCHARGE PLACEMENT REQUEST
"DR James Esquivel has agreed to be this patients following Louis Yoo (63 y.o. Male)       Date of Birth   1960    Social Security Number       Address   [address] [place] [address]    Home Phone   [phone]    MRN   [id number]       Pentecostal   Yarsani    Marital Status                               Admission Date   12/18/23    Admission Type   Elective    Admitting Provider   James Esquivel MD    Attending Provider   Yovani Trammell MD    Department, Room/Bed   [facility] SURGICAL INPATIENT, 4132/1       Discharge Date       Discharge Disposition       Discharge Destination                                 Attending Provider: Yovani Trammell MD    Allergies: No Known Allergies    Isolation: None   Infection: None   Code Status: CPR    Ht: 177.8 cm (70\")   Wt: 106 kg (233 lb 4 oz)    Admission Cmt: None   Principal Problem: Renal mass [N28.89]                   Active Insurance as of 12/18/2023       Primary Coverage       Payor Plan Insurance Group Employer/Plan Group    ANTHEM BLUE CROSS ANTHEM BLUE CROSS BLUE SHIELD PPO [id number]       Payor Plan Address Payor Plan Phone Number Payor Plan Fax Number Effective Dates    [address] [phone]  1/1/2022 - None Entered    [facility] [id number]         Subscriber Name Subscriber Birth Date Member ID       MARK CARRANZA 7/18/1963 [id number]               Secondary Coverage       Payor Plan Insurance Group Employer/Plan Group    MISC MC SUP   MISC MC SUP              NGN       Coverage Address Coverage Phone Number Coverage Fax Number Effective Dates    [address] [phone]  11/1/2023 - None Entered    [facility] [id number]         Subscriber Name Subscriber Birth Date Member ID       DILLONLOUIS 1960 [id number]               Tertiary Coverage       Payor Plan Insurance Group Employer/Plan Group    MEDICARE MEDICARE A & B        Payor Plan Address Payor Plan Phone Number Payor Plan Fax " Number Effective Dates    [address] [phone]  2023 - None Entered    [facility] [id number]         Subscriber Name Subscriber Birth Date Member ID       LOUIS ANDINO 1960 [id number]                     Emergency Contacts        (Rel.) Home Phone Work Phone Mobile Phone    Dahlia Andino (Spouse) -- -- [phone]                 History & Physical        Erika Hawkins APRN at 23       Attestation signed by Yuniel Lai MD at 23 0850    I have reviewed this documentation and agree.                    Critical Care History and Physical     Louis Andino : 1960 MRN:[id number] LOS:0 ROOM: 2308/1     Reason for admission: Renal mass     Assessment / Plan     Acute Respiratory Failure; intubated for surgery  -CXR Reviewed  -EKG Reviewed  -ABG, monitor as ordered  -BNP, monitor as ordered  -Duoneb  -Continue sedation and pain medication, titrate to effect  -Continue mechanical ventilation support with weaning as tolerated to baseline  -Titrate oxygen support delivery method for O2 SAT > 92%    Renal mass    --Status post left open radical nephrectomy with IVC thrombectomy    --3500 EBL  -Urology following    Kidney injury, acute  -UA reviewed  -Continue IVF resuscitation  -Monitor and trend labs  -Avoid nephrotoxic medications, hypotension, and NSAIDS  -Renally dose medications  -Monitor urine output  -Consider Nephrology consult if creatinine fails to improve    Essential Hypertension, Chronic, Controlledl; Hyperlipidemia  -Continue home medications as appropriate   - Monitor with routine vital signs     COPD, not in exacerbation  -Continue home inhalers as tolerated  -Incentive spirometry  -Titrate O2 for sats > 90%    GERD  -Continue PPI      Code Status (Patient has no pulse and is not breathing): CPR (Attempt to Resuscitate)  Medical Interventions (Patient has pulse or is breathing): Full Support       Nutrition:   NPO Diet NPO Type: Strict  NPO     DVT prophylaxis:  Medical and mechanical DVT prophylaxis orders are present.     History of Present illness     Louis Andino is a 63 y.o. male with PMH of hypertension, hyperlipidemia, COPD, BPH, and GERD presented to the hospital for nephrectomy, and was admitted with a principal diagnosis of Renal mass.  HPI information is limited due to patient intubated and sedated at time of assessment; information obtained from chart review and patient's spouse at bedside.  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  Post procedure patient was to remain on vent overnight and admitted to the intensive care unit for further evaluation and treatment.      ACP: Patient unable to answer questions due to intubation; wife is decision-maker if he is unable he will remain full code with full intervention.    Patient was seen and examined on 12/18/23 at 20:11 EST .    Subjective / Review of systems     Review of Systems   Unable to perform ROS: Intubated        Past Medical/Surgical/Social/Family History & Allergies     Past Medical History:   Diagnosis Date    Asthma     Emphysema/COPD     GERD (gastroesophageal reflux disease)     Hypertension     Prostate disease       Past Surgical History:   Procedure Laterality Date    SKIN LESION EXCISION  1990      Social History     Socioeconomic History    Marital status:     Number of children: 6   Tobacco Use    Smoking status: Never     Passive exposure: Past    Smokeless tobacco: Never    Tobacco comments:     SECOND HAND SMOKE EXPOSURE AS A CHILD    Vaping Use    Vaping Use: Never used   Substance and Sexual Activity    Alcohol use: Yes     Alcohol/week: 1.0 standard drink of alcohol     Types: 1 Glasses of wine per week     Comment: rare    Drug use: Not Currently    Sexual activity: Defer      History reviewed. No pertinent family  history.   No Known Allergies   Social Determinants of Health     Tobacco Use: Low Risk  (12/18/2023)    Patient History     Smoking Tobacco Use: Never     Smokeless Tobacco Use: Never     Passive Exposure: Past   Alcohol Use: Not on file   Financial Resource Strain: Not on file   Food Insecurity: Not on file   Transportation Needs: Not on file   Physical Activity: Not on file   Stress: Not on file   Social Connections: Unknown (10/11/2023)    Family and Community Support     Help with Day-to-Day Activities: Not on file     Lonely or Isolated: Not on file   Interpersonal Safety: Not At Risk (12/18/2023)    Abuse Screen     Unsafe at Home or Work/School: no     Feels Threatened by Someone?: no     Does Anyone Keep You from Contacting Others or Doint Things Outside the Home?: no     Physical Sign of Abuse Present: no   Depression: Not on file   Housing Stability: Unknown (12/18/2023)    Housing Stability     Current Living Arrangements: home     Potentially Unsafe Housing Conditions: Not on file   Utilities: Not on file   Health Literacy: Unknown (10/11/2023)    Education     Help with school or training?: Not on file     Preferred Language: Not on file   Employment: Unknown (10/11/2023)    Employment     Do you want help finding or keeping work or a job?: Not on file   Disabilities: Not At Risk (12/18/2023)    Disabilities     Concentrating, Remembering, or Making Decisions Difficulty: no     Doing Errands Independently Difficulty: no        Home Medications     Prior to Admission medications    Medication Sig Start Date End Date Taking? Authorizing Provider   aspirin 81 MG EC tablet Take 1 tablet by mouth Daily.   Yes Toño Garcia MD   celecoxib (CeleBREX) 200 MG capsule Take 1 capsule by mouth Daily.   Yes Toño Garcia MD   famotidine (PEPCID) 40 MG tablet Take 1 tablet by mouth Daily.   Yes Toño Garcia MD   hydroCHLOROthiazide (HYDRODIURIL) 25 MG tablet Take 1 tablet by mouth Daily.    Yes Toño Garcia MD   lisinopril (PRINIVIL,ZESTRIL) 10 MG tablet Take 1.5 tablets by mouth 2 (Two) Times a Day.   Yes Toño Garcia MD   magnesium oxide (MAG-OX) 400 MG tablet Take 1 tablet by mouth Daily.   Yes Toño Garcia MD   omeprazole (priLOSEC) 40 MG capsule Take 1 capsule by mouth Daily.   Yes Toño Garcia MD   potassium chloride (K-DUR,KLOR-CON) 20 MEQ CR tablet Take 1 tablet by mouth Daily.   Yes Toño Garcia MD   Symbicort 160-4.5 MCG/ACT inhaler Inhale 2 puffs 2 (Two) Times a Day. 8/9/23  Yes Toño Garcia MD   vitamin D3 125 MCG (5000 UT) capsule capsule Take 1 capsule by mouth Daily.   Yes Toño Garcia MD        Objective / Physical Exam     Vital signs:  Temp: 98.1 °F (36.7 °C)  BP: 98/67  Heart Rate: 87  Resp: 17  SpO2: 100 %  Weight: 102 kg (224 lb 13.9 oz)    Admission Weight: Weight: 101 kg (223 lb)    Physical Exam  Vitals and nursing note reviewed.   Constitutional:       Appearance: Normal appearance.   HENT:      Head: Normocephalic.      Nose: Nose normal.      Mouth/Throat:      Mouth: Mucous membranes are moist.      Pharynx: Oropharynx is clear.   Eyes:      Pupils: Pupils are equal, round, and reactive to light.   Cardiovascular:      Rate and Rhythm: Normal rate and regular rhythm.   Pulmonary:      Comments: ET tube in place  Abdominal:      General: There is distension.      Comments: Transverse surgical incision with island dressing C, D, I   Musculoskeletal:         General: Normal range of motion.      Cervical back: Normal range of motion.   Skin:     General: Skin is warm and dry.      Capillary Refill: Capillary refill takes 2 to 3 seconds.   Neurological:      Comments: Intubated and sedated   Psychiatric:      Comments: Intubated and sedated          Labs     Results from last 7 days   Lab Units 12/18/23  1722 12/18/23  1318 12/18/23  1203 12/18/23  1051 12/12/23  1319   WBC 10*3/mm3 17.50*  --  14.70*  --  9.99    HEMATOCRIT % 30.6*  --  28.3*  --  33.9*   HEMATOCRIT POC %  --  25*  --  30*  --    PLATELETS 10*3/mm3 228  --  227  --  376      Results from last 7 days   Lab Units 12/18/23  1722 12/12/23  1319   SODIUM mmol/L 136 136   POTASSIUM mmol/L 4.9 3.9   CHLORIDE mmol/L 104 98   CO2 mmol/L 21.0* 27.0   BUN mg/dL 14 15   CREATININE mg/dL 1.48* 1.02        Imaging     XR Abdomen KUB    Result Date: 12/18/2023  Impression: NG tube projects over the expected position of the distal body, antrum of the stomach. Electronically Signed: Ervin Howell MD  12/18/2023 5:58 PM EST  Workstation ID: [id number]    XR Chest 1 View    Result Date: 12/18/2023  Impression: Endotracheal tube in place Mild right infrahilar discoid atelectasis. Otherwise clear lungs Electronically Signed: Farhat Fletcher MD  12/18/2023 3:06 PM EST  Workstation ID: [id number]    XR Lost Needle / Instrument    Result Date: 12/18/2023  Impression: Postsurgical changes with multiple surgical clips and skin staple line in the upper abdomen. No definite radiopaque instrument or other unexpected foreign body is identified. Results were called to Suzette Carrasco RN in the OR by Dr. Moya at 12/18/2023 2:00 PM EST. Electronically Signed: Nestor Moya  12/18/2023 2:10 PM EST  Workstation ID: [id number]       Chest X ray: My independent assessment showed no infiltrates or effusions        Current Medications     Scheduled Meds:  budesonide-formoterol, 2 puff, Inhalation, BID - RT  chlorhexidine, 15 mL, Mouth/Throat, Q12H  [START ON 12/19/2023] enoxaparin, 40 mg, Subcutaneous, Daily  hydroCHLOROthiazide, 25 mg, Oral, Daily  magnesium sulfate, 1 g, Intravenous, Once  [START ON 12/19/2023] pantoprazole, 40 mg, Oral, Q AM  senna-docusate sodium, 2 tablet, Oral, BID  sodium chloride, 1,000 mL, Intravenous, Once  sodium chloride, 10 mL, Intravenous, Q12H  sodium chloride, 10 mL, Intravenous, Q12H  sodium chloride, 10 mL, Intravenous, Q12H  sodium chloride, 10 mL, Intravenous,  Q12H         Continuous Infusions:  lactated ringers, 1,000 mL, Last Rate: 1,000 mL (12/18/23 0647)  norepinephrine, 0.02-0.5 mcg/kg/min, Last Rate: 0.04 mcg/kg/min (12/18/23 1715)  propofol, 5-50 mcg/kg/min, Last Rate: 40 mcg/kg/min (12/18/23 1818)  sodium chloride, 100 mL/hr, Last Rate: 100 mL/hr (12/18/23 1739)         Plan discussed with RN. Reviewed all other data in the last 24 hours, including but not limited to vitals, labs, microbiology, imaging and pertinent notes from other providers.  Plan also discussed with patient's wife and son at the bedside.      DUANE Jimenez   Critical Care  12/18/23   20:11 EST       Electronically signed by Yuniel Lai MD at 12/19/23 0850       James Esquivel MD at 12/18/23 0718          H&P reviewed.  The patient was examined and there are no changes to the H&P   Electronically signed by James Esquivel MD at 12/18/23 0718   Source Note          I called Dr. Esquivel office to provide staff the update that he is cleared for surgery.  I was unable to get in contact with someone but I left a detailed VM on the confidential line relaying this information.  I encouraged them to call our office back if they had further questions.    I called and spoke with pt, providing him the pt appt liaison number for PCP- [phone].    Thank you,    Lisa STREET RN  Triage LC  12/15/23 13:29 EST      Electronically signed by Lisa Gaytan RN at 12/15/23 1329                 Lisa Gaytan RN at 12/15/23 1200          I called Dr. Esquivel office to provide staff the update that he is cleared for surgery.  I was unable to get in contact with someone but I left a detailed VM on the confidential line relaying this information.  I encouraged them to call our office back if they had further questions.    I called and spoke with pt, providing him the pt appt liaison number for PCP- [phone].    Thank you,    Lisa STREET RN  Triage [facility]  12/15/23 13:29 EST      Electronically signed  by Lisa Gaytan RN at 12/15/23 1329          Physician Progress Notes (most recent note)        James Esquivel MD at 23 0835            [facility] DAILY PROGRESS NOTE    Patient Identification  Name: Louis Andino  Age: 63 y.o.  Sex: male  :  1960  MRN: [id number]    Date: 2023             Subjective:  Interval History: Hgb down, BRB per rectum    Objective:    Scheduled Meds:budesonide, 0.5 mg, Nebulization, BID - RT  cefdinir, 300 mg, Oral, Q12H  losartan, 50 mg, Oral, Q24H  metoprolol succinate XL, 25 mg, Oral, Q24H  oxyCODONE, 10 mg, Oral, Q12H  pantoprazole, 40 mg, Intravenous, Q12H  senna-docusate sodium, 2 tablet, Nasogastric, BID  sodium chloride, 10 mL, Intravenous, Q12H      Continuous Infusions:sodium chloride, 125 mL/hr, Last Rate: 125 mL/hr (23 0201)      PRN Meds:  acetaminophen    [DISCONTINUED] acetaminophen **OR** acetaminophen    aluminum-magnesium hydroxide-simethicone    senna-docusate sodium **AND** polyethylene glycol **AND** bisacodyl **AND** bisacodyl    Calcium Replacement - Follow Nurse / BPA Driven Protocol    hydrALAZINE    influenza vaccine    ipratropium-albuterol    LORazepam    Magnesium Low Dose Replacement - Follow Nurse / BPA Driven Protocol    [] HYDROmorphone **AND** naloxone    nitroglycerin    ondansetron **OR** ondansetron    Phosphorus Replacement - Follow Nurse / BPA Driven Protocol    Potassium Replacement - Follow Nurse / BPA Driven Protocol    sodium chloride    sodium chloride    sodium chloride    sodium chloride    sodium chloride    Vital signs in last 24 hours:  Temp:  [98 °F (36.7 °C)-98.8 °F (37.1 °C)] 98 °F (36.7 °C)  Heart Rate:  [70-81] 75  Resp:  [15-20] 20  BP: (102-160)/(64-84) 146/75    Intake/Output:    Intake/Output Summary (Last 24 hours) at 2023 0835  Last data filed at 2023 0800  Gross per 24 hour   Intake 739.17 ml   Output 1500 ml   Net -760.83 ml       Exam:  /75   Pulse 75   Temp 98 °F  "(36.7 °C) (Oral)   Resp 20   Ht 177.8 cm (70\")   Wt 106 kg (233 lb 4 oz)   SpO2 96%   BMI 33.47 kg/m²     General Appearance:    Alert, cooperative, NAD   Lungs:     Respirations unlabored, no audible wheezing    Heart:    No cyanosis   Abdomen:     Soft, ND, incision clean dry intact   :   Harley out            Data Review:  All labs (24hrs):   Recent Results (from the past 24 hour(s))   Magnesium    Collection Time: 12/27/23 11:51 PM    Specimen: Arm, Left; Blood   Result Value Ref Range    Magnesium 1.8 1.6 - 2.4 mg/dL   Phosphorus    Collection Time: 12/27/23 11:51 PM    Specimen: Arm, Left; Blood   Result Value Ref Range    Phosphorus 2.9 2.5 - 4.5 mg/dL   Comprehensive Metabolic Panel    Collection Time: 12/27/23 11:51 PM    Specimen: Arm, Left; Blood   Result Value Ref Range    Glucose 107 (H) 65 - 99 mg/dL    BUN 14 8 - 23 mg/dL    Creatinine 1.23 0.76 - 1.27 mg/dL    Sodium 136 136 - 145 mmol/L    Potassium 4.3 3.5 - 5.2 mmol/L    Chloride 100 98 - 107 mmol/L    CO2 28.0 22.0 - 29.0 mmol/L    Calcium 8.3 (L) 8.6 - 10.5 mg/dL    Total Protein 5.4 (L) 6.0 - 8.5 g/dL    Albumin 2.6 (L) 3.5 - 5.2 g/dL    ALT (SGPT) 30 1 - 41 U/L    AST (SGOT) 30 1 - 40 U/L    Alkaline Phosphatase 85 39 - 117 U/L    Total Bilirubin 0.7 0.0 - 1.2 mg/dL    Globulin 2.8 gm/dL    A/G Ratio 0.9 g/dL    BUN/Creatinine Ratio 11.4 7.0 - 25.0    Anion Gap 8.0 5.0 - 15.0 mmol/L    eGFR 65.2 >60.0 mL/min/1.73   CBC Auto Differential    Collection Time: 12/27/23 11:51 PM    Specimen: Arm, Left; Blood   Result Value Ref Range    WBC 10.20 3.40 - 10.80 10*3/mm3    RBC 2.57 (L) 4.14 - 5.80 10*6/mm3    Hemoglobin 6.7 (C) 13.0 - 17.7 g/dL    Hematocrit 20.7 (C) 37.5 - 51.0 %    MCV 80.3 79.0 - 97.0 fL    MCH 26.1 (L) 26.6 - 33.0 pg    MCHC 32.5 31.5 - 35.7 g/dL    RDW 18.3 (H) 12.3 - 15.4 %    RDW-SD 51.2 37.0 - 54.0 fl    MPV 7.3 6.0 - 12.0 fL    Platelets 421 140 - 450 10*3/mm3   Scan Slide    Collection Time: 12/27/23 11:51 PM    " Specimen: Arm, Left; Blood   Result Value Ref Range    Scan Slide     Manual Differential    Collection Time: 12/27/23 11:51 PM    Specimen: Arm, Left; Blood   Result Value Ref Range    Neutrophil % 78.0 (H) 42.7 - 76.0 %    Lymphocyte % 10.0 (L) 19.6 - 45.3 %    Monocyte % 5.0 5.0 - 12.0 %    Eosinophil % 1.0 0.3 - 6.2 %    Bands %  6.0 (H) 0.0 - 5.0 %    Neutrophils Absolute 8.57 (H) 1.70 - 7.00 10*3/mm3    Lymphocytes Absolute 1.02 0.70 - 3.10 10*3/mm3    Monocytes Absolute 0.51 0.10 - 0.90 10*3/mm3    Eosinophils Absolute 0.10 0.00 - 0.40 10*3/mm3    Anisocytosis Slight/1+ None Seen    WBC Morphology Normal Normal    Platelet Morphology Normal Normal   Type & Screen    Collection Time: 12/28/23  2:08 AM    Specimen: Arm, Left; Blood   Result Value Ref Range    ABO Type A     RH type Positive     Antibody Screen Negative     T&S Expiration Date 12/31/2023 11:59:59 PM    Calcium, Ionized    Collection Time: 12/28/23  2:09 AM    Specimen: Arm, Left; Blood   Result Value Ref Range    Ionized Calcium 1.22 1.20 - 1.30 mmol/L   Prepare RBC, 1 Units    Collection Time: 12/28/23  3:58 AM   Result Value Ref Range    Product Code O9228U38     Unit Number [id number]     UNIT  ABO A     UNIT  RH POS     Crossmatch Interpretation Compatible     Dispense Status IS     Blood Expiration Date [id number]     Blood Type Barcode 6200       Imaging Results (Last 24 Hours)       ** No results found for the last 24 hours. **             Assessment:    Renal mass    Asthma    Gastroesophageal reflux disease    Hyperlipidemia    Hypertension    BPH (benign prostatic hyperplasia)    COPD (chronic obstructive pulmonary disease)    KARON (acute kidney injury)    Acute respiratory failure with hypoxia      Left nephrectomy and IVC thrombectomy 12/18    Plan:    Hgb down, 1U transfusing, recheck  KARON, improved  Cont Lovenox prophylaxis  SCDs  Continue to increase activity, appreciate physical and Occupational Therapy input  Discharge  when hemoglobin stable and rectal bleeding resolved  Pain control is improved  Continue regular diet  Appreciate consultants care    James Esquivel MD  [facility]  [address] [facility] [address] [facility] [address], [address]  [place] [address]  Office: [phone]  Available via [employer] Secure Chat  23  08:35 EST       Electronically signed by James Esquivel MD at 23 0836          Physical Therapy Notes (all)        Isabel Portillo, PT at 23 1601  Version 1 of 1         Patient Name: Louis Andino  : 1960    MRN: [id number]                              Today's Date: 2023       Admit Date: 2023    Visit Dx:     ICD-10-CM ICD-9-CM   1. Other specified disorders of kidney and ureter  N28.89 593.89   2. Renal mass  N28.89 593.9     Patient Active Problem List   Diagnosis    Renal mass    Asthma    Gastroesophageal reflux disease    Hyperlipidemia    Hypertension    BPH (benign prostatic hyperplasia)    COPD (chronic obstructive pulmonary disease)    KARON (acute kidney injury)    Acute respiratory failure with hypoxia     Past Medical History:   Diagnosis Date    Asthma     Emphysema/COPD     GERD (gastroesophageal reflux disease)     Hyperglycemia 10/12/2023    Hyperlipidemia 10/12/2023    Hypertension     Prostate disease     Vitreous degeneration of right eye     Formatting of this note might be different from the original. Retinal tear and detachment warning symptoms reviewed and patient instructed to call immediately if increasing floaters, flashes, or decreasing peripheral vision. No retinal detachment or retinal tear noted. Recheck in 1 month with dilated exam.     Past Surgical History:   Procedure Laterality Date    NEPHRECTOMY Left 2023    Procedure: NEPHRECTOMY RADICAL WITH CAVAL THROMBECTOMY;  Surgeon: James Esquivel MD;  [facility] OR;  Service: Urology;  Laterality: Left;    SKIN LESION EXCISION        General Information       Row Name 23 7544          Physical  Therapy Time and Intention    Document Type evaluation  -OD     Mode of Treatment physical therapy  -OD       Row Name 12/21/23 1548          General Information    Patient Profile Reviewed yes  -OD     Prior Level of Function independent:;ADL's  -OD     Existing Precautions/Restrictions fall;oxygen therapy device and L/min  -OD     Barriers to Rehab medically complex  -OD       Row Name 12/21/23 1548          Living Environment    People in Home spouse  -OD       Row Name 12/21/23 1548          Cognition    Orientation Status (Cognition) oriented x 4  -OD       Row Name 12/21/23 1548          Safety Issues, Functional Mobility    Impairments Affecting Function (Mobility) balance;endurance/activity tolerance;strength  -OD               User Key  (r) = Recorded By, (t) = Taken By, (c) = Cosigned By      Initials Name Provider Type    OD Isabel Portillo PT Physical Therapist                   Mobility       Row Name 12/21/23 4081          Bed Mobility    Bed Mobility bed mobility (all) activities  -OD     All Activities, Eaton (Bed Mobility) moderate assist (50% patient effort)  -OD     Comment, (Bed Mobility) RN reports pt drowsy d/t dose of dilaudid. Pt able to roll bilat with Tushar, modA given for attempting to sit EOB, but pt began to desat.  -OD               User Key  (r) = Recorded By, (t) = Taken By, (c) = Cosigned By      Initials Name Provider Type    OD Isabel Portillo PT Physical Therapist                   Obj/Interventions       Row Name 12/21/23 8909          Range of Motion Comprehensive    General Range of Motion no range of motion deficits identified  -OD       Row Name 12/21/23 2672          Strength Comprehensive (MMT)    General Manual Muscle Testing (MMT) Assessment lower extremity strength deficits identified  -OD     Comment, General Manual Muscle Testing (MMT) Assessment No formal MMT, but pt demo weakness BLE  -OD       Row Name 12/21/23 4777          Sensory Assessment (Somatosensory)     Sensory Assessment (Somatosensory) unable/difficult to assess  -OD               User Key  (r) = Recorded By, (t) = Taken By, (c) = Cosigned By      Initials Name Provider Type    Isabel Foley PT Physical Therapist                   Goals/Plan       Row Name 12/21/23 1559          Bed Mobility Goal 1 (PT)    Activity/Assistive Device (Bed Mobility Goal 1, PT) bed mobility activities, all  -OD     Moore Level/Cues Needed (Bed Mobility Goal 1, PT) independent  -OD     Time Frame (Bed Mobility Goal 1, PT) long term goal (LTG);2 weeks  -OD       Row Name 12/21/23 1559          Transfer Goal 1 (PT)    Activity/Assistive Device (Transfer Goal 1, PT) transfers, all  -OD     Moore Level/Cues Needed (Transfer Goal 1, PT) independent  -OD     Time Frame (Transfer Goal 1, PT) long term goal (LTG);2 weeks  -OD       Row Name 12/21/23 1559          Gait Training Goal 1 (PT)    Activity/Assistive Device (Gait Training Goal 1, PT) gait (walking locomotion)  -OD     [place] Level (Gait Training Goal 1, PT) independent  -OD     Distance (Gait Training Goal 1, PT) 100 feet  -OD     Time Frame (Gait Training Goal 1, PT) long term goal (LTG);2 weeks  -OD       Row Name 12/21/23 2879          Therapy Assessment/Plan (PT)    Planned Therapy Interventions (PT) balance training;bed mobility training;gait training;home exercise program;neuromuscular re-education;postural re-education;transfer training;ROM (range of motion);stair training;strengthening;stretching;patient/family education  -OD               User Key  (r) = Recorded By, (t) = Taken By, (c) = Cosigned By      Initials Name Provider Type    Isabel Foley PT Physical Therapist                   Clinical Impression       Row Name 12/21/23 8872          Pain    Pretreatment Pain Rating 0/10 - no pain  -OD     Posttreatment Pain Rating 0/10 - no pain  -OD       Row Name 12/21/23 5706          Plan of Care Review    Plan of Care Reviewed With patient   -OD     Progress no change  -OD     Outcome Evaluation Louis Andino is a 62 y/o M admitted to [facility] on 12/18/23 d/t having L renal mass with plans for nephrectomy, 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 Pt is s/p open radical nephrectomy and IVC thrombectomy with Dr. Esquivel and transferred to ICU. PMH includes HTN, HLD, COPD, BPH, GERD. Pt limited in mobility and answering PLOF questions this day d/t recent dose of dilaudid. Pt reports he lives with his spouse, who works full-time. He reports indep with mobility and ADLs at baseline, pt denies use of AD. Pt requires min-modA with bed mobility for rolling bilat, and attempting to sit EOB. Pt began desat'ing, so repositioned pt supine and donned the NC. Pt kept his eyes closed most of the session, and was pleasant throughout. Repositioned bed into chair position for improving upright tolerance. PT will follow up for OOB mobility assessment. Recommend home with home health PT at this time, but pending further mobility assessment regarding safety at home.  -OD       Row Name 12/21/23 1554          Therapy Assessment/Plan (PT)    Rehab Potential (PT) good, to achieve stated therapy goals  -OD     Criteria for Skilled Interventions Met (PT) meets criteria;yes  -OD     Therapy Frequency (PT) 3 times/wk  -OD     Predicted Duration of Therapy Intervention (PT) until d/c  -OD       Row Name 12/21/23 7050          Vital Signs    Pre SpO2 (%) 78  -OD     O2 Delivery Pre Treatment room air  -OD     Intra SpO2 (%) 96  -OD     O2 Delivery Intra Treatment nasal cannula  2L  -OD     Post SpO2 (%) 96  -OD     O2 Delivery Post Treatment nasal cannula  -OD     Pre Patient Position Side Lying  -OD     Intra Patient Position Side Lying  -OD     Post Patient Position Supine  -OD       Row Name 12/21/23 8312          Positioning and Restraints    Pre-Treatment Position in bed  -OD     Post Treatment Position bed  -OD     In Bed notified nsg;call light within  reach;supine;encouraged to call for assist;exit alarm on  -OD               User Key  (r) = Recorded By, (t) = Taken By, (c) = Cosigned By      Initials Name Provider Type    Isabel Foley PT Physical Therapist                   Outcome Measures       Row Name 12/21/23 5783          How much help from another person do you currently need...    Turning from your back to your side while in flat bed without using bedrails? 3  -OD     Moving from lying on back to sitting on the side of a flat bed without bedrails? 3  -OD     Moving to and from a bed to a chair (including a wheelchair)? 2  -OD     Standing up from a chair using your arms (e.g., wheelchair, bedside chair)? 2  -OD     Climbing 3-5 steps with a railing? 2  -OD     To walk in hospital room? 2  -OD     AM-PAC 6 Clicks Score (PT) 14  -OD     Highest Level of Mobility Goal 4 --> Transfer to chair/commode  -OD       Row Name 12/21/23 8503          Functional Assessment    Outcome Measure Options AM-PAC 6 Clicks Basic Mobility (PT)  -OD               User Key  (r) = Recorded By, (t) = Taken By, (c) = Cosigned By      Initials Name Provider Type    Isabel Foley PT Physical Therapist                                 Physical Therapy Education       Title: PT OT SLP Therapies (Done)       Topic: Physical Therapy (Done)       Point: Mobility training (Done)       Learning Progress Summary             Patient Acceptance, E, VU by OD at 12/21/2023 1600                         Point: Home exercise program (Done)       Learning Progress Summary             Patient Acceptance, E, VU by OD at 12/21/2023 1600                         Point: Body mechanics (Done)       Learning Progress Summary             Patient Acceptance, E, VU by OD at 12/21/2023 1600                         Point: Precautions (Done)       Learning Progress Summary             Patient Acceptance, E, VU by OD at 12/21/2023 1600                                         User Key       Initials  Effective Dates Name Provider Type Discipline    OD 05/11/23 -  Isabel Portillo, PT Physical Therapist PT                  PT Recommendation and Plan  Planned Therapy Interventions (PT): balance training, bed mobility training, gait training, home exercise program, neuromuscular re-education, postural re-education, transfer training, ROM (range of motion), stair training, strengthening, stretching, patient/family education  Plan of Care Reviewed With: patient  Progress: no change  Outcome Evaluation: Louis Andino is a 64 y/o M admitted to [facility] on 12/18/23 d/t having L renal mass with plans for nephrectomy, 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 Pt is s/p open radical nephrectomy and IVC thrombectomy with Dr. Esquivel and transferred to ICU. PMH includes HTN, HLD, COPD, BPH, GERD. Pt limited in mobility and answering PLOF questions this day d/t recent dose of dilaudid. Pt reports he lives with his spouse, who works full-time. He reports indep with mobility and ADLs at baseline, pt denies use of AD. Pt requires min-modA with bed mobility for rolling bilat, and attempting to sit EOB. Pt began desat'ing, so repositioned pt supine and donned the NC. Pt kept his eyes closed most of the session, and was pleasant throughout. Repositioned bed into chair position for improving upright tolerance. PT will follow up for OOB mobility assessment. Recommend home with home health PT at this time, but pending further mobility assessment regarding safety at home.     Time Calculation:         PT Charges       Row Name 12/21/23 1600             Time Calculation    Start Time 1039  -OD      Stop Time 1057  -OD      Time Calculation (min) 18 min  -OD      PT Received On 12/21/23  -OD      PT - Next Appointment 12/22/23  -OD      PT Goal Re-Cert Due Date 01/04/24  -OD         Time Calculation- PT    Total Timed Code Minutes- PT 0 minute(s)  -OD                User Key  (r) = Recorded By, (t) = Taken By, (c) =  Cosigned By      Initials Name Provider Type    OD Isabel Portillo, PT Physical Therapist                  Therapy Charges for Today       Code Description Service Date Service Provider Modifiers Qty    26902524919 HC PT EVAL MOD COMPLEXITY 4 12/21/2023 Isabel Portillo, PT GP 1            PT G-Codes  Outcome Measure Options: AM-PAC 6 Clicks Basic Mobility (PT)  AM-PAC 6 Clicks Score (PT): 14  PT Discharge Summary  Anticipated Discharge Disposition (PT): home with home health, other (see comments) (pending furhter mobility as pt was on dilaudid and not appropriate.)    Isabel Portillo PT  12/21/2023      Electronically signed by Isabel Portillo PT at 12/21/23 1601       Isabel Portillo PT at 12/21/23 1601  Version 1 of 1         Goal Outcome Evaluation:  Plan of Care Reviewed With: patient        Progress: no change  Outcome Evaluation: Louis Andino is a 62 y/o M admitted to [facility] on 12/18/23 d/t having L renal mass with plans for nephrectomy, 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 Pt is s/p open radical nephrectomy and IVC thrombectomy with Dr. Esquivel and transferred to ICU. PMH includes HTN, HLD, COPD, BPH, GERD. Pt limited in mobility and answering PLOF questions this day d/t recent dose of dilaudid. Pt reports he lives with his spouse, who works full-time. He reports indep with mobility and ADLs at baseline, pt denies use of AD. Pt requires min-modA with bed mobility for rolling bilat, and attempting to sit EOB. Pt began desat'ing, so repositioned pt supine and donned the NC. Pt kept his eyes closed most of the session, and was pleasant throughout. Repositioned bed into chair position for improving upright tolerance. PT will follow up for OOB mobility assessment. Recommend home with home health PT at this time, but pending further mobility assessment regarding safety at home.      Anticipated Discharge Disposition (PT): home with home health, other (see comments) (pending furhter  "mobility as pt was on dilaudid and not appropriate.)    Electronically signed by Isabel Portillo, PT at 12/21/23 1607       Nancy aShu, PTA at 12/24/23 8780  Version 1 of 1         Subjective: Pt agreeable to therapeutic plan of care. Pt stated he just got back to bed and comfortable position and did not want to move. Family asked what exercises they could assist him with at a later time.    Objective:     Bed mobility - N/A or Not attempted.  Transfers - N/A or Not attempted.  Ambulation -  feet N/A or Not attempted.        Vitals: Desaturates when off 2L O2 , incr BP    Pain:  c/o back pain, chronic neck and R hip pain  Intervention for pain: Therapeutic Presence    Education: Provided education on the importance of mobility in the acute care setting and Verbal/Tactile Cues, went over exs all 4 exts they could assist him with and issued pink tb for UE resistance.     Assessment: Louis Andino presents with functional mobility impairments which indicate the need for skilled intervention. Did not Tolerate much this  session but went over HEP he and family could work on when he felt better. Plans on home with HH and assist. Will continue to follow and progress as tolerated.     Plan/Recommendations:   If medically appropriate, Low Intensity Therapy recommended post-acute care - This is recommended as therapy feels this patient would require 2-3 visits per week. OP or HH would be the best option depending on patient's home bound status. Consider, if the patient has other  \"skilled\" needs such as wounds, IV antibiotics, etc. Combined with \"low intensity\" could also equate to a SNF. If patient is medically complex, consider LTAC. Pt requires no DME at discharge.     Pt desires Home with family assist and and Home Health at discharge. Pt cooperative; agreeable to therapeutic recommendations and plan of care.       Post-Tx Position: Call light and personal items within reach. Pt was L sidelying with ice packs on " neck  PPE: gloves     Electronically signed by Nancy Sahu, PTA at 12/24/23 1711       Nancy Sahu, PTA at 12/24/23 1156  Version 1 of 1         Assessment: Louis Andino presents with functional mobility impairments which indicate the need for skilled intervention. Did not Tolerate much this  session but went over HEP he and family could work on when he felt better. Plans on home with HH and assist. Will continue to follow and progress as tolerated.          Electronically signed by Nancy Sahu PTA at 12/24/23 1711       Maranda Melton, PT at 12/27/23 1609  Version 1 of 1         Subjective: Pt agreeable to therapeutic plan of care. Reports being pt in the chair ~2.5 hrs prior to PT arrival.     Objective:     Bed mobility - Min-A with RLE into bed during sit to supine. Supine scooting completed independently.   Transfers - SBA and with rolling walker, increased time and guarded movements. Cues for hand placement.   Ambulation - 20 feet x2 CGA and with rolling walker. Distance limited by bathroom needs and then pt c/o fatigue and needs to return to bed.    Vitals: WNL    Pain: 2 VAS   Location: incision, also chronic pain in shoulder, back, R hip  Intervention for pain: Repositioned    Education: Provided education on the importance of mobility in the acute care setting, Verbal/Tactile Cues, Transfer Training, Gait Training, and Energy conservation strategies    Assessment: Louis Andino presents with functional mobility impairments in functional mobility, gait, and endurance which indicate the need for skilled intervention. PT noted low (but stable) Hgb, likely contributing to poor activity tolerance. Will continue to follow and progress as tolerated.     Plan/Recommendations:   If medically appropriate, Low Intensity Therapy recommended post-acute care - This is recommended as therapy feels this patient would require 2-3 visits per week. OP or HH would be the best option depending on  "patient's home bound status. Consider, if the patient has other  \"skilled\" needs such as wounds, IV antibiotics, etc. Combined with \"low intensity\" could also equate to a SNF. If patient is medically complex, consider LTAC. Pt requires no DME at discharge, he has his wife's RW.     Pt desires Home with Home Health at discharge, but verbalizes understanding regarding no accepting agencies. Pt cooperative; agreeable to therapeutic recommendations and plan of care.         Basic Mobility 6-click:  Rollin = Total, A lot = 2, A little = 3; 4 = None  Supine>Sit:   1 = Total, A lot = 2, A little = 3; 4 = None   Sit>Stand with arms:  1 = Total, A lot = 2, A little = 3; 4 = None  Bed>Chair:   1 = Total, A lot = 2, A little = 3; 4 = None  Ambulate in room:  1 = Total, A lot = 2, A little = 3; 4 = None  3-5 Steps with railin = Total, A lot = 2, A little = 3; 4 = None  Score: 17    Modified [place]: N/A = No pre-op stroke/TIA    Post-Tx Position: Supine with HOB Elevated, Alarms activated, and Call light and personal items within reach  PPE: gloves       Electronically signed by Maranda Melton, PT at 23 161       Maranda Melton, PT at 23 161  Version 1 of 1         Goal Outcome Evaluation:      Assessment: Louis Andino presents with functional mobility impairments in functional mobility, gait, and endurance which indicate the need for skilled intervention. PT noted low (but stable) Hgb, likely contributing to poor activity tolerance. Will continue to follow and progress as tolerated.     Plan/Recommendations:   If medically appropriate, Low Intensity Therapy recommended post-acute care - This is recommended as therapy feels this patient would require 2-3 visits per week. OP or HH would be the best option depending on patient's home bound status. Consider, if the patient has other  \"skilled\" needs such as wounds, IV antibiotics, etc. Combined with \"low intensity\" could also equate to a SNF. If " "patient is medically complex, consider LTAC. Pt requires no DME at discharge, he has his wife's RW.     Pt desires Home with Home Health at discharge, but verbalizes understanding regarding no accepting agencies. Pt cooperative; agreeable to therapeutic recommendations and plan of care.                     Electronically signed by Maranda Melton, PT at 12/27/23 1617          Occupational Therapy Notes (all)        Savita Balderas, OT at 12/27/23 1655          Goal Outcome Evaluation:            Assessment: Louis Andino presents with ADL impairments affecting function including balance, endurance / activity tolerance, pain, and strength. Demonstrated functioning below baseline abilities indicate the need for continued skilled intervention while inpatient. Tolerating session today without incident. Will continue to follow and progress as tolerated.      Plan/Recommendations:   Low Intensity Therapy recommended post-acute care - This is recommended as therapy feels this patient would require 2-3 visits per week. OP or HH would be the best option depending on patient's home bound status. Consider, if the patient has other  \"skilled\" needs such as wounds, IV antibiotics, etc. Combined with \"low intensity\" could also equate to a SNF. If patient is medically complex, consider LTAC..              Electronically signed by Savita Balderas OT at 12/27/23 1700       Savita Balderas OT at 12/27/23 1141          Subjective: Pt agreeable to therapeutic plan of care.  Cognition: arousal/alertness: Alert and Attentive    Objective:     Bed Mobility: CGA   Functional Transfers: CGA     Balance: dynamic and standing CGA  Functional Ambulation: CGA and with rolling walker    Lower Body Dressing: Dependent  ADL Position: supine  ADL Comments: Donning sock     Toileting: Supervision  ADL Position: supported standing  ADL Comments: Urinating while standing over toilet     Vitals: WNL    Pain: 5 FACES  Location: " "Abdomen  Interventions for pain: N/A  Education: Provided education on the importance of mobility in the acute care setting      Assessment: Louis Andino presents with ADL impairments affecting function including balance, endurance / activity tolerance, pain, and strength. Demonstrated functioning below baseline abilities indicate the need for continued skilled intervention while inpatient. Tolerating session today without incident. Will continue to follow and progress as tolerated.     Plan/Recommendations:   Low Intensity Therapy recommended post-acute care - This is recommended as therapy feels this patient would require 2-3 visits per week. OP or HH would be the best option depending on patient's home bound status. Consider, if the patient has other  \"skilled\" needs such as wounds, IV antibiotics, etc. Combined with \"low intensity\" could also equate to a SNF. If patient is medically complex, consider LTAC..    Pt desires Home with Home Health at discharge. Pt cooperative; agreeable to therapeutic recommendations and plan of care.     Modified [place]: N/A = No pre-op stroke/TIA    Post-Tx Position: Up in Chair, Alarms activated, and Call light and personal items within reach  PPE: gloves      Electronically signed by Savita Balderas OT at 23 1659       Steffi Curran OT at 23 0849          Patient Name: Louis Andino  : 1960    MRN: [id number]                              Today's Date: 2023       Admit Date: 2023    Visit Dx:     ICD-10-CM ICD-9-CM   1. Other specified disorders of kidney and ureter  N28.89 593.89   2. Renal mass  N28.89 593.9     Patient Active Problem List   Diagnosis    Renal mass    Asthma    Gastroesophageal reflux disease    Hyperlipidemia    Hypertension    BPH (benign prostatic hyperplasia)    COPD (chronic obstructive pulmonary disease)    KARON (acute kidney injury)    Acute respiratory failure with hypoxia     Past Medical History: "   Diagnosis Date    Asthma     Emphysema/COPD     GERD (gastroesophageal reflux disease)     Hyperglycemia 10/12/2023    Hyperlipidemia 10/12/2023    Hypertension     Prostate disease     Vitreous degeneration of right eye     Formatting of this note might be different from the original. Retinal tear and detachment warning symptoms reviewed and patient instructed to call immediately if increasing floaters, flashes, or decreasing peripheral vision. No retinal detachment or retinal tear noted. Recheck in 1 month with dilated exam.     Past Surgical History:   Procedure Laterality Date    NEPHRECTOMY Left 12/18/2023    Procedure: NEPHRECTOMY RADICAL WITH CAVAL THROMBECTOMY;  Surgeon: James Esquivel MD;  Location: [facility] MAIN OR;  Service: Urology;  Laterality: Left;    SKIN LESION EXCISION  1990      General Information       Row Name 12/26/23 0838          General Information    Prior Level of Function independent:  goes to [employer] 3 days/wk. Has limited ex misael due to hip OA, cervical nerve impingement. Sees chiropractor. Wanting hip replacement.  -     Existing Precautions/Restrictions fall  but spouse is assisting pt with mobility to the bathroom, using RW and not able to find comfortable spot for gait belt.  -     Barriers to Rehab medically complex  -       Row Name 12/26/23 0838          Living Environment    People in Home spouse  she is off work till the new year  -       Row Name 12/26/23 0838          Cognition    Orientation Status (Cognition) oriented x 4  -       Row Name 12/26/23 0838          Safety Issues, Functional Mobility    Safety Issues Affecting Function (Mobility) judgment;problem-solving;safety precautions follow-through/compliance  -     Impairments Affecting Function (Mobility) endurance/activity tolerance;balance;pain  hip pain & pt reports lightheadedness but not as if he will faint  -               User Key  (r) = Recorded By, (t) = Taken By, (c) = Cosigned By       Initials Name Provider Type     Steffi Curran OT Occupational Therapist                     Mobility/ADL's       Row Name 12/26/23 0841          Bed Mobility    Comment, (Bed Mobility) up in chair  -       Row Name 12/26/23 0841          Functional Mobility    Functional Mobility- Comment CGA to bathroom w/ spouse  -       Row Name 12/26/23 0841          Activities of Daily Living    BADL Assessment/Intervention toileting;grooming  -       Row Name 12/26/23 0841          Toileting Assessment/Training    [place] Level (Toileting) toileting skills;minimum assist (75% patient effort)  -     Comment, (Toileting) spouse assisting with many things  -       Row Name 12/26/23 0841          Grooming Assessment/Training    [place] Level (Grooming) grooming skills;standby assist  -               User Key  (r) = Recorded By, (t) = Taken By, (c) = Cosigned By      Initials Name Provider Type     Steffi Curran OT Occupational Therapist                   Obj/Interventions       Row Name 12/26/23 0842          Range of Motion Comprehensive    General Range of Motion no range of motion deficits identified  -       Row Name 12/26/23 0842          Strength Comprehensive (MMT)    General Manual Muscle Testing (MMT) Assessment no strength deficits identified  -               User Key  (r) = Recorded By, (t) = Taken By, (c) = Cosigned By      Initials Name Provider Type     Steffi Curran OT Occupational Therapist                   Goals/Plan       Row Name 12/26/23 0846          Bed Mobility Goal 1 (OT)    Activity/Assistive Device (Bed Mobility Goal 1, OT) bed mobility activities, all  -     [place] Level/Cues Needed (Bed Mobility Goal 1, OT) independent  -     Time Frame (Bed Mobility Goal 1, OT) 1 week  -       Row Name 12/26/23 0846          Transfer Goal 1 (OT)    Activity/Assistive Device (Transfer Goal 1, OT) transfers, all  -     [place] Level/Cues Needed (Transfer Goal 1,  OT) modified independence  -     Time Frame (Transfer Goal 1, OT) 1 week  -       Row Name 12/26/23 0846          Bathing Goal 1 (OT)    Activity/Device (Bathing Goal 1, OT) bathing skills, all  -     [place] Level/Cues Needed (Bathing Goal 1, OT) set-up required  -     Time Frame (Bathing Goal 1, OT) 2 weeks  -       Row Name 12/26/23 0846          Dressing Goal 1 (OT)    Activity/Device (Dressing Goal 1, OT) dressing skills, all  -MH     [place]/Cues Needed (Dressing Goal 1, OT) set-up required  -     Time Frame (Dressing Goal 1, OT) 10 days  -       Row Name 12/26/23 0846          Therapy Assessment/Plan (OT)    Planned Therapy Interventions (OT) activity tolerance training;adaptive equipment training;BADL retraining;occupation/activity based interventions;patient/caregiver education/training;transfer/mobility retraining;ROM/therapeutic exercise  -               User Key  (r) = Recorded By, (t) = Taken By, (c) = Cosigned By      Initials Name Provider Type     Steffi Curran, OT Occupational Therapist                   Clinical Impression       Row Name 12/26/23 0842          Pain Assessment    Pretreatment Pain Rating 5/10  -     Posttreatment Pain Rating 5/10  -     Pain Location - Side/Orientation Right  -     Pain Location - hip  -       Row Name 12/26/23 0842          Plan of Care Review    Plan of Care Reviewed With patient;spouse  -     Progress improving  -     Outcome Evaluation Pt with renal cancer admitted for planned left open radical nephrectomy. IVC thrombectomy also done. Pt remained mechanically ventilated for several days post-op due to ongoing respiratory failure and large volume blood loss during surgery. Pt has started to mobilize though only with spouse assisting. She assists with many things. She is off work and will be home with him until the new year when she returns to work. OT is recommending [facility]HC at d/c. They have necessary DME. Pt states he does  wish for continued OT treatments but had just been in the bathroom for 7-8 minutes with his spouse and was painful and had been lightheaded so he declined to get up again this am. Will FU tomorrow for increasing activity tolerance.  -       Row Name 12/26/23 0842          Therapy Assessment/Plan (OT)    Rehab Potential (OT) good, to achieve stated therapy goals  -     Criteria for Skilled Therapeutic Interventions Met (OT) skilled treatment is necessary  -     Therapy Frequency (OT) 5 times/wk  -     Predicted Duration of Therapy Intervention (OT) until d/c  -       Row Name 12/26/23 0842          Therapy Plan Review/Discharge Plan (OT)    Anticipated Discharge Disposition (OT) home with home health  -       Row Name 12/26/23 0842          Vital Signs    O2 Delivery Pre Treatment room air  -       Row Name 12/26/23 0842          Positioning and Restraints    Pre-Treatment Position sitting in chair/recliner  -     Post Treatment Position chair  -     In Chair notified nsg;reclined;exit alarm on;with family/caregiver;call light within reach  -               User Key  (r) = Recorded By, (t) = Taken By, (c) = Cosigned By      Initials Name Provider Type     Steffi Curran, OT Occupational Therapist                   Outcome Measures       Row Name 12/26/23 0847 12/26/23 0800       How much help from another person do you currently need...    Turning from your back to your side while in flat bed without using bedrails? 3  - 3  -KL    Moving from lying on back to sitting on the side of a flat bed without bedrails? 3  - 3  -KL    Moving to and from a bed to a chair (including a wheelchair)? 3  - 3  -KL    Standing up from a chair using your arms (e.g., wheelchair, bedside chair)? 3  - 3  -KL    Climbing 3-5 steps with a railing? 3  - 3  -KL    To walk in hospital room? 3  - 3  -KL    AM-PAC 6 Clicks Score (PT) 18  - 18  -    Highest Level of Mobility Goal 6 --> Walk 10 steps or more   - 6 --> Walk 10 steps or more  -JANE              User Key  (r) = Recorded By, (t) = Taken By, (c) = Cosigned By      Initials Name Provider Type     Steffi Curran OT Occupational Therapist    Brooklyn Bourgeois, RN Registered Nurse                    Occupational Therapy Education       Title: PT OT SLP Therapies (In Progress)       Topic: Occupational Therapy (In Progress)       Point: ADL training (Done)       Description:   Instruct learner(s) on proper safety adaptation and remediation techniques during self care or transfers.   Instruct in proper use of assistive devices.                  Learning Progress Summary             Patient Acceptance, E, VU,NR by  at 12/26/2023 0848   Family Acceptance, E, VU,NR by  at 12/26/2023 0848                         Point: Home exercise program (Not Started)       Description:   Instruct learner(s) on appropriate technique for monitoring, assisting and/or progressing therapeutic exercises/activities.                  Learner Progress:  Not documented in this visit.              Point: Precautions (Done)       Description:   Instruct learner(s) on prescribed precautions during self-care and functional transfers.                  Learning Progress Summary             Patient Acceptance, E, VU,NR by  at 12/26/2023 0848   Family Acceptance, E, VU,NR by  at 12/26/2023 0848                         Point: Body mechanics (Not Started)       Description:   Instruct learner(s) on proper positioning and spine alignment during self-care, functional mobility activities and/or exercises.                  Learner Progress:  Not documented in this visit.                              User Key       Initials Effective Dates Name Provider Type [facility] 06/16/21 -  Steffi Curran OT Occupational Therapist OT                  OT Recommendation and Plan  Planned Therapy Interventions (OT): activity tolerance training, adaptive equipment training, BADL retraining,  occupation/activity based interventions, patient/caregiver education/training, transfer/mobility retraining, ROM/therapeutic exercise  Therapy Frequency (OT): 5 times/wk  Plan of Care Review  Plan of Care Reviewed With: patient, spouse  Progress: improving  Outcome Evaluation: Pt with renal cancer admitted for planned left open radical nephrectomy. IVC thrombectomy also done. Pt remained mechanically ventilated for several days post-op due to ongoing respiratory failure and large volume blood loss during surgery. Pt has started to mobilize though only with spouse assisting. She assists with many things. She is off work and will be home with him until the new year when she returns to work. OT is recommending HHC at d/c. They have necessary DME. Pt states he does wish for continued OT treatments but had just been in the bathroom for 7-8 minutes with his spouse and was painful and had been lightheaded so he declined to get up again this am. Will FU tomorrow for increasing activity tolerance.     Time Calculation:         Time Calculation- OT       Row Name 12/26/23 0848             Time Calculation-     OT Start Time 0819  -      OT Stop Time 0831  -      OT Time Calculation (min) 12 min  -      Total Timed Code Minutes- OT 0 minute(s)  -      OT Received On 12/26/23  -      OT - Next Appointment 12/27/23  -      OT Goal Re-Cert Due Date 01/09/24  -                User Key  (r) = Recorded By, (t) = Taken By, (c) = Cosigned By      Initials Name Provider Type     Steffi Curran OT Occupational Therapist                  Therapy Charges for Today       Code Description Service Date Service Provider Modifiers Qty    [id number]  OT EVAL LOW COMPLEXITY 2 12/26/2023 Steffi Curran OT GO 1                 Steffi Curran OT  12/26/2023    Electronically signed by Steffi Curran OT at 12/26/23 0849       Steffi Curran OT at 12/26/23 0848          Goal Outcome Evaluation:  Plan of Care Reviewed With:  patient, spouse        Progress: improving  Outcome Evaluation: Pt with renal cancer admitted for planned left open radical nephrectomy. IVC thrombectomy also done. Pt remained mechanically ventilated for several days post-op due to ongoing respiratory failure and large volume blood loss during surgery. Pt has started to mobilize though only with spouse assisting. She assists with many things. She is off work and will be home with him until the new year when she returns to work. OT is recommending HHC at d/c. They have necessary DME. Pt states he does wish for continued OT treatments but had just been in the bathroom for 7-8 minutes with his spouse and was painful and had been lightheaded so he declined to get up again this am. Will FU tomorrow for increasing activity tolerance.      Anticipated Discharge Disposition (OT): home with home health    Electronically signed by Steffi Curran OT at 23 0848                                                                          ORDER FOR OPPT      [facility] SURGICAL INPATIENT  [address] [place] [address]  Phone:  [phone]  Fax:  [phone] Date: Dec 28, 2023      Ambulatory Referral to Physical Therapy Evaluate and treat     Patient:  Louis Andino MRN:  [id number]   [address] [id number] :  1960  SSN:    Phone: [phone] Sex:  M      INSURANCE PAYOR PLAN GROUP # SUBSCRIBER ID   Primary:  Secondary:    [facility] SUP   [id number]  [id number]  [id number]  [id number]      Referring Provider Information:  KIRBY WRIGHT Phone: [phone] Fax: [phone]       Referral Information:   # Visits:  1 Referral Type: Physical Therapy [AE1]   Urgency:  Routine Referral Reason: Specialty Services Required   Start Date: Dec 28, 2023 End Date:  To be determined by Insurer   Diagnosis: Renal mass (N28.89 [ICD-10-CM] 593.9 [ICD-9-CM])      Refer to Dept:   Refer to Provider:   Refer  to Provider Phone:   Refer to Facility:       Specialty needed: Evaluate and treat  Follow-up needed: Yes     This document serves as a request of services and does not constitute Insurance authorization or approval of services.  To determine eligibility, please contact the members Insurance carrier to verify and review coverage.     If you have medical questions regarding this request for services. Please contact [facility] SURGICAL INPATIENT at [phone] during normal business hours.        Verbal Order Mode: Verbal with readback   Authorizing Provider: Yovani Trammell MD  Authorizing Provider's NPI: [id number]     Order Entered By: Josette Hwang RN 12/28/2023 10:52 AM     Electronically signed by:      GEORGINA Hwang RN  SIPS/ICU   O: [phone]  C: [phone]  [email or web address]

## 2023-12-29 ENCOUNTER — INPATIENT HOSPITAL (OUTPATIENT)
Dept: URBAN - METROPOLITAN AREA HOSPITAL 84 | Facility: HOSPITAL | Age: 63
End: 2023-12-29

## 2023-12-29 DIAGNOSIS — D62 ACUTE POSTHEMORRHAGIC ANEMIA: ICD-10-CM

## 2023-12-29 DIAGNOSIS — K62.5 HEMORRHAGE OF ANUS AND RECTUM: ICD-10-CM

## 2023-12-29 LAB
ALBUMIN SERPL-MCNC: 2.8 G/DL (ref 3.5–5.2)
ALBUMIN/GLOB SERPL: 1 G/DL
ALP SERPL-CCNC: 80 U/L (ref 39–117)
ALT SERPL W P-5'-P-CCNC: 26 U/L (ref 1–41)
ANION GAP SERPL CALCULATED.3IONS-SCNC: 7 MMOL/L (ref 5–15)
ANION GAP SERPL CALCULATED.3IONS-SCNC: 9 MMOL/L (ref 5–15)
ANISOCYTOSIS BLD QL: ABNORMAL
AST SERPL-CCNC: 27 U/L (ref 1–40)
BH BB BLOOD EXPIRATION DATE: NORMAL
BH BB BLOOD TYPE BARCODE: 6200
BH BB DISPENSE STATUS: NORMAL
BH BB PRODUCT CODE: NORMAL
BH BB UNIT NUMBER: NORMAL
BILIRUB SERPL-MCNC: 0.8 MG/DL (ref 0–1.2)
BUN SERPL-MCNC: 12 MG/DL (ref 8–23)
BUN SERPL-MCNC: 13 MG/DL (ref 8–23)
BUN/CREAT SERPL: 8.5 (ref 7–25)
BUN/CREAT SERPL: 9 (ref 7–25)
CA-I SERPL ISE-MCNC: 1.21 MMOL/L (ref 1.2–1.3)
CALCIUM SPEC-SCNC: 8.7 MG/DL (ref 8.6–10.5)
CALCIUM SPEC-SCNC: 9 MG/DL (ref 8.6–10.5)
CHLORIDE SERPL-SCNC: 96 MMOL/L (ref 98–107)
CHLORIDE SERPL-SCNC: 99 MMOL/L (ref 98–107)
CO2 SERPL-SCNC: 30 MMOL/L (ref 22–29)
CO2 SERPL-SCNC: 30 MMOL/L (ref 22–29)
CREAT SERPL-MCNC: 1.41 MG/DL (ref 0.76–1.27)
CREAT SERPL-MCNC: 1.45 MG/DL (ref 0.76–1.27)
CROSSMATCH INTERPRETATION: NORMAL
DEPRECATED RDW RBC AUTO: 51.6 FL (ref 37–54)
DEPRECATED RDW RBC AUTO: 53.4 FL (ref 37–54)
EGFRCR SERPLBLD CKD-EPI 2021: 53.5 ML/MIN/1.73
EGFRCR SERPLBLD CKD-EPI 2021: 55.3 ML/MIN/1.73
EOSINOPHIL # BLD MANUAL: 0.22 10*3/MM3 (ref 0–0.4)
EOSINOPHIL # BLD MANUAL: 0.41 10*3/MM3 (ref 0–0.4)
EOSINOPHIL NFR BLD MANUAL: 2 % (ref 0.3–6.2)
EOSINOPHIL NFR BLD MANUAL: 4 % (ref 0.3–6.2)
ERYTHROCYTE [DISTWIDTH] IN BLOOD BY AUTOMATED COUNT: 18.4 % (ref 12.3–15.4)
ERYTHROCYTE [DISTWIDTH] IN BLOOD BY AUTOMATED COUNT: 18.4 % (ref 12.3–15.4)
GLOBULIN UR ELPH-MCNC: 2.9 GM/DL
GLUCOSE SERPL-MCNC: 105 MG/DL (ref 65–99)
GLUCOSE SERPL-MCNC: 91 MG/DL (ref 65–99)
HCT VFR BLD AUTO: 23.2 % (ref 37.5–51)
HCT VFR BLD AUTO: 26.4 % (ref 37.5–51)
HGB BLD-MCNC: 7.6 G/DL (ref 13–17.7)
HGB BLD-MCNC: 8.7 G/DL (ref 13–17.7)
LYMPHOCYTES # BLD MANUAL: 1.55 10*3/MM3 (ref 0.7–3.1)
LYMPHOCYTES # BLD MANUAL: 1.76 10*3/MM3 (ref 0.7–3.1)
LYMPHOCYTES NFR BLD MANUAL: 6 % (ref 5–12)
LYMPHOCYTES NFR BLD MANUAL: 6 % (ref 5–12)
MAGNESIUM SERPL-MCNC: 1.9 MG/DL (ref 1.6–2.4)
MCH RBC QN AUTO: 25.9 PG (ref 26.6–33)
MCH RBC QN AUTO: 27 PG (ref 26.6–33)
MCHC RBC AUTO-ENTMCNC: 33 G/DL (ref 31.5–35.7)
MCHC RBC AUTO-ENTMCNC: 33 G/DL (ref 31.5–35.7)
MCV RBC AUTO: 78.4 FL (ref 79–97)
MCV RBC AUTO: 81.9 FL (ref 79–97)
METAMYELOCYTES NFR BLD MANUAL: 2 % (ref 0–0)
METAMYELOCYTES NFR BLD MANUAL: 3 % (ref 0–0)
MICROCYTES BLD QL: ABNORMAL
MONOCYTES # BLD: 0.62 10*3/MM3 (ref 0.1–0.9)
MONOCYTES # BLD: 0.66 10*3/MM3 (ref 0.1–0.9)
MYELOCYTES NFR BLD MANUAL: 1 % (ref 0–0)
MYELOCYTES NFR BLD MANUAL: 3 % (ref 0–0)
NEUTROPHILS # BLD AUTO: 7.31 10*3/MM3 (ref 1.7–7)
NEUTROPHILS # BLD AUTO: 7.81 10*3/MM3 (ref 1.7–7)
NEUTROPHILS NFR BLD MANUAL: 68 % (ref 42.7–76)
NEUTROPHILS NFR BLD MANUAL: 71 % (ref 42.7–76)
NEUTS BAND NFR BLD MANUAL: 3 % (ref 0–5)
PHOSPHATE SERPL-MCNC: 3.4 MG/DL (ref 2.5–4.5)
PLAT MORPH BLD: NORMAL
PLATELET # BLD AUTO: 407 10*3/MM3 (ref 140–450)
PLATELET # BLD AUTO: 462 10*3/MM3 (ref 140–450)
PMV BLD AUTO: 7.2 FL (ref 6–12)
PMV BLD AUTO: 7.3 FL (ref 6–12)
POIKILOCYTOSIS BLD QL SMEAR: ABNORMAL
POLYCHROMASIA BLD QL SMEAR: ABNORMAL
POTASSIUM SERPL-SCNC: 3.9 MMOL/L (ref 3.5–5.2)
POTASSIUM SERPL-SCNC: 4.2 MMOL/L (ref 3.5–5.2)
PROT SERPL-MCNC: 5.7 G/DL (ref 6–8.5)
RBC # BLD AUTO: 2.95 10*6/MM3 (ref 4.14–5.8)
RBC # BLD AUTO: 3.22 10*6/MM3 (ref 4.14–5.8)
RBC MORPH BLD: NORMAL
SCAN SLIDE: NORMAL
SCAN SLIDE: NORMAL
SMALL PLATELETS BLD QL SMEAR: ADEQUATE
SODIUM SERPL-SCNC: 135 MMOL/L (ref 136–145)
SODIUM SERPL-SCNC: 136 MMOL/L (ref 136–145)
UNIT  ABO: NORMAL
UNIT  RH: NORMAL
VARIANT LYMPHS NFR BLD MANUAL: 12 % (ref 19.6–45.3)
VARIANT LYMPHS NFR BLD MANUAL: 16 % (ref 19.6–45.3)
VARIANT LYMPHS NFR BLD MANUAL: 3 % (ref 0–5)
WBC MORPH BLD: NORMAL
WBC MORPH BLD: NORMAL
WBC NRBC COR # BLD AUTO: 10.3 10*3/MM3 (ref 3.4–10.8)
WBC NRBC COR # BLD AUTO: 11 10*3/MM3 (ref 3.4–10.8)

## 2023-12-29 PROCEDURE — 85025 COMPLETE CBC W/AUTO DIFF WBC: CPT | Performed by: STUDENT IN AN ORGANIZED HEALTH CARE EDUCATION/TRAINING PROGRAM

## 2023-12-29 PROCEDURE — 99232 SBSQ HOSP IP/OBS MODERATE 35: CPT

## 2023-12-29 PROCEDURE — 85007 BL SMEAR W/DIFF WBC COUNT: CPT | Performed by: STUDENT IN AN ORGANIZED HEALTH CARE EDUCATION/TRAINING PROGRAM

## 2023-12-29 PROCEDURE — 94799 UNLISTED PULMONARY SVC/PX: CPT

## 2023-12-29 PROCEDURE — 80048 BASIC METABOLIC PNL TOTAL CA: CPT | Performed by: STUDENT IN AN ORGANIZED HEALTH CARE EDUCATION/TRAINING PROGRAM

## 2023-12-29 PROCEDURE — 25010000002 ENOXAPARIN PER 10 MG: Performed by: STUDENT IN AN ORGANIZED HEALTH CARE EDUCATION/TRAINING PROGRAM

## 2023-12-29 RX ORDER — AMOXICILLIN 250 MG
2 CAPSULE ORAL 2 TIMES DAILY
Status: DISCONTINUED | OUTPATIENT
Start: 2023-12-29 | End: 2023-12-30 | Stop reason: HOSPADM

## 2023-12-29 RX ORDER — BISACODYL 10 MG
10 SUPPOSITORY, RECTAL RECTAL DAILY PRN
Status: DISCONTINUED | OUTPATIENT
Start: 2023-12-29 | End: 2023-12-30

## 2023-12-29 RX ORDER — POLYETHYLENE GLYCOL 3350 17 G/17G
17 POWDER, FOR SOLUTION ORAL DAILY PRN
Status: DISCONTINUED | OUTPATIENT
Start: 2023-12-29 | End: 2023-12-30

## 2023-12-29 RX ORDER — BISACODYL 5 MG/1
5 TABLET, DELAYED RELEASE ORAL DAILY PRN
Status: DISCONTINUED | OUTPATIENT
Start: 2023-12-29 | End: 2023-12-30

## 2023-12-29 RX ADMIN — METOPROLOL SUCCINATE 25 MG: 25 TABLET, EXTENDED RELEASE ORAL at 09:52

## 2023-12-29 RX ADMIN — METHYLNALTREXONE BROMIDE 450 MG: 150 TABLET ORAL at 16:33

## 2023-12-29 RX ADMIN — POLYETHYLENE GLYCOL 3350 17 G: 17 POWDER, FOR SOLUTION ORAL at 09:52

## 2023-12-29 RX ADMIN — LORAZEPAM 0.5 MG: 0.5 TABLET ORAL at 03:40

## 2023-12-29 RX ADMIN — SENNOSIDES AND DOCUSATE SODIUM 2 TABLET: 50; 8.6 TABLET ORAL at 21:07

## 2023-12-29 RX ADMIN — HYDROCORTISONE ACETATE 25 MG: 25 SUPPOSITORY RECTAL at 09:52

## 2023-12-29 RX ADMIN — LOSARTAN POTASSIUM 50 MG: 50 TABLET, FILM COATED ORAL at 09:52

## 2023-12-29 RX ADMIN — SENNOSIDES AND DOCUSATE SODIUM 2 TABLET: 50; 8.6 TABLET ORAL at 09:52

## 2023-12-29 RX ADMIN — HYDROCORTISONE ACETATE 25 MG: 25 SUPPOSITORY RECTAL at 21:07

## 2023-12-29 RX ADMIN — Medication 10 ML: at 09:53

## 2023-12-29 RX ADMIN — ENOXAPARIN SODIUM 40 MG: 100 INJECTION SUBCUTANEOUS at 16:33

## 2023-12-29 RX ADMIN — PANTOPRAZOLE SODIUM 40 MG: 40 INJECTION, POWDER, FOR SOLUTION INTRAVENOUS at 21:07

## 2023-12-29 RX ADMIN — BUDESONIDE INHALATION 0.5 MG: 0.5 SUSPENSION RESPIRATORY (INHALATION) at 19:32

## 2023-12-29 RX ADMIN — OXYCODONE HYDROCHLORIDE 10 MG: 10 TABLET, FILM COATED, EXTENDED RELEASE ORAL at 21:07

## 2023-12-29 RX ADMIN — PANTOPRAZOLE SODIUM 40 MG: 40 INJECTION, POWDER, FOR SOLUTION INTRAVENOUS at 09:52

## 2023-12-29 RX ADMIN — Medication 10 ML: at 21:08

## 2023-12-29 RX ADMIN — OXYCODONE HYDROCHLORIDE 10 MG: 10 TABLET, FILM COATED, EXTENDED RELEASE ORAL at 09:52

## 2023-12-29 NOTE — PROGRESS NOTES
[facility] MEDICINE SERVICE  DAILY PROGRESS NOTE    NAME: Louis Andino  : 1960  MRN: [id number]      LOS: 11 days     PROVIDER OF SERVICE: Yovani Trammell MD    Chief Complaint: Renal mass    Subjective:     Interval History:  History taken from: patient  Patient Complaints: Hard bowel movement with no blood  Patient Denies: Hematemesis    Review of Systems:   Review of Systems  14 point review of system unremarkable except mentioned above  Objective:     Vital Signs  Temp:  [98.1 °F (36.7 °C)-98.5 °F (36.9 °C)] 98.5 °F (36.9 °C)  Heart Rate:  [70-77] 71  Resp:  [16-22] 18  BP: (131-180)/(76-95) 158/90  Flow (L/min):  [0] 0   Body mass index is 33.47 kg/m².    Physical Exam  Physical Exam  Constitutional:       Appearance: He is obese.   HENT:      Head: Atraumatic.      Mouth/Throat:      Mouth: Mucous membranes are moist.   Eyes:      Pupils: Pupils are equal, round, and reactive to light.   Cardiovascular:      Rate and Rhythm: Normal rate and regular rhythm.      Pulses: Normal pulses.      Heart sounds: Normal heart sounds.   Pulmonary:      Effort: Pulmonary effort is normal.      Breath sounds: Normal breath sounds.   Abdominal:      General: Bowel sounds are normal.      Palpations: Abdomen is soft.      Comments: Clean surgical wound on abd   Musculoskeletal:         General: Normal range of motion.      Cervical back: Neck supple.   Skin:     General: Skin is warm.   Neurological:      General: No focal deficit present.      Mental Status: He is alert and oriented to person, place, and time.   Psychiatric:         Mood and Affect: Mood normal.         Behavior: Behavior normal.         Scheduled Meds   budesonide, 0.5 mg, Nebulization, BID - RT  enoxaparin, 40 mg, Subcutaneous, Daily  hydrocortisone, 25 mg, Rectal, BID  losartan, 50 mg, Oral, Q24H  Methylnaltrexone Bromide, 450 mg, Oral, Daily  metoprolol succinate XL, 25 mg, Oral, Q24H  oxyCODONE, 10 mg, Oral, Q12H  pantoprazole, 40  mg, Intravenous, Q12H  polyethylene glycol, 17 g, Oral, Daily  senna-docusate sodium, 2 tablet, Oral, BID  sodium chloride, 10 mL, Intravenous, Q12H       PRN Meds     acetaminophen    [DISCONTINUED] acetaminophen **OR** acetaminophen    aluminum-magnesium hydroxide-simethicone    senna-docusate sodium **AND** polyethylene glycol **AND** bisacodyl **AND** bisacodyl    Calcium Replacement - Follow Nurse / BPA Driven Protocol    hydrALAZINE    influenza vaccine    ipratropium-albuterol    LORazepam    Magnesium Low Dose Replacement - Follow Nurse / BPA Driven Protocol    [] HYDROmorphone **AND** naloxone    nitroglycerin    ondansetron **OR** ondansetron    Phosphorus Replacement - Follow Nurse / BPA Driven Protocol    Potassium Replacement - Follow Nurse / BPA Driven Protocol    sodium chloride    sodium chloride    sodium chloride    sodium chloride    sodium chloride   Infusions  sodium chloride, 125 mL/hr, Last Rate: 125 mL/hr (23 0201)          Diagnostic Data    Results from last 7 days   Lab Units 23  1008 23  2344   WBC 10*3/mm3 11.00* 10.30   HEMOGLOBIN g/dL 8.7* 7.6*   HEMATOCRIT % 26.4* 23.2*   PLATELETS 10*3/mm3 462* 407   GLUCOSE mg/dL 105* 91   CREATININE mg/dL 1.45* 1.41*   BUN mg/dL 13 12   SODIUM mmol/L 135* 136   POTASSIUM mmol/L 3.9 4.2   AST (SGOT) U/L  --  27   ALT (SGPT) U/L  --  26   ALK PHOS U/L  --  80   BILIRUBIN mg/dL  --  0.8   ANION GAP mmol/L 9.0 7.0       No radiology results for the last day      I reviewed the patient's new clinical results.    Assessment/Plan:     Active and Resolved Problems  Active Hospital Problems    Diagnosis  POA    **Renal mass [N28.89]  Yes    BPH (benign prostatic hyperplasia) [N40.0]  Yes    COPD (chronic obstructive pulmonary disease) [J44.9]  Yes    KARON (acute kidney injury) [N17.9]  Yes    Acute respiratory failure with hypoxia [J96.01]  Yes    Asthma [J45.909]  Yes    Gastroesophageal reflux disease [K21.9]  Yes    Hyperlipidemia  [E78.5]  Yes    Hypertension [I10]  Yes      Resolved Hospital Problems   No resolved problems to display.     # Rectal bleeding likely hemorrhoidal bleeding   - GI consulted and started on topical  steroid and supplement  fiber diet   -Return for radiation DC am     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 and large volume blood loss    - extensive blood loss during surgery    - s/p 5u prbc and 2u FFP given per op note    -Urology following     #Acute hypoxic respiratory failure  #COPD    - Patient remained intubated post op, was transferred to ICU-Patient extubated on 12/19/2023    - Dueoneb QID    - Pulmicort BID    - wean oxygen as tolerated     #Hypovolemic shock    - 2/2 blood loss from surgery    - Levophed Weaned  -Continue Omnicef per ID recommendation        #Acute blood loss anemia    - 2/2 blood loss from surgery    - s/p 5u prbc and 2u FFP given per op note      - h/h qday    - transfuse to maintain hgb > 7.0    - pt     #KARON  #Metabolic acidosis    - suspect 2/2 blood loss and possible post op ATN    - bicarb improved from 18 > 24    - Cr 1.35->1.23 trended down     - monitor     #GERD    - PPI     #HTN    - hold hypertensive meds due to recent shock, monitor      # Weakness, PT OT.   Possible discharge to home vs. SNF      DVT prophylaxis:  Medical and mechanical DVT prophylaxis orders are present.     Code status is   Code Status and Medical Interventions:   Ordered at: 12/18/23 2010     Code Status (Patient has no pulse and is not breathing):    CPR (Attempt to Resuscitate)     Medical Interventions (Patient has pulse or is breathing):    Full Support       Plan for disposition:home am if H/h remains stable    Time: 32 minutes    Signature: Electronically signed by Yovani Trammell MD, 12/29/23, 14:54 EST.  [facility] Hospitalist Team

## 2023-12-29 NOTE — CONSULTS
Nutrition Services    Patient Name: Louis Andino  YOB: 1960  MRN: [id number]  Admission date: 12/18/2023    Comment:  Continue current diet. Encourage good PO intake.     PPE Documentation        PPE Worn By Provider Did not enter room this encounter   PPE Worn By Patient  None      CLINICAL NUTRITION ASSESSMENT      Reason for Assessment Follow up   12/22: NPO/liq x 4 days      H&P      Past Medical History:   Diagnosis Date    Asthma     Emphysema/COPD     GERD (gastroesophageal reflux disease)     Hyperglycemia 10/12/2023    Hyperlipidemia 10/12/2023    Hypertension     Prostate disease     Vitreous degeneration of right eye     Formatting of this note might be different from the original. Retinal tear and detachment warning symptoms reviewed and patient instructed to call immediately if increasing floaters, flashes, or decreasing peripheral vision. No retinal detachment or retinal tear noted. Recheck in 1 month with dilated exam.       Past Surgical History:   Procedure Laterality Date    NEPHRECTOMY Left 12/18/2023    Procedure: NEPHRECTOMY RADICAL WITH CAVAL THROMBECTOMY;  Surgeon: James Esquivel MD;  Location: [facility] MAIN OR;  Service: Urology;  Laterality: Left;    SKIN LESION EXCISION  1990        Current Problems   Acute Respiratory Failure  - intubated for surgery, extubated 12/19    Renal mass  - Urology following  - S/P nephrectomy 12/18    Kidney injury     Essential Hypertension     COPD     GERD       Encounter Information        Trending Narrative     12/29: Pt being reassessed for follow up. Pt with constipation since recent surgery. GI following and recommended fiber supplement/mild laxative.     12/22: Admitted for planned nephrectomy 12/18.  RD visited patient at bedside.  Patient laying on side, seemed uncomfortable.  RD spoke mostly to spouse.  Noted with Mexican ices at bedside as patient likes them.  Patient does not like broth.  RD encouraged Boost Breeze supplement and  "brought patient a [place] flavor to try.  RD alerted RN of where to find more if patient likes.  RD will continue to defer diet advancement to MD and continue to monitor.  NFPE completed and not consistent with nutrition diagnosis of malnutrition at this time using AND/ASPEN criteria.  Noted Phos replaced today.         Anthropometrics        Current Height, Weight Height: 177.8 cm (70\")  Weight: 106 kg (233 lb 4 oz) (12/24/23 0608)       Usual Body Weight (UBW) Unable to obtain from patient        Trending Weight Hx  12/29: 233# - scale wt from 12/24; c/w wt from 12/22   This admission: 12/22: 233#             PTA: Stable weight history     Wt Readings from Last 30 Encounters:   12/24/23 0608 106 kg (233 lb 4 oz)   12/19/23 0600 106 kg (233 lb 7.5 oz)   12/18/23 1718 102 kg (224 lb 13.9 oz)   12/18/23 0540 100 kg (221 lb)   12/04/23 1427 101 kg (223 lb)   12/15/23 1218 102 kg (225 lb)   11/04/23 1913 99.8 kg (220 lb 0.3 oz)   10/31/20 1916 99.8 kg (220 lb)      BMI kg/m2 Body mass index is 33.47 kg/m².       Labs        Pertinent Labs    Results from last 7 days   Lab Units 12/28/23  2344 12/27/23  2351 12/27/23  0516   SODIUM mmol/L 136 136 136   POTASSIUM mmol/L 4.2 4.3 3.6   CHLORIDE mmol/L 99 100 98   CO2 mmol/L 30.0* 28.0 29.0   BUN mg/dL 12 14 14   CREATININE mg/dL 1.41* 1.23 1.27   CALCIUM mg/dL 8.7 8.3* 8.3*   BILIRUBIN mg/dL 0.8 0.7 0.9   ALK PHOS U/L 80 85 93   ALT (SGPT) U/L 26 30 29   AST (SGOT) U/L 27 30 27   GLUCOSE mg/dL 91 107* 100*     Results from last 7 days   Lab Units 12/28/23  2344 12/28/23  1055 12/27/23  2351 12/27/23  0516   MAGNESIUM mg/dL 1.9  --  1.8 2.0   PHOSPHORUS mg/dL 3.4  --  2.9 2.9   HEMOGLOBIN g/dL 7.6*   < > 6.7* 7.3*   HEMATOCRIT % 23.2*   < > 20.7* 22.5*    < > = values in this interval not displayed.     No results found for: \"HGBA1C\"     Medications    Scheduled Medications budesonide, 0.5 mg, Nebulization, BID - RT  enoxaparin, 40 mg, Subcutaneous, Daily  hydrocortisone, 25 " mg, Rectal, BID  losartan, 50 mg, Oral, Q24H  metoprolol succinate XL, 25 mg, Oral, Q24H  oxyCODONE, 10 mg, Oral, Q12H  pantoprazole, 40 mg, Intravenous, Q12H  polyethylene glycol, 17 g, Oral, Daily  senna-docusate sodium, 2 tablet, Nasogastric, BID  sodium chloride, 10 mL, Intravenous, Q12H        Infusions sodium chloride, 125 mL/hr, Last Rate: 125 mL/hr (23 0201)        PRN Medications   acetaminophen    [DISCONTINUED] acetaminophen **OR** acetaminophen    aluminum-magnesium hydroxide-simethicone    senna-docusate sodium **AND** polyethylene glycol **AND** bisacodyl **AND** bisacodyl    Calcium Replacement - Follow Nurse / BPA Driven Protocol    hydrALAZINE    influenza vaccine    ipratropium-albuterol    LORazepam    Magnesium Low Dose Replacement - Follow Nurse / BPA Driven Protocol    [] HYDROmorphone **AND** naloxone    nitroglycerin    ondansetron **OR** ondansetron    Phosphorus Replacement - Follow Nurse / BPA Driven Protocol    Potassium Replacement - Follow Nurse / BPA Driven Protocol    sodium chloride    sodium chloride    sodium chloride    sodium chloride    sodium chloride     Physical Findings        Trending Physical   Appearance, NFPE : ALTAGRACIA    : NFPE completed and not consistent with nutrition diagnosis of malnutrition at this time using AND/ASPEN criteria      --  Edema  1+ dependent, generalized, face, periorbital, R leg  2+ ankles, feet     Bowel Function + BM on , pt with constipation since recent surgery. GI saw pt .     Tubes No feeding tube      Chewing/Swallowing No issues     Skin Incision      --  Estimated/Assessed Needs    Estimated 23, remains appropriate     Energy Requirements    EST Needs, Method, Wt used 4482-5311 kcal/day (25-30 kcal/day of IBW 77.2 kg)       Protein Requirements    EST Needs, Method, Wt used 77-92 g/day (1.0-1.2 g/kg of IBW 77.2 kg)       Fluid Requirements     Estimated Needs (mL/day) 1 mL/kcal, will monitor hydration  status      Current Nutrition Orders & Evaluation of Intake       Oral Nutrition     Food Allergies NKFA   Current PO Diet Diet: Regular/House Diet; Texture: Regular Texture (IDDSI 7); Fluid Consistency: Thin (IDDSI 0)   Supplement Boost Breeze BID (provides 500 kcals, 18 g protein if consumed)      PO Evaluation     Trending % PO Intake 12/29: 50%  12/22: Minimal related to liquids    --  Enteral Nutrition    Enteral Route    Order, Modulars, Flushes    Residual/Tolerance    TF Observation         Parenteral Nutrition     TPN Route    Total # Days on TPN    TPN Order, Lipid Details    MVI & Trace Element Freq    TPN Observation         Nutrition Course Details    PO Diets: NPO /liquids 12/18-12/24 12/25 to current: regular diet   Nutrition Support:   Monitor for need for nutrition support      Nutritional Risk Screening        NRS-2002 Score          Nutrition Diagnosis         Nutrition Dx Problem 1 Inadequate energy intake related to clinical course and decreased appetite as evidenced by frequent NPO status and liquid diets early this admission and fair PO intake since regular diet initiated.       Nutrition Dx Problem 2        Intervention Goal         Intervention Goal(s) PO intake > 50%, ONS acceptance      Nutrition Intervention        RD Action Continue current diet     Nutrition Prescription          Diet Prescription Regular    Supplement Prescription Boost Breeze BID   --  Monitor/Evaluation        Monitor Per protocol, I&O, PO intake, Supplement intake, Pertinent labs, Weight, Skin status, GI status, Symptoms, Hemodynamic stability       Electronically signed by:  Shahana Humphreys RD  12/29/23 08:17 EST

## 2023-12-29 NOTE — PROGRESS NOTES
[facility] DAILY PROGRESS NOTE    Patient Identification  Name: Louis Andino  Age: 63 y.o.  Sex: male  :  1960  MRN: [id number]    Date: 2023             Subjective:  Interval History: Hgb stable after another transfusion    Objective:    Scheduled Meds:budesonide, 0.5 mg, Nebulization, BID - RT  enoxaparin, 40 mg, Subcutaneous, Daily  hydrocortisone, 25 mg, Rectal, BID  losartan, 50 mg, Oral, Q24H  Methylnaltrexone Bromide, 450 mg, Oral, Daily  metoprolol succinate XL, 25 mg, Oral, Q24H  oxyCODONE, 10 mg, Oral, Q12H  pantoprazole, 40 mg, Intravenous, Q12H  polyethylene glycol, 17 g, Oral, Daily  senna-docusate sodium, 2 tablet, Oral, BID  sodium chloride, 10 mL, Intravenous, Q12H      Continuous Infusions:sodium chloride, 125 mL/hr, Last Rate: 125 mL/hr (23 0201)      PRN Meds:  acetaminophen    [DISCONTINUED] acetaminophen **OR** acetaminophen    aluminum-magnesium hydroxide-simethicone    senna-docusate sodium **AND** polyethylene glycol **AND** bisacodyl **AND** bisacodyl    Calcium Replacement - Follow Nurse / BPA Driven Protocol    hydrALAZINE    influenza vaccine    ipratropium-albuterol    LORazepam    Magnesium Low Dose Replacement - Follow Nurse / BPA Driven Protocol    [] HYDROmorphone **AND** naloxone    nitroglycerin    ondansetron **OR** ondansetron    Phosphorus Replacement - Follow Nurse / BPA Driven Protocol    Potassium Replacement - Follow Nurse / BPA Driven Protocol    sodium chloride    sodium chloride    sodium chloride    sodium chloride    sodium chloride    Vital signs in last 24 hours:  Temp:  [98.1 °F (36.7 °C)-98.5 °F (36.9 °C)] 98.5 °F (36.9 °C)  Heart Rate:  [70-77] 71  Resp:  [16-22] 18  BP: (131-180)/(76-95) 158/90    Intake/Output:    Intake/Output Summary (Last 24 hours) at 2023 1342  Last data filed at 2023 1308  Gross per 24 hour   Intake 1110 ml   Output --   Net 1110 ml       Exam:  /90 (BP Location: Left arm, Patient  "Position: Sitting)   Pulse 71   Temp 98.5 °F (36.9 °C) (Oral)   Resp 18   Ht 177.8 cm (70\")   Wt 106 kg (233 lb 4 oz)   SpO2 95%   BMI 33.47 kg/m²     General Appearance:    Alert, cooperative, NAD   Lungs:     Respirations unlabored, no audible wheezing    Heart:    No cyanosis   Abdomen:     Soft, ND, incision clean dry intact   :   Harley out            Data Review:  All labs (24hrs):   Recent Results (from the past 24 hour(s))   Magnesium    Collection Time: 12/28/23 11:44 PM    Specimen: Arm, Left; Blood   Result Value Ref Range    Magnesium 1.9 1.6 - 2.4 mg/dL   Phosphorus    Collection Time: 12/28/23 11:44 PM    Specimen: Arm, Left; Blood   Result Value Ref Range    Phosphorus 3.4 2.5 - 4.5 mg/dL   Comprehensive Metabolic Panel    Collection Time: 12/28/23 11:44 PM    Specimen: Arm, Left; Blood   Result Value Ref Range    Glucose 91 65 - 99 mg/dL    BUN 12 8 - 23 mg/dL    Creatinine 1.41 (H) 0.76 - 1.27 mg/dL    Sodium 136 136 - 145 mmol/L    Potassium 4.2 3.5 - 5.2 mmol/L    Chloride 99 98 - 107 mmol/L    CO2 30.0 (H) 22.0 - 29.0 mmol/L    Calcium 8.7 8.6 - 10.5 mg/dL    Total Protein 5.7 (L) 6.0 - 8.5 g/dL    Albumin 2.8 (L) 3.5 - 5.2 g/dL    ALT (SGPT) 26 1 - 41 U/L    AST (SGOT) 27 1 - 40 U/L    Alkaline Phosphatase 80 39 - 117 U/L    Total Bilirubin 0.8 0.0 - 1.2 mg/dL    Globulin 2.9 gm/dL    A/G Ratio 1.0 g/dL    BUN/Creatinine Ratio 8.5 7.0 - 25.0    Anion Gap 7.0 5.0 - 15.0 mmol/L    eGFR 55.3 (L) >60.0 mL/min/1.73   Calcium, Ionized    Collection Time: 12/28/23 11:44 PM    Specimen: Arm, Left; Blood   Result Value Ref Range    Ionized Calcium 1.21 1.20 - 1.30 mmol/L   CBC Auto Differential    Collection Time: 12/28/23 11:44 PM    Specimen: Arm, Left; Blood   Result Value Ref Range    WBC 10.30 3.40 - 10.80 10*3/mm3    RBC 2.95 (L) 4.14 - 5.80 10*6/mm3    Hemoglobin 7.6 (L) 13.0 - 17.7 g/dL    Hematocrit 23.2 (L) 37.5 - 51.0 %    MCV 78.4 (L) 79.0 - 97.0 fL    MCH 25.9 (L) 26.6 - 33.0 pg    " MCHC 33.0 31.5 - 35.7 g/dL    RDW 18.4 (H) 12.3 - 15.4 %    RDW-SD 53.4 37.0 - 54.0 fl    MPV 7.2 6.0 - 12.0 fL    Platelets 407 140 - 450 10*3/mm3   Scan Slide    Collection Time: 12/28/23 11:44 PM    Specimen: Arm, Left; Blood   Result Value Ref Range    Scan Slide     Manual Differential    Collection Time: 12/28/23 11:44 PM    Specimen: Arm, Left; Blood   Result Value Ref Range    Neutrophil % 68.0 42.7 - 76.0 %    Lymphocyte % 12.0 (L) 19.6 - 45.3 %    Monocyte % 6.0 5.0 - 12.0 %    Eosinophil % 4.0 0.3 - 6.2 %    Bands %  3.0 0.0 - 5.0 %    Metamyelocyte % 3.0 (H) 0.0 - 0.0 %    Myelocyte % 1.0 (H) 0.0 - 0.0 %    Atypical Lymphocyte % 3.0 0.0 - 5.0 %    Neutrophils Absolute 7.31 (H) 1.70 - 7.00 10*3/mm3    Lymphocytes Absolute 1.55 0.70 - 3.10 10*3/mm3    Monocytes Absolute 0.62 0.10 - 0.90 10*3/mm3    Eosinophils Absolute 0.41 (H) 0.00 - 0.40 10*3/mm3    Anisocytosis Slight/1+ None Seen    Microcytes Slight/1+ None Seen    Poikilocytes Slight/1+ None Seen    Polychromasia Slight/1+ None Seen    WBC Morphology Normal Normal    Platelet Estimate Adequate Normal   Prepare RBC, 1 Units    Collection Time: 12/29/23  2:00 AM   Result Value Ref Range    Product Code X8212V86     Unit Number [id number]     UNIT  ABO A     UNIT  RH POS     Crossmatch Interpretation Compatible     Dispense Status PT     Blood Expiration Date [id number]     Blood Type Barcode 6200    Basic Metabolic Panel    Collection Time: 12/29/23 10:08 AM    Specimen: Blood   Result Value Ref Range    Glucose 105 (H) 65 - 99 mg/dL    BUN 13 8 - 23 mg/dL    Creatinine 1.45 (H) 0.76 - 1.27 mg/dL    Sodium 135 (L) 136 - 145 mmol/L    Potassium 3.9 3.5 - 5.2 mmol/L    Chloride 96 (L) 98 - 107 mmol/L    CO2 30.0 (H) 22.0 - 29.0 mmol/L    Calcium 9.0 8.6 - 10.5 mg/dL    BUN/Creatinine Ratio 9.0 7.0 - 25.0    Anion Gap 9.0 5.0 - 15.0 mmol/L    eGFR 53.5 (L) >60.0 mL/min/1.73   CBC Auto Differential    Collection Time: 12/29/23 10:08 AM    Specimen:  Blood   Result Value Ref Range    WBC 11.00 (H) 3.40 - 10.80 10*3/mm3    RBC 3.22 (L) 4.14 - 5.80 10*6/mm3    Hemoglobin 8.7 (L) 13.0 - 17.7 g/dL    Hematocrit 26.4 (L) 37.5 - 51.0 %    MCV 81.9 79.0 - 97.0 fL    MCH 27.0 26.6 - 33.0 pg    MCHC 33.0 31.5 - 35.7 g/dL    RDW 18.4 (H) 12.3 - 15.4 %    RDW-SD 51.6 37.0 - 54.0 fl    MPV 7.3 6.0 - 12.0 fL    Platelets 462 (H) 140 - 450 10*3/mm3   Scan Slide    Collection Time: 12/29/23 10:08 AM    Specimen: Blood   Result Value Ref Range    Scan Slide     Manual Differential    Collection Time: 12/29/23 10:08 AM    Specimen: Blood   Result Value Ref Range    Neutrophil % 71.0 42.7 - 76.0 %    Lymphocyte % 16.0 (L) 19.6 - 45.3 %    Monocyte % 6.0 5.0 - 12.0 %    Eosinophil % 2.0 0.3 - 6.2 %    Metamyelocyte % 2.0 (H) 0.0 - 0.0 %    Myelocyte % 3.0 (H) 0.0 - 0.0 %    Neutrophils Absolute 7.81 (H) 1.70 - 7.00 10*3/mm3    Lymphocytes Absolute 1.76 0.70 - 3.10 10*3/mm3    Monocytes Absolute 0.66 0.10 - 0.90 10*3/mm3    Eosinophils Absolute 0.22 0.00 - 0.40 10*3/mm3    RBC Morphology Normal Normal    WBC Morphology Normal Normal    Platelet Morphology Normal Normal      Imaging Results (Last 24 Hours)       ** No results found for the last 24 hours. 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

## 2023-12-29 NOTE — PROGRESS NOTES
LOS: 11 days   Patient Care Team:  Provider, No Known as PCP - General      Subjective     Interval History:     Subjective: Patient reports he is feeling much better this morning.  Is still experiencing rectal bleeding, but feels it is decreasing.  Mainly occurs when he is passing gas.  Still has not had a bowel movement.      ROS:   No chest pain, shortness of breath, or cough.        Medication Review:     Current Facility-Administered Medications:     acetaminophen (TYLENOL) 160 MG/5ML oral solution 650 mg, 650 mg, Oral, Q6H PRN, Xavier Boston DO, 650 mg at 12/25/23 0024    [DISCONTINUED] acetaminophen (TYLENOL) tablet 650 mg, 650 mg, Oral, Q4H PRN **OR** acetaminophen (TYLENOL) suppository 650 mg, 650 mg, Rectal, Q4H PRN, Erika Hawkins APRN    aluminum-magnesium hydroxide-simethicone (MAALOX MAX) 400-400-40 MG/5ML suspension 15 mL, 15 mL, Oral, Q6H PRN, Nestor Prescott MD    sennosides-docusate (PERICOLACE) 8.6-50 MG per tablet 2 tablet, 2 tablet, Nasogastric, BID, 2 tablet at 12/29/23 0952 **AND** polyethylene glycol (MIRALAX) packet 17 g, 17 g, Oral, Daily PRN **AND** bisacodyl (DULCOLAX) EC tablet 5 mg, 5 mg, Oral, Daily PRN **AND** bisacodyl (DULCOLAX) suppository 10 mg, 10 mg, Rectal, Daily PRN, Yuniel Lai MD    budesonide (PULMICORT) nebulizer solution 0.5 mg, 0.5 mg, Nebulization, BID - RT, Yuniel Lai MD, 0.5 mg at 12/28/23 2042    Calcium Replacement - Follow Nurse / BPA Driven Protocol, , Does not apply, PRN, Erika Hawkins APRN    Enoxaparin Sodium (LOVENOX) syringe 40 mg, 40 mg, Subcutaneous, Daily, Yovani Trammell MD, 40 mg at 12/28/23 1729    hydrALAZINE (APRESOLINE) injection 20 mg, 20 mg, Intravenous, Q6H PRN, Clifford Sanchez MD, 20 mg at 12/26/23 1247    hydrocortisone (ANUSOL-HC) suppository 25 mg, 25 mg, Rectal, BID, Mary Swan APRN, 25 mg at 12/29/23 0952    Influenza Vac High-Dose Quad (FLUZONE HIGH DOSE) injection 0.7 mL, 0.7 mL, Intramuscular,  During Hospitalization, Nahun Torres APRN    ipratropium-albuterol (DUO-NEB) nebulizer solution 3 mL, 3 mL, Nebulization, Q6H PRN, Erika Hawkins APRN, 3 mL at 23 1227    LORazepam (ATIVAN) tablet 0.5 mg, 0.5 mg, Oral, Q6H PRN, James Esquivel MD, 0.5 mg at 23 0340    losartan (COZAAR) tablet 50 mg, 50 mg, Oral, Q24H, Clifford Sanchez MD, 50 mg at 23 0952    Magnesium Low Dose Replacement - Follow Nurse / BPA Driven Protocol, , Does not apply, PRN, Erika Hawkins APRN    metoprolol succinate XL (TOPROL-XL) 24 hr tablet 25 mg, 25 mg, Oral, Q24H, Clifford Sanchez MD, 25 mg at 23 0952    [] HYDROmorphone (DILAUDID) injection 0.5 mg, 0.5 mg, Intravenous, Q2H PRN, 0.5 mg at 23 0616 **AND** naloxone (NARCAN) injection 0.1 mg, 0.1 mg, Intravenous, Q5 Min PRN, James Esquivel MD    nitroglycerin (NITROSTAT) SL tablet 0.4 mg, 0.4 mg, Sublingual, Q5 Min PRN, Erika Hawkins APRN    ondansetron (ZOFRAN) tablet 4 mg, 4 mg, Oral, Q6H PRN **OR** ondansetron (ZOFRAN) injection 4 mg, 4 mg, Intravenous, Q6H PRN, Erika Hawkins APRN, 4 mg at 23 1009    oxyCODONE (oxyCONTIN) 12 hr tablet 10 mg, 10 mg, Oral, Q12H, Clifford Sanchez MD, 10 mg at 23 0952    pantoprazole (PROTONIX) injection 40 mg, 40 mg, Intravenous, Q12H, Yovani Trammell MD, 40 mg at 23 0952    Phosphorus Replacement - Follow Nurse / BPA Driven Protocol, , Does not apply, PRN, Erika Hawkins APRN    polyethylene glycol (MIRALAX) packet 17 g, 17 g, Oral, Daily, Mary Swan APRN, 17 g at 23 0952    Potassium Replacement - Follow Nurse / BPA Driven Protocol, , Does not apply, Ross ZAVALETA Angela, APRN    sodium chloride 0.9 % flush 10 mL, 10 mL, Intravenous, Q12H, James Esquivel MD, 10 mL at 23 0953    sodium chloride 0.9 % flush 10 mL, 10 mL, Intravenous, PRN, James Esquivel MD    sodium chloride 0.9 % flush 10 mL, 10 mL, Intravenous, PRN, Yuniel Lai MD, 10 mL at  12/22/23 0416    sodium chloride 0.9 % flush 20 mL, 20 mL, Intravenous, PRN, Yuniel Lai MD    sodium chloride 0.9 % infusion 40 mL, 40 mL, Intravenous, PRN, James Esquivel MD    sodium chloride 0.9 % infusion 40 mL, 40 mL, Intravenous, PRN, Yuniel Lai MD    sodium chloride 0.9 % infusion, 125 mL/hr, Intravenous, Continuous, Xavier Boston DO, Last Rate: 125 mL/hr at 12/22/23 0201, 125 mL/hr at 12/22/23 0201      Objective     Vital Signs  Vitals:    12/28/23 2005 12/28/23 2053 12/29/23 0422 12/29/23 1308   BP:  152/81 131/76 158/90   BP Location:  Left arm Left arm Left arm   Patient Position:  Lying Lying Sitting   Pulse: 75 71 70 71   Resp: 20 20 16 18   Temp:  98.1 °F (36.7 °C) 98.1 °F (36.7 °C) 98.5 °F (36.9 °C)   TempSrc:  Oral Oral Oral   SpO2: 97% 96% 94% 95%   Weight:       Height:           Physical Exam:     General Appearance:    Awake and alert, in no acute distress   Head:    Normocephalic, without obvious abnormality   Eyes:          Conjunctivae normal, anicteric sclera   Throat:   No oral lesions, no thrush, oral mucosa moist   Neck:   No adenopathy, supple, no JVD   Lungs:     respirations regular, even and unlabored   Abdomen:     Soft, non-tender, no rebound or guarding, non-distended, no hepatosplenomegaly   Rectal:     Deferred   Extremities:   No edema, no cyanosis   Skin:   No bruising or rash, no jaundice        Results Review:    CBC    Results from last 7 days   Lab Units 12/29/23  1008 12/28/23  2344 12/28/23  1055 12/27/23  2351 12/27/23  0516 12/26/23  0446 12/25/23  1644 12/25/23  0219 12/24/23  0442   WBC 10*3/mm3 11.00* 10.30  --  10.20 10.30 11.90*  --  12.70* 13.10*   HEMOGLOBIN g/dL 8.7* 7.6* 7.8* 6.7* 7.3* 7.6* 7.2* 7.2* 8.2*   PLATELETS 10*3/mm3 462* 407  --  421 406 380  --  347 311     CMP   Results from last 7 days   Lab Units 12/29/23  1008 12/28/23  2344 12/27/23  2351 12/27/23  0516 12/26/23  0446 12/25/23  1644 12/25/23  0219 12/24/23  0442 12/23/23  0654  "  SODIUM mmol/L 135* 136 136 136 136  --  136 137 136   POTASSIUM mmol/L 3.9 4.2 4.3 3.6 3.8 3.9 3.5 3.6  3.7 3.3*   CHLORIDE mmol/L 96* 99 100 98 99  --  98 99 98   CO2 mmol/L 30.0* 30.0* 28.0 29.0 28.0  --  28.0 26.0 25.0   BUN mg/dL 13 12 14 14 15  --  17 20 19   CREATININE mg/dL 1.45* 1.41* 1.23 1.27 1.35*  --  1.26 1.37* 1.26   GLUCOSE mg/dL 105* 91 107* 100* 95  --  115* 83 81   ALBUMIN g/dL  --  2.8* 2.6* 2.7* 2.5*  --  2.7* 2.8* 2.9*   BILIRUBIN mg/dL  --  0.8 0.7 0.9 0.8  --  1.4* 1.7* 3.2*   ALK PHOS U/L  --  80 85 93 109  --  124* 269* 220*   AST (SGOT) U/L  --  27 30 27 30  --  33 47* 63*   ALT (SGPT) U/L  --  26 30 29 31  --  38 50* 60*   MAGNESIUM mg/dL  --  1.9 1.8 2.0 1.8  --  1.8 2.2 2.0   PHOSPHORUS mg/dL  --  3.4 2.9 2.9 2.7 2.2* 2.1* 2.0* 2.0*     Cr Clearance Estimated Creatinine Clearance: 61.9 mL/min (A) (by C-G formula based on SCr of 1.45 mg/dL (H)).  Coag     HbA1C No results found for: \"HGBA1C\"  Blood Glucose No results found for: \"POCGLU\"  Infection     UA    Results from last 7 days   Lab Units 12/23/23  2319   NITRITE UA  Negative   WBC UA /HPF 0-2   BACTERIA UA /HPF None Seen   SQUAM EPITHEL UA /HPF 0-2     Imaging Results (Last 72 Hours)       ** No results found for the last 72 hours. **            Assessment & Plan     63-year-old male admitted for nephrectomy due to renal mass.  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  He has received multiple transfusions.  GI consulted for rectal bleeding and anemia.      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 Feeling well today and bleeding is not as significant.  Continue topical treatment with hydrocortisone cream and laxatives to keep stool soft and prevent straining. Continue with MiraLAX and will " also add Relistor due to narcotic use.  Hemoglobin stable at 8.7.  Continue to monitor and transfuse for hemoglobin less than 7.   Anemia likely secondary to his major surgery during which he lost a large amount of blood.   Recommend outpatient screening colonoscopy.  Continue supportive care.     I discussed the patients findings and my recommendations with the patient.     We appreciate the referral       Electronically signed by DUANE Macdonald, 12/29/23, 1:17 PM EST.

## 2023-12-29 NOTE — PLAN OF CARE
Goal Outcome Evaluation:         Patient able to make needs known. Spouse at bedside and attentive to patients needs. Pain treated per MAR. VSS on room air. Personal belongings and call light with in reach. Plan of care on going.                 GIB

## 2023-12-29 NOTE — CASE MANAGEMENT/SOCIAL WORK
Continued Stay Note   [facility]     Patient Name: Louis Andino  MRN: [id number]  Today's Date: 12/29/2023    Admit Date: 12/18/2023    Plan: Home with spouse - [facility], accepted.   Discharge Plan       Row Name 12/29/23 1510       Plan    Plan Home with spouse - [facility], accepted.    Plan Comments Home with spouse - [facility], accepted.  DC Barriers:  hgb 8.7                 Expected Discharge Date and Time       Expected Discharge Date Expected Discharge Time    Dec 30, 2023               GEORGINA Hwang RN  SIPS/ICU   O: [phone]  C: [phone]  [email or web address]

## 2023-12-30 ENCOUNTER — READMISSION MANAGEMENT (OUTPATIENT)
Dept: CALL CENTER | Facility: HOSPITAL | Age: 63
End: 2023-12-30
Payer: COMMERCIAL

## 2023-12-30 VITALS
HEIGHT: 70 IN | RESPIRATION RATE: 15 BRPM | DIASTOLIC BLOOD PRESSURE: 73 MMHG | TEMPERATURE: 98.3 F | HEART RATE: 63 BPM | WEIGHT: 239.2 LBS | OXYGEN SATURATION: 97 % | SYSTOLIC BLOOD PRESSURE: 141 MMHG | BODY MASS INDEX: 34.24 KG/M2

## 2023-12-30 PROBLEM — C64.9 RENAL CELL CARCINOMA: Status: ACTIVE | Noted: 2023-12-30

## 2023-12-30 LAB
ALBUMIN SERPL-MCNC: 3.2 G/DL (ref 3.5–5.2)
ALBUMIN/GLOB SERPL: 1 G/DL
ALP SERPL-CCNC: 88 U/L (ref 39–117)
ALT SERPL W P-5'-P-CCNC: 33 U/L (ref 1–41)
ANION GAP SERPL CALCULATED.3IONS-SCNC: 10 MMOL/L (ref 5–15)
ANISOCYTOSIS BLD QL: ABNORMAL
AST SERPL-CCNC: 34 U/L (ref 1–40)
BILIRUB SERPL-MCNC: 0.8 MG/DL (ref 0–1.2)
BUN SERPL-MCNC: 13 MG/DL (ref 8–23)
BUN/CREAT SERPL: 9.2 (ref 7–25)
CA-I SERPL ISE-MCNC: 1.22 MMOL/L (ref 1.2–1.3)
CALCIUM SPEC-SCNC: 9 MG/DL (ref 8.6–10.5)
CHLORIDE SERPL-SCNC: 98 MMOL/L (ref 98–107)
CO2 SERPL-SCNC: 29 MMOL/L (ref 22–29)
CREAT SERPL-MCNC: 1.42 MG/DL (ref 0.76–1.27)
DEPRECATED RDW RBC AUTO: 52.1 FL (ref 37–54)
EGFRCR SERPLBLD CKD-EPI 2021: 54.8 ML/MIN/1.73
EOSINOPHIL # BLD MANUAL: 0.25 10*3/MM3 (ref 0–0.4)
EOSINOPHIL NFR BLD MANUAL: 2 % (ref 0.3–6.2)
ERYTHROCYTE [DISTWIDTH] IN BLOOD BY AUTOMATED COUNT: 18.4 % (ref 12.3–15.4)
GLOBULIN UR ELPH-MCNC: 3.1 GM/DL
GLUCOSE SERPL-MCNC: 109 MG/DL (ref 65–99)
HCT VFR BLD AUTO: 27 % (ref 37.5–51)
HGB BLD-MCNC: 8.7 G/DL (ref 13–17.7)
LYMPHOCYTES # BLD MANUAL: 2.54 10*3/MM3 (ref 0.7–3.1)
LYMPHOCYTES NFR BLD MANUAL: 6 % (ref 5–12)
MAGNESIUM SERPL-MCNC: 1.9 MG/DL (ref 1.6–2.4)
MCH RBC QN AUTO: 26.2 PG (ref 26.6–33)
MCHC RBC AUTO-ENTMCNC: 32.2 G/DL (ref 31.5–35.7)
MCV RBC AUTO: 81.3 FL (ref 79–97)
MONOCYTES # BLD: 0.76 10*3/MM3 (ref 0.1–0.9)
NEUTROPHILS # BLD AUTO: 9.14 10*3/MM3 (ref 1.7–7)
NEUTROPHILS NFR BLD MANUAL: 72 % (ref 42.7–76)
PHOSPHATE SERPL-MCNC: 3.5 MG/DL (ref 2.5–4.5)
PLATELET # BLD AUTO: 457 10*3/MM3 (ref 140–450)
PMV BLD AUTO: 7.3 FL (ref 6–12)
POTASSIUM SERPL-SCNC: 4.3 MMOL/L (ref 3.5–5.2)
PROT SERPL-MCNC: 6.3 G/DL (ref 6–8.5)
RBC # BLD AUTO: 3.32 10*6/MM3 (ref 4.14–5.8)
SCAN SLIDE: NORMAL
SMALL PLATELETS BLD QL SMEAR: ABNORMAL
SODIUM SERPL-SCNC: 137 MMOL/L (ref 136–145)
VARIANT LYMPHS NFR BLD MANUAL: 20 % (ref 19.6–45.3)
WBC MORPH BLD: NORMAL
WBC NRBC COR # BLD AUTO: 12.7 10*3/MM3 (ref 3.4–10.8)

## 2023-12-30 PROCEDURE — 80053 COMPREHEN METABOLIC PANEL: CPT | Performed by: STUDENT IN AN ORGANIZED HEALTH CARE EDUCATION/TRAINING PROGRAM

## 2023-12-30 PROCEDURE — 83735 ASSAY OF MAGNESIUM: CPT | Performed by: STUDENT IN AN ORGANIZED HEALTH CARE EDUCATION/TRAINING PROGRAM

## 2023-12-30 PROCEDURE — 85025 COMPLETE CBC W/AUTO DIFF WBC: CPT | Performed by: STUDENT IN AN ORGANIZED HEALTH CARE EDUCATION/TRAINING PROGRAM

## 2023-12-30 PROCEDURE — 97116 GAIT TRAINING THERAPY: CPT

## 2023-12-30 PROCEDURE — 85007 BL SMEAR W/DIFF WBC COUNT: CPT | Performed by: STUDENT IN AN ORGANIZED HEALTH CARE EDUCATION/TRAINING PROGRAM

## 2023-12-30 PROCEDURE — 84100 ASSAY OF PHOSPHORUS: CPT | Performed by: STUDENT IN AN ORGANIZED HEALTH CARE EDUCATION/TRAINING PROGRAM

## 2023-12-30 PROCEDURE — 82330 ASSAY OF CALCIUM: CPT | Performed by: STUDENT IN AN ORGANIZED HEALTH CARE EDUCATION/TRAINING PROGRAM

## 2023-12-30 RX ORDER — HYDROCORTISONE ACETATE 25 MG/1
25 SUPPOSITORY RECTAL 2 TIMES DAILY
Qty: 20 SUPPOSITORY | Refills: 0 | Status: SHIPPED | OUTPATIENT
Start: 2023-12-30

## 2023-12-30 RX ORDER — LORAZEPAM 0.5 MG/1
0.5 TABLET ORAL EVERY 8 HOURS PRN
Qty: 8 TABLET | Refills: 0 | Status: SHIPPED | OUTPATIENT
Start: 2023-12-30 | End: 2024-01-04

## 2023-12-30 RX ORDER — NALOXONE HYDROCHLORIDE 4 MG/.1ML
SPRAY NASAL
Qty: 2 EACH | Refills: 0 | Status: SHIPPED | OUTPATIENT
Start: 2023-12-30

## 2023-12-30 RX ADMIN — POLYETHYLENE GLYCOL 3350 17 G: 17 POWDER, FOR SOLUTION ORAL at 08:41

## 2023-12-30 RX ADMIN — SENNOSIDES AND DOCUSATE SODIUM 2 TABLET: 50; 8.6 TABLET ORAL at 08:41

## 2023-12-30 RX ADMIN — HYDROCORTISONE ACETATE 25 MG: 25 SUPPOSITORY RECTAL at 08:41

## 2023-12-30 RX ADMIN — METOPROLOL SUCCINATE 25 MG: 25 TABLET, EXTENDED RELEASE ORAL at 08:41

## 2023-12-30 RX ADMIN — METHYLNALTREXONE BROMIDE 450 MG: 150 TABLET ORAL at 08:41

## 2023-12-30 RX ADMIN — ACETAMINOPHEN 650 MG: 650 SOLUTION ORAL at 03:07

## 2023-12-30 RX ADMIN — LOSARTAN POTASSIUM 50 MG: 50 TABLET, FILM COATED ORAL at 08:41

## 2023-12-30 RX ADMIN — PANTOPRAZOLE SODIUM 40 MG: 40 INJECTION, POWDER, FOR SOLUTION INTRAVENOUS at 08:41

## 2023-12-30 NOTE — OUTREACH NOTE
Prep Survey      Flowsheet Row Responses   [facility] facility patient discharged from? [facility]   Is LACE score < 7 ? No   Eligibility Readm Mgmt   Discharge diagnosis Left radical nephrectomy with IVC tumor thrombectomy   Does the patient have one of the following disease processes/diagnoses(primary or secondary)? General Surgery   Does the patient have Home health ordered? No   Is there a DME ordered? No   Comments regarding appointments [facility]   Prep survey completed? Yes            SILVIA CHEUNG - Registered Nurse

## 2023-12-30 NOTE — PLAN OF CARE
Assessment: Louis Andino presents with functional mobility impairments which indicate the need for skilled intervention. Tolerating session today without incident. Was able to see how pt could amb since pain under control with meds. Still slowed raven, guarded and dependent on Ue's. Plans on OPT PT at [facility].Will continue to follow and progress as tolerated.

## 2023-12-30 NOTE — THERAPY TREATMENT NOTE
"Subjective: Pt agreeable to therapeutic plan of care.   Wife stated none of the HH they checked on would cover and they were going to do OPT PT    Objective:     Bed mobility - N/A or Not attempted.  Transfers - CGA and with rolling walker  Ambulation - 100 feet CGA and with rolling walker    Vitals: WNL    Pain:  chronic sh, back and R hip  Intervention for pain: Repositioned, Increased Activity, and Therapeutic Presence    Education: Provided education on the importance of mobility in the acute care setting, Verbal/Tactile Cues, Transfer Training, and Gait Training    Assessment: Louis Andino presents with functional mobility impairments which indicate the need for skilled intervention. Tolerating session today without incident. Was able to see how pt could amb since pain under control with meds. Still slowed raven, guarded and dependent on Ue's. Plans on OPT PT at [place].Will continue to follow and progress as tolerated.     Plan/Recommendations:   If medically appropriate, Low Intensity Therapy recommended post-acute care - This is recommended as therapy feels this patient would require 2-3 visits per week. OP or HH would be the best option depending on patient's home bound status. Consider, if the patient has other  \"skilled\" needs such as wounds, IV antibiotics, etc. Combined with \"low intensity\" could also equate to a SNF. If patient is medically complex, consider LTAC. Pt requires no DME at discharge.     Pt desires Home with family assist and Outpatient therapy at discharge. Pt cooperative; agreeable to therapeutic recommendations and plan of care.         Basic Mobility 6-click:  Rollin = Total, A lot = 2, A little = 3; 4 = None  Supine>Sit:   1 = Total, A lot = 2, A little = 3; 4 = None   Sit>Stand with arms:  1 = Total, A lot = 2, A little = 3; 4 = None  Bed>Chair:   1 = Total, A lot = 2, A little = 3; 4 = None  Ambulate in room:  1 = Total, A lot = 2, A little = 3; 4 = None  3-5 Steps " with railin = Total, A lot = 2, A little = 3; 4 = None  Score: 17    Post-Tx Position: Up in Chair and Call light and personal items within reach  PPE: gloves

## 2023-12-30 NOTE — DISCHARGE INSTR - LAB
Please call [phone] on Monday to make your follow up appointment with Dr. Esquivel for 1 week, if you do not already have an appointment.

## 2023-12-30 NOTE — PROGRESS NOTES
[facility] MEDICINE SERVICE  DAILY PROGRESS NOTE    NAME: Louis Andino  : 1960  MRN: [id number]      LOS: 12 days     PROVIDER OF SERVICE: Yovani Trammell MD    Chief Complaint: Renal mass    Subjective:     Interval History:  History taken from: patient  Patient Complaints: no new complaints   Patient Denies: Hematemesis nor melena     Review of Systems:   Review of Systems  14 point review of system unremarkable except mentioned above  Objective:     Vital Signs  Temp:  [98.2 °F (36.8 °C)-98.5 °F (36.9 °C)] 98.3 °F (36.8 °C)  Heart Rate:  [63-76] 63  Resp:  [15-18] 15  BP: (141-165)/(73-90) 141/73   Body mass index is 34.32 kg/m².    Physical Exam  Physical Exam  Constitutional:       Appearance: He is obese.   HENT:      Head: Atraumatic.      Mouth/Throat:      Mouth: Mucous membranes are moist.   Eyes:      Pupils: Pupils are equal, round, and reactive to light.   Cardiovascular:      Rate and Rhythm: Normal rate and regular rhythm.      Pulses: Normal pulses.      Heart sounds: Normal heart sounds.   Pulmonary:      Effort: Pulmonary effort is normal.      Breath sounds: Normal breath sounds.   Abdominal:      General: Bowel sounds are normal.      Palpations: Abdomen is soft.      Comments: Clean surgical wound on abd   Musculoskeletal:         General: Normal range of motion.      Cervical back: Neck supple.   Skin:     General: Skin is warm.   Neurological:      General: No focal deficit present.      Mental Status: He is alert and oriented to person, place, and time.   Psychiatric:         Mood and Affect: Mood normal.         Behavior: Behavior normal.         Scheduled Meds   budesonide, 0.5 mg, Nebulization, BID - RT  hydrocortisone, 25 mg, Rectal, BID  losartan, 50 mg, Oral, Q24H  Methylnaltrexone Bromide, 450 mg, Oral, Daily  metoprolol succinate XL, 25 mg, Oral, Q24H  pantoprazole, 40 mg, Intravenous, Q12H  polyethylene glycol, 17 g, Oral, Daily  senna-docusate sodium, 2 tablet,  Oral, BID       PRN Meds     acetaminophen    Calcium Replacement - Follow Nurse / BPA Driven Protocol    influenza vaccine    LORazepam    Magnesium Low Dose Replacement - Follow Nurse / BPA Driven Protocol    [] HYDROmorphone **AND** naloxone    nitroglycerin    ondansetron **OR** [DISCONTINUED] ondansetron    Phosphorus Replacement - Follow Nurse / BPA Driven Protocol    Potassium Replacement - Follow Nurse / BPA Driven Protocol   Infusions           Diagnostic Data    Results from last 7 days   Lab Units 23  0328   WBC 10*3/mm3 12.70*   HEMOGLOBIN g/dL 8.7*   HEMATOCRIT % 27.0*   PLATELETS 10*3/mm3 457*   GLUCOSE mg/dL 109*   CREATININE mg/dL 1.42*   BUN mg/dL 13   SODIUM mmol/L 137   POTASSIUM mmol/L 4.3   AST (SGOT) U/L 34   ALT (SGPT) U/L 33   ALK PHOS U/L 88   BILIRUBIN mg/dL 0.8   ANION GAP mmol/L 10.0       No radiology results for the last day      I reviewed the patient's new clinical results.    Assessment/Plan:     Active and Resolved Problems  Active Hospital Problems    Diagnosis  POA    **Renal mass [N28.89]  Yes    Renal cell carcinoma [C64.9]  Unknown    BPH (benign prostatic hyperplasia) [N40.0]  Yes    COPD (chronic obstructive pulmonary disease) [J44.9]  Yes    KARON (acute kidney injury) [N17.9]  Yes    Acute respiratory failure with hypoxia [J96.01]  Yes    Asthma [J45.909]  Yes    Gastroesophageal reflux disease [K21.9]  Yes    Hyperlipidemia [E78.5]  Yes    Hypertension [I10]  Yes      Resolved Hospital Problems   No resolved problems to display.     # Rectal bleeding likely hemorrhoidal bleeding   - GI consulted and started on topical  steroid and supplement  fiber diet   -Return for radiation DC am     #Left Renal Mass    - s/p left open radical nephrectomy and IVC thrombectomy on 2023    - 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 and large volume blood loss    - extensive blood loss during surgery    - s/p 5u prbc and 2u FFP given per op note    -Urology following      #Acute hypoxic respiratory failure  #COPD    - Patient remained intubated post op, was transferred to ICU-Patient extubated on 12/19/2023    - Dueoneb QID    - Pulmicort BID    - wean oxygen as tolerated     #Hypovolemic shock    - 2/2 blood loss from surgery    - Levophed Weaned  -Continue Omnicef per ID recommendation        #Acute blood loss anemia    - 2/2 blood loss from surgery    - s/p 5u prbc and 2u FFP given per op note      - h/h qday    - transfuse to maintain hgb > 7.0    - pt     #KARON  #Metabolic acidosis    - suspect 2/2 blood loss and possible post op ATN    - bicarb improved from 18 > 24    - Cr 1.35->1.23 trended down     - monitor     #GERD    - PPI     #HTN    - hold hypertensive meds due to recent shock, monitor      # Weakness, PT OT.   Possible discharge to home vs. SNF      DVT prophylaxis:  Mechanical DVT prophylaxis orders are present.     Code status is   Code Status and Medical Interventions:   Ordered at: 12/18/23 2010     Code Status (Patient has no pulse and is not breathing):    CPR (Attempt to Resuscitate)     Medical Interventions (Patient has pulse or is breathing):    Full Support       Plan for disposition:home am if H/h remains stable    Time: 32 minutes    Signature: Electronically signed by Yovani Trammell MD, 12/30/23, 11:17 EST.  [facility] Hospitalist Team

## 2023-12-30 NOTE — NURSING NOTE
45 staples removed from patient's abdominal incision. Steri strips placed, incision remains intact, no redness noted.

## 2023-12-30 NOTE — DISCHARGE SUMMARY
[facility] DISCHARGE SUMMARY      Patient Identification:  NAME:  Louis Andino  Age:  63 y.o.   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  Post op experienced a slow recovery and did require RBC transfusions.  BP meds altered.  Had post op pneumonia managed with rocephin and now cefdinir.  Post op CT showed seroma and fluid collection but no abscess.  Gradual improvement noted.  On day of DC, had stable creat, lytes, and H/H.  Home today with rx's.  Staples out today.  OV one week with Dr. Esquivel.  Encouraged to avoid use of narcotic and ativan over next couple of weeks.  Instructions discussed with patient and wife.    Discharge medications:       Discharge Medications        New Medications        Instructions Start Date   cefdinir 300 MG capsule  Commonly known as: OMNICEF   300 mg, Oral, 2 Times Daily      Combivent Respimat  MCG/ACT inhaler  Generic drug: ipratropium-albuterol   1 puff, Inhalation, 4 Times Daily PRN      docusate sodium 100 MG capsule  Commonly known as: Colace   100 mg, Oral, 2 Times Daily PRN      hydrocortisone 25 MG suppository  Commonly known as: ANUSOL-HC   25 mg, Rectal, 2 Times Daily      LORazepam 0.5 MG tablet  Commonly known as: ATIVAN   0.5 mg, Oral, Every 8 Hours PRN      losartan 50 MG tablet  Commonly known as: COZAAR   100 mg, Oral, Every 24 Hours Scheduled      metoprolol succinate XL 25 MG 24 hr tablet  Commonly known as:  TOPROL-XL   50 mg, Oral, Every 24 Hours Scheduled      naloxone 4 MG/0.1ML nasal spray  Commonly known as: NARCAN   Call 911. Don't prime. [place] in 1 nostril for overdose. Repeat in 2-3 minutes in other nostril if no or minimal breathing/responsiveness.      ondansetron 4 MG tablet  Commonly known as: ZOFRAN   4 mg, Oral, Every 6 Hours PRN      oxyCODONE-acetaminophen 5-325 MG per tablet  Commonly known as: PERCOCET   1-2 tablets, Oral, Every 6 Hours PRN      pantoprazole 40 MG EC tablet  Commonly known as: PROTONIX   40 mg, Oral, 2 Times Daily Before Meals      polyethylene glycol 17 GM/SCOOP powder  Commonly known as: MIRALAX   17 g, Oral, Daily PRN      sucralfate 1 g tablet  Commonly known as: CARAFATE   1 g, Oral, 3 Times Daily Before Meals             Continue These Medications        Instructions Start Date   aspirin 81 MG EC tablet   81 mg, Oral, Daily      celecoxib 200 MG capsule  Commonly known as: CeleBREX   200 mg, Oral, Daily PRN      magnesium oxide 400 MG tablet  Commonly known as: MAG-OX   400 mg, Oral, Daily      potassium chloride 20 MEQ CR tablet  Commonly known as: K-DUR,KLOR-CON   20 mEq, Oral, Daily      Symbicort 160-4.5 MCG/ACT inhaler  Generic drug: budesonide-formoterol   2 puffs, Inhalation, 2 Times Daily - RT      vitamin D3 125 MCG (5000 UT) capsule capsule   5,000 Units, Oral, Daily             Stop These Medications      famotidine 40 MG tablet  Commonly known as: PEPCID     hydroCHLOROthiazide 25 MG tablet  Commonly known as: HYDRODIURIL     lisinopril 10 MG tablet  Commonly known as: PRINIVIL,ZESTRIL     omeprazole 40 MG capsule  Commonly known as: priLOSEC

## 2023-12-30 NOTE — PLAN OF CARE
Goal Outcome Evaluation:       Patient able to make needs known. Spouse at bedside and attentive to patients needs. Pain treated per MAR. VSS on room air. Personal belongings and call light with in reach. Plan of care on going.

## 2023-12-30 NOTE — PROGRESS NOTES
POD #11 left rad nx with IVC tumor thrombectomy  Significant EBL and prolonged recovery, stabilizing recently  Had rectal bleeding  Required RBCs post op, most recently 12/28    Feels well, pain controlled, anxious  Walking in halls    Afebrile, VSs stable  Voiding well  Incision CDI  Abdom benign    Creat 1.42 (stable)  H/H 9/27 (same as yesterday)    PATH:  G4 RCCa, T3b, rhabdoid and sarcomatoid features noted    Tolerating diet well  Pain controlled    Home today  Staples out  OV 1 week with Dr. Esquivel  Rx's sent - will d/w hospitalist changes in anti-HTN management and anxiety meds  Discussed pain med wean, reducing use once home, patient agreeable

## 2023-12-31 NOTE — CASE MANAGEMENT/SOCIAL WORK
Case Management Discharge Note      Final Note: home wiht OP Pt Kormae.    Provided Post Acute Provider List?: Yes  Post Acute Provider List: Home Health  Provided Post Acute Provider Quality & Resource List?: Yes  Post Acute Provider Quality and Resource List: Home Health  Delivered To: Patient, Support Person  Method of Delivery: In person    Selected Continued Care - Discharged on 12/30/2023 Admission date: 12/18/2023 - Discharge disposition: Home or Self Care          Therapy Coordination complete.      Service Provider Selected Services Address Phone Fax Patient Preferred    [facility] [address] [address] [phone] --                 Transportation Services  Private: Car    Final Discharge Disposition Code: 01 - home or self-care

## 2024-01-02 NOTE — PAYOR COMM NOTE
"This is discharge notification for Louis Andino   Reference/Auth # [id number]   Pt discharged routine to home on 12/30/23 with OP PT.    RANDALL Paulino, RN, [facility]  Utilization Review Nurse  [facility]  Direct & confidential phone # [phone]  Fax # [phone]      Louis Andino (63 y.o. Male)       Date of Birth   1960    Social Security Number       Address   [address] [place] [address]    Home Phone   [phone]    MRN   [id number]       [employer]    Marital Status                               Admission Date   12/18/23    Admission Type   Elective    Admitting Provider   James Esquivel MD    Attending Provider       Department, Room/Bed   [facility] SURGICAL INPATIENT, 4132/1       Discharge Date   12/30/2023    Discharge Disposition   Home or Self Care    Discharge Destination                                 Attending Provider: (none)   Allergies: No Known Allergies    Isolation: None   Infection: None   Code Status: Prior    Ht: 177.8 cm (70\")   Wt: 109 kg (239 lb 3.2 oz)    Admission Cmt: None   Principal Problem: Renal mass [N28.89]                   Active Insurance as of 12/18/2023       Primary Coverage       Payor Plan Insurance Group Employer/Plan Group    ANTHEM BLUE CROSS ANTHEM BLUE CROSS BLUE SHIELD PPO [id number]       Payor Plan Address Payor Plan Phone Number Payor Plan Fax Number Effective Dates    [id number] [phone]  1/1/2022 - None Entered    [facility] [id number]         Subscriber Name Subscriber Birth Date Member ID       MARK ANDINO [birth year]/18/1961 [id number]               Secondary Coverage       Payor Plan Insurance Group Employer/Plan Group    MISC MC SUP   MISC MC SUP              NGN       Coverage Address Coverage Phone Number Coverage Fax Number Effective Dates    [address] [phone]  11/1/2023 - None Entered    [facility] [id number]         Subscriber Name Subscriber Birth Date Member ID "       LOUIS ANDINO 1960 [id number]               Tertiary Coverage       Payor Plan Insurance Group Employer/Plan Group    MEDICARE MEDICARE A & B        Payor Plan Address Payor Plan Phone Number Payor Plan Fax Number Effective Dates    [address] [phone]  2023 - None Entered    [facility] [id number]         Subscriber Name Subscriber Birth Date Member ID       LOUIS ANDINO 1960 [id number]                     Emergency Contacts        (Rel.) Home Phone Work Phone Mobile Phone    Dahlia Andino (Spouse) -- -- [phone]                 Discharge Summary        Caio Corrales MD at 23 0833              [facility] DISCHARGE SUMMARY      Patient Identification:  NAME:  Louis Andino  Age:  63 y.o.   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  Post op experienced a slow recovery and did require RBC transfusions.  BP meds altered.  Had post op pneumonia managed with rocephin and now cefdinir.  Post op CT showed seroma and fluid collection but no abscess.  Gradual improvement noted.  On day of DC, had stable creat, lytes, and H/H.  Home today with rx's.  Staples out today.  OV one week with Dr. Esquivel.  Encouraged to avoid use of narcotic and ativan over next couple of weeks.  Instructions discussed with patient and wife.    Discharge medications:       Discharge Medications        New Medications         Instructions Start Date   cefdinir 300 MG capsule  Commonly known as: OMNICEF   300 mg, Oral, 2 Times Daily      Combivent Respimat  MCG/ACT inhaler  Generic drug: ipratropium-albuterol   1 puff, Inhalation, 4 Times Daily PRN      docusate sodium 100 MG capsule  Commonly known as: Colace   100 mg, Oral, 2 Times Daily PRN      hydrocortisone 25 MG suppository  Commonly known as: ANUSOL-HC   25 mg, Rectal, 2 Times Daily      LORazepam 0.5 MG tablet  Commonly known as: ATIVAN   0.5 mg, Oral, Every 8 Hours PRN      losartan 50 MG tablet  Commonly known as: COZAAR   100 mg, Oral, Every 24 Hours Scheduled      metoprolol succinate XL 25 MG 24 hr tablet  Commonly known as: TOPROL-XL   50 mg, Oral, Every 24 Hours Scheduled      naloxone 4 MG/0.1ML nasal spray  Commonly known as: NARCAN   Call 911. Don't prime. [place] in 1 nostril for overdose. Repeat in 2-3 minutes in other nostril if no or minimal breathing/responsiveness.      ondansetron 4 MG tablet  Commonly known as: ZOFRAN   4 mg, Oral, Every 6 Hours PRN      oxyCODONE-acetaminophen 5-325 MG per tablet  Commonly known as: PERCOCET   1-2 tablets, Oral, Every 6 Hours PRN      pantoprazole 40 MG EC tablet  Commonly known as: PROTONIX   40 mg, Oral, 2 Times Daily Before Meals      polyethylene glycol 17 GM/SCOOP powder  Commonly known as: MIRALAX   17 g, Oral, Daily PRN      sucralfate 1 g tablet  Commonly known as: CARAFATE   1 g, Oral, 3 Times Daily Before Meals             Continue These Medications        Instructions Start Date   aspirin 81 MG EC tablet   81 mg, Oral, Daily      celecoxib 200 MG capsule  Commonly known as: CeleBREX   200 mg, Oral, Daily PRN      magnesium oxide 400 MG tablet  Commonly known as: MAG-OX   400 mg, Oral, Daily      potassium chloride 20 MEQ CR tablet  Commonly known as: K-DUR,KLOR-CON   20 mEq, Oral, Daily      Symbicort 160-4.5 MCG/ACT inhaler  Generic drug: budesonide-formoterol   2 puffs, Inhalation, 2 Times Daily - RT       vitamin D3 125 MCG (5000 UT) capsule capsule   5,000 Units, Oral, Daily             Stop These Medications      famotidine 40 MG tablet  Commonly known as: PEPCID     hydroCHLOROthiazide 25 MG tablet  Commonly known as: HYDRODIURIL     lisinopril 10 MG tablet  Commonly known as: PRINIVIL,ZESTRIL     omeprazole 40 MG capsule  Commonly known as: priLOSEC               Electronically signed by Caio Corrales MD at 12/30/23 0813

## 2024-01-05 ENCOUNTER — READMISSION MANAGEMENT (OUTPATIENT)
Dept: CALL CENTER | Facility: HOSPITAL | Age: 64
End: 2024-01-05
Payer: COMMERCIAL

## 2024-01-05 NOTE — OUTREACH NOTE
General Surgery Week 1 Survey      Flowsheet Row Responses   [facility] patient discharged from? [facility]   Does the patient have one of the following disease processes/diagnoses(primary or secondary)? General Surgery   Week 1 attempt successful? Yes   Call start time 1823   Unsuccessful attempts Attempt 1   Call end time 1827   Discharge diagnosis Left radical nephrectomy with IVC tumor thrombectomy   Person spoke with today (if not patient) and relationship pt   Meds reviewed with patient/caregiver? Yes   Is the patient having any side effects they believe may be caused by any medication additions or changes? No   Does the patient have all medications related to this admission filled (includes all antibiotics, pain medications, etc.) Yes   Is the patient taking all medications as directed (includes completed medication regime)? Yes   Does the patient have a follow up appointment scheduled with their surgeon? Yes   Has the patient kept scheduled appointments due by today? Yes   Psychosocial issues? No   Did the patient receive a copy of their discharge instructions? Yes   Nursing interventions Reviewed instructions with patient   What is the patient's perception of their health status since discharge? Improving   Nursing interventions Nurse provided patient education   Is the patient /caregiver able to teach back basic post-op care? Lifting as instructed by MD in discharge instructions, Take showers only when approved by MD-sponge bathe until then, No tub bath, swimming, or hot tub until instructed by MD, Keep incision areas clean,dry and protected   Is the patient/caregiver able to teach back signs and symptoms of incisional infection? Increased redness, swelling or pain at the incisonal site, Increased drainage or bleeding, Incisional warmth, Pus or odor from incision, Fever   Is the patient/caregiver able to teach back steps to recovery at home? Rest and rebuild strength, gradually increase activity, Eat a  well-balance diet   If the patient is a current smoker, are they able to teach back resources for cessation? Not a smoker   Is the patient/caregiver able to teach back the hierarchy of who to call/visit for symptoms/problems? PCP, Specialist, Home health nurse, Urgent Care, ED, 911 Yes   Week 1 call completed? Yes   Is the patient interested in additional calls from an ambulatory ? No   Would this patient benefit from a Referral to [facility] Social Work? No   Wrap up additional comments Pt states he is doing well, and denies any increased redness, swelling, drainage at incisional site. Reviewed AVS/meds with pt. Pt had post-op appt, and verified PCP fu appt.   Call end time 1827            Suzette POPE - Registered Nurse

## 2024-01-05 NOTE — OUTREACH NOTE
General Surgery Week 1 Survey      Flowsheet Row Responses   [facility]? [facility]   Does the patient have one of the following disease processes/diagnoses(primary or secondary)? General Surgery   Week 1 attempt successful? No   Unsuccessful attempts Attempt 1            Suzette Ocampo Registered Nurse

## 2024-01-09 ENCOUNTER — PATIENT ROUNDING (BHMG ONLY) (OUTPATIENT)
Dept: INTERNAL MEDICINE | Facility: CLINIC | Age: 64
End: 2024-01-09
Payer: COMMERCIAL

## 2024-01-09 ENCOUNTER — OFFICE VISIT (OUTPATIENT)
Dept: INTERNAL MEDICINE | Facility: CLINIC | Age: 64
End: 2024-01-09
Payer: MEDICARE

## 2024-01-09 VITALS
HEART RATE: 77 BPM | OXYGEN SATURATION: 96 % | SYSTOLIC BLOOD PRESSURE: 146 MMHG | TEMPERATURE: 98.4 F | BODY MASS INDEX: 29.98 KG/M2 | DIASTOLIC BLOOD PRESSURE: 76 MMHG | WEIGHT: 209.4 LBS | HEIGHT: 70 IN

## 2024-01-09 DIAGNOSIS — R25.2 LEG CRAMPS: ICD-10-CM

## 2024-01-09 DIAGNOSIS — D64.9 ANEMIA, UNSPECIFIED TYPE: ICD-10-CM

## 2024-01-09 DIAGNOSIS — K62.5 RECTAL BLEEDING: ICD-10-CM

## 2024-01-09 DIAGNOSIS — J45.40 MODERATE PERSISTENT ASTHMA WITHOUT COMPLICATION: Chronic | ICD-10-CM

## 2024-01-09 DIAGNOSIS — Z12.11 ENCOUNTER FOR SCREENING COLONOSCOPY: ICD-10-CM

## 2024-01-09 DIAGNOSIS — Z11.59 ENCOUNTER FOR HEPATITIS C SCREENING TEST FOR LOW RISK PATIENT: ICD-10-CM

## 2024-01-09 DIAGNOSIS — C64.2 RENAL CELL CARCINOMA OF LEFT KIDNEY: Primary | ICD-10-CM

## 2024-01-09 RX ORDER — BUDESONIDE 0.5 MG/2ML
0.5 INHALANT ORAL
Qty: 20 ML | Refills: 3 | Status: SHIPPED | OUTPATIENT
Start: 2024-01-09

## 2024-01-09 RX ORDER — OXYCODONE HYDROCHLORIDE AND ACETAMINOPHEN 5; 325 MG/1; MG/1
1-2 TABLET ORAL EVERY 6 HOURS PRN
Qty: 30 TABLET | Refills: 0 | Status: SHIPPED | OUTPATIENT
Start: 2024-01-09

## 2024-01-09 NOTE — PROGRESS NOTES
January 9, 2024    Hello, may I speak with Louis Andino?    My name is Queenie      I am  with [facility]  [address]  [facility] [address].    Before we get started may I verify your date of birth? 1960    I am calling to officially welcome you to our practice and ask about your recent visit. Is this a good time to talk? yes    Tell me about your visit with us. What things went well?  Liked  the Doctor a lot , he listened well        We're always looking for ways to make our patients' experiences even better. Do you have recommendations on ways we may improve?  no    Overall were you satisfied with your first visit to our practice? yes       I appreciate you taking the time to speak with me today. Is there anything else I can do for you? no      Thank you, and have a great day.

## 2024-01-09 NOTE — PROGRESS NOTES
New Patient Office Visit      Patient Name: Louis Andino  : 1960   MRN: [id number]   Care Team: Patient Care Team:  Harry Hsu MD as PCP - General (Internal Medicine)  James Esquivel MD as Consulting Physician (Urology)  Bandar Prakash MD as Consulting Physician (Hematology and Oncology)    Chief Complaint:    Chief Complaint   Patient presents with    renal cell carcinoma    Establish Care    Asthma    Fatigue       History of Present Illness: Louis Andino is a 63 y.o. male who is here today to establish care as a new patient with complaints of decreased energy and weakness after recent hospitalization for left nephrectomy for renal cell carcinoma, and management of chronic conditions    He was recently admitted on  for known left renal mass and subsequently underwent a left radical nephrectomy.  His hospital course was complicated by postop anemia and he was discharged on .  Since being discharged he feels as if his energy and weakness is somewhat improved however he is continuously fatigued and weak.  He also reports of occasional lightheadedness when getting up too quickly.  He denies having any bleeding episodes as he does have a history of hemorrhoids and rectal bleeding that was present during hospitalization.        Subjective      Review of Systems:   Review of Systems   Constitutional:  Positive for activity change and fatigue. Negative for fever and unexpected weight loss.   Respiratory:  Negative for cough, chest tightness and shortness of breath.    Cardiovascular:  Negative for chest pain.   Gastrointestinal:  Negative for abdominal pain, blood in stool, diarrhea and nausea.   Genitourinary:  Positive for flank pain. Negative for decreased urine volume, dysuria and hematuria.   Musculoskeletal:  Positive for arthralgias.   Neurological:  Positive for weakness and light-headedness. Negative for headache.    - See HPI    Past Medical History:   Past Medical  History:   Diagnosis Date    Anemia 12/18/2023    Due to surgery. Required transfusions    Asthma     Emphysema/COPD     Erectile dysfunction Several years    GERD (gastroesophageal reflux disease)     HL (hearing loss)     Progressive worsening over many years    Hyperglycemia 10/12/2023    Hyperlipidemia 10/12/2023    Hypertension     Prostate disease     Renal cell carcinoma 12/30/2023    Vitreous degeneration of right eye     Formatting of this note might be different from the original. Retinal tear and detachment warning symptoms reviewed and patient instructed to call immediately if increasing floaters, flashes, or decreasing peripheral vision. No retinal detachment or retinal tear noted. Recheck in 1 month with dilated exam.       Past Surgical History:   Past Surgical History:   Procedure Laterality Date    NEPHRECTOMY Left 12/18/2023    Procedure: NEPHRECTOMY RADICAL WITH CAVAL THROMBECTOMY;  Surgeon: James Esquivel MD;  Location: [facility] OR;  Service: Urology;  Laterality: Left;    SKIN LESION EXCISION  1990       Family History:   Family History   Problem Relation Age of Onset    Arthritis Mother     Cancer Mother     Diabetes Mother     Heart disease Mother     Hyperlipidemia Mother     Miscarriages / Stillbirths Mother         Stillborn child    COPD Father     Hyperlipidemia Father     Hyperlipidemia Brother        Social History:   Social History     Socioeconomic History    Marital status:     Number of children: 6   Tobacco Use    Smoking status: Never     Passive exposure: Past    Smokeless tobacco: Never    Tobacco comments:     SECOND HAND SMOKE EXPOSURE AS A CHILD    Vaping Use    Vaping Use: Never used   Substance and Sexual Activity    Alcohol use: Yes     Alcohol/week: 1.0 standard drink of alcohol     Types: 1 Glasses of wine per week     Comment: rare    Drug use: Never    Sexual activity: Yes     Partners: Female       Tobacco History:   Social History     Tobacco Use   Smoking  Status Never    Passive exposure: Past   Smokeless Tobacco Never   Tobacco Comments    SECOND HAND SMOKE EXPOSURE AS A CHILD        Medications:     Current Outpatient Medications:     hydrocortisone (ANUSOL-HC) 25 MG suppository, Insert 1 suppository into the rectum 2 (Two) Times a Day., Disp: 20 suppository, Rfl: 0    losartan (COZAAR) 100 MG tablet, Take 1 tablet by mouth Daily., Disp: 30 tablet, Rfl: 0    magnesium oxide (MAG-OX) 400 MG tablet, Take 1 tablet by mouth Daily., Disp: , Rfl:     metoprolol succinate XL (TOPROL-XL) 25 MG 24 hr tablet, Take 2 tablets by mouth Daily., Disp: 30 tablet, Rfl: 1    naloxone (NARCAN) 4 MG/0.1ML nasal spray, Call 911. Don't prime. [place] in 1 nostril for overdose. Repeat in 2-3 minutes in other nostril if no or minimal breathing/responsiveness., Disp: 2 each, Rfl: 0    ondansetron (ZOFRAN) 4 MG tablet, Take 1 tablet by mouth Every 6 (Six) Hours As Needed for Nausea or Vomiting., Disp: 30 tablet, Rfl: 0    oxyCODONE-acetaminophen (PERCOCET) 5-325 MG per tablet, Take 1-2 tablets by mouth Every 6 (Six) Hours As Needed for Severe Pain., Disp: 30 tablet, Rfl: 0    pantoprazole (PROTONIX) 40 MG EC tablet, Take 1 tablet by mouth Daily., Disp: 30 tablet, Rfl: 0    polyethylene glycol (MIRALAX) 17 GM/SCOOP powder, Take 17 g by mouth Daily As Needed (Use if senna-docusate is ineffective)., Disp: 510 g, Rfl: 0    sucralfate (CARAFATE) 1 g tablet, Take 1 tablet by mouth 3 (Three) Times a Day Before Meals., Disp: 90 tablet, Rfl: 0    Symbicort 160-4.5 MCG/ACT inhaler, Inhale 2 puffs 2 (Two) Times a Day., Disp: , Rfl:     vitamin D3 125 MCG (5000 UT) capsule capsule, Take 1 capsule by mouth Daily., Disp: , Rfl:     budesonide (PULMICORT) 0.5 MG/2ML nebulizer solution, Take 2 mL by nebulization Daily., Disp: 20 mL, Rfl: 3    Allergies:   No Known Allergies    Objective     Physical Exam:  Vital Signs:   Vitals:    01/09/24 1452   BP: 146/76   Pulse: 77   Temp: 98.4 °F (36.9 °C)   TempSrc:  "Temporal   SpO2: 96%   Weight: 95 kg (209 lb 6.4 oz)   Height: 177.8 cm (70\")     Body mass index is 30.05 kg/m².     Physical Exam  Vitals reviewed.   Constitutional:       General: He is not in acute distress.     Appearance: Normal appearance. He is obese. He is ill-appearing.   HENT:      Head: Normocephalic and atraumatic.      Nose: Nose normal. No rhinorrhea.   Eyes:      General:         Right eye: No discharge.         Left eye: No discharge.      Extraocular Movements: Extraocular movements intact.      Conjunctiva/sclera: Conjunctivae normal.   Cardiovascular:      Rate and Rhythm: Normal rate and regular rhythm.      Heart sounds: Normal heart sounds.   Pulmonary:      Effort: Pulmonary effort is normal. No respiratory distress.      Breath sounds: Normal breath sounds. No wheezing.   Abdominal:      General: Abdomen is flat. There is no distension.      Palpations: Abdomen is soft.      Tenderness: There is no abdominal tenderness. There is right CVA tenderness and left CVA tenderness.   Musculoskeletal:         General: No swelling or deformity. Normal range of motion.      Cervical back: Normal range of motion and neck supple.   Skin:     General: Skin is warm and dry.   Neurological:      General: No focal deficit present.      Mental Status: He is alert and oriented to person, place, and time.      Motor: Weakness present.      Gait: Gait abnormal.   Psychiatric:         Mood and Affect: Mood normal.         Behavior: Behavior normal.         Assessment / Plan      Assessment/Plan:   Problems Addressed This Visit  Diagnoses and all orders for this visit:    1. Renal cell carcinoma of left kidney (Primary)  -     Comprehensive Metabolic Panel  -     oxyCODONE-acetaminophen (PERCOCET) 5-325 MG per tablet; Take 1-2 tablets by mouth Every 6 (Six) Hours As Needed for Severe Pain.  Dispense: 30 tablet; Refill: 0    2. Anemia, unspecified type  -     CBC & Differential    3. Moderate persistent asthma " without complication  -     budesonide (PULMICORT) 0.5 MG/2ML nebulizer solution; Take 2 mL by nebulization Daily.  Dispense: 20 mL; Refill: 3    4. Rectal bleeding  -     CBC & Differential  -     Ambulatory Referral For Screening Colonoscopy    5. Encounter for screening colonoscopy  -     Ambulatory Referral For Screening Colonoscopy    6. Encounter for hepatitis C screening test for low risk patient  -     Hepatitis C Antibody    7. Leg cramps  -     Magnesium      Mr. Andino is a 64yo M who presents to clinic today to establish care as a new patient.    Recently diagnosed with renal cell carcinoma of the left kidney status post left nephrectomy about 3 weeks ago.  Has follow-up with oncology within the next week to discuss further management.  Reviewed labs from hospitalization that showed elevated creatinine that was above prior baseline.  Will plan to repeat today to evaluate if concern for kidney dysfunction in setting of recent nephrectomy. Will refill short course of pain medication as would like to avoid NSAID in setting of kidney insufficiency and labs to be obtained today.     Will also repeat CBC today given persistent weakness, fatigue and decreased energy as well as was anemic on discharge. Likely multifactorial from nephrectomy as well as known rectal bleeding/hemorrhoids + post-op blood loss anemia. Reviewed GI note who saw patient inpatient and recommended screening c-scope will also order that    Asthma is chronic and stable, patient reports hospital nebulizer was significantly better than prior inhaler so will switch to nebulizer per pt request.       Plan of care reviewed with patient at th c/oe conclusion of today's visit. Education was provided regarding diagnosis and management.  Patient verbalizes understanding of and agreement with management plan.      Follow Up:   Return in about 3 months (around 4/9/2024). Labs obtained today           Harry Hsu MD  [facility]  [facility]  [address] [place] [id number]  [facility] [id number]  Fax [phone]

## 2024-01-09 NOTE — PROGRESS NOTES
"Chief Complaint  No chief complaint on file.    Subjective    {Problem List  Visit Diagnosis   Encounters  Notes  Medications  Labs  Result Review Imaging  Media :23}    Louis Andino presents to [facility]  History of Present Illness            Objective   Vital Signs:  There were no vitals taken for this visit.  Estimated body mass index is 34.32 kg/m² as calculated from the following:    Height as of 12/18/23: 177.8 cm (70\").    Weight as of 12/30/23: 109 kg (239 lb 3.2 oz).               Physical Exam   Result Review :{Labs  Result Review  Imaging  Med Tab  Media  Procedures :23}  {The following data was reviewed by (Optional):49188}  {Ambulatory Labs (Optional):87489}  {Data reviewed (Optional):96123:::1}             Assessment and Plan {CC Problem List  Visit Diagnosis   ROS  Review (Popup)  Health Maintenance  Quality  BestPractice  Medications  SmartSets  SnapShot Encounters  Media :23}  There are no diagnoses linked to this encounter.       {Time Spent (Optional):95068}  Follow Up {Instructions Charge Capture  Follow-up Communications :23}  No follow-ups on file.  Patient was given instructions and counseling regarding his condition or for health maintenance advice. Please see specific information pulled into the AVS if appropriate.       "

## 2024-01-10 LAB
ALBUMIN SERPL-MCNC: 3.8 G/DL (ref 3.5–5.2)
ALBUMIN/GLOB SERPL: 1.2 G/DL
ALP SERPL-CCNC: 94 U/L (ref 39–117)
ALT SERPL-CCNC: 48 U/L (ref 1–41)
AST SERPL-CCNC: 25 U/L (ref 1–40)
BASOPHILS # BLD AUTO: 0.08 10*3/MM3 (ref 0–0.2)
BASOPHILS NFR BLD AUTO: 1.2 % (ref 0–1.5)
BILIRUB SERPL-MCNC: 0.8 MG/DL (ref 0–1.2)
BUN SERPL-MCNC: 15 MG/DL (ref 8–23)
BUN/CREAT SERPL: 9.3 (ref 7–25)
CALCIUM SERPL-MCNC: 10.1 MG/DL (ref 8.6–10.5)
CHLORIDE SERPL-SCNC: 102 MMOL/L (ref 98–107)
CO2 SERPL-SCNC: 27.6 MMOL/L (ref 22–29)
CREAT SERPL-MCNC: 1.62 MG/DL (ref 0.76–1.27)
EGFRCR SERPLBLD CKD-EPI 2021: 46.8 ML/MIN/1.73
EOSINOPHIL # BLD AUTO: 0.08 10*3/MM3 (ref 0–0.4)
EOSINOPHIL NFR BLD AUTO: 1.2 % (ref 0.3–6.2)
ERYTHROCYTE [DISTWIDTH] IN BLOOD BY AUTOMATED COUNT: 16.5 % (ref 12.3–15.4)
GLOBULIN SER CALC-MCNC: 3.2 GM/DL
GLUCOSE SERPL-MCNC: 100 MG/DL (ref 65–99)
HCT VFR BLD AUTO: 29.6 % (ref 37.5–51)
HCV IGG SERPL QL IA: NON REACTIVE
HGB BLD-MCNC: 9.7 G/DL (ref 13–17.7)
IMM GRANULOCYTES # BLD AUTO: 0.02 10*3/MM3 (ref 0–0.05)
IMM GRANULOCYTES NFR BLD AUTO: 0.3 % (ref 0–0.5)
LYMPHOCYTES # BLD AUTO: 1.81 10*3/MM3 (ref 0.7–3.1)
LYMPHOCYTES NFR BLD AUTO: 27.9 % (ref 19.6–45.3)
MAGNESIUM SERPL-MCNC: 2 MG/DL (ref 1.6–2.4)
MCH RBC QN AUTO: 27.5 PG (ref 26.6–33)
MCHC RBC AUTO-ENTMCNC: 32.8 G/DL (ref 31.5–35.7)
MCV RBC AUTO: 83.9 FL (ref 79–97)
MONOCYTES # BLD AUTO: 0.65 10*3/MM3 (ref 0.1–0.9)
MONOCYTES NFR BLD AUTO: 10 % (ref 5–12)
NEUTROPHILS # BLD AUTO: 3.84 10*3/MM3 (ref 1.7–7)
NEUTROPHILS NFR BLD AUTO: 59.4 % (ref 42.7–76)
NRBC BLD AUTO-RTO: 0.2 /100 WBC (ref 0–0.2)
PLATELET # BLD AUTO: 377 10*3/MM3 (ref 140–450)
POTASSIUM SERPL-SCNC: 4.3 MMOL/L (ref 3.5–5.2)
PROT SERPL-MCNC: 7 G/DL (ref 6–8.5)
RBC # BLD AUTO: 3.53 10*6/MM3 (ref 4.14–5.8)
SODIUM SERPL-SCNC: 140 MMOL/L (ref 136–145)
WBC # BLD AUTO: 6.48 10*3/MM3 (ref 3.4–10.8)

## 2024-01-18 NOTE — PROGRESS NOTES
HEMATOLOGY ONCOLOGY OUTPATIENT CONSULTATION       Patient name: Louis Andino  : 1960  MRN: [id number]  Primary Care Physician: Harry Hsu MD  Referring Physician: James Esquivel MD  Reason For Consult: Renal cell carcinoma      History of Present Illness:  Patient is a 63 y.o. male with RCC.    Patient initially presented with hematuria.  During hospitalization he had a CT of his abdomen which showed a large left lower pole renal mass with possible adrenal metastasis.    2023 CT abdomen pelvis with contrast showing mass arising from the lower pole of the left kidney consistent with renal cell carcinoma.  Associated uroepithelial thickening of the pelvis and proximal ureter.  Enhancing indeterminate left adrenal nodule potential metastasis no retroperitoneal lymphadenopathy.    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  Vessel with clear-cell renal cell carcinoma tumor thrombus        Subjective:  Patient presents for initial consultation.  Patient recovering from surgery.      Past Medical History:   Diagnosis Date    Anemia 2023    Due to surgery. Required transfusions    Asthma     Emphysema/COPD     Erectile dysfunction Several years    GERD (gastroesophageal reflux disease)     HL (hearing loss)     Progressive worsening over many years    Hyperglycemia 10/12/2023    Hyperlipidemia 10/12/2023    Hypertension     Prostate disease     Renal cell carcinoma 2023    Vitreous degeneration of right eye     Formatting of this note might be different from the original. Retinal tear and detachment warning symptoms reviewed and patient  instructed to call immediately if increasing floaters, flashes, or decreasing peripheral vision. No retinal detachment or retinal tear noted. Recheck in 1 month with dilated exam.       Past Surgical History:   Procedure Laterality Date    ABDOMINAL SURGERY  December 18, 2023    Left Kidney and Adrenal Gland removed due to Renal Cell Carcinoma    NEPHRECTOMY Left 12/18/2023    Procedure: NEPHRECTOMY RADICAL WITH CAVAL THROMBECTOMY;  Surgeon: James Esquivel MD;  Location: [facility] OR;  Service: Urology;  Laterality: Left;    SKIN LESION EXCISION  1990         Current Outpatient Medications:     docusate sodium (COLACE) 50 MG capsule, Take 1 capsule by mouth 2 (Two) Times a Day., Disp: , Rfl:     ipratropium-albuterol (COMBIVENT RESPIMAT)  MCG/ACT inhaler, Inhale 1 puff 4 (Four) Times a Day As Needed for Wheezing., Disp: , Rfl:     magnesium oxide (MAG-OX) 400 MG tablet, Take 1 tablet by mouth Daily. (Patient not taking: Reported on 1/25/2024), Disp: , Rfl:     oxyCODONE-acetaminophen (PERCOCET) 5-325 MG per tablet, Take 1-2 tablets by mouth Every 6 (Six) Hours As Needed for Severe Pain., Disp: 30 tablet, Rfl: 0    vitamin D3 125 MCG (5000 UT) capsule capsule, Take 1 capsule by mouth Daily., Disp: , Rfl:     budesonide (PULMICORT) 0.5 MG/2ML nebulizer solution, Take 2 mL by nebulization Daily., Disp: 20 mL, Rfl: 3    hydrocortisone (ANUSOL-HC) 25 MG suppository, Insert 1 suppository into the rectum 2 (Two) Times a Day. (Patient not taking: Reported on 1/19/2024), Disp: 20 suppository, Rfl: 0    lidocaine-prilocaine (EMLA) 2.5-2.5 % cream, Apply 1 Application topically to the appropriate area as directed See Admin Instructions. Apply to port site one hour prior to port being accessed., Disp: 30 g, Rfl: 3    losartan (COZAAR) 100 MG tablet, Take 1 tablet by mouth Daily., Disp: 30 tablet, Rfl: 0    lubiprostone (Amitiza) 8 MCG capsule, Take 1 capsule by mouth 2 (Two) Times a Day With Meals., Disp: 60 capsule,  Rfl: 2    metoprolol succinate XL (TOPROL-XL) 25 MG 24 hr tablet, Take 2 tablets by mouth Daily., Disp: 30 tablet, Rfl: 1    naloxone (NARCAN) 4 MG/0.1ML nasal spray, Call 911. Don't prime. [place] in 1 nostril for overdose. Repeat in 2-3 minutes in other nostril if no or minimal breathing/responsiveness., Disp: 2 each, Rfl: 0    ondansetron (ZOFRAN) 4 MG tablet, Take 1 tablet by mouth Every 6 (Six) Hours As Needed for Nausea or Vomiting., Disp: 30 tablet, Rfl: 0    pantoprazole (PROTONIX) 40 MG EC tablet, Take 1 tablet by mouth Daily., Disp: 30 tablet, Rfl: 0    polyethylene glycol (MIRALAX) 17 GM/SCOOP powder, Take 17 g by mouth Daily As Needed (Use if senna-docusate is ineffective). (Patient not taking: Reported on 1/19/2024), Disp: 510 g, Rfl: 0    sucralfate (CARAFATE) 1 g tablet, Take 1 tablet by mouth 3 (Three) Times a Day Before Meals., Disp: 90 tablet, Rfl: 0    Symbicort 160-4.5 MCG/ACT inhaler, Inhale 2 puffs 2 (Two) Times a Day. (Patient not taking: Reported on 1/25/2024), Disp: , Rfl:     No Known Allergies    Family History   Problem Relation Age of Onset    Arthritis Mother     Cancer Mother     Diabetes Mother     Heart disease Mother     Hyperlipidemia Mother     Miscarriages / Stillbirths Mother         Stillborn child    COPD Father     Hyperlipidemia Father     Hyperlipidemia Brother        Cancer-related family history includes Cancer in his mother.      Social History     Tobacco Use    Smoking status: Never     Passive exposure: Past    Smokeless tobacco: Never    Tobacco comments:     SECOND HAND SMOKE EXPOSURE AS A CHILD    Vaping Use    Vaping Use: Never used   Substance Use Topics    Alcohol use: Yes     Alcohol/week: 1.0 standard drink of alcohol     Types: 1 Glasses of wine per week     Comment: rare    Drug use: Never     Social History     Social History Narrative    Not on file       ROS:   Review of Systems   Constitutional:  Negative for fatigue and fever.   HENT:  Negative for  "congestion and nosebleeds.    Eyes:  Negative for pain.   Respiratory:  Negative for cough and shortness of breath.    Cardiovascular:  Negative for chest pain.   Gastrointestinal:  Negative for abdominal pain, blood in stool, diarrhea, nausea and vomiting.   Endocrine: Negative for cold intolerance and heat intolerance.   Genitourinary:  Negative for difficulty urinating.   Musculoskeletal:  Negative for arthralgias.   Skin:  Negative for rash.   Neurological:  Negative for dizziness and headaches.   Hematological:  Does not bruise/bleed easily.   Psychiatric/Behavioral:  Negative for behavioral problems.          Objective:    Vital Signs:  Vitals:    01/19/24 1253 01/19/24 1344 01/19/24 1345 01/19/24 1346   BP: 142/84 125/74  Comment: Lying down 137/90  Comment: sitting down 128/82  Comment: standing   Pulse: 71 69 78 82   Resp: 14      Temp: 98.4 °F (36.9 °C)      SpO2: 97%      Weight: 93.7 kg (206 lb 9.6 oz)      Height: 177.8 cm (70\")      PainSc: 0-No pain        Body mass index is 29.64 kg/m².    ECOG  (0) Fully active, able to carry on all predisease performance without restriction    Physical Exam:   Physical Exam  Constitutional:       Appearance: Normal appearance.   HENT:      Head: Normocephalic and atraumatic.   Eyes:      Pupils: Pupils are equal, round, and reactive to light.   Cardiovascular:      Rate and Rhythm: Normal rate and regular rhythm.      Pulses: Normal pulses.      Heart sounds: No murmur heard.  Pulmonary:      Effort: Pulmonary effort is normal.      Breath sounds: Normal breath sounds.   Abdominal:      General: There is no distension.      Palpations: Abdomen is soft. There is no mass.      Tenderness: There is no abdominal tenderness.   Musculoskeletal:         General: Normal range of motion.      Cervical back: Normal range of motion.   Skin:     General: Skin is warm.   Neurological:      General: No focal deficit present.      Mental Status: He is alert.   Psychiatric:        " " Mood and Affect: Mood normal.         Lab Results - Last 18 Months   Lab Units 01/30/24  0827 01/09/24  1544 12/30/23  0328   WBC 10*3/mm3 9.60 6.48 12.70*   HEMOGLOBIN g/dL 11.4* 9.7* 8.7*   HEMATOCRIT % 36.0* 29.6* 27.0*   PLATELETS 10*3/mm3 252 377 457*   MCV fL 84.3 83.9 81.3     Lab Results - Last 18 Months   Lab Units 01/09/24  1544 12/30/23  0328 12/29/23  1008 12/28/23  2344   SODIUM mmol/L 140 137 135* 136   POTASSIUM mmol/L 4.3 4.3 3.9 4.2   CHLORIDE mmol/L 102 98 96* 99   CO2 mmol/L 27.6 29.0 30.0* 30.0*   BUN mg/dL 15 13 13 12   CREATININE mg/dL 1.62* 1.42* 1.45* 1.41*   CALCIUM mg/dL 10.1 9.0 9.0 8.7   BILIRUBIN mg/dL 0.8 0.8  --  0.8   ALK PHOS U/L 94 88  --  80   ALT (SGPT) U/L 48* 33  --  26   AST (SGOT) U/L 25 34  --  27   GLUCOSE mg/dL 100* 109* 105* 91       Lab Results   Component Value Date    GLUCOSE 100 (H) 01/09/2024    BUN 15 01/09/2024    CREATININE 1.62 (H) 01/09/2024    BCR 9.3 01/09/2024    K 4.3 01/09/2024    CO2 27.6 01/09/2024    CALCIUM 10.1 01/09/2024    PROTENTOTREF 7.0 01/09/2024    ALBUMIN 3.8 01/09/2024    LABIL2 1.2 01/09/2024    AST 25 01/09/2024    ALT 48 (H) 01/09/2024       Lab Results - Last 18 Months   Lab Units 01/30/24  0827 12/18/23  1722 12/18/23  1203   INR  1.01 1.08 1.24*   APTT seconds 25.3 28.7 58.7*       Lab Results   Component Value Date    IRON 12 (L) 12/20/2023    TIBC 145 (L) 12/20/2023       No results found for: \"FOLATE\"    No results found for: \"OCCULTBLD\"    No results found for: \"RETICCTPCT\"  No results found for: \"YEUJTDWS73\"  No results found for: \"SPEP\", \"UPEP\"  No results found for: \"LDH\", \"URICACID\"  No results found for: \"OSKAR\", \"RF\", \"SEDRATE\"  Lab Results   Component Value Date    FIBRINOGEN 381 12/18/2023     Lab Results   Component Value Date    PTT 25.3 01/30/2024    INR 1.01 01/30/2024     No results found for: \"\"  No results found for: \"CEA\"  No components found for: \"CA-19-9\"  No results found for: \"PSA\"  No results found for: " "\"SEDRATE\"       Assessment & Plan     Patient is a 63-year-old male with stage IIIb T3b RCC status post radical nephrectomy    Renal cell carcinoma  Status post nephrectomy, CT chest with no evidence of metastasis questionable adrenal metastasis on scan status post adrenalectomy with no evidence of metastatic disease  Given patient's stage III clear-cell RCC, discussed adjuvant treatment with pembrolizumab based on the findings from keynote 564 trial with significant improvement in disease-free survival as compared to placebo for stage III clear-cell RCC.    I discussed side effects in detail with the patient discussed immune mediated toxicities in detail.  Patient is agreeable to start with the same we will plan for a port placement start treatment    I will follow-up after first cycle of treatment to assess for response, toxicities      Thank you very much for providing the opportunity to participate in this patient’s care. Please do not hesitate to call if there are any other questions.  Time spent on encounter including record review, history taking, exam, discussion, counseling and documentation at: 60 minutes    "

## 2024-01-19 ENCOUNTER — CONSULT (OUTPATIENT)
Dept: ONCOLOGY | Facility: CLINIC | Age: 64
End: 2024-01-19
Payer: COMMERCIAL

## 2024-01-19 VITALS
OXYGEN SATURATION: 97 % | TEMPERATURE: 98.4 F | DIASTOLIC BLOOD PRESSURE: 82 MMHG | SYSTOLIC BLOOD PRESSURE: 128 MMHG | HEIGHT: 70 IN | WEIGHT: 206.6 LBS | RESPIRATION RATE: 14 BRPM | BODY MASS INDEX: 29.58 KG/M2 | HEART RATE: 82 BPM

## 2024-01-19 DIAGNOSIS — N28.89 RENAL MASS: Primary | ICD-10-CM

## 2024-01-19 PROCEDURE — 99205 OFFICE O/P NEW HI 60 MIN: CPT | Performed by: INTERNAL MEDICINE

## 2024-01-19 NOTE — LETTER
2024     James Esquivel MD  [address] [place] [address] [place] [address]    Patient: Louis Andino   YOB: 1960   Date of Visit: 2024     Dear James Esquivel MD:       Thank you for referring Louis Andino to me for evaluation. Below are the relevant portions of my assessment and plan of care.    If you have questions, please do not hesitate to call me. I look forward to following Louis along with you.         Sincerely,        Bandar Prakash MD        CC: MD Olinda Sun Amitoj, MD  24 1230  Incomplete                           HEMATOLOGY ONCOLOGY OUTPATIENT CONSULTATION       Patient name: Louis Andino  : 1960  MRN: [id number]  Primary Care Physician: Harry Hsu MD  Referring Physician: James Esquivel MD  Reason For Consult: Renal cell carcinoma      History of Present Illness:  Patient is a 63 y.o. male with RCC.    Patient initially presented with hematuria.  During hospitalization he had a CT of his abdomen which showed a large left lower pole renal mass with possible adrenal metastasis.    2023 CT abdomen pelvis with contrast showing mass arising from the lower pole of the left kidney consistent with renal cell carcinoma.  Associated uroepithelial thickening of the pelvis and proximal ureter.  Enhancing indeterminate left adrenal nodule potential metastasis no retroperitoneal lymphadenopathy.    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  Vessel with clear-cell renal cell carcinoma tumor thrombus        Subjective:  Patient presents for initial consultation.  Patient recovering from surgery.      Past Medical  History:   Diagnosis Date   • Anemia 12/18/2023    Due to surgery. Required transfusions   • Asthma    • Emphysema/COPD    • Erectile dysfunction Several years   • GERD (gastroesophageal reflux disease)    • HL (hearing loss)     Progressive worsening over many years   • Hyperglycemia 10/12/2023   • Hyperlipidemia 10/12/2023   • Hypertension    • Prostate disease    • Renal cell carcinoma 12/30/2023   • Vitreous degeneration of right eye     Formatting of this note might be different from the original. Retinal tear and detachment warning symptoms reviewed and patient instructed to call immediately if increasing floaters, flashes, or decreasing peripheral vision. No retinal detachment or retinal tear noted. Recheck in 1 month with dilated exam.       Past Surgical History:   Procedure Laterality Date   • ABDOMINAL SURGERY  December 18, 2023    Left Kidney and Adrenal Gland removed due to Renal Cell Carcinoma   • NEPHRECTOMY Left 12/18/2023    Procedure: NEPHRECTOMY RADICAL WITH CAVAL THROMBECTOMY;  Surgeon: James Esquivel MD;  Location: [facility];  Service: Urology;  Laterality: Left;   • SKIN LESION EXCISION  1990         Current Outpatient Medications:   •  docusate sodium (COLACE) 50 MG capsule, Take 1 capsule by mouth 2 (Two) Times a Day., Disp: , Rfl:   •  ipratropium-albuterol (COMBIVENT RESPIMAT)  MCG/ACT inhaler, Inhale 1 puff 4 (Four) Times a Day As Needed for Wheezing., Disp: , Rfl:   •  magnesium oxide (MAG-OX) 400 MG tablet, Take 1 tablet by mouth Daily. (Patient not taking: Reported on 1/25/2024), Disp: , Rfl:   •  oxyCODONE-acetaminophen (PERCOCET) 5-325 MG per tablet, Take 1-2 tablets by mouth Every 6 (Six) Hours As Needed for Severe Pain., Disp: 30 tablet, Rfl: 0  •  vitamin D3 125 MCG (5000 UT) capsule capsule, Take 1 capsule by mouth Daily., Disp: , Rfl:   •  budesonide (PULMICORT) 0.5 MG/2ML nebulizer solution, Take 2 mL by nebulization Daily., Disp: 20 mL, Rfl: 3  •  hydrocortisone  (ANUSOL-HC) 25 MG suppository, Insert 1 suppository into the rectum 2 (Two) Times a Day. (Patient not taking: Reported on 1/19/2024), Disp: 20 suppository, Rfl: 0  •  lidocaine-prilocaine (EMLA) 2.5-2.5 % cream, Apply 1 Application topically to the appropriate area as directed See Admin Instructions. Apply to port site one hour prior to port being accessed., Disp: 30 g, Rfl: 3  •  losartan (COZAAR) 100 MG tablet, Take 1 tablet by mouth Daily., Disp: 30 tablet, Rfl: 0  •  lubiprostone (Amitiza) 8 MCG capsule, Take 1 capsule by mouth 2 (Two) Times a Day With Meals., Disp: 60 capsule, Rfl: 2  •  metoprolol succinate XL (TOPROL-XL) 25 MG 24 hr tablet, Take 2 tablets by mouth Daily., Disp: 30 tablet, Rfl: 1  •  naloxone (NARCAN) 4 MG/0.1ML nasal spray, Call 911. Don't prime. [place] in 1 nostril for overdose. Repeat in 2-3 minutes in other nostril if no or minimal breathing/responsiveness., Disp: 2 each, Rfl: 0  •  ondansetron (ZOFRAN) 4 MG tablet, Take 1 tablet by mouth Every 6 (Six) Hours As Needed for Nausea or Vomiting., Disp: 30 tablet, Rfl: 0  •  pantoprazole (PROTONIX) 40 MG EC tablet, Take 1 tablet by mouth Daily., Disp: 30 tablet, Rfl: 0  •  polyethylene glycol (MIRALAX) 17 GM/SCOOP powder, Take 17 g by mouth Daily As Needed (Use if senna-docusate is ineffective). (Patient not taking: Reported on 1/19/2024), Disp: 510 g, Rfl: 0  •  sucralfate (CARAFATE) 1 g tablet, Take 1 tablet by mouth 3 (Three) Times a Day Before Meals., Disp: 90 tablet, Rfl: 0  •  Symbicort 160-4.5 MCG/ACT inhaler, Inhale 2 puffs 2 (Two) Times a Day. (Patient not taking: Reported on 1/25/2024), Disp: , Rfl:     No Known Allergies    Family History   Problem Relation Age of Onset   • Arthritis Mother    • Cancer Mother    • Diabetes Mother    • Heart disease Mother    • Hyperlipidemia Mother    • Miscarriages / Stillbirths Mother         Stillborn child   • COPD Father    • Hyperlipidemia Father    • Hyperlipidemia Brother        Cancer-related  "family history includes Cancer in his mother.      Social History     Tobacco Use   • Smoking status: Never     Passive exposure: Past   • Smokeless tobacco: Never   • Tobacco comments:     SECOND HAND SMOKE EXPOSURE AS A CHILD    Vaping Use   • Vaping Use: Never used   Substance Use Topics   • Alcohol use: Yes     Alcohol/week: 1.0 standard drink of alcohol     Types: 1 Glasses of wine per week     Comment: rare   • Drug use: Never     Social History     Social History Narrative   • Not on file       ROS:   Review of Systems   Constitutional:  Negative for fatigue and fever.   HENT:  Negative for congestion and nosebleeds.    Eyes:  Negative for pain.   Respiratory:  Negative for cough and shortness of breath.    Cardiovascular:  Negative for chest pain.   Gastrointestinal:  Negative for abdominal pain, blood in stool, diarrhea, nausea and vomiting.   Endocrine: Negative for cold intolerance and heat intolerance.   Genitourinary:  Negative for difficulty urinating.   Musculoskeletal:  Negative for arthralgias.   Skin:  Negative for rash.   Neurological:  Negative for dizziness and headaches.   Hematological:  Does not bruise/bleed easily.   Psychiatric/Behavioral:  Negative for behavioral problems.          Objective:    Vital Signs:  Vitals:    01/19/24 1253 01/19/24 1344 01/19/24 1345 01/19/24 1346   BP: 142/84 125/74  Comment: Lying down 137/90  Comment: sitting down 128/82  Comment: standing   Pulse: 71 69 78 82   Resp: 14      Temp: 98.4 °F (36.9 °C)      SpO2: 97%      Weight: 93.7 kg (206 lb 9.6 oz)      Height: 177.8 cm (70\")      PainSc: 0-No pain        Body mass index is 29.64 kg/m².    ECOG  (0) Fully active, able to carry on all predisease performance without restriction    Physical Exam:   Physical Exam  Constitutional:       Appearance: Normal appearance.   HENT:      Head: Normocephalic and atraumatic.   Eyes:      Pupils: Pupils are equal, round, and reactive to light.   Cardiovascular:      Rate " and Rhythm: Normal rate and regular rhythm.      Pulses: Normal pulses.      Heart sounds: No murmur heard.  Pulmonary:      Effort: Pulmonary effort is normal.      Breath sounds: Normal breath sounds.   Abdominal:      General: There is no distension.      Palpations: Abdomen is soft. There is no mass.      Tenderness: There is no abdominal tenderness.   Musculoskeletal:         General: Normal range of motion.      Cervical back: Normal range of motion.   Skin:     General: Skin is warm.   Neurological:      General: No focal deficit present.      Mental Status: He is alert.   Psychiatric:         Mood and Affect: Mood normal.         Lab Results - Last 18 Months   Lab Units 01/30/24  0827 01/09/24  1544 12/30/23  0328   WBC 10*3/mm3 9.60 6.48 12.70*   HEMOGLOBIN g/dL 11.4* 9.7* 8.7*   HEMATOCRIT % 36.0* 29.6* 27.0*   PLATELETS 10*3/mm3 252 377 457*   MCV fL 84.3 83.9 81.3     Lab Results - Last 18 Months   Lab Units 01/09/24  1544 12/30/23  0328 12/29/23  1008 12/28/23  2344   SODIUM mmol/L 140 137 135* 136   POTASSIUM mmol/L 4.3 4.3 3.9 4.2   CHLORIDE mmol/L 102 98 96* 99   CO2 mmol/L 27.6 29.0 30.0* 30.0*   BUN mg/dL 15 13 13 12   CREATININE mg/dL 1.62* 1.42* 1.45* 1.41*   CALCIUM mg/dL 10.1 9.0 9.0 8.7   BILIRUBIN mg/dL 0.8 0.8  --  0.8   ALK PHOS U/L 94 88  --  80   ALT (SGPT) U/L 48* 33  --  26   AST (SGOT) U/L 25 34  --  27   GLUCOSE mg/dL 100* 109* 105* 91       Lab Results   Component Value Date    GLUCOSE 100 (H) 01/09/2024    BUN 15 01/09/2024    CREATININE 1.62 (H) 01/09/2024    BCR 9.3 01/09/2024    K 4.3 01/09/2024    CO2 27.6 01/09/2024    CALCIUM 10.1 01/09/2024    PROTENTOTREF 7.0 01/09/2024    ALBUMIN 3.8 01/09/2024    LABIL2 1.2 01/09/2024    AST 25 01/09/2024    ALT 48 (H) 01/09/2024       Lab Results - Last 18 Months   Lab Units 01/30/24  0827 12/18/23  1722 12/18/23  1203   INR  1.01 1.08 1.24*   APTT seconds 25.3 28.7 58.7*       Lab Results   Component Value Date    IRON 12 (L) 12/20/2023  "   TIBC 145 (L) 2023       No results found for: \"FOLATE\"    No results found for: \"OCCULTBLD\"    No results found for: \"RETICCTPCT\"  No results found for: \"HBDSHIFE83\"  No results found for: \"SPEP\", \"UPEP\"  No results found for: \"LDH\", \"URICACID\"  No results found for: \"OSKAR\", \"RF\", \"SEDRATE\"  Lab Results   Component Value Date    FIBRINOGEN 381 2023     Lab Results   Component Value Date    PTT 25.3 2024    INR 1.01 2024     No results found for: \"\"  No results found for: \"CEA\"  No components found for: \"CA-19-9\"  No results found for: \"PSA\"  No results found for: \"SEDRATE\"       Assessment & Plan    Patient is a 63-year-old male with stage IIIb T3b RCC status post radical nephrectomy    Renal cell carcinoma  Status post nephrectomy, CT chest with no evidence of metastasis questionable adrenal metastasis on scan status post adrenalectomy with no evidence of metastatic disease  Given patient's stage III clear-cell RCC, discussed adjuvant treatment with pembrolizumab based on the findings from keynote 564 trial with significant improvement in disease-free survival as compared to placebo for stage III clear-cell RCC.    I discussed side effects in detail with the patient discussed immune mediated toxicities in detail.  Patient is agreeable to start with the same we will plan for a port placement start treatment    I will follow-up after first cycle of treatment to assess for response, toxicities      Thank you very much for providing the opportunity to participate in this patient’s care. Please do not hesitate to call if there are any other questions.  Time spent on encounter including record review, history taking, exam, discussion, counseling and documentation at: 60 minutes      Bandar Prakash MD  24 [id number]  In Progress                           HEMATOLOGY ONCOLOGY OUTPATIENT CONSULTATION       Patient name: Louis Andino  : 1960  MRN: [id number]  Primary Care Physician: " Harry Hsu MD  Referring Physician: James Esquivel MD  Reason For Consult:       History of Present Illness:  Patient is a 63 y.o. male with RCC. Had hematuria    11/4/23 - CT - left renal mass  9.5 cm T3b G4   Stage 3 RCC    Subjective:  Patient presents for initial consultation       Past Medical History:   Diagnosis Date   • Anemia 12/18/2023    Due to surgery. Required transfusions   • Asthma    • Emphysema/COPD    • Erectile dysfunction Several years   • GERD (gastroesophageal reflux disease)    • HL (hearing loss)     Progressive worsening over many years   • Hyperglycemia 10/12/2023   • Hyperlipidemia 10/12/2023   • Hypertension    • Prostate disease    • Renal cell carcinoma 12/30/2023   • Vitreous degeneration of right eye     Formatting of this note might be different from the original. Retinal tear and detachment warning symptoms reviewed and patient instructed to call immediately if increasing floaters, flashes, or decreasing peripheral vision. No retinal detachment or retinal tear noted. Recheck in 1 month with dilated exam.       Past Surgical History:   Procedure Laterality Date   • NEPHRECTOMY Left 12/18/2023    Procedure: NEPHRECTOMY RADICAL WITH CAVAL THROMBECTOMY;  Surgeon: James Esquivel MD;  Location: [facility];  Service: Urology;  Laterality: Left;   • SKIN LESION EXCISION  1990         Current Outpatient Medications:   •  budesonide (PULMICORT) 0.5 MG/2ML nebulizer solution, Take 2 mL by nebulization Daily., Disp: 20 mL, Rfl: 3  •  hydrocortisone (ANUSOL-HC) 25 MG suppository, Insert 1 suppository into the rectum 2 (Two) Times a Day., Disp: 20 suppository, Rfl: 0  •  losartan (COZAAR) 100 MG tablet, Take 1 tablet by mouth Daily., Disp: 30 tablet, Rfl: 0  •  magnesium oxide (MAG-OX) 400 MG tablet, Take 1 tablet by mouth Daily., Disp: , Rfl:   •  metoprolol succinate XL (TOPROL-XL) 25 MG 24 hr tablet, Take 2 tablets by mouth Daily., Disp: 30 tablet, Rfl: 1  •  naloxone (NARCAN) 4  MG/0.1ML nasal spray, Call 911. Don't prime. [place] in 1 nostril for overdose. Repeat in 2-3 minutes in other nostril if no or minimal breathing/responsiveness., Disp: 2 each, Rfl: 0  •  ondansetron (ZOFRAN) 4 MG tablet, Take 1 tablet by mouth Every 6 (Six) Hours As Needed for Nausea or Vomiting., Disp: 30 tablet, Rfl: 0  •  oxyCODONE-acetaminophen (PERCOCET) 5-325 MG per tablet, Take 1-2 tablets by mouth Every 6 (Six) Hours As Needed for Severe Pain., Disp: 30 tablet, Rfl: 0  •  pantoprazole (PROTONIX) 40 MG EC tablet, Take 1 tablet by mouth Daily., Disp: 30 tablet, Rfl: 0  •  polyethylene glycol (MIRALAX) 17 GM/SCOOP powder, Take 17 g by mouth Daily As Needed (Use if senna-docusate is ineffective)., Disp: 510 g, Rfl: 0  •  sucralfate (CARAFATE) 1 g tablet, Take 1 tablet by mouth 3 (Three) Times a Day Before Meals., Disp: 90 tablet, Rfl: 0  •  Symbicort 160-4.5 MCG/ACT inhaler, Inhale 2 puffs 2 (Two) Times a Day., Disp: , Rfl:   •  vitamin D3 125 MCG (5000 UT) capsule capsule, Take 1 capsule by mouth Daily., Disp: , Rfl:     No Known Allergies    Family History   Problem Relation Age of Onset   • Arthritis Mother    • Cancer Mother    • Diabetes Mother    • Heart disease Mother    • Hyperlipidemia Mother    • Miscarriages / Stillbirths Mother         Stillborn child   • COPD Father    • Hyperlipidemia Father    • Hyperlipidemia Brother        Cancer-related family history includes Cancer in his mother.      Social History     Tobacco Use   • Smoking status: Never     Passive exposure: Past   • Smokeless tobacco: Never   • Tobacco comments:     SECOND HAND SMOKE EXPOSURE AS A CHILD    Vaping Use   • Vaping Use: Never used   Substance Use Topics   • Alcohol use: Yes     Alcohol/week: 1.0 standard drink of alcohol     Types: 1 Glasses of wine per week     Comment: rare   • Drug use: Never     Social History     Social History Narrative   • Not on file       ROS:   Review of Systems   Constitutional:  Negative for  fatigue and fever.   HENT:  Negative for congestion and nosebleeds.    Eyes:  Negative for pain.   Respiratory:  Negative for cough and shortness of breath.    Cardiovascular:  Negative for chest pain.   Gastrointestinal:  Negative for abdominal pain, blood in stool, diarrhea, nausea and vomiting.   Endocrine: Negative for cold intolerance and heat intolerance.   Genitourinary:  Negative for difficulty urinating.   Musculoskeletal:  Negative for arthralgias.   Skin:  Negative for rash.   Neurological:  Negative for dizziness and headaches.   Hematological:  Does not bruise/bleed easily.   Psychiatric/Behavioral:  Negative for behavioral problems.          Objective:    Vital Signs:  There were no vitals filed for this visit.  There is no height or weight on file to calculate BMI.    ECOG  (0) Fully active, able to carry on all predisease performance without restriction    Physical Exam:   Physical Exam  Constitutional:       Appearance: Normal appearance.   HENT:      Head: Normocephalic and atraumatic.   Eyes:      Pupils: Pupils are equal, round, and reactive to light.   Cardiovascular:      Rate and Rhythm: Normal rate and regular rhythm.      Pulses: Normal pulses.      Heart sounds: No murmur heard.  Pulmonary:      Effort: Pulmonary effort is normal.      Breath sounds: Normal breath sounds.   Abdominal:      General: There is no distension.      Palpations: Abdomen is soft. There is no mass.      Tenderness: There is no abdominal tenderness.   Musculoskeletal:         General: Normal range of motion.      Cervical back: Normal range of motion.   Skin:     General: Skin is warm.   Neurological:      General: No focal deficit present.      Mental Status: He is alert.   Psychiatric:         Mood and Affect: Mood normal.         Lab Results - Last 18 Months   Lab Units 01/09/24  1544 12/30/23  0328 12/29/23  1008   WBC 10*3/mm3 6.48 12.70* 11.00*   HEMOGLOBIN g/dL 9.7* 8.7* 8.7*   HEMATOCRIT % 29.6* 27.0* 26.4*  "  PLATELETS 10*3/mm3 377 457* 462*   MCV fL 83.9 81.3 81.9     Lab Results - Last 18 Months   Lab Units 01/09/24  1544 12/30/23  0328 12/29/23  1008 12/28/23  2344   SODIUM mmol/L 140 137 135* 136   POTASSIUM mmol/L 4.3 4.3 3.9 4.2   CHLORIDE mmol/L 102 98 96* 99   CO2 mmol/L 27.6 29.0 30.0* 30.0*   BUN mg/dL 15 13 13 12   CREATININE mg/dL 1.62* 1.42* 1.45* 1.41*   CALCIUM mg/dL 10.1 9.0 9.0 8.7   BILIRUBIN mg/dL 0.8 0.8  --  0.8   ALK PHOS U/L 94 88  --  80   ALT (SGPT) U/L 48* 33  --  26   AST (SGOT) U/L 25 34  --  27   GLUCOSE mg/dL 100* 109* 105* 91       Lab Results   Component Value Date    GLUCOSE 100 (H) 01/09/2024    BUN 15 01/09/2024    CREATININE 1.62 (H) 01/09/2024    BCR 9.3 01/09/2024    K 4.3 01/09/2024    CO2 27.6 01/09/2024    CALCIUM 10.1 01/09/2024    PROTENTOTREF 7.0 01/09/2024    ALBUMIN 3.8 01/09/2024    LABIL2 1.2 01/09/2024    AST 25 01/09/2024    ALT 48 (H) 01/09/2024       Lab Results - Last 18 Months   Lab Units 12/18/23  1722 12/18/23  1203   INR  1.08 1.24*   APTT seconds 28.7 58.7*       Lab Results   Component Value Date    IRON 12 (L) 12/20/2023    TIBC 145 (L) 12/20/2023       No results found for: \"FOLATE\"    No results found for: \"OCCULTBLD\"    No results found for: \"RETICCTPCT\"  No results found for: \"[id number]\"  No results found for: \"SPEP\", \"UPEP\"  No results found for: \"LDH\", \"URICACID\"  No results found for: \"OSKAR\", \"RF\", \"SEDRATE\"  Lab Results   Component Value Date    FIBRINOGEN 381 12/18/2023     Lab Results   Component Value Date    PTT 28.7 12/18/2023    INR 1.08 12/18/2023     No results found for: \"\"  No results found for: \"CEA\"  No components found for: \"CA-19-9\"  No results found for: \"PSA\"  No results found for: \"SEDRATE\"       Assessment & Plan     Stage 3b clear cell RCC    Recommend adjuvant pembro  Port placement        Thank you very much for providing the opportunity to participate in this patient’s care. Please do not hesitate to call if there are any " other questions.      Bandar Prakash MD  24 1552  Incomplete Revision                           HEMATOLOGY ONCOLOGY OUTPATIENT CONSULTATION       Patient name: Louis Andino  : 1960  MRN: [id number]  Primary Care Physician: Harry Hsu MD  Referring Physician: James Esquivel MD  Reason For Consult: Renal cell carcinoma      History of Present Illness:  Patient is a 63 y.o. male with RCC. Had hematuria    23 - CT - left renal mass  9.5 cm T3b G4   Stage 3 RCC    Subjective:  Patient presents for initial consultation       Past Medical History:   Diagnosis Date   • Anemia 2023    Due to surgery. Required transfusions   • Asthma    • Emphysema/COPD    • Erectile dysfunction Several years   • GERD (gastroesophageal reflux disease)    • HL (hearing loss)     Progressive worsening over many years   • Hyperglycemia 10/12/2023   • Hyperlipidemia 10/12/2023   • Hypertension    • Prostate disease    • Renal cell carcinoma 2023   • Vitreous degeneration of right eye     Formatting of this note might be different from the original. Retinal tear and detachment warning symptoms reviewed and patient instructed to call immediately if increasing floaters, flashes, or decreasing peripheral vision. No retinal detachment or retinal tear noted. Recheck in 1 month with dilated exam.       Past Surgical History:   Procedure Laterality Date   • NEPHRECTOMY Left 2023    Procedure: NEPHRECTOMY RADICAL WITH CAVAL THROMBECTOMY;  Surgeon: James Esquivel MD;  Location: [facility];  Service: Urology;  Laterality: Left;   • SKIN LESION EXCISION           Current Outpatient Medications:   •  budesonide (PULMICORT) 0.5 MG/2ML nebulizer solution, Take 2 mL by nebulization Daily., Disp: 20 mL, Rfl: 3  •  hydrocortisone (ANUSOL-HC) 25 MG suppository, Insert 1 suppository into the rectum 2 (Two) Times a Day., Disp: 20 suppository, Rfl: 0  •  losartan (COZAAR) 100 MG tablet, Take 1 tablet by mouth  Daily., Disp: 30 tablet, Rfl: 0  •  magnesium oxide (MAG-OX) 400 MG tablet, Take 1 tablet by mouth Daily., Disp: , Rfl:   •  metoprolol succinate XL (TOPROL-XL) 25 MG 24 hr tablet, Take 2 tablets by mouth Daily., Disp: 30 tablet, Rfl: 1  •  naloxone (NARCAN) 4 MG/0.1ML nasal spray, Call 911. Don't prime. [place] in 1 nostril for overdose. Repeat in 2-3 minutes in other nostril if no or minimal breathing/responsiveness., Disp: 2 each, Rfl: 0  •  ondansetron (ZOFRAN) 4 MG tablet, Take 1 tablet by mouth Every 6 (Six) Hours As Needed for Nausea or Vomiting., Disp: 30 tablet, Rfl: 0  •  oxyCODONE-acetaminophen (PERCOCET) 5-325 MG per tablet, Take 1-2 tablets by mouth Every 6 (Six) Hours As Needed for Severe Pain., Disp: 30 tablet, Rfl: 0  •  pantoprazole (PROTONIX) 40 MG EC tablet, Take 1 tablet by mouth Daily., Disp: 30 tablet, Rfl: 0  •  polyethylene glycol (MIRALAX) 17 GM/SCOOP powder, Take 17 g by mouth Daily As Needed (Use if senna-docusate is ineffective)., Disp: 510 g, Rfl: 0  •  sucralfate (CARAFATE) 1 g tablet, Take 1 tablet by mouth 3 (Three) Times a Day Before Meals., Disp: 90 tablet, Rfl: 0  •  Symbicort 160-4.5 MCG/ACT inhaler, Inhale 2 puffs 2 (Two) Times a Day., Disp: , Rfl:   •  vitamin D3 125 MCG (5000 UT) capsule capsule, Take 1 capsule by mouth Daily., Disp: , Rfl:     No Known Allergies    Family History   Problem Relation Age of Onset   • Arthritis Mother    • Cancer Mother    • Diabetes Mother    • Heart disease Mother    • Hyperlipidemia Mother    • Miscarriages / Stillbirths Mother         Stillborn child   • COPD Father    • Hyperlipidemia Father    • Hyperlipidemia Brother        Cancer-related family history includes Cancer in his mother.      Social History     Tobacco Use   • Smoking status: Never     Passive exposure: Past   • Smokeless tobacco: Never   • Tobacco comments:     SECOND HAND SMOKE EXPOSURE AS A CHILD    Vaping Use   • Vaping Use: Never used   Substance Use Topics   • Alcohol use:  Yes     Alcohol/week: 1.0 standard drink of alcohol     Types: 1 Glasses of wine per week     Comment: rare   • Drug use: Never     Social History     Social History Narrative   • Not on file       ROS:   Review of Systems   Constitutional:  Negative for fatigue and fever.   HENT:  Negative for congestion and nosebleeds.    Eyes:  Negative for pain.   Respiratory:  Negative for cough and shortness of breath.    Cardiovascular:  Negative for chest pain.   Gastrointestinal:  Negative for abdominal pain, blood in stool, diarrhea, nausea and vomiting.   Endocrine: Negative for cold intolerance and heat intolerance.   Genitourinary:  Negative for difficulty urinating.   Musculoskeletal:  Negative for arthralgias.   Skin:  Negative for rash.   Neurological:  Negative for dizziness and headaches.   Hematological:  Does not bruise/bleed easily.   Psychiatric/Behavioral:  Negative for behavioral problems.          Objective:    Vital Signs:  There were no vitals filed for this visit.  There is no height or weight on file to calculate BMI.    ECOG  (0) Fully active, able to carry on all predisease performance without restriction    Physical Exam:   Physical Exam  Constitutional:       Appearance: Normal appearance.   HENT:      Head: Normocephalic and atraumatic.   Eyes:      Pupils: Pupils are equal, round, and reactive to light.   Cardiovascular:      Rate and Rhythm: Normal rate and regular rhythm.      Pulses: Normal pulses.      Heart sounds: No murmur heard.  Pulmonary:      Effort: Pulmonary effort is normal.      Breath sounds: Normal breath sounds.   Abdominal:      General: There is no distension.      Palpations: Abdomen is soft. There is no mass.      Tenderness: There is no abdominal tenderness.   Musculoskeletal:         General: Normal range of motion.      Cervical back: Normal range of motion.   Skin:     General: Skin is warm.   Neurological:      General: No focal deficit present.      Mental Status: He is  "alert.   Psychiatric:         Mood and Affect: Mood normal.       Lab Results - Last 18 Months   Lab Units 01/09/24  1544 12/30/23  0328 12/29/23  1008   WBC 10*3/mm3 6.48 12.70* 11.00*   HEMOGLOBIN g/dL 9.7* 8.7* 8.7*   HEMATOCRIT % 29.6* 27.0* 26.4*   PLATELETS 10*3/mm3 377 457* 462*   MCV fL 83.9 81.3 81.9     Lab Results - Last 18 Months   Lab Units 01/09/24  1544 12/30/23  0328 12/29/23  1008 12/28/23  2344   SODIUM mmol/L 140 137 135* 136   POTASSIUM mmol/L 4.3 4.3 3.9 4.2   CHLORIDE mmol/L 102 98 96* 99   CO2 mmol/L 27.6 29.0 30.0* 30.0*   BUN mg/dL 15 13 13 12   CREATININE mg/dL 1.62* 1.42* 1.45* 1.41*   CALCIUM mg/dL 10.1 9.0 9.0 8.7   BILIRUBIN mg/dL 0.8 0.8  --  0.8   ALK PHOS U/L 94 88  --  80   ALT (SGPT) U/L 48* 33  --  26   AST (SGOT) U/L 25 34  --  27   GLUCOSE mg/dL 100* 109* 105* 91       Lab Results   Component Value Date    GLUCOSE 100 (H) 01/09/2024    BUN 15 01/09/2024    CREATININE 1.62 (H) 01/09/2024    BCR 9.3 01/09/2024    K 4.3 01/09/2024    CO2 27.6 01/09/2024    CALCIUM 10.1 01/09/2024    PROTENTOTREF 7.0 01/09/2024    ALBUMIN 3.8 01/09/2024    LABIL2 1.2 01/09/2024    AST 25 01/09/2024    ALT 48 (H) 01/09/2024       Lab Results - Last 18 Months   Lab Units 12/18/23  1722 12/18/23  1203   INR  1.08 1.24*   APTT seconds 28.7 58.7*       Lab Results   Component Value Date    IRON 12 (L) 12/20/2023    TIBC 145 (L) 12/20/2023       No results found for: \"FOLATE\"    No results found for: \"OCCULTBLD\"    No results found for: \"RETICCTPCT\"  No results found for: \"WNJFLUOI82\"  No results found for: \"SPEP\", \"UPEP\"  No results found for: \"LDH\", \"URICACID\"  No results found for: \"OSKAR\", \"RF\", \"SEDRATE\"  Lab Results   Component Value Date    FIBRINOGEN 381 12/18/2023     Lab Results   Component Value Date    PTT 28.7 12/18/2023    INR 1.08 12/18/2023     No results found for: \"\"  No results found for: \"CEA\"  No components found for: \"CA-19-9\"  No results found for: \"PSA\"  No results found for: " "\"SEDRATE\"       Assessment & Plan     Stage 3b clear cell RCC    Recommend adjuvant pembro  Port placement        Thank you very much for providing the opportunity to participate in this patient’s care. Please do not hesitate to call if there are any other questions.    "

## 2024-01-25 ENCOUNTER — OFFICE VISIT (OUTPATIENT)
Dept: GASTROENTEROLOGY | Facility: CLINIC | Age: 64
End: 2024-01-25
Payer: MEDICARE

## 2024-01-25 VITALS
DIASTOLIC BLOOD PRESSURE: 91 MMHG | WEIGHT: 205 LBS | SYSTOLIC BLOOD PRESSURE: 153 MMHG | HEART RATE: 65 BPM | BODY MASS INDEX: 29.35 KG/M2 | TEMPERATURE: 97.2 F | HEIGHT: 70 IN

## 2024-01-25 DIAGNOSIS — K59.01 SLOW TRANSIT CONSTIPATION: Primary | ICD-10-CM

## 2024-01-25 DIAGNOSIS — K59.03 DRUG INDUCED CONSTIPATION: ICD-10-CM

## 2024-01-25 PROCEDURE — 99204 OFFICE O/P NEW MOD 45 MIN: CPT | Performed by: INTERNAL MEDICINE

## 2024-01-25 PROCEDURE — 1159F MED LIST DOCD IN RCRD: CPT | Performed by: INTERNAL MEDICINE

## 2024-01-25 PROCEDURE — 3080F DIAST BP >= 90 MM HG: CPT | Performed by: INTERNAL MEDICINE

## 2024-01-25 PROCEDURE — 3077F SYST BP >= 140 MM HG: CPT | Performed by: INTERNAL MEDICINE

## 2024-01-25 PROCEDURE — 1160F RVW MEDS BY RX/DR IN RCRD: CPT | Performed by: INTERNAL MEDICINE

## 2024-01-25 RX ORDER — LUBIPROSTONE 8 UG/1
8 CAPSULE ORAL 2 TIMES DAILY WITH MEALS
Qty: 60 CAPSULE | Refills: 2 | Status: SHIPPED | OUTPATIENT
Start: 2024-01-25

## 2024-01-25 NOTE — PROGRESS NOTES
Subjective   Chief Complaint   Patient presents with    Hemorrhoids    Nausea    Abdominal Pain    Bloated    Constipation       Louis Andino is a  63 y.o. male here for abdominal pain bloating and constipation, nausea and hemorrhoids.  All problems are new to me today.    Symptoms started after recent surgery  late Dec 2023.  He had some rectal bleeding initially which has resolved.  He has been constipated since his surgery.  He has a BM most days but taking colace/bisacodyl bid.  He tried MiraLAX but it upset his stomach.  He has been off Percocet for about 2 days but he has a lot of hip pain.  Prior to his surgery he was taking Celebrex which has been discontinued after his nephrectomy.  He has generalized abdominal discomfort.  His appetite is poor and he is cut back on eating as a result of his symptoms.  He is lost about 30 pounds.  His mobility is limited due to his hip and back pain.      No FH CRC/polyps.  He has never had a colonoscopy.  HPI  Past Medical History:   Diagnosis Date    Anemia 12/18/2023    Due to surgery. Required transfusions    Asthma     Emphysema/COPD     Erectile dysfunction Several years    GERD (gastroesophageal reflux disease)     HL (hearing loss)     Progressive worsening over many years    Hyperglycemia 10/12/2023    Hyperlipidemia 10/12/2023    Hypertension     Prostate disease     Renal cell carcinoma 12/30/2023    Vitreous degeneration of right eye     Formatting of this note might be different from the original. Retinal tear and detachment warning symptoms reviewed and patient instructed to call immediately if increasing floaters, flashes, or decreasing peripheral vision. No retinal detachment or retinal tear noted. Recheck in 1 month with dilated exam.     Past Surgical History:   Procedure Laterality Date    ABDOMINAL SURGERY  December 18, 2023    Left Kidney and Adrenal Gland removed due to Renal Cell Carcinoma    NEPHRECTOMY Left 12/18/2023    Procedure: NEPHRECTOMY  RADICAL WITH CAVAL THROMBECTOMY;  Surgeon: James Esquivel MD;  Location: [facility] MAIN OR;  Service: Urology;  Laterality: Left;    SKIN LESION EXCISION  1990       Current Outpatient Medications:     budesonide (PULMICORT) 0.5 MG/2ML nebulizer solution, Take 2 mL by nebulization Daily., Disp: 20 mL, Rfl: 3    docusate sodium (COLACE) 50 MG capsule, Take 1 capsule by mouth 2 (Two) Times a Day., Disp: , Rfl:     ipratropium-albuterol (COMBIVENT RESPIMAT)  MCG/ACT inhaler, Inhale 1 puff 4 (Four) Times a Day As Needed for Wheezing., Disp: , Rfl:     losartan (COZAAR) 100 MG tablet, Take 1 tablet by mouth Daily., Disp: 30 tablet, Rfl: 0    metoprolol succinate XL (TOPROL-XL) 25 MG 24 hr tablet, Take 2 tablets by mouth Daily., Disp: 30 tablet, Rfl: 1    naloxone (NARCAN) 4 MG/0.1ML nasal spray, Call 911. Don't prime. [place] in 1 nostril for overdose. Repeat in 2-3 minutes in other nostril if no or minimal breathing/responsiveness., Disp: 2 each, Rfl: 0    ondansetron (ZOFRAN) 4 MG tablet, Take 1 tablet by mouth Every 6 (Six) Hours As Needed for Nausea or Vomiting., Disp: 30 tablet, Rfl: 0    oxyCODONE-acetaminophen (PERCOCET) 5-325 MG per tablet, Take 1-2 tablets by mouth Every 6 (Six) Hours As Needed for Severe Pain., Disp: 30 tablet, Rfl: 0    pantoprazole (PROTONIX) 40 MG EC tablet, Take 1 tablet by mouth Daily., Disp: 30 tablet, Rfl: 0    sucralfate (CARAFATE) 1 g tablet, Take 1 tablet by mouth 3 (Three) Times a Day Before Meals., Disp: 90 tablet, Rfl: 0    vitamin D3 125 MCG (5000 UT) capsule capsule, Take 1 capsule by mouth Daily., Disp: , Rfl:     hydrocortisone (ANUSOL-HC) 25 MG suppository, Insert 1 suppository into the rectum 2 (Two) Times a Day. (Patient not taking: Reported on 1/19/2024), Disp: 20 suppository, Rfl: 0    lubiprostone (Amitiza) 8 MCG capsule, Take 1 capsule by mouth 2 (Two) Times a Day With Meals., Disp: 60 capsule, Rfl: 2    magnesium oxide (MAG-OX) 400 MG tablet, Take 1 tablet by mouth  Daily. (Patient not taking: Reported on 1/25/2024), Disp: , Rfl:     polyethylene glycol (MIRALAX) 17 GM/SCOOP powder, Take 17 g by mouth Daily As Needed (Use if senna-docusate is ineffective). (Patient not taking: Reported on 1/19/2024), Disp: 510 g, Rfl: 0    Symbicort 160-4.5 MCG/ACT inhaler, Inhale 2 puffs 2 (Two) Times a Day. (Patient not taking: Reported on 1/25/2024), Disp: , Rfl:   PRN Meds:.  No Known Allergies  Social History     Socioeconomic History    Marital status:     Number of children: 6   Tobacco Use    Smoking status: Never     Passive exposure: Past    Smokeless tobacco: Never    Tobacco comments:     SECOND HAND SMOKE EXPOSURE AS A CHILD    Vaping Use    Vaping Use: Never used   Substance and Sexual Activity    Alcohol use: Yes     Alcohol/week: 1.0 standard drink of alcohol     Types: 1 Glasses of wine per week     Comment: rare    Drug use: Never    Sexual activity: Yes     Partners: Female     Family History   Problem Relation Age of Onset    Arthritis Mother     Cancer Mother     Diabetes Mother     Heart disease Mother     Hyperlipidemia Mother     Miscarriages / Stillbirths Mother         Stillborn child    COPD Father     Hyperlipidemia Father     Hyperlipidemia Brother      Review of Systems   Constitutional:  Positive for appetite change and unexpected weight change.   Gastrointestinal:  Positive for abdominal pain and constipation.   Musculoskeletal:  Positive for arthralgias and back pain.     Vitals:    01/25/24 1113   BP: 153/91   Pulse: 65   Temp: 97.2 °F (36.2 °C)         01/25/24  1113   Weight: 93 kg (205 lb)       Objective   Physical Exam  Constitutional:       Appearance: Normal appearance. He is well-developed.   HENT:      Head: Normocephalic and atraumatic.   Eyes:      General: No scleral icterus.     Conjunctiva/sclera: Conjunctivae normal.   Pulmonary:      Effort: Pulmonary effort is normal.   Abdominal:      General: There is no distension.      Palpations:  Abdomen is soft. There is no mass.      Tenderness: There is abdominal tenderness.      Comments: Mild generalized tenderness without focal tenderness   Musculoskeletal:      Cervical back: Normal range of motion and neck supple.   Skin:     General: Skin is warm and dry.   Neurological:      Mental Status: He is alert.   Psychiatric:         Mood and Affect: Mood normal.         Behavior: Behavior normal.       No radiology results for the last 7 days    Assessment & Plan   Diagnoses and all orders for this visit:    Slow transit constipation    Drug induced constipation    Other orders  -     lubiprostone (Amitiza) 8 MCG capsule; Take 1 capsule by mouth 2 (Two) Times a Day With Meals.      Plan:  New onset constipation in the setting of recent surgery, decreased appetite and mobility, narcotics and Zofran.  Recommend starting amitiza.  Stop stimulant laxatives.  May need to use them as needed until adequate dose of amitiza is established.  If he resumes Percocet for his back pain, may consider addition of Movantik.  Recommend adequate fluid consumption daily (6 to 8 glasses of water daily), high-fiber diet, regular physical activity as tolerated to reduce symptoms of constipation  He is scheduled to have a port placed next week and then will have immunotherapy every several weeks.  He will eventually need colonoscopy but needs improvement in his constipation in order to tolerate the prep.  Will follow-up on his constipation before making more definitive plans for colonoscopy.

## 2024-01-29 DIAGNOSIS — J45.40 MODERATE PERSISTENT ASTHMA WITHOUT COMPLICATION: Chronic | ICD-10-CM

## 2024-01-29 RX ORDER — SUCRALFATE 1 G/1
1 TABLET ORAL
Qty: 90 TABLET | Refills: 0 | Status: SHIPPED | OUTPATIENT
Start: 2024-01-29

## 2024-01-29 RX ORDER — METOPROLOL SUCCINATE 25 MG/1
50 TABLET, EXTENDED RELEASE ORAL
Qty: 30 TABLET | Refills: 1 | Status: SHIPPED | OUTPATIENT
Start: 2024-01-29

## 2024-01-29 RX ORDER — LOSARTAN POTASSIUM 100 MG/1
100 TABLET ORAL
Qty: 30 TABLET | Refills: 0 | Status: SHIPPED | OUTPATIENT
Start: 2024-01-29

## 2024-01-29 RX ORDER — BUDESONIDE 0.5 MG/2ML
0.5 INHALANT ORAL
Qty: 20 ML | Refills: 3 | Status: SHIPPED | OUTPATIENT
Start: 2024-01-29

## 2024-01-29 RX ORDER — PANTOPRAZOLE SODIUM 40 MG/1
40 TABLET, DELAYED RELEASE ORAL DAILY
Qty: 30 TABLET | Refills: 0 | Status: SHIPPED | OUTPATIENT
Start: 2024-01-29

## 2024-01-29 NOTE — TELEPHONE ENCOUNTER
Caller: Louis Andino    Relationship: Self    Best call back number: [phone]     Requested Prescriptions:   Requested Prescriptions     Pending Prescriptions Disp Refills    sucralfate (CARAFATE) 1 g tablet 90 tablet 0     Sig: Take 1 tablet by mouth 3 (Three) Times a Day Before Meals.    pantoprazole (PROTONIX) 40 MG EC tablet 30 tablet 0     Sig: Take 1 tablet by mouth Daily.    losartan (COZAAR) 100 MG tablet 30 tablet 0     Sig: Take 1 tablet by mouth Daily.    metoprolol succinate XL (TOPROL-XL) 25 MG 24 hr tablet 30 tablet 1     Sig: Take 2 tablets by mouth Daily.    budesonide (PULMICORT) 0.5 MG/2ML nebulizer solution 20 mL 3     Sig: Take 2 mL by nebulization Daily.        Pharmacy where request should be sent: [facility] [id number] [facility] [address] [phone] [phone] FX     Last office visit with prescribing clinician: 1/9/2024   Last telemedicine visit with prescribing clinician: Visit date not found   Next office visit with prescribing clinician: 4/9/2024     Additional details provided by patient: COMPLETELY OUT OF 4 OF THESE     Does the patient have less than a 3 day supply:  [x] Yes  [] No    Would you like a call back once the refill request has been completed: [x] Yes [] No    If the office needs to give you a call back, can they leave a voicemail: [] Yes [x] No    Shahana Valentin, ABBY   01/29/24 10:56 EST

## 2024-01-30 ENCOUNTER — HOSPITAL ENCOUNTER (OUTPATIENT)
Dept: INTERVENTIONAL RADIOLOGY/VASCULAR | Facility: HOSPITAL | Age: 64
Discharge: HOME | End: 2024-01-30
Admitting: RADIOLOGY
Payer: COMMERCIAL

## 2024-01-30 ENCOUNTER — TELEPHONE (OUTPATIENT)
Dept: ONCOLOGY | Facility: CLINIC | Age: 64
End: 2024-01-30
Payer: COMMERCIAL

## 2024-01-30 VITALS
DIASTOLIC BLOOD PRESSURE: 85 MMHG | WEIGHT: 205 LBS | HEIGHT: 70 IN | RESPIRATION RATE: 11 BRPM | OXYGEN SATURATION: 92 % | TEMPERATURE: 98.4 F | SYSTOLIC BLOOD PRESSURE: 150 MMHG | BODY MASS INDEX: 29.35 KG/M2 | HEART RATE: 61 BPM

## 2024-01-30 DIAGNOSIS — N28.89 RENAL MASS: ICD-10-CM

## 2024-01-30 LAB
APTT PPP: 25.3 SECONDS (ref 24–31)
BASOPHILS # BLD AUTO: 0.1 10*3/MM3 (ref 0–0.2)
BASOPHILS NFR BLD AUTO: 0.7 % (ref 0–1.5)
DEPRECATED RDW RBC AUTO: 54.7 FL (ref 37–54)
EOSINOPHIL # BLD AUTO: 0.4 10*3/MM3 (ref 0–0.4)
EOSINOPHIL NFR BLD AUTO: 3.8 % (ref 0.3–6.2)
ERYTHROCYTE [DISTWIDTH] IN BLOOD BY AUTOMATED COUNT: 18.9 % (ref 12.3–15.4)
HCT VFR BLD AUTO: 36 % (ref 37.5–51)
HGB BLD-MCNC: 11.4 G/DL (ref 13–17.7)
INR PPP: 1.01 (ref 0.93–1.1)
LYMPHOCYTES # BLD AUTO: 3.3 10*3/MM3 (ref 0.7–3.1)
LYMPHOCYTES NFR BLD AUTO: 34.3 % (ref 19.6–45.3)
MCH RBC QN AUTO: 26.7 PG (ref 26.6–33)
MCHC RBC AUTO-ENTMCNC: 31.7 G/DL (ref 31.5–35.7)
MCV RBC AUTO: 84.3 FL (ref 79–97)
MONOCYTES # BLD AUTO: 0.9 10*3/MM3 (ref 0.1–0.9)
MONOCYTES NFR BLD AUTO: 9.2 % (ref 5–12)
MRSA DNA SPEC QL NAA+PROBE: NORMAL
NEUTROPHILS NFR BLD AUTO: 5 10*3/MM3 (ref 1.7–7)
NEUTROPHILS NFR BLD AUTO: 52 % (ref 42.7–76)
NRBC BLD AUTO-RTO: 0 /100 WBC (ref 0–0.2)
PLATELET # BLD AUTO: 252 10*3/MM3 (ref 140–450)
PMV BLD AUTO: 8.9 FL (ref 6–12)
PROTHROMBIN TIME: 11 SECONDS (ref 9.6–11.7)
RBC # BLD AUTO: 4.27 10*6/MM3 (ref 4.14–5.8)
WBC NRBC COR # BLD AUTO: 9.6 10*3/MM3 (ref 3.4–10.8)

## 2024-01-30 PROCEDURE — 77001 FLUOROGUIDE FOR VEIN DEVICE: CPT

## 2024-01-30 PROCEDURE — 25010000002 ONDANSETRON PER 1 MG

## 2024-01-30 PROCEDURE — C1894 INTRO/SHEATH, NON-LASER: HCPCS

## 2024-01-30 PROCEDURE — 25810000003 SODIUM CHLORIDE 0.9 % SOLUTION: Performed by: RADIOLOGY

## 2024-01-30 PROCEDURE — 85610 PROTHROMBIN TIME: CPT | Performed by: RADIOLOGY

## 2024-01-30 PROCEDURE — C1788 PORT, INDWELLING, IMP: HCPCS

## 2024-01-30 PROCEDURE — 99153 MOD SED SAME PHYS/QHP EA: CPT

## 2024-01-30 PROCEDURE — 87641 MR-STAPH DNA AMP PROBE: CPT | Performed by: INTERNAL MEDICINE

## 2024-01-30 PROCEDURE — 25010000002 CEFAZOLIN PER 500 MG: Performed by: RADIOLOGY

## 2024-01-30 PROCEDURE — 25010000002 MIDAZOLAM PER 1 MG

## 2024-01-30 PROCEDURE — 99152 MOD SED SAME PHYS/QHP 5/>YRS: CPT

## 2024-01-30 PROCEDURE — 25010000002 HEPARIN LOCK FLUSH PER 10 UNITS

## 2024-01-30 PROCEDURE — 85025 COMPLETE CBC W/AUTO DIFF WBC: CPT | Performed by: RADIOLOGY

## 2024-01-30 PROCEDURE — 25010000002 FENTANYL CITRATE (PF) 50 MCG/ML SOLUTION

## 2024-01-30 PROCEDURE — 76937 US GUIDE VASCULAR ACCESS: CPT

## 2024-01-30 PROCEDURE — 85730 THROMBOPLASTIN TIME PARTIAL: CPT | Performed by: RADIOLOGY

## 2024-01-30 RX ORDER — HEPARIN SODIUM (PORCINE) LOCK FLUSH IV SOLN 100 UNIT/ML 100 UNIT/ML
SOLUTION INTRAVENOUS AS NEEDED
Status: COMPLETED | OUTPATIENT
Start: 2024-01-30 | End: 2024-01-30

## 2024-01-30 RX ORDER — MIDAZOLAM HYDROCHLORIDE 1 MG/ML
INJECTION INTRAMUSCULAR; INTRAVENOUS AS NEEDED
Status: COMPLETED | OUTPATIENT
Start: 2024-01-30 | End: 2024-01-30

## 2024-01-30 RX ORDER — SODIUM CHLORIDE 0.9 % (FLUSH) 0.9 %
10 SYRINGE (ML) INJECTION EVERY 12 HOURS SCHEDULED
Status: DISCONTINUED | OUTPATIENT
Start: 2024-01-30 | End: 2024-01-31 | Stop reason: HOSPADM

## 2024-01-30 RX ORDER — LIDOCAINE HYDROCHLORIDE 20 MG/ML
INJECTION, SOLUTION INFILTRATION; PERINEURAL AS NEEDED
Status: COMPLETED | OUTPATIENT
Start: 2024-01-30 | End: 2024-01-30

## 2024-01-30 RX ORDER — ONDANSETRON 2 MG/ML
INJECTION INTRAMUSCULAR; INTRAVENOUS AS NEEDED
Status: COMPLETED | OUTPATIENT
Start: 2024-01-30 | End: 2024-01-30

## 2024-01-30 RX ORDER — SODIUM CHLORIDE 9 MG/ML
75 INJECTION, SOLUTION INTRAVENOUS CONTINUOUS
Status: DISCONTINUED | OUTPATIENT
Start: 2024-01-30 | End: 2024-01-31 | Stop reason: HOSPADM

## 2024-01-30 RX ORDER — LIDOCAINE HYDROCHLORIDE AND EPINEPHRINE 10; 10 MG/ML; UG/ML
INJECTION, SOLUTION INFILTRATION; PERINEURAL AS NEEDED
Status: COMPLETED | OUTPATIENT
Start: 2024-01-30 | End: 2024-01-30

## 2024-01-30 RX ORDER — FENTANYL CITRATE 50 UG/ML
INJECTION, SOLUTION INTRAMUSCULAR; INTRAVENOUS AS NEEDED
Status: COMPLETED | OUTPATIENT
Start: 2024-01-30 | End: 2024-01-30

## 2024-01-30 RX ORDER — SODIUM CHLORIDE 0.9 % (FLUSH) 0.9 %
10 SYRINGE (ML) INJECTION AS NEEDED
Status: DISCONTINUED | OUTPATIENT
Start: 2024-01-30 | End: 2024-01-31 | Stop reason: HOSPADM

## 2024-01-30 RX ADMIN — Medication 10 ML: at 08:45

## 2024-01-30 RX ADMIN — ONDANSETRON 4 MG: 2 INJECTION INTRAMUSCULAR; INTRAVENOUS at 10:09

## 2024-01-30 RX ADMIN — MIDAZOLAM 1 MG: 1 INJECTION INTRAMUSCULAR; INTRAVENOUS at 09:21

## 2024-01-30 RX ADMIN — CEFAZOLIN 2000 MG: 1 INJECTION, POWDER, FOR SOLUTION INTRAMUSCULAR; INTRAVENOUS at 09:00

## 2024-01-30 RX ADMIN — Medication 500 UNITS: at 09:47

## 2024-01-30 RX ADMIN — LIDOCAINE HYDROCHLORIDE 5 ML: 20 INJECTION, SOLUTION INFILTRATION; PERINEURAL at 09:29

## 2024-01-30 RX ADMIN — FENTANYL CITRATE 50 MCG: 50 INJECTION, SOLUTION INTRAMUSCULAR; INTRAVENOUS at 09:27

## 2024-01-30 RX ADMIN — SODIUM CHLORIDE 75 ML/HR: 9 INJECTION, SOLUTION INTRAVENOUS at 08:45

## 2024-01-30 RX ADMIN — LIDOCAINE HYDROCHLORIDE 5 ML: 20 INJECTION, SOLUTION INFILTRATION; PERINEURAL at 09:33

## 2024-01-30 RX ADMIN — FENTANYL CITRATE 100 MCG: 50 INJECTION, SOLUTION INTRAMUSCULAR; INTRAVENOUS at 09:21

## 2024-01-30 RX ADMIN — LIDOCAINE HYDROCHLORIDE,EPINEPHRINE BITARTRATE 8 ML: 10; .01 INJECTION, SOLUTION INFILTRATION; PERINEURAL at 09:33

## 2024-01-30 RX ADMIN — MIDAZOLAM 1 MG: 1 INJECTION INTRAMUSCULAR; INTRAVENOUS at 09:27

## 2024-01-30 NOTE — H&P
[facility]   Interventional Radiology H&P    Patient Name: Louis Andino  : 1960  MRN: [id number]  Primary Care Physician:  Harry Hsu MD  Referring Physician: Bandar Prakash MD  Date of admission: 2024    Subjective   Subjective     HPI:  Louis Andino is a 63 y.o. male with renal cell carcinoma    Review of Systems:   Constitutional no fever,  no weight loss       Otolaryngeal no difficulty swallowing   Cardiovascular no chest pain   Pulmonary no cough, no sputum production   Gastrointestinal no constipation, no diarrhea                         Personal History       Past Medical/Surgical History:   Past Medical History:   Diagnosis Date    Anemia 2023    Due to surgery. Required transfusions    Asthma     Emphysema/COPD     Erectile dysfunction Several years    GERD (gastroesophageal reflux disease)     HL (hearing loss)     Progressive worsening over many years    Hyperglycemia 10/12/2023    Hyperlipidemia 10/12/2023    Hypertension     Prostate disease     Renal cell carcinoma 2023    Vitreous degeneration of right eye     Formatting of this note might be different from the original. Retinal tear and detachment warning symptoms reviewed and patient instructed to call immediately if increasing floaters, flashes, or decreasing peripheral vision. No retinal detachment or retinal tear noted. Recheck in 1 month with dilated exam.     Past Surgical History:   Procedure Laterality Date    ABDOMINAL SURGERY  2023    Left Kidney and Adrenal Gland removed due to Renal Cell Carcinoma    NEPHRECTOMY Left 2023    Procedure: NEPHRECTOMY RADICAL WITH CAVAL THROMBECTOMY;  Surgeon: James Esquivel MD;  Location: [facility];  Service: Urology;  Laterality: Left;    SKIN LESION EXCISION         Social History:  reports that he has never smoked. He has been exposed to tobacco smoke. He has never used smokeless tobacco. He reports current alcohol use of about 1.0  "standard drink of alcohol per week. He reports that he does not use drugs.    Medications:  (Not in a hospital admission)    Current medications:  sodium chloride, 10 mL, Intravenous, Q12H      Current IV drips:  ceFAZolin, , Last Rate: 2,000 mg (01/30/24 0900)  sodium chloride, 75 mL/hr, Last Rate: 75 mL/hr (01/30/24 0859)        Allergies:  No Known Allergies    Objective    Objective     Vitals:   Temp:  [98.4 °F (36.9 °C)] 98.4 °F (36.9 °C)  Heart Rate:  [58] 58  Resp:  [11] 11  BP: (157)/(89) 157/89      Physical Exam:   Constitutional: Awake, alert, No acute distress    Respiratory: Clear to auscultation bilaterally, nonlabored respirations    Cardiovascular: RRR, no murmurs, rubs, or gallops, palpable pedal pulses bilaterally   Gastrointestinal: Positive bowel sounds, soft, nontender, nondistended        ASA SCALE ASSESSMENT:  2-Mild to moderate systemic disease, medically well controlled, with no functional limitation    MALLAMPATI CLASSIFICATION:  2-Able to visualize the soft palate, fauces, uvula. The anterior & posterior tonsilar pillars are hidden by the tongue.       Result Review        Result Review:     No results found for: \"NA\"    No results found for: \"K\"    No results found for: \"CL\"    No results found for: \"PLASMABICARB\"    No results found for: \"BUN\"    No results found for: \"CREATININE\"    No results found for: \"CALCIUM\"        No components found for: \"GLUCOSE.*\"  Results from last 7 days   Lab Units 01/30/24  0827   WBC 10*3/mm3 9.60   HEMOGLOBIN g/dL 11.4*   HEMATOCRIT % 36.0*   PLATELETS 10*3/mm3 252      Results from last 7 days   Lab Units 01/30/24  0827   INR  1.01           Assessment / Plan     Assesment:  Renal cell carcinoma      Plan:   Port placement     The risks and benefits of the procedure were discussed with the patient.    Electronically signed by DUANE Sweeney, 01/30/24, 9:11 AM EST.  "

## 2024-01-30 NOTE — TELEPHONE ENCOUNTER
Caller: Louis Andino    Relationship: Self    Best call back number: [phone]    Who are you requesting to speak with (clinical staff, provider,  specific staff member): scheduling    Do you know the name of the person who called: patient    What was the call regarding: pt just had a port placed 01/30, and is requesting to schedule his infusion treatments. preference is friday's late morning/early afternoon

## 2024-02-05 RX ORDER — LIDOCAINE AND PRILOCAINE 25; 25 MG/G; MG/G
1 CREAM TOPICAL SEE ADMIN INSTRUCTIONS
Qty: 30 G | Refills: 3 | Status: SHIPPED | OUTPATIENT
Start: 2024-02-05

## 2024-02-07 NOTE — PROGRESS NOTES
TREATMENT  PREPARATION    Louis Andino  [id number]  1960    Chief Complaint: Treatment preparation and needs assessment    History of present illness:  Louis Andino is a 63 y.o. year old male who is here today for treatment preparation and needs assessment.  The patient has been diagnosed with   Encounter Diagnoses   Name Primary?    Renal cell carcinoma of left kidney Yes    Encounter for education     and is scheduled to begin treatment with:     Oncology History:    Oncology/Hematology History   Renal cell carcinoma   12/30/2023 Initial Diagnosis    Renal cell carcinoma     1/19/2024 -  Chemotherapy    OP RENAL CELL Pembrolizumab 200 mg     2/6/2024 Cancer Staged    Staging form: Kidney, AJCC 8th Edition  - Pathologic: Stage III (pT3b, pNX, cM0) - Signed by Bandar Prakash MD on 2/6/2024         The current medication list and allergy list were reviewed and reconciled.     Past Medical History, Past Surgical History, Social History, Family History have been reviewed and are without significant changes except as mentioned.    Physical Exam:    Vitals:    02/08/24 1253   BP: 140/88   Pulse: 66   Resp: 18   Temp: 98 °F (36.7 °C)   SpO2: 99%     Vitals:    02/08/24 1253   PainSc:   6   PainLoc: Generalized  Comment: neck, shoulder, back, hip        ECOG score: 2             Physical Exam  Vitals reviewed.   Constitutional:       General: He is not in acute distress.     Appearance: Normal appearance.   HENT:      Head: Normocephalic and atraumatic.   Eyes:      Extraocular Movements: Extraocular movements intact.   Pulmonary:      Effort: Pulmonary effort is normal. No respiratory distress.   Musculoskeletal:      Cervical back: Normal range of motion.      Comments: uses a walker   Skin:     General: Skin is warm.   Neurological:      Mental Status: He is alert and oriented to person, place, and time.   Psychiatric:         Mood and Affect: Mood normal.         Behavior: Behavior normal.            NEEDS ASSESSMENTS    Genetics  The patient's new diagnosis and family history have been reviewed for genetic counseling needs. The patient will not be referred..     Psychosocial and Barriers to care  The patient will meet with our  today.       Nutrition  The patient has completed the malnutrition screening today. Patients beginning at risk treatment regimens or who have dietary concerns will also be referred to our oncology dietitian.  Will meet with the dietitian per routine on 2/9/2024.    Functional Assessment  Persons who are age 70 or greater will be screened for qualification of a comprehensive geriatric assessment by our survivorship nurse practitioner.  Older adults with cancer face unique challenges. These may include an increased risk of drug reactions, financial burdens, and caregiver stress. The patient scored   . Patients scoring 14 or lower will referred for an older adult functional assessment with the survivorship advanced practice registered nurse to ensure all needed support is provided as patients plan for their treatments. NOT APPLICABLE    Intravenous Access Assessment  The patient and I discussed planned intravenous chemo/biotherapy as well as other IV treatments that are often needed throughout the course of treatment. These may include, but are not limited to blood transfusions, antibiotics, and IV hydration. Discussed that depending on selected treatment and vein assessment, patient may require venous access device (VAD) which could include but not limited to a Mediport or PICC line. Risks and benefits of VADs reviewed. The patient will be treated via Port.    Reproductive/Sexual Activity   People should avoid becoming pregnant and should not get a partner pregnant while undergoing chemo/biotherapy.  People of childbearing age should use effective contraception during active therapy. The best recommendation for all people is to use a barrier method for a minimum of 1  "week after the last infusion of chemo/biotherapy to prevent your partner being exposed to byproducts from treatment medications in bodily fluids. Effective contraception should be discussed with your oncology team to make sure it is safe to take based on your diagnosis. Possible options include oral contraceptives, barrier methods. Chemo/biotherapy can change your ability to reproduce children in the future.  There are options for fertility preservation. NOT APPLICABLE    Advanced Care Planning  Advance Care Planning   The patient and I discussed advanced care planning, \"Conversations that Matter\".   This service is offered for development of advance directives with a certified ACP facilitator.  The patient does not have an up-to-date advanced directive. This document is not on file with our office. The patient is not interested in an appointment with one of our facilitators to create or update their advanced directives.               Smoking cessation  Tobacco Use: Low Risk  (2/8/2024)    Patient History     Smoking Tobacco Use: Never     Smokeless Tobacco Use: Never     Passive Exposure: Past       Patient and I discussed their tobacco use history. Referral will not be made for smoking cessation.      Palliative Care  When appropriate, the patient and I discussed the availability palliative care services and when appropriate Hospice care. Palliative care is not the same as Hospice care which was explained to the patient.NOT APPLICABLE.    Survivorship   When appropriate, we discussed that we will refer the patient to survivorship clinic to discuss next steps following completion of planned treatment.  Reviewed this visit will include assessment of your physical, psychological, functional, and spiritual needs as a survivor and the need at attend this visit when scheduled.    TREATMENT EDUCATION    Today I met with the patient to discuss the chemo/biotherapy regimen recommended for treatment of Renal cell carcinoma " of left kidney    Encounter for education  .  The patient was given explanation of treatment premed side effects including office policy that prohibits patients to drive if sedating medications are administered, MD explanation given regarding benefits, side effects, toxicities and goals of treatment.  The patient received a Chemotherapy/Biotherapy Plan Summary including diagnosis and explanation of specific treatment plan.    SIDE EFFECTS:  Common side effects were discussed with the patient and/or significant other.  Discussion included where applicable hair loss/discoloration, anemia/fatigue, infection/chills/fever, appetite, bleeding risk/precautions, constipation, diarrhea, mouth sores, taste alteration, loss of appetite, nausea/vomiting, peripheral neuropathy, skin/nail changes, rash, muscle aches/weakness, photosensitivity, weight gain/loss, hearing loss, dizziness, menopausal symptoms, menstrual irregularity, sterility, high blood pressure, heart damage, liver damage, lung damage, kidney damage, DVT/PE risk, fluid retention, pleural/pericardial effusion, somnolence, electrolyte/LFT imbalance, vein exercises and/or the possible need for vascular access/port placement.  The patient was advised that although uncommon, leakage of an infused medication from the vein or venous access device may lead to skin breakdown and/or other tissue damage.  The patient was advised that he/she may have pain, bleeding, and/or bruising from the insertion of a needle in their vein or venous access device (port).  The patient was further advised that, in spite of proper technique, infection with redness and irritation may rarely occur at the site where the needle was inserted.  The patient was advised that if complications occur, additional medical treatment is available.  Finally, where applicable we have reviewed rare but potential immune mediated side effects including shortness of breath, cough, chest pain (pneumonitis),  abdominal pain, diarrhea (colitis), thyroiditis (hypothyroid or hyperthyroid), hepatitis and liver dysfunction, nephritis and renal dysfunction.    Discussion also included side effects specific to drugs in the treatment plan, specifically:    Treatment Plans       Name Type Plan Dates Plan Provider         Active    OP RENAL CELL Pembrolizumab 200 mg ONCOLOGY TREATMENT  1/18/2024 - Present Bandar Prakash MD                      Questions answered and additional information discussed on topics including:  Anemia, Thrombocytopenia, Neutropenia, Nutrition and appetite changes, Constipation, Diarrhea, Nausea & vomiting, Mouth sores, Intimate activity, Nervous system changes, Pain, Skin & nail changes, Organ toxicities, and Vaccinations       Assessment and Plan:    Diagnoses and all orders for this visit:    1. Renal cell carcinoma of left kidney (Primary)    2. Encounter for education    Other orders  -     Diclofenac Sodium (VOLTAREN) 1 % gel gel; Apply 4 g topically to the appropriate area as directed 4 (Four) Times a Day.  Dispense: 50 g; Refill: 0  -     ondansetron (ZOFRAN) 8 MG tablet; Take 1 tablet by mouth Every 8 (Eight) Hours As Needed for Nausea or Vomiting.  Dispense: 30 tablet; Refill: 2      No orders of the defined types were placed in this encounter.        The patient and I have reviewed their diagnosis and scheduled treatment plan. Needs assessment was completed where applicable including genetics, psychosocial needs, barriers to care, VAD evaluation, advanced care planning, survivorship, and palliative care services where indicated. Referrals have been ordered as appropriate based upon evaluation today and patient desires.   Chemo/biotherapy teaching was completed today and consent obtained. See separate documentation for further details.  Adequate time was given to answer questions.  Patient made aware of their care team members and contact information if they have questions or problems throughout  the treatment course.  Discussion held and written information provided describing frequency of office visits and ongoing monitoring throughout the treatment plan.     Reviewed with patient any prescribed medication sent to pharmacy.  Education provided regarding proper storage, safe handling, and proper disposal of unused medication.  Proper handling of body fluids and waste discussed and written information provided.  If appropriate, patient had pretreatment labs drawn today.  Ortho recommended OTC diclofenac for hip pain if OK with oncology    Learning assessment completed at initial patient encounter. See separate flowsheet. Chemo/biotherapy education comprehension assessed at today's visit.    I spent 45 minutes caring for Louis on this date of service. This time includes time spent by me in the following activities: preparing for the visit, obtaining and/or reviewing a separately obtained history, performing a medically appropriate examination and/or evaluation, counseling and educating the patient/family/caregiver, ordering medications, tests, or procedures, referring and communicating with other health care professionals, and documenting information in the medical record.     Maranda Zarate, APRN   02/08/24

## 2024-02-08 ENCOUNTER — DOCUMENTATION (OUTPATIENT)
Dept: ONCOLOGY | Facility: CLINIC | Age: 64
End: 2024-02-08
Payer: COMMERCIAL

## 2024-02-08 ENCOUNTER — OFFICE VISIT (OUTPATIENT)
Dept: ONCOLOGY | Facility: CLINIC | Age: 64
End: 2024-02-08
Payer: COMMERCIAL

## 2024-02-08 VITALS
WEIGHT: 205 LBS | HEART RATE: 66 BPM | RESPIRATION RATE: 18 BRPM | SYSTOLIC BLOOD PRESSURE: 140 MMHG | DIASTOLIC BLOOD PRESSURE: 88 MMHG | HEIGHT: 70 IN | OXYGEN SATURATION: 99 % | BODY MASS INDEX: 29.35 KG/M2 | TEMPERATURE: 98 F

## 2024-02-08 DIAGNOSIS — C64.2 RENAL CELL CARCINOMA OF LEFT KIDNEY: Primary | ICD-10-CM

## 2024-02-08 DIAGNOSIS — Z71.9 ENCOUNTER FOR EDUCATION: ICD-10-CM

## 2024-02-08 RX ORDER — ONDANSETRON HYDROCHLORIDE 8 MG/1
8 TABLET, FILM COATED ORAL EVERY 8 HOURS PRN
Qty: 30 TABLET | Refills: 2 | Status: SHIPPED | OUTPATIENT
Start: 2024-02-08

## 2024-02-08 RX ORDER — LISINOPRIL 10 MG/1
TABLET ORAL
COMMUNITY
Start: 2024-01-25

## 2024-02-08 NOTE — PROGRESS NOTES
Oncology Social Worker Initial Patient Interaction:  OSW met with patient and wife.Introductions made.  OSW provided welcome letter and contact information. OSW reviewed services available at [facility] to ensure patient is aware of all types of support available.    OSW completed a psychosocial assessment through review of information available in EPIC and additional inquiries.    Client Demographic portion of chart was reviewed with patient to ensure accuracy.  The following sections were reviewed and changes made as indicated:  Basics:  No changes made  Employer and Identification:  No changes made.  Patient Contacts:  No changes made.  Advance Directives:  OSW discussed types of advance directives and offered to assist patient with completion, if desired.  Patient was given an opportunity to ask questions and review documents.  No changes made.  Code status:   OSW reviewed current code status on file and ensured this reflects patient's current wishes.  No changes needed.    The History section of the patient chart was reviewed and information entered as shared by patient.  This included general medical, surgical and family history.  The social determinants sections (substance abuse and sexuality, e-cig/vaping, socioeconomic, and other social determinants information).  Any significant social documentation was entered.    Following review of these sections of the patient information in EPIC, OSW engaged patient in further conversation to assess any current needs.    There are no current barriers with cultural or spiritual factors.    There are no current mental health needs.  There is no presentation of appearance, behavior, speech or language, thoughts, mood, affect, perception, or insight which would indicate mental status deficits or need for further evaluation.  Patient is not voicing any thoughts of self-harm or aggression toward others.    Patient presents as calm and well supported.  Has a good sense of humor  and positive outlook.  Support system includes his wife, six children, one of whom lives nearby, his Buddhism and several friends.    Family history not discussed at this time.  Pt and wife indicate there are no practical needs at this time.  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  He is now a deacon, a  and has several other interests    Physically, patient is having difficulty with his neck and shoulders since having the port placed.  Massage therapy service here at the center was discussed and pt and wife indicate interest in pursuing that.  Patient does not indicate and concerns at this time.  He has confidence in his care team and has good support as noted.  Patient strengths and coping skills include the numerous individuals he can call on for support, his outlook and sense of humor.    OSW offered emotional support and encouragement throughout the visit.  OSW provided active listening and restatement of patient's concerns.    OSW plan at this time will be to remain available as patient's needs change.  There are no identified problems or issues as noted.  Patient is encouraged to reach out to OSW as needs arise or support is needed.

## 2024-02-09 ENCOUNTER — HOSPITAL ENCOUNTER (OUTPATIENT)
Dept: ONCOLOGY | Facility: HOSPITAL | Age: 64
Discharge: HOME | End: 2024-02-09
Payer: COMMERCIAL

## 2024-02-09 VITALS
SYSTOLIC BLOOD PRESSURE: 148 MMHG | TEMPERATURE: 97.1 F | HEIGHT: 70 IN | OXYGEN SATURATION: 97 % | RESPIRATION RATE: 16 BRPM | HEART RATE: 70 BPM | WEIGHT: 207.9 LBS | BODY MASS INDEX: 29.76 KG/M2 | DIASTOLIC BLOOD PRESSURE: 75 MMHG

## 2024-02-09 DIAGNOSIS — C64.9 RENAL CELL CARCINOMA, UNSPECIFIED LATERALITY: ICD-10-CM

## 2024-02-09 DIAGNOSIS — Z95.828 PORT-A-CATH IN PLACE: ICD-10-CM

## 2024-02-09 DIAGNOSIS — C64.2 RENAL CELL CARCINOMA OF LEFT KIDNEY: Primary | ICD-10-CM

## 2024-02-09 LAB
ALP BLD-CCNC: 48 U/L (ref 53–128)
BASOPHILS # BLD AUTO: 0.04 10*3/MM3 (ref 0–0.2)
BASOPHILS NFR BLD AUTO: 0.5 % (ref 0–1.5)
BUN BLDA-MCNC: 17 MG/DL (ref 7–22)
CALCIUM BLD QL: 9.4 MG/DL (ref 8–10.3)
CHLORIDE BLDA-SCNC: 106 MMOL/L (ref 98–108)
CO2 BLDA-SCNC: 29 MMOL/L (ref 18–33)
CREAT BLDA-MCNC: 1.6 MG/DL (ref 0.6–1.2)
DEPRECATED RDW RBC AUTO: 52.5 FL (ref 37–54)
EGFRCR SERPLBLD CKD-EPI 2021: 47.5 ML/MIN/1.73
EOSINOPHIL # BLD AUTO: 0.24 10*3/MM3 (ref 0–0.4)
EOSINOPHIL NFR BLD AUTO: 2.9 % (ref 0.3–6.2)
ERYTHROCYTE [DISTWIDTH] IN BLOOD BY AUTOMATED COUNT: 16.9 % (ref 12.3–15.4)
GLUCOSE BLDC GLUCOMTR-MCNC: 116 MG/DL (ref 70–105)
GLUCOSE BLDC GLUCOMTR-MCNC: 132 MG/DL (ref 73–118)
HCT VFR BLD AUTO: 35.4 % (ref 37.5–51)
HGB BLD-MCNC: 11.1 G/DL (ref 13–17.7)
LYMPHOCYTES # BLD AUTO: 2.36 10*3/MM3 (ref 0.7–3.1)
LYMPHOCYTES NFR BLD AUTO: 28.7 % (ref 19.6–45.3)
MCH RBC QN AUTO: 27 PG (ref 26.6–33)
MCHC RBC AUTO-ENTMCNC: 31.4 G/DL (ref 31.5–35.7)
MCV RBC AUTO: 86.1 FL (ref 79–97)
MONOCYTES # BLD AUTO: 0.7 10*3/MM3 (ref 0.1–0.9)
MONOCYTES NFR BLD AUTO: 8.5 % (ref 5–12)
NEUTROPHILS NFR BLD AUTO: 4.89 10*3/MM3 (ref 1.7–7)
NEUTROPHILS NFR BLD AUTO: 59.4 % (ref 42.7–76)
PLATELET # BLD AUTO: 254 10*3/MM3 (ref 140–450)
PMV BLD AUTO: 11.1 FL (ref 6–12)
POC ALBUMIN: 3.2 G/L (ref 3.3–5.5)
POC ALT (SGPT): 11 U/L (ref 10–47)
POC AST (SGOT): 15 U/L (ref 11–38)
POC TOTAL BILIRUBIN: 0.9 MG/DL (ref 0.2–1.6)
POC TOTAL PROTEIN: 6.6 G/DL (ref 6.4–8.1)
POTASSIUM BLDA-SCNC: 3.6 MMOL/L (ref 3.6–5.1)
RBC # BLD AUTO: 4.11 10*6/MM3 (ref 4.14–5.8)
SODIUM BLD-SCNC: 138 MMOL/L (ref 128–145)
T4 FREE SERPL-MCNC: 1.29 NG/DL (ref 0.93–1.7)
TSH SERPL DL<=0.05 MIU/L-ACNC: 0.91 UIU/ML (ref 0.27–4.2)
WBC NRBC COR # BLD AUTO: 8.23 10*3/MM3 (ref 3.4–10.8)

## 2024-02-09 PROCEDURE — 96413 CHEMO IV INFUSION 1 HR: CPT

## 2024-02-09 PROCEDURE — 25810000003 SODIUM CHLORIDE 0.9 % SOLUTION: Performed by: INTERNAL MEDICINE

## 2024-02-09 PROCEDURE — 84439 ASSAY OF FREE THYROXINE: CPT | Performed by: INTERNAL MEDICINE

## 2024-02-09 PROCEDURE — 82948 REAGENT STRIP/BLOOD GLUCOSE: CPT

## 2024-02-09 PROCEDURE — 25010000002 PEMBROLIZUMAB 100 MG/4ML SOLUTION 4 ML VIAL: Performed by: INTERNAL MEDICINE

## 2024-02-09 PROCEDURE — 25010000002 HEPARIN LOCK FLUSH PER 10 UNITS: Performed by: INTERNAL MEDICINE

## 2024-02-09 PROCEDURE — 80050 GENERAL HEALTH PANEL: CPT | Performed by: INTERNAL MEDICINE

## 2024-02-09 RX ORDER — HEPARIN SODIUM (PORCINE) LOCK FLUSH IV SOLN 100 UNIT/ML 100 UNIT/ML
500 SOLUTION INTRAVENOUS AS NEEDED
Status: DISCONTINUED | OUTPATIENT
Start: 2024-02-09 | End: 2024-02-10 | Stop reason: HOSPADM

## 2024-02-09 RX ORDER — SODIUM CHLORIDE 9 MG/ML
20 INJECTION, SOLUTION INTRAVENOUS ONCE
Status: CANCELLED | OUTPATIENT
Start: 2024-02-09

## 2024-02-09 RX ORDER — SODIUM CHLORIDE 0.9 % (FLUSH) 0.9 %
10 SYRINGE (ML) INJECTION AS NEEDED
OUTPATIENT
Start: 2024-02-09

## 2024-02-09 RX ORDER — SODIUM CHLORIDE 9 MG/ML
20 INJECTION, SOLUTION INTRAVENOUS ONCE
Status: COMPLETED | OUTPATIENT
Start: 2024-02-09 | End: 2024-02-09

## 2024-02-09 RX ORDER — SODIUM CHLORIDE 0.9 % (FLUSH) 0.9 %
10 SYRINGE (ML) INJECTION AS NEEDED
Status: DISCONTINUED | OUTPATIENT
Start: 2024-02-09 | End: 2024-02-10 | Stop reason: HOSPADM

## 2024-02-09 RX ORDER — HEPARIN SODIUM (PORCINE) LOCK FLUSH IV SOLN 100 UNIT/ML 100 UNIT/ML
500 SOLUTION INTRAVENOUS AS NEEDED
OUTPATIENT
Start: 2024-02-09

## 2024-02-09 RX ADMIN — SODIUM CHLORIDE 20 ML/HR: 9 INJECTION, SOLUTION INTRAVENOUS at 14:55

## 2024-02-09 RX ADMIN — HEPARIN 500 UNITS: 100 SYRINGE at 15:40

## 2024-02-09 RX ADMIN — Medication 10 ML: at 15:40

## 2024-02-09 RX ADMIN — SODIUM CHLORIDE 200 MG: 9 INJECTION, SOLUTION INTRAVENOUS at 15:08

## 2024-02-25 DIAGNOSIS — J45.40 MODERATE PERSISTENT ASTHMA WITHOUT COMPLICATION: Chronic | ICD-10-CM

## 2024-02-25 DIAGNOSIS — C64.2 RENAL CELL CARCINOMA OF LEFT KIDNEY: ICD-10-CM

## 2024-02-26 RX ORDER — SUCRALFATE 1 G/1
1 TABLET ORAL
Qty: 90 TABLET | Refills: 0 | Status: SHIPPED | OUTPATIENT
Start: 2024-02-26

## 2024-02-26 RX ORDER — LOSARTAN POTASSIUM 100 MG/1
100 TABLET ORAL
Qty: 30 TABLET | Refills: 0 | Status: SHIPPED | OUTPATIENT
Start: 2024-02-26

## 2024-02-26 RX ORDER — METOPROLOL SUCCINATE 25 MG/1
50 TABLET, EXTENDED RELEASE ORAL
Qty: 30 TABLET | Refills: 1 | Status: SHIPPED | OUTPATIENT
Start: 2024-02-26 | End: 2024-02-28

## 2024-02-26 RX ORDER — METOPROLOL SUCCINATE 25 MG/1
50 TABLET, EXTENDED RELEASE ORAL
Qty: 30 TABLET | Refills: 1 | Status: SHIPPED | OUTPATIENT
Start: 2024-02-26 | End: 2024-02-27 | Stop reason: SDUPTHER

## 2024-02-26 RX ORDER — PANTOPRAZOLE SODIUM 40 MG/1
40 TABLET, DELAYED RELEASE ORAL DAILY
Qty: 30 TABLET | Refills: 0 | Status: SHIPPED | OUTPATIENT
Start: 2024-02-26

## 2024-02-26 RX ORDER — BUDESONIDE 0.5 MG/2ML
0.5 INHALANT ORAL
Qty: 20 ML | Refills: 3 | Status: SHIPPED | OUTPATIENT
Start: 2024-02-26

## 2024-02-26 RX ORDER — OXYCODONE HYDROCHLORIDE AND ACETAMINOPHEN 5; 325 MG/1; MG/1
1 TABLET ORAL EVERY 12 HOURS PRN
Qty: 30 TABLET | Refills: 0 | Status: SHIPPED | OUTPATIENT
Start: 2024-02-26

## 2024-02-26 NOTE — TELEPHONE ENCOUNTER
Could you make sure he is not taking lisinopril as well as losartan before I refill this. He only needs to be on one, thank you

## 2024-02-28 RX ORDER — METOPROLOL SUCCINATE 25 MG/1
50 TABLET, EXTENDED RELEASE ORAL
Qty: 60 TABLET | Refills: 1 | Status: SHIPPED | OUTPATIENT
Start: 2024-02-28

## 2024-02-29 DIAGNOSIS — C64.2 RENAL CELL CARCINOMA OF LEFT KIDNEY: Primary | ICD-10-CM

## 2024-02-29 NOTE — PROGRESS NOTES
HEMATOLOGY ONCOLOGY OUTPATIENT FOLLOW UP       Patient name: Louis Andino  : 1960  MRN: [id number]  Primary Care Physician: Harry Hsu MD  Referring Physician: Harry Hsu MD  Reason For Consult: Renal cell carcinoma      History of Present Illness:  Patient is a 63 y.o. male with RCC.    Patient initially presented with hematuria.  During hospitalization he had a CT of his abdomen which showed a large left lower pole renal mass with possible adrenal metastasis.    2023 CT abdomen pelvis with contrast showing mass arising from the lower pole of the left kidney consistent with renal cell carcinoma.  Associated uroepithelial thickening of the pelvis and proximal ureter.  Enhancing indeterminate left adrenal nodule potential metastasis no retroperitoneal lymphadenopathy.    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  Vessel with clear-cell renal cell carcinoma tumor thrombus    25 - pembrolizumab    Subjective:  Patient denies any diarrhea, rash, has ongoing pruritus.      Past Medical History:   Diagnosis Date    Anemia 2023    Due to surgery. Required transfusions    Asthma     Emphysema/COPD     Erectile dysfunction Several years    GERD (gastroesophageal reflux disease)     HL (hearing loss)     Progressive worsening over many years    Hyperglycemia 10/12/2023    Hyperlipidemia 10/12/2023    Hypertension     Prostate disease     Renal cell carcinoma 2023    Vitreous degeneration of right eye     Formatting of this note might be different from the original. Retinal tear and detachment warning symptoms reviewed and patient  instructed to call immediately if increasing floaters, flashes, or decreasing peripheral vision. No retinal detachment or retinal tear noted. Recheck in 1 month with dilated exam.       Past Surgical History:   Procedure Laterality Date    ABDOMINAL SURGERY  December 18, 2023    Left Kidney and Adrenal Gland removed due to Renal Cell Carcinoma    NEPHRECTOMY Left 12/18/2023    Procedure: NEPHRECTOMY RADICAL WITH CAVAL THROMBECTOMY;  Surgeon: James Esquivel MD;  Location: [facility] OR;  Service: Urology;  Laterality: Left;    SKIN LESION EXCISION  1990         Current Outpatient Medications:     budesonide (PULMICORT) 0.5 MG/2ML nebulizer solution, Take 2 mL by nebulization Daily., Disp: 20 mL, Rfl: 3    Diclofenac Sodium (VOLTAREN) 1 % gel gel, Apply 4 g topically to the appropriate area as directed 4 (Four) Times a Day., Disp: 50 g, Rfl: 0    hydrocortisone (ANUSOL-HC) 25 MG suppository, Insert 1 suppository into the rectum 2 (Two) Times a Day., Disp: 20 suppository, Rfl: 0    ipratropium-albuterol (COMBIVENT RESPIMAT)  MCG/ACT inhaler, Inhale 1 puff 4 (Four) Times a Day As Needed for Wheezing., Disp: , Rfl:     lidocaine-prilocaine (EMLA) 2.5-2.5 % cream, Apply 1 Application topically to the appropriate area as directed See Admin Instructions. Apply to port site one hour prior to port being accessed., Disp: 30 g, Rfl: 3    losartan (COZAAR) 100 MG tablet, Take 1 tablet by mouth Daily., Disp: 30 tablet, Rfl: 0    lubiprostone (Amitiza) 8 MCG capsule, Take 1 capsule by mouth 2 (Two) Times a Day With Meals., Disp: 60 capsule, Rfl: 2    magnesium oxide (MAG-OX) 400 MG tablet, Take 1 tablet by mouth Daily., Disp: , Rfl:     metoprolol succinate XL (TOPROL-XL) 25 MG 24 hr tablet, Take 2 tablets by mouth Daily., Disp: 60 tablet, Rfl: 1    naloxone (NARCAN) 4 MG/0.1ML nasal spray, Call 911. Don't prime. [place] in 1 nostril for overdose. Repeat in 2-3 minutes in other nostril if no or minimal  breathing/responsiveness., Disp: 2 each, Rfl: 0    ondansetron (ZOFRAN) 8 MG tablet, Take 1 tablet by mouth Every 8 (Eight) Hours As Needed for Nausea or Vomiting., Disp: 30 tablet, Rfl: 2    oxyCODONE-acetaminophen (PERCOCET) 5-325 MG per tablet, Take 1 tablet by mouth Every 12 (Twelve) Hours As Needed for Severe Pain., Disp: 30 tablet, Rfl: 0    pantoprazole (PROTONIX) 40 MG EC tablet, TAKE 1 TABLET BY MOUTH DAILY, Disp: 90 tablet, Rfl: 0    pantoprazole (PROTONIX) 40 MG EC tablet, Take 1 tablet by mouth Daily., Disp: 30 tablet, Rfl: 0    sucralfate (CARAFATE) 1 g tablet, Take 1 tablet by mouth 3 (Three) Times a Day Before Meals., Disp: 90 tablet, Rfl: 0    Symbicort 160-4.5 MCG/ACT inhaler, Inhale 2 puffs 2 (Two) Times a Day., Disp: , Rfl:     vitamin D3 125 MCG (5000 UT) capsule capsule, Take 1 capsule by mouth Daily., Disp: , Rfl:     No Known Allergies    Family History   Problem Relation Age of Onset    Arthritis Mother     Cancer Mother     Diabetes Mother     Heart disease Mother     Hyperlipidemia Mother     Miscarriages / Stillbirths Mother         Stillborn child    COPD Father     Hyperlipidemia Father     Hyperlipidemia Brother        Cancer-related family history includes Cancer in his mother.      Social History     Tobacco Use    Smoking status: Never     Passive exposure: Past    Smokeless tobacco: Never    Tobacco comments:     SECOND HAND SMOKE EXPOSURE AS A CHILD    Vaping Use    Vaping Use: Never used   Substance Use Topics    Alcohol use: Yes     Alcohol/week: 1.0 standard drink of alcohol     Types: 1 Glasses of wine per week     Comment: rare    Drug use: Never     Social History     Social History Narrative    Not on file       ROS:   Review of Systems   Constitutional:  Negative for fatigue and fever.   HENT:  Negative for congestion and nosebleeds.    Eyes:  Negative for pain.   Respiratory:  Negative for cough and shortness of breath.    Cardiovascular:  Negative for chest pain.  "  Gastrointestinal:  Negative for abdominal pain, blood in stool, diarrhea, nausea and vomiting.   Endocrine: Negative for cold intolerance and heat intolerance.   Genitourinary:  Negative for difficulty urinating.   Musculoskeletal:  Negative for arthralgias.   Skin:  Negative for rash.   Neurological:  Negative for dizziness and headaches.   Hematological:  Does not bruise/bleed easily.   Psychiatric/Behavioral:  Negative for behavioral problems.    pruritis    Objective:    Vital Signs:  Vitals:    03/01/24 1151   BP: 147/79   Pulse: 61   Resp: 14   Temp: 98.2 °F (36.8 °C)   SpO2: 97%   Weight: 96.8 kg (213 lb 6.4 oz)   Height: 177.8 cm (70\")   PainSc:   6   PainLoc: Hip       Body mass index is 30.62 kg/m².    ECOG  (0) Fully active, able to carry on all predisease performance without restriction    Physical Exam:   Physical Exam  Constitutional:       Appearance: Normal appearance.   HENT:      Head: Normocephalic and atraumatic.   Eyes:      Pupils: Pupils are equal, round, and reactive to light.   Cardiovascular:      Rate and Rhythm: Normal rate and regular rhythm.      Pulses: Normal pulses.      Heart sounds: No murmur heard.  Pulmonary:      Effort: Pulmonary effort is normal.      Breath sounds: Normal breath sounds.   Abdominal:      General: There is no distension.      Palpations: Abdomen is soft. There is no mass.      Tenderness: There is no abdominal tenderness.   Musculoskeletal:         General: Normal range of motion.      Cervical back: Normal range of motion and neck supple.   Skin:     General: Skin is warm.   Neurological:      General: No focal deficit present.      Mental Status: He is alert.   Psychiatric:         Mood and Affect: Mood normal.         Lab Results - Last 18 Months   Lab Units 03/01/24  1131 02/09/24  1332 01/30/24  0827   WBC 10*3/mm3 7.28 8.23 9.60   HEMOGLOBIN g/dL 11.5* 11.1* 11.4*   HEMATOCRIT % 36.3* 35.4* 36.0*   PLATELETS 10*3/mm3 212 254 252   MCV fL 85.6 86.1 84.3 " "    Lab Results - Last 18 Months   Lab Units 02/09/24  1411 01/09/24  1544 12/30/23  0328 12/29/23  1008 12/28/23  2344   SODIUM mmol/L  --  140 137 135* 136   POTASSIUM mmol/L  --  4.3 4.3 3.9 4.2   CHLORIDE mmol/L  --  102 98 96* 99   CO2 mmol/L  --  27.6 29.0 30.0* 30.0*   BUN mg/dL  --  15 13 13 12   CREATININE mg/dL 1.60* 1.62* 1.42* 1.45* 1.41*   CALCIUM mg/dL  --  10.1 9.0 9.0 8.7   BILIRUBIN mg/dL  --  0.8 0.8  --  0.8   ALK PHOS U/L  --  94 88  --  80   ALT (SGPT) U/L  --  48* 33  --  26   AST (SGOT) U/L  --  25 34  --  27   GLUCOSE mg/dL  --  100* 109* 105* 91       Lab Results   Component Value Date    GLUCOSE 100 (H) 01/09/2024    BUN 15 01/09/2024    CREATININE 1.60 (H) 02/09/2024    BCR 9.3 01/09/2024    K 4.3 01/09/2024    CO2 27.6 01/09/2024    CALCIUM 10.1 01/09/2024    PROTENTOTREF 7.0 01/09/2024    ALBUMIN 3.8 01/09/2024    LABIL2 1.2 01/09/2024    AST 25 01/09/2024    ALT 48 (H) 01/09/2024       Lab Results - Last 18 Months   Lab Units 01/30/24  0827 12/18/23  1722 12/18/23  1203   INR  1.01 1.08 1.24*   APTT seconds 25.3 28.7 58.7*       Lab Results   Component Value Date    IRON 12 (L) 12/20/2023    TIBC 145 (L) 12/20/2023       No results found for: \"FOLATE\"    No results found for: \"OCCULTBLD\"    No results found for: \"RETICCTPCT\"  No results found for: \"MLGXJKKE42\"  No results found for: \"SPEP\", \"UPEP\"  No results found for: \"LDH\", \"URICACID\"  No results found for: \"OSKAR\", \"RF\", \"SEDRATE\"  Lab Results   Component Value Date    FIBRINOGEN 381 12/18/2023     Lab Results   Component Value Date    PTT 25.3 01/30/2024    INR 1.01 01/30/2024     No results found for: \"\"  No results found for: \"CEA\"  No components found for: \"CA-19-9\"  No results found for: \"PSA\"  No results found for: \"SEDRATE\"       Assessment & Plan     Patient is a 63-year-old male with stage IIIb T3b RCC status post radical nephrectomy    Renal cell carcinoma  Status post nephrectomy, CT chest with no evidence of " metastasis questionable adrenal metastasis on scan status post adrenalectomy with no evidence of metastatic disease  Given patient's stage III clear-cell RCC, discussed adjuvant treatment with pembrolizumab based on the findings from keynote 564 trial with significant improvement in disease-free survival as compared to placebo for stage III clear-cell RCC.    Patient started immunotherapy , has G1 pruritus.     Pruritus  G1, continue immunotherapy  Use moisturizer    F/u in 3 weeks with next treatment, monitor for toxicity      Thank you very much for providing the opportunity to participate in this patient’s care. Please do not hesitate to call if there are any other questions.

## 2024-03-01 ENCOUNTER — HOSPITAL ENCOUNTER (OUTPATIENT)
Dept: ONCOLOGY | Facility: HOSPITAL | Age: 64
Discharge: HOME | End: 2024-03-01
Payer: COMMERCIAL

## 2024-03-01 ENCOUNTER — OFFICE VISIT (OUTPATIENT)
Dept: ONCOLOGY | Facility: CLINIC | Age: 64
End: 2024-03-01
Payer: COMMERCIAL

## 2024-03-01 VITALS
HEIGHT: 70 IN | DIASTOLIC BLOOD PRESSURE: 79 MMHG | BODY MASS INDEX: 30.55 KG/M2 | OXYGEN SATURATION: 97 % | HEART RATE: 61 BPM | RESPIRATION RATE: 14 BRPM | TEMPERATURE: 98.2 F | WEIGHT: 213.4 LBS | SYSTOLIC BLOOD PRESSURE: 147 MMHG

## 2024-03-01 DIAGNOSIS — C64.2 RENAL CELL CARCINOMA OF LEFT KIDNEY: Primary | ICD-10-CM

## 2024-03-01 DIAGNOSIS — Z95.828 PORT-A-CATH IN PLACE: ICD-10-CM

## 2024-03-01 DIAGNOSIS — C64.2 RENAL CELL CARCINOMA OF LEFT KIDNEY: ICD-10-CM

## 2024-03-01 DIAGNOSIS — C64.9 RENAL CELL CARCINOMA, UNSPECIFIED LATERALITY: Primary | ICD-10-CM

## 2024-03-01 LAB
ALP BLD-CCNC: 52 U/L (ref 53–128)
BASOPHILS # BLD AUTO: 0.04 10*3/MM3 (ref 0–0.2)
BASOPHILS NFR BLD AUTO: 0.5 % (ref 0–1.5)
BUN BLDA-MCNC: 21 MG/DL (ref 7–22)
CALCIUM BLD QL: 10 MG/DL (ref 8–10.3)
CHLORIDE BLDA-SCNC: 106 MMOL/L (ref 98–108)
CO2 BLDA-SCNC: 28 MMOL/L (ref 18–33)
CREAT BLDA-MCNC: 1.5 MG/DL (ref 0.6–1.2)
DEPRECATED RDW RBC AUTO: 50.3 FL (ref 37–54)
EGFRCR SERPLBLD CKD-EPI 2021: 51.3 ML/MIN/1.73
EOSINOPHIL # BLD AUTO: 0.27 10*3/MM3 (ref 0–0.4)
EOSINOPHIL NFR BLD AUTO: 3.7 % (ref 0.3–6.2)
ERYTHROCYTE [DISTWIDTH] IN BLOOD BY AUTOMATED COUNT: 16.3 % (ref 12.3–15.4)
GLUCOSE BLDC GLUCOMTR-MCNC: 107 MG/DL (ref 73–118)
HCT VFR BLD AUTO: 36.3 % (ref 37.5–51)
HGB BLD-MCNC: 11.5 G/DL (ref 13–17.7)
LYMPHOCYTES # BLD AUTO: 2.61 10*3/MM3 (ref 0.7–3.1)
LYMPHOCYTES NFR BLD AUTO: 35.9 % (ref 19.6–45.3)
MCH RBC QN AUTO: 27.1 PG (ref 26.6–33)
MCHC RBC AUTO-ENTMCNC: 31.7 G/DL (ref 31.5–35.7)
MCV RBC AUTO: 85.6 FL (ref 79–97)
MONOCYTES # BLD AUTO: 0.8 10*3/MM3 (ref 0.1–0.9)
MONOCYTES NFR BLD AUTO: 11 % (ref 5–12)
NEUTROPHILS NFR BLD AUTO: 3.56 10*3/MM3 (ref 1.7–7)
NEUTROPHILS NFR BLD AUTO: 48.9 % (ref 42.7–76)
PLATELET # BLD AUTO: 212 10*3/MM3 (ref 140–450)
PMV BLD AUTO: 10.9 FL (ref 6–12)
POC ALBUMIN: 3.3 G/L (ref 3.3–5.5)
POC ALT (SGPT): 14 U/L (ref 10–47)
POC AST (SGOT): 17 U/L (ref 11–38)
POC TOTAL BILIRUBIN: 1 MG/DL (ref 0.2–1.6)
POC TOTAL PROTEIN: 6.9 G/DL (ref 6.4–8.1)
POTASSIUM BLDA-SCNC: 3.8 MMOL/L (ref 3.6–5.1)
RBC # BLD AUTO: 4.24 10*6/MM3 (ref 4.14–5.8)
SODIUM BLD-SCNC: 146 MMOL/L (ref 128–145)
WBC NRBC COR # BLD AUTO: 7.28 10*3/MM3 (ref 3.4–10.8)

## 2024-03-01 PROCEDURE — 25010000002 HEPARIN LOCK FLUSH PER 10 UNITS: Performed by: INTERNAL MEDICINE

## 2024-03-01 PROCEDURE — 99214 OFFICE O/P EST MOD 30 MIN: CPT | Performed by: INTERNAL MEDICINE

## 2024-03-01 PROCEDURE — 80053 COMPREHEN METABOLIC PANEL: CPT

## 2024-03-01 PROCEDURE — 25810000003 SODIUM CHLORIDE 0.9 % SOLUTION: Performed by: INTERNAL MEDICINE

## 2024-03-01 PROCEDURE — 25010000002 PEMBROLIZUMAB 100 MG/4ML SOLUTION 4 ML VIAL: Performed by: INTERNAL MEDICINE

## 2024-03-01 PROCEDURE — 96413 CHEMO IV INFUSION 1 HR: CPT

## 2024-03-01 PROCEDURE — 85025 COMPLETE CBC W/AUTO DIFF WBC: CPT | Performed by: INTERNAL MEDICINE

## 2024-03-01 RX ORDER — SODIUM CHLORIDE 9 MG/ML
20 INJECTION, SOLUTION INTRAVENOUS ONCE
Qty: 250 ML | Refills: 0 | Status: COMPLETED | OUTPATIENT
Start: 2024-03-01 | End: 2024-03-01

## 2024-03-01 RX ORDER — SODIUM CHLORIDE 0.9 % (FLUSH) 0.9 %
10 SYRINGE (ML) INJECTION AS NEEDED
OUTPATIENT
Start: 2024-03-01

## 2024-03-01 RX ORDER — HEPARIN SODIUM (PORCINE) LOCK FLUSH IV SOLN 100 UNIT/ML 100 UNIT/ML
500 SOLUTION INTRAVENOUS AS NEEDED
Status: DISCONTINUED | OUTPATIENT
Start: 2024-03-01 | End: 2024-03-02 | Stop reason: HOSPADM

## 2024-03-01 RX ORDER — HEPARIN SODIUM (PORCINE) LOCK FLUSH IV SOLN 100 UNIT/ML 100 UNIT/ML
500 SOLUTION INTRAVENOUS AS NEEDED
Status: CANCELLED | OUTPATIENT
Start: 2024-03-01

## 2024-03-01 RX ORDER — SODIUM CHLORIDE 0.9 % (FLUSH) 0.9 %
10 SYRINGE (ML) INJECTION AS NEEDED
Status: DISCONTINUED | OUTPATIENT
Start: 2024-03-01 | End: 2024-03-02 | Stop reason: HOSPADM

## 2024-03-01 RX ORDER — SODIUM CHLORIDE 0.9 % (FLUSH) 0.9 %
10 SYRINGE (ML) INJECTION AS NEEDED
Status: CANCELLED | OUTPATIENT
Start: 2024-03-01

## 2024-03-01 RX ORDER — SODIUM CHLORIDE 9 MG/ML
20 INJECTION, SOLUTION INTRAVENOUS ONCE
Status: CANCELLED | OUTPATIENT
Start: 2024-03-01

## 2024-03-01 RX ORDER — HEPARIN SODIUM (PORCINE) LOCK FLUSH IV SOLN 100 UNIT/ML 100 UNIT/ML
500 SOLUTION INTRAVENOUS AS NEEDED
OUTPATIENT
Start: 2024-03-01

## 2024-03-01 RX ADMIN — SODIUM CHLORIDE 200 MG: 9 INJECTION, SOLUTION INTRAVENOUS at 12:34

## 2024-03-01 RX ADMIN — SODIUM CHLORIDE 20 ML/HR: 9 INJECTION, SOLUTION INTRAVENOUS at 12:34

## 2024-03-01 RX ADMIN — Medication 10 ML: at 12:03

## 2024-03-01 RX ADMIN — Medication 10 ML: at 13:11

## 2024-03-01 RX ADMIN — HEPARIN 500 UNITS: 100 SYRINGE at 13:11

## 2024-03-18 NOTE — PROGRESS NOTES
HEMATOLOGY ONCOLOGY OUTPATIENT FOLLOW UP       Patient name: Louis Andino  : 1960  MRN: [id number]  Primary Care Physician: Harry Hsu MD  Referring Physician: No ref. provider found  Reason For Consult: Renal cell carcinoma      History of Present Illness:  Patient is a 63 y.o. male with RCC.    Patient initially presented with hematuria.  During hospitalization he had a CT of his abdomen which showed a large left lower pole renal mass with possible adrenal metastasis.    2023 CT abdomen pelvis with contrast showing mass arising from the lower pole of the left kidney consistent with renal cell carcinoma.  Associated uroepithelial thickening of the pelvis and proximal ureter.  Enhancing indeterminate left adrenal nodule potential metastasis no retroperitoneal lymphadenopathy.    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  Vessel with clear-cell renal cell carcinoma tumor thrombus    24 - pembrolizumab  3/1/24 - pembro    Subjective:  Has ongoing pruritus, no rash, no diarrhea      Past Medical History:   Diagnosis Date    Anemia 2023    Due to surgery. Required transfusions    Asthma     Emphysema/COPD     Erectile dysfunction Several years    GERD (gastroesophageal reflux disease)     HL (hearing loss)     Progressive worsening over many years    Hyperglycemia 10/12/2023    Hyperlipidemia 10/12/2023    Hypertension     Prostate disease     Renal cell carcinoma 2023    Vitreous degeneration of right eye     Formatting of this note might be different from the original. Retinal tear and detachment warning symptoms reviewed and  patient instructed to call immediately if increasing floaters, flashes, or decreasing peripheral vision. No retinal detachment or retinal tear noted. Recheck in 1 month with dilated exam.       Past Surgical History:   Procedure Laterality Date    ABDOMINAL SURGERY  December 18, 2023    Left Kidney and Adrenal Gland removed due to Renal Cell Carcinoma    NEPHRECTOMY Left 12/18/2023    Procedure: NEPHRECTOMY RADICAL WITH CAVAL THROMBECTOMY;  Surgeon: James Esquivel MD;  Location: [facility] OR;  Service: Urology;  Laterality: Left;    SKIN LESION EXCISION  1990         Current Outpatient Medications:     budesonide (PULMICORT) 0.5 MG/2ML nebulizer solution, Take 2 mL by nebulization Daily., Disp: 20 mL, Rfl: 3    Diclofenac Sodium (VOLTAREN) 1 % gel gel, Apply 4 g topically to the appropriate area as directed 4 (Four) Times a Day., Disp: 50 g, Rfl: 0    hydrocortisone (ANUSOL-HC) 25 MG suppository, Insert 1 suppository into the rectum 2 (Two) Times a Day., Disp: 20 suppository, Rfl: 0    ipratropium-albuterol (COMBIVENT RESPIMAT)  MCG/ACT inhaler, Inhale 1 puff 4 (Four) Times a Day As Needed for Wheezing., Disp: , Rfl:     lidocaine-prilocaine (EMLA) 2.5-2.5 % cream, Apply 1 Application topically to the appropriate area as directed See Admin Instructions. Apply to port site one hour prior to port being accessed., Disp: 30 g, Rfl: 3    losartan (COZAAR) 100 MG tablet, Take 1 tablet by mouth Daily., Disp: 30 tablet, Rfl: 0    lubiprostone (Amitiza) 8 MCG capsule, Take 1 capsule by mouth 2 (Two) Times a Day With Meals., Disp: 60 capsule, Rfl: 2    magnesium oxide (MAG-OX) 400 MG tablet, Take 1 tablet by mouth Daily., Disp: , Rfl:     metoprolol succinate XL (TOPROL-XL) 25 MG 24 hr tablet, Take 2 tablets by mouth Daily., Disp: 60 tablet, Rfl: 1    naloxone (NARCAN) 4 MG/0.1ML nasal spray, Call 911. Don't prime. [place] in 1 nostril for overdose. Repeat in 2-3 minutes in other nostril if no or minimal  breathing/responsiveness., Disp: 2 each, Rfl: 0    ondansetron (ZOFRAN) 8 MG tablet, Take 1 tablet by mouth Every 8 (Eight) Hours As Needed for Nausea or Vomiting., Disp: 30 tablet, Rfl: 2    oxyCODONE-acetaminophen (PERCOCET) 5-325 MG per tablet, Take 1 tablet by mouth Every 12 (Twelve) Hours As Needed for Severe Pain., Disp: 30 tablet, Rfl: 0    pantoprazole (PROTONIX) 40 MG EC tablet, TAKE 1 TABLET BY MOUTH DAILY, Disp: 90 tablet, Rfl: 0    pantoprazole (PROTONIX) 40 MG EC tablet, Take 1 tablet by mouth Daily., Disp: 30 tablet, Rfl: 0    sucralfate (CARAFATE) 1 g tablet, Take 1 tablet by mouth 3 (Three) Times a Day Before Meals., Disp: 90 tablet, Rfl: 0    Symbicort 160-4.5 MCG/ACT inhaler, Inhale 2 puffs 2 (Two) Times a Day., Disp: , Rfl:     vitamin D3 125 MCG (5000 UT) capsule capsule, Take 1 capsule by mouth Daily., Disp: , Rfl:     No Known Allergies    Family History   Problem Relation Age of Onset    Arthritis Mother     Cancer Mother     Diabetes Mother     Heart disease Mother     Hyperlipidemia Mother     Miscarriages / Stillbirths Mother         Stillborn child    COPD Father     Hyperlipidemia Father     Hyperlipidemia Brother        Cancer-related family history includes Cancer in his mother.      Social History     Tobacco Use    Smoking status: Never     Passive exposure: Past    Smokeless tobacco: Never    Tobacco comments:     SECOND HAND SMOKE EXPOSURE AS A CHILD    Vaping Use    Vaping status: Never Used   Substance Use Topics    Alcohol use: Yes     Alcohol/week: 1.0 standard drink of alcohol     Types: 1 Glasses of wine per week     Comment: rare    Drug use: Never     Social History     Social History Narrative    Not on file       ROS:   Review of Systems   Constitutional:  Negative for fatigue and fever.   HENT:  Negative for congestion and nosebleeds.    Eyes:  Negative for pain.   Respiratory:  Negative for cough and shortness of breath.    Cardiovascular:  Negative for chest pain.  "  Gastrointestinal:  Negative for abdominal pain, blood in stool, diarrhea, nausea and vomiting.   Endocrine: Negative for cold intolerance and heat intolerance.   Genitourinary:  Negative for difficulty urinating.   Musculoskeletal:  Negative for arthralgias.   Skin:  Negative for rash.   Neurological:  Negative for dizziness and headaches.   Hematological:  Does not bruise/bleed easily.   Psychiatric/Behavioral:  Negative for behavioral problems.    pruritis    Objective:    Vital Signs:  Vitals:    03/22/24 1057   BP: 160/87   Pulse: 54   Resp: 18   Temp: 99.2 °F (37.3 °C)   TempSrc: Temporal   SpO2: 98%   Weight: 98.6 kg (217 lb 6.4 oz)   Height: 177.8 cm (70\")   PainSc: 4  Comment: right leg, hip and back         Body mass index is 31.19 kg/m².    ECOG  (0) Fully active, able to carry on all predisease performance without restriction    Physical Exam:   Physical Exam  Constitutional:       Appearance: Normal appearance.   HENT:      Head: Normocephalic and atraumatic.   Eyes:      Pupils: Pupils are equal, round, and reactive to light.   Cardiovascular:      Rate and Rhythm: Normal rate and regular rhythm.      Pulses: Normal pulses.      Heart sounds: No murmur heard.  Pulmonary:      Effort: Pulmonary effort is normal.      Breath sounds: Normal breath sounds.   Abdominal:      General: There is no distension.      Palpations: Abdomen is soft. There is no mass.      Tenderness: There is no abdominal tenderness.   Musculoskeletal:         General: Normal range of motion.      Cervical back: Normal range of motion and neck supple.   Skin:     General: Skin is warm.   Neurological:      General: No focal deficit present.      Mental Status: He is alert.   Psychiatric:         Mood and Affect: Mood normal.         Lab Results - Last 18 Months   Lab Units 03/22/24  1036 03/01/24  1131 02/09/24  1332   WBC 10*3/mm3 7.28 7.28 8.23   HEMOGLOBIN g/dL 11.4* 11.5* 11.1*   HEMATOCRIT % 35.9* 36.3* 35.4*   PLATELETS " "10*3/mm3 195 212 254   MCV fL 85.9 85.6 86.1     Lab Results - Last 18 Months   Lab Units 03/22/24  1040 03/01/24  1158 02/09/24  1411 01/09/24  1544 12/30/23  0328 12/29/23  1008 12/28/23  2344   SODIUM mmol/L  --   --   --  140 137 135* 136   POTASSIUM mmol/L  --   --   --  4.3 4.3 3.9 4.2   CHLORIDE mmol/L  --   --   --  102 98 96* 99   CO2 mmol/L  --   --   --  27.6 29.0 30.0* 30.0*   BUN mg/dL  --   --   --  15 13 13 12   CREATININE mg/dL 1.50* 1.50* 1.60* 1.62* 1.42* 1.45* 1.41*   CALCIUM mg/dL  --   --   --  10.1 9.0 9.0 8.7   BILIRUBIN mg/dL  --   --   --  0.8 0.8  --  0.8   ALK PHOS U/L  --   --   --  94 88  --  80   ALT (SGPT) U/L  --   --   --  48* 33  --  26   AST (SGOT) U/L  --   --   --  25 34  --  27   GLUCOSE mg/dL  --   --   --  100* 109* 105* 91       Lab Results   Component Value Date    GLUCOSE 100 (H) 01/09/2024    BUN 15 01/09/2024    CREATININE 1.50 (H) 03/22/2024    BCR 9.3 01/09/2024    K 4.3 01/09/2024    CO2 27.6 01/09/2024    CALCIUM 10.1 01/09/2024    PROTENTOTREF 7.0 01/09/2024    ALBUMIN 3.8 01/09/2024    LABIL2 1.2 01/09/2024    AST 25 01/09/2024    ALT 48 (H) 01/09/2024       Lab Results - Last 18 Months   Lab Units 01/30/24  0827 12/18/23  1722 12/18/23  1203   INR  1.01 1.08 1.24*   APTT seconds 25.3 28.7 58.7*       Lab Results   Component Value Date    IRON 12 (L) 12/20/2023    TIBC 145 (L) 12/20/2023       No results found for: \"FOLATE\"    No results found for: \"OCCULTBLD\"    No results found for: \"RETICCTPCT\"  No results found for: \"TCLJCJJM80\"  No results found for: \"SPEP\", \"UPEP\"  No results found for: \"LDH\", \"URICACID\"  No results found for: \"OSKAR\", \"RF\", \"SEDRATE\"  Lab Results   Component Value Date    FIBRINOGEN 381 12/18/2023     Lab Results   Component Value Date    PTT 25.3 01/30/2024    INR 1.01 01/30/2024     No results found for: \"\"  No results found for: \"CEA\"  No components found for: \"CA-19-9\"  No results found for: \"PSA\"  No results found for: \"SEDRATE\" "       Assessment & Plan     Patient is a 63-year-old male with stage IIIb T3b RCC status post radical nephrectomy    Renal cell carcinoma  Status post nephrectomy, CT chest with no evidence of metastasis questionable adrenal metastasis on scan status post adrenalectomy with no evidence of metastatic disease  Given patient's stage III clear-cell RCC, discussed adjuvant treatment with pembrolizumab based on the findings from keynote 564 trial with significant improvement in disease-free survival as compared to placebo for stage III clear-cell RCC.    Started Immunotherapy, G1 - pruritus otherwise good tolerance. Continue with same treatment today and increase dose to 400 mg with next treatment    Pruritus  G1, continue immunotherapy  Use moisturizer stable    Continue treatment      Thank you very much for providing the opportunity to participate in this patient’s care. Please do not hesitate to call if there are any other questions.

## 2024-03-21 DIAGNOSIS — C64.2 RENAL CELL CARCINOMA OF LEFT KIDNEY: Primary | ICD-10-CM

## 2024-03-22 ENCOUNTER — HOSPITAL ENCOUNTER (OUTPATIENT)
Dept: ONCOLOGY | Facility: HOSPITAL | Age: 64
Discharge: HOME | End: 2024-03-22
Payer: MEDICARE

## 2024-03-22 ENCOUNTER — OFFICE VISIT (OUTPATIENT)
Dept: ONCOLOGY | Facility: CLINIC | Age: 64
End: 2024-03-22
Payer: MEDICARE

## 2024-03-22 VITALS
HEART RATE: 54 BPM | WEIGHT: 217.4 LBS | BODY MASS INDEX: 31.12 KG/M2 | RESPIRATION RATE: 18 BRPM | DIASTOLIC BLOOD PRESSURE: 87 MMHG | HEIGHT: 70 IN | SYSTOLIC BLOOD PRESSURE: 160 MMHG | OXYGEN SATURATION: 98 % | TEMPERATURE: 99.2 F

## 2024-03-22 DIAGNOSIS — Z95.828 PORT-A-CATH IN PLACE: ICD-10-CM

## 2024-03-22 DIAGNOSIS — C64.2 RENAL CELL CARCINOMA OF LEFT KIDNEY: Primary | ICD-10-CM

## 2024-03-22 LAB
ALP BLD-CCNC: 62 U/L (ref 53–128)
BASOPHILS # BLD AUTO: 0.05 10*3/MM3 (ref 0–0.2)
BASOPHILS NFR BLD AUTO: 0.7 % (ref 0–1.5)
BUN BLDA-MCNC: 29 MG/DL (ref 7–22)
CALCIUM BLD QL: 9.8 MG/DL (ref 8–10.3)
CHLORIDE BLDA-SCNC: 107 MMOL/L (ref 98–108)
CO2 BLDA-SCNC: 25 MMOL/L (ref 18–33)
CREAT BLDA-MCNC: 1.5 MG/DL (ref 0.6–1.2)
DEPRECATED RDW RBC AUTO: 47.5 FL (ref 37–54)
EGFRCR SERPLBLD CKD-EPI 2021: 51.3 ML/MIN/1.73
EOSINOPHIL # BLD AUTO: 0.28 10*3/MM3 (ref 0–0.4)
EOSINOPHIL NFR BLD AUTO: 3.8 % (ref 0.3–6.2)
ERYTHROCYTE [DISTWIDTH] IN BLOOD BY AUTOMATED COUNT: 15.4 % (ref 12.3–15.4)
GLUCOSE BLDC GLUCOMTR-MCNC: 115 MG/DL (ref 73–118)
HCT VFR BLD AUTO: 35.9 % (ref 37.5–51)
HGB BLD-MCNC: 11.4 G/DL (ref 13–17.7)
LYMPHOCYTES # BLD AUTO: 2.49 10*3/MM3 (ref 0.7–3.1)
LYMPHOCYTES NFR BLD AUTO: 34.2 % (ref 19.6–45.3)
MCH RBC QN AUTO: 27.3 PG (ref 26.6–33)
MCHC RBC AUTO-ENTMCNC: 31.8 G/DL (ref 31.5–35.7)
MCV RBC AUTO: 85.9 FL (ref 79–97)
MONOCYTES # BLD AUTO: 0.67 10*3/MM3 (ref 0.1–0.9)
MONOCYTES NFR BLD AUTO: 9.2 % (ref 5–12)
NEUTROPHILS NFR BLD AUTO: 3.79 10*3/MM3 (ref 1.7–7)
NEUTROPHILS NFR BLD AUTO: 52.1 % (ref 42.7–76)
PLATELET # BLD AUTO: 195 10*3/MM3 (ref 140–450)
PMV BLD AUTO: 10.4 FL (ref 6–12)
POC ALBUMIN: 3.3 G/L (ref 3.3–5.5)
POC ALT (SGPT): 13 U/L (ref 10–47)
POC AST (SGOT): 15 U/L (ref 11–38)
POC TOTAL BILIRUBIN: 0.9 MG/DL (ref 0.2–1.6)
POC TOTAL PROTEIN: 6.9 G/DL (ref 6.4–8.1)
POTASSIUM BLDA-SCNC: 3.9 MMOL/L (ref 3.6–5.1)
RBC # BLD AUTO: 4.18 10*6/MM3 (ref 4.14–5.8)
SODIUM BLD-SCNC: 143 MMOL/L (ref 128–145)
T4 FREE SERPL-MCNC: 1.36 NG/DL (ref 0.93–1.7)
TSH SERPL DL<=0.05 MIU/L-ACNC: 1.52 UIU/ML (ref 0.27–4.2)
WBC NRBC COR # BLD AUTO: 7.28 10*3/MM3 (ref 3.4–10.8)

## 2024-03-22 PROCEDURE — 25010000002 PEMBROLIZUMAB 100 MG/4ML SOLUTION 4 ML VIAL: Performed by: INTERNAL MEDICINE

## 2024-03-22 PROCEDURE — 36591 DRAW BLOOD OFF VENOUS DEVICE: CPT

## 2024-03-22 PROCEDURE — 80053 COMPREHEN METABOLIC PANEL: CPT

## 2024-03-22 PROCEDURE — 25010000002 HEPARIN LOCK FLUSH PER 10 UNITS: Performed by: INTERNAL MEDICINE

## 2024-03-22 PROCEDURE — 84439 ASSAY OF FREE THYROXINE: CPT | Performed by: INTERNAL MEDICINE

## 2024-03-22 PROCEDURE — 96413 CHEMO IV INFUSION 1 HR: CPT

## 2024-03-22 PROCEDURE — 84443 ASSAY THYROID STIM HORMONE: CPT | Performed by: INTERNAL MEDICINE

## 2024-03-22 PROCEDURE — 85025 COMPLETE CBC W/AUTO DIFF WBC: CPT | Performed by: INTERNAL MEDICINE

## 2024-03-22 RX ORDER — SODIUM CHLORIDE 0.9 % (FLUSH) 0.9 %
10 SYRINGE (ML) INJECTION AS NEEDED
Status: CANCELLED | OUTPATIENT
Start: 2024-03-22

## 2024-03-22 RX ORDER — SODIUM CHLORIDE 0.9 % (FLUSH) 0.9 %
10 SYRINGE (ML) INJECTION AS NEEDED
Status: DISCONTINUED | OUTPATIENT
Start: 2024-03-22 | End: 2024-03-23 | Stop reason: HOSPADM

## 2024-03-22 RX ORDER — HEPARIN SODIUM (PORCINE) LOCK FLUSH IV SOLN 100 UNIT/ML 100 UNIT/ML
500 SOLUTION INTRAVENOUS AS NEEDED
OUTPATIENT
Start: 2024-03-22

## 2024-03-22 RX ORDER — HEPARIN SODIUM (PORCINE) LOCK FLUSH IV SOLN 100 UNIT/ML 100 UNIT/ML
500 SOLUTION INTRAVENOUS AS NEEDED
Status: CANCELLED | OUTPATIENT
Start: 2024-03-22

## 2024-03-22 RX ORDER — SODIUM CHLORIDE 9 MG/ML
20 INJECTION, SOLUTION INTRAVENOUS ONCE
Status: CANCELLED | OUTPATIENT
Start: 2024-03-22

## 2024-03-22 RX ORDER — HEPARIN SODIUM (PORCINE) LOCK FLUSH IV SOLN 100 UNIT/ML 100 UNIT/ML
500 SOLUTION INTRAVENOUS AS NEEDED
Status: DISCONTINUED | OUTPATIENT
Start: 2024-03-22 | End: 2024-03-23 | Stop reason: HOSPADM

## 2024-03-22 RX ORDER — SODIUM CHLORIDE 0.9 % (FLUSH) 0.9 %
10 SYRINGE (ML) INJECTION AS NEEDED
OUTPATIENT
Start: 2024-03-22

## 2024-03-22 RX ORDER — SODIUM CHLORIDE 9 MG/ML
20 INJECTION, SOLUTION INTRAVENOUS ONCE
Status: COMPLETED | OUTPATIENT
Start: 2024-03-22 | End: 2024-03-22

## 2024-03-22 RX ADMIN — SODIUM CHLORIDE 200 MG: 9 INJECTION, SOLUTION INTRAVENOUS at 12:12

## 2024-03-22 RX ADMIN — SODIUM CHLORIDE 20 ML/HR: 9 INJECTION, SOLUTION INTRAVENOUS at 12:10

## 2024-03-22 RX ADMIN — Medication 10 ML: at 10:38

## 2024-03-22 RX ADMIN — Medication 10 ML: at 12:47

## 2024-03-22 RX ADMIN — HEPARIN 500 UNITS: 100 SYRINGE at 12:47

## 2024-03-22 NOTE — ADDENDUM NOTE
Encounter addended by: Karen Lomeli, RN on: 3/22/2024 11:48 AM   Actions taken: Order list changed, Visit diagnoses modified, Diagnosis association updated, MAR administration accepted, Therapy plan modified

## 2024-03-22 NOTE — PROGRESS NOTES
Pt to infusion clinic after Dr. Prakash f/u appointment.  Port already accessed and positive blood return noted.  Tx given per MAR.  Pt tolerated well.  Pt d/c home with AVS given.

## 2024-03-22 NOTE — PROGRESS NOTES
Pt here to get port accessed, labs drawn, MD f/u and infusion. Port accessed and flushed with good blood return noted. 10cc of blood wasted prior to specimen collection. Blood specimen obtained and sent to lab for processing per protocol.  Port left access for infusion. Pt placed back in the waiting room and [facility] [place] [facility] notified.

## 2024-03-25 RX ORDER — LOSARTAN POTASSIUM 100 MG/1
100 TABLET ORAL
Qty: 30 TABLET | Refills: 0 | Status: SHIPPED | OUTPATIENT
Start: 2024-03-25

## 2024-04-09 ENCOUNTER — OFFICE VISIT (OUTPATIENT)
Dept: INTERNAL MEDICINE | Facility: CLINIC | Age: 64
End: 2024-04-09
Payer: MEDICARE

## 2024-04-09 ENCOUNTER — HOSPITAL ENCOUNTER (OUTPATIENT)
Facility: HOSPITAL | Age: 64
Discharge: HOME | End: 2024-04-09
Admitting: STUDENT IN AN ORGANIZED HEALTH CARE EDUCATION/TRAINING PROGRAM
Payer: COMMERCIAL

## 2024-04-09 VITALS
HEIGHT: 70 IN | SYSTOLIC BLOOD PRESSURE: 140 MMHG | TEMPERATURE: 98 F | DIASTOLIC BLOOD PRESSURE: 78 MMHG | WEIGHT: 221.2 LBS | BODY MASS INDEX: 31.67 KG/M2 | HEART RATE: 54 BPM

## 2024-04-09 DIAGNOSIS — M54.9 CHRONIC BACK PAIN, UNSPECIFIED BACK LOCATION, UNSPECIFIED BACK PAIN LATERALITY: Primary | ICD-10-CM

## 2024-04-09 DIAGNOSIS — G89.29 CHRONIC BACK PAIN, UNSPECIFIED BACK LOCATION, UNSPECIFIED BACK PAIN LATERALITY: Primary | ICD-10-CM

## 2024-04-09 DIAGNOSIS — I10 HYPERTENSION, UNSPECIFIED TYPE: Chronic | ICD-10-CM

## 2024-04-09 DIAGNOSIS — N18.31 STAGE 3A CHRONIC KIDNEY DISEASE: ICD-10-CM

## 2024-04-09 DIAGNOSIS — K21.9 GASTROESOPHAGEAL REFLUX DISEASE, UNSPECIFIED WHETHER ESOPHAGITIS PRESENT: Chronic | ICD-10-CM

## 2024-04-09 DIAGNOSIS — C64.2 RENAL CELL CARCINOMA OF LEFT KIDNEY: ICD-10-CM

## 2024-04-09 DIAGNOSIS — J43.9 PULMONARY EMPHYSEMA, UNSPECIFIED EMPHYSEMA TYPE: ICD-10-CM

## 2024-04-09 DIAGNOSIS — L98.9 SKIN LESIONS: ICD-10-CM

## 2024-04-09 PROCEDURE — 72100 X-RAY EXAM L-S SPINE 2/3 VWS: CPT

## 2024-04-09 NOTE — PROGRESS NOTES
"Chief Complaint  Hypertension    Subjective        Louis Andino presents to [facility] PRIMARY [facility]ARE  History of Present Illness    Mr. Andino presents to clinic today for follow-up of chronic medical conditions    He is overall doing very well since last visit and reports significant improvement in multiple symptoms.  He has not had any residual dyspnea on exertion as well as his strength is getting stronger as he is going through PT from hospitalization for renal cell carcinoma    He reports that he started immunotherapy for renal cell carcinoma and seems to be tolerating well.  He does report of some generalized pruritus but no rashes or other systemic symptoms    He does report of some hip pain that he perceives to be more so back pain            Review of Systems   Respiratory:  Negative for shortness of breath.    Cardiovascular:  Negative for chest pain.   Musculoskeletal:  Positive for arthralgias and back pain.   Skin:  Negative for rash.   Neurological:  Negative for weakness.        Objective   Vital Signs:  /78   Pulse 54   Temp 98 °F (36.7 °C)   Ht 177.8 cm (70\")   Wt 100 kg (221 lb 3.2 oz)   BMI 31.74 kg/m²   Estimated body mass index is 31.74 kg/m² as calculated from the following:    Height as of this encounter: 177.8 cm (70\").    Weight as of this encounter: 100 kg (221 lb 3.2 oz).               Physical Exam  Vitals reviewed.   Constitutional:       General: He is not in acute distress.     Appearance: Normal appearance. He is not toxic-appearing.   HENT:      Head: Normocephalic and atraumatic.   Eyes:      General: No scleral icterus.     Conjunctiva/sclera: Conjunctivae normal.   Skin:     General: Skin is warm and dry.   Neurological:      Mental Status: He is alert and oriented to person, place, and time.   Psychiatric:         Mood and Affect: Mood normal.         Behavior: Behavior normal.        Result Review :                   Assessment and Plan "   Diagnoses and all orders for this visit:    1. Chronic back pain, unspecified back location, unspecified back pain laterality (Primary)  -     XR Spine Lumbar 2 or 3 View  -     Ambulatory Referral to Physical Therapy  -     Ambulatory Referral to Orthopedic Surgery    2. Pulmonary emphysema, unspecified emphysema type  -     Complete PFT - Pre & Post Bronchodilator; Future    3. Skin lesions  -     Ambulatory Referral to Dermatology    4. Renal cell carcinoma of left kidney    5. Gastroesophageal reflux disease, unspecified whether esophagitis present  -     pantoprazole (PROTONIX) 40 MG EC tablet; Take 1 tablet by mouth Daily.  Dispense: 90 tablet; Refill: 0  -     sucralfate (CARAFATE) 1 g tablet; Take 1 tablet by mouth 3 (Three) Times a Day Before Meals.  Dispense: 90 tablet; Refill: 0    6. Hypertension, unspecified type  -     metoprolol succinate XL (TOPROL-XL) 25 MG 24 hr tablet; Take 2 tablets by mouth Daily.  Dispense: 60 tablet; Refill: 1  -     losartan (COZAAR) 100 MG tablet; Take 1 tablet by mouth Daily.  Dispense: 30 tablet; Refill: 0      Overall, doing well and improved since last visit. Reviewed oncology OV as well as most recent labs    Appears to have some component of CKD3a now from nephrectomy. Overall doing well without urinary sx, continue to  monitor renal function. Most recent labs demonstrate stable eGFR    Primary complaint is back pain with referred pain to his right hip. Will obtain plain films and refer to PT, he is currently getting PT just for generalized weakness from hospital stay in Jan and has some improvement so suspect he will benefit from isolated exercises for back pain    Will refill appropriate medications for HTN and GERD           Follow Up   Return in about 4 months (around 8/9/2024) for Medicare Wellness.  Patient was given instructions and counseling regarding his condition or for health maintenance advice. Please see specific information pulled into the AVS if  appropriate.

## 2024-04-10 RX ORDER — METOPROLOL SUCCINATE 25 MG/1
50 TABLET, EXTENDED RELEASE ORAL
Qty: 60 TABLET | Refills: 1 | Status: SHIPPED | OUTPATIENT
Start: 2024-04-10

## 2024-04-10 RX ORDER — SUCRALFATE 1 G/1
1 TABLET ORAL
Qty: 90 TABLET | Refills: 0 | Status: SHIPPED | OUTPATIENT
Start: 2024-04-10

## 2024-04-10 RX ORDER — LOSARTAN POTASSIUM 100 MG/1
100 TABLET ORAL
Qty: 30 TABLET | Refills: 0 | Status: SHIPPED | OUTPATIENT
Start: 2024-04-10

## 2024-04-10 RX ORDER — PANTOPRAZOLE SODIUM 40 MG/1
40 TABLET, DELAYED RELEASE ORAL DAILY
Qty: 90 TABLET | Refills: 0 | Status: SHIPPED | OUTPATIENT
Start: 2024-04-10

## 2024-04-12 ENCOUNTER — HOSPITAL ENCOUNTER (OUTPATIENT)
Dept: ONCOLOGY | Facility: HOSPITAL | Age: 64
Discharge: HOME | End: 2024-04-12
Payer: MEDICARE

## 2024-04-12 VITALS
DIASTOLIC BLOOD PRESSURE: 75 MMHG | BODY MASS INDEX: 31.07 KG/M2 | WEIGHT: 217 LBS | OXYGEN SATURATION: 97 % | HEART RATE: 62 BPM | TEMPERATURE: 98.2 F | HEIGHT: 70 IN | SYSTOLIC BLOOD PRESSURE: 137 MMHG | RESPIRATION RATE: 16 BRPM

## 2024-04-12 DIAGNOSIS — C64.2 RENAL CELL CARCINOMA OF LEFT KIDNEY: Primary | ICD-10-CM

## 2024-04-12 DIAGNOSIS — Z95.828 PORT-A-CATH IN PLACE: ICD-10-CM

## 2024-04-12 LAB
ALP BLD-CCNC: 64 U/L (ref 53–128)
BASOPHILS # BLD AUTO: 0.04 10*3/MM3 (ref 0–0.2)
BASOPHILS NFR BLD AUTO: 0.5 % (ref 0–1.5)
BUN BLDA-MCNC: 18 MG/DL (ref 7–22)
CALCIUM BLD QL: 10.3 MG/DL (ref 8–10.3)
CHLORIDE BLDA-SCNC: 106 MMOL/L (ref 98–108)
CO2 BLDA-SCNC: 29 MMOL/L (ref 18–33)
CREAT BLDA-MCNC: 1.4 MG/DL (ref 0.6–1.2)
DEPRECATED RDW RBC AUTO: 44.9 FL (ref 37–54)
EGFRCR SERPLBLD CKD-EPI 2021: 55.8 ML/MIN/1.73
EOSINOPHIL # BLD AUTO: 0.31 10*3/MM3 (ref 0–0.4)
EOSINOPHIL NFR BLD AUTO: 4.2 % (ref 0.3–6.2)
ERYTHROCYTE [DISTWIDTH] IN BLOOD BY AUTOMATED COUNT: 14.9 % (ref 12.3–15.4)
GLUCOSE BLDC GLUCOMTR-MCNC: 94 MG/DL (ref 73–118)
HCT VFR BLD AUTO: 38.1 % (ref 37.5–51)
HGB BLD-MCNC: 12.1 G/DL (ref 13–17.7)
LYMPHOCYTES # BLD AUTO: 2.49 10*3/MM3 (ref 0.7–3.1)
LYMPHOCYTES NFR BLD AUTO: 33.5 % (ref 19.6–45.3)
MCH RBC QN AUTO: 27.1 PG (ref 26.6–33)
MCHC RBC AUTO-ENTMCNC: 31.8 G/DL (ref 31.5–35.7)
MCV RBC AUTO: 85.4 FL (ref 79–97)
MONOCYTES # BLD AUTO: 0.88 10*3/MM3 (ref 0.1–0.9)
MONOCYTES NFR BLD AUTO: 11.8 % (ref 5–12)
NEUTROPHILS NFR BLD AUTO: 3.71 10*3/MM3 (ref 1.7–7)
NEUTROPHILS NFR BLD AUTO: 50 % (ref 42.7–76)
PLATELET # BLD AUTO: 199 10*3/MM3 (ref 140–450)
PMV BLD AUTO: 10.5 FL (ref 6–12)
POC ALBUMIN: 3.4 G/L (ref 3.3–5.5)
POC ALT (SGPT): 26 U/L (ref 10–47)
POC AST (SGOT): 52 U/L (ref 11–38)
POC TOTAL BILIRUBIN: 1.3 MG/DL (ref 0.2–1.6)
POC TOTAL PROTEIN: 7.3 G/DL (ref 6.4–8.1)
POTASSIUM BLDA-SCNC: 4.1 MMOL/L (ref 3.6–5.1)
RBC # BLD AUTO: 4.46 10*6/MM3 (ref 4.14–5.8)
SODIUM BLD-SCNC: 143 MMOL/L (ref 128–145)
T4 SERPL-MCNC: 7.69 MCG/DL (ref 4.5–11.7)
TSH SERPL DL<=0.05 MIU/L-ACNC: 1.27 UIU/ML (ref 0.27–4.2)
WBC NRBC COR # BLD AUTO: 7.43 10*3/MM3 (ref 3.4–10.8)

## 2024-04-12 PROCEDURE — 25810000003 SODIUM CHLORIDE 0.9 % SOLUTION: Performed by: INTERNAL MEDICINE

## 2024-04-12 PROCEDURE — 80053 COMPREHEN METABOLIC PANEL: CPT

## 2024-04-12 PROCEDURE — 25010000002 HEPARIN LOCK FLUSH PER 10 UNITS: Performed by: INTERNAL MEDICINE

## 2024-04-12 PROCEDURE — 84436 ASSAY OF TOTAL THYROXINE: CPT | Performed by: INTERNAL MEDICINE

## 2024-04-12 PROCEDURE — 84443 ASSAY THYROID STIM HORMONE: CPT | Performed by: INTERNAL MEDICINE

## 2024-04-12 PROCEDURE — 85025 COMPLETE CBC W/AUTO DIFF WBC: CPT | Performed by: INTERNAL MEDICINE

## 2024-04-12 PROCEDURE — 96413 CHEMO IV INFUSION 1 HR: CPT

## 2024-04-12 RX ORDER — HEPARIN SODIUM (PORCINE) LOCK FLUSH IV SOLN 100 UNIT/ML 100 UNIT/ML
500 SOLUTION INTRAVENOUS AS NEEDED
OUTPATIENT
Start: 2024-04-12

## 2024-04-12 RX ORDER — SODIUM CHLORIDE 0.9 % (FLUSH) 0.9 %
10 SYRINGE (ML) INJECTION AS NEEDED
Status: DISCONTINUED | OUTPATIENT
Start: 2024-04-12 | End: 2024-04-13 | Stop reason: HOSPADM

## 2024-04-12 RX ORDER — SODIUM CHLORIDE 0.9 % (FLUSH) 0.9 %
10 SYRINGE (ML) INJECTION AS NEEDED
OUTPATIENT
Start: 2024-04-12

## 2024-04-12 RX ORDER — HEPARIN SODIUM (PORCINE) LOCK FLUSH IV SOLN 100 UNIT/ML 100 UNIT/ML
500 SOLUTION INTRAVENOUS AS NEEDED
Status: DISCONTINUED | OUTPATIENT
Start: 2024-04-12 | End: 2024-04-13 | Stop reason: HOSPADM

## 2024-04-12 RX ORDER — SODIUM CHLORIDE 9 MG/ML
20 INJECTION, SOLUTION INTRAVENOUS ONCE
Status: COMPLETED | OUTPATIENT
Start: 2024-04-12 | End: 2024-04-12

## 2024-04-12 RX ADMIN — SODIUM CHLORIDE 20 ML/HR: 9 INJECTION, SOLUTION INTRAVENOUS at 11:39

## 2024-04-12 RX ADMIN — SODIUM CHLORIDE 400 MG: 9 INJECTION, SOLUTION INTRAVENOUS at 11:39

## 2024-04-12 RX ADMIN — HEPARIN 500 UNITS: 100 SYRINGE at 12:11

## 2024-04-12 RX ADMIN — Medication 10 ML: at 12:11

## 2024-04-18 ENCOUNTER — OFFICE VISIT (OUTPATIENT)
Dept: SPORTS MEDICINE | Facility: CLINIC | Age: 64
End: 2024-04-18
Payer: COMMERCIAL

## 2024-04-18 VITALS
SYSTOLIC BLOOD PRESSURE: 130 MMHG | HEIGHT: 70 IN | OXYGEN SATURATION: 95 % | WEIGHT: 217 LBS | TEMPERATURE: 96.7 F | BODY MASS INDEX: 31.07 KG/M2 | DIASTOLIC BLOOD PRESSURE: 88 MMHG | HEART RATE: 68 BPM

## 2024-04-18 DIAGNOSIS — M16.11 PRIMARY OSTEOARTHRITIS OF RIGHT HIP: Primary | ICD-10-CM

## 2024-04-18 NOTE — PROGRESS NOTES
"Louis is a 63 y.o. year old male     Chief Complaint   Patient presents with    Hip Pain     RIGHT HIP- patient is here for initial evaluation of chronic right hip pain, xray of lumbar spine due to lower back pain, 04/09.        History of Present Illness  History of Present Illness  The patient is here today for evaluation of chronic pain in the low back and right hip.    The patient, a retired physician, has been experiencing chronic low back and right hip pain for several years. He has been diagnosed with hip arthritis by orthopedic surgery, however, he is uncertain if this is the sole cause of his symptoms and whether to proceed with hip replacement surgery. The pain is localized around the right hip, anterolateral, and radiates down the thigh at various configurations. The pain sometimes radiates on the anterior, sometimes lateral, sometimes posterior, and sometimes medial to the thigh. Additionally, he experiences pain radiating in the posterior hip and pelvis towards the sacroiliac joint and into the low back. Despite undergoing physical therapy, there has been some improvement in his gait stability, but there has been unfortunately no significant ongoing benefits. His primary objective today is to determine if he should proceed with hip replacement surgery to improve the location of pain.    Supplemental Information  Of note, he recently was diagnosed with renal cell carcinoma treated surgically in 12/2023.    I have reviewed the patient's medical, family, and social history in detail and updated the computerized patient record.    Review of Systems    /88 (BP Location: Right arm, Patient Position: Sitting, Cuff Size: Adult)   Pulse 68   Temp 96.7 °F (35.9 °C) (Temporal)   Ht 177.8 cm (70\")   Wt 98.4 kg (217 lb)   SpO2 95%   BMI 31.14 kg/m²      Physical Exam    Vital signs reviewed.   General: No acute distress.      Physical Exam  The right hip appears normal, but there is a tendency towards " external rotation. There is tenderness when palpating the anterior aspect of the hip overlying the femoroacetabular joint. There is also some pain laterally at the greater trochanter, also posterior in the sciatic notch and the piriformis. There is milder tenderness at the sacroiliac joint. The range of motion is limited. Pain is present with hip flexion to 90 degrees in a supine position, requiring some abduction and external rotation to get to 90 degrees of flexion. Positive FADIR, positive impingement maneuver, negative LAUREN, positive Stinchfield, pain with internal rotation, logroll maneuver, and negative straight leg raise are present.    Results  Results  Imaging reviewed for independent interpretation  Two view lumbar spine performed 4/9/2025 shows some lumbar degenerative change with L5-S1 disc space narrowing and some facet arthrosis. AP view shows considerable arthritis in the right hip.   CT scan of the abdomen and pelvis performed on 12/21/2024 shows severe arthritis of the right hip with bone on bone changes in the superior aspect of the acetabulum, subarticular cyst formation and very extensive periarticular osteophyte formation on the inferior aspect of the femoral head.    Procedures     There are no diagnoses linked to this encounter.  Assessment & Plan    A comprehensive discussion was held with Dr. Andino today. Although it is unclear whether a hip replacement would alleviate all his symptoms, it is my belief that none of his musculoskeletal issues around his right hip and low back will likely improve without hip replacement. My suspicion is that his hip arthritis is the primary source of his problems, causing secondary gait abnormalities and stress on the right side of the hemipelvis at the sacroiliac joint.  I also suspect that his piriformis is being overloaded by pulling him in external rotation to avoid internal rotation pain due to his arthritis. The patient is considering this and will  continue his therapy, maintain his follow-up appointments with his oncology providers, and consider definitive treatment.     Follow-up  The patient is advised to follow up with me at any time on an as-needed basis.    Patient or patient representative verbalized consent for the use of Ambient Listening during the visit with  Matt Chavez MD for chart documentation. 4/18/2024  17:19 EDT  *Dictated after leaving exam room.   Matt Chavez MD   16:19 EDT   04/18/24

## 2024-04-19 ENCOUNTER — HOSPITAL ENCOUNTER (OUTPATIENT)
Dept: RESPIRATORY THERAPY | Facility: HOSPITAL | Age: 64
Discharge: HOME | End: 2024-04-19
Payer: COMMERCIAL

## 2024-04-19 ENCOUNTER — PATIENT ROUNDING (BHMG ONLY) (OUTPATIENT)
Dept: SPORTS MEDICINE | Facility: CLINIC | Age: 64
End: 2024-04-19
Payer: COMMERCIAL

## 2024-04-19 DIAGNOSIS — J43.9 PULMONARY EMPHYSEMA, UNSPECIFIED EMPHYSEMA TYPE: ICD-10-CM

## 2024-04-19 PROCEDURE — 94729 DIFFUSING CAPACITY: CPT

## 2024-04-19 PROCEDURE — 94060 EVALUATION OF WHEEZING: CPT

## 2024-04-19 PROCEDURE — 94726 PLETHYSMOGRAPHY LUNG VOLUMES: CPT

## 2024-04-19 RX ORDER — ALBUTEROL SULFATE 2.5 MG/3ML
2.5 SOLUTION RESPIRATORY (INHALATION) ONCE AS NEEDED
Status: COMPLETED | OUTPATIENT
Start: 2024-04-19 | End: 2024-04-19

## 2024-04-19 RX ADMIN — ALBUTEROL SULFATE 2.5 MG: 2.5 SOLUTION RESPIRATORY (INHALATION) at 15:22

## 2024-04-19 NOTE — PROGRESS NOTES
April 19, 2024    A [employer] Message has been sent to the patient for PATIENT ROUNDING with [facility]

## 2024-04-29 ENCOUNTER — TELEPHONE (OUTPATIENT)
Dept: ONCOLOGY | Facility: CLINIC | Age: 64
End: 2024-04-29

## 2024-04-29 DIAGNOSIS — J45.40 MODERATE PERSISTENT ASTHMA WITHOUT COMPLICATION: Chronic | ICD-10-CM

## 2024-04-29 RX ORDER — BUDESONIDE 0.5 MG/2ML
INHALANT ORAL
Qty: 60 ML | Refills: 0 | Status: SHIPPED | OUTPATIENT
Start: 2024-04-29

## 2024-04-29 NOTE — TELEPHONE ENCOUNTER
Caller: Louis Andino    Relationship to patient: Self    Best call back number:     [phone]     Chief complaint: PT DIDN'T THINK HE WAS STILL SUPPOSED TO HAVE AN APPT ON 05/03/24 AND WANTS TO DOUBLE CHECK THAT. HE THOUGHT IT WAS CANCELED. DOES HE NEED TO COME IN FOR THIS APPT OR SHOULD IT BE REMOVED.     Type of visit: VITALS AND FOLLOW UP    PLEASE ADVISE THE PT.

## 2024-05-09 DIAGNOSIS — I10 HYPERTENSION, UNSPECIFIED TYPE: Chronic | ICD-10-CM

## 2024-05-09 RX ORDER — LOSARTAN POTASSIUM 100 MG/1
100 TABLET ORAL
Qty: 30 TABLET | Refills: 0 | Status: SHIPPED | OUTPATIENT
Start: 2024-05-09 | End: 2024-05-10 | Stop reason: SDUPTHER

## 2024-05-10 DIAGNOSIS — I10 HYPERTENSION, UNSPECIFIED TYPE: Chronic | ICD-10-CM

## 2024-05-10 RX ORDER — LOSARTAN POTASSIUM 100 MG/1
100 TABLET ORAL
Qty: 90 TABLET | Refills: 0 | Status: SHIPPED | OUTPATIENT
Start: 2024-05-10 | End: 2024-08-08

## 2024-05-24 ENCOUNTER — HOSPITAL ENCOUNTER (OUTPATIENT)
Dept: ONCOLOGY | Facility: HOSPITAL | Age: 64
Discharge: HOME | End: 2024-05-24
Payer: MEDICARE

## 2024-05-24 VITALS
RESPIRATION RATE: 14 BRPM | HEIGHT: 70 IN | BODY MASS INDEX: 31.81 KG/M2 | SYSTOLIC BLOOD PRESSURE: 148 MMHG | DIASTOLIC BLOOD PRESSURE: 84 MMHG | TEMPERATURE: 97.7 F | OXYGEN SATURATION: 98 % | HEART RATE: 62 BPM | WEIGHT: 222.2 LBS

## 2024-05-24 DIAGNOSIS — Z95.828 PORT-A-CATH IN PLACE: ICD-10-CM

## 2024-05-24 DIAGNOSIS — C64.2 RENAL CELL CARCINOMA OF LEFT KIDNEY: Primary | ICD-10-CM

## 2024-05-24 LAB
ALP BLD-CCNC: 69 U/L (ref 53–128)
BASOPHILS # BLD AUTO: 0.04 10*3/MM3 (ref 0–0.2)
BASOPHILS NFR BLD AUTO: 0.5 % (ref 0–1.5)
BUN BLDA-MCNC: 22 MG/DL (ref 7–22)
CALCIUM BLD QL: 9.8 MG/DL (ref 8–10.3)
CHLORIDE BLDA-SCNC: 105 MMOL/L (ref 98–108)
CO2 BLDA-SCNC: 29 MMOL/L (ref 18–33)
CREAT BLDA-MCNC: 1.4 MG/DL (ref 0.6–1.2)
DEPRECATED RDW RBC AUTO: 45.8 FL (ref 37–54)
EGFRCR SERPLBLD CKD-EPI 2021: 55.8 ML/MIN/1.73
EOSINOPHIL # BLD AUTO: 0.46 10*3/MM3 (ref 0–0.4)
EOSINOPHIL NFR BLD AUTO: 5.5 % (ref 0.3–6.2)
ERYTHROCYTE [DISTWIDTH] IN BLOOD BY AUTOMATED COUNT: 14.8 % (ref 12.3–15.4)
GLUCOSE BLDC GLUCOMTR-MCNC: 109 MG/DL (ref 73–118)
HCT VFR BLD AUTO: 38.9 % (ref 37.5–51)
HGB BLD-MCNC: 12.6 G/DL (ref 13–17.7)
LYMPHOCYTES # BLD AUTO: 2.61 10*3/MM3 (ref 0.7–3.1)
LYMPHOCYTES NFR BLD AUTO: 31.1 % (ref 19.6–45.3)
MCH RBC QN AUTO: 27.9 PG (ref 26.6–33)
MCHC RBC AUTO-ENTMCNC: 32.4 G/DL (ref 31.5–35.7)
MCV RBC AUTO: 86.3 FL (ref 79–97)
MONOCYTES # BLD AUTO: 0.86 10*3/MM3 (ref 0.1–0.9)
MONOCYTES NFR BLD AUTO: 10.3 % (ref 5–12)
NEUTROPHILS NFR BLD AUTO: 4.42 10*3/MM3 (ref 1.7–7)
NEUTROPHILS NFR BLD AUTO: 52.6 % (ref 42.7–76)
PLATELET # BLD AUTO: 216 10*3/MM3 (ref 140–450)
PMV BLD AUTO: 10.7 FL (ref 6–12)
POC ALBUMIN: 3.4 G/L (ref 3.3–5.5)
POC ALT (SGPT): 18 U/L (ref 10–47)
POC AST (SGOT): 18 U/L (ref 11–38)
POC TOTAL BILIRUBIN: 1.5 MG/DL (ref 0.2–1.6)
POC TOTAL PROTEIN: 7.3 G/DL (ref 6.4–8.1)
POTASSIUM BLDA-SCNC: 3.8 MMOL/L (ref 3.6–5.1)
RBC # BLD AUTO: 4.51 10*6/MM3 (ref 4.14–5.8)
SODIUM BLD-SCNC: 145 MMOL/L (ref 128–145)
T4 FREE SERPL-MCNC: 1.42 NG/DL (ref 0.93–1.7)
TSH SERPL DL<=0.05 MIU/L-ACNC: 1.07 UIU/ML (ref 0.27–4.2)
WBC NRBC COR # BLD AUTO: 8.39 10*3/MM3 (ref 3.4–10.8)

## 2024-05-24 PROCEDURE — 85025 COMPLETE CBC W/AUTO DIFF WBC: CPT | Performed by: INTERNAL MEDICINE

## 2024-05-24 PROCEDURE — 80053 COMPREHEN METABOLIC PANEL: CPT

## 2024-05-24 PROCEDURE — 25010000002 PEMBROLIZUMAB 100 MG/4ML SOLUTION 4 ML VIAL: Performed by: STUDENT IN AN ORGANIZED HEALTH CARE EDUCATION/TRAINING PROGRAM

## 2024-05-24 PROCEDURE — 96413 CHEMO IV INFUSION 1 HR: CPT

## 2024-05-24 PROCEDURE — 25810000003 SODIUM CHLORIDE 0.9 % SOLUTION: Performed by: STUDENT IN AN ORGANIZED HEALTH CARE EDUCATION/TRAINING PROGRAM

## 2024-05-24 PROCEDURE — 84439 ASSAY OF FREE THYROXINE: CPT | Performed by: INTERNAL MEDICINE

## 2024-05-24 PROCEDURE — 25010000002 HEPARIN LOCK FLUSH PER 10 UNITS: Performed by: INTERNAL MEDICINE

## 2024-05-24 PROCEDURE — 84443 ASSAY THYROID STIM HORMONE: CPT | Performed by: INTERNAL MEDICINE

## 2024-05-24 RX ORDER — HEPARIN SODIUM (PORCINE) LOCK FLUSH IV SOLN 100 UNIT/ML 100 UNIT/ML
500 SOLUTION INTRAVENOUS AS NEEDED
OUTPATIENT
Start: 2024-05-24

## 2024-05-24 RX ORDER — SODIUM CHLORIDE 0.9 % (FLUSH) 0.9 %
10 SYRINGE (ML) INJECTION AS NEEDED
Status: DISCONTINUED | OUTPATIENT
Start: 2024-05-24 | End: 2024-05-25 | Stop reason: HOSPADM

## 2024-05-24 RX ORDER — SODIUM CHLORIDE 0.9 % (FLUSH) 0.9 %
10 SYRINGE (ML) INJECTION AS NEEDED
OUTPATIENT
Start: 2024-05-24

## 2024-05-24 RX ORDER — HEPARIN SODIUM (PORCINE) LOCK FLUSH IV SOLN 100 UNIT/ML 100 UNIT/ML
500 SOLUTION INTRAVENOUS AS NEEDED
Status: DISCONTINUED | OUTPATIENT
Start: 2024-05-24 | End: 2024-05-25 | Stop reason: HOSPADM

## 2024-05-24 RX ORDER — SODIUM CHLORIDE 9 MG/ML
20 INJECTION, SOLUTION INTRAVENOUS ONCE
Status: COMPLETED | OUTPATIENT
Start: 2024-05-24 | End: 2024-05-24

## 2024-05-24 RX ADMIN — HEPARIN 500 UNITS: 100 SYRINGE at 13:20

## 2024-05-24 RX ADMIN — SODIUM CHLORIDE 20 ML/HR: 9 INJECTION, SOLUTION INTRAVENOUS at 12:43

## 2024-05-24 RX ADMIN — Medication 10 ML: at 13:20

## 2024-05-24 RX ADMIN — SODIUM CHLORIDE 400 MG: 9 INJECTION, SOLUTION INTRAVENOUS at 12:47

## 2024-05-27 DIAGNOSIS — I10 HYPERTENSION, UNSPECIFIED TYPE: Chronic | ICD-10-CM

## 2024-05-28 RX ORDER — METOPROLOL SUCCINATE 25 MG/1
50 TABLET, EXTENDED RELEASE ORAL
Qty: 180 TABLET | Refills: 0 | Status: SHIPPED | OUTPATIENT
Start: 2024-05-28

## 2024-05-28 RX ORDER — LUBIPROSTONE 8 UG/1
8 CAPSULE ORAL 2 TIMES DAILY WITH MEALS
Qty: 60 CAPSULE | Refills: 2 | Status: SHIPPED | OUTPATIENT
Start: 2024-05-28

## 2024-05-31 NOTE — PROGRESS NOTES
HEMATOLOGY ONCOLOGY OUTPATIENT FOLLOW UP       Patient name: Louis Andino  : 1960  MRN: [id number]  Primary Care Physician: Harry Hsu MD  Referring Physician: No ref. provider found  Reason For Consult: Renal cell carcinoma      History of Present Illness:  Patient is a 63 y.o. male with RCC.    Patient initially presented with hematuria.  During hospitalization he had a CT of his abdomen which showed a large left lower pole renal mass with possible adrenal metastasis.    2023 CT abdomen pelvis with contrast showing mass arising from the lower pole of the left kidney consistent with renal cell carcinoma.  Associated uroepithelial thickening of the pelvis and proximal ureter.  Enhancing indeterminate left adrenal nodule potential metastasis no retroperitoneal lymphadenopathy.    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  Vessel with clear-cell renal cell carcinoma tumor thrombus    24 - pembrolizumab  3/1/24 - pembro  24 - increased pembro 400 mg    Subjective:  Ongoing pruritus      Past Medical History:   Diagnosis Date    Anemia 2023    Due to surgery. Required transfusions    Ankle sprain     Multiple-both ankles over the years    Asthma     Clotting disorder     Required blood transfusion during and after surgery in excess of expected    Coronary artery disease Dec 2023    Bifascicular block    Deep vein thrombosis 2023    During cancer surgery a clot was surgically removed from the inferior vena cava which was cancer related    Emphysema/COPD     Erectile dysfunction Several years    Fracture of ankle     Left ankle  as a child    Fracture of wrist     Left wrist as a child    GERD (gastroesophageal reflux disease)     HL (hearing loss)     Progressive worsening over many years    Hyperglycemia 10/12/2023    Hyperlipidemia 10/12/2023    Hypertension     Injury of neck 2023    Previous fractures noted during chiropractic visit. Probably secondary to shoulder injury.    Low back pain     Worse last few year    Obesity     Pneumonia 12/20/2023    Developed while hospitalized for cancer surgery    Prostate disease     Rash     Allergic reactions to laundry detergent and mild generalized itching secondary to immunotherapy.    Renal cell carcinoma 12/30/2023    Renal insufficiency 4/9/2024    Decreased renal function since nephrectomy in December, 2023, secondary to renal cancer, but not diagnosed as chronic kidney disease until recent visit. Kidney functions have been stable since surgery, but are now being considered permanent.    Shoulder injury 1980s    Injured in     Visual impairment     Wear glasses for near and distant vision    Vitreous degeneration of right eye     Formatting of this note might be different from the original. Retinal tear and detachment warning symptoms reviewed and patient instructed to call immediately if increasing floaters, flashes, or decreasing peripheral vision. No retinal detachment or retinal tear noted. Recheck in 1 month with dilated exam.       Past Surgical History:   Procedure Laterality Date    ABDOMINAL SURGERY  December 18, 2023    Left Kidney and Adrenal Gland removed due to Renal Cell Carcinoma    NEPHRECTOMY Left 12/18/2023    Procedure: NEPHRECTOMY RADICAL WITH CAVAL THROMBECTOMY;  Surgeon: James Esquivel MD;  Location: [facility];  Service: Urology;  Laterality: Left;    SKIN LESION EXCISION  1990         Current Outpatient Medications:     budesonide (PULMICORT) 0.5 MG/2ML nebulizer solution, INHALE 2 MILLILITERS VIA NEBULIZATION BY MOUTH DAILY, Disp: 60 mL, Rfl: 0     Diclofenac Sodium (VOLTAREN) 1 % gel gel, Apply 4 g topically to the appropriate area as directed 4 (Four) Times a Day., Disp: 50 g, Rfl: 0    hydrocortisone (ANUSOL-HC) 25 MG suppository, Insert 1 suppository into the rectum 2 (Two) Times a Day., Disp: 20 suppository, Rfl: 0    ipratropium-albuterol (COMBIVENT RESPIMAT)  MCG/ACT inhaler, Inhale 1 puff 4 (Four) Times a Day As Needed for Wheezing., Disp: , Rfl:     lidocaine-prilocaine (EMLA) 2.5-2.5 % cream, Apply 1 Application topically to the appropriate area as directed See Admin Instructions. Apply to port site one hour prior to port being accessed., Disp: 30 g, Rfl: 3    losartan (COZAAR) 100 MG tablet, Take 1 tablet by mouth Daily for 90 days., Disp: 90 tablet, Rfl: 0    lubiprostone (Amitiza) 8 MCG capsule, Take 1 capsule by mouth 2 (Two) Times a Day With Meals., Disp: 60 capsule, Rfl: 2    magnesium oxide (MAG-OX) 400 MG tablet, Take 1 tablet by mouth Daily., Disp: , Rfl:     metoprolol succinate XL (TOPROL-XL) 25 MG 24 hr tablet, Take 2 tablets by mouth Daily., Disp: 180 tablet, Rfl: 0    naloxone (NARCAN) 4 MG/0.1ML nasal spray, Call 911. Don't prime. [place] in 1 nostril for overdose. Repeat in 2-3 minutes in other nostril if no or minimal breathing/responsiveness., Disp: 2 each, Rfl: 0    ondansetron (ZOFRAN) 8 MG tablet, Take 1 tablet by mouth Every 8 (Eight) Hours As Needed for Nausea or Vomiting., Disp: 30 tablet, Rfl: 2    oxyCODONE-acetaminophen (PERCOCET) 5-325 MG per tablet, Take 1 tablet by mouth Every 12 (Twelve) Hours As Needed for Severe Pain., Disp: 30 tablet, Rfl: 0    pantoprazole (PROTONIX) 40 MG EC tablet, Take 1 tablet by mouth Daily., Disp: 90 tablet, Rfl: 0    sucralfate (CARAFATE) 1 g tablet, Take 1 tablet by mouth 3 (Three) Times a Day Before Meals., Disp: 90 tablet, Rfl: 0    Symbicort 160-4.5 MCG/ACT inhaler, Inhale 2 puffs 2 (Two) Times a Day., Disp: , Rfl:     vitamin D3 125 MCG (5000 UT) capsule capsule, Take 1 capsule by  mouth Daily., Disp: , Rfl:     No Known Allergies    Family History   Problem Relation Age of Onset    Arthritis Mother     Cancer Mother     Diabetes Mother     Heart disease Mother     Hyperlipidemia Mother     Miscarriages / Stillbirths Mother         Stillborn child    Hypertension Mother     Osteoporosis Mother     COPD Father     Hyperlipidemia Father     Hyperlipidemia Brother     Vision loss Daughter     Broken bones Daughter         Broke left forearm three times during childhood    Broken bones Daughter         Fractured right femur and right forearm during childhood    Dislocations Daughter         Recurrent radial head subluxations as a child       Cancer-related family history includes Cancer in his mother.      Social History     Tobacco Use    Smoking status: Never     Passive exposure: Past    Smokeless tobacco: Never    Tobacco comments:     SECOND HAND SMOKE EXPOSURE AS A CHILD    Vaping Use    Vaping status: Never Used   Substance Use Topics    Alcohol use: Yes     Alcohol/week: 1.0 standard drink of alcohol     Types: 1 Glasses of wine per week     Comment: rare    Drug use: Never     Social History     Social History Narrative    Not on file       ROS:   Review of Systems   Constitutional:  Negative for fatigue and fever.   HENT:  Negative for congestion and nosebleeds.    Eyes:  Negative for pain and itching.   Respiratory:  Negative for cough and shortness of breath.    Cardiovascular:  Negative for chest pain.   Gastrointestinal:  Negative for abdominal pain, blood in stool, diarrhea, nausea and vomiting.   Endocrine: Negative for cold intolerance and heat intolerance.   Genitourinary:  Negative for difficulty urinating.   Musculoskeletal:  Negative for arthralgias.   Skin:  Negative for rash.   Neurological:  Negative for dizziness and headaches.   Hematological:  Does not bruise/bleed easily.   Psychiatric/Behavioral:  Negative for behavioral problems.    pruritis    Objective:    Vital  "Signs:  Vitals:    06/04/24 1252   BP: 136/81   Pulse: 70   Resp: 18   Temp: 98.4 °F (36.9 °C)   SpO2: 96%   Weight: 102 kg (225 lb 6.4 oz)   Height: 177.8 cm (70\")   PainSc: 0-No pain       Body mass index is 32.34 kg/m².    ECOG  (0) Fully active, able to carry on all predisease performance without restriction    Physical Exam:   Physical Exam  Constitutional:       Appearance: Normal appearance.   HENT:      Head: Normocephalic and atraumatic.   Eyes:      Pupils: Pupils are equal, round, and reactive to light.   Cardiovascular:      Rate and Rhythm: Normal rate and regular rhythm.      Pulses: Normal pulses.      Heart sounds: No murmur heard.  Pulmonary:      Effort: Pulmonary effort is normal.      Breath sounds: Normal breath sounds.   Abdominal:      General: There is no distension.      Palpations: Abdomen is soft. There is no mass.      Tenderness: There is no abdominal tenderness.   Musculoskeletal:         General: Normal range of motion.      Cervical back: Normal range of motion and neck supple.   Skin:     General: Skin is warm.   Neurological:      General: No focal deficit present.      Mental Status: He is alert.   Psychiatric:         Mood and Affect: Mood normal.         Lab Results - Last 18 Months   Lab Units 05/24/24  1146 04/12/24  1020 03/22/24  1036   WBC 10*3/mm3 8.39 7.43 7.28   HEMOGLOBIN g/dL 12.6* 12.1* 11.4*   HEMATOCRIT % 38.9 38.1 35.9*   PLATELETS 10*3/mm3 216 199 195   MCV fL 86.3 85.4 85.9     Lab Results - Last 18 Months   Lab Units 05/24/24  1153 04/12/24  1045 03/22/24  1040 02/09/24  1411 01/09/24  1544 12/30/23  0328 12/29/23  1008 12/28/23  2344   SODIUM mmol/L  --   --   --   --  140 137 135* 136   POTASSIUM mmol/L  --   --   --   --  4.3 4.3 3.9 4.2   CHLORIDE mmol/L  --   --   --   --  102 98 96* 99   CO2 mmol/L  --   --   --   --  27.6 29.0 30.0* 30.0*   BUN mg/dL  --   --   --   --  15 13 13 12   CREATININE mg/dL 1.40* 1.40* 1.50*   < > 1.62* 1.42* 1.45* 1.41* " "  CALCIUM mg/dL  --   --   --   --  10.1 9.0 9.0 8.7   BILIRUBIN mg/dL  --   --   --   --  0.8 0.8  --  0.8   ALK PHOS U/L  --   --   --   --  94 88  --  80   ALT (SGPT) U/L  --   --   --   --  48* 33  --  26   AST (SGOT) U/L  --   --   --   --  25 34  --  27   GLUCOSE mg/dL  --   --   --   --  100* 109* 105* 91    < > = values in this interval not displayed.       Lab Results   Component Value Date    GLUCOSE 100 (H) 01/09/2024    BUN 15 01/09/2024    CREATININE 1.40 (H) 05/24/2024    BCR 9.3 01/09/2024    K 4.3 01/09/2024    CO2 27.6 01/09/2024    CALCIUM 10.1 01/09/2024    PROTENTOTREF 7.0 01/09/2024    ALBUMIN 3.8 01/09/2024    LABIL2 1.2 01/09/2024    AST 25 01/09/2024    ALT 48 (H) 01/09/2024       Lab Results - Last 18 Months   Lab Units 01/30/24  0827 12/18/23  1722 12/18/23  1203   INR  1.01 1.08 1.24*   APTT seconds 25.3 28.7 58.7*       Lab Results   Component Value Date    IRON 12 (L) 12/20/2023    TIBC 145 (L) 12/20/2023       No results found for: \"FOLATE\"    No results found for: \"OCCULTBLD\"    No results found for: \"RETICCTPCT\"  No results found for: \"BPIEUHHN37\"  No results found for: \"SPEP\", \"UPEP\"  No results found for: \"LDH\", \"URICACID\"  No results found for: \"OSKAR\", \"RF\", \"SEDRATE\"  Lab Results   Component Value Date    FIBRINOGEN 381 12/18/2023     Lab Results   Component Value Date    PTT 25.3 01/30/2024    INR 1.01 01/30/2024     No results found for: \"\"  No results found for: \"CEA\"  No components found for: \"CA-19-9\"  No results found for: \"PSA\"  No results found for: \"SEDRATE\"       Assessment & Plan     Patient is a 63-year-old male with stage IIIb T3b RCC status post radical nephrectomy    Renal cell carcinoma  Status post nephrectomy, CT chest with no evidence of metastasis questionable adrenal metastasis on scan status post adrenalectomy with no evidence of metastatic disease  Given patient's stage III clear-cell RCC, discussed adjuvant treatment with pembrolizumab based on the " findings from keynote 564 trial with significant improvement in disease-free survival as compared to placebo for stage III clear-cell RCC.    Started Immunotherapy, G1 - pruritus otherwise good tolerance. Now increased dose to 400 mg every 6 weeks.   Continue same. Some increase in rash no other new symptoms.    F/u after next treatment.     Pruritus  G1, continue immunotherapy  Use moisturizer stable  No significant worsening, continue same.      Thank you very much for providing the opportunity to participate in this patient’s care. Please do not hesitate to call if there are any other questions.

## 2024-06-04 ENCOUNTER — OFFICE VISIT (OUTPATIENT)
Dept: ONCOLOGY | Facility: CLINIC | Age: 64
End: 2024-06-04
Payer: MEDICARE

## 2024-06-04 VITALS
BODY MASS INDEX: 32.27 KG/M2 | SYSTOLIC BLOOD PRESSURE: 136 MMHG | TEMPERATURE: 98.4 F | RESPIRATION RATE: 18 BRPM | WEIGHT: 225.4 LBS | DIASTOLIC BLOOD PRESSURE: 81 MMHG | HEART RATE: 70 BPM | OXYGEN SATURATION: 96 % | HEIGHT: 70 IN

## 2024-06-04 DIAGNOSIS — C64.2 RENAL CELL CARCINOMA OF LEFT KIDNEY: Primary | ICD-10-CM

## 2024-06-04 PROCEDURE — 3075F SYST BP GE 130 - 139MM HG: CPT | Performed by: INTERNAL MEDICINE

## 2024-06-04 PROCEDURE — G2211 COMPLEX E/M VISIT ADD ON: HCPCS | Performed by: INTERNAL MEDICINE

## 2024-06-04 PROCEDURE — 99214 OFFICE O/P EST MOD 30 MIN: CPT | Performed by: INTERNAL MEDICINE

## 2024-06-04 PROCEDURE — 3079F DIAST BP 80-89 MM HG: CPT | Performed by: INTERNAL MEDICINE

## 2024-06-04 PROCEDURE — 1126F AMNT PAIN NOTED NONE PRSNT: CPT | Performed by: INTERNAL MEDICINE

## 2024-06-23 DIAGNOSIS — K21.9 GASTROESOPHAGEAL REFLUX DISEASE, UNSPECIFIED WHETHER ESOPHAGITIS PRESENT: Chronic | ICD-10-CM

## 2024-06-24 RX ORDER — SUCRALFATE 1 G/1
1 TABLET ORAL
Qty: 270 TABLET | Refills: 0 | Status: SHIPPED | OUTPATIENT
Start: 2024-06-24

## 2024-07-03 DIAGNOSIS — C64.2 RENAL CELL CARCINOMA OF LEFT KIDNEY: Primary | ICD-10-CM

## 2024-07-05 ENCOUNTER — HOSPITAL ENCOUNTER (OUTPATIENT)
Dept: ONCOLOGY | Facility: HOSPITAL | Age: 64
Discharge: HOME | End: 2024-07-05
Admitting: INTERNAL MEDICINE
Payer: MEDICARE

## 2024-07-05 VITALS
WEIGHT: 226.5 LBS | RESPIRATION RATE: 16 BRPM | HEIGHT: 70 IN | BODY MASS INDEX: 32.43 KG/M2 | TEMPERATURE: 97.5 F | HEART RATE: 65 BPM | DIASTOLIC BLOOD PRESSURE: 90 MMHG | OXYGEN SATURATION: 97 % | SYSTOLIC BLOOD PRESSURE: 158 MMHG

## 2024-07-05 DIAGNOSIS — C64.2 RENAL CELL CARCINOMA OF LEFT KIDNEY: Primary | ICD-10-CM

## 2024-07-05 DIAGNOSIS — N28.89 RENAL MASS: ICD-10-CM

## 2024-07-05 DIAGNOSIS — Z95.828 PORT-A-CATH IN PLACE: ICD-10-CM

## 2024-07-05 LAB
ALP BLD-CCNC: 70 U/L (ref 53–128)
BASOPHILS # BLD AUTO: 0.04 10*3/MM3 (ref 0–0.2)
BASOPHILS NFR BLD AUTO: 0.5 % (ref 0–1.5)
BUN BLDA-MCNC: 23 MG/DL (ref 7–22)
CALCIUM BLD QL: 9.8 MG/DL (ref 8–10.3)
CHLORIDE BLDA-SCNC: 108 MMOL/L (ref 98–108)
CO2 BLDA-SCNC: 26 MMOL/L (ref 18–33)
CREAT BLDA-MCNC: 1.6 MG/DL (ref 0.6–1.2)
DEPRECATED RDW RBC AUTO: 43.5 FL (ref 37–54)
EGFRCR SERPLBLD CKD-EPI 2021: 47.5 ML/MIN/1.73
EOSINOPHIL # BLD AUTO: 0.36 10*3/MM3 (ref 0–0.4)
EOSINOPHIL NFR BLD AUTO: 4.5 % (ref 0.3–6.2)
ERYTHROCYTE [DISTWIDTH] IN BLOOD BY AUTOMATED COUNT: 13.9 % (ref 12.3–15.4)
GLUCOSE BLDC GLUCOMTR-MCNC: 108 MG/DL (ref 73–118)
HCT VFR BLD AUTO: 38.7 % (ref 37.5–51)
HGB BLD-MCNC: 12.6 G/DL (ref 13–17.7)
LYMPHOCYTES # BLD AUTO: 2.28 10*3/MM3 (ref 0.7–3.1)
LYMPHOCYTES NFR BLD AUTO: 28.6 % (ref 19.6–45.3)
MCH RBC QN AUTO: 28.3 PG (ref 26.6–33)
MCHC RBC AUTO-ENTMCNC: 32.6 G/DL (ref 31.5–35.7)
MCV RBC AUTO: 87 FL (ref 79–97)
MONOCYTES # BLD AUTO: 0.63 10*3/MM3 (ref 0.1–0.9)
MONOCYTES NFR BLD AUTO: 7.9 % (ref 5–12)
NEUTROPHILS NFR BLD AUTO: 4.67 10*3/MM3 (ref 1.7–7)
NEUTROPHILS NFR BLD AUTO: 58.5 % (ref 42.7–76)
PLATELET # BLD AUTO: 203 10*3/MM3 (ref 140–450)
PMV BLD AUTO: 10.6 FL (ref 6–12)
POC ALBUMIN: 3.5 G/L (ref 3.3–5.5)
POC ALT (SGPT): 21 U/L (ref 10–47)
POC AST (SGOT): 23 U/L (ref 11–38)
POC TOTAL BILIRUBIN: 1 MG/DL (ref 0.2–1.6)
POC TOTAL PROTEIN: 7.6 G/DL (ref 6.4–8.1)
POTASSIUM BLDA-SCNC: 3.6 MMOL/L (ref 3.6–5.1)
RBC # BLD AUTO: 4.45 10*6/MM3 (ref 4.14–5.8)
SODIUM BLD-SCNC: 144 MMOL/L (ref 128–145)
T4 SERPL-MCNC: 6.9 MCG/DL (ref 4.5–11.7)
TSH SERPL DL<=0.05 MIU/L-ACNC: 1.12 UIU/ML (ref 0.27–4.2)
WBC NRBC COR # BLD AUTO: 7.98 10*3/MM3 (ref 3.4–10.8)

## 2024-07-05 PROCEDURE — 85025 COMPLETE CBC W/AUTO DIFF WBC: CPT | Performed by: INTERNAL MEDICINE

## 2024-07-05 PROCEDURE — 80053 COMPREHEN METABOLIC PANEL: CPT

## 2024-07-05 PROCEDURE — 25810000003 SODIUM CHLORIDE 0.9 % SOLUTION: Performed by: INTERNAL MEDICINE

## 2024-07-05 PROCEDURE — 84443 ASSAY THYROID STIM HORMONE: CPT | Performed by: INTERNAL MEDICINE

## 2024-07-05 PROCEDURE — 96413 CHEMO IV INFUSION 1 HR: CPT

## 2024-07-05 PROCEDURE — 25010000002 HEPARIN LOCK FLUSH PER 10 UNITS: Performed by: INTERNAL MEDICINE

## 2024-07-05 PROCEDURE — 84436 ASSAY OF TOTAL THYROXINE: CPT | Performed by: INTERNAL MEDICINE

## 2024-07-05 PROCEDURE — 25010000002 PEMBROLIZUMAB 100 MG/4ML SOLUTION 4 ML VIAL: Performed by: INTERNAL MEDICINE

## 2024-07-05 RX ORDER — SODIUM CHLORIDE 9 MG/ML
20 INJECTION, SOLUTION INTRAVENOUS ONCE
Status: COMPLETED | OUTPATIENT
Start: 2024-07-05 | End: 2024-07-05

## 2024-07-05 RX ORDER — HEPARIN SODIUM (PORCINE) LOCK FLUSH IV SOLN 100 UNIT/ML 100 UNIT/ML
500 SOLUTION INTRAVENOUS AS NEEDED
Status: DISCONTINUED | OUTPATIENT
Start: 2024-07-05 | End: 2024-07-06 | Stop reason: HOSPADM

## 2024-07-05 RX ORDER — SODIUM CHLORIDE 0.9 % (FLUSH) 0.9 %
10 SYRINGE (ML) INJECTION AS NEEDED
Status: DISCONTINUED | OUTPATIENT
Start: 2024-07-05 | End: 2024-07-06 | Stop reason: HOSPADM

## 2024-07-05 RX ORDER — SODIUM CHLORIDE 9 MG/ML
20 INJECTION, SOLUTION INTRAVENOUS ONCE
Status: CANCELLED | OUTPATIENT
Start: 2024-07-05

## 2024-07-05 RX ORDER — HEPARIN SODIUM (PORCINE) LOCK FLUSH IV SOLN 100 UNIT/ML 100 UNIT/ML
500 SOLUTION INTRAVENOUS AS NEEDED
OUTPATIENT
Start: 2024-07-05

## 2024-07-05 RX ORDER — SODIUM CHLORIDE 0.9 % (FLUSH) 0.9 %
10 SYRINGE (ML) INJECTION AS NEEDED
OUTPATIENT
Start: 2024-07-05

## 2024-07-05 RX ADMIN — SODIUM CHLORIDE 20 ML/HR: 9 INJECTION, SOLUTION INTRAVENOUS at 13:03

## 2024-07-05 RX ADMIN — HEPARIN 500 UNITS: 100 SYRINGE at 13:40

## 2024-07-05 RX ADMIN — SODIUM CHLORIDE 400 MG: 9 INJECTION, SOLUTION INTRAVENOUS at 13:08

## 2024-07-05 RX ADMIN — Medication 10 ML: at 13:39

## 2024-07-12 ENCOUNTER — APPOINTMENT (OUTPATIENT)
Dept: CT IMAGING | Facility: HOSPITAL | Age: 64
End: 2024-07-12
Payer: COMMERCIAL

## 2024-07-12 ENCOUNTER — HOSPITAL ENCOUNTER (OUTPATIENT)
Facility: HOSPITAL | Age: 64
Setting detail: OBSERVATION
Discharge: HOME | End: 2024-07-13
Attending: EMERGENCY MEDICINE | Admitting: EMERGENCY MEDICINE
Payer: COMMERCIAL

## 2024-07-12 ENCOUNTER — APPOINTMENT (OUTPATIENT)
Dept: GENERAL RADIOLOGY | Facility: HOSPITAL | Age: 64
End: 2024-07-12
Payer: COMMERCIAL

## 2024-07-12 DIAGNOSIS — R11.2 NAUSEA AND VOMITING, UNSPECIFIED VOMITING TYPE: ICD-10-CM

## 2024-07-12 DIAGNOSIS — R11.10 ACUTE VERTIGO WITH VOMITING AND INABILITY TO STAND: Primary | ICD-10-CM

## 2024-07-12 DIAGNOSIS — R42 ACUTE VERTIGO WITH VOMITING AND INABILITY TO STAND: Primary | ICD-10-CM

## 2024-07-12 DIAGNOSIS — R03.0 ELEVATED BLOOD PRESSURE READING: ICD-10-CM

## 2024-07-12 DIAGNOSIS — R42 LIGHTHEADED: ICD-10-CM

## 2024-07-12 LAB
ALBUMIN SERPL-MCNC: 4.5 G/DL (ref 3.5–5.2)
ALBUMIN/GLOB SERPL: 1.3 G/DL
ALP SERPL-CCNC: 74 U/L (ref 39–117)
ALT SERPL W P-5'-P-CCNC: 18 U/L (ref 1–41)
ANION GAP SERPL CALCULATED.3IONS-SCNC: 12 MMOL/L (ref 5–15)
APTT PPP: 27.5 SECONDS (ref 22.7–35.4)
AST SERPL-CCNC: 17 U/L (ref 1–40)
BILIRUB SERPL-MCNC: 0.8 MG/DL (ref 0–1.2)
BUN SERPL-MCNC: 31 MG/DL (ref 8–23)
BUN/CREAT SERPL: 20.4 (ref 7–25)
CALCIUM SPEC-SCNC: 10.4 MG/DL (ref 8.6–10.5)
CHLORIDE SERPL-SCNC: 106 MMOL/L (ref 98–107)
CO2 SERPL-SCNC: 24 MMOL/L (ref 22–29)
CREAT SERPL-MCNC: 1.52 MG/DL (ref 0.76–1.27)
DEPRECATED RDW RBC AUTO: 42.1 FL (ref 37–54)
EGFRCR SERPLBLD CKD-EPI 2021: 50.5 ML/MIN/1.73
EOSINOPHIL # BLD MANUAL: 0.59 10*3/MM3 (ref 0–0.4)
EOSINOPHIL NFR BLD MANUAL: 4 % (ref 0.3–6.2)
ERYTHROCYTE [DISTWIDTH] IN BLOOD BY AUTOMATED COUNT: 13.9 % (ref 12.3–15.4)
GLOBULIN UR ELPH-MCNC: 3.4 GM/DL
GLUCOSE BLDC GLUCOMTR-MCNC: 101 MG/DL (ref 70–130)
GLUCOSE SERPL-MCNC: 101 MG/DL (ref 65–99)
HCT VFR BLD AUTO: 40.6 % (ref 37.5–51)
HGB BLD-MCNC: 13.4 G/DL (ref 13–17.7)
HOLD SPECIMEN: NORMAL
HOLD SPECIMEN: NORMAL
INR PPP: 1.03 (ref 0.9–1.1)
LYMPHOCYTES # BLD MANUAL: 7.63 10*3/MM3 (ref 0.7–3.1)
LYMPHOCYTES NFR BLD MANUAL: 6 % (ref 5–12)
MAGNESIUM SERPL-MCNC: 2 MG/DL (ref 1.6–2.4)
MCH RBC QN AUTO: 27.6 PG (ref 26.6–33)
MCHC RBC AUTO-ENTMCNC: 33 G/DL (ref 31.5–35.7)
MCV RBC AUTO: 83.5 FL (ref 79–97)
MONOCYTES # BLD: 0.88 10*3/MM3 (ref 0.1–0.9)
NEUTROPHILS # BLD AUTO: 5.57 10*3/MM3 (ref 1.7–7)
NEUTROPHILS NFR BLD MANUAL: 38 % (ref 42.7–76)
PLAT MORPH BLD: NORMAL
PLATELET # BLD AUTO: 289 10*3/MM3 (ref 140–450)
PMV BLD AUTO: 10.1 FL (ref 6–12)
POTASSIUM SERPL-SCNC: 4 MMOL/L (ref 3.5–5.2)
PROT SERPL-MCNC: 7.9 G/DL (ref 6–8.5)
PROTHROMBIN TIME: 13.7 SECONDS (ref 11.7–14.2)
QT INTERVAL: 452 MS
QTC INTERVAL: 440 MS
RBC # BLD AUTO: 4.86 10*6/MM3 (ref 4.14–5.8)
RBC MORPH BLD: NORMAL
SODIUM SERPL-SCNC: 142 MMOL/L (ref 136–145)
TROPONIN T SERPL HS-MCNC: 17 NG/L
TROPONIN T SERPL HS-MCNC: 23 NG/L
VARIANT LYMPHS NFR BLD MANUAL: 52 % (ref 19.6–45.3)
WBC MORPH BLD: NORMAL
WBC NRBC COR # BLD AUTO: 14.67 10*3/MM3 (ref 3.4–10.8)
WHOLE BLOOD HOLD COAG: NORMAL
WHOLE BLOOD HOLD SPECIMEN: NORMAL

## 2024-07-12 PROCEDURE — 85730 THROMBOPLASTIN TIME PARTIAL: CPT | Performed by: EMERGENCY MEDICINE

## 2024-07-12 PROCEDURE — 94799 UNLISTED PULMONARY SVC/PX: CPT

## 2024-07-12 PROCEDURE — G0378 HOSPITAL OBSERVATION PER HR: HCPCS

## 2024-07-12 PROCEDURE — 85610 PROTHROMBIN TIME: CPT | Performed by: EMERGENCY MEDICINE

## 2024-07-12 PROCEDURE — 83735 ASSAY OF MAGNESIUM: CPT | Performed by: EMERGENCY MEDICINE

## 2024-07-12 PROCEDURE — 93005 ELECTROCARDIOGRAM TRACING: CPT | Performed by: EMERGENCY MEDICINE

## 2024-07-12 PROCEDURE — 36415 COLL VENOUS BLD VENIPUNCTURE: CPT

## 2024-07-12 PROCEDURE — 94664 DEMO&/EVAL PT USE INHALER: CPT

## 2024-07-12 PROCEDURE — 0042T HC CT CEREBRAL PERFUSION W/WO CONTRAST: CPT

## 2024-07-12 PROCEDURE — 94640 AIRWAY INHALATION TREATMENT: CPT

## 2024-07-12 PROCEDURE — 82948 REAGENT STRIP/BLOOD GLUCOSE: CPT

## 2024-07-12 PROCEDURE — 84484 ASSAY OF TROPONIN QUANT: CPT | Performed by: EMERGENCY MEDICINE

## 2024-07-12 PROCEDURE — 85025 COMPLETE CBC W/AUTO DIFF WBC: CPT | Performed by: EMERGENCY MEDICINE

## 2024-07-12 PROCEDURE — 25810000003 SODIUM CHLORIDE 0.9 % SOLUTION: Performed by: EMERGENCY MEDICINE

## 2024-07-12 PROCEDURE — 25510000001 IOPAMIDOL PER 1 ML: Performed by: EMERGENCY MEDICINE

## 2024-07-12 PROCEDURE — 96374 THER/PROPH/DIAG INJ IV PUSH: CPT

## 2024-07-12 PROCEDURE — 96375 TX/PRO/DX INJ NEW DRUG ADDON: CPT

## 2024-07-12 PROCEDURE — 99285 EMERGENCY DEPT VISIT HI MDM: CPT

## 2024-07-12 PROCEDURE — 71045 X-RAY EXAM CHEST 1 VIEW: CPT

## 2024-07-12 PROCEDURE — 82565 ASSAY OF CREATININE: CPT

## 2024-07-12 PROCEDURE — 94760 N-INVAS EAR/PLS OXIMETRY 1: CPT

## 2024-07-12 PROCEDURE — 80053 COMPREHEN METABOLIC PANEL: CPT | Performed by: EMERGENCY MEDICINE

## 2024-07-12 PROCEDURE — 70496 CT ANGIOGRAPHY HEAD: CPT

## 2024-07-12 PROCEDURE — 85007 BL SMEAR W/DIFF WBC COUNT: CPT | Performed by: EMERGENCY MEDICINE

## 2024-07-12 PROCEDURE — 99215 OFFICE O/P EST HI 40 MIN: CPT | Performed by: PSYCHIATRY & NEUROLOGY

## 2024-07-12 PROCEDURE — 70498 CT ANGIOGRAPHY NECK: CPT

## 2024-07-12 PROCEDURE — 94761 N-INVAS EAR/PLS OXIMETRY MLT: CPT

## 2024-07-12 PROCEDURE — 25010000002 LORAZEPAM PER 2 MG

## 2024-07-12 PROCEDURE — 25010000002 DIPHENHYDRAMINE PER 50 MG: Performed by: PHYSICIAN ASSISTANT

## 2024-07-12 PROCEDURE — 25010000002 ONDANSETRON PER 1 MG

## 2024-07-12 PROCEDURE — 93010 ELECTROCARDIOGRAM REPORT: CPT | Performed by: INTERNAL MEDICINE

## 2024-07-12 RX ORDER — OXYCODONE HYDROCHLORIDE AND ACETAMINOPHEN 5; 325 MG/1; MG/1
1 TABLET ORAL EVERY 12 HOURS PRN
Status: DISCONTINUED | OUTPATIENT
Start: 2024-07-12 | End: 2024-07-13 | Stop reason: HOSPADM

## 2024-07-12 RX ORDER — HYDROCORTISONE ACETATE 25 MG/1
25 SUPPOSITORY RECTAL 2 TIMES DAILY
Status: DISCONTINUED | OUTPATIENT
Start: 2024-07-12 | End: 2024-07-13 | Stop reason: HOSPADM

## 2024-07-12 RX ORDER — DIPHENHYDRAMINE HYDROCHLORIDE 50 MG/ML
25 INJECTION INTRAMUSCULAR; INTRAVENOUS ONCE
Status: COMPLETED | OUTPATIENT
Start: 2024-07-12 | End: 2024-07-12

## 2024-07-12 RX ORDER — ACETAMINOPHEN 325 MG/1
650 TABLET ORAL EVERY 4 HOURS PRN
Status: DISCONTINUED | OUTPATIENT
Start: 2024-07-12 | End: 2024-07-13 | Stop reason: HOSPADM

## 2024-07-12 RX ORDER — LORAZEPAM 2 MG/ML
INJECTION INTRAMUSCULAR
Status: COMPLETED
Start: 2024-07-12 | End: 2024-07-12

## 2024-07-12 RX ORDER — ACETAMINOPHEN 650 MG/1
650 SUPPOSITORY RECTAL EVERY 4 HOURS PRN
Status: DISCONTINUED | OUTPATIENT
Start: 2024-07-12 | End: 2024-07-13 | Stop reason: HOSPADM

## 2024-07-12 RX ORDER — ONDANSETRON 2 MG/ML
4 INJECTION INTRAMUSCULAR; INTRAVENOUS ONCE
Status: COMPLETED | OUTPATIENT
Start: 2024-07-12 | End: 2024-07-12

## 2024-07-12 RX ORDER — BUDESONIDE AND FORMOTEROL FUMARATE DIHYDRATE 160; 4.5 UG/1; UG/1
2 AEROSOL RESPIRATORY (INHALATION)
Status: DISCONTINUED | OUTPATIENT
Start: 2024-07-12 | End: 2024-07-13 | Stop reason: HOSPADM

## 2024-07-12 RX ORDER — IPRATROPIUM BROMIDE AND ALBUTEROL SULFATE 2.5; .5 MG/3ML; MG/3ML
3 SOLUTION RESPIRATORY (INHALATION) EVERY 6 HOURS PRN
Status: DISCONTINUED | OUTPATIENT
Start: 2024-07-12 | End: 2024-07-13 | Stop reason: HOSPADM

## 2024-07-12 RX ORDER — PANTOPRAZOLE SODIUM 40 MG/1
40 TABLET, DELAYED RELEASE ORAL DAILY
Status: DISCONTINUED | OUTPATIENT
Start: 2024-07-12 | End: 2024-07-13 | Stop reason: HOSPADM

## 2024-07-12 RX ORDER — LORAZEPAM 2 MG/ML
0.5 INJECTION INTRAMUSCULAR ONCE
Status: COMPLETED | OUTPATIENT
Start: 2024-07-12 | End: 2024-07-12

## 2024-07-12 RX ORDER — ACETAMINOPHEN 160 MG/5ML
650 SOLUTION ORAL EVERY 4 HOURS PRN
Status: DISCONTINUED | OUTPATIENT
Start: 2024-07-12 | End: 2024-07-13 | Stop reason: HOSPADM

## 2024-07-12 RX ORDER — LOSARTAN POTASSIUM 100 MG/1
100 TABLET ORAL
Status: DISCONTINUED | OUTPATIENT
Start: 2024-07-12 | End: 2024-07-13 | Stop reason: HOSPADM

## 2024-07-12 RX ORDER — SUCRALFATE 1 G/1
1 TABLET ORAL
Status: DISCONTINUED | OUTPATIENT
Start: 2024-07-12 | End: 2024-07-13 | Stop reason: HOSPADM

## 2024-07-12 RX ORDER — LUBIPROSTONE 8 UG/1
8 CAPSULE ORAL 2 TIMES DAILY WITH MEALS
Status: DISCONTINUED | OUTPATIENT
Start: 2024-07-12 | End: 2024-07-13 | Stop reason: HOSPADM

## 2024-07-12 RX ORDER — ONDANSETRON 2 MG/ML
INJECTION INTRAMUSCULAR; INTRAVENOUS
Status: COMPLETED
Start: 2024-07-12 | End: 2024-07-12

## 2024-07-12 RX ORDER — SODIUM CHLORIDE 0.9 % (FLUSH) 0.9 %
10 SYRINGE (ML) INJECTION AS NEEDED
Status: DISCONTINUED | OUTPATIENT
Start: 2024-07-12 | End: 2024-07-13 | Stop reason: HOSPADM

## 2024-07-12 RX ORDER — MELATONIN
5000 DAILY
Status: DISCONTINUED | OUTPATIENT
Start: 2024-07-12 | End: 2024-07-13 | Stop reason: HOSPADM

## 2024-07-12 RX ORDER — ONDANSETRON HYDROCHLORIDE 8 MG/1
8 TABLET, FILM COATED ORAL EVERY 8 HOURS PRN
Status: DISCONTINUED | OUTPATIENT
Start: 2024-07-12 | End: 2024-07-13 | Stop reason: HOSPADM

## 2024-07-12 RX ORDER — SODIUM CHLORIDE 0.9 % (FLUSH) 0.9 %
10 SYRINGE (ML) INJECTION EVERY 12 HOURS SCHEDULED
Status: DISCONTINUED | OUTPATIENT
Start: 2024-07-12 | End: 2024-07-13 | Stop reason: HOSPADM

## 2024-07-12 RX ORDER — SODIUM CHLORIDE 9 MG/ML
40 INJECTION, SOLUTION INTRAVENOUS AS NEEDED
Status: DISCONTINUED | OUTPATIENT
Start: 2024-07-12 | End: 2024-07-13 | Stop reason: HOSPADM

## 2024-07-12 RX ORDER — METOPROLOL SUCCINATE 50 MG/1
50 TABLET, EXTENDED RELEASE ORAL
Status: DISCONTINUED | OUTPATIENT
Start: 2024-07-12 | End: 2024-07-13 | Stop reason: HOSPADM

## 2024-07-12 RX ORDER — NITROGLYCERIN 0.4 MG/1
0.4 TABLET SUBLINGUAL
Status: DISCONTINUED | OUTPATIENT
Start: 2024-07-12 | End: 2024-07-13 | Stop reason: HOSPADM

## 2024-07-12 RX ADMIN — SUCRALFATE 1 G: 1 TABLET ORAL at 18:03

## 2024-07-12 RX ADMIN — DICLOFENAC SODIUM 4 G: 10 GEL TOPICAL at 21:40

## 2024-07-12 RX ADMIN — SODIUM CHLORIDE 500 ML: 9 INJECTION, SOLUTION INTRAVENOUS at 14:19

## 2024-07-12 RX ADMIN — ONDANSETRON 4 MG: 2 INJECTION INTRAMUSCULAR; INTRAVENOUS at 13:52

## 2024-07-12 RX ADMIN — BUDESONIDE AND FORMOTEROL FUMARATE DIHYDRATE 2 PUFF: 160; 4.5 AEROSOL RESPIRATORY (INHALATION) at 19:32

## 2024-07-12 RX ADMIN — LORAZEPAM 0.5 MG: 2 INJECTION, SOLUTION INTRAMUSCULAR; INTRAVENOUS at 13:52

## 2024-07-12 RX ADMIN — LORAZEPAM 0.5 MG: 2 INJECTION INTRAMUSCULAR at 13:52

## 2024-07-12 RX ADMIN — PANTOPRAZOLE SODIUM 40 MG: 40 TABLET, DELAYED RELEASE ORAL at 18:03

## 2024-07-12 RX ADMIN — OXYCODONE HYDROCHLORIDE AND ACETAMINOPHEN 0.25 TABLET: 5; 325 TABLET ORAL at 22:03

## 2024-07-12 RX ADMIN — METOPROLOL SUCCINATE 50 MG: 50 TABLET, FILM COATED, EXTENDED RELEASE ORAL at 18:03

## 2024-07-12 RX ADMIN — IOPAMIDOL 150 ML: 755 INJECTION, SOLUTION INTRAVENOUS at 14:04

## 2024-07-12 RX ADMIN — Medication 10 ML: at 21:41

## 2024-07-12 RX ADMIN — DIPHENHYDRAMINE HYDROCHLORIDE 25 MG: 50 INJECTION, SOLUTION INTRAMUSCULAR; INTRAVENOUS at 22:04

## 2024-07-12 NOTE — ED PROVIDER NOTES
EMERGENCY DEPARTMENT ENCOUNTER  Room Number:  31/31  PCP: Harry Hsu MD  Independent Historians: Patient and spouse      HPI:  Chief Complaint: Lightheaded with nausea and vomiting    A complete HPI/ROS/PMH/PSH/SH/FH are unobtainable due to: None    Chronic or social conditions impacting patient care (Social Determinants of Health): None      Context: Louis Andino is a 63 y.o. male with a medical history of renal cell carcinoma, COPD and hypertension as well as hyperlipidemia who presents to the ED c/o acute lightheadedness and nausea with vomiting while waiting for his lunch to come.  The patient was last normal at 1300 and then abruptly felt ill while sitting in a hernandez at a restaurant.  He walks with a walker at baseline but not because of dizziness.  Patient continues to use the word lightheadedness but when I set him up for further evaluation he does note that the world is spinning.  No focal pain complaints such as chest pain, shortness of breath, headache or abdominal pain.  Patient felt well earlier in the day before onset of symptoms that were rather abrupt.  Patient does report that he had a few other episodes that were much milder than this that he did not have to seek care for.      Review of prior external notes (non-ED) -and- Review of prior external test results outside of this encounter:  Oncology office note 6/4/2024 reviewed: Patient with history of renal cell carcinoma status post left nephrectomy      PAST MEDICAL HISTORY  Active Ambulatory Problems     Diagnosis Date Noted    Renal mass 12/18/2023    Asthma 10/12/2023    Gastroesophageal reflux disease 10/12/2023    Hyperlipidemia 10/12/2023    Hypertension 10/12/2023    BPH (benign prostatic hyperplasia) 12/18/2023    COPD (chronic obstructive pulmonary disease) 12/18/2023    KARON (acute kidney injury) 12/18/2023    Acute respiratory failure with hypoxia 12/18/2023    Renal cell carcinoma 12/30/2023    Port-A-Cath in place 02/09/2024      Resolved Ambulatory Problems     Diagnosis Date Noted    Hyperglycemia 10/12/2023    Vitreous degeneration of right eye 10/12/2023     Past Medical History:   Diagnosis Date    Anemia 12/18/2023    Ankle sprain     Clotting disorder     Coronary artery disease Dec 2023    Deep vein thrombosis 12/18/2023    Emphysema/COPD     Erectile dysfunction Several years    Fracture of ankle     Fracture of wrist     GERD (gastroesophageal reflux disease)     HL (hearing loss)     Injury of neck 2023    Low back pain     Obesity     Pneumonia 12/20/2023    Prostate disease     Rash     Renal insufficiency 4/9/2024    Shoulder injury 1980s    Visual impairment          PAST SURGICAL HISTORY  Past Surgical History:   Procedure Laterality Date    ABDOMINAL SURGERY  December 18, 2023    Left Kidney and Adrenal Gland removed due to Renal Cell Carcinoma    NEPHRECTOMY Left 12/18/2023    Procedure: NEPHRECTOMY RADICAL WITH CAVAL THROMBECTOMY;  Surgeon: James Esquivel MD;  Location: [facility] OR;  Service: Urology;  Laterality: Left;    SKIN LESION EXCISION  1990         FAMILY HISTORY  Family History   Problem Relation Age of Onset    Arthritis Mother     Cancer Mother     Diabetes Mother     Heart disease Mother     Hyperlipidemia Mother     Miscarriages / Stillbirths Mother         Stillborn child    Hypertension Mother     Osteoporosis Mother     COPD Father     Hyperlipidemia Father     Hyperlipidemia Brother     Vision loss Daughter     Broken bones Daughter         Broke left forearm three times during childhood    Broken bones Daughter         Fractured right femur and right forearm during childhood    Dislocations Daughter         Recurrent radial head subluxations as a child         SOCIAL HISTORY  Social History     Socioeconomic History    Marital status:     Number of children: 6   Tobacco Use    Smoking status: Never     Passive exposure: Past    Smokeless tobacco: Never    Tobacco comments:     SECOND HAND  SMOKE EXPOSURE AS A CHILD    Vaping Use    Vaping status: Never Used   Substance and Sexual Activity    Alcohol use: Yes     Alcohol/week: 1.0 standard drink of alcohol     Types: 1 Glasses of wine per week     Comment: rare    Drug use: Never    Sexual activity: Yes     Partners: Female         ALLERGIES  Patient has no known allergies.      REVIEW OF SYSTEMS  Review of Systems  Included in HPI  All systems reviewed and negative except for those discussed in HPI.      PHYSICAL EXAM    I have reviewed the triage vital signs and nursing notes.    ED Triage Vitals [07/12/24 1300]   Temp Heart Rate Resp BP SpO2   97.4 °F (36.3 °C) 120 20 -- 98 %      Temp src Heart Rate Source Patient Position BP Location FiO2 (%)   Tympanic -- -- -- --       Physical Exam    Physical Exam   Constitutional: Ill and uncomfortable appearing, diaphoretic and pale.  HENT:  Head: Normocephalic and atraumatic.   Oropharynx: Mucous membranes are moist.   Eyes: . No scleral icterus. No conjunctival pallor.  Neck: Normal range of motion. Neck supple.   Cardiovascular: Pink warm and well perfused throughout.  Regular  Pulmonary/Chest: No respiratory distress.  No tachypnea or increased work of breathing appreciated.    Abdominal: Soft. There is no tenderness. There is no rebound and no guarding.  Benign   Musculoskeletal: Moves all extremities equally.    Neurological: Alert and oriented.  Speech fluent and easily intelligible.  Patient does have some left groin nystagmus but otherwise equal  strength and no unilateral weakness.  Face symmetric.  Tongue midline.  When I sit the patient up he becomes significantly symptomatic.  Skin: Diaphoretic and pale  Psychiatric: Mood and affect normal.   Nursing note and vitals reviewed.      Total (NIH Stroke Scale): 0      LAB RESULTS  Recent Results (from the past 24 hour(s))   ECG 12 Lead Electrolyte Imbalance    Collection Time: 07/12/24  1:48 PM   Result Value Ref Range    QT Interval 452 ms     QTC Interval 440 ms   Comprehensive Metabolic Panel    Collection Time: 07/12/24  1:51 PM    Specimen: Blood   Result Value Ref Range    Glucose 101 (H) 65 - 99 mg/dL    BUN 31 (H) 8 - 23 mg/dL    Creatinine 1.52 (H) 0.76 - 1.27 mg/dL    Sodium 142 136 - 145 mmol/L    Potassium 4.0 3.5 - 5.2 mmol/L    Chloride 106 98 - 107 mmol/L    CO2 24.0 22.0 - 29.0 mmol/L    Calcium 10.4 8.6 - 10.5 mg/dL    Total Protein 7.9 6.0 - 8.5 g/dL    Albumin 4.5 3.5 - 5.2 g/dL    ALT (SGPT) 18 1 - 41 U/L    AST (SGOT) 17 1 - 40 U/L    Alkaline Phosphatase 74 39 - 117 U/L    Total Bilirubin 0.8 0.0 - 1.2 mg/dL    Globulin 3.4 gm/dL    A/G Ratio 1.3 g/dL    BUN/Creatinine Ratio 20.4 7.0 - 25.0    Anion Gap 12.0 5.0 - 15.0 mmol/L    eGFR 50.5 (L) >60.0 mL/min/1.73   Protime-INR    Collection Time: 07/12/24  1:51 PM    Specimen: Blood   Result Value Ref Range    Protime 13.7 11.7 - 14.2 Seconds    INR 1.03 0.90 - 1.10   aPTT    Collection Time: 07/12/24  1:51 PM    Specimen: Blood   Result Value Ref Range    PTT 27.5 22.7 - 35.4 seconds   Single High Sensitivity Troponin T    Collection Time: 07/12/24  1:51 PM    Specimen: Blood   Result Value Ref Range    HS Troponin T 23 (H) <22 ng/L   Green Top (Gel)    Collection Time: 07/12/24  1:51 PM   Result Value Ref Range    Extra Tube Hold for add-ons.    Lavender Top    Collection Time: 07/12/24  1:51 PM   Result Value Ref Range    Extra Tube hold for add-on    Gold Top - SST    Collection Time: 07/12/24  1:51 PM   Result Value Ref Range    Extra Tube Hold for add-ons.    Light Blue Top    Collection Time: 07/12/24  1:51 PM   Result Value Ref Range    Extra Tube Hold for add-ons.    CBC Auto Differential    Collection Time: 07/12/24  1:51 PM    Specimen: Blood   Result Value Ref Range    WBC 14.67 (H) 3.40 - 10.80 10*3/mm3    RBC 4.86 4.14 - 5.80 10*6/mm3    Hemoglobin 13.4 13.0 - 17.7 g/dL    Hematocrit 40.6 37.5 - 51.0 %    MCV 83.5 79.0 - 97.0 fL    MCH 27.6 26.6 - 33.0 pg    MCHC 33.0 31.5  - 35.7 g/dL    RDW 13.9 12.3 - 15.4 %    RDW-SD 42.1 37.0 - 54.0 fl    MPV 10.1 6.0 - 12.0 fL    Platelets 289 140 - 450 10*3/mm3   Manual Differential    Collection Time: 07/12/24  1:51 PM    Specimen: Blood   Result Value Ref Range    Neutrophil % 38.0 (L) 42.7 - 76.0 %    Lymphocyte % 52.0 (H) 19.6 - 45.3 %    Monocyte % 6.0 5.0 - 12.0 %    Eosinophil % 4.0 0.3 - 6.2 %    Neutrophils Absolute 5.57 1.70 - 7.00 10*3/mm3    Lymphocytes Absolute 7.63 (H) 0.70 - 3.10 10*3/mm3    Monocytes Absolute 0.88 0.10 - 0.90 10*3/mm3    Eosinophils Absolute 0.59 (H) 0.00 - 0.40 10*3/mm3    RBC Morphology Normal Normal    WBC Morphology Normal Normal    Platelet Morphology Normal Normal   POC Glucose Once    Collection Time: 07/12/24  2:12 PM    Specimen: Blood   Result Value Ref Range    Glucose 101 70 - 130 mg/dL         RADIOLOGY  XR Chest 1 View    Result Date: 7/12/2024  XR CHEST 1 VW-7/12/2024  HISTORY: Stroke protocol.  Heart size is at the upper limits of normal. Lungs appear free of acute infiltrates. Mediport catheter is seen in good position. Bones and soft tissues are unremarkable except for degenerative changes in the bilateral shoulders more severe on the left.      1. No acute process.   This report was finalized on 7/12/2024 3:14 PM by Dr. Forest Curry M.D on Workstation: [id number]         MEDICATIONS GIVEN IN ER  Medications   sodium chloride 0.9 % flush 10 mL (has no administration in time range)   sodium chloride 0.9 % flush 10 mL (has no administration in time range)   sodium chloride 0.9 % bolus 500 mL (500 mL Intravenous New Bag 7/12/24 1419)   LORazepam (ATIVAN) injection 0.5 mg (0.5 mg Intravenous Given 7/12/24 1352)   ondansetron (ZOFRAN) injection 4 mg (4 mg Intravenous Given 7/12/24 1352)   iopamidol (ISOVUE-370) 76 % injection 150 mL (150 mL Intravenous Given by Other 7/12/24 6451)         ORDERS PLACED DURING THIS VISIT:  Orders Placed This Encounter   Procedures    CT Angiogram Head w BRIANNA  Analysis of LVO    CT Angiogram Neck    CT CEREBRAL PERFUSION WITH & WITHOUT CONTRAST    XR Chest 1 View    [place] Draw    Comprehensive Metabolic Panel    Protime-INR    aPTT    Single High Sensitivity Troponin T    CBC Auto Differential    Manual Differential    Single High Sensitivity Troponin T    NPO Diet NPO Type: Strict NPO    Monitor Blood Pressure    Initiate Department's Acute Stroke Process (Team D, Code 19, etc.)    Perform NIH Stroke Scale    Measure Actual Weight    Notify Provider    Notify Provider for SBP Greater Than 140 for Hemorrhagic Stroke Patient    Head of Bed 30 Degrees or Less    Undress and Gown    Continuous Pulse Oximetry    Vital Signs    Neuro Checks    No Hypotonic Fluids    Nursing Dysphagia Screening (Complete Prior to Giving anything PO)    RN to Place Order SLP Consult (IF swallow screen failed) - Eval & Treat Choosing Reason of RN Dysphagia Screen Failed    Inpatient Neurology Consult Stroke    Inpatient Neurology Consult Stroke    Oxygen Therapy- Nasal Cannula; Titrate 1-6 LPM Per SpO2; 90 - 95%    POC Glucose Once    POC Glucose Once    ECG 12 Lead Electrolyte Imbalance    Insert Peripheral IV    Insert Large-Bore Peripheral IV - RIGHT AC Preferred    Initiate ED Observation Status    CBC & Differential    Green Top (Gel)    Lavender Top    Gold Top - SST    Light Blue Top         OUTPATIENT MEDICATION MANAGEMENT:  Current Facility-Administered Medications Ordered in Epic   Medication Dose Route Frequency Provider Last Rate Last Admin    sodium chloride 0.9 % flush 10 mL  10 mL Intravenous PRN Warren Muñiz MD        sodium chloride 0.9 % flush 10 mL  10 mL Intravenous PRN Warren Muñiz MD         Current Outpatient Medications Ordered in Epic   Medication Sig Dispense Refill    budesonide (PULMICORT) 0.5 MG/2ML nebulizer solution INHALE 2 MILLILITERS VIA NEBULIZATION BY MOUTH DAILY (Patient taking differently: Take 2 mL by nebulization Daily.) 60 mL 0    losartan (COZAAR)  100 MG tablet Take 1 tablet by mouth Daily for 90 days. 90 tablet 0    metoprolol succinate XL (TOPROL-XL) 25 MG 24 hr tablet Take 2 tablets by mouth Daily. 180 tablet 0    pantoprazole (PROTONIX) 40 MG EC tablet Take 1 tablet by mouth Daily. 90 tablet 0    sucralfate (CARAFATE) 1 g tablet Take 1 tablet by mouth 3 (Three) Times a Day Before Meals. 270 tablet 0    Diclofenac Sodium (VOLTAREN) 1 % gel gel Apply 4 g topically to the appropriate area as directed 4 (Four) Times a Day. 50 g 0    hydrocortisone (ANUSOL-HC) 25 MG suppository Insert 1 suppository into the rectum 2 (Two) Times a Day. 20 suppository 0    ipratropium-albuterol (COMBIVENT RESPIMAT)  MCG/ACT inhaler Inhale 1 puff 4 (Four) Times a Day As Needed for Wheezing.      lidocaine-prilocaine (EMLA) 2.5-2.5 % cream Apply 1 Application topically to the appropriate area as directed See Admin Instructions. Apply to port site one hour prior to port being accessed. 30 g 3    lubiprostone (Amitiza) 8 MCG capsule Take 1 capsule by mouth 2 (Two) Times a Day With Meals. 60 capsule 2    magnesium oxide (MAG-OX) 400 MG tablet Take 1 tablet by mouth Daily.      naloxone (NARCAN) 4 MG/0.1ML nasal spray Call 911. Don't prime. [place] in 1 nostril for overdose. Repeat in 2-3 minutes in other nostril if no or minimal breathing/responsiveness. 2 each 0    ondansetron (ZOFRAN) 8 MG tablet Take 1 tablet by mouth Every 8 (Eight) Hours As Needed for Nausea or Vomiting. 30 tablet 2    oxyCODONE-acetaminophen (PERCOCET) 5-325 MG per tablet Take 1 tablet by mouth Every 12 (Twelve) Hours As Needed for Severe Pain. 30 tablet 0    Symbicort 160-4.5 MCG/ACT inhaler Inhale 2 puffs 2 (Two) Times a Day.      vitamin D3 125 MCG (5000 UT) capsule capsule Take 1 capsule by mouth Daily.           PROCEDURES  Procedures      Total critical care time: Approximately 35 minutes    Due to a high probability of clinically significant, life threatening deterioration, the patient required my  highest level of preparedness to intervene emergently and I personally spent this critical care time directly and personally managing the patient. This critical care time included obtaining a history; examining the patient; vital sign monitoring; ordering and review of studies; arranging urgent treatment with development of a management plan; evaluation of patient's response to treatment; frequent reassessment; and, discussions with other providers.    This critical care time was performed to assess and manage the high probability of imminent, life-threatening deterioration that could result in multi-organ failure. It was exclusive of separately billable procedures and treating other patients and teaching time.    Please see MDM section and the rest of the note for further information on patient assessment and treatment.        PROGRESS, DATA ANALYSIS, CONSULTS, AND MEDICAL DECISION MAKING  All labs have been independently interpreted by me.  All radiology studies have been reviewed by me. All EKG's have been independently viewed and interpreted by me.  Discussion below represents my analysis of pertinent findings related to patient's condition, differential diagnosis, treatment plan and final disposition.    Differential diagnosis:   My differential diagnosis for dizziness included but is not limited to:  Labyrinthitis, vertigo, concussion, intracranial hemorrhage, stroke, migraine headache, cardiac arrhythmias, acute MI, hypotension, hypertension, hypoxia, inner ear and middle ear infections, anemia, electrolyte abnormalities, dehydration, hyperventilation and anxiety attacks..      Clinical Scores:                  ED Course as of 07/12/24 1528   Fri Jul 12, 2024   1345 CONSULT        Provider: Dr. Reece -stroke neurology    Discussion: Reviewed patient history, ED presentation and evaluation.  He is agreeable with the plan for team D    Agreeable c treatment and planned disposition.         [RS]   1346 Team D  initiated.  Zofran and Ativan for nausea. [RS]   1353 EKG           EKG time: 1348  Rhythm/Rate: Sinus, 60  P waves and DC: NITO within normal limits  QRS, axis: IVCD consistent with right bundle branch block  ST and T waves: ST/T wave repolarization abnormality, QTc within normal limits    Interpreted Contemporaneously by me, independently viewed  Comparison: 12/12/2023-previously noted right bundle branch block   [RS]   1420 WBC(!): 14.67 [RS]   1421 Hemoglobin: 13.4 [RS]   1421 Platelets: 289 [RS]   1435 Patient feeling vastly improved [RS]   1435 Stroke neurology, Dr. Reece, has been to bedside and evaluated the patient.  He has personally reviewed the imaging does not appreciate any acute defect.  Low suspicion for CVA, no further imaging indicated.  Stroke neurology signing off. [RS]   1436 Creatinine(!): 1.52  Stable chronic disease [RS]   1512 RADIOLOGY      Study: Single view chest  Findings: No pneumothorax or focal infiltrate noted  I independently viewed and interpreted these images contemporaneously with treatment.    [RS]   1524 Patient feeling improved but not resolved.  Reviewed all findings to this point.  Patient was feel more comfortable with 24-hour observation period.  I think this is reasonable. [RS]   1526 CONSULT        Provider: JAIRO BROWN    Discussion: Reviewed patient history, ED presentation and evaluation as well as consultation with stroke neurology.  Agreeable to accept the patient to the ED observation unit for further evaluation and treatment.    Agreeable c treatment and planned disposition.         [RS]      ED Course User Index  [RS] Warren Muñiz MD         Prescription drug monitoring program review:     AS OF 15:28 EDT VITALS:    BP - 136/89  HR - 65  TEMP - 97.4 °F (36.3 °C) (Tympanic)  O2 SATS - 95%    COMPLEXITY OF CARE.bfedischarge    The patient requires admission.      DIAGNOSIS  Final diagnoses:   Acute vertigo with vomiting and inability to stand   Lightheaded    Nausea and vomiting, unspecified vomiting type   Elevated blood pressure reading         DISPOSITION  ED Disposition       ED Disposition   Decision to Admit    Condition   --    Comment   --                  ADMISSION    Discussed treatment plan and reason for admission with pt/family and admitting physician.  Pt/family voiced understanding of the plan for admission for further testing/treatment as needed.       Please note that portions of this document were completed with a voice recognition program.    Note Disclaimer: At [facility], we believe that sharing information builds trust and better relationships. You are receiving this note because you recently visited [facility]. It is possible you will see health information before a provider has talked with you about it. This kind of information can be easy to misunderstand. To help you fully understand what it means for your health, we urge you to discuss this note with your provider.         Warren Muñiz MD  07/12/24 7634

## 2024-07-12 NOTE — CONSULTS
NEUROLOGY CONSULT - STROKE TEAM    DOS: 2024  NAME: Louis Andino   : 1960  PCP: Harry Hsu MD  CC: Stroke  Referring MD: No ref. provider found  Patient being seen at request of Dr. Muñiz for advice and opinion on dizziness.    Neurological Problem and Interval History:  63 y.o. male presents with chief complaint of: acute dizziness.  63m was at lunch when he had an acute onset of severe lightheadedness. He became ashen/pale, diaphoretic and nearly passed out. His wife brought him to the emergency room. Patient has renal cell cancer and is on immunotherapy every 6 weeks and since he began it, about a week or so after his therapy he has had lightheadedness but this was the worst he's had it. Usually he lays down and feels better right away. Patient hasn't been taking his blood pressure readings at home lately. Patient is a retired internist.       Past Medical/Surgical Hx:  Past Medical History:   Diagnosis Date    Anemia 2023    Due to surgery. Required transfusions    Ankle sprain     Multiple-both ankles over the years    Asthma     Clotting disorder     Required blood transfusion during and after surgery in excess of expected    Coronary artery disease Dec 2023    Bifascicular block    Deep vein thrombosis 2023    During cancer surgery a clot was surgically removed from the inferior vena cava which was cancer related    Emphysema/COPD     Erectile dysfunction Several years    Fracture of ankle     Left ankle as a child    Fracture of wrist     Left wrist as a child    GERD (gastroesophageal reflux disease)     HL (hearing loss)     Progressive worsening over many years    Hyperglycemia 10/12/2023    Hyperlipidemia 10/12/2023    Hypertension     Injury of neck     Previous fractures noted during chiropractic visit. Probably secondary to shoulder injury.    Low back pain     Worse last few year    Obesity     Pneumonia 2023    Developed while hospitalized for cancer  surgery    Prostate disease     Rash     Allergic reactions to laundry detergent and mild generalized itching secondary to immunotherapy.    Renal cell carcinoma 12/30/2023    Renal insufficiency 4/9/2024    Decreased renal function since nephrectomy in December, 2023, secondary to renal cancer, but not diagnosed as chronic kidney disease until recent visit. Kidney functions have been stable since surgery, but are now being considered permanent.    Shoulder injury 1980s    Injured in     Visual impairment     Wear glasses for near and distant vision    Vitreous degeneration of right eye     Formatting of this note might be different from the original. Retinal tear and detachment warning symptoms reviewed and patient instructed to call immediately if increasing floaters, flashes, or decreasing peripheral vision. No retinal detachment or retinal tear noted. Recheck in 1 month with dilated exam.     Past Surgical History:   Procedure Laterality Date    ABDOMINAL SURGERY  December 18, 2023    Left Kidney and Adrenal Gland removed due to Renal Cell Carcinoma    NEPHRECTOMY Left 12/18/2023    Procedure: NEPHRECTOMY RADICAL WITH CAVAL THROMBECTOMY;  Surgeon: James Esquivel MD;  Location: [facility];  Service: Urology;  Laterality: Left;    SKIN LESION EXCISION  1990       Review of Systems:        No fever, sweats or chills,  No neck pain, back pain or joint pain  No loss of hearing or tinnitus  No blurry or double vision  No rashes or edema  No chest pain or palpitations  No shortness of breath or wheezing  No diarrhea or constipation  No urinary incontinence or dysuria  No seizures or syncope  No depression or anxiety    Medications On Admission  (Not in a hospital admission)    No current facility-administered medications on file prior to encounter.     Current Outpatient Medications on File Prior to Encounter   Medication Sig Dispense Refill    budesonide (PULMICORT) 0.5 MG/2ML nebulizer solution INHALE 2  MILLILITERS VIA NEBULIZATION BY MOUTH DAILY (Patient taking differently: Take 2 mL by nebulization Daily.) 60 mL 0    losartan (COZAAR) 100 MG tablet Take 1 tablet by mouth Daily for 90 days. 90 tablet 0    metoprolol succinate XL (TOPROL-XL) 25 MG 24 hr tablet Take 2 tablets by mouth Daily. 180 tablet 0    pantoprazole (PROTONIX) 40 MG EC tablet Take 1 tablet by mouth Daily. 90 tablet 0    sucralfate (CARAFATE) 1 g tablet Take 1 tablet by mouth 3 (Three) Times a Day Before Meals. 270 tablet 0    Diclofenac Sodium (VOLTAREN) 1 % gel gel Apply 4 g topically to the appropriate area as directed 4 (Four) Times a Day. 50 g 0    hydrocortisone (ANUSOL-HC) 25 MG suppository Insert 1 suppository into the rectum 2 (Two) Times a Day. 20 suppository 0    ipratropium-albuterol (COMBIVENT RESPIMAT)  MCG/ACT inhaler Inhale 1 puff 4 (Four) Times a Day As Needed for Wheezing.      lidocaine-prilocaine (EMLA) 2.5-2.5 % cream Apply 1 Application topically to the appropriate area as directed See Admin Instructions. Apply to port site one hour prior to port being accessed. 30 g 3    lubiprostone (Amitiza) 8 MCG capsule Take 1 capsule by mouth 2 (Two) Times a Day With Meals. 60 capsule 2    magnesium oxide (MAG-OX) 400 MG tablet Take 1 tablet by mouth Daily.      naloxone (NARCAN) 4 MG/0.1ML nasal spray Call 911. Don't prime. [place] in 1 nostril for overdose. Repeat in 2-3 minutes in other nostril if no or minimal breathing/responsiveness. 2 each 0    ondansetron (ZOFRAN) 8 MG tablet Take 1 tablet by mouth Every 8 (Eight) Hours As Needed for Nausea or Vomiting. 30 tablet 2    oxyCODONE-acetaminophen (PERCOCET) 5-325 MG per tablet Take 1 tablet by mouth Every 12 (Twelve) Hours As Needed for Severe Pain. 30 tablet 0    Symbicort 160-4.5 MCG/ACT inhaler Inhale 2 puffs 2 (Two) Times a Day.      vitamin D3 125 MCG (5000 UT) capsule capsule Take 1 capsule by mouth Daily.          Allergies:  No Known Allergies    Social Hx:  Social  History     Socioeconomic History    Marital status:     Number of children: 6   Tobacco Use    Smoking status: Never     Passive exposure: Past    Smokeless tobacco: Never    Tobacco comments:     SECOND HAND SMOKE EXPOSURE AS A CHILD    Vaping Use    Vaping status: Never Used   Substance and Sexual Activity    Alcohol use: Yes     Alcohol/week: 1.0 standard drink of alcohol     Types: 1 Glasses of wine per week     Comment: rare    Drug use: Never    Sexual activity: Yes     Partners: Female       Family Hx:  Family History   Problem Relation Age of Onset    Arthritis Mother     Cancer Mother     Diabetes Mother     Heart disease Mother     Hyperlipidemia Mother     Miscarriages / Stillbirths Mother         Stillborn child    Hypertension Mother     Osteoporosis Mother     COPD Father     Hyperlipidemia Father     Hyperlipidemia Brother     Vision loss Daughter     Broken bones Daughter         Broke left forearm three times during childhood    Broken bones Daughter         Fractured right femur and right forearm during childhood    Dislocations Daughter         Recurrent radial head subluxations as a child       Review of Imaging (Interpretation of images not reports):      Laboratory Results:   Lab Results   Component Value Date    GLUCOSE 100 (H) 01/09/2024    CALCIUM 10.1 01/09/2024     01/09/2024    K 4.3 01/09/2024    CO2 27.6 01/09/2024     01/09/2024    BUN 15 01/09/2024    CREATININE 1.60 (H) 07/05/2024    BCR 9.3 01/09/2024    ANIONGAP 10.0 12/30/2023     Lab Results   Component Value Date    WBC 14.67 (H) 07/12/2024    HGB 13.4 07/12/2024    HCT 40.6 07/12/2024    MCV 83.5 07/12/2024     07/12/2024     Lab Results   Component Value Date    CHOL 75 12/19/2023     Lab Results   Component Value Date    HDL 24 (L) 12/19/2023     Lab Results   Component Value Date    LDL 30 12/19/2023     Lab Results   Component Value Date    TRIG 109 12/19/2023     No results found for:  "\"HGBA1C\"  Lab Results   Component Value Date    INR 1.01 01/30/2024    INR 1.08 12/18/2023    INR 1.24 (H) 12/18/2023    PROTIME 11.0 01/30/2024    PROTIME 11.7 12/18/2023    PROTIME 13.3 (H) 12/18/2023         Physical Examination:   /89   Pulse 120   Temp 97.4 °F (36.3 °C) (Tympanic)   Resp 20   Ht 177.8 cm (70\")   Wt 103 kg (227 lb 1.2 oz)   SpO2 98%   BMI 32.58 kg/m²   General Appearance:   Well developed, well nourished, well groomed, alert, and cooperative.  HEENT: Normocephalic. Atraumatic. No JVD, no tracheal deviation.  Neck and Spine: Normal range of motion.  Normal alignment. No mass or tenderness.   Neurological examination:  Higher Integrative  Function: Oriented to time, place and person. Normal registration, recall, attention span and concentration. Normal language. No neglect. Normal fund of knowledge and higher integrative function.  CN II: Pupils are equal, round, and reactive to light. Blinks to visual threat and counts fingers in all fields  CN III IV VI: Extraocular movements are full without nystagmus.   CN V: Normal facial sensation   CN VII: Facial movements are symmetric. No labial dysarthria  CN VIII:   Hard of hearing  CN IX & X:   No palatal or pharnygeal dysarthria.  CN XI: Turns head without abnormality.   CN XII:   The tongue is midline.  No lingual dysarthria.   Motor: Normal muscle strength, bulk and tone in upper and lower extremities.  No fasciculations, rigidity, spasticity, or abnormal movements.  Reflexes: Hypoactive but symmetric reflexes  Sensation: Normal to light touch, temperature, and proprioception in arms and legs.   Station and Gait: Deferred for bed rest    Coordination: Finger-to-nose test shows no dysmetria.  Rapid alternating movements are normal.  Heel-to-shin normal.    NIHSS:     Diagnoses / Discussion:  63 y.o. who presents with Sx of orthostatic pre-syncope. Possibly related to orthostatic hypotension. Patient reports a regular pattern of this " occurring about a week after his immunotherapy. I've asked him to check his pressures around that time and to be mindful this may occur again and to lay down and drink plenty of fluids a week after his immunotherapies. CT/CTP and CTA are unremarkable. No focality on exam. Will defer MRI and stroke preventive treatments.     Plan:  Check blood pressures about one week after each immunotherapy and consider adjusting medications around this period.     Will sign off, feel free to recontact with any further questions or concerns.     I have discussed the above with the patient and family.  Time spent with patient: 40min    MDM  Reviewed: previous chart, nursing note and vitals  Reviewed previous: labs and CT scan  Interpretation: CT scan and labs  Total time providing critical care: 30-74 minutes. This excludes time spent performing separately reportable procedures and services.         James Naqvi MD  Neurology

## 2024-07-12 NOTE — ED NOTES
"Nursing report ED to floor  Louis Andino  63 y.o.  male    HPI :  HPI (Adult)  Stated Reason for Visit: syncope and vomiting  History Obtained From: patient    Chief Complaint  Chief Complaint   Patient presents with    Syncope    Vomiting       Admitting doctor:   Matt Buitrago MD    Admitting diagnosis:   The primary encounter diagnosis was Acute vertigo with vomiting and inability to stand. Diagnoses of Lightheaded, Nausea and vomiting, unspecified vomiting type, and Elevated blood pressure reading were also pertinent to this visit.    Code status:   Current Code Status       Date Active Code Status Order ID Comments User Context       Prior            Allergies:   Patient has no known allergies.    Isolation:   No active isolations    Intake and Output  No intake or output data in the 24 hours ending 07/12/24 1532    Weight:       07/12/24  1338   Weight: 103 kg (227 lb 1.2 oz)       Most recent vitals:   Vitals:    07/12/24 1338 07/12/24 1346 07/12/24 1454   BP:  136/89    Pulse: 120  65   Resp: 20     Temp: 97.4 °F (36.3 °C)     TempSrc: Tympanic     SpO2: 98%  95%   Weight: 103 kg (227 lb 1.2 oz)     Height: 177.8 cm (70\")         Active LDAs/IV Access:   Lines, Drains & Airways       Active LDAs       Name Placement date Placement time Site Days    Peripheral IV 07/12/24 1340 Right Antecubital 07/12/24  1340  Antecubital  less than 1    Single Lumen Implantable Port 01/30/24 Right Internal jugular 01/30/24  0946  Internal jugular  164                    Labs (abnormal labs have a star):   Labs Reviewed   COMPREHENSIVE METABOLIC PANEL - Abnormal; Notable for the following components:       Result Value    Glucose 101 (*)     BUN 31 (*)     Creatinine 1.52 (*)     eGFR 50.5 (*)     All other components within normal limits    Narrative:     GFR Normal >60  Chronic Kidney Disease <60  Kidney Failure <15     SINGLE HS TROPONIN T - Abnormal; Notable for the following components:    HS Troponin T 23 (*)     " All other components within normal limits    Narrative:     High Sensitive Troponin T Reference Range:  <14.0 ng/L- Negative Female for AMI  <22.0 ng/L- Negative Male for AMI  >=14 - Abnormal Female indicating possible myocardial injury.  >=22 - Abnormal Male indicating possible myocardial injury.   Clinicians would have to utilize clinical acumen, EKG, Troponin, and serial changes to determine if it is an Acute Myocardial Infarction or myocardial injury due to an underlying chronic condition.        CBC WITH AUTO DIFFERENTIAL - Abnormal; Notable for the following components:    WBC 14.67 (*)     All other components within normal limits   MANUAL DIFFERENTIAL - Abnormal; Notable for the following components:    Neutrophil % 38.0 (*)     Lymphocyte % 52.0 (*)     Lymphocytes Absolute 7.63 (*)     Eosinophils Absolute 0.59 (*)     All other components within normal limits   PROTIME-INR - Normal   APTT - Normal   POCT GLUCOSE FINGERSTICK - Normal   RAINBOW DRAW    Narrative:     The following orders were created for panel order [facility] Draw.  Procedure                               Abnormality         Status                     ---------                               -----------         ------                     Green Top (Gel)[[id number]]                                  Final result               Lavender Top[[id number]]                                     Final result               Gold Top - SST[[id number]]                                   Final result               Light Blue Top[[id number]]                                   Final result                 Please view results for these tests on the individual orders.   SINGLE HS TROPONIN T   MAGNESIUM   POCT GLUCOSE FINGERSTICK   CBC AND DIFFERENTIAL    Narrative:     The following orders were created for panel order CBC & Differential.  Procedure                               Abnormality         Status                     ---------                                -----------         ------                     CBC Auto Differential[[id number]]        Abnormal            Final result                 Please view results for these tests on the individual orders.   GREEN TOP   LAVENDER TOP   GOLD TOP - SST   LIGHT BLUE TOP       EKG:   ECG 12 Lead Electrolyte Imbalance   Preliminary Result   HEART RATE=57  bpm   RR Ckfbwcns=3127  ms   CT Qnqfejmf=552  ms   P Horizontal Axis=11  deg   P Front Axis=31  deg   QRSD Ztkzaizo=724  ms   QT Bmgijsjx=543  ms   TNsX=213  ms   QRS Axis=-78  deg   T Wave Axis=6  deg   - ABNORMAL ECG -   Sinus rhythm   RBBB and LAFB   Inferior infarct, old   Lateral leads are also involved   Date and Time of Study:2024-07-12 13:48:54          Meds given in ED:   Medications   sodium chloride 0.9 % flush 10 mL (has no administration in time range)   sodium chloride 0.9 % flush 10 mL (has no administration in time range)   sodium chloride 0.9 % flush 10 mL (has no administration in time range)   sodium chloride 0.9 % flush 10 mL (has no administration in time range)   sodium chloride 0.9 % infusion 40 mL (has no administration in time range)   Potassium Replacement - Follow Nurse / BPA Driven Protocol (has no administration in time range)   Magnesium Standard Dose Replacement - Follow Nurse / BPA Driven Protocol (has no administration in time range)   Phosphorus Replacement - Follow Nurse / BPA Driven Protocol (has no administration in time range)   Calcium Replacement - Follow Nurse / BPA Driven Protocol (has no administration in time range)   acetaminophen (TYLENOL) tablet 650 mg (has no administration in time range)     Or   acetaminophen (TYLENOL) 160 MG/5ML oral solution 650 mg (has no administration in time range)     Or   acetaminophen (TYLENOL) suppository 650 mg (has no administration in time range)   nitroglycerin (NITROSTAT) SL tablet 0.4 mg (has no administration in time range)   sodium chloride 0.9 % bolus 500 mL (500 mL Intravenous New Bag 7/12/24  1419)   LORazepam (ATIVAN) injection 0.5 mg (0.5 mg Intravenous Given 7/12/24 1352)   ondansetron (ZOFRAN) injection 4 mg (4 mg Intravenous Given 7/12/24 1352)   iopamidol (ISOVUE-370) 76 % injection 150 mL (150 mL Intravenous Given by Other 7/12/24 1404)       Imaging results:  XR Chest 1 View    Result Date: 7/12/2024  1. No acute process.   This report was finalized on 7/12/2024 3:14 PM by Dr. Forest Curry M.D on Workstation: [id number]       Ambulatory status:   - assist    Social issues:   Social History     Socioeconomic History    Marital status:     Number of children: 6   Tobacco Use    Smoking status: Never     Passive exposure: Past    Smokeless tobacco: Never    Tobacco comments:     SECOND HAND SMOKE EXPOSURE AS A CHILD    Vaping Use    Vaping status: Never Used   Substance and Sexual Activity    Alcohol use: Yes     Alcohol/week: 1.0 standard drink of alcohol     Types: 1 Glasses of wine per week     Comment: rare    Drug use: Never    Sexual activity: Yes     Partners: Female       Peripheral Neurovascular  Peripheral Neurovascular (Adult)  Peripheral Neurovascular WDL: WDL    Neuro Cognitive  Neuro Cognitive (Adult)  Cognitive/Neuro/Behavioral WDL: .WDL except  Last Known Well Date: 07/12/24  Last Known Well Time: 1300  Additional Documentation: Last Known Well Date (Row), NIH Stroke Scale (group), Last Known Well Time (Row)  NIH Stroke Scale  Interval: baseline  1a. Level of Consciousness: 0-->Alert, keenly responsive  1b. LOC Questions: 0-->Answers both questions correctly  1c. LOC Commands: 0-->Performs both tasks correctly  2. Best Gaze: 0-->Normal  3. Visual: 0-->No visual loss  4. Facial Palsy: 0-->Normal symmetrical movements  5a. Motor Arm, Left: 0-->No drift, limb holds 90 (or 45) degrees for full 10 secs  5b. Motor Arm, Right: 0-->No drift, limb holds 90 (or 45) degrees for full 10 secs  6a. Motor Leg, Left: 0-->No drift, leg holds 30 degree position for full 5 secs  6b.  Motor Leg, Right: 0-->No drift, leg holds 30 degree position for full 5 secs  7. Limb Ataxia: 0-->Absent  8. Sensory: 0-->Normal, no sensory loss  9. Best Language: 0-->No aphasia, normal  10. Dysarthria: 0-->Normal  11. Extinction and Inattention (formerly Neglect): 0-->No abnormality  Total (NIH Stroke Scale): 0    Learning  Learning Assessment (Adult)  Learning Readiness and Ability: no barriers identified    Respiratory  Respiratory WDL  Respiratory WDL: WDL    Abdominal Pain       Pain Assessments  Pain (Adult)  (0-10) Pain Rating: Rest: 0    NIH Stroke Scale  NIH Stroke Scale  Interval: baseline  1a. Level of Consciousness: 0-->Alert, keenly responsive  1b. LOC Questions: 0-->Answers both questions correctly  1c. LOC Commands: 0-->Performs both tasks correctly  2. Best Gaze: 0-->Normal  3. Visual: 0-->No visual loss  4. Facial Palsy: 0-->Normal symmetrical movements  5a. Motor Arm, Left: 0-->No drift, limb holds 90 (or 45) degrees for full 10 secs  5b. Motor Arm, Right: 0-->No drift, limb holds 90 (or 45) degrees for full 10 secs  6a. Motor Leg, Left: 0-->No drift, leg holds 30 degree position for full 5 secs  6b. Motor Leg, Right: 0-->No drift, leg holds 30 degree position for full 5 secs  7. Limb Ataxia: 0-->Absent  8. Sensory: 0-->Normal, no sensory loss  9. Best Language: 0-->No aphasia, normal  10. Dysarthria: 0-->Normal  11. Extinction and Inattention (formerly Neglect): 0-->No abnormality  Total (NIH Stroke Scale): 0    Shannon Pereira RN  07/12/24 15:32 EDT

## 2024-07-12 NOTE — PLAN OF CARE
Goal Outcome Evaluation:      Patient admitted to obs for dizziness lightheadedness and syncope and nausea. Symptoms have resolved. Ct scans negative. No c/o nausea or vomiting at this time. Patient is alert and oriented x4. Patient is waiting for a hip replacement and uses a walker for ambulation.VSS at this time. Continuing plan of care and plan for discharge tomorrow.

## 2024-07-12 NOTE — PROGRESS NOTES
HEMATOLOGY ONCOLOGY OUTPATIENT FOLLOW UP       Patient name: Louis Andino  : 1960  MRN: [id number]  Primary Care Physician: Harry Hsu MD  Referring Physician: No ref. provider found  Reason For Consult: Renal cell carcinoma      History of Present Illness:  Patient is a 63 y.o. male with RCC.    Patient initially presented with hematuria.  During hospitalization he had a CT of his abdomen which showed a large left lower pole renal mass with possible adrenal metastasis.    2023 CT abdomen pelvis with contrast showing mass arising from the lower pole of the left kidney consistent with renal cell carcinoma.  Associated uroepithelial thickening of the pelvis and proximal ureter.  Enhancing indeterminate left adrenal nodule potential metastasis no retroperitoneal lymphadenopathy.    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  Vessel with clear-cell renal cell carcinoma tumor thrombus    24 - pembrolizumab  3/1/24 - pembro  24 - increased pembro 400 mg  24 - pembro 400 mg  24 - pembro 400 mg  24 - was admitted with syncopal episode, ECHO was normal, CTA neck negative, has a Zio patch, will be seen by cardiology.  Morning metoprolol was stopped.    Subjective:  Still has ongoing lightheadedness      Past Medical History:   Diagnosis Date    Anemia 2023    Due to surgery. Required transfusions    Ankle sprain     Multiple-both ankles over the years    Asthma     Clotting disorder     Required blood transfusion during and after surgery in excess of expected    Coronary artery disease Dec 2023    Bifascicular block    Deep  vein thrombosis 12/18/2023    During cancer surgery a clot was surgically removed from the inferior vena cava which was cancer related    Emphysema/COPD     Erectile dysfunction Several years    Fracture of ankle     Left ankle as a child    Fracture of wrist     Left wrist as a child    GERD (gastroesophageal reflux disease)     HL (hearing loss)     Progressive worsening over many years    Hyperglycemia 10/12/2023    Hyperlipidemia 10/12/2023    Hypertension     Injury of neck 2023    Previous fractures noted during chiropractic visit. Probably secondary to shoulder injury.    Low back pain     Worse last few year    Obesity     Pneumonia 12/20/2023    Developed while hospitalized for cancer surgery    Prostate disease     Rash     Allergic reactions to laundry detergent and mild generalized itching secondary to immunotherapy.    Renal cell carcinoma 12/30/2023    Renal insufficiency 4/9/2024    Decreased renal function since nephrectomy in December, 2023, secondary to renal cancer, but not diagnosed as chronic kidney disease until recent visit. Kidney functions have been stable since surgery, but are now being considered permanent.    Shoulder injury 1980s    Injured in     Visual impairment     Wear glasses for near and distant vision    Vitreous degeneration of right eye     Formatting of this note might be different from the original. Retinal tear and detachment warning symptoms reviewed and patient instructed to call immediately if increasing floaters, flashes, or decreasing peripheral vision. No retinal detachment or retinal tear noted. Recheck in 1 month with dilated exam.       Past Surgical History:   Procedure Laterality Date    ABDOMINAL SURGERY  December 18, 2023    Left Kidney and Adrenal Gland removed due to Renal Cell Carcinoma    NEPHRECTOMY Left 12/18/2023    Procedure: NEPHRECTOMY RADICAL WITH CAVAL THROMBECTOMY;  Surgeon: James Esquivel MD;  Location: [facility] OR;  Service: Urology;   Laterality: Left;    SKIN LESION EXCISION  1990         Current Outpatient Medications:     Diclofenac Sodium (VOLTAREN) 1 % gel gel, Apply 4 g topically to the appropriate area as directed 4 (Four) Times a Day., Disp: 50 g, Rfl: 0    hydrocortisone (ANUSOL-HC) 25 MG suppository, Insert 1 suppository into the rectum 2 (Two) Times a Day., Disp: 20 suppository, Rfl: 0    ipratropium-albuterol (COMBIVENT RESPIMAT)  MCG/ACT inhaler, Inhale 1 puff 4 (Four) Times a Day As Needed for Wheezing., Disp: , Rfl:     lidocaine-prilocaine (EMLA) 2.5-2.5 % cream, Apply 1 Application topically to the appropriate area as directed See Admin Instructions. Apply to port site one hour prior to port being accessed., Disp: 30 g, Rfl: 3    losartan (COZAAR) 100 MG tablet, Take 1 tablet by mouth Daily for 90 days., Disp: 90 tablet, Rfl: 0    lubiprostone (Amitiza) 8 MCG capsule, Take 1 capsule by mouth 2 (Two) Times a Day With Meals., Disp: 60 capsule, Rfl: 2    magnesium oxide (MAG-OX) 400 MG tablet, Take 1 tablet by mouth Daily., Disp: , Rfl:     metoprolol succinate XL (TOPROL-XL) 25 MG 24 hr tablet, Take 2 tablets by mouth Daily. (Patient taking differently: Take 1 tablet by mouth Daily.), Disp: 180 tablet, Rfl: 0    naloxone (NARCAN) 4 MG/0.1ML nasal spray, Call 911. Don't prime. [place] in 1 nostril for overdose. Repeat in 2-3 minutes in other nostril if no or minimal breathing/responsiveness., Disp: 2 each, Rfl: 0    ondansetron (ZOFRAN) 8 MG tablet, Take 1 tablet by mouth Every 8 (Eight) Hours As Needed for Nausea or Vomiting., Disp: 30 tablet, Rfl: 2    oxyCODONE-acetaminophen (PERCOCET) 5-325 MG per tablet, Take 1 tablet by mouth Every 12 (Twelve) Hours As Needed for Severe Pain., Disp: 30 tablet, Rfl: 0    pantoprazole (PROTONIX) 40 MG EC tablet, Take 1 tablet by mouth Daily., Disp: 90 tablet, Rfl: 0    sucralfate (CARAFATE) 1 g tablet, Take 1 tablet by mouth 3 (Three) Times a Day Before Meals., Disp: 270 tablet, Rfl: 0     Symbicort 160-4.5 MCG/ACT inhaler, Inhale 2 puffs 2 (Two) Times a Day., Disp: , Rfl:     vitamin D3 125 MCG (5000 UT) capsule capsule, Take 1 capsule by mouth Daily., Disp: , Rfl:     budesonide (PULMICORT) 0.5 MG/2ML nebulizer solution, INHALE 2 MILLILITERS VIA NEBULIZATION BY MOUTH DAILY (Patient not taking: Reported on 7/17/2024), Disp: 60 mL, Rfl: 0    No Known Allergies    Family History   Problem Relation Age of Onset    Arthritis Mother     Cancer Mother     Diabetes Mother     Heart disease Mother     Hyperlipidemia Mother     Miscarriages / Stillbirths Mother         Stillborn child    Hypertension Mother     Osteoporosis Mother     COPD Father     Hyperlipidemia Father     Hyperlipidemia Brother     Vision loss Daughter     Broken bones Daughter         Broke left forearm three times during childhood    Broken bones Daughter         Fractured right femur and right forearm during childhood    Anxiety disorder Daughter     Depression Daughter     Miscarriages / Stillbirths Daughter         First Trimester miscarriage    Dislocations Daughter         Recurrent radial head subluxations as a child    Anxiety disorder Daughter     Depression Daughter     Asthma Son     Depression Son        Cancer-related family history includes Cancer in his mother.      Social History     Tobacco Use    Smoking status: Never     Passive exposure: Past    Smokeless tobacco: Never    Tobacco comments:     SECOND HAND SMOKE EXPOSURE AS A CHILD    Vaping Use    Vaping status: Never Used   Substance Use Topics    Alcohol use: Yes     Alcohol/week: 1.0 standard drink of alcohol     Types: 1 Glasses of wine per week     Comment: rare    Drug use: Never     Social History     Social History Narrative    Not on file       ROS:   Review of Systems   Constitutional:  Negative for fatigue and fever.   HENT:  Negative for congestion and nosebleeds.    Eyes:  Negative for pain and itching.   Respiratory:  Negative for cough and shortness of  "breath.    Cardiovascular:  Negative for chest pain.   Gastrointestinal:  Negative for abdominal pain, blood in stool, diarrhea, nausea and vomiting.   Endocrine: Negative for cold intolerance and heat intolerance.   Genitourinary:  Negative for difficulty urinating.   Musculoskeletal:  Negative for arthralgias.   Skin:  Negative for rash.   Neurological:  Negative for dizziness and headaches.   Hematological:  Does not bruise/bleed easily.   Psychiatric/Behavioral:  Negative for behavioral problems.    pruritis    Objective:    Vital Signs:  Vitals:    07/17/24 1320   BP: 131/81   Pulse: 74   Resp: 18   Temp: 97.8 °F (36.6 °C)   SpO2: 98%   Weight: 103 kg (228 lb)   Height: 177.8 cm (70\")   PainSc: 0-No pain         Body mass index is 32.71 kg/m².    ECOG  (0) Fully active, able to carry on all predisease performance without restriction    Physical Exam:   Physical Exam  Constitutional:       Appearance: Normal appearance.   HENT:      Head: Normocephalic and atraumatic.   Eyes:      Pupils: Pupils are equal, round, and reactive to light.   Cardiovascular:      Rate and Rhythm: Normal rate and regular rhythm.      Pulses: Normal pulses.      Heart sounds: No murmur heard.  Pulmonary:      Effort: Pulmonary effort is normal.      Breath sounds: Normal breath sounds.   Abdominal:      General: There is no distension.      Palpations: Abdomen is soft. There is no mass.      Tenderness: There is no abdominal tenderness.   Musculoskeletal:         General: Normal range of motion.      Cervical back: Normal range of motion and neck supple.   Skin:     General: Skin is warm.   Neurological:      General: No focal deficit present.      Mental Status: He is alert.   Psychiatric:         Mood and Affect: Mood normal.         Lab Results - Last 18 Months   Lab Units 07/13/24  0507 07/12/24  1351 07/05/24  1152   WBC 10*3/mm3 14.13* 14.67* 7.98   HEMOGLOBIN g/dL 12.3* 13.4 12.6*   HEMATOCRIT % 36.8* 40.6 38.7   PLATELETS " "10*3/mm3 230 289 203   MCV fL 85.0 83.5 87.0     Lab Results - Last 18 Months   Lab Units 07/13/24  0507 07/12/24  1357 07/12/24  1351 02/09/24  1411 01/09/24  1544   SODIUM mmol/L 141  --  142  --  140   POTASSIUM mmol/L 4.3  --  4.0  --  4.3   CHLORIDE mmol/L 105  --  106  --  102   CO2 mmol/L 24.9  --  24.0  --  27.6   BUN mg/dL 24*  --  31*  --  15   CREATININE mg/dL 1.48* 1.80* 1.52*   < > 1.62*   CALCIUM mg/dL 9.6  --  10.4  --  10.1   BILIRUBIN mg/dL 0.8  --  0.8  --  0.8   ALK PHOS U/L 63  --  74  --  94   ALT (SGPT) U/L 15  --  18  --  48*   AST (SGOT) U/L 13  --  17  --  25   GLUCOSE mg/dL 89  --  101*  --  100*    < > = values in this interval not displayed.       Lab Results   Component Value Date    GLUCOSE 89 07/13/2024    BUN 24 (H) 07/13/2024    CREATININE 1.48 (H) 07/13/2024    BCR 16.2 07/13/2024    K 4.3 07/13/2024    CO2 24.9 07/13/2024    CALCIUM 9.6 07/13/2024    PROTENTOTREF 7.0 01/09/2024    ALBUMIN 3.9 07/13/2024    LABIL2 1.2 01/09/2024    AST 13 07/13/2024    ALT 15 07/13/2024       Lab Results - Last 18 Months   Lab Units 07/12/24  1351 01/30/24  0827 12/18/23  1722   INR  1.03 1.01 1.08   APTT seconds 27.5 25.3 28.7       Lab Results   Component Value Date    IRON 12 (L) 12/20/2023    TIBC 145 (L) 12/20/2023       No results found for: \"FOLATE\"    No results found for: \"OCCULTBLD\"    No results found for: \"RETICCTPCT\"  No results found for: \"IVKPQPIS87\"  No results found for: \"SPEP\", \"UPEP\"  No results found for: \"LDH\", \"URICACID\"  No results found for: \"OSKAR\", \"RF\", \"SEDRATE\"  Lab Results   Component Value Date    FIBRINOGEN 381 12/18/2023     Lab Results   Component Value Date    PTT 27.5 07/12/2024    INR 1.03 07/12/2024     No results found for: \"\"  No results found for: \"CEA\"  No components found for: \"CA-19-9\"  No results found for: \"PSA\"  No results found for: \"SEDRATE\"       Assessment & Plan     Patient is a 63-year-old male with stage IIIb T3b RCC status post radical " nephrectomy    Renal cell carcinoma  Status post nephrectomy, CT chest with no evidence of metastasis questionable adrenal metastasis on scan status post adrenalectomy with no evidence of metastatic disease  Given patient's stage III clear-cell RCC, discussed adjuvant treatment with pembrolizumab based on the findings from keynote 564 trial with significant improvement in disease-free survival as compared to placebo for stage III clear-cell RCC.    Started Immunotherapy, G1 - pruritus otherwise good tolerance. Now increased dose to 400 mg every 6 weeks.   Continue same. Has ongoing pruritus no rash, takes benadryl.       Pruritus  G1, continue immunotherapy  Use moisturizer stable  No significant worsening, continue same.  He will try atarax instead of benadryl      Syncope  Ziopatch on  F/u with cardiology  Has increased symptoms after immunotherapy  Will add saline few days after treatment    Thank you very much for providing the opportunity to participate in this patient’s care. Please do not hesitate to call if there are any other questions.

## 2024-07-12 NOTE — H&P
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  Patient reports that it started around 1300 on 2024 while he was at lunch, and he felt lightheaded then passed out in a hernandez without falling or any injury, he says that he was out for as long as it took his wife to bring the car around, and was associated with some nausea.  Patient states that his wife said he had some urinary incontinence but no biting of the tongue, or any tonic-clonic movements noted.  Patient reports that the lightheadedness has improved since arrival to the ER.  Patient states that he had an immunotherapy in fusion about 1 week ago and he usually does have some lightheadedness about 1 week post infusions, however this time was more severe than previous times and he does not usually have syncope or nausea associated with it.  Patient reports that he had a similar episode with syncope and 2010 and he had a full workup in hospital in [facility] without any specific diagnosis appreciated at that time.  Patient denies any fevers or chills, chest pain, dyspnea, palpitations, abdominal pain, peripheral edema, headaches, or unilateral weakness.    Review of Systems   All systems were reviewed and negative except for: Lightheadedness, nausea, syncope    Personal History     Past Medical History:   Diagnosis Date    Anemia 2023    Due to surgery. Required transfusions    Ankle sprain     Multiple-both ankles over the years     Asthma     Clotting disorder     Required blood transfusion during and after surgery in excess of expected    Coronary artery disease Dec 2023    Bifascicular block    Deep vein thrombosis 12/18/2023    During cancer surgery a clot was surgically removed from the inferior vena cava which was cancer related    Emphysema/COPD     Erectile dysfunction Several years    Fracture of ankle     Left ankle as a child    Fracture of wrist     Left wrist as a child    GERD (gastroesophageal reflux disease)     HL (hearing loss)     Progressive worsening over many years    Hyperglycemia 10/12/2023    Hyperlipidemia 10/12/2023    Hypertension     Injury of neck 2023    Previous fractures noted during chiropractic visit. Probably secondary to shoulder injury.    Low back pain     Worse last few year    Obesity     Pneumonia 12/20/2023    Developed while hospitalized for cancer surgery    Prostate disease     Rash     Allergic reactions to laundry detergent and mild generalized itching secondary to immunotherapy.    Renal cell carcinoma 12/30/2023    Renal insufficiency 4/9/2024    Decreased renal function since nephrectomy in December, 2023, secondary to renal cancer, but not diagnosed as chronic kidney disease until recent visit. Kidney functions have been stable since surgery, but are now being considered permanent.    Shoulder injury 1980s    Injured in     Visual impairment     Wear glasses for near and distant vision    Vitreous degeneration of right eye     Formatting of this note might be different from the original. Retinal tear and detachment warning symptoms reviewed and patient instructed to call immediately if increasing floaters, flashes, or decreasing peripheral vision. No retinal detachment or retinal tear noted. Recheck in 1 month with dilated exam.       Past Surgical History:   Procedure Laterality Date    ABDOMINAL SURGERY  December 18, 2023    Left Kidney and Adrenal Gland removed due to Renal Cell  Carcinoma    NEPHRECTOMY Left 12/18/2023    Procedure: NEPHRECTOMY RADICAL WITH CAVAL THROMBECTOMY;  Surgeon: James Esquivel MD;  Location: [facility] MAIN OR;  Service: Urology;  Laterality: Left;    SKIN LESION EXCISION  1990       Family History: family history includes Arthritis in his mother; Broken bones in his daughter and daughter; COPD in his father; Cancer in his mother; Diabetes in his mother; Dislocations in his daughter; Heart disease in his mother; Hyperlipidemia in his brother, father, and mother; Hypertension in his mother; Miscarriages / Stillbirths in his mother; Osteoporosis in his mother; Vision loss in his daughter. Otherwise pertinent FHx was reviewed and not pertinent to current issue.    Social History:  reports that he has never smoked. He has been exposed to tobacco smoke. He has never used smokeless tobacco. He reports current alcohol use of about 1.0 standard drink of alcohol per week. He reports that he does not use drugs.    Home Medications:  Diclofenac Sodium, budesonide, budesonide-formoterol, hydrocortisone, ipratropium-albuterol, lidocaine-prilocaine, losartan, lubiprostone, magnesium oxide, metoprolol succinate XL, naloxone, ondansetron, oxyCODONE-acetaminophen, pantoprazole, sucralfate, and vitamin D3    Allergies:  No Known Allergies    Objective   Objective     Vitals:   Temp:  [97.4 °F (36.3 °C)-98.4 °F (36.9 °C)] 98.4 °F (36.9 °C)  Heart Rate:  [] 60  Resp:  [18-20] 18  BP: (136-143)/(71-89) 143/71  Physical Exam    Constitutional: Awake, alert, well-groomed male   Eyes: PERRL, sclerae anicteric, no conjunctival injection, EOMI   HENT: NCAT, mucous membranes moist, normal hearing   Neck: Supple, nontender, trachea midline   Respiratory: Clear to auscultation bilaterally, nonlabored respirations on room air   Cardiovascular: RRR, no murmurs, palpable pedal pulses bilaterally   Gastrointestinal: Positive bowel sounds, soft, nontender, nondistended, obese  abdomen   Musculoskeletal: No bilateral ankle edema, no clubbing or cyanosis to extremities   Psychiatric: Appropriate affect, cooperative   Neurologic: Oriented x 3, strength symmetric in all extremities, Cranial Nerves grossly intact to confrontation, speech clear   Skin: No rashes     Result Review    Result Review:  I have personally reviewed the results from the time of this admission to 7/12/2024 17:14 EDT and agree with these findings:  [x]  Laboratory list / accordion  []  Microbiology  [x]  Radiology  [x]  EKG/Telemetry   []  Cardiology/Vascular   []  Pathology  []  Old records  []  Other:  Most notable findings include: Initial troponin 23, repeat 17, potassium 4.0, serum creatinine 1.52 (appears to be baseline), normal AST and ALT, mildly elevated WBC of 14.67, hemoglobin normal 13.4.  Chest x-ray negative for any acute process.  CTA head and neck and perfusion studies negative for any acute infarct, intracranial hemorrhage, significant stenosis.  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      Assessment & Plan   Assessment / Plan     Brief Patient Summary:  Louis Andino is a 63 y.o. male who is admitted for lightheadedness with syncope    Active Hospital Problems:  Active Hospital Problems    Diagnosis     **Lightheadedness      Plan:   Lightheadedness/syncope:  -Initial troponin 23, repeat 17, will continue to trend  -Chest x-ray negative  -EKG shows sinus rhythm with RBBB and LAFB  -Continuous cardiac monitor  -Echo pending  -Code D called in ER and CTA head neck and perfusion studies negative; neurology saw and evaluated the patient and did not feel that this was consistent with stroke and did not recommend MRI at this time or any further neurological workup from their standpoint  -Cardiology consulted  -D-dimer not ordered given patient's renal cancer and persistent elevated serum creatinine and likely that it will be positive; patient has already had CTAs with contrast today so cannot get a CTA  chest at this time; ordered bilateral lower extremity duplex ultrasound to rule out for DVT if positive will need CTA chest tomorrow.  -Orthostatics pending    Chronic hypertension:  -Continue home regimen    GERD:  -Continue home regimen    Asthma:  -Continue home regimen  -Does not appear to be in acute exacerbation    CKD status post left nephrectomy 12/18/2023:  -Avoid nephrotoxic medication  -Patient appears to be at baseline    Obesity:  - BMI 32.58, diet and exercise encouraged      VTE Prophylaxis:  Mechanical VTE prophylaxis orders are present.        CODE STATUS:    Level Of Support Discussed With: Patient  Code Status (Patient has no pulse and is not breathing): CPR (Attempt to Resuscitate)  Medical Interventions (Patient has pulse or is breathing): Full Support    Admission Status:  I believe this patient meets observation status.    Electronically signed by Agnes Moreno PA-C, 07/12/24, 5:14 PM EDT.        75 minutes has been spent by [facility] providers in the care of this patient while under observation status      I have worn appropriate PPE during this patient encounter, sanitized my hands both with entering and exiting patient's room.    I have discussed plan of care with patient including advance care plan and/or surrogate decision maker.  Patient advises that their wife Dahlia will be their primary surrogate decision maker

## 2024-07-13 ENCOUNTER — APPOINTMENT (OUTPATIENT)
Dept: CARDIOLOGY | Facility: HOSPITAL | Age: 64
End: 2024-07-13
Payer: COMMERCIAL

## 2024-07-13 ENCOUNTER — READMISSION MANAGEMENT (OUTPATIENT)
Dept: CALL CENTER | Facility: HOSPITAL | Age: 64
End: 2024-07-13
Payer: COMMERCIAL

## 2024-07-13 VITALS
RESPIRATION RATE: 18 BRPM | SYSTOLIC BLOOD PRESSURE: 133 MMHG | HEIGHT: 70 IN | TEMPERATURE: 97.3 F | OXYGEN SATURATION: 95 % | WEIGHT: 227.07 LBS | DIASTOLIC BLOOD PRESSURE: 73 MMHG | BODY MASS INDEX: 32.51 KG/M2 | HEART RATE: 53 BPM

## 2024-07-13 LAB
ALBUMIN SERPL-MCNC: 3.9 G/DL (ref 3.5–5.2)
ALBUMIN/GLOB SERPL: 1.3 G/DL
ALP SERPL-CCNC: 63 U/L (ref 39–117)
ALT SERPL W P-5'-P-CCNC: 15 U/L (ref 1–41)
ANION GAP SERPL CALCULATED.3IONS-SCNC: 11.1 MMOL/L (ref 5–15)
AORTIC DIMENSIONLESS INDEX: 0.6 (DI)
ASCENDING AORTA: 3.4 CM
AST SERPL-CCNC: 13 U/L (ref 1–40)
BASOPHILS # BLD AUTO: 0.09 10*3/MM3 (ref 0–0.2)
BASOPHILS NFR BLD AUTO: 0.6 % (ref 0–1.5)
BH CV ECHO LEFT VENTRICLE GLOBAL LONGITUDINAL STRAIN: -20.4 %
BH CV ECHO MEAS - ACS: 1.99 CM
BH CV ECHO MEAS - AO MAX PG: 7.4 MMHG
BH CV ECHO MEAS - AO MEAN PG: 4 MMHG
BH CV ECHO MEAS - AO V2 MAX: 136 CM/SEC
BH CV ECHO MEAS - AO V2 VTI: 32.7 CM
BH CV ECHO MEAS - AVA(I,D): 2.42 CM2
BH CV ECHO MEAS - EDV(CUBED): 108.1 ML
BH CV ECHO MEAS - EDV(MOD-SP2): 81 ML
BH CV ECHO MEAS - EDV(MOD-SP4): 102 ML
BH CV ECHO MEAS - EF(MOD-BP): 49.4 %
BH CV ECHO MEAS - EF(MOD-SP2): 51.9 %
BH CV ECHO MEAS - EF(MOD-SP4): 50 %
BH CV ECHO MEAS - ESV(CUBED): 46.9 ML
BH CV ECHO MEAS - ESV(MOD-SP2): 39 ML
BH CV ECHO MEAS - ESV(MOD-SP4): 51 ML
BH CV ECHO MEAS - FS: 24.3 %
BH CV ECHO MEAS - IVS/LVPW: 1.08 CM
BH CV ECHO MEAS - IVSD: 1.53 CM
BH CV ECHO MEAS - LAT PEAK E' VEL: 9 CM/SEC
BH CV ECHO MEAS - LV DIASTOLIC VOL/BSA (35-75): 46.5 CM2
BH CV ECHO MEAS - LV MASS(C)D: 293.5 GRAMS
BH CV ECHO MEAS - LV MAX PG: 2.8 MMHG
BH CV ECHO MEAS - LV MEAN PG: 1 MMHG
BH CV ECHO MEAS - LV SYSTOLIC VOL/BSA (12-30): 23.2 CM2
BH CV ECHO MEAS - LV V1 MAX: 83.3 CM/SEC
BH CV ECHO MEAS - LV V1 VTI: 19.7 CM
BH CV ECHO MEAS - LVIDD: 4.8 CM
BH CV ECHO MEAS - LVIDS: 3.6 CM
BH CV ECHO MEAS - LVOT AREA: 4 CM2
BH CV ECHO MEAS - LVOT DIAM: 2.26 CM
BH CV ECHO MEAS - LVPWD: 1.42 CM
BH CV ECHO MEAS - MED PEAK E' VEL: 7.2 CM/SEC
BH CV ECHO MEAS - MV A MAX VEL: 75.6 CM/SEC
BH CV ECHO MEAS - MV DEC SLOPE: 429 CM/SEC2
BH CV ECHO MEAS - MV DEC TIME: 0.24 SEC
BH CV ECHO MEAS - MV E MAX VEL: 71.5 CM/SEC
BH CV ECHO MEAS - MV E/A: 0.95
BH CV ECHO MEAS - MV MAX PG: 2.8 MMHG
BH CV ECHO MEAS - MV MEAN PG: 0.81 MMHG
BH CV ECHO MEAS - MV P1/2T: 59.1 MSEC
BH CV ECHO MEAS - MV V2 VTI: 32.5 CM
BH CV ECHO MEAS - MVA(P1/2T): 3.7 CM2
BH CV ECHO MEAS - MVA(VTI): 2.44 CM2
BH CV ECHO MEAS - PA ACC TIME: 0.16 SEC
BH CV ECHO MEAS - PA V2 MAX: 105 CM/SEC
BH CV ECHO MEAS - PULM SYS VEL: 62.8 CM/SEC
BH CV ECHO MEAS - QP/QS: 1.74
BH CV ECHO MEAS - RAP SYSTOLE: 3 MMHG
BH CV ECHO MEAS - RV MAX PG: 1.35 MMHG
BH CV ECHO MEAS - RV V1 MAX: 58.2 CM/SEC
BH CV ECHO MEAS - RV V1 VTI: 14.9 CM
BH CV ECHO MEAS - RVOT DIAM: 3.4 CM
BH CV ECHO MEAS - RVSP: 22 MMHG
BH CV ECHO MEAS - SV(LVOT): 79.2 ML
BH CV ECHO MEAS - SV(MOD-SP2): 42 ML
BH CV ECHO MEAS - SV(MOD-SP4): 51 ML
BH CV ECHO MEAS - SV(RVOT): 137.8 ML
BH CV ECHO MEAS - SVI(LVOT): 36.1 ML/M2
BH CV ECHO MEAS - SVI(MOD-SP2): 19.1 ML/M2
BH CV ECHO MEAS - SVI(MOD-SP4): 23.2 ML/M2
BH CV ECHO MEAS - TR MAX PG: 19.3 MMHG
BH CV ECHO MEAS - TR MAX VEL: 219.4 CM/SEC
BH CV ECHO MEASUREMENTS AVERAGE E/E' RATIO: 8.83
BH CV LOWER VASCULAR LEFT COMMON FEMORAL AUGMENT: NORMAL
BH CV LOWER VASCULAR LEFT COMMON FEMORAL COMPETENT: NORMAL
BH CV LOWER VASCULAR LEFT COMMON FEMORAL COMPRESS: NORMAL
BH CV LOWER VASCULAR LEFT COMMON FEMORAL PHASIC: NORMAL
BH CV LOWER VASCULAR LEFT COMMON FEMORAL SPONT: NORMAL
BH CV LOWER VASCULAR LEFT DISTAL FEMORAL COMPRESS: NORMAL
BH CV LOWER VASCULAR LEFT GASTRONEMIUS COMPRESS: NORMAL
BH CV LOWER VASCULAR LEFT GREATER SAPH AK COMPRESS: NORMAL
BH CV LOWER VASCULAR LEFT GREATER SAPH BK COMPRESS: NORMAL
BH CV LOWER VASCULAR LEFT LESSER SAPH COMPRESS: NORMAL
BH CV LOWER VASCULAR LEFT MID FEMORAL AUGMENT: NORMAL
BH CV LOWER VASCULAR LEFT MID FEMORAL COMPETENT: NORMAL
BH CV LOWER VASCULAR LEFT MID FEMORAL COMPRESS: NORMAL
BH CV LOWER VASCULAR LEFT MID FEMORAL PHASIC: NORMAL
BH CV LOWER VASCULAR LEFT MID FEMORAL SPONT: NORMAL
BH CV LOWER VASCULAR LEFT PERONEAL COMPRESS: NORMAL
BH CV LOWER VASCULAR LEFT POPLITEAL AUGMENT: NORMAL
BH CV LOWER VASCULAR LEFT POPLITEAL COMPETENT: NORMAL
BH CV LOWER VASCULAR LEFT POPLITEAL COMPRESS: NORMAL
BH CV LOWER VASCULAR LEFT POPLITEAL PHASIC: NORMAL
BH CV LOWER VASCULAR LEFT POPLITEAL SPONT: NORMAL
BH CV LOWER VASCULAR LEFT POSTERIOR TIBIAL COMPRESS: NORMAL
BH CV LOWER VASCULAR LEFT PROFUNDA FEMORAL COMPRESS: NORMAL
BH CV LOWER VASCULAR LEFT PROXIMAL FEMORAL COMPRESS: NORMAL
BH CV LOWER VASCULAR LEFT SAPHENOFEMORAL JUNCTION COMPRESS: NORMAL
BH CV LOWER VASCULAR RIGHT COMMON FEMORAL AUGMENT: NORMAL
BH CV LOWER VASCULAR RIGHT COMMON FEMORAL COMPETENT: NORMAL
BH CV LOWER VASCULAR RIGHT COMMON FEMORAL COMPRESS: NORMAL
BH CV LOWER VASCULAR RIGHT COMMON FEMORAL PHASIC: NORMAL
BH CV LOWER VASCULAR RIGHT COMMON FEMORAL SPONT: NORMAL
BH CV LOWER VASCULAR RIGHT DISTAL FEMORAL COMPRESS: NORMAL
BH CV LOWER VASCULAR RIGHT GASTRONEMIUS COMPRESS: NORMAL
BH CV LOWER VASCULAR RIGHT GREATER SAPH AK COMPRESS: NORMAL
BH CV LOWER VASCULAR RIGHT GREATER SAPH BK COMPRESS: NORMAL
BH CV LOWER VASCULAR RIGHT LESSER SAPH COMPRESS: NORMAL
BH CV LOWER VASCULAR RIGHT MID FEMORAL AUGMENT: NORMAL
BH CV LOWER VASCULAR RIGHT MID FEMORAL COMPETENT: NORMAL
BH CV LOWER VASCULAR RIGHT MID FEMORAL COMPRESS: NORMAL
BH CV LOWER VASCULAR RIGHT MID FEMORAL PHASIC: NORMAL
BH CV LOWER VASCULAR RIGHT MID FEMORAL SPONT: NORMAL
BH CV LOWER VASCULAR RIGHT PERONEAL COMPRESS: NORMAL
BH CV LOWER VASCULAR RIGHT POPLITEAL AUGMENT: NORMAL
BH CV LOWER VASCULAR RIGHT POPLITEAL COMPETENT: NORMAL
BH CV LOWER VASCULAR RIGHT POPLITEAL COMPRESS: NORMAL
BH CV LOWER VASCULAR RIGHT POPLITEAL PHASIC: NORMAL
BH CV LOWER VASCULAR RIGHT POPLITEAL SPONT: NORMAL
BH CV LOWER VASCULAR RIGHT POSTERIOR TIBIAL COMPRESS: NORMAL
BH CV LOWER VASCULAR RIGHT PROFUNDA FEMORAL COMPRESS: NORMAL
BH CV LOWER VASCULAR RIGHT PROXIMAL FEMORAL COMPRESS: NORMAL
BH CV LOWER VASCULAR RIGHT SAPHENOFEMORAL JUNCTION COMPRESS: NORMAL
BH CV XLRA - RV BASE: 4 CM
BH CV XLRA - RV LENGTH: 8.1 CM
BH CV XLRA - RV MID: 3.6 CM
BH CV XLRA - TDI S': 11.9 CM/SEC
BILIRUB SERPL-MCNC: 0.8 MG/DL (ref 0–1.2)
BUN SERPL-MCNC: 24 MG/DL (ref 8–23)
BUN/CREAT SERPL: 16.2 (ref 7–25)
CALCIUM SPEC-SCNC: 9.6 MG/DL (ref 8.6–10.5)
CHLORIDE SERPL-SCNC: 105 MMOL/L (ref 98–107)
CO2 SERPL-SCNC: 24.9 MMOL/L (ref 22–29)
CREAT SERPL-MCNC: 1.48 MG/DL (ref 0.76–1.27)
DEPRECATED RDW RBC AUTO: 43.1 FL (ref 37–54)
EGFRCR SERPLBLD CKD-EPI 2021: 52.2 ML/MIN/1.73
EOSINOPHIL # BLD AUTO: 0.34 10*3/MM3 (ref 0–0.4)
EOSINOPHIL NFR BLD AUTO: 2.4 % (ref 0.3–6.2)
ERYTHROCYTE [DISTWIDTH] IN BLOOD BY AUTOMATED COUNT: 14.2 % (ref 12.3–15.4)
GLOBULIN UR ELPH-MCNC: 2.9 GM/DL
GLUCOSE SERPL-MCNC: 89 MG/DL (ref 65–99)
HCT VFR BLD AUTO: 36.8 % (ref 37.5–51)
HGB BLD-MCNC: 12.3 G/DL (ref 13–17.7)
IMM GRANULOCYTES # BLD AUTO: 0.09 10*3/MM3 (ref 0–0.05)
IMM GRANULOCYTES NFR BLD AUTO: 0.6 % (ref 0–0.5)
LEFT ATRIUM VOLUME INDEX: 31.4 ML/M2
LYMPHOCYTES # BLD AUTO: 3 10*3/MM3 (ref 0.7–3.1)
LYMPHOCYTES NFR BLD AUTO: 21.2 % (ref 19.6–45.3)
MCH RBC QN AUTO: 28.4 PG (ref 26.6–33)
MCHC RBC AUTO-ENTMCNC: 33.4 G/DL (ref 31.5–35.7)
MCV RBC AUTO: 85 FL (ref 79–97)
MONOCYTES # BLD AUTO: 1.19 10*3/MM3 (ref 0.1–0.9)
MONOCYTES NFR BLD AUTO: 8.4 % (ref 5–12)
NEUTROPHILS NFR BLD AUTO: 66.8 % (ref 42.7–76)
NEUTROPHILS NFR BLD AUTO: 9.42 10*3/MM3 (ref 1.7–7)
NRBC BLD AUTO-RTO: 0 /100 WBC (ref 0–0.2)
PLATELET # BLD AUTO: 230 10*3/MM3 (ref 140–450)
PMV BLD AUTO: 9.9 FL (ref 6–12)
POTASSIUM SERPL-SCNC: 4.3 MMOL/L (ref 3.5–5.2)
PROT SERPL-MCNC: 6.8 G/DL (ref 6–8.5)
RBC # BLD AUTO: 4.33 10*6/MM3 (ref 4.14–5.8)
SINUS: 3.1 CM
SODIUM SERPL-SCNC: 141 MMOL/L (ref 136–145)
WBC NRBC COR # BLD AUTO: 14.13 10*3/MM3 (ref 3.4–10.8)

## 2024-07-13 PROCEDURE — 99214 OFFICE O/P EST MOD 30 MIN: CPT | Performed by: INTERNAL MEDICINE

## 2024-07-13 PROCEDURE — 97161 PT EVAL LOW COMPLEX 20 MIN: CPT

## 2024-07-13 PROCEDURE — 93356 MYOCRD STRAIN IMG SPCKL TRCK: CPT

## 2024-07-13 PROCEDURE — 93246 EXT ECG>7D<15D RECORDING: CPT

## 2024-07-13 PROCEDURE — 85025 COMPLETE CBC W/AUTO DIFF WBC: CPT | Performed by: EMERGENCY MEDICINE

## 2024-07-13 PROCEDURE — 25510000001 PERFLUTREN (DEFINITY) 8.476 MG IN SODIUM CHLORIDE (PF) 0.9 % 10 ML INJECTION: Performed by: EMERGENCY MEDICINE

## 2024-07-13 PROCEDURE — 93970 EXTREMITY STUDY: CPT

## 2024-07-13 PROCEDURE — 93970 EXTREMITY STUDY: CPT | Performed by: STUDENT IN AN ORGANIZED HEALTH CARE EDUCATION/TRAINING PROGRAM

## 2024-07-13 PROCEDURE — 93306 TTE W/DOPPLER COMPLETE: CPT

## 2024-07-13 PROCEDURE — 93306 TTE W/DOPPLER COMPLETE: CPT | Performed by: INTERNAL MEDICINE

## 2024-07-13 PROCEDURE — 93356 MYOCRD STRAIN IMG SPCKL TRCK: CPT | Performed by: INTERNAL MEDICINE

## 2024-07-13 PROCEDURE — G0378 HOSPITAL OBSERVATION PER HR: HCPCS

## 2024-07-13 PROCEDURE — 80053 COMPREHEN METABOLIC PANEL: CPT | Performed by: EMERGENCY MEDICINE

## 2024-07-13 RX ADMIN — SUCRALFATE 1 G: 1 TABLET ORAL at 09:07

## 2024-07-13 RX ADMIN — LUBIPROSTONE 8 MCG: 8 CAPSULE, GELATIN COATED ORAL at 09:08

## 2024-07-13 RX ADMIN — METOPROLOL SUCCINATE 50 MG: 50 TABLET, FILM COATED, EXTENDED RELEASE ORAL at 09:07

## 2024-07-13 RX ADMIN — PANTOPRAZOLE SODIUM 40 MG: 40 TABLET, DELAYED RELEASE ORAL at 09:07

## 2024-07-13 RX ADMIN — Medication 5000 UNITS: at 09:07

## 2024-07-13 RX ADMIN — DICLOFENAC SODIUM 4 G: 10 GEL TOPICAL at 10:44

## 2024-07-13 RX ADMIN — PERFLUTREN 2 ML: 6.52 INJECTION, SUSPENSION INTRAVENOUS at 08:09

## 2024-07-13 RX ADMIN — LOSARTAN POTASSIUM 100 MG: 100 TABLET, FILM COATED ORAL at 09:07

## 2024-07-13 RX ADMIN — Medication 10 ML: at 10:44

## 2024-07-13 NOTE — PLAN OF CARE
Dotflu[employer] message sent    Leigh Lee RN  [facility] Triage     Goal Outcome Evaluation:      Pt. Denies any lightheadedness currently. Symptoms have resolved.  PT and cardio consulted. NSB on the monitor with BBB. A quarter of a percocet given for pain in hip, pt. Does not tolerate full dose. Benadryl IV also given. Echo ordered for today. Duplex venous lower extremity ordered. Pt aware of treatment plan and is agreeable.

## 2024-07-13 NOTE — PLAN OF CARE
Goal Outcome Evaluation:  Plan of Care Reviewed With: patient           Outcome Evaluation: Patient is a 63 y.o male who presented to [facility] with reports of lightheadedness. Patient with hx of renal cancer, currently undergoing treatment. Patient reports he is waiting to finish treatment to get a hip replacement. Patient AOx4 supine in bed upon arrival. Patient lives at home with his spouse with 4 JIMMY. Patient is independent at baseline and uses a rwx for ambulation. Patient completed all bed mobility with SV. Patient performed STS from EOB with SV. Patient ambulated 150ft with rwx and SBA. Gait slow but steady with no overt LOB noted. Patient reported R hip pain throughout. Patient declined any lightheadedness or dizziness during session this date. No further skilled acute PT needs identified at this time. PT will sign off.      Anticipated Discharge Disposition (PT): home with assist

## 2024-07-13 NOTE — PLAN OF CARE
Goal Outcome Evaluation:      Patient continues to have some dizziness with head movement. PT did not feel he was having vestibular symptoms so Epley Maneuver not appropriate. Will be discharged with Zio patch. Left unit VIA w/c, wife to drive home. IV removed before discharge.

## 2024-07-13 NOTE — DISCHARGE SUMMARY
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 male with past medical history of hypertension, GERD, asthma, left renal cancer status post nephrectomy (12/18/2023)and obesity who is admitted to the observation unit for lightheadedness associated with syncopal episode.  Patient reports that it started around 1300 on 7/12/2024 while he was at lunch, and he felt lightheaded then passed out in a hernandez without falling or any injury, he says that he was out for as long as it took his wife to bring the car around, and was associated with some nausea.  Patient states that his wife said he had some urinary incontinence but no biting of the tongue, or any tonic-clonic movements noted.  Patient reports that the lightheadedness has improved since arrival to the ER.  Patient states that he had an immunotherapy in fusion about 1 week ago and he usually does have some lightheadedness about 1 week post infusions, however this time was more severe than previous times and he does not usually have syncope or nausea associated with it.  Patient reports that he had a similar episode with syncope and 2010 and he had a full workup in hospital in [facility] without any specific diagnosis appreciated at that time.  Patient denies any fevers or chills, chest pain, dyspnea, palpitations, abdominal pain, peripheral edema, headaches, or unilateral weakness.     7/13/2024: Echo shows normal EF, moderate concentric hypertrophy, and normal diastolic function.  Bilateral duplex venous lower extremity ultrasounds were normal as well.  Orthostatics negative.  Cardiology saw and evaluated the patient and patient is okay for discharge from their standpoint and will go home on a 2-week Zio patch and follow-up in their office in 6 weeks.  All labs and imaging findings discussed with  patient as well as specialist recommendations and patient is agreeable for discharge home at this time.    ROS:  General: no fevers, chills  Respiratory: no cough, dyspnea  Cardiovascular: no chest pain, palpitations  Abdomen: No abdominal pain, nausea, vomiting, or diarrhea  Neurologic: No focal weakness    Objective   Physical Exam:  I have reviewed the vital signs.  Temp:  [97.4 °F (36.3 °C)-98.6 °F (37 °C)] 98.4 °F (36.9 °C)  Heart Rate:  [] 51  Resp:  [16-20] 16  BP: (117-156)/(68-96) 118/71  General Appearance:    Alert, cooperative, no distress, well groomed  Head:    Normocephalic, atraumatic, normal hearing   Eyes:    Sclerae anicteric, EOMI  Neck:   Supple, nontender  Lungs: Clear to auscultation bilaterally, respirations unlabored on RA  Heart: Regular rate and rhythm, S1 and S2 normal, no murmur  Abdomen:  Soft, non-tender, bowel sounds active, nondistended obese abdomen  Extremities: No clubbing, cyanosis, or edema to lower extremities  Pulses:  2+ and symmetric in distal lower extremities  Skin: No rashes   Neurologic: Oriented x3, Normal strength to extremities    Results Review:    I have reviewed the labs, radiology results and diagnostic studies.    Results from last 7 days   Lab Units 07/12/24  1351   WBC 10*3/mm3 14.67*   HEMOGLOBIN g/dL 13.4   HEMATOCRIT % 40.6   PLATELETS 10*3/mm3 289     Results from last 7 days   Lab Units 07/12/24  1351   SODIUM mmol/L 142   POTASSIUM mmol/L 4.0   CHLORIDE mmol/L 106   CO2 mmol/L 24.0   BUN mg/dL 31*   CREATININE mg/dL 1.52*   CALCIUM mg/dL 10.4   BILIRUBIN mg/dL 0.8   ALK PHOS U/L 74   ALT (SGPT) U/L 18   AST (SGOT) U/L 17   GLUCOSE mg/dL 101*     Imaging Results (Last 24 Hours)       Procedure Component Value Units Date/Time    CT Angiogram Head w AI Analysis of LVO [[id number]] Collected: 07/12/24 1543     Updated: 07/12/24 1614    Narrative:      CT ANGIOGRAM OF THE NECK AND HEAD WITH CONTRAST AND CT PERFUSION WITH  CONTRAST     HISTORY: Neuro  deficit, acute, stroke suspected, dizziness, nausea,  vomiting.     COMPARISON: None.     A noncontrasted CT examination of the brain was performed. The brain  ventricles are symmetrical. There is no evidence of acute infarction or  of intracranial hemorrhage. Two adjacent small foci of calcification are  appreciated involving the inner table of the frontal bone overlying the  superior lateral aspect of the right frontal lobe. The appearance is  nonspecific. These likely represent 2 small adjacent meningiomas, the  largest of which measures approximately 6 x 5 mm in size.     CT PERFUSION: There was no evidence of abnormal CBF or delayed perfusion  greater than 6 seconds. A small area of mildly delayed perfusion  involving the medial aspect the right temporal lobe is suggested (5 cc).  This may be artifactual.     A CT angiogram of the neck and head was performed. Multiplanar as well  as three-dimensional reconstructions were generated.     The great vessels are arranged in a classic configuration. There is 0%  stenosis of the internal carotid arteries using NASCET criteria. The  proximal aspects of the anterior and middle cerebral arteries appear  unremarkable. Both vertebral arteries were opacified. The left vertebral  artery is larger than that of the right. The basilar artery and right  posterior cerebral artery appear unremarkable. A moderate size posterior  communicating artery on the left is noted. There was no evidence of  abnormal intra-axial enhancement after contrast administration.     Further evaluation could be performed with a MRI examination of the  brain.                    AI analysis of LVO was utilized.     Radiation dose reduction techniques were utilized, including automated  exposure control and exposure modulation based on body size.        This report was finalized on 7/12/2024 4:11 PM by Dr. Sj Aguilar M.D  on Workstation: [id number]       CT Angiogram Neck [[id number]] Collected:  07/12/24 1543     Updated: 07/12/24 1614    Narrative:      CT ANGIOGRAM OF THE NECK AND HEAD WITH CONTRAST AND CT PERFUSION WITH  CONTRAST     HISTORY: Neuro deficit, acute, stroke suspected, dizziness, nausea,  vomiting.     COMPARISON: None.     A noncontrasted CT examination of the brain was performed. The brain  ventricles are symmetrical. There is no evidence of acute infarction or  of intracranial hemorrhage. Two adjacent small foci of calcification are  appreciated involving the inner table of the frontal bone overlying the  superior lateral aspect of the right frontal lobe. The appearance is  nonspecific. These likely represent 2 small adjacent meningiomas, the  largest of which measures approximately 6 x 5 mm in size.     CT PERFUSION: There was no evidence of abnormal CBF or delayed perfusion  greater than 6 seconds. A small area of mildly delayed perfusion  involving the medial aspect the right temporal lobe is suggested (5 cc).  This may be artifactual.     A CT angiogram of the neck and head was performed. Multiplanar as well  as three-dimensional reconstructions were generated.     The great vessels are arranged in a classic configuration. There is 0%  stenosis of the internal carotid arteries using NASCET criteria. The  proximal aspects of the anterior and middle cerebral arteries appear  unremarkable. Both vertebral arteries were opacified. The left vertebral  artery is larger than that of the right. The basilar artery and right  posterior cerebral artery appear unremarkable. A moderate size posterior  communicating artery on the left is noted. There was no evidence of  abnormal intra-axial enhancement after contrast administration.     Further evaluation could be performed with a MRI examination of the  brain.                    AI analysis of LVO was utilized.     Radiation dose reduction techniques were utilized, including automated  exposure control and exposure modulation based on body size.         This report was finalized on 7/12/2024 4:11 PM by Dr. Sj Aguilar M.D  on Workstation: [id number]       CT CEREBRAL PERFUSION WITH & WITHOUT CONTRAST [[id number]] Collected: 07/12/24 1543     Updated: 07/12/24 1614    Narrative:      CT ANGIOGRAM OF THE NECK AND HEAD WITH CONTRAST AND CT PERFUSION WITH  CONTRAST     HISTORY: Neuro deficit, acute, stroke suspected, dizziness, nausea,  vomiting.     COMPARISON: None.     A noncontrasted CT examination of the brain was performed. The brain  ventricles are symmetrical. There is no evidence of acute infarction or  of intracranial hemorrhage. Two adjacent small foci of calcification are  appreciated involving the inner table of the frontal bone overlying the  superior lateral aspect of the right frontal lobe. The appearance is  nonspecific. These likely represent 2 small adjacent meningiomas, the  largest of which measures approximately 6 x 5 mm in size.     CT PERFUSION: There was no evidence of abnormal CBF or delayed perfusion  greater than 6 seconds. A small area of mildly delayed perfusion  involving the medial aspect the right temporal lobe is suggested (5 cc).  This may be artifactual.     A CT angiogram of the neck and head was performed. Multiplanar as well  as three-dimensional reconstructions were generated.     The great vessels are arranged in a classic configuration. There is 0%  stenosis of the internal carotid arteries using NASCET criteria. The  proximal aspects of the anterior and middle cerebral arteries appear  unremarkable. Both vertebral arteries were opacified. The left vertebral  artery is larger than that of the right. The basilar artery and right  posterior cerebral artery appear unremarkable. A moderate size posterior  communicating artery on the left is noted. There was no evidence of  abnormal intra-axial enhancement after contrast administration.     Further evaluation could be performed with a MRI examination of the  brain.                     AI analysis of LVO was utilized.     Radiation dose reduction techniques were utilized, including automated  exposure control and exposure modulation based on body size.        This report was finalized on 7/12/2024 4:11 PM by Dr. Sj Aguilar M.D  on Workstation: [id number]       XR Chest 1 View [[id number]] Collected: 07/12/24 1513     Updated: 07/12/24 1517    Narrative:      XR CHEST 1 VW-7/12/2024     HISTORY: Stroke protocol.     Heart size is at the upper limits of normal. Lungs appear free of acute  infiltrates. Mediport catheter is seen in good position. Bones and soft  tissues are unremarkable except for degenerative changes in the  bilateral shoulders more severe on the left.       Impression:      1. No acute process.        This report was finalized on 7/12/2024 3:14 PM by Dr. Forest Curry M.D on Workstation: [id number]               I have reviewed the medications.  ---------------------------------------------------------------------------------------------  Assessment & Plan   Assessment/Problem List    Lightheadedness      Plan:    Lightheadedness/syncope:  -Initial troponin 23, repeat 17, will continue to trend  -Chest x-ray negative  -EKG shows sinus rhythm with RBBB and LAFB  -Continuous cardiac monitor  -Code D called in ER and CTA head neck and perfusion studies negative; neurology saw and evaluated the patient and did not feel that this was consistent with stroke and did not recommend MRI at this time or any further neurological workup from their standpoint  -Echo shows normal EF, moderate concentric hypertrophy, and normal diastolic function.  - Bilateral duplex venous lower extremity ultrasounds were normal as well.   - Orthostatics negative.    -Cardiology saw and evaluated the patient and patient is okay for discharge from their standpoint and will go home on a 2-week Zio patch and follow-up in their office in 6 weeks.     Chronic hypertension:  -Continue home  regimen     GERD:  -Continue home regimen     Asthma:  -Continue home regimen  -Does not appear to be in acute exacerbation     CKD status post left nephrectomy 12/18/2023:  -Avoid nephrotoxic medication  -Patient appears to be at baseline     Obesity:  - BMI 32.58, diet and exercise encouraged        VTE Prophylaxis:  Mechanical VTE prophylaxis orders are present.    Disposition: Discharge to home    Follow-up after Discharge: PCP in 1-2 weeks and Cardiology in 6 weeks    This note will serve as a discharge summary    Agnes Moreno PA-C 07/13/24 05:15 EDT    I have worn appropriate PPE during this patient encounter, sanitized my hands both with entering and exiting patient's room.      35 minutes has been spent by [facility] providers in the care of this patient while under observation status

## 2024-07-13 NOTE — CONSULTS
Date of Hospital Visit: 24  Encounter Provider: Janae Amos MD  Place of Service: [facility] CARDIOLOGY  Patient Name: Louis Andino  :1960  Referral Provider: Matt Buitrago MD    Chief complaint    History of Present Illness:    Louis Andino is a 63 y.o. male     He has a history of renal mass due to clear-cell renal cell carcinoma with left open radical nephrectomy and IVC thrombectomy 2023, hypertension, COPD, GERD.      He was admitted 2023 for left radical nephrectomy with IVC tumor thrombectomy by urology.  Postoperatively, he had pneumonia and anemia.  He was discharged on 2023.    He receives immunotherapy every 6 weeks since the diagnosis of carcinoma.  He has had lightheadedness-gets lightheaded about 1 week after getting immunotherapy but on 2024, had a severe episode of lightheadedness with near syncope at lunch.  Orthostatics are normal.  Troponin was 23 and 17, creatinine stable at 1.48, BUN elevated, hemoglobin stable 12.3.  ECG shows sinus rhythm with right bundle branch block, left anterior fascicular block unchanged from prior.    Echocardiogram done 2024 shows ejection fraction 56-60% with normal RVSP, normal diastolic function, normal global longitudinal strain, normal valvular structure and function.    He has a remote history of syncope when his son graduated from college in  and at that time they were adjusting his blood pressure medications.  The episode that occurred yesterday he had delivered a clean child and was seated when he started feeling poorly.  The syncopal episode happened when he got to the car and he vomited and aspirated some of that as well as had urinary incontinence.  He feels fine now but he really does any Rouben or vomiting or having the incontinence.  No seizure-like activity was noted per his wife.  He had no chest pain leading up to this and actually felt fine.  The only thing  that seems to be consistent as this occurs about a week after he gets his immunotherapy this was the worst episode he was had though of lightheadedness as a result and syncope.    Right now, he feels well.    Past Medical History:   Diagnosis Date    Anemia 12/18/2023    Due to surgery. Required transfusions    Ankle sprain     Multiple-both ankles over the years    Asthma     Clotting disorder     Required blood transfusion during and after surgery in excess of expected    Coronary artery disease Dec 2023    Bifascicular block    Deep vein thrombosis 12/18/2023    During cancer surgery a clot was surgically removed from the inferior vena cava which was cancer related    Emphysema/COPD     Erectile dysfunction Several years    Fracture of ankle     Left ankle as a child    Fracture of wrist     Left wrist as a child    GERD (gastroesophageal reflux disease)     HL (hearing loss)     Progressive worsening over many years    Hyperglycemia 10/12/2023    Hyperlipidemia 10/12/2023    Hypertension     Injury of neck 2023    Previous fractures noted during chiropractic visit. Probably secondary to shoulder injury.    Low back pain     Worse last few year    Obesity     Pneumonia 12/20/2023    Developed while hospitalized for cancer surgery    Prostate disease     Rash     Allergic reactions to laundry detergent and mild generalized itching secondary to immunotherapy.    Renal cell carcinoma 12/30/2023    Renal insufficiency 4/9/2024    Decreased renal function since nephrectomy in December, 2023, secondary to renal cancer, but not diagnosed as chronic kidney disease until recent visit. Kidney functions have been stable since surgery, but are now being considered permanent.    Shoulder injury 1980s    Injured in     Visual impairment     Wear glasses for near and distant vision    Vitreous degeneration of right eye     Formatting of this note might be different from the original. Retinal tear and detachment warning  symptoms reviewed and patient instructed to call immediately if increasing floaters, flashes, or decreasing peripheral vision. No retinal detachment or retinal tear noted. Recheck in 1 month with dilated exam.       Past Surgical History:   Procedure Laterality Date    ABDOMINAL SURGERY  December 18, 2023    Left Kidney and Adrenal Gland removed due to Renal Cell Carcinoma    NEPHRECTOMY Left 12/18/2023    Procedure: NEPHRECTOMY RADICAL WITH CAVAL THROMBECTOMY;  Surgeon: James Esquivel MD;  [facility]: [facility] MAIN OR;  Service: Urology;  Laterality: Left;    SKIN LESION EXCISION  1990       Medications Prior to Admission   Medication Sig Dispense Refill Last Dose    budesonide (PULMICORT) 0.5 MG/2ML nebulizer solution INHALE 2 MILLILITERS VIA NEBULIZATION BY MOUTH DAILY (Patient taking differently: Take 2 mL by nebulization Daily.) 60 mL 0     Diclofenac Sodium (VOLTAREN) 1 % gel gel Apply 4 g topically to the appropriate area as directed 4 (Four) Times a Day. 50 g 0 Past Week    hydrocortisone (ANUSOL-HC) 25 MG suppository Insert 1 suppository into the rectum 2 (Two) Times a Day. 20 suppository 0 Past Month    ipratropium-albuterol (COMBIVENT RESPIMAT)  MCG/ACT inhaler Inhale 1 puff 4 (Four) Times a Day As Needed for Wheezing.   Past Month    lidocaine-prilocaine (EMLA) 2.5-2.5 % cream Apply 1 Application topically to the appropriate area as directed See Admin Instructions. Apply to port site one hour prior to port being accessed. 30 g 3 Past Month    losartan (COZAAR) 100 MG tablet Take 1 tablet by mouth Daily for 90 days. 90 tablet 0 7/12/2024    lubiprostone (Amitiza) 8 MCG capsule Take 1 capsule by mouth 2 (Two) Times a Day With Meals. 60 capsule 2 7/11/2024    magnesium oxide (MAG-OX) 400 MG tablet Take 1 tablet by mouth Daily.   7/12/2024    metoprolol succinate XL (TOPROL-XL) 25 MG 24 hr tablet Take 2 tablets by mouth Daily. 180 tablet 0 7/12/2024    ondansetron (ZOFRAN) 8 MG tablet Take 1 tablet by  mouth Every 8 (Eight) Hours As Needed for Nausea or Vomiting. 30 tablet 2 Past Week    oxyCODONE-acetaminophen (PERCOCET) 5-325 MG per tablet Take 1 tablet by mouth Every 12 (Twelve) Hours As Needed for Severe Pain. 30 tablet 0 Past Week    pantoprazole (PROTONIX) 40 MG EC tablet Take 1 tablet by mouth Daily. 90 tablet 0 7/12/2024    sucralfate (CARAFATE) 1 g tablet Take 1 tablet by mouth 3 (Three) Times a Day Before Meals. 270 tablet 0 7/12/2024    Symbicort 160-4.5 MCG/ACT inhaler Inhale 2 puffs 2 (Two) Times a Day.   Past Month    vitamin D3 125 MCG (5000 UT) capsule capsule Take 1 capsule by mouth Daily.   7/12/2024    naloxone (NARCAN) 4 MG/0.1ML nasal spray Call 911. Don't prime. [place] in 1 nostril for overdose. Repeat in 2-3 minutes in other nostril if no or minimal breathing/responsiveness. 2 each 0 Unknown       Current Meds  Scheduled Meds:budesonide-formoterol, 2 puff, Inhalation, BID - RT  cholecalciferol, 5,000 Units, Oral, Daily  Diclofenac Sodium, 4 g, Topical, 4x Daily  hydrocortisone, 25 mg, Rectal, BID  losartan, 100 mg, Oral, Q24H  lubiprostone, 8 mcg, Oral, BID With Meals  magnesium oxide, 400 mg, Oral, Daily  metoprolol succinate XL, 50 mg, Oral, Q24H  pantoprazole, 40 mg, Oral, Daily  sodium chloride, 10 mL, Intravenous, Q12H  sucralfate, 1 g, Oral, TID AC      Continuous Infusions:   PRN Meds:.  acetaminophen **OR** acetaminophen **OR** acetaminophen    Calcium Replacement - Follow Nurse / BPA Driven Protocol    ipratropium-albuterol    Magnesium Standard Dose Replacement - Follow Nurse / BPA Driven Protocol    nitroglycerin    ondansetron    oxyCODONE-acetaminophen    Phosphorus Replacement - Follow Nurse / BPA Driven Protocol    Potassium Replacement - Follow Nurse / BPA Driven Protocol    [COMPLETED] Insert Peripheral IV **AND** sodium chloride    sodium chloride    sodium chloride    sodium chloride    Allergies as of 07/12/2024    (No Known Allergies)       Social History  "    Socioeconomic History    Marital status:     Number of children: 6   Tobacco Use    Smoking status: Never     Passive exposure: Past    Smokeless tobacco: Never    Tobacco comments:     SECOND HAND SMOKE EXPOSURE AS A CHILD    Vaping Use    Vaping status: Never Used   Substance and Sexual Activity    Alcohol use: Yes     Alcohol/week: 1.0 standard drink of alcohol     Types: 1 Glasses of wine per week     Comment: rare    Drug use: Never    Sexual activity: Yes     Partners: Female       Family History   Problem Relation Age of Onset    Arthritis Mother     Cancer Mother     Diabetes Mother     Heart disease Mother     Hyperlipidemia Mother     Miscarriages / Stillbirths Mother         Stillborn child    Hypertension Mother     Osteoporosis Mother     COPD Father     Hyperlipidemia Father     Hyperlipidemia Brother     Vision loss Daughter     Broken bones Daughter         Broke left forearm three times during childhood    Broken bones Daughter         Fractured right femur and right forearm during childhood    Dislocations Daughter         Recurrent radial head subluxations as a child       REVIEW OF SYSTEMS:   12 point ROS was performed and is negative except as outlined in HPI       Objective:   Temp:  [97.4 °F (36.3 °C)-98.6 °F (37 °C)] 98.4 °F (36.9 °C)  Heart Rate:  [] 51  Resp:  [16-20] 16  BP: (117-156)/(68-96) 118/71  Body mass index is 32.58 kg/m².  Flowsheet Rows      Flowsheet Row First Filed Value   Admission Height 177.8 cm (70\") Documented at 07/12/2024 1338   Admission Weight 103 kg (227 lb 1.2 oz) Documented at 07/12/2024 1338          Vitals:    07/13/24 0500   BP: 118/71   Pulse: 51   Resp: 16   Temp: 98.4 °F (36.9 °C)   SpO2: 92%       General Appearance:    Alert, cooperative, in no acute distress   Head:    Normocephalic, without obvious abnormality, atraumatic   Throat:   oral mucosa moist   Lungs:     Clear to auscultation,respirations regular, even and unlabored    Heart:    " Regular rhythm and normal rate, normal S1 and S2, no murmur, no gallop, no rub, no click   Extremities:   Moves all extremities well, no edema, no cyanosis, no redness   Pulses:   Pulses palpable and equal bilaterally. Normal radial, carotid,  dorsalis pedis and posterior tibial pulses bilaterally.      Lab Review:      Results from last 7 days   Lab Units 07/13/24  0507   SODIUM mmol/L 141   POTASSIUM mmol/L 4.3   CHLORIDE mmol/L 105   CO2 mmol/L 24.9   BUN mg/dL 24*   CREATININE mg/dL 1.48*   CALCIUM mg/dL 9.6   BILIRUBIN mg/dL 0.8   ALK PHOS U/L 63   ALT (SGPT) U/L 15   AST (SGOT) U/L 13   GLUCOSE mg/dL 89     Results from last 7 days   Lab Units 07/12/24  1556 07/12/24  1351   HSTROP T ng/L 17 23*     @LABRCNT(bnp)@  Results from last 7 days   Lab Units 07/13/24  0507 07/12/24  1351   WBC 10*3/mm3 14.13* 14.67*   HEMOGLOBIN g/dL 12.3* 13.4   HEMATOCRIT % 36.8* 40.6   PLATELETS 10*3/mm3 230 289     Results from last 7 days   Lab Units 07/12/24  1351   INR  1.03   APTT seconds 27.5     Results from last 7 days   Lab Units 07/12/24  1351   MAGNESIUM mg/dL 2.0     Lipid Panel          12/19/2023    05:41   Lipid Panel   Total Cholesterol 75    Triglycerides 109    HDL Cholesterol 24    VLDL Cholesterol 21    LDL Cholesterol  30    LDL/HDL Ratio 1.22          I personally viewed and interpreted the patient's EKG/Telemetry data        Lightheadedness      Assessment and Plan:    Lightheaded, not orthostatic, no ACS, normal echocardiogram.  Okay to go home today with a 2-week Zio patch monitor, follow-up in our office in 6 weeks.  Follow-up with PCP about potential orthostasis dehydration.  Neurologic follow-up per neurology  Hypertension, no medication changes  GERD  Asthma  Left clear-cell renal cell cancer-status post nephrectomy 12/2023, on immunotherapy.  He will talk to Dr. Prakash about immunotherapy and the side effects he is having with this.  RBBB/LAFB, chronic    We will sign off.  His cardiovascular is stable.   He could go home today if okay with others.    Janae Amos MD  07/13/24  08:03 EDT.  Time spent in reviewing chart, discussion and examination:

## 2024-07-13 NOTE — THERAPY EVALUATION
Patient Name: Louis Andino  : 1960    MRN: [id number]                              Today's Date: 2024       Admit Date: 2024    Visit Dx:     ICD-10-CM ICD-9-CM   1. Acute vertigo with vomiting and inability to stand  R42 780.4    R11.10 787.03   2. Lightheaded  R42 780.4   3. Nausea and vomiting, unspecified vomiting type  R11.2 787.01   4. Elevated blood pressure reading  R03.0 796.2     Patient Active Problem List   Diagnosis    Renal mass    Asthma    Gastroesophageal reflux disease    Hyperlipidemia    Hypertension    BPH (benign prostatic hyperplasia)    COPD (chronic obstructive pulmonary disease)    KARON (acute kidney injury)    Acute respiratory failure with hypoxia    Renal cell carcinoma    Port-A-Cath in place    Lightheadedness     Past Medical History:   Diagnosis Date    Anemia 2023    Due to surgery. Required transfusions    Ankle sprain     Multiple-both ankles over the years    Asthma     Clotting disorder     Required blood transfusion during and after surgery in excess of expected    Coronary artery disease Dec 2023    Bifascicular block    Deep vein thrombosis 2023    During cancer surgery a clot was surgically removed from the inferior vena cava which was cancer related    Emphysema/COPD     Erectile dysfunction Several years    Fracture of ankle     Left ankle as a child    Fracture of wrist     Left wrist as a child    GERD (gastroesophageal reflux disease)     HL (hearing loss)     Progressive worsening over many years    Hyperglycemia 10/12/2023    Hyperlipidemia 10/12/2023    Hypertension     Injury of neck     Previous fractures noted during chiropractic visit. Probably secondary to shoulder injury.    Low back pain     Worse last few year    Obesity     Pneumonia 2023    Developed while hospitalized for cancer surgery    Prostate disease     Rash     Allergic reactions to laundry detergent and mild generalized itching secondary to  immunotherapy.    Renal cell carcinoma 12/30/2023    Renal insufficiency 4/9/2024    Decreased renal function since nephrectomy in December, 2023, secondary to renal cancer, but not diagnosed as chronic kidney disease until recent visit. Kidney functions have been stable since surgery, but are now being considered permanent.    Shoulder injury 1980s    Injured in     Visual impairment     Wear glasses for near and distant vision    Vitreous degeneration of right eye     Formatting of this note might be different from the original. Retinal tear and detachment warning symptoms reviewed and patient instructed to call immediately if increasing floaters, flashes, or decreasing peripheral vision. No retinal detachment or retinal tear noted. Recheck in 1 month with dilated exam.     Past Surgical History:   Procedure Laterality Date    ABDOMINAL SURGERY  December 18, 2023    Left Kidney and Adrenal Gland removed due to Renal Cell Carcinoma    NEPHRECTOMY Left 12/18/2023    Procedure: NEPHRECTOMY RADICAL WITH CAVAL THROMBECTOMY;  Surgeon: James Esquivel MD;  Location: [facility];  Service: Urology;  Laterality: Left;    SKIN LESION EXCISION  1990      General Information       Row Name 07/13/24 1136          Physical Therapy Time and Intention    Document Type evaluation  -     Mode of Treatment individual therapy;physical therapy  -       Row Name 07/13/24 1136          General Information    Patient Profile Reviewed yes  -SM     Prior Level of Function independent:  -       Row Name 07/13/24 1136          Living Environment    People in Home spouse  -       Row Name 07/13/24 1136          Home Main Entrance    Number of Stairs, Main Entrance four  -       Row Name 07/13/24 1136          Cognition    Orientation Status (Cognition) oriented x 4  -SM               User Key  (r) = Recorded By, (t) = Taken By, (c) = Cosigned By      Initials Name Provider Type     Fabienne Cruz PT Physical  Therapist                   Mobility       Row Name 07/13/24 1137          Bed Mobility    Bed Mobility supine-sit  -     Supine-Sit [place] (Bed Mobility) supervision  -     Comment, (Bed Mobility) Patient in BR with spouse at end of session  -       Row Name 07/13/24 1137          Sit-Stand Transfer    Sit-Stand [place] (Transfers) supervision  -       Row Name 07/13/24 1137          Gait/Stairs (Locomotion)    [place] Level (Gait) standby assist  -     Assistive Device (Gait) walker, front-wheeled  -     Distance in Feet (Gait) 150  -     Deviations/Abnormal Patterns (Gait) gait speed decreased  -     Comment, (Gait/Stairs) Gait slow but steady with no overt LOB noted. Patient reported R hip pain throughout.  -               User Key  (r) = Recorded By, (t) = Taken By, (c) = Cosigned By      Initials Name Provider Type     Fabienne Cruz PT Physical Therapist                   Obj/Interventions       Row Name 07/13/24 1138          Range of Motion Comprehensive    General Range of Motion bilateral lower extremity ROM WFL  -[facility] Name 07/13/24 1138          Strength Comprehensive (MMT)    General Manual Muscle Testing (MMT) Assessment lower extremity strength deficits identified  -     Comment, General Manual Muscle Testing (MMT) Assessment R hip pain at BL  -       Row Name 07/13/24 1138          Balance    Balance Assessment sitting static balance;sitting dynamic balance;sit to stand dynamic balance;standing static balance;standing dynamic balance  -     Static Sitting Balance independent  -     Dynamic Sitting Balance modified independence  -     Position, Sitting Balance sitting edge of bed  -     Sit to Stand Dynamic Balance supervision  -     Static Standing Balance supervision  -     Dynamic Standing Balance standby assist  -     Position/Device Used, Standing Balance supported;walker, front-wheeled  -     Balance Interventions  sitting;standing;sit to stand;supported;static;dynamic  -               User Key  (r) = Recorded By, (t) = Taken By, (c) = Cosigned By      Initials Name Provider Type     Fabienne Cruz, DNAIEL Physical Therapist                   Goals/Plan    No documentation.                  Clinical Impression       [facility] Name 07/13/24 1139          Pain    Pain Location - Side/Orientation Right  -     Pain Location - hip  -     Pre/Posttreatment Pain Comment Patient did not rate pain  -     Pain Intervention(s) Rest;Repositioned;Ambulation/increased activity  -[facility] Name 07/13/24 1139          Plan of Care Review    Plan of Care Reviewed With patient  -     Outcome Evaluation Patient is a 63 y.o male who presented to [facility] with reports of lightheadedness. Patient with hx of renal cancer, currently undergoing treatment. Patient reports he is waiting to finish treatment to get a hip replacement. Patient AOx4 supine in bed upon arrival. Patient lives at home with his spouse with 4 JIMMY. Patient is independent at baseline and uses a rwx for ambulation. Patient completed all bed mobility with SV. Patient performed STS from EOB with SV. Patient ambulated 150ft with rwx and SBA. Gait slow but steady with no overt LOB noted. Patient reported R hip pain throughout. Patient declined any lightheadedness or dizziness during session this date. No further skilled acute PT needs identified at this time. PT will sign off.  -[facility] Name 07/13/24 1139          Therapy Assessment/Plan (PT)    Criteria for Skilled Interventions Met (PT) no;no problems identified which require skilled intervention  -     Therapy Frequency (PT) evaluation only  -       Row Name 07/13/24 1139          Vital Signs    Pre Patient Position Supine  -     Intra Patient Position Standing  -     Post Patient Position Standing  -SM       Row Name 07/13/24 1139          Positioning and Restraints    Pre-Treatment Position in bed  -     Post  Treatment Position bathroom  -     Bathroom notified nsg;with family/caregiver;encouraged to call for assist  -               User Key  (r) = Recorded By, (t) = Taken By, (c) = Cosigned By      Initials Name Provider Type    Fabienne Elias PT Physical Therapist                   Outcome Measures       Row Name 07/13/24 1146          How much help from another person do you currently need...    Turning from your back to your side while in flat bed without using bedrails? 4  -SM     Moving from lying on back to sitting on the side of a flat bed without bedrails? 4  -SM     Moving to and from a bed to a chair (including a wheelchair)? 4  -SM     Standing up from a chair using your arms (e.g., wheelchair, bedside chair)? 4  -SM     Climbing 3-5 steps with a railing? 4  -SM     To walk in hospital room? 4  -SM     AM-[facility] 6 Clicks Score (PT) 24  -     Highest Level of Mobility Goal 8 --> Walked 250 feet or more  -       Row Name 07/13/24 1146          Functional Assessment    Outcome Measure Options AM-PAC 6 Clicks Basic Mobility (PT)  -               User Key  (r) = Recorded By, (t) = Taken By, (c) = Cosigned By      Initials Name Provider Type    Fabienne Elias PT Physical Therapist                                 Physical Therapy Education       Title: PT OT SLP Therapies (Done)       Topic: Physical Therapy (Done)       Point: Mobility training (Done)       Learning Progress Summary             Patient Acceptance, E, VU by  at 7/13/2024 1146                         Point: Home exercise program (Done)       Learning Progress Summary             Patient Acceptance, E, VU by  at 7/13/2024 1146                         Point: Body mechanics (Done)       Learning Progress Summary             Patient Acceptance, E, VU by  at 7/13/2024 1146                         Point: Precautions (Done)       Learning Progress Summary             Patient Acceptance, E, VU by  at 7/13/2024 1146                                          User Key       Initials Effective Dates Name Provider Type Discipline     05/02/22 -  Fabienne Cruz PT Physical Therapist PT                  PT Recommendation and Plan     Plan of Care Reviewed With: patient  Outcome Evaluation: Patient is a 63 y.o male who presented to [facility] with reports of lightheadedness. Patient with hx of renal cancer, currently undergoing treatment. Patient reports he is waiting to finish treatment to get a hip replacement. Patient AOx4 supine in bed upon arrival. Patient lives at home with his spouse with 4 JIMMY. Patient is independent at baseline and uses a rwx for ambulation. Patient completed all bed mobility with SV. Patient performed STS from EOB with SV. Patient ambulated 150ft with rwx and SBA. Gait slow but steady with no overt LOB noted. Patient reported R hip pain throughout. Patient declined any lightheadedness or dizziness during session this date. No further skilled acute PT needs identified at this time. PT will sign off.     Time Calculation:         PT Charges       Row Name 07/13/24 1146             Time Calculation    Start Time 1019  -      Stop Time 1028  -      Time Calculation (min) 9 min  -      PT Received On 07/13/24  -                User Key  (r) = Recorded By, (t) = Taken By, (c) = Cosigned By      Initials Name Provider Type     Fabienne Cruz PT Physical Therapist                  Therapy Charges for Today       Code Description Service Date Service Provider Modifiers Qty    26405120258 HC PT EVAL LOW COMPLEXITY 3 7/13/2024 Fabienne Cruz PT GP 1            PT G-Codes  Outcome Measure Options: AM-PAC 6 Clicks Basic Mobility (PT)  AM-PAC 6 Clicks Score (PT): 24  PT Discharge Summary  Anticipated Discharge Disposition (PT): home with assist    Fabienne Cruz PT  7/13/2024

## 2024-07-13 NOTE — PROGRESS NOTES
MD ATTESTATION NOTE    The LITA and I have discussed this patient's history, physical exam, and treatment plan.  I have reviewed the documentation and personally had a face to face interaction with the patient. I affirm the documentation and agree with the treatment and plan.  The attached note describes my personal findings.      I provided a substantive portion of the care of the patient.  I personally performed the physical exam in its entirety, and below are my findings.        Brief HPI: This patient is a 63-year-old male with a history of somewhat recently diagnosed renal cell carcinoma status post surgery as well as immunotherapy management admitted to the observation unit due to a syncopal episode.  He reports that typically roughly 1 week post immunotherapy infusion, he has significant lightheadedness.  However, he has never lost consciousness with the event.  Currently, he reports some mild lightheadedness however symptoms have drastically improved.  He denies headache, chest pain, shortness of breath, nausea/vomiting, or fever/chills.      PHYSICAL EXAM  ED Triage Vitals   Temp Heart Rate Resp BP SpO2   07/12/24 1338 07/12/24 1338 07/12/24 1338 07/12/24 1346 07/12/24 1338   97.4 °F (36.3 °C) 120 20 136/89 98 %      Temp src Heart Rate Source Patient Position BP Location FiO2 (%)   07/12/24 1338 07/12/24 1615 07/12/24 1615 07/12/24 1615 --   Tympanic Monitor Lying Right arm          GENERAL: Resting comfortably and in no acute distress, nontoxic in appearance  HENT: nares patent  EYES: no scleral icterus  CV: regular rhythm, normal rate, no M/R/G  RESPIRATORY: normal effort, lungs clear bilaterally  ABDOMEN: soft, nontender, no rebound or guarding  MUSCULOSKELETAL: no deformity, no edema  NEURO: alert, moves all extremities, follows commands  PSYCH:  calm, cooperative  SKIN: warm, dry    Vital signs and nursing notes reviewed.        Plan: The patient's EKG is unremarkable and serial cardiac enzymes have  down trended.  This morning, we will obtain an echocardiogram as well as duplex venous Doppler ultrasounds of the lower extremities bilaterally.  We will ask cardiology to see in morning consultation.  Further planning and disposition to follow.

## 2024-07-13 NOTE — OUTREACH NOTE
Prep Survey      Flowsheet Row Responses   [facility] patient discharged from? [place]   Is LACE score < 7 ? No   Eligibility [facility]   Date of Admission 07/12/24   Date of Discharge 07/13/24   Discharge Disposition Home or Self Care   Discharge diagnosis Lightheadedness   Does the patient have one of the following disease processes/diagnoses(primary or secondary)? Other   Prep survey completed? Yes            Jeni MÉNDEZ - Registered Nurse

## 2024-07-15 ENCOUNTER — TRANSITIONAL CARE MANAGEMENT TELEPHONE ENCOUNTER (OUTPATIENT)
Dept: CALL CENTER | Facility: HOSPITAL | Age: 64
End: 2024-07-15
Payer: COMMERCIAL

## 2024-07-15 NOTE — OUTREACH NOTE
Call Center TCM Note      Flowsheet Row Responses   [facility] patient discharged from? [place]   Does the patient have one of the following disease processes/diagnoses(primary or secondary)? Other   TCM attempt successful? Yes   Call start time 1122   Call end time 1125   Discharge diagnosis Lightheadedness   Meds reviewed with patient/caregiver? Yes   Does the patient have all medications ordered at discharge? N/A   Is the patient taking all medications as directed (includes completed medication regime)? Yes   Medication comments No changes   Comments Appt made for 7/16 @ 1:00   Does the patient have an appointment with their PCP within 7-14 days of discharge? No   Nursing Interventions Assisted patient with making appointment per protocol   Psychosocial issues? No   Did the patient receive a copy of their discharge instructions? Yes   Nursing interventions Reviewed instructions with patient   What is the patient's perception of their health status since discharge? Improving   Is the patient/caregiver able to teach back signs and symptoms related to disease process for when to call PCP? Yes   Is the patient/caregiver able to teach back signs and symptoms related to disease process for when to call 911? Yes   Is the patient/caregiver able to teach back the hierarchy of who to call/visit for symptoms/problems? PCP, Specialist, Home health nurse, Urgent Care, ED, 911 Yes   If the patient is a current smoker, are they able to teach back resources for cessation? Not a smoker   TCM call completed? Yes   Call end time 1125            Reina Zapien LPN    7/15/2024, 11:25 EDT

## 2024-07-16 ENCOUNTER — OFFICE VISIT (OUTPATIENT)
Dept: INTERNAL MEDICINE | Facility: CLINIC | Age: 64
End: 2024-07-16
Payer: MEDICARE

## 2024-07-16 VITALS
SYSTOLIC BLOOD PRESSURE: 122 MMHG | DIASTOLIC BLOOD PRESSURE: 86 MMHG | HEART RATE: 68 BPM | BODY MASS INDEX: 32.44 KG/M2 | TEMPERATURE: 96.5 F | HEIGHT: 70 IN | WEIGHT: 226.6 LBS | OXYGEN SATURATION: 96 %

## 2024-07-16 DIAGNOSIS — Z09 HOSPITAL DISCHARGE FOLLOW-UP: Primary | ICD-10-CM

## 2024-07-16 DIAGNOSIS — C64.2 RENAL CELL CARCINOMA OF LEFT KIDNEY: ICD-10-CM

## 2024-07-16 DIAGNOSIS — K64.9 HEMORRHOIDS, UNSPECIFIED HEMORRHOID TYPE: ICD-10-CM

## 2024-07-16 DIAGNOSIS — R42 LIGHTHEADEDNESS: ICD-10-CM

## 2024-07-16 DIAGNOSIS — I10 HYPERTENSION, UNSPECIFIED TYPE: Chronic | ICD-10-CM

## 2024-07-16 PROCEDURE — 3074F SYST BP LT 130 MM HG: CPT | Performed by: STUDENT IN AN ORGANIZED HEALTH CARE EDUCATION/TRAINING PROGRAM

## 2024-07-16 PROCEDURE — 3079F DIAST BP 80-89 MM HG: CPT | Performed by: STUDENT IN AN ORGANIZED HEALTH CARE EDUCATION/TRAINING PROGRAM

## 2024-07-16 PROCEDURE — 1126F AMNT PAIN NOTED NONE PRSNT: CPT | Performed by: STUDENT IN AN ORGANIZED HEALTH CARE EDUCATION/TRAINING PROGRAM

## 2024-07-16 PROCEDURE — 99495 TRANSJ CARE MGMT MOD F2F 14D: CPT | Performed by: STUDENT IN AN ORGANIZED HEALTH CARE EDUCATION/TRAINING PROGRAM

## 2024-07-16 PROCEDURE — 1159F MED LIST DOCD IN RCRD: CPT | Performed by: STUDENT IN AN ORGANIZED HEALTH CARE EDUCATION/TRAINING PROGRAM

## 2024-07-16 PROCEDURE — 1111F DSCHRG MED/CURRENT MED MERGE: CPT | Performed by: STUDENT IN AN ORGANIZED HEALTH CARE EDUCATION/TRAINING PROGRAM

## 2024-07-16 PROCEDURE — 1160F RVW MEDS BY RX/DR IN RCRD: CPT | Performed by: STUDENT IN AN ORGANIZED HEALTH CARE EDUCATION/TRAINING PROGRAM

## 2024-07-16 RX ORDER — HYDROCORTISONE ACETATE 25 MG/1
25 SUPPOSITORY RECTAL 2 TIMES DAILY
Qty: 20 SUPPOSITORY | Refills: 0 | Status: SHIPPED | OUTPATIENT
Start: 2024-07-16

## 2024-07-16 NOTE — PROGRESS NOTES
Transitional Care Follow Up Visit  Subjective     Louis Andino is a 63 y.o. male who presents for a transitional care management visit.    Within 48 business hours after discharge our office contacted him via telephone to coordinate his care and needs.      I reviewed and discussed the details of that call along with the discharge summary, hospital problems, inpatient lab results, inpatient diagnostic studies, and consultation reports with Louis.     Current outpatient and discharge medications have been reconciled for the patient.  Reviewed by: Harry Hsu MD          7/13/2024     5:00 PM   Date of TCM Phone Call   [facility]   Date of Admission 7/12/2024   Date of Discharge 7/13/2024   Discharge Disposition Home or Self Care     Risk for Readmission (LACE) Score: 6 (7/13/2024  6:00 AM)      History of Present Illness   Course During Hospital Stay:  Mr. Andino presents to clinic today for follow-up of recent brief hospital stay for lightheadedness and presyncope.  He states that he was feeling significantly lightheaded and went to the hospital, he had workup with CT imaging as well as echo that was ultimately unremarkable.  He was discharged on a Zio patch and neurology had recommended no further neurological workup.  He states that symptoms are largely present after he takes his morning medications and improves with laying down.  He currently has a Zio patch on with plans have been over 2 weeks.     The following portions of the patient's history were reviewed and updated as appropriate: allergies, current medications, past family history, past medical history, past social history, past surgical history, and problem list.    Review of Systems   Constitutional:  Positive for fatigue. Negative for chills and fever.   Neurological:  Positive for dizziness and light-headedness. Negative for syncope.       Objective   /86   Pulse 68   Temp 96.5 °F (35.8 °C) (Temporal)   Ht  "177 cm (69.69\")   Wt 103 kg (226 lb 9.6 oz)   SpO2 96%   BMI 32.80 kg/m²   Physical Exam  Vitals reviewed.   Constitutional:       General: He is not in acute distress.     Appearance: Normal appearance. He is not toxic-appearing.   HENT:      Head: Normocephalic and atraumatic.   Eyes:      General: No scleral icterus.     Conjunctiva/sclera: Conjunctivae normal.   Cardiovascular:      Rate and Rhythm: Normal rate and regular rhythm.      Heart sounds: Normal heart sounds. No murmur heard.  Skin:     General: Skin is warm and dry.   Neurological:      Mental Status: He is alert and oriented to person, place, and time.   Psychiatric:         Mood and Affect: Mood normal.         Behavior: Behavior normal.         Assessment & Plan   Diagnoses and all orders for this visit:    1. Hospital discharge follow-up (Primary)    2. Hypertension, unspecified type    3. Lightheadedness    4. Renal cell carcinoma of left kidney    5. Hemorrhoids, unspecified hemorrhoid type  -     hydrocortisone (ANUSOL-HC) 25 MG suppository; Insert 1 suppository into the rectum 2 (Two) Times a Day.  Dispense: 20 suppository; Refill: 0      Reviewed hospitalization, labs and imaging    Ultimately suspect his lightheadedness and presyncope is secondary to dehydration from immunotherapy regimen and possibly beta-blocker.  I advised him to reduce his metoprolol in half daily and only take it at night as he is currently taking it twice a day despite it being succinate.  We will see if he has improvement in his symptoms with this as well as discussion with his oncologist regarding his immunotherapy as he may benefit from IV hydration after immunotherapy regimen.    Will refill anusol suppository.     RTC as previously scheduled in about 2-3 weeks.                "

## 2024-07-17 ENCOUNTER — OFFICE VISIT (OUTPATIENT)
Dept: ONCOLOGY | Facility: CLINIC | Age: 64
End: 2024-07-17
Payer: COMMERCIAL

## 2024-07-17 VITALS
OXYGEN SATURATION: 98 % | TEMPERATURE: 97.8 F | HEIGHT: 70 IN | BODY MASS INDEX: 32.64 KG/M2 | WEIGHT: 228 LBS | HEART RATE: 74 BPM | RESPIRATION RATE: 18 BRPM | DIASTOLIC BLOOD PRESSURE: 81 MMHG | SYSTOLIC BLOOD PRESSURE: 131 MMHG

## 2024-07-17 DIAGNOSIS — C64.9 RENAL CELL CARCINOMA, UNSPECIFIED LATERALITY: ICD-10-CM

## 2024-07-17 DIAGNOSIS — L30.8 PRURITIC DERMATITIS: ICD-10-CM

## 2024-07-17 DIAGNOSIS — C64.2 RENAL CELL CARCINOMA OF LEFT KIDNEY: Primary | ICD-10-CM

## 2024-07-17 LAB — CREAT BLDA-MCNC: 1.8 MG/DL (ref 0.6–1.3)

## 2024-07-17 RX ORDER — HYDROXYZINE HYDROCHLORIDE 25 MG/1
25 TABLET, FILM COATED ORAL EVERY 8 HOURS PRN
Qty: 30 TABLET | Refills: 0 | Status: SHIPPED | OUTPATIENT
Start: 2024-07-17

## 2024-08-01 ENCOUNTER — TELEPHONE (OUTPATIENT)
Dept: CARDIOLOGY | Facility: CLINIC | Age: 64
End: 2024-08-01
Payer: COMMERCIAL

## 2024-08-01 NOTE — TELEPHONE ENCOUNTER
Results and recommendations called to pt.  Instructed to call with any further questions or concerns.  Verbalized understanding.    Shantelle Fields RN  Triage Nurse, [facility]  08/01/24 13:20 EDT

## 2024-08-06 ENCOUNTER — OFFICE VISIT (OUTPATIENT)
Dept: GASTROENTEROLOGY | Facility: CLINIC | Age: 64
End: 2024-08-06
Payer: COMMERCIAL

## 2024-08-06 VITALS
DIASTOLIC BLOOD PRESSURE: 89 MMHG | BODY MASS INDEX: 43.95 KG/M2 | HEIGHT: 61 IN | SYSTOLIC BLOOD PRESSURE: 158 MMHG | WEIGHT: 232.8 LBS | TEMPERATURE: 97.8 F | HEART RATE: 71 BPM

## 2024-08-06 DIAGNOSIS — K21.9 GASTROESOPHAGEAL REFLUX DISEASE, UNSPECIFIED WHETHER ESOPHAGITIS PRESENT: Chronic | ICD-10-CM

## 2024-08-06 DIAGNOSIS — K62.5 RECTAL BLEEDING: ICD-10-CM

## 2024-08-06 DIAGNOSIS — K59.01 SLOW TRANSIT CONSTIPATION: Primary | ICD-10-CM

## 2024-08-06 PROCEDURE — 99214 OFFICE O/P EST MOD 30 MIN: CPT | Performed by: INTERNAL MEDICINE

## 2024-08-06 RX ORDER — SUCRALFATE 1 G/1
1 TABLET ORAL 3 TIMES DAILY PRN
Qty: 270 TABLET | Refills: 0 | Status: SHIPPED | OUTPATIENT
Start: 2024-08-06

## 2024-08-06 NOTE — PROGRESS NOTES
Subjective   Chief Complaint   Patient presents with    Hemorrhoids       Luois Andino is a  63 y.o. male here for a follow up visit for constipation, rectal bleeding.    Constipation much better with amitiza 8mcg once daily.  Occasional bleeding from hemorrhoids - uses suppository for 1-2 days and it improves.  This typically occurs when he is a little constipated.  He notes no obvious relationship with abnormal bowel movements and rectal bleeding.  No abd pain.  Appetite is good - he feels much better.    He is still on immunotherapy for renal cell cancer until early next year (Jan/Feb)    He has never had a colonoscopy.    GERD is well-controlled with current regimen.  HPI  Past Medical History:   Diagnosis Date    Anemia 12/18/2023    Due to surgery. Required transfusions    Ankle sprain     Multiple-both ankles over the years    Asthma     Clotting disorder     Required blood transfusion during and after surgery in excess of expected    Coronary artery disease Dec 2023    Bifascicular block    Deep vein thrombosis 12/18/2023    During cancer surgery a clot was surgically removed from the inferior vena cava which was cancer related    Emphysema/COPD     Erectile dysfunction Several years    Fracture of ankle     Left ankle as a child    Fracture of wrist     Left wrist as a child    GERD (gastroesophageal reflux disease)     HL (hearing loss)     Progressive worsening over many years    Hyperglycemia 10/12/2023    Hyperlipidemia 10/12/2023    Hypertension     Injury of neck 2023    Previous fractures noted during chiropractic visit. Probably secondary to shoulder injury.    Low back pain     Worse last few year    Obesity     Pneumonia 12/20/2023    Developed while hospitalized for cancer surgery    Prostate disease     Rash     Allergic reactions to laundry detergent and mild generalized itching secondary to immunotherapy.    Renal cell carcinoma 12/30/2023    Renal insufficiency 4/9/2024    Decreased  renal function since nephrectomy in December, 2023, secondary to renal cancer, but not diagnosed as chronic kidney disease until recent visit. Kidney functions have been stable since surgery, but are now being considered permanent.    Shoulder injury 1980s    Injured in     Visual impairment     Wear glasses for near and distant vision    Vitreous degeneration of right eye     Formatting of this note might be different from the original. Retinal tear and detachment warning symptoms reviewed and patient instructed to call immediately if increasing floaters, flashes, or decreasing peripheral vision. No retinal detachment or retinal tear noted. Recheck in 1 month with dilated exam.     Past Surgical History:   Procedure Laterality Date    ABDOMINAL SURGERY  December 18, 2023    Left Kidney and Adrenal Gland removed due to Renal Cell Carcinoma    NEPHRECTOMY Left 12/18/2023    Procedure: NEPHRECTOMY RADICAL WITH CAVAL THROMBECTOMY;  Surgeon: James Esquivel MD;  Location: [facility];  Service: Urology;  Laterality: Left;    SKIN LESION EXCISION  1990       Current Outpatient Medications:     budesonide (PULMICORT) 0.5 MG/2ML nebulizer solution, INHALE 2 MILLILITERS VIA NEBULIZATION BY MOUTH DAILY, Disp: 60 mL, Rfl: 0    Diclofenac Sodium (VOLTAREN) 1 % gel gel, Apply 4 g topically to the appropriate area as directed 4 (Four) Times a Day., Disp: 50 g, Rfl: 0    hydrocortisone (ANUSOL-HC) 25 MG suppository, Insert 1 suppository into the rectum 2 (Two) Times a Day., Disp: 20 suppository, Rfl: 0    ipratropium-albuterol (COMBIVENT RESPIMAT)  MCG/ACT inhaler, Inhale 1 puff 4 (Four) Times a Day As Needed for Wheezing., Disp: , Rfl:     lidocaine-prilocaine (EMLA) 2.5-2.5 % cream, Apply 1 Application topically to the appropriate area as directed See Admin Instructions. Apply to port site one hour prior to port being accessed., Disp: 30 g, Rfl: 3    losartan (COZAAR) 100 MG tablet, Take 1 tablet by mouth Daily  for 90 days., Disp: 90 tablet, Rfl: 0    lubiprostone (Amitiza) 8 MCG capsule, Take 1 capsule by mouth 2 (Two) Times a Day With Meals., Disp: 60 capsule, Rfl: 2    magnesium oxide (MAG-OX) 400 MG tablet, Take 1 tablet by mouth Daily., Disp: , Rfl:     metoprolol succinate XL (TOPROL-XL) 25 MG 24 hr tablet, Take 2 tablets by mouth Daily. (Patient taking differently: Take 1 tablet by mouth Daily.), Disp: 180 tablet, Rfl: 0    naloxone (NARCAN) 4 MG/0.1ML nasal spray, Call 911. Don't prime. Only in 1 nostril for overdose. Repeat in 2-3 minutes in other nostril if no or minimal breathing/responsiveness., Disp: 2 each, Rfl: 0    ondansetron (ZOFRAN) 8 MG tablet, Take 1 tablet by mouth Every 8 (Eight) Hours As Needed for Nausea or Vomiting., Disp: 30 tablet, Rfl: 2    oxyCODONE-acetaminophen (PERCOCET) 5-325 MG per tablet, Take 1 tablet by mouth Every 12 (Twelve) Hours As Needed for Severe Pain., Disp: 30 tablet, Rfl: 0    pantoprazole (PROTONIX) 40 MG EC tablet, Take 1 tablet by mouth Daily., Disp: 90 tablet, Rfl: 0    sucralfate (CARAFATE) 1 g tablet, Take 1 tablet by mouth 3 (Three) Times a Day As Needed (heartburn)., Disp: 270 tablet, Rfl: 0    Symbicort 160-4.5 MCG/ACT inhaler, Inhale 2 puffs 2 (Two) Times a Day., Disp: , Rfl:     vitamin D3 125 MCG (5000 UT) capsule capsule, Take 1 capsule by mouth Daily., Disp: , Rfl:   PRN Meds:.  No Known Allergies  Social History     Socioeconomic History    Marital status:     Number of children: 6   Tobacco Use    Smoking status: Never     Passive exposure: Past    Smokeless tobacco: Never    Tobacco comments:     SECOND HAND SMOKE EXPOSURE AS A CHILD    Vaping Use    Vaping status: Never Used   Substance and Sexual Activity    Alcohol use: Yes     Alcohol/week: 1.0 standard drink of alcohol     Types: 1 Glasses of wine per week     Comment: rare    Drug use: Never    Sexual activity: Yes     Partners: Female     Family History   Problem Relation Age of Onset     Arthritis Mother     Cancer Mother     Diabetes Mother     Heart disease Mother     Hyperlipidemia Mother     Miscarriages / Stillbirths Mother         Stillborn child    Hypertension Mother     Osteoporosis Mother     COPD Father     Hyperlipidemia Father     Hyperlipidemia Brother     Vision loss Daughter     Broken bones Daughter         Broke left forearm three times during childhood    Broken bones Daughter         Fractured right femur and right forearm during childhood    Anxiety disorder Daughter     Depression Daughter     Miscarriages / Stillbirths Daughter         First Trimester miscarriage    Dislocations Daughter         Recurrent radial head subluxations as a child    Anxiety disorder Daughter     Depression Daughter     Asthma Son     Depression Son      Review of Systems   Constitutional:  Negative for appetite change and unexpected weight change.   Gastrointestinal:  Positive for blood in stool. Negative for abdominal pain and constipation.     Vitals:    08/06/24 1541   BP: 158/89   Pulse: 71   Temp: 97.8 °F (36.6 °C)         08/06/24  1541   Weight: 106 kg (232 lb 12.8 oz)       Objective   Physical Exam  Constitutional:       Appearance: Normal appearance. He is well-developed.   HENT:      Head: Normocephalic and atraumatic.   Eyes:      General: No scleral icterus.     Conjunctiva/sclera: Conjunctivae normal.   Pulmonary:      Effort: Pulmonary effort is normal.   Abdominal:      General: There is no distension.      Palpations: Abdomen is soft.   Musculoskeletal:      Cervical back: Normal range of motion and neck supple.   Skin:     General: Skin is warm and dry.   Neurological:      Mental Status: He is alert.   Psychiatric:         Mood and Affect: Mood normal.         Behavior: Behavior normal.       No radiology results for the last 7 days    Assessment & Plan   Diagnoses and all orders for this visit:    Slow transit constipation    Gastroesophageal reflux disease, unspecified whether  esophagitis present  -     sucralfate (CARAFATE) 1 g tablet; Take 1 tablet by mouth 3 (Three) Times a Day As Needed (heartburn).    Rectal bleeding      Plan:  Constipation well controlled with amitiza once daily-continue current regimen  Continue pantoprazole daily for GERD  Continue adequate fluid consumption, regular activity as tolerated and high-fiber diet.  Plan for EGD and colonoscopy once he has completed immunotherapy early next year.  He will contact me when he is ready to schedule.

## 2024-08-07 ENCOUNTER — TELEPHONE (OUTPATIENT)
Dept: GASTROENTEROLOGY | Facility: CLINIC | Age: 64
End: 2024-08-07
Payer: COMMERCIAL

## 2024-08-07 ENCOUNTER — SPECIALTY PHARMACY (OUTPATIENT)
Dept: GASTROENTEROLOGY | Facility: CLINIC | Age: 64
End: 2024-08-07
Payer: COMMERCIAL

## 2024-08-07 NOTE — TELEPHONE ENCOUNTER
Insurance has denied amitiza - recommend trial of trulance which is covered as an alternative.  This is once daily with or without food.  I have sent it to his pharmacy

## 2024-08-07 NOTE — TELEPHONE ENCOUNTER
----- Message from Alley WALKER sent at 8/7/2024  8:36 AM EDT -----  Regarding: Lubiprostone PA denial  Lubiprostone PA denied  Key: [id number]  PA Case ID #: [id number]  ID: [id number]     Alternatives: trulance and lactulose

## 2024-08-07 NOTE — PROGRESS NOTES
Lubiprostone PA denied  Key: [id number]  PA Case ID #: [id number]  ID: [id number]    Alternatives: trulance and lactulose       Alley Johnson  Specialty Pharmacy Technician

## 2024-08-09 ENCOUNTER — HOSPITAL ENCOUNTER (OUTPATIENT)
Facility: HOSPITAL | Age: 64
Discharge: HOME | End: 2024-08-09
Admitting: INTERNAL MEDICINE
Payer: COMMERCIAL

## 2024-08-09 ENCOUNTER — OFFICE VISIT (OUTPATIENT)
Dept: INTERNAL MEDICINE | Facility: CLINIC | Age: 64
End: 2024-08-09
Payer: COMMERCIAL

## 2024-08-09 DIAGNOSIS — C64.9 RENAL CELL CARCINOMA, UNSPECIFIED LATERALITY: ICD-10-CM

## 2024-08-09 DIAGNOSIS — E78.5 HYPERLIPIDEMIA, UNSPECIFIED HYPERLIPIDEMIA TYPE: Chronic | ICD-10-CM

## 2024-08-09 DIAGNOSIS — I10 HYPERTENSION, UNSPECIFIED TYPE: Chronic | ICD-10-CM

## 2024-08-09 DIAGNOSIS — C64.2 RENAL CELL CARCINOMA OF LEFT KIDNEY: ICD-10-CM

## 2024-08-09 DIAGNOSIS — Z00.00 WELCOME TO MEDICARE PREVENTIVE VISIT: Primary | ICD-10-CM

## 2024-08-09 DIAGNOSIS — N18.31 STAGE 3A CHRONIC KIDNEY DISEASE: ICD-10-CM

## 2024-08-09 DIAGNOSIS — R73.9 HYPERGLYCEMIA: ICD-10-CM

## 2024-08-09 DIAGNOSIS — M16.11 PRIMARY OSTEOARTHRITIS OF RIGHT HIP: ICD-10-CM

## 2024-08-09 DIAGNOSIS — R06.00 PND (PAROXYSMAL NOCTURNAL DYSPNEA): ICD-10-CM

## 2024-08-09 PROCEDURE — 71250 CT THORAX DX C-: CPT

## 2024-08-09 PROCEDURE — 74176 CT ABD & PELVIS W/O CONTRAST: CPT

## 2024-08-09 RX ORDER — MULTIPLE VITAMINS W/ MINERALS TAB 9MG-400MCG
1 TAB ORAL DAILY
COMMUNITY

## 2024-08-09 NOTE — PROGRESS NOTES
Subjective   The ABCs of the Annual Wellness Visit  Medicare Wellness Visit      Louis Andino is a 63 y.o. patient who presents for a Medicare Wellness Visit.    The following portions of the patient's history were reviewed and   updated as appropriate: allergies, current medications, past family history, past medical history, past social history, past surgical history, and problem list.    Compared to one year ago, the patient's physical   health is better.  Compared to one year ago, the patient's mental   health is better.    Recent Hospitalizations:  This patient has had a [facility] admission record on file within the last 365 days.  Current Medical Providers:  Patient Care Team:  Harry Hsu MD as PCP - General (Internal Medicine)  James Esquivel MD as Consulting Physician (Urology)  Bandar Prakash MD as Consulting Physician (Hematology and Oncology)    Outpatient Medications Prior to Visit   Medication Sig Dispense Refill    budesonide (PULMICORT) 0.5 MG/2ML nebulizer solution INHALE 2 MILLILITERS VIA NEBULIZATION BY MOUTH DAILY 60 mL 0    Diclofenac Sodium (VOLTAREN) 1 % gel gel Apply 4 g topically to the appropriate area as directed 4 (Four) Times a Day. 50 g 0    hydrocortisone (ANUSOL-HC) 25 MG suppository Insert 1 suppository into the rectum 2 (Two) Times a Day. 20 suppository 0    ipratropium-albuterol (COMBIVENT RESPIMAT)  MCG/ACT inhaler Inhale 1 puff 4 (Four) Times a Day As Needed for Wheezing.      lidocaine-prilocaine (EMLA) 2.5-2.5 % cream Apply 1 Application topically to the appropriate area as directed See Admin Instructions. Apply to port site one hour prior to port being accessed. 30 g 3    losartan (COZAAR) 100 MG tablet Take 1 tablet by mouth Daily for 90 days. 90 tablet 0    magnesium oxide (MAG-OX) 400 MG tablet Take 1 tablet by mouth Daily.      metoprolol succinate XL (TOPROL-XL) 25 MG 24 hr tablet Take 2 tablets by mouth Daily. (Patient taking differently: Take 1  tablet by mouth Daily.) 180 tablet 0    multivitamin with minerals tablet tablet Take 1 tablet by mouth Daily.      naloxone (NARCAN) 4 MG/0.1ML nasal spray Call 911. Don't prime. [place] in 1 nostril for overdose. Repeat in 2-3 minutes in other nostril if no or minimal breathing/responsiveness. 2 each 0    ondansetron (ZOFRAN) 8 MG tablet Take 1 tablet by mouth Every 8 (Eight) Hours As Needed for Nausea or Vomiting. 30 tablet 2    pantoprazole (PROTONIX) 40 MG EC tablet Take 1 tablet by mouth Daily. 90 tablet 0    Plecanatide 3 MG tablet Take 1 tablet by mouth Daily. 30 tablet 1    sucralfate (CARAFATE) 1 g tablet Take 1 tablet by mouth 3 (Three) Times a Day As Needed (heartburn). 270 tablet 0    Symbicort 160-4.5 MCG/ACT inhaler Inhale 2 puffs 2 (Two) Times a Day.      vitamin D3 125 MCG (5000 UT) capsule capsule Take 1 capsule by mouth Daily.      oxyCODONE-acetaminophen (PERCOCET) 5-325 MG per tablet Take 1 tablet by mouth Every 12 (Twelve) Hours As Needed for Severe Pain. 30 tablet 0     No facility-administered medications prior to visit.     No opioid medication identified on active medication list. I have reviewed chart for other potential  high risk medication/s and harmful drug interactions in the elderly.      Aspirin is not on active medication list.  Aspirin use is not indicated based on review of current medical condition/s. Risk of harm outweighs potential benefits.  .    Patient Active Problem List   Diagnosis    Renal mass    Asthma    Gastroesophageal reflux disease    Hyperlipidemia    Hypertension    BPH (benign prostatic hyperplasia)    COPD (chronic obstructive pulmonary disease)    KARON (acute kidney injury)    Acute respiratory failure with hypoxia    Renal cell carcinoma    Port-A-Cath in place    [facility]     Advance Care Planning Advance Directive is not on file.  ACP discussion was held with the patient during this visit. Patient has an advance directive (not in EMR), copy requested.   "          Objective   Vitals:    24 1426   BP: 128/80   Pulse: 65   Temp: 96.7 °F (35.9 °C)   TempSrc: Temporal   SpO2: 96%   Weight: 105 kg (230 lb 9.6 oz)   Height: 155.4 cm (61.2\")       Estimated body mass index is 43.29 kg/m² as calculated from the following:    Height as of this encounter: 155.4 cm (61.2\").    Weight as of this encounter: 105 kg (230 lb 9.6 oz).              Gait and Balance Evaluation:  Slow Tentative Pace, Needs Assistance, and Walker  Does the patient have evidence of cognitive impairment? No                                                                                                Health  Risk Assessment    Smoking Status:  Social History     Tobacco Use   Smoking Status Never    Passive exposure: Past   Smokeless Tobacco Never   Tobacco Comments    SECOND HAND SMOKE EXPOSURE AS A CHILD      Alcohol Consumption:  Social History     Substance and Sexual Activity   Alcohol Use Yes    Alcohol/week: 1.0 standard drink of alcohol    Types: 1 Glasses of wine per week    Comment: rare       Fall Risk Screen  STEADI Fall Risk Assessment was completed, and patient is at LOW risk for falls.Assessment completed on:2024    Depression Screenin/9/2024     2:40 PM   PHQ-2/PHQ-9 Depression Screening   Little Interest or Pleasure in Doing Things 0-->not at all   Feeling Down, Depressed or Hopeless 0-->not at all   PHQ-9: Brief Depression Severity Measure Score 0     Health Habits and Functional and Cognitive Screenin/9/2024     2:32 PM   Functional & Cognitive Status   Do you have difficulty preparing food and eating? No   Do you have difficulty bathing yourself, getting dressed or grooming yourself? Yes   Do you have difficulty using the toilet? No   Do you have difficulty moving around from place to place? Yes   Do you have trouble with steps or getting out of a bed or a chair? Yes   Current Diet Limited Junk Food   Dental Exam Up to date   Eye Exam Up to date   Exercise " (times per week) 1 times per week   Current Exercises Include Walking;Stationary Bicycling/Spin Class   Do you need help using the phone?  No   Are you deaf or do you have serious difficulty hearing?  Yes   Do you need help to go to places out of walking distance? Yes   Do you need help shopping? No   Do you need help preparing meals?  No   Do you need help with housework?  Yes   Do you need help with laundry? Yes   Do you need help taking your medications? No   Do you need help managing money? No   Do you ever drive or ride in a car without wearing a seat belt? No   Have you felt unusual stress, anger or loneliness in the last month? No   Who do you live with? Spouse   If you need help, do you have trouble finding someone available to you? Yes   Have you been bothered in the last four weeks by sexual problems? No   Do you have difficulty concentrating, remembering or making decisions? No           Visual Acuity:  Vision Screening    Right eye Left eye Both eyes   Without correction 20/000 20/200 20/200   With correction 20/20 20/20 20/20     Age-appropriate Screening Schedule:  Refer to the list below for future screening recommendations based on patient's age, sex and/or medical conditions. Orders for these recommended tests are listed in the plan section. The patient has been provided with a written plan.    Health Maintenance List  Health Maintenance   Topic Date Due    COLORECTAL CANCER SCREENING  Never done    Pneumococcal Vaccine 65+ (1 of 2 - PCV) Never done    ANNUAL WELLNESS VISIT  Never done    ZOSTER VACCINE (1 of 2) 08/09/2024 (Originally 11/22/2008)    COVID-19 Vaccine (1 - 2023-24 season) 08/11/2024 (Originally 9/1/2023)    LIPID PANEL  12/19/2024    BMI FOLLOWUP  08/09/2025    TDAP/TD VACCINES (2 - Td or Tdap) 03/08/2032    HEPATITIS C SCREENING  Completed    INFLUENZA VACCINE  Discontinued                                                                                                                  "                               CMS Preventative Services Quick Reference  Risk Factors Identified During Encounter  Fall Risk-High or Moderate: Referral Placed for Physical Therapy  Immunizations Discussed/Encouraged: Influenza, Prevnar 20 (Pneumococcal 20-valent conjugate), Shingrix, and COVID19  Dental Screening Recommended  Vision Screening Recommended    The above risks/problems have been discussed with the patient.  Pertinent information has been shared with the patient in the After Visit Summary.  An After Visit Summary and PPPS were made available to the patient.    Follow Up:   Next Medicare Wellness visit to be scheduled in 1 year.         Additional E&M Note during same encounter follows:  Patient has additional, significant, and separately identifiable condition(s)/problem(s) that require work above and beyond the Medicare Wellness Visit     Chief Complaint  No chief complaint on file.    Subjective   HPI  Louis is also being seen today for additional medical problem/s.    Reports he is doing well since last visit, he has had no presyncopal episodes since reducing his metoprolol in half    His blood pressure is somewhat higher with an average of upper 130s systolics and 80-90 diastolics.    He is interested in pursuing water aerobics for his chronic hip pain                Objective   Vital Signs:  /80   Pulse 65   Temp 96.7 °F (35.9 °C) (Temporal)   Ht 155.4 cm (61.2\")   Wt 105 kg (230 lb 9.6 oz)   SpO2 96%   BMI 43.29 kg/m²   Physical Exam  Vitals reviewed.   Constitutional:       General: He is not in acute distress.     Appearance: Normal appearance. He is not toxic-appearing.   HENT:      Head: Normocephalic and atraumatic.   Eyes:      General: No scleral icterus.     Conjunctiva/sclera: Conjunctivae normal.   Musculoskeletal:      Right hip: Crepitus present. Decreased range of motion. Decreased strength.   Skin:     General: Skin is warm and dry.   Neurological:      Mental Status: He " is alert and oriented to person, place, and time.      Gait: Gait abnormal.   Psychiatric:         Mood and Affect: Mood normal.         Behavior: Behavior normal.                 Assessment and Plan               Primary osteoarthritis of right hip    Welcome to Medicare preventive visit    PND (paroxysmal nocturnal dyspnea)    Stage 3a chronic kidney disease    Renal cell carcinoma of left kidney    Hyperglycemia    Hyperlipidemia, unspecified hyperlipidemia type     Hypertension, unspecified type      Orders Placed This Encounter   Procedures    Comprehensive Metabolic Panel     Standing Status:   Future     Standing Expiration Date:   8/9/2025     Order Specific Question:   Release to patient     Answer:   Routine Release [[id number]]    Hemoglobin A1c     Standing Status:   Future     Standing Expiration Date:   8/9/2025     Order Specific Question:   Release to patient     Answer:   Routine Release [[id number]]    Lipid Panel With / Chol / HDL Ratio     Standing Status:   Future     Standing Expiration Date:   8/9/2025     Order Specific Question:   Release to patient     Answer:   Routine Release [[id number]]    Ambulatory Referral to Physical Therapy     Referral Priority:   Routine     Referral Type:   Physical Therapy     Referral Reason:   Specialty Services Required     Referral Location:   [facility] [place]     Requested Specialty:   Physical Therapy     Number of Visits Requested:   1    Ambulatory Referral to Sleep Medicine     Referral Priority:   Routine     Referral Type:   Consultation     Number of Visits Requested:   1       Will plan to refer to PT for specifically water aerobics given his hip arthritis.    Suspect some degree of JUAN MANUEL, will plan to refer to sleep medicine for sleep study    Advised him to reach out if his blood pressure remains elevated above 135 systolics and 85 diastolics we will likely need to start amlodipine.    Return to clinic in about 3 months,  labs prior              Follow Up   Return in about 3 months (around 11/9/2024).  Patient was given instructions and counseling regarding his condition or for health maintenance advice. Please see specific information pulled into the AVS if appropriate.

## 2024-08-09 NOTE — PROGRESS NOTES
" Subjective   The ABCs of the Annual Wellness Visit  Medicare Wellness Visit      Louis Andino is a 63 y.o. patient who presents for a Medicare Wellness Visit.    The following portions of the patient's history were reviewed and   updated as appropriate: {history reviewed:20406::\"allergies\",\"current medications\",\"past family history\",\"past medical history\",\"past social history\",\"past surgical history\",\"problem list\"}.    Compared to one year ago, the patient's physical   health is {better worse same:82340}.  Compared to one year ago, the patient's mental   health is {better worse same:12289}.    Recent Hospitalizations:  This patient has had a [facility] admission record on file within the last 365 days.  Current Medical Providers:  Patient Care Team:  Harry Hsu MD as PCP - General (Internal Medicine)  James Esquivel MD as Consulting Physician (Urology)  Bandar Prakash MD as Consulting Physician (Hematology and Oncology)    Outpatient Medications Prior to Visit   Medication Sig Dispense Refill    budesonide (PULMICORT) 0.5 MG/2ML nebulizer solution INHALE 2 MILLILITERS VIA NEBULIZATION BY MOUTH DAILY 60 mL 0    Diclofenac Sodium (VOLTAREN) 1 % gel gel Apply 4 g topically to the appropriate area as directed 4 (Four) Times a Day. 50 g 0    hydrocortisone (ANUSOL-HC) 25 MG suppository Insert 1 suppository into the rectum 2 (Two) Times a Day. 20 suppository 0    ipratropium-albuterol (COMBIVENT RESPIMAT)  MCG/ACT inhaler Inhale 1 puff 4 (Four) Times a Day As Needed for Wheezing.      lidocaine-prilocaine (EMLA) 2.5-2.5 % cream Apply 1 Application topically to the appropriate area as directed See Admin Instructions. Apply to port site one hour prior to port being accessed. 30 g 3    losartan (COZAAR) 100 MG tablet Take 1 tablet by mouth Daily for 90 days. 90 tablet 0    magnesium oxide (MAG-OX) 400 MG tablet Take 1 tablet by mouth Daily.      metoprolol succinate XL (TOPROL-XL) 25 MG 24 hr tablet " Take 2 tablets by mouth Daily. (Patient taking differently: Take 1 tablet by mouth Daily.) 180 tablet 0    naloxone (NARCAN) 4 MG/0.1ML nasal spray Call 911. Don't prime. [place] in 1 nostril for overdose. Repeat in 2-3 minutes in other nostril if no or minimal breathing/responsiveness. 2 each 0    ondansetron (ZOFRAN) 8 MG tablet Take 1 tablet by mouth Every 8 (Eight) Hours As Needed for Nausea or Vomiting. 30 tablet 2    oxyCODONE-acetaminophen (PERCOCET) 5-325 MG per tablet Take 1 tablet by mouth Every 12 (Twelve) Hours As Needed for Severe Pain. 30 tablet 0    pantoprazole (PROTONIX) 40 MG EC tablet Take 1 tablet by mouth Daily. 90 tablet 0    Plecanatide 3 MG tablet Take 1 tablet by mouth Daily. 30 tablet 1    sucralfate (CARAFATE) 1 g tablet Take 1 tablet by mouth 3 (Three) Times a Day As Needed (heartburn). 270 tablet 0    Symbicort 160-4.5 MCG/ACT inhaler Inhale 2 puffs 2 (Two) Times a Day.      vitamin D3 125 MCG (5000 UT) capsule capsule Take 1 capsule by mouth Daily.       No facility-administered medications prior to visit.     Opioid medication/s are on active medication list.  and I have evaluated his active treatment plan and pain score trends (see table).  There were no vitals filed for this visit.  I have reviewed the chart for potential of high risk medication and harmful drug interactions in the elderly.        Aspirin is not on active medication list.  {ASPIRIN NOT ON MEDICATION LIST INDICATED/NOT INDICATED:15178}.    Patient Active Problem List   Diagnosis    Renal mass    Asthma    Gastroesophageal reflux disease    Hyperlipidemia    Hypertension    BPH (benign prostatic hyperplasia)    COPD (chronic obstructive pulmonary disease)    KARON (acute kidney injury)    Acute respiratory failure with hypoxia    Renal cell carcinoma    Port-A-Cath in place    Lightheadedness     Advance Care Planning {Advance Care Planning Hyperlink:23}Advance Directive is not on file.  {ACP Discussion, Advance Directive  "not in EMR:73761}            Objective   There were no vitals filed for this visit.    Estimated body mass index is 43.7 kg/m² as calculated from the following:    Height as of 24: 155.4 cm (61.2\").    Weight as of 24: 106 kg (232 lb 12.8 oz).            {Jump to Steadi Fall Risk Flowsheet:23}  Gait and Balance Evaluation:  Slow Tentative Pace, Needs Assistance, and Walker  Does the patient have evidence of cognitive impairment? No                                                                                                Health  Risk Assessment    Smoking Status:  Social History     Tobacco Use   Smoking Status Never    Passive exposure: Past   Smokeless Tobacco Never   Tobacco Comments    SECOND HAND SMOKE EXPOSURE AS A CHILD      Alcohol Consumption:  Social History     Substance and Sexual Activity   Alcohol Use Yes    Alcohol/week: 1.0 standard drink of alcohol    Types: 1 Glasses of wine per week    Comment: rare       Fall Risk Screen{Jump to Steadi Fall Risk Flowsheet:23}  STEADI Fall Risk Assessment was completed, and patient is at LOW risk for falls.Assessment completed on:2024    Depression Screenin/9/2024     3:17 PM   PHQ-2/PHQ-9 Depression Screening   Little Interest or Pleasure in Doing Things 0-->not at all   Feeling Down, Depressed or Hopeless 0-->not at all   PHQ-9: Brief Depression Severity Measure Score 0     Health Habits and Functional and Cognitive Screenin/2/2024    10:24 AM   Functional & Cognitive Status   Do you have difficulty preparing food and eating? No   Do you have difficulty bathing yourself, getting dressed or grooming yourself? No   Do you have difficulty using the toilet? No   Do you have difficulty moving around from place to place? Yes   Do you have trouble with steps or getting out of a bed or a chair? Yes   Current Diet Limited Junk Food   Dental Exam Up to date   Eye Exam Up to date   Exercise (times per week) 2 times per week   Current " Exercises Include No Regular Exercise;Stationary Bicycling/Spin Class   Do you need help using the phone?  No   Are you deaf or do you have serious difficulty hearing?  Yes   Do you need help to go to places out of walking distance? No   Do you need help shopping? No   Do you need help preparing meals?  No   Do you need help with housework?  Yes   Do you need help with laundry? No   Do you need help taking your medications? No   Do you need help managing money? No   Do you ever drive or ride in a car without wearing a seat belt? No   Have you felt unusual stress, anger or loneliness in the last month? No   Who do you live with? Spouse   If you need help, do you have trouble finding someone available to you? No   Have you been bothered in the last four weeks by sexual problems? No   Do you have difficulty concentrating, remembering or making decisions? No           Visual Acuity:  No results found.  Age-appropriate Screening Schedule:  Refer to the list below for future screening recommendations based on patient's age, sex and/or medical conditions. Orders for these recommended tests are listed in the plan section. The patient has been provided with a written plan.    Health Maintenance List  Health Maintenance   Topic Date Due    COLORECTAL CANCER SCREENING  Never done    Pneumococcal Vaccine 65+ (1 of 2 - PCV) Never done    ZOSTER VACCINE (1 of 2) Never done    COVID-19 Vaccine (1 - 2023-24 season) Never done    ANNUAL WELLNESS VISIT  Never done    INFLUENZA VACCINE  08/01/2024    LIPID PANEL  12/19/2024    BMI FOLLOWUP  04/09/2025    TDAP/TD VACCINES (2 - Td or Tdap) 03/08/2032    HEPATITIS C SCREENING  Completed                                                                                                                                                CMS Preventative Services Quick Reference  Risk Factors Identified During Encounter  {Medicare Wellness Risk Factors:70903}    The above risks/problems have been  discussed with the patient.  Pertinent information has been shared with the patient in the After Visit Summary.  An After Visit Summary and PPPS were made available to the patient.    Follow Up:{Wrapup  Review (Popup)  Advance Care Planning  Labs  CC  Problem List  Visit Diagnosis  Medications  Result Review  Imaging  [facility]  BestPractice  SmartSets  SnapShot  Encounters  Notes  Media  Procedures :23}   Next Medicare Wellness visit to be scheduled in 1 year.         Additional E&M Note during same encounter follows:  Patient has additional, significant, and separately identifiable condition(s)/problem(s) that require work above and beyond the Medicare Wellness Visit     Chief Complaint  No chief complaint on file.    Subjective {Problem List  Visit Diagnosis   Encounters  Notes  Medications  Labs  Result Review Imaging  Media :23}{Help Text;  If performing a Preventative Medicine Visit (e.g. 86469) in addition to AWV, choose the 1st SmartList option below and document any ROS/PE performed.  If performing a separately identifiable E/M service (e.g. 91261), choose 2nd SmartLink option below. This information in red will not appear in the final note after note is signed:15009}  HPI  {AWV/Preventative Exam/EM Progress Note. Use this note if billing for additional Wellness Visit or EM exam in addition to AWV visit (Optional):[id number]}                Objective   Vital Signs:  There were no vitals taken for this visit.  Physical Exam    {The following data was reviewed by (Optional):04115}        Assessment and Plan {CC Problem List  Visit Diagnosis   ROS  Review (Popup)  [facility]  BestPractice  Medications  SmartSets  SnapShot Encounters  Media :23}{Help Text; When performing an adult Preventative Medicine Visit (e.g. 08918) using the optional SmartList below can help when documenting any age-appropriate advice given.  When using the SmartList  review and update the information based on the unique discussion or advice given to the patient.  As a reminder, diagnosis code Z00.00 (nl exam) or Z00.01 (abnl exam), should be added when this service is performed.  Text will disappear once the note is signed:61407}{Preventative Physical Additional Health Advice List (Optional):[id number]}                No orders of the defined types were placed in this encounter.          {Time Spent (Optional):52493}  Follow Up {Instructions Charge Capture  Follow-up Communications :23}  No follow-ups on file.  Patient was given instructions and counseling regarding his condition or for health maintenance advice. Please see specific information pulled into the AVS if appropriate.

## 2024-08-12 VITALS
DIASTOLIC BLOOD PRESSURE: 80 MMHG | SYSTOLIC BLOOD PRESSURE: 128 MMHG | HEART RATE: 65 BPM | OXYGEN SATURATION: 96 % | WEIGHT: 230.6 LBS | HEIGHT: 70 IN | TEMPERATURE: 96.7 F | BODY MASS INDEX: 33.01 KG/M2

## 2024-08-15 DIAGNOSIS — C64.2 RENAL CELL CARCINOMA OF LEFT KIDNEY: Primary | ICD-10-CM

## 2024-08-15 NOTE — PROGRESS NOTES
HEMATOLOGY ONCOLOGY OUTPATIENT FOLLOW UP       Patient name: Louis Andino  : 1960  MRN: [id number]  Primary Care Physician: Harry Hsu MD  Referring Physician: Harry Hsu MD  Reason For Consult: Renal cell carcinoma      History of Present Illness:  Patient is a 63 y.o. male with RCC.    Patient initially presented with hematuria.  During hospitalization he had a CT of his abdomen which showed a large left lower pole renal mass with possible adrenal metastasis.    2023 CT abdomen pelvis with contrast showing mass arising from the lower pole of the left kidney consistent with renal cell carcinoma.  Associated uroepithelial thickening of the pelvis and proximal ureter.  Enhancing indeterminate left adrenal nodule potential metastasis no retroperitoneal lymphadenopathy.    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  Vessel with clear-cell renal cell carcinoma tumor thrombus    24 - pembrolizumab  3/1/24 - pembro  24 - increased pembro 400 mg  24 - pembro 400 mg  24 - pembro 400 mg  24 - was admitted with syncopal episode, ECHO was normal, CTA neck negative, has a Zio patch, will be seen by cardiology.  Morning metoprolol was stopped.    24: CT Chest Abd Pelvis WO Contrast:    IMPRESSION:     Status post left nephrectomy. No metastatic disease identified by  unenhanced imaging. Continued follow-up advised as indicated.    Subjective:  Patient seen today for initial evaluation by me. He was preiovulsy being seen by Dr. Prakash in our practice.   He is on adjuvant Immunotherapy with pembrolizumab for RCC stage III. He has  had fairly good tolerance to IO therapy except for some adverse effects, Reported having persistent diffuse pruritus and Dermographism which has not improved significantly with PRN Antihistamines. He however denied any significant impairment of his quality of life with these symptoms. Reported some dizziness/ preSyncopal Episodes following immunotherapy. He was admitted to [facility] for these symptoms, extensive workup for neurocardiogenic and endocrine etiology was reported unremarkable at this time. He is status post Restaging scans as above.  He is scheduled to receive IV Fluids on 8/19/24 after treatment today in view of his Symptoms.        Past Medical History:   Diagnosis Date    Anemia 12/18/2023    Due to surgery. Required transfusions    Ankle sprain     Multiple-both ankles over the years    Asthma     Clotting disorder     Required blood transfusion during and after surgery in excess of expected    Coronary artery disease Dec 2023    Bifascicular block    Deep vein thrombosis 12/18/2023    During cancer surgery a clot was surgically removed from the inferior vena cava which was cancer related    Emphysema/COPD     Erectile dysfunction Several years    Fracture of ankle     Left ankle as a child    Fracture of wrist     Left wrist as a child    GERD (gastroesophageal reflux disease)     HL (hearing loss)     Progressive worsening over many years    Hyperglycemia 10/12/2023    Hyperlipidemia 10/12/2023    Hypertension     Injury of neck 2023    Previous fractures noted during chiropractic visit. Probably secondary to shoulder injury.    Low back pain     Worse last few year    Obesity     Pneumonia 12/20/2023    Developed while hospitalized for cancer surgery    Prostate disease     Rash     Allergic reactions to laundry detergent and mild generalized itching secondary to immunotherapy.    Renal cell carcinoma 12/30/2023    Renal insufficiency 4/9/2024    Decreased renal function since nephrectomy in December,  2023, secondary to renal cancer, but not diagnosed as chronic kidney disease until recent visit. Kidney functions have been stable since surgery, but are now being considered permanent.    Shoulder injury 1980s    Injured in     Visual impairment     Wear glasses for near and distant vision    Vitreous degeneration of right eye     Formatting of this note might be different from the original. Retinal tear and detachment warning symptoms reviewed and patient instructed to call immediately if increasing floaters, flashes, or decreasing peripheral vision. No retinal detachment or retinal tear noted. Recheck in 1 month with dilated exam.       Past Surgical History:   Procedure Laterality Date    ABDOMINAL SURGERY  December 18, 2023    Left Kidney and Adrenal Gland removed due to Renal Cell Carcinoma    NEPHRECTOMY Left 12/18/2023    Procedure: NEPHRECTOMY RADICAL WITH CAVAL THROMBECTOMY;  Surgeon: James Esquivel MD;  Location: [facility];  Service: Urology;  Laterality: Left;    SKIN LESION EXCISION  1990         Current Outpatient Medications:     budesonide (PULMICORT) 0.5 MG/2ML nebulizer solution, INHALE 2 MILLILITERS VIA NEBULIZATION BY MOUTH DAILY, Disp: 60 mL, Rfl: 0    Diclofenac Sodium (VOLTAREN) 1 % gel gel, Apply 4 g topically to the appropriate area as directed 4 (Four) Times a Day., Disp: 50 g, Rfl: 0    hydrocortisone (ANUSOL-HC) 25 MG suppository, Insert 1 suppository into the rectum 2 (Two) Times a Day., Disp: 20 suppository, Rfl: 0    ipratropium-albuterol (COMBIVENT RESPIMAT)  MCG/ACT inhaler, Inhale 1 puff 4 (Four) Times a Day As Needed for Wheezing., Disp: , Rfl:     lidocaine-prilocaine (EMLA) 2.5-2.5 % cream, Apply 1 Application topically to the appropriate area as directed See Admin Instructions. Apply to port site one hour prior to port being accessed., Disp: 30 g, Rfl: 3    losartan (COZAAR) 100 MG tablet, Take 1 tablet by mouth Daily for 90 days., Disp: 90 tablet, Rfl: 0     magnesium oxide (MAG-OX) 400 MG tablet, Take 1 tablet by mouth Daily., Disp: , Rfl:     metoprolol succinate XL (TOPROL-XL) 25 MG 24 hr tablet, Take 2 tablets by mouth Daily. (Patient taking differently: Take 1 tablet by mouth Daily.), Disp: 180 tablet, Rfl: 0    multivitamin with minerals tablet tablet, Take 1 tablet by mouth Daily., Disp: , Rfl:     naloxone (NARCAN) 4 MG/0.1ML nasal spray, Call 911. Don't prime. [place] in 1 nostril for overdose. Repeat in 2-3 minutes in other nostril if no or minimal breathing/responsiveness., Disp: 2 each, Rfl: 0    ondansetron (ZOFRAN) 8 MG tablet, Take 1 tablet by mouth Every 8 (Eight) Hours As Needed for Nausea or Vomiting., Disp: 30 tablet, Rfl: 2    pantoprazole (PROTONIX) 40 MG EC tablet, Take 1 tablet by mouth Daily., Disp: 90 tablet, Rfl: 0    Plecanatide 3 MG tablet, Take 1 tablet by mouth Daily., Disp: 30 tablet, Rfl: 1    sucralfate (CARAFATE) 1 g tablet, Take 1 tablet by mouth 3 (Three) Times a Day As Needed (heartburn)., Disp: 270 tablet, Rfl: 0    Symbicort 160-4.5 MCG/ACT inhaler, Inhale 2 puffs 2 (Two) Times a Day., Disp: , Rfl:     vitamin D3 125 MCG (5000 UT) capsule capsule, Take 1 capsule by mouth Daily., Disp: , Rfl:     No Known Allergies    Family History   Problem Relation Age of Onset    Arthritis Mother     Cancer Mother     Diabetes Mother     Heart disease Mother     Hyperlipidemia Mother     Miscarriages / Stillbirths Mother         Stillborn child    Hypertension Mother     Osteoporosis Mother     COPD Father     Hyperlipidemia Father     Hyperlipidemia Brother     Vision loss Daughter     Broken bones Daughter         Broke left forearm three times during childhood    Broken bones Daughter         Fractured right femur and right forearm during childhood    Anxiety disorder Daughter     Depression Daughter     Miscarriages / Stillbirths Daughter         First Trimester miscarriage    Dislocations Daughter         Recurrent radial head subluxations as  "a child    Anxiety disorder Daughter     Depression Daughter     Asthma Son     Depression Son        Cancer-related family history includes Cancer in his mother.      Social History     Tobacco Use    Smoking status: Never     Passive exposure: Past    Smokeless tobacco: Never    Tobacco comments:     SECOND HAND SMOKE EXPOSURE AS A CHILD    Vaping Use    Vaping status: Never Used   Substance Use Topics    Alcohol use: Yes     Alcohol/week: 1.0 standard drink of alcohol     Types: 1 Glasses of wine per week     Comment: rare    Drug use: Never     Social History     Social History Narrative    Not on file       ROS:   Review of Systems   Constitutional:  Negative for fatigue and fever.   HENT:  Negative for congestion and nosebleeds.    Eyes:  Negative for pain and itching.   Respiratory:  Negative for cough and shortness of breath.    Cardiovascular:  Negative for chest pain.   Gastrointestinal:  Negative for abdominal pain, blood in stool, diarrhea, nausea and vomiting.   Endocrine: Negative for cold intolerance and heat intolerance.   Genitourinary:  Negative for difficulty urinating.   Musculoskeletal:  Negative for arthralgias.   Skin:  Negative for rash.   Neurological:  Negative for dizziness and headaches.   Hematological:  Does not bruise/bleed easily.   Psychiatric/Behavioral:  Negative for behavioral problems.    pruritis    Objective:    Vital Signs:  Vitals:    08/16/24 1106   BP: 168/89   Pulse: 69   Resp: 18   Temp: 98.2 °F (36.8 °C)   TempSrc: Oral   SpO2: 97%   Weight: 105 kg (231 lb 3.2 oz)   Height: 177.8 cm (70\")   PainSc:   4   PainLoc: Hip  Comment: Hip,shoulder,back arthritis       Body mass index is 33.17 kg/m².    ECOG  (0) Fully active, able to carry on all predisease performance without restriction    Physical Exam:   Physical Exam  Constitutional:       Appearance: Normal appearance.   HENT:      Head: Normocephalic and atraumatic.   Eyes:      Pupils: Pupils are equal, round, and " reactive to light.   Cardiovascular:      Rate and Rhythm: Normal rate and regular rhythm.      Pulses: Normal pulses.      Heart sounds: No murmur heard.  Pulmonary:      Effort: Pulmonary effort is normal.      Breath sounds: Normal breath sounds.   Abdominal:      General: There is no distension.      Palpations: Abdomen is soft. There is no mass.      Tenderness: There is no abdominal tenderness.   Musculoskeletal:         General: Normal range of motion.      Cervical back: Normal range of motion and neck supple.   Skin:     General: Skin is warm.   Neurological:      General: No focal deficit present.      Mental Status: He is alert.   Psychiatric:         Mood and Affect: Mood normal.         Lab Results - Last 18 Months   Lab Units 08/16/24  1101 07/13/24  0507 07/12/24  1351   WBC 10*3/mm3 9.94 14.13* 14.67*   HEMOGLOBIN g/dL 13.6 12.3* 13.4   HEMATOCRIT % 42.3 36.8* 40.6   PLATELETS 10*3/mm3 224 230 289   MCV fL 88.9 85.0 83.5     Lab Results - Last 18 Months   Lab Units 08/16/24  1101 07/13/24  0507 07/12/24  1357 07/12/24  1351   SODIUM mmol/L 140 141  --  142   POTASSIUM mmol/L 4.2 4.3  --  4.0   CHLORIDE mmol/L 104 105  --  106   CO2 mmol/L 22.4 24.9  --  24.0   BUN mg/dL 25* 24*  --  31*   CREATININE mg/dL 1.40* 1.48* 1.80* 1.52*   CALCIUM mg/dL 10.1 9.6  --  10.4   BILIRUBIN mg/dL 0.8 0.8  --  0.8   ALK PHOS U/L 68 63  --  74   ALT (SGPT) U/L 18 15  --  18   AST (SGOT) U/L 21 13  --  17   GLUCOSE mg/dL 133* 89  --  101*       Lab Results   Component Value Date    GLUCOSE 89 07/13/2024    BUN 24 (H) 07/13/2024    CREATININE 1.48 (H) 07/13/2024    BCR 16.2 07/13/2024    K 4.3 07/13/2024    CO2 24.9 07/13/2024    CALCIUM 9.6 07/13/2024    PROTENTOTREF 7.0 01/09/2024    ALBUMIN 3.9 07/13/2024    LABIL2 1.2 01/09/2024    AST 13 07/13/2024    ALT 15 07/13/2024       Lab Results - Last 18 Months   Lab Units 07/12/24  1351 01/30/24  0827 12/18/23  1722   INR  1.03 1.01 1.08   APTT seconds 27.5 25.3 28.7  "      Lab Results   Component Value Date    IRON 12 (L) 12/20/2023    TIBC 145 (L) 12/20/2023       No results found for: \"FOLATE\"    No results found for: \"OCCULTBLD\"    No results found for: \"RETICCTPCT\"  No results found for: \"WOJWVAPS47\"  No results found for: \"SPEP\", \"UPEP\"  No results found for: \"LDH\", \"URICACID\"  No results found for: \"OSKAR\", \"RF\", \"SEDRATE\"  Lab Results   Component Value Date    FIBRINOGEN 381 12/18/2023     Lab Results   Component Value Date    PTT 27.5 07/12/2024    INR 1.03 07/12/2024     No results found for: \"\"  No results found for: \"CEA\"  No components found for: \"CA-19-9\"  No results found for: \"PSA\"  No results found for: \"SEDRATE\"       Assessment & Plan     Patient is a 63-year-old male with stage IIIb T3b RCC status post radical nephrectomy    Renal cell carcinoma  Status post nephrectomy, CT chest with no evidence of metastasis questionable adrenal metastasis on scan status post adrenalectomy with no evidence of metastatic disease  Given patient's stage III clear-cell RCC, discussed adjuvant treatment with pembrolizumab based on the findings from keynote 564 trial with significant improvement in disease-free survival as compared to placebo for stage III clear-cell RCC.  Started Immunotherapy, G1 - pruritus otherwise good tolerance. Currently on  400 mg every 6 weeks.   Has ongoing pruritus no rash, takes benadryl.   -restaging scans as above, reported ANA. Continue surveillance every 6 months.    Pruritus  G1, continue immunotherapy  Use moisturizer stable  No significant worsening, continue same.  He will try atarax instead of benadryl  Patient reported limited improvement. Will consider use of Leukotriene inhibitors on follow up if no improvement.      Syncope  Ziopatch on  F/u with cardiology  Has increased symptoms after immunotherapy  Patient is s/p hospital admission and evaluation as above.  Will add saline 2-3 days after each infusion.      Follow up in 6 weeks with " scheduled treatment. Sooner as needed.    Thank you very much for providing the opportunity to participate in this patient’s care. Please do not hesitate to call if there are any other questions.

## 2024-08-16 ENCOUNTER — HOSPITAL ENCOUNTER (OUTPATIENT)
Dept: ONCOLOGY | Facility: HOSPITAL | Age: 64
Discharge: HOME | End: 2024-08-16
Payer: COMMERCIAL

## 2024-08-16 ENCOUNTER — LAB (OUTPATIENT)
Dept: LAB | Facility: HOSPITAL | Age: 64
End: 2024-08-16
Payer: COMMERCIAL

## 2024-08-16 ENCOUNTER — OFFICE VISIT (OUTPATIENT)
Dept: ONCOLOGY | Facility: CLINIC | Age: 64
End: 2024-08-16
Payer: COMMERCIAL

## 2024-08-16 VITALS
SYSTOLIC BLOOD PRESSURE: 168 MMHG | BODY MASS INDEX: 33.1 KG/M2 | OXYGEN SATURATION: 97 % | WEIGHT: 231.2 LBS | HEART RATE: 69 BPM | TEMPERATURE: 98.2 F | RESPIRATION RATE: 18 BRPM | DIASTOLIC BLOOD PRESSURE: 89 MMHG | HEIGHT: 70 IN

## 2024-08-16 DIAGNOSIS — L30.8 PRURITIC DERMATITIS: ICD-10-CM

## 2024-08-16 DIAGNOSIS — C64.2 RENAL CELL CARCINOMA OF LEFT KIDNEY: Primary | ICD-10-CM

## 2024-08-16 DIAGNOSIS — Z95.828 PORT-A-CATH IN PLACE: ICD-10-CM

## 2024-08-16 DIAGNOSIS — C64.2 RENAL CELL CARCINOMA OF LEFT KIDNEY: ICD-10-CM

## 2024-08-16 LAB
ALBUMIN SERPL-MCNC: 4.6 G/DL (ref 3.5–5.2)
ALBUMIN/GLOB SERPL: 1.4 G/DL
ALP SERPL-CCNC: 68 U/L (ref 39–117)
ALT SERPL W P-5'-P-CCNC: 18 U/L (ref 1–41)
ANION GAP SERPL CALCULATED.3IONS-SCNC: 13.6 MMOL/L (ref 5–15)
AST SERPL-CCNC: 21 U/L (ref 1–40)
BASOPHILS # BLD AUTO: 0.04 10*3/MM3 (ref 0–0.2)
BASOPHILS NFR BLD AUTO: 0.4 % (ref 0–1.5)
BILIRUB SERPL-MCNC: 0.8 MG/DL (ref 0–1.2)
BUN SERPL-MCNC: 25 MG/DL (ref 8–23)
BUN/CREAT SERPL: 17.9 (ref 7–25)
CALCIUM SPEC-SCNC: 10.1 MG/DL (ref 8.6–10.5)
CHLORIDE SERPL-SCNC: 104 MMOL/L (ref 98–107)
CO2 SERPL-SCNC: 22.4 MMOL/L (ref 22–29)
CREAT SERPL-MCNC: 1.4 MG/DL (ref 0.76–1.27)
DEPRECATED RDW RBC AUTO: 43.7 FL (ref 37–54)
EGFRCR SERPLBLD CKD-EPI 2021: 55.8 ML/MIN/1.73
EOSINOPHIL # BLD AUTO: 0.34 10*3/MM3 (ref 0–0.4)
EOSINOPHIL NFR BLD AUTO: 3.4 % (ref 0.3–6.2)
ERYTHROCYTE [DISTWIDTH] IN BLOOD BY AUTOMATED COUNT: 13.8 % (ref 12.3–15.4)
GLOBULIN UR ELPH-MCNC: 3.3 GM/DL
GLUCOSE BLDC GLUCOMTR-MCNC: 106 MG/DL (ref 70–105)
GLUCOSE SERPL-MCNC: 133 MG/DL (ref 65–99)
HCT VFR BLD AUTO: 42.3 % (ref 37.5–51)
HGB BLD-MCNC: 13.6 G/DL (ref 13–17.7)
LYMPHOCYTES # BLD AUTO: 2.75 10*3/MM3 (ref 0.7–3.1)
LYMPHOCYTES NFR BLD AUTO: 27.7 % (ref 19.6–45.3)
MCH RBC QN AUTO: 28.6 PG (ref 26.6–33)
MCHC RBC AUTO-ENTMCNC: 32.2 G/DL (ref 31.5–35.7)
MCV RBC AUTO: 88.9 FL (ref 79–97)
MONOCYTES # BLD AUTO: 0.76 10*3/MM3 (ref 0.1–0.9)
MONOCYTES NFR BLD AUTO: 7.6 % (ref 5–12)
NEUTROPHILS NFR BLD AUTO: 6.05 10*3/MM3 (ref 1.7–7)
NEUTROPHILS NFR BLD AUTO: 60.9 % (ref 42.7–76)
PLATELET # BLD AUTO: 224 10*3/MM3 (ref 140–450)
PMV BLD AUTO: 11 FL (ref 6–12)
POTASSIUM SERPL-SCNC: 4.2 MMOL/L (ref 3.5–5.2)
PROT SERPL-MCNC: 7.9 G/DL (ref 6–8.5)
RBC # BLD AUTO: 4.76 10*6/MM3 (ref 4.14–5.8)
SODIUM SERPL-SCNC: 140 MMOL/L (ref 136–145)
T4 FREE SERPL-MCNC: 1.38 NG/DL (ref 0.93–1.7)
TSH SERPL DL<=0.05 MIU/L-ACNC: 1.54 UIU/ML (ref 0.27–4.2)
WBC NRBC COR # BLD AUTO: 9.94 10*3/MM3 (ref 3.4–10.8)

## 2024-08-16 PROCEDURE — 25010000002 HEPARIN LOCK FLUSH PER 10 UNITS: Performed by: INTERNAL MEDICINE

## 2024-08-16 PROCEDURE — 36415 COLL VENOUS BLD VENIPUNCTURE: CPT

## 2024-08-16 PROCEDURE — 80050 GENERAL HEALTH PANEL: CPT

## 2024-08-16 PROCEDURE — 96413 CHEMO IV INFUSION 1 HR: CPT

## 2024-08-16 PROCEDURE — 25810000003 SODIUM CHLORIDE 0.9 % SOLUTION: Performed by: STUDENT IN AN ORGANIZED HEALTH CARE EDUCATION/TRAINING PROGRAM

## 2024-08-16 PROCEDURE — 82948 REAGENT STRIP/BLOOD GLUCOSE: CPT | Performed by: STUDENT IN AN ORGANIZED HEALTH CARE EDUCATION/TRAINING PROGRAM

## 2024-08-16 PROCEDURE — 84439 ASSAY OF FREE THYROXINE: CPT | Performed by: INTERNAL MEDICINE

## 2024-08-16 PROCEDURE — 25010000002 PEMBROLIZUMAB 100 MG/4ML SOLUTION 4 ML VIAL: Performed by: STUDENT IN AN ORGANIZED HEALTH CARE EDUCATION/TRAINING PROGRAM

## 2024-08-16 RX ORDER — SODIUM CHLORIDE 0.9 % (FLUSH) 0.9 %
10 SYRINGE (ML) INJECTION AS NEEDED
Status: CANCELLED | OUTPATIENT
Start: 2024-08-16

## 2024-08-16 RX ORDER — SODIUM CHLORIDE 9 MG/ML
20 INJECTION, SOLUTION INTRAVENOUS ONCE
Status: CANCELLED | OUTPATIENT
Start: 2024-08-16

## 2024-08-16 RX ORDER — HEPARIN SODIUM (PORCINE) LOCK FLUSH IV SOLN 100 UNIT/ML 100 UNIT/ML
500 SOLUTION INTRAVENOUS AS NEEDED
Status: DISCONTINUED | OUTPATIENT
Start: 2024-08-16 | End: 2024-08-17 | Stop reason: HOSPADM

## 2024-08-16 RX ORDER — SODIUM CHLORIDE 9 MG/ML
20 INJECTION, SOLUTION INTRAVENOUS ONCE
OUTPATIENT
Start: 2024-09-27

## 2024-08-16 RX ORDER — SODIUM CHLORIDE 9 MG/ML
20 INJECTION, SOLUTION INTRAVENOUS ONCE
Status: COMPLETED | OUTPATIENT
Start: 2024-08-16 | End: 2024-08-16

## 2024-08-16 RX ORDER — HEPARIN SODIUM (PORCINE) LOCK FLUSH IV SOLN 100 UNIT/ML 100 UNIT/ML
500 SOLUTION INTRAVENOUS AS NEEDED
Status: CANCELLED | OUTPATIENT
Start: 2024-08-16

## 2024-08-16 RX ORDER — SODIUM CHLORIDE 0.9 % (FLUSH) 0.9 %
10 SYRINGE (ML) INJECTION AS NEEDED
Status: DISCONTINUED | OUTPATIENT
Start: 2024-08-16 | End: 2024-08-17 | Stop reason: HOSPADM

## 2024-08-16 RX ADMIN — SODIUM CHLORIDE 20 ML/HR: 9 INJECTION, SOLUTION INTRAVENOUS at 12:18

## 2024-08-16 RX ADMIN — HEPARIN 500 UNITS: 100 SYRINGE at 12:56

## 2024-08-16 RX ADMIN — SODIUM CHLORIDE 400 MG: 9 INJECTION, SOLUTION INTRAVENOUS at 12:22

## 2024-08-16 RX ADMIN — Medication 10 ML: at 12:56

## 2024-08-16 NOTE — PROGRESS NOTES
Pt to infusion clinic after MD f/u appt.  Port not accessed and labs drawn per venipuncture.  Port accessed using sterile technique with positive blood return noted.  Pt tolerated well.  Tx given per MAR.  Pt tolerated well.  Pt d/c home with AVS given.

## 2024-08-18 DIAGNOSIS — I10 HYPERTENSION, UNSPECIFIED TYPE: Primary | ICD-10-CM

## 2024-08-18 RX ORDER — AMLODIPINE BESYLATE 5 MG/1
5 TABLET ORAL DAILY
Qty: 90 TABLET | Refills: 1 | Status: SHIPPED | OUTPATIENT
Start: 2024-08-18

## 2024-08-19 ENCOUNTER — HOSPITAL ENCOUNTER (OUTPATIENT)
Dept: ONCOLOGY | Facility: HOSPITAL | Age: 64
Discharge: HOME | End: 2024-08-19
Admitting: INTERNAL MEDICINE
Payer: COMMERCIAL

## 2024-08-19 VITALS
OXYGEN SATURATION: 98 % | TEMPERATURE: 97.5 F | HEART RATE: 68 BPM | WEIGHT: 223 LBS | DIASTOLIC BLOOD PRESSURE: 99 MMHG | SYSTOLIC BLOOD PRESSURE: 162 MMHG | RESPIRATION RATE: 18 BRPM | BODY MASS INDEX: 32 KG/M2

## 2024-08-19 DIAGNOSIS — Z95.828 PORT-A-CATH IN PLACE: Primary | ICD-10-CM

## 2024-08-19 DIAGNOSIS — N17.9 AKI (ACUTE KIDNEY INJURY): ICD-10-CM

## 2024-08-19 DIAGNOSIS — C64.2 RENAL CELL CARCINOMA OF LEFT KIDNEY: ICD-10-CM

## 2024-08-19 PROCEDURE — 25010000002 HEPARIN LOCK FLUSH PER 10 UNITS: Performed by: INTERNAL MEDICINE

## 2024-08-19 PROCEDURE — 96360 HYDRATION IV INFUSION INIT: CPT

## 2024-08-19 PROCEDURE — 25810000003 SODIUM CHLORIDE 0.9 % SOLUTION: Performed by: INTERNAL MEDICINE

## 2024-08-19 RX ORDER — HEPARIN SODIUM (PORCINE) LOCK FLUSH IV SOLN 100 UNIT/ML 100 UNIT/ML
500 SOLUTION INTRAVENOUS AS NEEDED
OUTPATIENT
Start: 2024-08-19

## 2024-08-19 RX ORDER — HEPARIN SODIUM (PORCINE) LOCK FLUSH IV SOLN 100 UNIT/ML 100 UNIT/ML
500 SOLUTION INTRAVENOUS AS NEEDED
Status: DISCONTINUED | OUTPATIENT
Start: 2024-08-19 | End: 2024-08-20 | Stop reason: HOSPADM

## 2024-08-19 RX ORDER — SODIUM CHLORIDE 0.9 % (FLUSH) 0.9 %
10 SYRINGE (ML) INJECTION AS NEEDED
OUTPATIENT
Start: 2024-08-19

## 2024-08-19 RX ORDER — SODIUM CHLORIDE 0.9 % (FLUSH) 0.9 %
10 SYRINGE (ML) INJECTION AS NEEDED
Status: DISCONTINUED | OUTPATIENT
Start: 2024-08-19 | End: 2024-08-20 | Stop reason: HOSPADM

## 2024-08-19 RX ADMIN — HEPARIN 500 UNITS: 100 SYRINGE at 16:48

## 2024-08-19 RX ADMIN — Medication 10 ML: at 16:48

## 2024-08-19 RX ADMIN — SODIUM CHLORIDE 1000 ML: 9 INJECTION, SOLUTION INTRAVENOUS at 15:28

## 2024-08-25 DIAGNOSIS — I10 HYPERTENSION, UNSPECIFIED TYPE: Chronic | ICD-10-CM

## 2024-08-26 RX ORDER — LOSARTAN POTASSIUM 100 MG/1
100 TABLET ORAL
Qty: 90 TABLET | Refills: 0 | Status: SHIPPED | OUTPATIENT
Start: 2024-08-26 | End: 2024-11-24

## 2024-08-27 ENCOUNTER — OFFICE VISIT (OUTPATIENT)
Dept: CARDIOLOGY | Facility: CLINIC | Age: 64
End: 2024-08-27
Payer: COMMERCIAL

## 2024-08-27 VITALS
SYSTOLIC BLOOD PRESSURE: 132 MMHG | DIASTOLIC BLOOD PRESSURE: 84 MMHG | WEIGHT: 234.6 LBS | HEIGHT: 70 IN | BODY MASS INDEX: 33.58 KG/M2 | HEART RATE: 73 BPM

## 2024-08-27 DIAGNOSIS — C64.2 RENAL CELL CARCINOMA OF LEFT KIDNEY: ICD-10-CM

## 2024-08-27 DIAGNOSIS — I49.1 APC (ATRIAL PREMATURE CONTRACTIONS): ICD-10-CM

## 2024-08-27 DIAGNOSIS — I45.2 RIGHT BUNDLE BRANCH BLOCK (RBBB) WITH LEFT ANTERIOR FASCICULAR BLOCK (LAFB): Primary | ICD-10-CM

## 2024-08-27 DIAGNOSIS — I10 HYPERTENSION, UNSPECIFIED TYPE: Chronic | ICD-10-CM

## 2024-08-27 NOTE — PROGRESS NOTES
Date of Office Visit: 2024  Encounter Provider: DUANE Cooper  Place of Service: [facility] CARD[facility]  Established cardiologist: Bradley Ahuja MD  Patient Name: Louis Andino  :1960      Patient ID:  Louis Andino is a 63 y.o. 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 Andino is new to me and I have reviewed his medical records.  He established with Dr. Ahuja December 15, 2023 for a preoperative risk assessment to undergo open resection of a large kidney/adrenal mass.  During preoperative testing he had an EKG that showed a right bundle branch block and left anterior fascicular block and this was new when compared to prior study in 2016.     2023 he underwent left open radical nephrectomy and IVC thrombectomy.  The procedure was complicated by the IVC tumor thrombus and there was large volume blood loss during surgery and he received 5 units of packed red blood cells and 2 units of fresh frozen plasma.  Pathology showed multifocal clear-cell renal cell carcinoma.  He was started on immunotherapy. He follows with Dr. Prakash's office.     Ongoing arthritic hip pain and cannot undergo any intervention for this while he is on immunotherapy, ambulating with a walker for now    2024 he presented to the emergency department with a chief complaint of lightheadedness, near syncope, nausea, and vomiting. This occurred suddenly as he was sitting in a [place] at a restaurant.  He had reported similar episodes after immunotherapy, but this had been a few days after.  Dr. Amos saw him in the hospital in consult.  His orthostatics were negative.  He was ruled out for ACS.      He completed an echocardiogram 2024 which showed an ejection fraction of 56 to 60%.  LV wall thickness  consistent with moderate concentric LVH.  Sclerotic aortic valve, trace mitral regurgitation, trace tricuspid regurgitation, normal RVSP.    Discharged home with a 2-week ZIO monitor (7/2024) which was relatively benign and showed patient diary report of palpitations correlated with episodes of PACs this was rare.  A wide-complex tachycardia lasting 10 beats that was thought to be an aberrantly conducted SVT.    Today he presents with his wife. He is feeling well and without acute complaints. He did have some mild lightheadedness following recent immunotherapy. Immunotherapy seems to have been his trigger for the event in July, and he did review this with Dr. Prakash and was given IV fluids the day after receiving recent immunotherapy, he felt much better than he has before.  He has not had any recurrent presyncope.  There is no chest pain, pressure, shortness of breath, BILL, PND, or heart racing.  There is chronic trace bilateral lower extremity edema that is present on hot days.  This is typical for him and resolves with elevation.     Current Outpatient Medications on File Prior to Visit   Medication Sig Dispense Refill    amLODIPine (NORVASC) 5 MG tablet Take 1 tablet by mouth Daily. 90 tablet 1    budesonide (PULMICORT) 0.5 MG/2ML nebulizer solution INHALE 2 MILLILITERS VIA NEBULIZATION BY MOUTH DAILY 60 mL 0    Diclofenac Sodium (VOLTAREN) 1 % gel gel Apply 4 g topically to the appropriate area as directed 4 (Four) Times a Day. 50 g 0    hydrocortisone (ANUSOL-HC) 25 MG suppository Insert 1 suppository into the rectum 2 (Two) Times a Day. 20 suppository 0    ipratropium-albuterol (COMBIVENT RESPIMAT)  MCG/ACT inhaler Inhale 1 puff 4 (Four) Times a Day As Needed for Wheezing.      lidocaine-prilocaine (EMLA) 2.5-2.5 % cream Apply 1 Application topically to the appropriate area as directed See Admin Instructions. Apply to port site one hour prior to port being accessed. 30 g 3    losartan (COZAAR) 100 MG  "tablet Take 1 tablet by mouth Daily for 90 days. 90 tablet 0    magnesium oxide (MAG-OX) 400 MG tablet Take 1 tablet by mouth Daily.      metoprolol succinate XL (TOPROL-XL) 25 MG 24 hr tablet Take 2 tablets by mouth Daily. (Patient taking differently: Take 1 tablet by mouth Daily.) 180 tablet 0    multivitamin with minerals tablet tablet Take 1 tablet by mouth Daily.      naloxone (NARCAN) 4 MG/0.1ML nasal spray Call 911. Don't prime. [place] in 1 nostril for overdose. Repeat in 2-3 minutes in other nostril if no or minimal breathing/responsiveness. 2 each 0    ondansetron (ZOFRAN) 8 MG tablet Take 1 tablet by mouth Every 8 (Eight) Hours As Needed for Nausea or Vomiting. 30 tablet 2    pantoprazole (PROTONIX) 40 MG EC tablet Take 1 tablet by mouth Daily. 90 tablet 0    Plecanatide 3 MG tablet Take 1 tablet by mouth Daily. 30 tablet 1    sucralfate (CARAFATE) 1 g tablet Take 1 tablet by mouth 3 (Three) Times a Day As Needed (heartburn). 270 tablet 0    Symbicort 160-4.5 MCG/ACT inhaler Inhale 2 puffs 2 (Two) Times a Day.      vitamin D3 125 MCG (5000 UT) capsule capsule Take 1 capsule by mouth Daily.       No current facility-administered medications on file prior to visit.         Procedures    ECG 12 Lead    Date/Time: 8/27/2024 4:44 PM  Performed by: Brenda Vaughn APRN    Authorized by: Brenda Vaughn APRN  Comparison: compared with previous ECG from 7/12/2024  Similar to previous ECG  Rhythm: sinus rhythm  BPM: 73  Conduction: right bundle branch block              Objective:      Vitals:    08/27/24 1432   BP: 132/84   BP Location: Left arm   Patient Position: Sitting   Pulse: 73   Weight: 106 kg (234 lb 9.6 oz)   Height: 177.8 cm (70\")     Body mass index is 33.66 kg/m².  Wt Readings from Last 3 Encounters:   08/27/24 106 kg (234 lb 9.6 oz)   08/19/24 101 kg (223 lb)   08/16/24 105 kg (231 lb 3.2 oz)         Constitutional:       Comments: Mr. Andino is a 63-year-old  male who is " "well-appearing and in no acute distress.    Pulmonary:      Effort: Pulmonary effort is normal.      Breath sounds: Normal breath sounds.   Cardiovascular:      Normal rate. Regular rhythm.      Murmurs: There is no murmur.   Pulses:     Radial: 2+ bilaterally.     Dorsalis pedis: 2+ bilaterally.     Posterior tibial: 2+ bilaterally.  Edema:     Peripheral edema present.     Ankle: bilateral trace edema of the ankle.  Skin:     General: Skin is warm and dry.   Neurological:      Mental Status: Alert and oriented to person, place and time.       Lab Review:      Lab Results   Component Value Date    TSH 1.540 08/16/2024       Lab Results   Component Value Date    CHOL 75 12/19/2023     Lab Results   Component Value Date    TRIG 109 12/19/2023     Lab Results   Component Value Date    HDL 24 (L) 12/19/2023     Lab Results   Component Value Date    LDL 30 12/19/2023       Lab Results   Component Value Date    WBC 9.94 08/16/2024    HGB 13.6 08/16/2024    HCT 42.3 08/16/2024    MCV 88.9 08/16/2024     08/16/2024       Lab Results   Component Value Date    GLUCOSE 133 (H) 08/16/2024    BUN 25 (H) 08/16/2024    CREATININE 1.40 (H) 08/16/2024    EGFRRESULT 46.8 (L) 01/09/2024    EGFR 55.8 (L) 08/16/2024    BCR 17.9 08/16/2024    K 4.2 08/16/2024    CO2 22.4 08/16/2024    CALCIUM 10.1 08/16/2024    PROTENTOTREF 7.0 01/09/2024    ALBUMIN 4.6 08/16/2024    BILITOT 0.8 08/16/2024    AST 21 08/16/2024    ALT 18 08/16/2024       No results found for: \"HGBA1C\"    Assessment:     RBBB, 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 He has received IV fluids after last infusion and did much better. Benign tte & zio 7/2024  APC's, rare and has rare palpitation with them   HTN is controlled on present regimen  Renal cell carcinoma, follows with Dr. Prakash, on immunotherapy   6.   Chronic BLE edema in hot weather that is trace and dependent  Plan:   No medication changes were made  He is " doing well today  Return in about 1 year (around 8/27/2025) for JK.        Thank you for allowing me to participate in this patient's care. Please call with any questions or concerns.          Dragon dictation device was utilized in this note.

## 2024-09-11 ENCOUNTER — OFFICE VISIT (OUTPATIENT)
Dept: SLEEP MEDICINE | Facility: HOSPITAL | Age: 64
End: 2024-09-11
Payer: COMMERCIAL

## 2024-09-11 VITALS — HEIGHT: 70 IN | HEART RATE: 70 BPM | OXYGEN SATURATION: 97 % | BODY MASS INDEX: 33.07 KG/M2 | WEIGHT: 231 LBS

## 2024-09-11 DIAGNOSIS — G47.8 NON-RESTORATIVE SLEEP: ICD-10-CM

## 2024-09-11 DIAGNOSIS — E66.9 CLASS 1 OBESITY WITH BODY MASS INDEX (BMI) OF 33.0 TO 33.9 IN ADULT, UNSPECIFIED OBESITY TYPE, UNSPECIFIED WHETHER SERIOUS COMORBIDITY PRESENT: ICD-10-CM

## 2024-09-11 DIAGNOSIS — R06.81 WITNESSED EPISODE OF APNEA: Primary | ICD-10-CM

## 2024-09-11 PROCEDURE — 99214 OFFICE O/P EST MOD 30 MIN: CPT | Performed by: NURSE PRACTITIONER

## 2024-09-11 PROCEDURE — G0463 HOSPITAL OUTPT CLINIC VISIT: HCPCS

## 2024-09-11 NOTE — PROGRESS NOTES
[facility]  [address]  [place] [address]  Phone   Fax       Louis Andino  [phone]   1960  63 y.o.  male      PCP:Harry Hsu MD    Type of service: Initial New Patient Office Visit  Date of service: 9/11/2024          CHIEF COMPLAINT: Witnessed apnea      HISTORY OF PRESENT ILLNESS:  Louis Andino 63 y.o.  presents to the [facility] today as a new patient.    Patient has a history of hypertension, asthma, acid reflux, arthritis, renal carcinoma, renal insufficiency, for which the patient follows with outside providers.  Patient has no history of tonsillectomy, adenoidectomy, nasal surgery, UPPP.  Patient's wife is with him in the office today, per patient preference.  Patient presents today with symptoms of snoring, non-restorative sleep, and witnessed apneas.  The symptoms are chronic in nature.  He underwent surgery for renal carcinoma and is now receiving immunotherapy.        PATIENT HISTORY:  Sleep schedule:  Bedtime: 10 to 11 PM  Wake time: 9 to 10 AM or 715 on Sundays  Time it takes to fall asleep: 30 minutes  Average hours of sleep: 8-10  Number of naps per day: 0    Symptoms:  In addition to the above, patient reports:   Have you ever awakened gasping for breath, coughing, choking: No   Change in weight:  No   Morning headaches:  No   Awaken with a sore throat or dry mouth:  Yes   Leg jerking at night:  No   Creepy crawly feeling in legs/urge to move legs: No   Teeth grinding: No   Have you ever awakened at night with a sour taste or burning sensation in your chest:  No   Do you have muscle weakness with laughing or anger:  No   Have you ever felt paralyzed while going to sleep or waking up:  No   Sleepwalking: No   Nightmares: No   Nocturia (urination at night): 3-4 times per night  Memory Problem: No     Past medical history: (Relevant to sleep medicine)  Hypertension  Asthma  Acid reflux  Renal  "insufficiency  Renal carcinoma  Arthritis      Social history:  Do you drive a commercial vehicle:  No   Shift work:  No   Tobacco use:  No  Alcohol use:  1 per week  Caffeinated drinks: 1-2 per day      Family history (parents, siblings, children) (relevant to sleep medicine):  No relevant    Medications: reviewed     ALLERGIES: Patient has no known allergies.        REVIEW OF SYSTEMS:  Full review of systems available on the intake form which is scanned in the media tab.  The relevant positives are noted below:  [place] Sleepiness Scale: Total score: 6   Fatigue  Snoring  Heartburn          PHYSICAL EXAM:  Vitals:    09/11/24 1354   Pulse: 70   SpO2: 97%   Weight: 105 kg (231 lb)   Height: 177.8 cm (70\")    Body mass index is 33.15 kg/m². Neck Circumference: 18.5 inches  HEAD: atraumatic, normocephalic  THROAT: tonsils are significant, Mallampati class IV  NECK: Neck Circumference: 18.5 inches, trachea is in the midline  RESPIRATORY SYSTEM: Respirations even, unlabored, normal rate  CARDIOVASULAR SYSTEM: Normal rate  EXTREMITES: No cyanosis or clubbing  NEUROLOGICAL SYSTEM: Alert and oriented x 3    Office notes from care team reviewed. Office note(s) reviewed: 8/2024 internal medicine note      Labs/ Test Results Reviewed:  TSH          5/24/2024    11:46 7/5/2024    13:02 8/16/2024    11:01   TSH   TSH 1.070  1.120  1.540        7/2024 echocardiogram          ASSESSMENT AND PLAN:   Witnessed apnea: patient's symptoms and physical examination are concerning for possible sleep apnea.   I discussed the signs, symptoms, and pathophysiology of sleep apnea with this patient.  I also discussed the possible complications of untreated sleep apnea including but not limited to potential risk of resistant hypertension, insulin resistance, pulmonary hypertension, atrial fibrillation or other arrhythmias, heart attack, stroke, nonrestorative sleep with hypersomnia which can increase risk for motor vehicle accidents, etc.   " Different testing methods including home-based and lab based sleep studies were discussed with this patient.   Based on patient history and physical examination, will proceed with home sleep study, per patient and family preference.  Patient aware potential limitations of home study versus in lab..  The order for the sleep study is placed in [facility].  The test will be scheduled after prior authorization has been obtained through patient's insurance.  Discussed overview of treatment options for sleep apnea in the office today including PAP therapy, and treatment/ management will be discussed in more detail with this patient after the test is completed.  All questions were answered to patient's satisfaction.  Snoring: snoring is the sound created by turbulent airflow vibrating upper airway soft tissue.  I have also discussed factors affecting snoring including sleep deprivation, sleeping on the back and alcohol ingestion. To minimize snoring, patient is advised to have adequate sleep, sleep on their side, and avoid alcohol and sedative medications around bedtime.   Daytime fatigue/hypersomnolence: [place] Sleepiness Scale of Total score: 6.  There are many causes of daytime sleepiness and/or fatigue.  Rule out sleep apnea as a contributing factor, as above.  Do not drive, operate heavy machinery, or do activities that require high concentration if feeling tired/drowsy.  Obesity: Body mass index is 33.15 kg/m².. Patients who are overweight or obese are at increased risk of sleep apnea/ sleep disordered breathing. Weight reduction and healthy lifestyle are encouraged in overweight/ obese patients as part of a comprehensive approach to sleep apnea treatment.      Patient will follow-up after study, 31 to 90 days after PAP therapy initiated if applicable, or sooner for issues or concerns.  Patient's questions were answered.        Thank you for allowing me to participate in the care of this patient.    Rossy Jama DNP,  DUANE  [facility]

## 2024-09-14 DIAGNOSIS — K21.9 GASTROESOPHAGEAL REFLUX DISEASE, UNSPECIFIED WHETHER ESOPHAGITIS PRESENT: Chronic | ICD-10-CM

## 2024-09-16 RX ORDER — PANTOPRAZOLE SODIUM 40 MG/1
40 TABLET, DELAYED RELEASE ORAL DAILY
Qty: 30 TABLET | Refills: 0 | Status: SHIPPED | OUTPATIENT
Start: 2024-09-16

## 2024-09-18 ENCOUNTER — HOSPITAL ENCOUNTER (OUTPATIENT)
Dept: SLEEP MEDICINE | Facility: HOSPITAL | Age: 64
End: 2024-09-18
Payer: COMMERCIAL

## 2024-09-18 DIAGNOSIS — G47.8 NON-RESTORATIVE SLEEP: ICD-10-CM

## 2024-09-18 DIAGNOSIS — R06.81 WITNESSED EPISODE OF APNEA: ICD-10-CM

## 2024-09-18 DIAGNOSIS — E66.9 CLASS 1 OBESITY WITH BODY MASS INDEX (BMI) OF 33.0 TO 33.9 IN ADULT, UNSPECIFIED OBESITY TYPE, UNSPECIFIED WHETHER SERIOUS COMORBIDITY PRESENT: ICD-10-CM

## 2024-09-18 PROCEDURE — 95806 SLEEP STUDY UNATT&RESP EFFT: CPT

## 2024-09-26 DIAGNOSIS — G47.33 OSA (OBSTRUCTIVE SLEEP APNEA): Primary | ICD-10-CM

## 2024-09-26 DIAGNOSIS — C64.2 RENAL CELL CARCINOMA OF LEFT KIDNEY: Primary | ICD-10-CM

## 2024-09-26 DIAGNOSIS — R06.83 SNORING: ICD-10-CM

## 2024-09-27 ENCOUNTER — HOSPITAL ENCOUNTER (OUTPATIENT)
Dept: ONCOLOGY | Facility: HOSPITAL | Age: 64
Discharge: HOME | End: 2024-09-27
Payer: MEDICARE

## 2024-09-27 ENCOUNTER — OFFICE VISIT (OUTPATIENT)
Dept: ONCOLOGY | Facility: CLINIC | Age: 64
End: 2024-09-27
Payer: COMMERCIAL

## 2024-09-27 VITALS
OXYGEN SATURATION: 96 % | SYSTOLIC BLOOD PRESSURE: 146 MMHG | TEMPERATURE: 98.2 F | BODY MASS INDEX: 33.43 KG/M2 | HEART RATE: 59 BPM | DIASTOLIC BLOOD PRESSURE: 91 MMHG | WEIGHT: 233.5 LBS | HEIGHT: 70 IN

## 2024-09-27 DIAGNOSIS — C64.2 RENAL CELL CARCINOMA OF LEFT KIDNEY: ICD-10-CM

## 2024-09-27 DIAGNOSIS — N17.9 AKI (ACUTE KIDNEY INJURY): ICD-10-CM

## 2024-09-27 DIAGNOSIS — Z95.828 PORT-A-CATH IN PLACE: ICD-10-CM

## 2024-09-27 DIAGNOSIS — C64.2 RENAL CELL CARCINOMA OF LEFT KIDNEY: Primary | ICD-10-CM

## 2024-09-27 DIAGNOSIS — L30.8 PRURITIC DERMATITIS: ICD-10-CM

## 2024-09-27 DIAGNOSIS — Z95.828 PORT-A-CATH IN PLACE: Primary | ICD-10-CM

## 2024-09-27 LAB
ALP BLD-CCNC: 54 U/L (ref 53–128)
BASOPHILS # BLD AUTO: 0.04 10*3/MM3 (ref 0–0.2)
BASOPHILS NFR BLD AUTO: 0.5 % (ref 0–1.5)
BUN BLDA-MCNC: 22 MG/DL (ref 7–22)
CALCIUM BLD QL: 10.1 MG/DL (ref 8–10.3)
CHLORIDE BLDA-SCNC: 102 MMOL/L (ref 98–108)
CO2 BLDA-SCNC: 28 MMOL/L (ref 18–33)
CREAT BLDA-MCNC: 1.4 MG/DL (ref 0.6–1.2)
DEPRECATED RDW RBC AUTO: 43.4 FL (ref 37–54)
EGFRCR SERPLBLD CKD-EPI 2021: 55.8 ML/MIN/1.73
EOSINOPHIL # BLD AUTO: 0.25 10*3/MM3 (ref 0–0.4)
EOSINOPHIL NFR BLD AUTO: 2.8 % (ref 0.3–6.2)
ERYTHROCYTE [DISTWIDTH] IN BLOOD BY AUTOMATED COUNT: 13.7 % (ref 12.3–15.4)
GLUCOSE BLDC GLUCOMTR-MCNC: 121 MG/DL (ref 73–118)
HCT VFR BLD AUTO: 40 % (ref 37.5–51)
HGB BLD-MCNC: 12.9 G/DL (ref 13–17.7)
LYMPHOCYTES # BLD AUTO: 2.45 10*3/MM3 (ref 0.7–3.1)
LYMPHOCYTES NFR BLD AUTO: 27.7 % (ref 19.6–45.3)
MCH RBC QN AUTO: 28.7 PG (ref 26.6–33)
MCHC RBC AUTO-ENTMCNC: 32.3 G/DL (ref 31.5–35.7)
MCV RBC AUTO: 89.1 FL (ref 79–97)
MONOCYTES # BLD AUTO: 0.78 10*3/MM3 (ref 0.1–0.9)
MONOCYTES NFR BLD AUTO: 8.8 % (ref 5–12)
NEUTROPHILS NFR BLD AUTO: 5.32 10*3/MM3 (ref 1.7–7)
NEUTROPHILS NFR BLD AUTO: 60.2 % (ref 42.7–76)
PLATELET # BLD AUTO: 163 10*3/MM3 (ref 140–450)
PMV BLD AUTO: 11.1 FL (ref 6–12)
POC ALBUMIN: 3.5 G/L (ref 3.3–5.5)
POC ALT (SGPT): 22 U/L (ref 10–47)
POC AST (SGOT): 26 U/L (ref 11–38)
POC TOTAL BILIRUBIN: 1.2 MG/DL (ref 0.2–1.6)
POC TOTAL PROTEIN: 7.4 G/DL (ref 6.4–8.1)
POTASSIUM BLDA-SCNC: 3.8 MMOL/L (ref 3.6–5.1)
RBC # BLD AUTO: 4.49 10*6/MM3 (ref 4.14–5.8)
SODIUM BLD-SCNC: 142 MMOL/L (ref 128–145)
WBC NRBC COR # BLD AUTO: 8.84 10*3/MM3 (ref 3.4–10.8)

## 2024-09-27 PROCEDURE — 36591 DRAW BLOOD OFF VENOUS DEVICE: CPT

## 2024-09-27 PROCEDURE — 80053 COMPREHEN METABOLIC PANEL: CPT

## 2024-09-27 PROCEDURE — 85025 COMPLETE CBC W/AUTO DIFF WBC: CPT | Performed by: STUDENT IN AN ORGANIZED HEALTH CARE EDUCATION/TRAINING PROGRAM

## 2024-09-27 PROCEDURE — 25010000002 HEPARIN LOCK FLUSH PER 10 UNITS: Performed by: INTERNAL MEDICINE

## 2024-09-27 PROCEDURE — 25010000002 PEMBROLIZUMAB 100 MG/4ML SOLUTION 4 ML VIAL: Performed by: STUDENT IN AN ORGANIZED HEALTH CARE EDUCATION/TRAINING PROGRAM

## 2024-09-27 PROCEDURE — 25810000003 SODIUM CHLORIDE 0.9 % SOLUTION: Performed by: STUDENT IN AN ORGANIZED HEALTH CARE EDUCATION/TRAINING PROGRAM

## 2024-09-27 PROCEDURE — 96413 CHEMO IV INFUSION 1 HR: CPT

## 2024-09-27 RX ORDER — SODIUM CHLORIDE 0.9 % (FLUSH) 0.9 %
10 SYRINGE (ML) INJECTION AS NEEDED
OUTPATIENT
Start: 2024-09-27

## 2024-09-27 RX ORDER — SODIUM CHLORIDE 0.9 % (FLUSH) 0.9 %
10 SYRINGE (ML) INJECTION AS NEEDED
Status: DISCONTINUED | OUTPATIENT
Start: 2024-09-27 | End: 2024-09-28 | Stop reason: HOSPADM

## 2024-09-27 RX ORDER — SODIUM CHLORIDE 9 MG/ML
20 INJECTION, SOLUTION INTRAVENOUS ONCE
Status: COMPLETED | OUTPATIENT
Start: 2024-09-27 | End: 2024-09-27

## 2024-09-27 RX ORDER — HEPARIN SODIUM (PORCINE) LOCK FLUSH IV SOLN 100 UNIT/ML 100 UNIT/ML
500 SOLUTION INTRAVENOUS AS NEEDED
OUTPATIENT
Start: 2024-09-27

## 2024-09-27 RX ORDER — HEPARIN SODIUM (PORCINE) LOCK FLUSH IV SOLN 100 UNIT/ML 100 UNIT/ML
500 SOLUTION INTRAVENOUS AS NEEDED
Status: DISCONTINUED | OUTPATIENT
Start: 2024-09-27 | End: 2024-09-28 | Stop reason: HOSPADM

## 2024-09-27 RX ADMIN — HEPARIN 500 UNITS: 100 SYRINGE at 11:50

## 2024-09-27 RX ADMIN — SODIUM CHLORIDE 400 MG: 9 INJECTION, SOLUTION INTRAVENOUS at 11:09

## 2024-09-27 RX ADMIN — Medication 10 ML: at 11:50

## 2024-09-27 RX ADMIN — Medication 10 ML: at 09:46

## 2024-09-27 RX ADMIN — SODIUM CHLORIDE 20 ML/HR: 9 INJECTION, SOLUTION INTRAVENOUS at 11:07

## 2024-09-29 DIAGNOSIS — J45.40 MODERATE PERSISTENT ASTHMA WITHOUT COMPLICATION: Chronic | ICD-10-CM

## 2024-09-30 ENCOUNTER — TELEPHONE (OUTPATIENT)
Dept: SLEEP MEDICINE | Facility: HOSPITAL | Age: 64
End: 2024-09-30
Payer: COMMERCIAL

## 2024-09-30 RX ORDER — BUDESONIDE 0.5 MG/2ML
0.5 INHALANT ORAL 2 TIMES DAILY
Qty: 180 ML | Refills: 1 | Status: SHIPPED | OUTPATIENT
Start: 2024-09-30

## 2024-09-30 NOTE — TELEPHONE ENCOUNTER
..Spoke with patient about sleep study results , [facility], compliance follow up [id number].

## 2024-10-02 ENCOUNTER — HOSPITAL ENCOUNTER (OUTPATIENT)
Dept: ONCOLOGY | Facility: HOSPITAL | Age: 64
Discharge: HOME | End: 2024-10-02
Admitting: STUDENT IN AN ORGANIZED HEALTH CARE EDUCATION/TRAINING PROGRAM
Payer: COMMERCIAL

## 2024-10-02 VITALS — SYSTOLIC BLOOD PRESSURE: 133 MMHG | DIASTOLIC BLOOD PRESSURE: 77 MMHG | HEART RATE: 87 BPM

## 2024-10-02 DIAGNOSIS — Z95.828 PORT-A-CATH IN PLACE: ICD-10-CM

## 2024-10-02 DIAGNOSIS — C64.2 RENAL CELL CARCINOMA OF LEFT KIDNEY: Primary | ICD-10-CM

## 2024-10-02 DIAGNOSIS — C64.9 RENAL CELL CARCINOMA, UNSPECIFIED LATERALITY: ICD-10-CM

## 2024-10-02 PROCEDURE — 25010000002 HEPARIN LOCK FLUSH PER 10 UNITS: Performed by: STUDENT IN AN ORGANIZED HEALTH CARE EDUCATION/TRAINING PROGRAM

## 2024-10-02 PROCEDURE — 25810000003 SODIUM CHLORIDE 0.9 % SOLUTION: Performed by: STUDENT IN AN ORGANIZED HEALTH CARE EDUCATION/TRAINING PROGRAM

## 2024-10-02 PROCEDURE — 96360 HYDRATION IV INFUSION INIT: CPT

## 2024-10-02 RX ORDER — HEPARIN SODIUM (PORCINE) LOCK FLUSH IV SOLN 100 UNIT/ML 100 UNIT/ML
500 SOLUTION INTRAVENOUS AS NEEDED
Status: CANCELLED | OUTPATIENT
Start: 2024-10-02

## 2024-10-02 RX ORDER — SODIUM CHLORIDE 0.9 % (FLUSH) 0.9 %
10 SYRINGE (ML) INJECTION AS NEEDED
Status: DISCONTINUED | OUTPATIENT
Start: 2024-10-02 | End: 2024-10-03 | Stop reason: HOSPADM

## 2024-10-02 RX ORDER — HEPARIN SODIUM (PORCINE) LOCK FLUSH IV SOLN 100 UNIT/ML 100 UNIT/ML
500 SOLUTION INTRAVENOUS AS NEEDED
OUTPATIENT
Start: 2024-10-02

## 2024-10-02 RX ORDER — SODIUM CHLORIDE 0.9 % (FLUSH) 0.9 %
10 SYRINGE (ML) INJECTION AS NEEDED
OUTPATIENT
Start: 2024-10-02

## 2024-10-02 RX ORDER — HEPARIN SODIUM (PORCINE) LOCK FLUSH IV SOLN 100 UNIT/ML 100 UNIT/ML
500 SOLUTION INTRAVENOUS AS NEEDED
Status: DISCONTINUED | OUTPATIENT
Start: 2024-10-02 | End: 2024-10-03 | Stop reason: HOSPADM

## 2024-10-02 RX ORDER — SODIUM CHLORIDE 0.9 % (FLUSH) 0.9 %
10 SYRINGE (ML) INJECTION AS NEEDED
Status: CANCELLED | OUTPATIENT
Start: 2024-10-02

## 2024-10-02 RX ADMIN — Medication 10 ML: at 16:02

## 2024-10-02 RX ADMIN — SODIUM CHLORIDE 1000 ML: 0.9 INJECTION, SOLUTION INTRAVENOUS at 14:49

## 2024-10-02 RX ADMIN — HEPARIN 500 UNITS: 100 SYRINGE at 16:02

## 2024-10-03 RX ORDER — PLECANATIDE 3 MG/1
1 TABLET ORAL DAILY
Qty: 30 TABLET | Refills: 4 | Status: SHIPPED | OUTPATIENT
Start: 2024-10-03

## 2024-10-14 DIAGNOSIS — M16.11 PRIMARY OSTEOARTHRITIS OF RIGHT HIP: Primary | ICD-10-CM

## 2024-10-14 DIAGNOSIS — K21.9 GASTROESOPHAGEAL REFLUX DISEASE, UNSPECIFIED WHETHER ESOPHAGITIS PRESENT: Chronic | ICD-10-CM

## 2024-10-14 RX ORDER — PANTOPRAZOLE SODIUM 40 MG/1
40 TABLET, DELAYED RELEASE ORAL DAILY
Qty: 30 TABLET | Refills: 0 | Status: SHIPPED | OUTPATIENT
Start: 2024-10-14

## 2024-10-21 DIAGNOSIS — K21.9 GASTROESOPHAGEAL REFLUX DISEASE, UNSPECIFIED WHETHER ESOPHAGITIS PRESENT: Chronic | ICD-10-CM

## 2024-10-22 DIAGNOSIS — M16.11 PRIMARY OSTEOARTHRITIS OF RIGHT HIP: Primary | ICD-10-CM

## 2024-10-22 DIAGNOSIS — M54.9 CHRONIC BACK PAIN, UNSPECIFIED BACK LOCATION, UNSPECIFIED BACK PAIN LATERALITY: ICD-10-CM

## 2024-10-22 DIAGNOSIS — G89.29 CHRONIC BACK PAIN, UNSPECIFIED BACK LOCATION, UNSPECIFIED BACK PAIN LATERALITY: ICD-10-CM

## 2024-10-22 RX ORDER — PANTOPRAZOLE SODIUM 40 MG/1
40 TABLET, DELAYED RELEASE ORAL DAILY
Qty: 90 TABLET | Refills: 0 | Status: SHIPPED | OUTPATIENT
Start: 2024-10-22 | End: 2024-10-23 | Stop reason: SDUPTHER

## 2024-10-23 ENCOUNTER — HOSPITAL ENCOUNTER (OUTPATIENT)
Facility: HOSPITAL | Age: 64
Discharge: HOME | End: 2024-10-23
Payer: MEDICARE

## 2024-10-23 DIAGNOSIS — K21.9 GASTROESOPHAGEAL REFLUX DISEASE, UNSPECIFIED WHETHER ESOPHAGITIS PRESENT: Chronic | ICD-10-CM

## 2024-10-23 DIAGNOSIS — M16.11 PRIMARY OSTEOARTHRITIS OF RIGHT HIP: ICD-10-CM

## 2024-10-23 DIAGNOSIS — M54.9 CHRONIC BACK PAIN, UNSPECIFIED BACK LOCATION, UNSPECIFIED BACK PAIN LATERALITY: ICD-10-CM

## 2024-10-23 DIAGNOSIS — G89.29 CHRONIC BACK PAIN, UNSPECIFIED BACK LOCATION, UNSPECIFIED BACK PAIN LATERALITY: ICD-10-CM

## 2024-10-23 PROCEDURE — 73502 X-RAY EXAM HIP UNI 2-3 VIEWS: CPT

## 2024-10-23 PROCEDURE — 72100 X-RAY EXAM L-S SPINE 2/3 VWS: CPT

## 2024-10-23 RX ORDER — PANTOPRAZOLE SODIUM 40 MG/1
40 TABLET, DELAYED RELEASE ORAL DAILY
Qty: 90 TABLET | Refills: 0 | Status: SHIPPED | OUTPATIENT
Start: 2024-10-23

## 2024-10-28 ENCOUNTER — TELEPHONE (OUTPATIENT)
Dept: SLEEP MEDICINE | Facility: HOSPITAL | Age: 64
End: 2024-10-28
Payer: COMMERCIAL

## 2024-10-28 DIAGNOSIS — M16.11 PRIMARY OSTEOARTHRITIS OF RIGHT HIP: Primary | ICD-10-CM

## 2024-10-28 NOTE — TELEPHONE ENCOUNTER
Reached out to [facility], they should be calling you to schedule CPAP set up . If they do not get ahold of you , you can call them @ [phone]

## 2024-10-29 NOTE — PROGRESS NOTES
Xray shows severe degenerative disease in his right hip. While I think he should see pain medicine I also would like him to see an orthopedic specialist to review his imaging given his arthritis being severe. I have placed referral

## 2024-11-06 ENCOUNTER — OFFICE VISIT (OUTPATIENT)
Dept: PAIN MEDICINE | Facility: CLINIC | Age: 64
End: 2024-11-06
Payer: COMMERCIAL

## 2024-11-06 VITALS
HEIGHT: 70 IN | SYSTOLIC BLOOD PRESSURE: 154 MMHG | OXYGEN SATURATION: 95 % | TEMPERATURE: 98.2 F | DIASTOLIC BLOOD PRESSURE: 83 MMHG | WEIGHT: 242 LBS | HEART RATE: 67 BPM | BODY MASS INDEX: 34.65 KG/M2

## 2024-11-06 DIAGNOSIS — G89.4 CHRONIC PAIN SYNDROME: ICD-10-CM

## 2024-11-06 DIAGNOSIS — M47.816 LUMBAR FACET ARTHROPATHY: Primary | ICD-10-CM

## 2024-11-06 DIAGNOSIS — M16.11 OSTEOARTHRITIS OF RIGHT HIP, UNSPECIFIED OSTEOARTHRITIS TYPE: ICD-10-CM

## 2024-11-06 DIAGNOSIS — Z13.79 ENCOUNTER FOR PHARMACOGENETIC TESTING: Primary | ICD-10-CM

## 2024-11-06 DIAGNOSIS — M54.31 RIGHT SIDED SCIATICA: ICD-10-CM

## 2024-11-06 PROCEDURE — 99204 OFFICE O/P NEW MOD 45 MIN: CPT | Performed by: PHYSICIAN ASSISTANT

## 2024-11-06 RX ORDER — BUPRENORPHINE 5 UG/H
1 PATCH TRANSDERMAL WEEKLY
Qty: 4 PATCH | Refills: 0 | Status: SHIPPED | OUTPATIENT
Start: 2024-11-06 | End: 2024-11-07 | Stop reason: SDUPTHER

## 2024-11-06 NOTE — PROGRESS NOTES
HEMATOLOGY ONCOLOGY OUTPATIENT FOLLOW UP       Patient name: Louis Andino  : 1960  MRN: [id number]  Primary Care Physician: Harry Hsu MD  Referring Physician: Harry Hsu MD  Reason For Consult: Renal cell carcinoma      History of Present Illness:  Patient is a 63 y.o. male with RCC.    Patient initially presented with hematuria.  During hospitalization he had a CT of his abdomen which showed a large left lower pole renal mass with possible adrenal metastasis.    2023 CT abdomen pelvis with contrast showing mass arising from the lower pole of the left kidney consistent with renal cell carcinoma.  Associated uroepithelial thickening of the pelvis and proximal ureter.  Enhancing indeterminate left adrenal nodule potential metastasis no retroperitoneal lymphadenopathy.    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  Vessel with clear-cell renal cell carcinoma tumor thrombus    24 - pembrolizumab  3/1/24 - pembro  24 - increased pembro 400 mg  24 - pembro 400 mg  24 - pembro 400 mg  24 - was admitted with syncopal episode, ECHO was normal, CTA neck negative, has a Zio patch, will be seen by cardiology.  Morning metoprolol was stopped.    24: CT Chest Abd Pelvis WO Contrast:    IMPRESSION:     Status post left nephrectomy. No metastatic disease identified by  unenhanced imaging. Continued follow-up advised as indicated.      2024: Patient seen today for initial evaluation by me. He was preiovulsy being seen by Dr. Prakash in our practice.   He is on adjuvant Immunotherapy with pembrolizumab for RCC stage III. He has  had fairly good tolerance to IO therapy except for some adverse effects, Reported having persistent diffuse pruritus and Dermographism which has not improved significantly with PRN Antihistamines. He however denied any significant impairment of his quality of life with these symptoms. Reported some dizziness/ preSyncopal Episodes following immunotherapy. He was admitted to [facility] for these symptoms, extensive workup for neurocardiogenic and endocrine etiology was reported unremarkable at this time. He is status post Restaging scans as above.  He is scheduled to receive IV Fluids on 8/19/24 after treatment today in view of his Symptoms.    9/27/2024: Patient seen today for follow-up. Stated that he has tolerated immunotherapy much better after the last infusion.  Skin rash and dizziness are better controlled today.  Acute issues reported.      Subjective:  11/8/24: Improved itching with over-the-counter lotion.  Has recently started CPAP for sleep apnea.  Reported having better sleep at night and feeling refreshed during daytime.  Constipation has improved.  No other significant treatment-related side effects reported.  He is planning to travel to [place] in early December.      The patient has a documented plan of care to address pain.       Past Medical History:   Diagnosis Date    Anemia 12/18/2023    Due to surgery. Required transfusions    Ankle sprain     Multiple-both ankles over the years    Asthma     Chronic pain disorder 2020    Right hip and back    Clotting disorder     Required blood transfusion during and after surgery in excess of expected    Coronary artery disease Dec 2023    Bifascicular block    Deep vein thrombosis 12/18/2023    During cancer surgery a clot was surgically removed from the inferior vena cava which was cancer related    Emphysema/COPD     Erectile dysfunction Several years    Extremity pain 2020    Right hip and bilat shoulders    Fracture of ankle     Left ankle as a child    Fracture  of wrist     Left wrist as a child    GERD (gastroesophageal reflux disease)     Headache, tension-type 1977    Rare    HL (hearing loss)     Progressive worsening over many years    Hyperglycemia 10/12/2023    Hyperlipidemia 10/12/2023    Hypertension     Injury of neck 2023    Previous fractures noted during chiropractic visit. Probably secondary to shoulder injury.    Joint pain 2020    Right hip    Low back pain     Worse last few year    Neck pain 2020    Mild    Obesity     Pneumonia 12/20/2023    Developed while hospitalized for cancer surgery    Prostate disease     Rash     Allergic reactions to laundry detergent and mild generalized itching secondary to immunotherapy.    Renal cell carcinoma 12/30/2023    Renal insufficiency 4/9/2024    Decreased renal function since nephrectomy in December, 2023, secondary to renal cancer, but not diagnosed as chronic kidney disease until recent visit. Kidney functions have been stable since surgery, but are now being considered permanent.    Shoulder injury 1980s    Injured in     Sleep apnea     Visual impairment     Wear glasses for near and distant vision    Vitreous degeneration of right eye     Formatting of this note might be different from the original. Retinal tear and detachment warning symptoms reviewed and patient instructed to call immediately if increasing floaters, flashes, or decreasing peripheral vision. No retinal detachment or retinal tear noted. Recheck in 1 month with dilated exam.       Past Surgical History:   Procedure Laterality Date    ABDOMINAL SURGERY  December 18, 2023    Left Kidney and Adrenal Gland removed due to Renal Cell Carcinoma    NEPHRECTOMY Left 12/18/2023    Procedure: NEPHRECTOMY RADICAL WITH CAVAL THROMBECTOMY;  Surgeon: James Esquivel MD;  Location: [facility];  Service: Urology;  Laterality: Left;    PORTACATH PLACEMENT  01/30/2024    SKIN LESION EXCISION  1990         Current Outpatient Medications:     budesonide  (PULMICORT) 0.5 MG/2ML nebulizer solution, Take 2 mL (one vial) by nebulization 2 (Two) Times a Day., Disp: 180 mL, Rfl: 1    Diclofenac Sodium (VOLTAREN) 1 % gel gel, Apply 4 g topically to the appropriate area as directed 4 (Four) Times a Day., Disp: 50 g, Rfl: 0    hydrocortisone (ANUSOL-HC) 25 MG suppository, Insert 1 suppository into the rectum 2 (Two) Times a Day., Disp: 20 suppository, Rfl: 0    ipratropium-albuterol (COMBIVENT RESPIMAT)  MCG/ACT inhaler, Inhale 1 puff 4 (Four) Times a Day As Needed for Wheezing., Disp: , Rfl:     lidocaine-prilocaine (EMLA) 2.5-2.5 % cream, Apply 1 Application topically to the appropriate area as directed See Admin Instructions. Apply to port site one hour prior to port being accessed., Disp: 30 g, Rfl: 3    losartan (COZAAR) 100 MG tablet, Take 1 tablet by mouth Daily for 90 days., Disp: 90 tablet, Rfl: 0    magnesium oxide (MAG-OX) 400 MG tablet, Take 1 tablet by mouth Daily., Disp: , Rfl:     metoprolol succinate XL (TOPROL-XL) 25 MG 24 hr tablet, Take 2 tablets by mouth Daily. (Patient taking differently: Take 1 tablet by mouth Daily.), Disp: 180 tablet, Rfl: 0    multivitamin with minerals tablet tablet, Take 1 tablet by mouth Daily., Disp: , Rfl:     naloxone (NARCAN) 4 MG/0.1ML nasal spray, Call 911. Don't prime. [place] in 1 nostril for overdose. Repeat in 2-3 minutes in other nostril if no or minimal breathing/responsiveness., Disp: 2 each, Rfl: 0    ondansetron (ZOFRAN) 8 MG tablet, Take 1 tablet by mouth Every 8 (Eight) Hours As Needed for Nausea or Vomiting., Disp: 30 tablet, Rfl: 2    pantoprazole (PROTONIX) 40 MG EC tablet, Take 1 tablet by mouth Daily., Disp: 90 tablet, Rfl: 0    Plecanatide (Trulance) 3 MG tablet, TAKE 1 TABLET BY MOUTH DAILY, Disp: 30 tablet, Rfl: 4    sucralfate (CARAFATE) 1 g tablet, Take 1 tablet by mouth 3 (Three) Times a Day As Needed (heartburn)., Disp: 270 tablet, Rfl: 0    Symbicort 160-4.5 MCG/ACT inhaler, Inhale 2 puffs 2 (Two)  Times a Day., Disp: , Rfl:     vitamin D3 125 MCG (5000 UT) capsule capsule, Take 1 capsule by mouth Daily., Disp: , Rfl:     No Known Allergies    Family History   Problem Relation Age of Onset    Arthritis Mother     Cancer Mother     Diabetes Mother     Heart disease Mother     Hyperlipidemia Mother     Miscarriages / Stillbirths Mother         Stillborn child    Hypertension Mother     Osteoporosis Mother     COPD Father     Hyperlipidemia Father     Migraines Father     Hyperlipidemia Brother     Hypertension Brother     Vision loss Daughter     Broken bones Daughter         Broke left forearm three times during childhood    Broken bones Daughter         Fractured right femur and right forearm during childhood    Anxiety disorder Daughter     Depression Daughter     Miscarriages / Stillbirths Daughter         First Trimester miscarriage    Dislocations Daughter         Recurrent radial head subluxations as a child    Anxiety disorder Daughter     Depression Daughter     Asthma Son     Depression Son        Cancer-related family history includes Cancer in his mother.      Social History     Tobacco Use    Smoking status: Passive Smoke Exposure - Never Smoker     Passive exposure: Past    Smokeless tobacco: Never    Tobacco comments:     Passive due to Father and most male relatives smoking   Vaping Use    Vaping status: Never Used   Substance Use Topics    Alcohol use: Yes     Alcohol/week: 1.0 standard drink of alcohol     Types: 1 Glasses of wine per week     Comment: rare    Drug use: Never     Social History     Social History Narrative    Not on file       ROS:   Review of Systems   Constitutional:  Negative for fatigue and fever.   HENT:  Negative for congestion and nosebleeds.    Eyes:  Negative for pain and itching.   Respiratory:  Negative for cough and shortness of breath.    Cardiovascular:  Negative for chest pain.   Gastrointestinal:  Negative for abdominal pain, blood in stool, diarrhea, nausea and  "vomiting.   Endocrine: Negative for cold intolerance and heat intolerance.   Genitourinary:  Negative for difficulty urinating.   Musculoskeletal:  Negative for arthralgias.   Skin:  Negative for rash.   Neurological:  Negative for dizziness and headaches.   Hematological:  Does not bruise/bleed easily.   Psychiatric/Behavioral:  Negative for behavioral problems.    pruritis    Objective:    Vital Signs:  Vitals:    11/08/24 1102   BP: 143/80   Pulse: 60   SpO2: 96%   Weight: 108 kg (238 lb 6.4 oz)   Height: 177.8 cm (70\")   PainSc:   6   PainLoc: Back  Comment: HIP, SHOULDER           Body mass index is 34.21 kg/m².    ECOG  (0) Fully active, able to carry on all predisease performance without restriction    Physical Exam:   Physical Exam  Constitutional:       Appearance: Normal appearance.   HENT:      Head: Normocephalic and atraumatic.   Eyes:      Pupils: Pupils are equal, round, and reactive to light.   Cardiovascular:      Rate and Rhythm: Normal rate and regular rhythm.      Pulses: Normal pulses.      Heart sounds: No murmur heard.  Pulmonary:      Effort: Pulmonary effort is normal.      Breath sounds: Normal breath sounds.   Abdominal:      General: There is no distension.      Palpations: Abdomen is soft. There is no mass.      Tenderness: There is no abdominal tenderness.   Musculoskeletal:         General: Normal range of motion.      Cervical back: Normal range of motion and neck supple.   Skin:     General: Skin is warm.   Neurological:      General: No focal deficit present.      Mental Status: He is alert.   Psychiatric:         Mood and Affect: Mood normal.         Lab Results - Last 18 Months   Lab Units 09/27/24  0946 08/16/24  1101 07/13/24  0507   WBC 10*3/mm3 8.84 9.94 14.13*   HEMOGLOBIN g/dL 12.9* 13.6 12.3*   HEMATOCRIT % 40.0 42.3 36.8*   PLATELETS 10*3/mm3 163 224 230   MCV fL 89.1 88.9 85.0     Lab Results - Last 18 Months   Lab Units 09/27/24  0957 08/16/24  1101 07/13/24  0507 " "07/12/24  1357 07/12/24  1351   SODIUM mmol/L  --  140 141  --  142   POTASSIUM mmol/L  --  4.2 4.3  --  4.0   CHLORIDE mmol/L  --  104 105  --  106   CO2 mmol/L  --  22.4 24.9  --  24.0   BUN mg/dL  --  25* 24*  --  31*   CREATININE mg/dL 1.40* 1.40* 1.48*   < > 1.52*   CALCIUM mg/dL  --  10.1 9.6  --  10.4   BILIRUBIN mg/dL  --  0.8 0.8  --  0.8   ALK PHOS U/L  --  68 63  --  74   ALT (SGPT) U/L  --  18 15  --  18   AST (SGOT) U/L  --  21 13  --  17   GLUCOSE mg/dL  --  133* 89  --  101*    < > = values in this interval not displayed.       Lab Results   Component Value Date    GLUCOSE 133 (H) 08/16/2024    BUN 25 (H) 08/16/2024    CREATININE 1.40 (H) 09/27/2024    BCR 17.9 08/16/2024    K 4.2 08/16/2024    CO2 22.4 08/16/2024    CALCIUM 10.1 08/16/2024    PROTENTOTREF 7.0 01/09/2024    ALBUMIN 4.6 08/16/2024    LABIL2 1.2 01/09/2024    AST 21 08/16/2024    ALT 18 08/16/2024       Lab Results - Last 18 Months   Lab Units 07/12/24  1351 01/30/24  0827 12/18/23  1722   INR  1.03 1.01 1.08   APTT seconds 27.5 25.3 28.7       Lab Results   Component Value Date    IRON 12 (L) 12/20/2023    TIBC 145 (L) 12/20/2023       No results found for: \"FOLATE\"    No results found for: \"OCCULTBLD\"    No results found for: \"RETICCTPCT\"  No results found for: \"VDNXSYNI20\"  No results found for: \"SPEP\", \"UPEP\"  No results found for: \"LDH\", \"URICACID\"  No results found for: \"OSKAR\", \"RF\", \"SEDRATE\"  Lab Results   Component Value Date    FIBRINOGEN 381 12/18/2023     Lab Results   Component Value Date    PTT 27.5 07/12/2024    INR 1.03 07/12/2024     No results found for: \"\"  No results found for: \"CEA\"  No components found for: \"CA-19-9\"  No results found for: \"PSA\"  No results found for: \"SEDRATE\"       Assessment & Plan     Patient is a 63-year-old male with stage IIIb T3b RCC status post radical nephrectomy    Renal cell carcinoma  Status post nephrectomy, CT chest with no evidence of metastasis questionable adrenal metastasis " on scan status post adrenalectomy with no evidence of metastatic disease  Given patient's stage III clear-cell RCC, discussed adjuvant treatment with pembrolizumab based on the findings from keynote 564 trial with significant improvement in disease-free survival as compared to placebo for stage III clear-cell RCC.  Started Immunotherapy, G1 - pruritus otherwise good tolerance. Currently on  400 mg every 6 weeks.   Has ongoing pruritus no rash, takes benadryl.   -restaging scans as above, reported ANA. Continue surveillance every 6 months.  -Patient tolerating treatment well, continue immunotherapy every 6 weeks for total 1 year.  Discussed with patient and wife regarding duration of treatment.  -Continue treatment for 1 year, until February 2025.    Pruritus  G1, continue immunotherapy  Use moisturizer stable  No significant worsening, continue same.  He will try atarax instead of benadryl  Patient reported significant improvement in symptoms with use of an over-the-counter lotion.    Will consider use of Leukotriene inhibitors on follow up if no improvement.      Syncope  Ziopatch on  F/u with cardiology  Has increased symptoms after immunotherapy  Patient is s/p hospital admission and evaluation as above.  Patient has been previously receiving normal saline 2-3 days after each infusion.  This has been helping him.  Held due to shortage of IV fluids.  Encouraged patient on improved oral intake.    Anemia:  Noted mild anemia with Hb 12.2, MCV 88. Cannot rule our iron deficiency.  Will start on Oral iron and check iron panel on follow up visit.    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 Calixto] .   -He has an upcoming appointment.    Follow up in 6 weeks with scheduled treatment. Sooner as needed.    Thank you very much for providing the opportunity to participate in this patient’s care. Please do not hesitate to call if there are any other questions.

## 2024-11-06 NOTE — PROGRESS NOTES
CHIEF COMPLAINT  Mr. Andino states that he has has right hip pain since [id number]. He has had PT that did help some. He has had massage therapy,used a TENS unit and seen a Chiropractor.      Subjective   Louis Andino is a 63 y.o. male.   He presents to the office for initial evaluation of right hip, low back and left shoulder pain. He was referred here by Dr. Ant Hsu.  This patient reports that he has had significant pain within the right hip joint that has been occurring since 2015/16 and has been diagnosed with end-stage osteoarthritis and it is need of total hip replacement.  Due to his recent diagnosis of renal cell carcinoma in December 2023 and ongoing immunotherapy which should be finished hopefully in February 2025, he is unable to proceed with arthroplasty surgery at this time.  He finds that due to his hip pain and having to ambulate with the use of a walker he is also experiencing increased right sided sciatica pain which radiates from the right low back into the posterior buttock and into the posterior aspect of the leg terminating at the calf.  Pain in the lumbar spine is increased with sitting or driving for prolonged periods of time.  He denies any lower extremity numbness, tingling or weakness.      Medical history significant for renal cell carcinoma and he subsequently underwent left nephrectomy and adrenalectomy on 12/18/2023.  He is currently undergoing immunotherapy every 6 weeks with pembrolizumab for RCC stage III.    Patient has undergone physical therapy which she feels was more helpful for the left shoulder pain but did not provide any relief of the low back or hip issues.  He is also undergone chiropractor and massage therapy well as use of a TENS unit with mixed results.    The patient states that he has been trialed on tramadol in the past with suboptimal relief, hydrocodone caused severe nausea and vomiting and he has tolerated oxycodone 5 mg which he states he would  take a 1/4 once a day usually.    Pain today 6/10 VAS in severity.      Back Pain  This is a chronic problem. The current episode started more than 1 year ago. The problem occurs constantly. The problem is unchanged. The pain is present in the lumbar spine and sacro-iliac. The quality of the pain is described as aching and burning. The pain radiates to the right buttock, right thigh and right anterolateral lower leg. The pain is at a severity of 6/10. The pain is moderate. The pain is The same all the time. The symptoms are aggravated by sitting, position and standing. Associated symptoms include leg pain. Pertinent negatives include no numbness or weakness. Risk factors include history of cancer, obesity, sedentary lifestyle, poor posture and lack of exercise. He has tried ice and heat for the symptoms. The treatment provided mild relief.   Hip Pain   There was no injury mechanism. The pain is present in the right hip. The quality of the pain is described as aching. The pain is at a severity of 6/10. The pain is moderate. The pain has been Constant since onset. Associated symptoms include an inability to bear weight. Pertinent negatives include no numbness. The symptoms are aggravated by movement and weight bearing.        PEG Assessment   What number best describes your pain on average in the past week?6  What number best describes how, during the past week, pain has interfered with your enjoyment of life?7  What number best describes how, during the past week, pain has interfered with your general activity?  7        Current Outpatient Medications:     budesonide (PULMICORT) 0.5 MG/2ML nebulizer solution, Take 2 mL (one vial) by nebulization 2 (Two) Times a Day., Disp: 180 mL, Rfl: 1    Diclofenac Sodium (VOLTAREN) 1 % gel gel, Apply 4 g topically to the appropriate area as directed 4 (Four) Times a Day., Disp: 50 g, Rfl: 0    hydrocortisone (ANUSOL-HC) 25 MG suppository, Insert 1 suppository into the rectum 2  (Two) Times a Day., Disp: 20 suppository, Rfl: 0    ipratropium-albuterol (COMBIVENT RESPIMAT)  MCG/ACT inhaler, Inhale 1 puff 4 (Four) Times a Day As Needed for Wheezing., Disp: , Rfl:     lidocaine-prilocaine (EMLA) 2.5-2.5 % cream, Apply 1 Application topically to the appropriate area as directed See Admin Instructions. Apply to port site one hour prior to port being accessed., Disp: 30 g, Rfl: 3    losartan (COZAAR) 100 MG tablet, Take 1 tablet by mouth Daily for 90 days., Disp: 90 tablet, Rfl: 0    magnesium oxide (MAG-OX) 400 MG tablet, Take 1 tablet by mouth Daily., Disp: , Rfl:     metoprolol succinate XL (TOPROL-XL) 25 MG 24 hr tablet, Take 2 tablets by mouth Daily. (Patient taking differently: Take 1 tablet by mouth Daily.), Disp: 180 tablet, Rfl: 0    multivitamin with minerals tablet tablet, Take 1 tablet by mouth Daily., Disp: , Rfl:     naloxone (NARCAN) 4 MG/0.1ML nasal spray, Call 911. Don't prime. [place] in 1 nostril for overdose. Repeat in 2-3 minutes in other nostril if no or minimal breathing/responsiveness., Disp: 2 each, Rfl: 0    ondansetron (ZOFRAN) 8 MG tablet, Take 1 tablet by mouth Every 8 (Eight) Hours As Needed for Nausea or Vomiting., Disp: 30 tablet, Rfl: 2    pantoprazole (PROTONIX) 40 MG EC tablet, Take 1 tablet by mouth Daily., Disp: 90 tablet, Rfl: 0    Plecanatide (Trulance) 3 MG tablet, TAKE 1 TABLET BY MOUTH DAILY, Disp: 30 tablet, Rfl: 4    sucralfate (CARAFATE) 1 g tablet, Take 1 tablet by mouth 3 (Three) Times a Day As Needed (heartburn)., Disp: 270 tablet, Rfl: 0    Symbicort 160-4.5 MCG/ACT inhaler, Inhale 2 puffs 2 (Two) Times a Day., Disp: , Rfl:     vitamin D3 125 MCG (5000 UT) capsule capsule, Take 1 capsule by mouth Daily., Disp: , Rfl:     Buprenorphine (Butrans) 5 MCG/HR patch weekly transdermal patch, Place 1 patch on the skin as directed by provider 1 (One) Time Per Week., Disp: 4 patch, Rfl: 0    The following portions of the patient's history were reviewed  "and updated as appropriate: allergies, current medications, past family history, past medical history, past social history, past surgical history, and problem list.      REVIEW OF PERTINENT MEDICAL DATA    3 VIEW LUMBAR SPINE     HISTORY: Low back pain.     FINDINGS: There is some very minimal disc space narrowing at L3-4. There  is some prominent spurring of the posterior facets throughout the lumbar  spine and associated with some minimal posterior subluxation at L3-4  measuring 3 mm. These findings are unchanged from 04/09/2024.     2 VIEW RIGHT HIP AND 1 VIEW AP PELVIS     HISTORY: Right hip pain.     FINDINGS: There are very severe degenerative changes involving the right  hip much more than left with marked joint space narrowing and spurring  of the acetabulum and subchondral degenerative cystic change of the  femoral head. There are also slight degenerative changes at both  sacroiliac joints.     This report was finalized on 10/28/2024 10:15 AM by Dr. Cornelio Desai M.D on Workstation: [employer]    Review of Systems   Gastrointestinal:  Positive for constipation. Negative for diarrhea.   Musculoskeletal:  Positive for arthralgias (right hip) and back pain.   Neurological:  Negative for weakness and numbness.   Psychiatric/Behavioral:  Negative for sleep disturbance and suicidal ideas. The patient is not nervous/anxious.        I have reviewed and confirmed the accuracy of the ROS as documented by the MA/LPN/RN QI Freed    Vitals:    11/06/24 1536   BP: 154/83   Pulse: 67   Temp: 98.2 °F (36.8 °C)   SpO2: 95%   Weight: 110 kg (242 lb)   Height: 177.8 cm (70\")   PainSc:   6   PainLoc: Hip         Objective       Physical Exam  Vitals and nursing note reviewed.   Constitutional:       Appearance: Normal appearance. He is obese.   HENT:      Head: Normocephalic.   Musculoskeletal:      Lumbar back: Tenderness (painful palpation over the right SI/lumbar facet joint spaces) present.        " Back:    Neurological:      Mental Status: He is alert and oriented to person, place, and time.      Cranial Nerves: Cranial nerves 2-12 are intact.      Sensory: Sensation is intact.      Motor: Motor function is intact.      Gait: Gait abnormal (ambulates slowly with use of walker).   Psychiatric:         Mood and Affect: Mood normal.         Behavior: Behavior normal.         Thought Content: Thought content normal.         Judgment: Judgment normal.       -------    Opioid Risk Tool for Male Patients    This tool should be administered to patients upon an initial visit prior to beginning opioid therapy for pain management.  The ORT has been validated for both male and female patients with chronic pain, and there are specific validated tools for each.      Fam Hx of Substance Abuse:  Alcohol=3, Illegal Drugs=3, Rx Drugs=4   TOTAL: 0  Personal History of Substance Abuse:  Alcohol=3, Illegal Drugs=4, Rx Drugs=5 TOTAL: 0  Age Between 16 - 45 years:  yes=1        TOTAL: 0  History of Preadolescent Sexual Abuse:  yes = N/a in ORT for males    TOTAL: Not Applicable  Psychological Disease:  ADD/OCD/Schizophrenia/Bipolar = 2 ; Depression=1  TOTAL: 0    SCORING TOTALS:          SUM of TOTALS: 0    Interpretation:  - score of 3 or lower indicates low risk for future opioid abuse  - score of 4 to 7 indicates moderate risk for opioid abuse  - score of 8 or higher indicates a high risk for opioid abuse.  - this assessment alone is not the only determining factor in developing a treatment plan    Citation... Dr. Johnna Maria, Pain Med. 2005; 6 (6) : 432.  -------         Assessment & Plan   Diagnoses and all orders for this visit:    1. Lumbar facet arthropathy (Primary)    2. Right sided sciatica    3. Osteoarthritis of right hip, unspecified osteoarthritis type    4. Chronic pain syndrome        --- Louis Andino reports a pain score of 6.  Given his pain assessment as noted, treatment options were discussed and the  following options were decided upon as a follow-up plan to address the patient's pain: continuation of current treatment plan for pain, prescription for opiod analgesics, and use of non-medical modalities (ice, heat, stretching and/or behavior modifications).    --- I have discussed with Dr. Vicente this patient's treatment plan as we are unable to utilize any type of NSAIDs due to left nephrectomy as well as stage III renal disease.  At this time it is recommended that Louis proceed with pharmacogenetics testing which will help us to determine which opiate medication he would respond to more optimally.  In the interim we will proceed with a low-dose trial of Butrans patch 5 mcg q. 7 days for pain.  Another consideration would be a trial of naltrexone.  --- Once the patient has completed immunotherapy he may be a candidate for consideration of interventional pain procedures as we use a very small diluted amount of contrast in his GFR indicates that he would be able to handle this.  --- Follow-up in 1 month for further evaluation and treatment recommendations to be made  --- ORT has been obtained on this patient which indicates low risk for opiate abuse/diversion      Pain / Disability Scale    The scale used for measurement of pain and/or disability for this patient was the [place] back pain disability scale.  The score was 69 on 11/06/2024           ERICA REPORT    As part of the patient's treatment plan, I am prescribing controlled substances. The patient has been made aware of appropriate use of such medications, including potential risk of somnolence, limited ability to drive and/or work safely, and the potential for dependence or overdose. It has also bee made clear that these medications are for use by this patient only, without concomitant use of alcohol or other substances unless prescribed.     Patient has completed prescribing agreement detailing terms of continued prescribing of controlled substances,  including monitoring ERICA reports, urine drug screening, and pill counts if necessary. The patient is aware that inappropriate use will results in cessation of prescribing such medications.    ERICA report has been reviewed and scanned into the patient's chart.    As the clinician, I personally reviewed the ERICA from 11/6/2024 while the patient was in the office today.    History and physical exam exhibit continued safe and appropriate use of controlled substances.       Dictated utilizing Dragon dictation.

## 2024-11-07 DIAGNOSIS — M47.816 LUMBAR FACET ARTHROPATHY: ICD-10-CM

## 2024-11-07 RX ORDER — BUPRENORPHINE 5 UG/H
1 PATCH TRANSDERMAL WEEKLY
Qty: 4 PATCH | Refills: 0 | Status: SHIPPED | OUTPATIENT
Start: 2024-11-07 | End: 2024-11-11

## 2024-11-07 RX ORDER — BUPRENORPHINE 5 UG/H
1 PATCH TRANSDERMAL WEEKLY
Qty: 4 PATCH | Refills: 0 | Status: CANCELLED | OUTPATIENT
Start: 2024-11-07

## 2024-11-07 NOTE — TELEPHONE ENCOUNTER
I spoke with Mr. Andino and informed him that a Rx for Butrans patches have been sent over to [facility] pharmacy and scheduled him a 1 month follow-up. Mr. Andino states that he is using [facility] and wanted to know if you would send the Rx to that pharmacy instead?

## 2024-11-08 ENCOUNTER — HOSPITAL ENCOUNTER (OUTPATIENT)
Dept: ONCOLOGY | Facility: HOSPITAL | Age: 64
Discharge: HOME | End: 2024-11-08
Payer: COMMERCIAL

## 2024-11-08 ENCOUNTER — OFFICE VISIT (OUTPATIENT)
Dept: ONCOLOGY | Facility: CLINIC | Age: 64
End: 2024-11-08
Payer: COMMERCIAL

## 2024-11-08 VITALS
DIASTOLIC BLOOD PRESSURE: 80 MMHG | BODY MASS INDEX: 34.13 KG/M2 | HEART RATE: 60 BPM | WEIGHT: 238.4 LBS | OXYGEN SATURATION: 96 % | HEIGHT: 70 IN | SYSTOLIC BLOOD PRESSURE: 143 MMHG

## 2024-11-08 DIAGNOSIS — C64.9 RENAL CELL CARCINOMA, UNSPECIFIED LATERALITY: ICD-10-CM

## 2024-11-08 DIAGNOSIS — C64.2 RENAL CELL CARCINOMA OF LEFT KIDNEY: Primary | ICD-10-CM

## 2024-11-08 DIAGNOSIS — L30.8 PRURITIC DERMATITIS: ICD-10-CM

## 2024-11-08 DIAGNOSIS — N17.9 AKI (ACUTE KIDNEY INJURY): ICD-10-CM

## 2024-11-08 DIAGNOSIS — D64.9 ANEMIA, UNSPECIFIED TYPE: ICD-10-CM

## 2024-11-08 DIAGNOSIS — Z95.828 PORT-A-CATH IN PLACE: ICD-10-CM

## 2024-11-08 LAB
ALP BLD-CCNC: 60 U/L (ref 53–128)
BASOPHILS # BLD AUTO: 0.05 10*3/MM3 (ref 0–0.2)
BASOPHILS NFR BLD AUTO: 0.5 % (ref 0–1.5)
BUN BLDA-MCNC: 23 MG/DL (ref 7–22)
CALCIUM BLD QL: 10.3 MG/DL (ref 8–10.3)
CHLORIDE BLDA-SCNC: 100 MMOL/L (ref 98–108)
CO2 BLDA-SCNC: 27 MMOL/L (ref 18–33)
CREAT BLDA-MCNC: 1.5 MG/DL (ref 0.6–1.2)
DEPRECATED RDW RBC AUTO: 41.3 FL (ref 37–54)
EGFRCR SERPLBLD CKD-EPI 2021: 51.3 ML/MIN/1.73
EOSINOPHIL # BLD AUTO: 0.19 10*3/MM3 (ref 0–0.4)
EOSINOPHIL NFR BLD AUTO: 2 % (ref 0.3–6.2)
ERYTHROCYTE [DISTWIDTH] IN BLOOD BY AUTOMATED COUNT: 13.2 % (ref 12.3–15.4)
GLUCOSE BLDC GLUCOMTR-MCNC: 123 MG/DL (ref 73–118)
HCT VFR BLD AUTO: 38 % (ref 37.5–51)
HGB BLD-MCNC: 12.2 G/DL (ref 13–17.7)
LYMPHOCYTES # BLD AUTO: 2.24 10*3/MM3 (ref 0.7–3.1)
LYMPHOCYTES NFR BLD AUTO: 23.2 % (ref 19.6–45.3)
MCH RBC QN AUTO: 28.2 PG (ref 26.6–33)
MCHC RBC AUTO-ENTMCNC: 32.1 G/DL (ref 31.5–35.7)
MCV RBC AUTO: 88 FL (ref 79–97)
MONOCYTES # BLD AUTO: 0.86 10*3/MM3 (ref 0.1–0.9)
MONOCYTES NFR BLD AUTO: 8.9 % (ref 5–12)
NEUTROPHILS NFR BLD AUTO: 6.31 10*3/MM3 (ref 1.7–7)
NEUTROPHILS NFR BLD AUTO: 65.4 % (ref 42.7–76)
PLATELET # BLD AUTO: 213 10*3/MM3 (ref 140–450)
PMV BLD AUTO: 10.4 FL (ref 6–12)
POC ALBUMIN: 3.4 G/L (ref 3.3–5.5)
POC ALT (SGPT): 27 U/L (ref 10–47)
POC AST (SGOT): 27 U/L (ref 11–38)
POC TOTAL BILIRUBIN: 1.1 MG/DL (ref 0.2–1.6)
POC TOTAL PROTEIN: 7.5 G/DL (ref 6.4–8.1)
POTASSIUM BLDA-SCNC: 3.8 MMOL/L (ref 3.6–5.1)
RBC # BLD AUTO: 4.32 10*6/MM3 (ref 4.14–5.8)
SODIUM BLD-SCNC: 143 MMOL/L (ref 128–145)
WBC NRBC COR # BLD AUTO: 9.65 10*3/MM3 (ref 3.4–10.8)

## 2024-11-08 PROCEDURE — 25010000002 HEPARIN LOCK FLUSH PER 10 UNITS: Performed by: STUDENT IN AN ORGANIZED HEALTH CARE EDUCATION/TRAINING PROGRAM

## 2024-11-08 PROCEDURE — 80053 COMPREHEN METABOLIC PANEL: CPT

## 2024-11-08 PROCEDURE — 85025 COMPLETE CBC W/AUTO DIFF WBC: CPT | Performed by: STUDENT IN AN ORGANIZED HEALTH CARE EDUCATION/TRAINING PROGRAM

## 2024-11-08 PROCEDURE — 25010000002 PEMBROLIZUMAB 100 MG/4ML SOLUTION 4 ML VIAL: Performed by: STUDENT IN AN ORGANIZED HEALTH CARE EDUCATION/TRAINING PROGRAM

## 2024-11-08 RX ORDER — SODIUM CHLORIDE 9 MG/ML
20 INJECTION, SOLUTION INTRAVENOUS ONCE
Status: CANCELLED | OUTPATIENT
Start: 2024-11-08

## 2024-11-08 RX ORDER — SODIUM CHLORIDE 0.9 % (FLUSH) 0.9 %
10 SYRINGE (ML) INJECTION AS NEEDED
OUTPATIENT
Start: 2024-11-08

## 2024-11-08 RX ORDER — SODIUM CHLORIDE 9 MG/ML
20 INJECTION, SOLUTION INTRAVENOUS ONCE
Status: DISCONTINUED | OUTPATIENT
Start: 2024-11-08 | End: 2024-11-09 | Stop reason: HOSPADM

## 2024-11-08 RX ORDER — HEPARIN SODIUM (PORCINE) LOCK FLUSH IV SOLN 100 UNIT/ML 100 UNIT/ML
500 SOLUTION INTRAVENOUS AS NEEDED
Status: DISCONTINUED | OUTPATIENT
Start: 2024-11-08 | End: 2024-11-09 | Stop reason: HOSPADM

## 2024-11-08 RX ORDER — SODIUM CHLORIDE 9 MG/ML
20 INJECTION, SOLUTION INTRAVENOUS ONCE
OUTPATIENT
Start: 2024-12-20

## 2024-11-08 RX ORDER — HEPARIN SODIUM (PORCINE) LOCK FLUSH IV SOLN 100 UNIT/ML 100 UNIT/ML
500 SOLUTION INTRAVENOUS AS NEEDED
Status: CANCELLED | OUTPATIENT
Start: 2024-11-08

## 2024-11-08 RX ORDER — SODIUM CHLORIDE 0.9 % (FLUSH) 0.9 %
10 SYRINGE (ML) INJECTION AS NEEDED
Status: CANCELLED | OUTPATIENT
Start: 2024-11-08

## 2024-11-08 RX ORDER — HEPARIN SODIUM (PORCINE) LOCK FLUSH IV SOLN 100 UNIT/ML 100 UNIT/ML
500 SOLUTION INTRAVENOUS AS NEEDED
OUTPATIENT
Start: 2024-11-08

## 2024-11-08 RX ORDER — SODIUM CHLORIDE 0.9 % (FLUSH) 0.9 %
10 SYRINGE (ML) INJECTION AS NEEDED
Status: DISCONTINUED | OUTPATIENT
Start: 2024-11-08 | End: 2024-11-09 | Stop reason: HOSPADM

## 2024-11-08 RX ADMIN — HEPARIN 500 UNITS: 100 SYRINGE at 13:07

## 2024-11-08 RX ADMIN — SODIUM CHLORIDE 400 MG: 900 INJECTION, SOLUTION INTRAVENOUS at 12:33

## 2024-11-08 RX ADMIN — Medication 10 ML: at 13:07

## 2024-11-08 NOTE — PROGRESS NOTES
1117 Port accessed and flushed with good blood return noted. 10cc of blood wasted prior to specimen collection. Blood specimen, cbc, cmp obtained and sent to lab for processing per protocol.  Port flushed with saline and capped.

## 2024-11-08 NOTE — PROGRESS NOTES
Pt to infusion after Dr. Lama f/u appointment.  Port already accessed and assessed with positive blood return noted.  Labs reviewed.  Tx given per MAR.  Pt tolerated well.  Pt given packets of biolyte to take next week in place of IVF.  Instructed to call if unable to get adequate hydration.  Understanding verbalized.  Pt d/c home.

## 2024-11-10 DIAGNOSIS — K21.9 GASTROESOPHAGEAL REFLUX DISEASE, UNSPECIFIED WHETHER ESOPHAGITIS PRESENT: Chronic | ICD-10-CM

## 2024-11-11 ENCOUNTER — OFFICE VISIT (OUTPATIENT)
Dept: INTERNAL MEDICINE | Facility: CLINIC | Age: 64
End: 2024-11-11
Payer: COMMERCIAL

## 2024-11-11 VITALS
HEIGHT: 70 IN | WEIGHT: 241.4 LBS | SYSTOLIC BLOOD PRESSURE: 132 MMHG | OXYGEN SATURATION: 96 % | TEMPERATURE: 97.7 F | BODY MASS INDEX: 34.56 KG/M2 | HEART RATE: 74 BPM | DIASTOLIC BLOOD PRESSURE: 80 MMHG

## 2024-11-11 DIAGNOSIS — I10 HYPERTENSION, UNSPECIFIED TYPE: Primary | ICD-10-CM

## 2024-11-11 DIAGNOSIS — M47.816 LUMBAR FACET ARTHROPATHY: ICD-10-CM

## 2024-11-11 DIAGNOSIS — E78.5 HYPERLIPIDEMIA, UNSPECIFIED HYPERLIPIDEMIA TYPE: ICD-10-CM

## 2024-11-11 PROCEDURE — 99213 OFFICE O/P EST LOW 20 MIN: CPT | Performed by: STUDENT IN AN ORGANIZED HEALTH CARE EDUCATION/TRAINING PROGRAM

## 2024-11-11 RX ORDER — SUCRALFATE 1 G/1
1 TABLET ORAL 3 TIMES DAILY PRN
Qty: 270 TABLET | Refills: 0 | Status: SHIPPED | OUTPATIENT
Start: 2024-11-11

## 2024-11-11 RX ORDER — BUPRENORPHINE 5 UG/H
1 PATCH TRANSDERMAL WEEKLY
Qty: 4 PATCH | Refills: 0 | Status: CANCELLED | OUTPATIENT
Start: 2024-11-11 | End: 2024-11-15

## 2024-11-11 NOTE — PROGRESS NOTES
"Chief Complaint  Hypertension    Subjective        Louis Andino presents to [facility]  History of Present Illness    Presents to clinic today for follow up    He is doing well since last visit, he is now established with pain medicine and has plans to start pain medication for his severe hip and lower back OA    He was seen by nephrology recently and recently switched his amlodipine to bystolic due to some adverse effects from amlodipine.       Objective   Vital Signs:  /80   Pulse 74   Temp 97.7 °F (36.5 °C) (Temporal)   Ht 177.8 cm (70\")   Wt 109 kg (241 lb 6.4 oz)   SpO2 96%   BMI 34.64 kg/m²   Estimated body mass index is 34.64 kg/m² as calculated from the following:    Height as of this encounter: 177.8 cm (70\").    Weight as of this encounter: 109 kg (241 lb 6.4 oz).            Physical Exam  Vitals reviewed.   Constitutional:       General: He is not in acute distress.     Appearance: Normal appearance. He is not toxic-appearing.   HENT:      Head: Normocephalic and atraumatic.   Eyes:      General: No scleral icterus.     Conjunctiva/sclera: Conjunctivae normal.   Skin:     General: Skin is warm and dry.   Neurological:      Mental Status: He is alert and oriented to person, place, and time.   Psychiatric:         Mood and Affect: Mood normal.         Behavior: Behavior normal.        Result Review :                Assessment and Plan   Diagnoses and all orders for this visit:    1. Hypertension, unspecified type (Primary)    2. Hyperlipidemia, unspecified hyperlipidemia type      Reviewed nephrology, pain medicine OV    Agree with bystolic change given adverse effect to CCB. Okay with BP running slightly higher but no more than 140/90. At goal today    Continue f/u with pain medicine. We will coordinate if we can expedite him to get scheduled with water aerobics.     Will see him back in 3mo, labs are pending and will follow these up    He will plan to get " colonoscopy after he completes treatment for his  RCC         Follow Up   Return in about 3 months (around 2/11/2025).  Patient was given instructions and counseling regarding his condition or for health maintenance advice. Please see specific information pulled into the AVS if appropriate.

## 2024-11-11 NOTE — TELEPHONE ENCOUNTER
Please see pharmacy note. This was deleted by accident today. I am currently working on the PA for this medication

## 2024-11-14 ENCOUNTER — TELEPHONE (OUTPATIENT)
Dept: PAIN MEDICINE | Facility: CLINIC | Age: 64
End: 2024-11-14
Payer: COMMERCIAL

## 2024-11-14 DIAGNOSIS — M47.816 LUMBAR FACET ARTHROPATHY: Primary | ICD-10-CM

## 2024-11-14 DIAGNOSIS — M16.11 OSTEOARTHRITIS OF RIGHT HIP, UNSPECIFIED OSTEOARTHRITIS TYPE: ICD-10-CM

## 2024-11-14 RX ORDER — BUPRENORPHINE 5 UG/H
1 PATCH TRANSDERMAL WEEKLY
Qty: 4 PATCH | Refills: 0 | Status: SHIPPED | OUTPATIENT
Start: 2024-11-14

## 2024-11-14 NOTE — TELEPHONE ENCOUNTER
Spoke to patient. He did not  prescription as [facility] states that the prescription was canceled. Spoke to pharmacy and it was canceled when he was seen by PCP the day after his appointment they canceled it on the medication list, which in turn canceled the prescription at the pharmacy. Please resend RX to [facility].

## 2024-11-15 ENCOUNTER — TELEPHONE (OUTPATIENT)
Dept: SLEEP MEDICINE | Facility: HOSPITAL | Age: 64
End: 2024-11-15
Payer: COMMERCIAL

## 2024-11-15 NOTE — TELEPHONE ENCOUNTER
Patient had appointment scheduled for today.  He has not been on CPAP at least 30 days yet.  I called him and let him know that I would be more than happy to see him today but he will need to be seen in the office after he has been on CPAP at least 30 to 90 days as well, per insurance compliance guidelines.  He would like to push out his appointment a couple of weeks.  He reports he is having mask leakage and the [employer] company is sending him a new mask to try.  We discussed that he should be able to return this within 30 days if he is still having leakage with this mask as well.  We discussed possible CPAP pillow.  Happy to see him back in the office at any time if he is having issues or concerns but at this point he would like to push out appointment until after being on CPAP for the full 30-day compliance period.  Patient transferred to .

## 2024-11-25 DIAGNOSIS — I10 HYPERTENSION, UNSPECIFIED TYPE: Chronic | ICD-10-CM

## 2024-11-25 RX ORDER — LOSARTAN POTASSIUM 100 MG/1
100 TABLET ORAL
Qty: 90 TABLET | Refills: 0 | Status: SHIPPED | OUTPATIENT
Start: 2024-11-25 | End: 2025-02-24

## 2024-12-05 ENCOUNTER — OFFICE VISIT (OUTPATIENT)
Dept: PAIN MEDICINE | Facility: CLINIC | Age: 64
End: 2024-12-05
Payer: MEDICARE

## 2024-12-05 VITALS
HEIGHT: 70 IN | DIASTOLIC BLOOD PRESSURE: 91 MMHG | WEIGHT: 243.8 LBS | RESPIRATION RATE: 20 BRPM | SYSTOLIC BLOOD PRESSURE: 172 MMHG | OXYGEN SATURATION: 98 % | TEMPERATURE: 97 F | BODY MASS INDEX: 34.9 KG/M2 | HEART RATE: 67 BPM

## 2024-12-05 DIAGNOSIS — M47.816 LUMBAR FACET ARTHROPATHY: ICD-10-CM

## 2024-12-05 DIAGNOSIS — M54.31 RIGHT SIDED SCIATICA: Primary | ICD-10-CM

## 2024-12-05 DIAGNOSIS — G89.4 CHRONIC PAIN SYNDROME: ICD-10-CM

## 2024-12-05 DIAGNOSIS — M16.11 OSTEOARTHRITIS OF RIGHT HIP, UNSPECIFIED OSTEOARTHRITIS TYPE: ICD-10-CM

## 2024-12-05 LAB
POC AMPHETAMINES: NEGATIVE
POC BARBITURATES: NEGATIVE
POC BENZODIAZEPHINES: NEGATIVE
POC COCAINE: NEGATIVE
POC METHADONE: NEGATIVE
POC METHAMPHETAMINE SCREEN URINE: NEGATIVE
POC OPIATES: NEGATIVE
POC OXYCODONE: NEGATIVE
POC PHENCYCLIDINE: NEGATIVE
POC PROPOXYPHENE: NEGATIVE
POC THC: NEGATIVE
POC TRICYCLIC ANTIDEPRESSANTS: NEGATIVE

## 2024-12-05 NOTE — PROGRESS NOTES
CHIEF COMPLAINT  Hip pain  Pt reports improved pain since last OV.    Subjective   Louis Andino is a 64 y.o. male  who presents for follow-up.  He has a history of right hip, low back and left shoulder pain.  The patient is pleased to report significant improvement of pain as compared to his last office visit as he has been placed on the Butrans 5 mcg patch.  Patient continues with severe pain within the right hip due to end-stage OA and is need of a hip replacement however that has been placed on hold.  Patient was diagnosed with renal cell carcinoma in December 2023 and continues with immunotherapy which hopefully should finish by February 2025.  Continues to have intermittent right sided sciatica pain stemming from favoring the hip as well as pain that radiates into the right low back, posterior buttock and into the posterior aspect of the leg terminating at the calf.    Medical history significant for renal cell carcinoma and he subsequently underwent left nephrectomy and adrenalectomy on 12/18/2023.  He is currently undergoing immunotherapy every 6 weeks with pembrolizumab for RCC stage III.     Current medication regimen consists of Butrans 5 mcg patch q. 7 days which he tolerates well without adverse effects.  He is pleased to report greater than 50% reduction of pain ongoing with the use of the patch.  Failed previous trial of tramadol and hydrocodone.  He did tolerate oxycodone 5 mg which he usually took 1/4 tablet daily.    Pain today 2/10 VAS in severity.    Back Pain  This is a chronic problem. The current episode started more than 1 year ago. The problem occurs constantly. The problem has been improved since onset. The pain is present in the lumbar spine and sacro-iliac. The quality of the pain is described as aching and burning. The pain radiates to the right buttock, right thigh and right anterolateral lower leg. The pain is at a severity of 2/10. The pain is mild. The pain is The same all the  time. The symptoms are aggravated by sitting, position and standing. Associated symptoms include leg pain. Pertinent negatives include no abdominal pain, chest pain, fever, headaches, numbness or weakness. Risk factors include history of cancer, obesity, sedentary lifestyle, poor posture and lack of exercise. He has tried ice and heat for the symptoms. The treatment provided mild relief.   Hip Pain   There was no injury mechanism. The pain is present in the right hip. The quality of the pain is described as aching. The pain is at a severity of 2/10. The pain is mild. The pain has been Constant since onset. Associated symptoms include an inability to bear weight. Pertinent negatives include no numbness. The symptoms are aggravated by movement and weight bearing.        PEG Assessment   What number best describes your pain on average in the past week?2  What number best describes how, during the past week, pain has interfered with your enjoyment of life?2  What number best describes how, during the past week, pain has interfered with your general activity?  3    Review of Pertinent Medical Data ---  3 VIEW LUMBAR SPINE     HISTORY: Low back pain.     FINDINGS: There is some very minimal disc space narrowing at L3-4. There  is some prominent spurring of the posterior facets throughout the lumbar  spine and associated with some minimal posterior subluxation at L3-4  measuring 3 mm. These findings are unchanged from 04/09/2024.     2 VIEW RIGHT HIP AND 1 VIEW AP PELVIS     HISTORY: Right hip pain.     FINDINGS: There are very severe degenerative changes involving the right  hip much more than left with marked joint space narrowing and spurring  of the acetabulum and subchondral degenerative cystic change of the  femoral head. There are also slight degenerative changes at both  sacroiliac joints.     This report was finalized on 10/28/2024 10:15 AM by Dr. Cornelio Desai M.D on Workstation: [id number]    The following  "portions of the patient's history were reviewed and updated as appropriate: allergies, current medications, past family history, past medical history, past social history, past surgical history, and problem list.    Review of Systems   Constitutional:  Negative for activity change, fatigue and fever.   Respiratory:  Negative for cough and chest tightness.    Cardiovascular:  Negative for chest pain.   Gastrointestinal:  Positive for constipation (occ). Negative for abdominal pain and diarrhea.   Musculoskeletal:  Positive for back pain.   Neurological:  Negative for dizziness, weakness, light-headedness, numbness and headaches.   Psychiatric/Behavioral:  Negative for agitation, sleep disturbance and suicidal ideas. The patient is not nervous/anxious.      I have reviewed and confirmed the accuracy of the ROS as documented by the MA/LPN/RN QI Freed  Vitals:    12/05/24 1510   BP: 172/91   BP Location: Right arm   Patient Position: Sitting   Cuff Size: Large Adult   Pulse: 67   Resp: 20   Temp: 97 °F (36.1 °C)   TempSrc: Temporal   SpO2: 98%   Weight: 111 kg (243 lb 12.8 oz)   Height: 177.8 cm (70\")   PainSc:   2         Objective   Physical Exam  Vitals and nursing note reviewed.   Constitutional:       Appearance: Normal appearance. He is obese.   HENT:      Head: Normocephalic.   Musculoskeletal:      Lumbar back: Tenderness (painful palpation over the right SI/lumbar facet joint spaces) present.        Back:    Neurological:      Mental Status: He is alert and oriented to person, place, and time.      Cranial Nerves: Cranial nerves 2-12 are intact.      Sensory: Sensation is intact.      Motor: Motor function is intact.      Gait: Gait abnormal (ambulates slowly with use of walker).   Psychiatric:         Mood and Affect: Mood normal.         Behavior: Behavior normal.         Thought Content: Thought content normal.         Judgment: Judgment normal.             Assessment & Plan   Diagnoses and all " orders for this visit:    1. Right sided sciatica (Primary)  -     Urine Drug Screen Confirmation - Urine, Clean Catch; Future  -     POC Urine Drug Screen, Triage    2. Lumbar facet arthropathy  -     Urine Drug Screen Confirmation - Urine, Clean Catch; Future  -     POC Urine Drug Screen, Triage    3. Chronic pain syndrome  -     Urine Drug Screen Confirmation - Urine, Clean Catch; Future  -     POC Urine Drug Screen, Triage    4. Osteoarthritis of right hip, unspecified osteoarthritis type        Louis Andino reports a pain score of 2.  Given his pain assessment as noted, treatment options were discussed and the following options were decided upon as a follow-up plan to address the patient's pain: continuation of current treatment plan for pain and use of non-medical modalities (ice, heat, stretching and/or behavior modifications).      --- Follow-up in 1 month or sooner for medication management  --- Continue Butrans 5 mcg patch.  He does not require prescription refill on today. Patient appears stable with current regimen. No adverse effects. Regarding continuation of opioids, there is no evidence of aberrant behavior or any red flags.  The patient continues with appropriate response to opioid therapy. ADL's remain intact by self.   --- Low risk for opiate abuse/diversion  ---Once the patient has completed immunotherapy he is a candidate for consideration of interventional pain procedures as we use a very small diluted amount of contrast in his GFR indicates that he would be able to handle this.         Routine UDS in office today as part of monitoring requirements for controlled substances.  The specimen was viewed and the immunoassay result reviewed and is probably negative as Butrans is not detected on the POCT.  This specimen will be sent to [facility] laboratory for confirmation.       The patient has signed a copy of our prescribing agreement.  The has stated both verbal and written understanding  that prescription pain medication may be stopped if - pain does not significantly decrease and/or function increase, there is evidence of diverting medication, if He obtained controlled substances from another licensed practitioner without my knowledge and approval, if I feel that it is in the patient's best interest, if He exhibits any aggressive behavior to any provider or staff member in this office.  The patient agrees to only use the medication exactly as prescribed, to fill controlled substances at the same pharmacy, to keep medication in the original container, and to store controlled substances in a locked cabinet or other secure storage unit. The patient agrees to notify the office immediately if medication is lost or stolen and will be asked to produce an official police report prior to replacing/continuing lost/stolen controlled substances.  The patient understands that there will be routine monitoring including urine drug screens, pill counts, and review of pharmacy report.  The patient understands that He may be asked to submit to random drug screen or pill count. The patient will not seek early refills.  The patient will not use illegal street drugs while receiving controlled substances from this practice.  The patient understands that failure to adhere with this agreement may result in cessation of therapy with controlled substance prescribing.              ERICA REPORT  As part of the patient's treatment plan, I am prescribing controlled substances. The patient has been made aware of appropriate use of such medications, including potential risk of somnolence, limited ability to drive and/or work safely, and the potential for dependence or overdose. It has also been made clear that these medications are for use by this patient only, without concomitant use of alcohol or other substances unless prescribed.     Patient has completed prescribing agreement detailing terms of continued prescribing of  controlled substances, including monitoring ERICA reports, urine drug screening, and pill counts if necessary. The patient is aware that inappropriate use will results in cessation of prescribing such medications.    As the clinician, I personally reviewed the ERICA from 12/5/24 while the patient was in the office today.    History and physical exam exhibit continued safe and appropriate use of controlled substances.     Dictated utilizing Dragon dictation.

## 2024-12-11 DIAGNOSIS — M16.11 OSTEOARTHRITIS OF RIGHT HIP, UNSPECIFIED OSTEOARTHRITIS TYPE: ICD-10-CM

## 2024-12-11 DIAGNOSIS — M47.816 LUMBAR FACET ARTHROPATHY: ICD-10-CM

## 2024-12-11 RX ORDER — BUPRENORPHINE 5 UG/H
1 PATCH TRANSDERMAL WEEKLY
Qty: 4 PATCH | Refills: 0 | Status: SHIPPED | OUTPATIENT
Start: 2024-12-11

## 2024-12-16 DIAGNOSIS — I10 HYPERTENSION, UNSPECIFIED TYPE: Primary | ICD-10-CM

## 2024-12-16 RX ORDER — METOPROLOL SUCCINATE 25 MG/1
25 TABLET, EXTENDED RELEASE ORAL DAILY
Qty: 90 TABLET | Refills: 1 | Status: SHIPPED | OUTPATIENT
Start: 2024-12-16

## 2024-12-17 NOTE — PROGRESS NOTES
HEMATOLOGY ONCOLOGY OUTPATIENT FOLLOW UP       Patient name: Louis Andino  : 1960  MRN: [id number]  Primary Care Physician: Harry Hsu MD  Referring Physician: Harry Hsu MD  Reason For Consult: Renal cell carcinoma      History of Present Illness:  Patient is a 64 y.o. male with RCC.    Patient initially presented with hematuria.  During hospitalization he had a CT of his abdomen which showed a large left lower pole renal mass with possible adrenal metastasis.    2023 CT abdomen pelvis with contrast showing mass arising from the lower pole of the left kidney consistent with renal cell carcinoma.  Associated uroepithelial thickening of the pelvis and proximal ureter.  Enhancing indeterminate left adrenal nodule potential metastasis no retroperitoneal lymphadenopathy.    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  Vessel with clear-cell renal cell carcinoma tumor thrombus    24 - pembrolizumab  3/1/24 - pembro  24 - increased pembro 400 mg  24 - pembro 400 mg  24 - pembro 400 mg  24 - was admitted with syncopal episode, ECHO was normal, CTA neck negative, has a Zio patch, will be seen by cardiology.  Morning metoprolol was stopped.    24: CT Chest Abd Pelvis WO Contrast:    IMPRESSION:     Status post left nephrectomy. No metastatic disease identified by  unenhanced imaging. Continued follow-up advised as indicated.      2024: Patient seen today for initial evaluation by me. He was preiovulsy being seen by Dr. Prakash in our practice.   He is on adjuvant Immunotherapy with pembrolizumab for RCC stage III. He has  had fairly good tolerance to IO therapy except for some adverse effects, Reported having persistent diffuse pruritus and Dermographism which has not improved significantly with PRN Antihistamines. He however denied any significant impairment of his quality of life with these symptoms. Reported some dizziness/ preSyncopal Episodes following immunotherapy. He was admitted to [facility] for these symptoms, extensive workup for neurocardiogenic and endocrine etiology was reported unremarkable at this time. He is status post Restaging scans as above.  He is scheduled to receive IV Fluids on 8/19/24 after treatment today in view of his Symptoms.    9/27/2024: Patient seen today for follow-up. Stated that he has tolerated immunotherapy much better after the last infusion.  Skin rash and dizziness are better controlled today.  Acute issues reported.      11/8/24: Improved itching with over-the-counter lotion.  Has recently started CPAP for sleep apnea.  Reported having better sleep at night and feeling refreshed during daytime.  Constipation has improved.  No other significant treatment-related side effects reported.  He is planning to travel to [place] in early December.      Subjective:  12/20/24: Seen today for follow-up visit prior to scheduled treatment.  No new complaints reported today.  Continues to have good tolerance to immunotherapy but reported having some fatigue and weakness for short duration thereafter.  Reported having presyncopal episodes recently, likely secondary to aggressive BP management following which his BP medications were adjusted.    The patient has a documented plan of care to address pain.       Past Medical History:   Diagnosis Date    Anemia 12/18/2023    Due to surgery. Required transfusions    Ankle sprain     Multiple-both ankles over the years    Asthma     Chronic pain disorder 2020    Right hip and back    Clotting disorder     Required blood transfusion during and after surgery in excess of  expected    Coronary artery disease Dec 2023    Bifascicular block    Deep vein thrombosis 12/18/2023    During cancer surgery a clot was surgically removed from the inferior vena cava which was cancer related    Emphysema/COPD     Erectile dysfunction Several years    Extremity pain 2020    Right hip and bilat shoulders    Fracture of ankle     Left ankle as a child    Fracture of wrist     Left wrist as a child    GERD (gastroesophageal reflux disease)     Headache, tension-type 1977    Rare    HL (hearing loss)     Progressive worsening over many years    Hyperglycemia 10/12/2023    Hyperlipidemia 10/12/2023    Hypertension     Injury of neck 2023    Previous fractures noted during chiropractic visit. Probably secondary to shoulder injury.    Joint pain 2020    Right hip    Low back pain     Worse last few year    Neck pain 2020    Mild    Obesity     Pneumonia 12/20/2023    Developed while hospitalized for cancer surgery    Prostate disease     Rash     Allergic reactions to laundry detergent and mild generalized itching secondary to immunotherapy.    Renal cell carcinoma 12/30/2023    Renal insufficiency 4/9/2024    Decreased renal function since nephrectomy in December, 2023, secondary to renal cancer, but not diagnosed as chronic kidney disease until recent visit. Kidney functions have been stable since surgery, but are now being considered permanent.    Shoulder injury 1980s    Injured in     Sleep apnea     Visual impairment     Wear glasses for near and distant vision    Vitreous degeneration of right eye     Formatting of this note might be different from the original. Retinal tear and detachment warning symptoms reviewed and patient instructed to call immediately if increasing floaters, flashes, or decreasing peripheral vision. No retinal detachment or retinal tear noted. Recheck in 1 month with dilated exam.       Past Surgical History:   Procedure Laterality Date    ABDOMINAL SURGERY  December  18, 2023    Left Kidney and Adrenal Gland removed due to Renal Cell Carcinoma    NEPHRECTOMY Left 12/18/2023    Procedure: NEPHRECTOMY RADICAL WITH CAVAL THROMBECTOMY;  Surgeon: James Esquivel MD;  Location: [facility] MAIN OR;  Service: Urology;  Laterality: Left;    PORTACATH PLACEMENT  01/30/2024    SKIN LESION EXCISION  1990         Current Outpatient Medications:     bisoprolol (ZEBeta) 5 MG tablet, Take 1 tablet (5 mg total) by mouth 1 (one) time each day, Disp: 90 tablet, Rfl: 3    budesonide (PULMICORT) 0.5 MG/2ML nebulizer solution, Take 2 mL (one vial) by nebulization 2 (Two) Times a Day., Disp: 180 mL, Rfl: 1    Buprenorphine (BUTRANS) 5 MCG/HR patch weekly transdermal patch, Place 1 patch on the skin as directed by provider 1 (One) Time Per Week., Disp: 4 patch, Rfl: 0    Diclofenac Sodium (VOLTAREN) 1 % gel gel, Apply 4 g topically to the appropriate area as directed 4 (Four) Times a Day., Disp: 50 g, Rfl: 0    hydrocortisone (ANUSOL-HC) 25 MG suppository, Insert 1 suppository into the rectum 2 (Two) Times a Day., Disp: 20 suppository, Rfl: 0    ipratropium-albuterol (COMBIVENT RESPIMAT)  MCG/ACT inhaler, Inhale 1 puff 4 (Four) Times a Day As Needed for Wheezing., Disp: , Rfl:     lidocaine-prilocaine (EMLA) 2.5-2.5 % cream, Apply 1 Application topically to the appropriate area as directed See Admin Instructions. Apply to port site one hour prior to port being accessed., Disp: 30 g, Rfl: 3    losartan (COZAAR) 100 MG tablet, Take 1 tablet by mouth Daily for 90 days., Disp: 90 tablet, Rfl: 0    metoprolol succinate XL (TOPROL-XL) 25 MG 24 hr tablet, Take 1 tablet by mouth Daily., Disp: 90 tablet, Rfl: 1    multivitamin with minerals tablet tablet, Take 1 tablet by mouth Daily., Disp: , Rfl:     ondansetron (ZOFRAN) 8 MG tablet, Take 1 tablet by mouth Every 8 (Eight) Hours As Needed for Nausea or Vomiting., Disp: 30 tablet, Rfl: 2    pantoprazole (PROTONIX) 40 MG EC tablet, Take 1 tablet by mouth  Daily., Disp: 90 tablet, Rfl: 0    Plecanatide (Trulance) 3 MG tablet, TAKE 1 TABLET BY MOUTH DAILY, Disp: 30 tablet, Rfl: 4    sucralfate (CARAFATE) 1 g tablet, Take 1 tablet by mouth 3 (Three) Times a Day As Needed (heartburn)., Disp: 270 tablet, Rfl: 0    Symbicort 160-4.5 MCG/ACT inhaler, Inhale 2 puffs 2 (Two) Times a Day., Disp: , Rfl:     vitamin D3 125 MCG (5000 UT) capsule capsule, Take 1 capsule by mouth Daily., Disp: , Rfl:     No Known Allergies    Family History   Problem Relation Age of Onset    Arthritis Mother     Cancer Mother     Diabetes Mother     Heart disease Mother     Hyperlipidemia Mother     Miscarriages / Stillbirths Mother         Stillborn child    Hypertension Mother     Osteoporosis Mother     Kidney disease Mother         Kidney stones    Vision loss Mother         Near sighted    COPD Father     Hyperlipidemia Father     Migraines Father     Vision loss Father         Near sighted    Hyperlipidemia Brother     Hypertension Brother     Asthma Brother     Vision loss Daughter     Broken bones Daughter         Broke left forearm three times during childhood    Broken bones Daughter         Fractured right femur and right forearm during childhood    Anxiety disorder Daughter     Depression Daughter     Miscarriages / Stillbirths Daughter         First Trimester miscarriage    Dislocations Daughter         Recurrent radial head subluxations as a child    Anxiety disorder Daughter     Depression Daughter     Asthma Daughter     Asthma Son     Depression Son        Cancer-related family history includes Cancer in his mother.      Social History     Tobacco Use    Smoking status: Never     Passive exposure: Past    Smokeless tobacco: Never    Tobacco comments:     Passive due to Father and most male relatives smoking   Vaping Use    Vaping status: Never Used   Substance Use Topics    Alcohol use: Yes     Alcohol/week: 1.0 standard drink of alcohol     Types: 1 Glasses of wine per week      "Comment: rare    Drug use: Never     Social History     Social History Narrative    Not on file       ROS:   Review of Systems   Constitutional:  Negative for fatigue and fever.   HENT:  Negative for congestion and nosebleeds.    Eyes:  Negative for pain and itching.   Respiratory:  Negative for cough and shortness of breath.    Cardiovascular:  Negative for chest pain.   Gastrointestinal:  Negative for abdominal pain, blood in stool, diarrhea, nausea and vomiting.   Endocrine: Negative for cold intolerance and heat intolerance.   Genitourinary:  Negative for difficulty urinating.   Musculoskeletal:  Negative for arthralgias.   Skin:  Negative for rash.   Neurological:  Negative for dizziness and headaches.   Hematological:  Does not bruise/bleed easily.   Psychiatric/Behavioral:  Negative for behavioral problems.    pruritis    Objective:    Vital Signs:  Vitals:    12/20/24 1216   BP: 151/91   Pulse: 51   SpO2: 98%   Weight: 111 kg (244 lb 6.4 oz)   Height: 177.8 cm (70\")   PainSc: 0-No pain             Body mass index is 35.07 kg/m².    ECOG  (0) Fully active, able to carry on all predisease performance without restriction    Physical Exam:   Physical Exam  Constitutional:       Appearance: Normal appearance.   HENT:      Head: Normocephalic and atraumatic.   Eyes:      Pupils: Pupils are equal, round, and reactive to light.   Cardiovascular:      Rate and Rhythm: Normal rate and regular rhythm.      Pulses: Normal pulses.      Heart sounds: No murmur heard.  Pulmonary:      Effort: Pulmonary effort is normal.      Breath sounds: Normal breath sounds.   Abdominal:      General: There is no distension.      Palpations: Abdomen is soft. There is no mass.      Tenderness: There is no abdominal tenderness.   Musculoskeletal:         General: Normal range of motion.      Cervical back: Normal range of motion and neck supple.   Skin:     General: Skin is warm.   Neurological:      General: No focal deficit present.     " " Mental Status: He is alert.   Psychiatric:         Mood and Affect: Mood normal.         Lab Results - Last 18 Months   Lab Units 12/20/24  1225 11/08/24  1117 09/27/24  0946   WBC 10*3/mm3 10.95* 9.65 8.84   HEMOGLOBIN g/dL 11.7* 12.2* 12.9*   HEMATOCRIT % 37.1* 38.0 40.0   PLATELETS 10*3/mm3 181 213 163   MCV fL 89.4 88.0 89.1     Lab Results - Last 18 Months   Lab Units 12/20/24  1216 11/08/24  1125 09/27/24  0957 08/16/24  1101 07/13/24  0507 07/12/24  1357 07/12/24  1351   SODIUM mmol/L  --   --   --  140 141  --  142   POTASSIUM mmol/L  --   --   --  4.2 4.3  --  4.0   CHLORIDE mmol/L  --   --   --  104 105  --  106   CO2 mmol/L  --   --   --  22.4 24.9  --  24.0   BUN mg/dL  --   --   --  25* 24*  --  31*   CREATININE mg/dL 1.50* 1.50* 1.40* 1.40* 1.48*   < > 1.52*   CALCIUM mg/dL  --   --   --  10.1 9.6  --  10.4   BILIRUBIN mg/dL  --   --   --  0.8 0.8  --  0.8   ALK PHOS U/L  --   --   --  68 63  --  74   ALT (SGPT) U/L  --   --   --  18 15  --  18   AST (SGOT) U/L  --   --   --  21 13  --  17   GLUCOSE mg/dL  --   --   --  133* 89  --  101*    < > = values in this interval not displayed.       Lab Results   Component Value Date    GLUCOSE 133 (H) 08/16/2024    BUN 25 (H) 08/16/2024    CREATININE 1.50 (H) 11/08/2024    BCR 17.9 08/16/2024    K 4.2 08/16/2024    CO2 22.4 08/16/2024    CALCIUM 10.1 08/16/2024    PROTENTOTREF 7.0 01/09/2024    ALBUMIN 4.6 08/16/2024    LABIL2 1.2 01/09/2024    AST 21 08/16/2024    ALT 18 08/16/2024       Lab Results - Last 18 Months   Lab Units 07/12/24  1351 01/30/24  0827 12/18/23  1722   INR  1.03 1.01 1.08   APTT seconds 27.5 25.3 28.7       Lab Results   Component Value Date    IRON 12 (L) 12/20/2023    TIBC 145 (L) 12/20/2023       No results found for: \"FOLATE\"    No results found for: \"OCCULTBLD\"    No results found for: \"RETICCTPCT\"  No results found for: \"ZPMJNBVQ67\"  No results found for: \"SPEP\", \"UPEP\"  No results found for: \"LDH\", \"URICACID\"  No results found " "for: \"OSKAR\", \"RF\", \"SEDRATE\"  Lab Results   Component Value Date    FIBRINOGEN 381 12/18/2023     Lab Results   Component Value Date    PTT 27.5 07/12/2024    INR 1.03 07/12/2024     No results found for: \"\"  No results found for: \"CEA\"  No components found for: \"CA-19-9\"  No results found for: \"PSA\"  No results found for: \"SEDRATE\"       Assessment & Plan     Patient is a 63-year-old male with stage IIIb T3b RCC status post radical nephrectomy    Renal cell carcinoma  Status post nephrectomy, CT chest with no evidence of metastasis questionable adrenal metastasis on scan status post adrenalectomy with no evidence of metastatic disease  Given patient's stage III clear-cell RCC, discussed adjuvant treatment with pembrolizumab based on the findings from keynote 564 trial with significant improvement in disease-free survival as compared to placebo for stage III clear-cell RCC.  Started Immunotherapy, G1 - pruritus otherwise good tolerance. Currently on  400 mg every 6 weeks.   Has ongoing pruritus no rash, takes benadryl.   -restaging scans as above, reported ANA. Continue surveillance every 6 months.  -Patient tolerating treatment well, continue immunotherapy every 6 weeks for total 1 year.  Discussed with patient and wife regarding duration of treatment.  -Continue treatment for 1 additional cycle, due on 1/31/2025.     Pruritus  G1, continue immunotherapy  Use moisturizer stable  No significant worsening, continue same.  He will try atarax instead of benadryl  Patient reported significant improvement in symptoms with use of an over-the-counter lotion.    Will consider use of Leukotriene inhibitors on follow up if no improvement.      Syncope  Ziopatch on  F/u with cardiology  Has increased symptoms after immunotherapy  Patient is s/p hospital admission and evaluation as above.  Patient has been previously receiving normal saline 2-3 days after each infusion.  This has been helping him.  Held due to shortage of " IV fluids.  Encouraged patient on improved oral intake.    Anemia:  Noted mild anemia with Hb 12.2, MCV 88.  Iron panel is suggestive of iron deficiency anemia.  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 Calixto] .    Follow up in 6 weeks with APRN with scheduled treatment.  MD follow-up in 8-9 weeks with repeat imaging.  Sooner as needed.    Thank you very much for providing the opportunity to participate in this patient’s care. Please do not hesitate to call if there are any other questions.

## 2024-12-18 ENCOUNTER — OFFICE VISIT (OUTPATIENT)
Dept: SLEEP MEDICINE | Facility: HOSPITAL | Age: 64
End: 2024-12-18
Payer: COMMERCIAL

## 2024-12-18 VITALS — BODY MASS INDEX: 34.79 KG/M2 | OXYGEN SATURATION: 96 % | HEART RATE: 70 BPM | HEIGHT: 70 IN | WEIGHT: 243 LBS

## 2024-12-18 DIAGNOSIS — E66.811 CLASS 1 OBESITY WITH BODY MASS INDEX (BMI) OF 34.0 TO 34.9 IN ADULT, UNSPECIFIED OBESITY TYPE, UNSPECIFIED WHETHER SERIOUS COMORBIDITY PRESENT: ICD-10-CM

## 2024-12-18 DIAGNOSIS — G47.33 SEVERE OBSTRUCTIVE SLEEP APNEA: Primary | ICD-10-CM

## 2024-12-18 PROCEDURE — G0463 HOSPITAL OUTPT CLINIC VISIT: HCPCS

## 2024-12-18 PROCEDURE — 99214 OFFICE O/P EST MOD 30 MIN: CPT | Performed by: NURSE PRACTITIONER

## 2024-12-18 NOTE — PROGRESS NOTES
"  [facility]  [address]  [address]  [place] [address]  Phone   Fax         SLEEP CLINIC FOLLOW-UP PROGRESS NOTE    Louis Andino  [id number]   1960  64 y.o.  male      PCP: Harry Hsu MD    DATE OF VISIT: 12/18/2024          CHIEF COMPLAINT: Severe obstructive sleep apnea    HPI:  This is a 64 y.o. year old patient who presents to the clinic today for the management of obstructive sleep apnea.  Patient had a(n) home sleep study 9/2024 showing severe obstructive sleep apnea with AHI of 66/hr overall, 98/hr when supine.lowest SpO2 74% with 23.3 minutes spent with O2 sats below 89% the night of the study.  This patient is using positive airway pressure therapy with auto CPAP at 8 to 16 cm H2O with EPR full-time at 2.   Patient's sleep apnea has improved with this therapy with residual AHI 7.3/hr on 30-day download as of 12/16/2024, improved further to 5.5/h on 2-week download in the setting of decreased mask leak.   Average usage is 9 hours 3 minutes per night on nights used.   Patient tolerating device well and improved with treatment.  His wife is with him in the office today, per patient preference.  He really feels he is doing well with his current fullface mask style.  Notices improvement in sleep and daytime energy levels.  Has been able to take on some additional projects during the day due to improvement in energy.          MEDICATIONS: reviewed     ALLERGIES:  Patient has no known allergies.    SOCIAL HISTORY (habits pertaining to sleep medicine):  Tobacco use: No   Alcohol use: 1-2 per month  Caffeine use: 4     REVIEW OF SYSTEMS:   Pertinent positive symptoms are:  [place] Sleepiness Scale :Total score: 6         PHYSICAL EXAMINATION:  CONSTITUTIONAL:  Vitals:    12/18/24 1010   Pulse: 70   SpO2: 96%   Weight: 110 kg (243 lb)   Height: 177.8 cm (70\")    Body mass index is 34.87 kg/m².   HEAD: atraumatic, normocephalic  RESP SYSTEM: not in " respiratory distress, breathing unlabored  CARDIOVASULAR: normal rate, no edema noted   NEURO: Alert and oriented x 3, mood and affect appeared appropriate      DATA REVIEWED:  The PAP compliance summary downloaded on 12/16/2024 has been reviewed independently by me and discussed with the patient.   Compliance: 100%  More than 4 hr use: 100%  Average use of the device: 9 hours 30 minutes per night  Residual AHI: 7.3/hr --- improved 5.5/hr on 2 week download with decreased mask leak  Device: ResMed air sense 11  Mask type: Fullface  DME: [employer]          ASSESSMENT AND PLAN:  Obstructive Sleep Apnea: sleep apnea has improved with the device and treatment.  Patient has excellent compliance with the device for treatment of sleep apnea.  I have personally reviewed the smart card download and discussed the download data with the patient and encouarged continued use of the device.  The residual AHI is acceptable. The device is benefiting the patient and the device is medically necessary.  Recommend patient get supplies from the DME company, change them on a regular basis, and clean as directed.  A prescription for supplies has been sent to the [employer].  Recommend continued usage of PAP device, most insurances require minimum usage of 4 hours per night at least 70% of the time, would recommend using all night every night if possible for optimum health.  Recommend prioritizing sleep to aid in overall health.  Do not drive or operate heavy machinery or do activities that require high concentration if feeling tired or drowsy.  Nocturnal hypoxia: In the setting of severe untreated sleep apnea on home sleep study.  Patient would like to proceed with overnight oximetry study on room air with PAP to ensure improved with use of PAP device.  Will reach out to DME company if they have not heard anything within 1 week.  Obesity: Body mass index is 34.87 kg/m².. Patients who are overweight or obese are at increased risk of sleep  apnea/ sleep disordered breathing. Weight reduction and healthy lifestyle are encouraged in overweight/ obese patients as part of a comprehensive approach to sleep apnea treatment.       Patient will follow-up in 3 months or follow-up sooner for any issues or concerns.  Patient's questions were answered.        Thank you for allowing me to participate in the care of this patient.     Rossy Jama DNP, [facility] Sleep Medicine

## 2024-12-19 DIAGNOSIS — C64.2 RENAL CELL CARCINOMA OF LEFT KIDNEY: Primary | ICD-10-CM

## 2024-12-20 ENCOUNTER — HOSPITAL ENCOUNTER (OUTPATIENT)
Dept: ONCOLOGY | Facility: HOSPITAL | Age: 64
Discharge: HOME | End: 2024-12-20
Payer: COMMERCIAL

## 2024-12-20 ENCOUNTER — OFFICE VISIT (OUTPATIENT)
Dept: ONCOLOGY | Facility: CLINIC | Age: 64
End: 2024-12-20
Payer: COMMERCIAL

## 2024-12-20 VITALS
BODY MASS INDEX: 34.99 KG/M2 | WEIGHT: 244.4 LBS | DIASTOLIC BLOOD PRESSURE: 91 MMHG | OXYGEN SATURATION: 98 % | SYSTOLIC BLOOD PRESSURE: 151 MMHG | HEART RATE: 51 BPM | HEIGHT: 70 IN

## 2024-12-20 DIAGNOSIS — C64.9 RENAL CELL CARCINOMA, UNSPECIFIED LATERALITY: ICD-10-CM

## 2024-12-20 DIAGNOSIS — D50.0 IRON DEFICIENCY ANEMIA DUE TO CHRONIC BLOOD LOSS: ICD-10-CM

## 2024-12-20 DIAGNOSIS — Z13.79 ENCOUNTER FOR PHARMACOGENETIC TESTING: ICD-10-CM

## 2024-12-20 DIAGNOSIS — C64.2 RENAL CELL CARCINOMA OF LEFT KIDNEY: ICD-10-CM

## 2024-12-20 DIAGNOSIS — C64.2 RENAL CELL CARCINOMA OF LEFT KIDNEY: Primary | ICD-10-CM

## 2024-12-20 DIAGNOSIS — D64.9 ANEMIA, UNSPECIFIED TYPE: ICD-10-CM

## 2024-12-20 DIAGNOSIS — N17.9 AKI (ACUTE KIDNEY INJURY): ICD-10-CM

## 2024-12-20 DIAGNOSIS — Z95.828 PORT-A-CATH IN PLACE: ICD-10-CM

## 2024-12-20 DIAGNOSIS — L30.8 PRURITIC DERMATITIS: ICD-10-CM

## 2024-12-20 LAB
ALP BLD-CCNC: 63 U/L (ref 53–128)
BASOPHILS # BLD AUTO: 0.07 10*3/MM3 (ref 0–0.2)
BASOPHILS NFR BLD AUTO: 0.6 % (ref 0–1.5)
BUN BLDA-MCNC: 20 MG/DL (ref 7–22)
CALCIUM BLD QL: 10 MG/DL (ref 8–10.3)
CHLORIDE BLDA-SCNC: 109 MMOL/L (ref 98–108)
CO2 BLDA-SCNC: 29 MMOL/L (ref 18–33)
CREAT BLDA-MCNC: 1.5 MG/DL (ref 0.6–1.2)
DEPRECATED RDW RBC AUTO: 42.1 FL (ref 37–54)
EGFRCR SERPLBLD CKD-EPI 2021: 51 ML/MIN/1.73
EOSINOPHIL # BLD AUTO: 0.25 10*3/MM3 (ref 0–0.4)
EOSINOPHIL NFR BLD AUTO: 2.3 % (ref 0.3–6.2)
ERYTHROCYTE [DISTWIDTH] IN BLOOD BY AUTOMATED COUNT: 13.2 % (ref 12.3–15.4)
FERRITIN SERPL-MCNC: 61.5 NG/ML (ref 30–400)
GLUCOSE BLDC GLUCOMTR-MCNC: 97 MG/DL (ref 73–118)
HBA1C MFR BLD: 5.94 % (ref 4.8–5.6)
HCT VFR BLD AUTO: 37.1 % (ref 37.5–51)
HGB BLD-MCNC: 11.7 G/DL (ref 13–17.7)
IRON 24H UR-MRATE: 49 MCG/DL (ref 59–158)
IRON SATN MFR SERPL: 16 % (ref 20–50)
LYMPHOCYTES # BLD AUTO: 3.04 10*3/MM3 (ref 0.7–3.1)
LYMPHOCYTES NFR BLD AUTO: 27.8 % (ref 19.6–45.3)
MCH RBC QN AUTO: 28.2 PG (ref 26.6–33)
MCHC RBC AUTO-ENTMCNC: 31.5 G/DL (ref 31.5–35.7)
MCV RBC AUTO: 89.4 FL (ref 79–97)
MONOCYTES # BLD AUTO: 1.22 10*3/MM3 (ref 0.1–0.9)
MONOCYTES NFR BLD AUTO: 11.1 % (ref 5–12)
NEUTROPHILS NFR BLD AUTO: 58.2 % (ref 42.7–76)
NEUTROPHILS NFR BLD AUTO: 6.37 10*3/MM3 (ref 1.7–7)
PLATELET # BLD AUTO: 181 10*3/MM3 (ref 140–450)
PMV BLD AUTO: 11.8 FL (ref 6–12)
POC ALBUMIN: 3.5 G/L (ref 3.3–5.5)
POC ALT (SGPT): 31 U/L (ref 10–47)
POC AST (SGOT): 28 U/L (ref 11–38)
POC TOTAL BILIRUBIN: 1 MG/DL (ref 0.2–1.6)
POC TOTAL PROTEIN: 7.2 G/DL (ref 6.4–8.1)
POTASSIUM BLDA-SCNC: 4 MMOL/L (ref 3.6–5.1)
RBC # BLD AUTO: 4.15 10*6/MM3 (ref 4.14–5.8)
SODIUM BLD-SCNC: 145 MMOL/L (ref 128–145)
TIBC SERPL-MCNC: 304 MCG/DL (ref 298–536)
TRANSFERRIN SERPL-MCNC: 204 MG/DL (ref 200–360)
WBC NRBC COR # BLD AUTO: 10.95 10*3/MM3 (ref 3.4–10.8)

## 2024-12-20 PROCEDURE — 80053 COMPREHEN METABOLIC PANEL: CPT

## 2024-12-20 PROCEDURE — 83540 ASSAY OF IRON: CPT | Performed by: STUDENT IN AN ORGANIZED HEALTH CARE EDUCATION/TRAINING PROGRAM

## 2024-12-20 PROCEDURE — 96413 CHEMO IV INFUSION 1 HR: CPT

## 2024-12-20 PROCEDURE — 82728 ASSAY OF FERRITIN: CPT | Performed by: STUDENT IN AN ORGANIZED HEALTH CARE EDUCATION/TRAINING PROGRAM

## 2024-12-20 PROCEDURE — 25010000002 HEPARIN LOCK FLUSH PER 10 UNITS: Performed by: STUDENT IN AN ORGANIZED HEALTH CARE EDUCATION/TRAINING PROGRAM

## 2024-12-20 PROCEDURE — 83036 HEMOGLOBIN GLYCOSYLATED A1C: CPT | Performed by: STUDENT IN AN ORGANIZED HEALTH CARE EDUCATION/TRAINING PROGRAM

## 2024-12-20 PROCEDURE — 85025 COMPLETE CBC W/AUTO DIFF WBC: CPT | Performed by: STUDENT IN AN ORGANIZED HEALTH CARE EDUCATION/TRAINING PROGRAM

## 2024-12-20 PROCEDURE — 36415 COLL VENOUS BLD VENIPUNCTURE: CPT | Performed by: STUDENT IN AN ORGANIZED HEALTH CARE EDUCATION/TRAINING PROGRAM

## 2024-12-20 PROCEDURE — 25010000002 PEMBROLIZUMAB 100 MG/4ML SOLUTION 4 ML VIAL: Performed by: STUDENT IN AN ORGANIZED HEALTH CARE EDUCATION/TRAINING PROGRAM

## 2024-12-20 PROCEDURE — 84466 ASSAY OF TRANSFERRIN: CPT | Performed by: STUDENT IN AN ORGANIZED HEALTH CARE EDUCATION/TRAINING PROGRAM

## 2024-12-20 RX ORDER — SODIUM CHLORIDE 9 MG/ML
20 INJECTION, SOLUTION INTRAVENOUS ONCE
Status: DISCONTINUED | OUTPATIENT
Start: 2024-12-20 | End: 2024-12-21 | Stop reason: HOSPADM

## 2024-12-20 RX ORDER — HEPARIN SODIUM (PORCINE) LOCK FLUSH IV SOLN 100 UNIT/ML 100 UNIT/ML
500 SOLUTION INTRAVENOUS AS NEEDED
OUTPATIENT
Start: 2024-12-20

## 2024-12-20 RX ORDER — SODIUM CHLORIDE 0.9 % (FLUSH) 0.9 %
10 SYRINGE (ML) INJECTION AS NEEDED
OUTPATIENT
Start: 2024-12-20

## 2024-12-20 RX ORDER — FERROUS SULFATE 325(65) MG
325 TABLET ORAL
Qty: 90 TABLET | Refills: 1 | Status: SHIPPED | OUTPATIENT
Start: 2024-12-20

## 2024-12-20 RX ORDER — SODIUM CHLORIDE 0.9 % (FLUSH) 0.9 %
10 SYRINGE (ML) INJECTION AS NEEDED
Status: DISCONTINUED | OUTPATIENT
Start: 2024-12-20 | End: 2024-12-21 | Stop reason: HOSPADM

## 2024-12-20 RX ORDER — HEPARIN SODIUM (PORCINE) LOCK FLUSH IV SOLN 100 UNIT/ML 100 UNIT/ML
500 SOLUTION INTRAVENOUS AS NEEDED
Status: DISCONTINUED | OUTPATIENT
Start: 2024-12-20 | End: 2024-12-21 | Stop reason: HOSPADM

## 2024-12-20 RX ORDER — SODIUM CHLORIDE 9 MG/ML
20 INJECTION, SOLUTION INTRAVENOUS ONCE
OUTPATIENT
Start: 2025-01-31

## 2024-12-20 RX ADMIN — HEPARIN 500 UNITS: 100 SYRINGE at 13:48

## 2024-12-20 RX ADMIN — SODIUM CHLORIDE 400 MG: 9 INJECTION, SOLUTION INTRAVENOUS at 13:10

## 2024-12-20 RX ADMIN — Medication 10 ML: at 13:48

## 2024-12-20 NOTE — PROGRESS NOTES
Pt here for Keytruda following scheduled visit with Dr Lama, treatment administered per MAR and pt tolerated

## 2024-12-20 NOTE — PROGRESS NOTES
Pt to port chair for port access and labs prior to appt with Dr. Lama and infusion. Port accessed and flushed with good blood return noted. 10cc of blood wasted prior to specimen collection. Blood specimen obtained and sent to lab for processing per protocol.  Port saline locked and capped. Pt requested labs be drawn for Dr. Hsu and Dr. Vicente. Drawn per protocol and sent to lab. Pt to waiting room.

## 2024-12-23 ENCOUNTER — TELEPHONE (OUTPATIENT)
Dept: ONCOLOGY | Facility: CLINIC | Age: 64
End: 2024-12-23
Payer: COMMERCIAL

## 2024-12-23 NOTE — TELEPHONE ENCOUNTER
----- Message from Praveen Lama sent at 12/20/2024  5:47 PM EST -----  Iron panel is suggestive of some degree of iron deficiency anemia, I have prescribed Oral iron for patient. Please update him at your convenience. Thanks.

## 2024-12-24 DIAGNOSIS — J45.40 MODERATE PERSISTENT ASTHMA WITHOUT COMPLICATION: Chronic | ICD-10-CM

## 2024-12-24 RX ORDER — BUDESONIDE 0.5 MG/2ML
0.5 INHALANT ORAL 2 TIMES DAILY
Qty: 180 ML | Refills: 1 | Status: SHIPPED | OUTPATIENT
Start: 2024-12-24

## 2024-12-28 LAB
CYP2C19 ALLELE GENO BLD/T: NORMAL
CYP2C19 GENE PROD MET ACT IMP BLD/T-IMP: NORMAL
CYP2D6 ALLELE GENO BLD/T: NORMAL
CYP2D6 GENE PROD MET ACT IMP BLD/T-IMP: NORMAL
IMP & REVIEW OF LAB RESULTS: NORMAL
IMP & REVIEW OF LAB RESULTS: NORMAL
LAB DIRECTOR NAME PROVIDER: NORMAL
LAB DIRECTOR NAME PROVIDER: NORMAL
TEST PERFORMANCE INFO SPEC: NORMAL
TEST PERFORMANCE INFO SPEC: NORMAL

## 2024-12-30 LAB — GENE XXX MUT ANL BLD/T: NORMAL

## 2025-01-09 ENCOUNTER — OFFICE VISIT (OUTPATIENT)
Dept: PAIN MEDICINE | Facility: CLINIC | Age: 65
End: 2025-01-09
Payer: COMMERCIAL

## 2025-01-09 VITALS
SYSTOLIC BLOOD PRESSURE: 148 MMHG | RESPIRATION RATE: 20 BRPM | OXYGEN SATURATION: 98 % | HEIGHT: 70 IN | HEART RATE: 85 BPM | TEMPERATURE: 96.9 F | BODY MASS INDEX: 35.07 KG/M2 | DIASTOLIC BLOOD PRESSURE: 78 MMHG

## 2025-01-09 DIAGNOSIS — M54.31 RIGHT SIDED SCIATICA: ICD-10-CM

## 2025-01-09 DIAGNOSIS — Z79.899 ENCOUNTER FOR LONG-TERM (CURRENT) USE OF HIGH-RISK MEDICATION: ICD-10-CM

## 2025-01-09 DIAGNOSIS — M47.816 LUMBAR FACET ARTHROPATHY: Primary | ICD-10-CM

## 2025-01-09 DIAGNOSIS — M16.11 OSTEOARTHRITIS OF RIGHT HIP, UNSPECIFIED OSTEOARTHRITIS TYPE: ICD-10-CM

## 2025-01-09 PROCEDURE — 99214 OFFICE O/P EST MOD 30 MIN: CPT | Performed by: PHYSICIAN ASSISTANT

## 2025-01-09 RX ORDER — BUPRENORPHINE 5 UG/H
1 PATCH TRANSDERMAL WEEKLY
Qty: 4 PATCH | Refills: 0 | Status: SHIPPED | OUTPATIENT
Start: 2025-01-09

## 2025-01-09 NOTE — PROGRESS NOTES
CHIEF COMPLAINT  Hip pain  Pt reports improved pain since last OV.      Subjective   Louis Andino is a 64 y.o. male  who presents for follow-up.  He has a history of chronic right hip, low back and left shoulder pain.  The patient reports that he has relatively stable pain presentation as compared to his last office visit and does feel that his activity levels have improved with ongoing use of the Butrans patch.  He does have severe pain within the right hip and is end-stage OA and is in need of a right hip replacement however that has been placed on hold.  Patient's medical history is complicated with renal cell carcinoma which recurred in December 2023 and he continues with immunotherapy with hope that he will be finished by this February.  He experiences intermittent right sided sciatica pain into the posterior aspect of the leg usually terminating at the calf.    Medical history significant for renal cell carcinoma and he subsequently underwent left nephrectomy and adrenalectomy on 12/18/2023.  He is currently undergoing immunotherapy every 6 weeks with pembrolizumab for RCC stage III.      He is currently medically managed on Butrans 5 mcg patch q. 7 days which he tolerates well without adverse effects.  He is pleased to report greater than 50% reduction of pain ongoing with the use of the patch.  Failed previous trial of tramadol and hydrocodone.  He tolerated oxycodone 5 mg which he usually took 1/4 tablet daily.    Pain today 2/10 VAS in severity.      Back Pain  This is a chronic problem. The current episode started more than 1 year ago. The problem occurs constantly. The problem has been improved since onset. The pain is present in the lumbar spine and sacro-iliac. The quality of the pain is described as aching and burning. The pain radiates to the right buttock, right thigh and right anterolateral lower leg. The pain is at a severity of 2/10. The pain is mild. The pain is The same all the time. The  symptoms are aggravated by sitting, position and standing. Associated symptoms include leg pain. Pertinent negatives include no abdominal pain, chest pain, fever, headaches, numbness or weakness. Risk factors include history of cancer, obesity, sedentary lifestyle, poor posture and lack of exercise. He has tried ice and heat for the symptoms. The treatment provided mild relief.   Hip Pain   There was no injury mechanism. The pain is present in the right hip. The quality of the pain is described as aching. The pain is at a severity of 2/10. The pain is mild. The pain has been Constant since onset. Associated symptoms include an inability to bear weight. Pertinent negatives include no numbness. The symptoms are aggravated by movement and weight bearing.        PEG Assessment   What number best describes your pain on average in the past week?2  What number best describes how, during the past week, pain has interfered with your enjoyment of life?8  What number best describes how, during the past week, pain has interfered with your general activity?  8    Review of Pertinent Medical Data ---  No new imaging for review on today    The following portions of the patient's history were reviewed and updated as appropriate: allergies, current medications, past family history, past medical history, past social history, past surgical history, and problem list.    Review of Systems   Constitutional:  Negative for activity change, fatigue and fever.   HENT:  Positive for congestion.    Respiratory:  Negative for cough, chest tightness and shortness of breath.    Cardiovascular:  Negative for chest pain.   Gastrointestinal:  Positive for constipation. Negative for abdominal pain and diarrhea.   Neurological:  Negative for dizziness, weakness, light-headedness, numbness and headaches.   Psychiatric/Behavioral:  Negative for agitation, sleep disturbance and suicidal ideas. The patient is not nervous/anxious.      I have reviewed and  "confirmed the accuracy of the ROS as documented by the MA/LPN/RN QI Freed  Vitals:    01/09/25 1453   BP: 148/78   BP Location: Left arm   Patient Position: Sitting   Cuff Size: Large Adult   Pulse: 85   Resp: 20   Temp: 96.9 °F (36.1 °C)   TempSrc: Temporal   SpO2: 98%   Weight: Comment: refused   Height: 177.8 cm (70\")   PainSc:   2         Objective   Physical Exam  Vitals and nursing note reviewed.   Constitutional:       Appearance: Normal appearance. He is obese.   HENT:      Head: Normocephalic.   Musculoskeletal:      Lumbar back: Tenderness (painful palpation over the right SI/lumbar facet joint spaces) present.        Back:    Neurological:      Mental Status: He is alert and oriented to person, place, and time.      Cranial Nerves: Cranial nerves 2-12 are intact.      Sensory: Sensation is intact.      Motor: Motor function is intact.      Gait: Gait abnormal (ambulates slowly with use of walker).   Psychiatric:         Mood and Affect: Mood normal.         Behavior: Behavior normal.         Thought Content: Thought content normal.         Judgment: Judgment normal.       PHQ-2 Depression Screening  Little interest or pleasure in doing things? Not at all   Feeling down, depressed, or hopeless? Not at all   PHQ-2 Total Score 0         Tobacco Use: Low Risk  (1/9/2025)    Patient History     Smoking Tobacco Use: Never     Smokeless Tobacco Use: Never     Passive Exposure: Past   Recent Concern: Tobacco Use - Medium Risk (11/8/2024)    Patient History     Smoking Tobacco Use: Former     Smokeless Tobacco Use: Never     Passive Exposure: Past           Assessment & Plan   Diagnoses and all orders for this visit:    1. Lumbar facet arthropathy (Primary)  -     Buprenorphine (BUTRANS) 5 MCG/HR patch weekly transdermal patch; Place 1 patch on the skin as directed by provider 1 (One) Time Per Week.  Dispense: 4 patch; Refill: 0    2. Right sided sciatica    3. Osteoarthritis of right hip, unspecified " osteoarthritis type  -     Buprenorphine (BUTRANS) 5 MCG/HR patch weekly transdermal patch; Place 1 patch on the skin as directed by provider 1 (One) Time Per Week.  Dispense: 4 patch; Refill: 0    4. Encounter for long-term (current) use of high-risk medication        Louis Andino reports a pain score of 2.  Given his pain assessment as noted, treatment options were discussed and the following options were decided upon as a follow-up plan to address the patient's pain: continuation of current treatment plan for pain, prescription for opiod analgesics, and use of non-medical modalities (ice, heat, stretching and/or behavior modifications).      --- Follow-up in 2 months or sooner for medication management  --- Feel Butrans 5 mcg patch. Patient appears stable with current regimen. No adverse effects. Regarding continuation of opioids, there is no evidence of aberrant behavior or any red flags.  The patient continues with appropriate response to opioid therapy. ADL's remain intact by self.   --- Low risk for opiate abuse/diversion  ---Once the patient has completed immunotherapy he is a candidate for consideration of interventional pain procedures as we use a very small diluted amount of contrast in his GFR indicates that he would be able to handle this.       The urine drug screen confirmation from 12/5/2024 has been reviewed and the result is negative based on patient history and low dose of Butrans usage and ERICA report           ERICA REPORT  As part of the patient's treatment plan, I am prescribing controlled substances. The patient has been made aware of appropriate use of such medications, including potential risk of somnolence, limited ability to drive and/or work safely, and the potential for dependence or overdose. It has also been made clear that these medications are for use by this patient only, without concomitant use of alcohol or other substances unless prescribed.     Patient has completed  prescribing agreement detailing terms of continued prescribing of controlled substances, including monitoring ERICA reports, urine drug screening, and pill counts if necessary. The patient is aware that inappropriate use will results in cessation of prescribing such medications.    As the clinician, I personally reviewed the ERICA from 1/9/2025 while the patient was in the office today.    History and physical exam exhibit continued safe and appropriate use of controlled substances.     Dictated utilizing Dragon dictation.

## 2025-01-12 DIAGNOSIS — K21.9 GASTROESOPHAGEAL REFLUX DISEASE, UNSPECIFIED WHETHER ESOPHAGITIS PRESENT: Chronic | ICD-10-CM

## 2025-01-13 RX ORDER — SUCRALFATE 1 G/1
1 TABLET ORAL 3 TIMES DAILY PRN
Qty: 270 TABLET | Refills: 0 | Status: SHIPPED | OUTPATIENT
Start: 2025-01-13

## 2025-01-22 DIAGNOSIS — K64.9 HEMORRHOIDS, UNSPECIFIED HEMORRHOID TYPE: ICD-10-CM

## 2025-01-22 RX ORDER — HYDROCORTISONE ACETATE 25 MG/1
25 SUPPOSITORY RECTAL 2 TIMES DAILY
Qty: 20 EACH | Refills: 0 | Status: SHIPPED | OUTPATIENT
Start: 2025-01-22

## 2025-01-30 NOTE — PROGRESS NOTES
HEMATOLOGY ONCOLOGY OUTPATIENT FOLLOW UP       Patient name: Louis Andino  : 1960  MRN: [id number]  Primary Care Physician: Harry Hsu MD  Referring Physician: Harry Hsu MD  Reason For Consult: Renal cell carcinoma      History of Present Illness:  Patient is a 64 y.o. male with RCC.    Patient initially presented with hematuria.  During hospitalization he had a CT of his abdomen which showed a large left lower pole renal mass with possible adrenal metastasis.    2023 CT abdomen pelvis with contrast showing mass arising from the lower pole of the left kidney consistent with renal cell carcinoma.  Associated uroepithelial thickening of the pelvis and proximal ureter.  Enhancing indeterminate left adrenal nodule potential metastasis no retroperitoneal lymphadenopathy.    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  Vessel with clear-cell renal cell carcinoma tumor thrombus    24 - pembrolizumab  3/1/24 - pembro  24 - increased pembro 400 mg  24 - pembro 400 mg  24 - pembro 400 mg  24 - was admitted with syncopal episode, ECHO was normal, CTA neck negative, has a Zio patch, will be seen by cardiology.  Morning metoprolol was stopped.    24: CT Chest Abd Pelvis WO Contrast:    IMPRESSION:     Status post left nephrectomy. No metastatic disease identified by  unenhanced imaging. Continued follow-up advised as indicated.      2024: Patient seen today for initial evaluation by me. He was preiovulsy being seen by Dr. Prakash in our practice.   He is on adjuvant Immunotherapy with pembrolizumab for RCC stage III. He has  had fairly good tolerance to IO therapy except for some adverse effects, Reported having persistent diffuse pruritus and Dermographism which has not improved significantly with PRN Antihistamines. He however denied any significant impairment of his quality of life with these symptoms. Reported some dizziness/ preSyncopal Episodes following immunotherapy. He was admitted to [facility] for these symptoms, extensive workup for neurocardiogenic and endocrine etiology was reported unremarkable at this time. He is status post Restaging scans as above.  He is scheduled to receive IV Fluids on 8/19/24 after treatment today in view of his Symptoms.    9/27/2024: Patient seen today for follow-up. Stated that he has tolerated immunotherapy much better after the last infusion.  Skin rash and dizziness are better controlled today.  Acute issues reported.      11/8/24: Improved itching with over-the-counter lotion.  Has recently started CPAP for sleep apnea.  Reported having better sleep at night and feeling refreshed during daytime.  Constipation has improved.  No other significant treatment-related side effects reported.  He is planning to travel to [place] in early December.    12/20/24: Seen today for follow-up visit prior to scheduled treatment.  No new complaints reported today.  Continues to have good tolerance to immunotherapy but reported having some fatigue and weakness for short duration thereafter.  Reported having presyncopal episodes recently, likely secondary to aggressive BP management following which his BP medications were adjusted.    Subjective:  1/31/2025: Seen today for follow-up prior to scheduled treatment. Reports overall feeling well. No new complaints reported today. Continues to tolerate pembrolizumab. He does report mild fatigue and pruritus following infusion and improves shortly thereafter.     The patient has a documented plan of care to address pain.       Past Medical History:   Diagnosis Date    Anemia  12/18/2023    Due to surgery. Required transfusions    Ankle sprain     Multiple-both ankles over the years    Asthma     Chronic pain disorder 2020    Right hip and back    Clotting disorder     Required blood transfusion during and after surgery in excess of expected    Coronary artery disease Dec 2023    Bifascicular block    Deep vein thrombosis 12/18/2023    During cancer surgery a clot was surgically removed from the inferior vena cava which was cancer related    Emphysema/COPD     Erectile dysfunction Several years    Extremity pain 2020    Right hip and bilat shoulders    Fracture of ankle     Left ankle as a child    Fracture of wrist     Left wrist as a child    GERD (gastroesophageal reflux disease)     Headache, tension-type 1977    Rare    HL (hearing loss)     Progressive worsening over many years    Hyperglycemia 10/12/2023    Hyperlipidemia 10/12/2023    Hypertension     Injury of neck 2023    Previous fractures noted during chiropractic visit. Probably secondary to shoulder injury.    Joint pain 2020    Right hip    Low back pain     Worse last few year    Neck pain 2020    Mild    Obesity     Pneumonia 12/20/2023    Developed while hospitalized for cancer surgery    Prostate disease     Rash     Allergic reactions to laundry detergent and mild generalized itching secondary to immunotherapy.    Renal cell carcinoma 12/30/2023    Renal insufficiency 4/9/2024    Decreased renal function since nephrectomy in December, 2023, secondary to renal cancer, but not diagnosed as chronic kidney disease until recent visit. Kidney functions have been stable since surgery, but are now being considered permanent.    Shoulder injury 1980s    Injured in     Sleep apnea     Visual impairment     Wear glasses for near and distant vision    Vitreous degeneration of right eye     Formatting of this note might be different from the original. Retinal tear and detachment warning symptoms reviewed and patient  instructed to call immediately if increasing floaters, flashes, or decreasing peripheral vision. No retinal detachment or retinal tear noted. Recheck in 1 month with dilated exam.       Past Surgical History:   Procedure Laterality Date    ABDOMINAL SURGERY  December 18, 2023    Left Kidney and Adrenal Gland removed due to Renal Cell Carcinoma    NEPHRECTOMY Left 12/18/2023    Procedure: NEPHRECTOMY RADICAL WITH CAVAL THROMBECTOMY;  Surgeon: James Esquivel MD;  Location: [facility] MAIN OR;  Service: Urology;  Laterality: Left;    PORTACATH PLACEMENT  01/30/2024    SKIN LESION EXCISION  1990         Current Outpatient Medications:     budesonide (PULMICORT) 0.5 MG/2ML nebulizer solution, Take 2 mL (one vial) by nebulization 2 (Two) Times a Day., Disp: 180 mL, Rfl: 1    Buprenorphine (BUTRANS) 5 MCG/HR patch weekly transdermal patch, Place 1 patch on the skin as directed by provider 1 (One) Time Per Week., Disp: 4 patch, Rfl: 0    ferrous sulfate 325 (65 FE) MG tablet, Take 1 tablet by mouth Daily With Breakfast., Disp: 90 tablet, Rfl: 1    hydrocortisone (ANUSOL-HC) 25 MG suppository, Insert 1 suppository into the rectum 2 (Two) Times a Day., Disp: 20 each, Rfl: 0    ipratropium-albuterol (COMBIVENT RESPIMAT)  MCG/ACT inhaler, Inhale 1 puff 4 (Four) Times a Day As Needed for Wheezing., Disp: , Rfl:     lidocaine-prilocaine (EMLA) 2.5-2.5 % cream, Apply 1 Application topically to the appropriate area as directed See Admin Instructions. Apply to port site one hour prior to port being accessed., Disp: 30 g, Rfl: 3    losartan (COZAAR) 100 MG tablet, Take 1 tablet by mouth Daily for 90 days., Disp: 90 tablet, Rfl: 0    metoprolol succinate XL (TOPROL-XL) 25 MG 24 hr tablet, Take 1 tablet by mouth Daily., Disp: 90 tablet, Rfl: 1    multivitamin with minerals tablet tablet, Take 1 tablet by mouth Daily., Disp: , Rfl:     ondansetron (ZOFRAN) 8 MG tablet, Take 1 tablet by mouth Every 8 (Eight) Hours As Needed for Nausea  or Vomiting., Disp: 30 tablet, Rfl: 2    pantoprazole (PROTONIX) 40 MG EC tablet, Take 1 tablet by mouth Daily., Disp: 90 tablet, Rfl: 0    Plecanatide (Trulance) 3 MG tablet, TAKE 1 TABLET BY MOUTH DAILY, Disp: 30 tablet, Rfl: 4    sucralfate (CARAFATE) 1 g tablet, Take 1 tablet by mouth 3 (Three) Times a Day As Needed (heartburn)., Disp: 270 tablet, Rfl: 0    Symbicort 160-4.5 MCG/ACT inhaler, Inhale 2 puffs 2 (Two) Times a Day., Disp: , Rfl:     vitamin D3 125 MCG (5000 UT) capsule capsule, Take 1 capsule by mouth Daily., Disp: , Rfl:     No Known Allergies    Family History   Problem Relation Age of Onset    Arthritis Mother     Cancer Mother     Diabetes Mother     Heart disease Mother     Hyperlipidemia Mother     Miscarriages / Stillbirths Mother         Stillborn child    Hypertension Mother     Osteoporosis Mother     Kidney disease Mother         Kidney stones    Vision loss Mother         Near sighted    COPD Father     Hyperlipidemia Father     Migraines Father     Vision loss Father         Near sighted    Hyperlipidemia Brother     Hypertension Brother     Asthma Brother     Vision loss Daughter     Broken bones Daughter         Broke left forearm three times during childhood    Broken bones Daughter         Fractured right femur and right forearm during childhood    Anxiety disorder Daughter     Depression Daughter     Miscarriages / Stillbirths Daughter         First Trimester miscarriage    Dislocations Daughter         Recurrent radial head subluxations as a child    Anxiety disorder Daughter     Depression Daughter     Asthma Daughter     Asthma Son     Depression Son        Cancer-related family history includes Cancer in his mother.      Social History     Tobacco Use    Smoking status: Never     Passive exposure: Past    Smokeless tobacco: Never    Tobacco comments:     Passive due to Father and most male relatives smoking   Vaping Use    Vaping status: Never Used   Substance Use Topics     "Alcohol use: Yes     Alcohol/week: 1.0 standard drink of alcohol     Types: 1 Glasses of wine per week     Comment: rare    Drug use: Never     Social History     Social History Narrative    Not on file       ROS:   Review of Systems   Constitutional:  Negative for appetite change, chills and fever.   HENT:  Negative for ear pain, mouth sores, nosebleeds and sore throat.    Eyes:  Negative for photophobia and visual disturbance.   Respiratory:  Negative for wheezing and stridor.    Cardiovascular:  Negative for chest pain and palpitations.   Gastrointestinal:  Negative for abdominal pain, diarrhea, nausea and vomiting.   Endocrine: Negative for cold intolerance and heat intolerance.   Genitourinary:  Negative for dysuria and hematuria.   Musculoskeletal:  Negative for joint swelling and neck stiffness.   Skin:  Negative for color change and rash.   Neurological:  Negative for seizures and syncope.   Hematological:  Negative for adenopathy.        No obvious bleeding   Psychiatric/Behavioral:  Negative for agitation, confusion and hallucinations.    pruritis    Objective:    Vital Signs:  Vitals:    01/31/25 1042   BP: 138/79   Pulse: 65   Temp: 98.2 °F (36.8 °C)   SpO2: 94%   Weight: 111 kg (244 lb)   Height: 177.8 cm (70\")   PainSc: 0-No pain               Body mass index is 35.01 kg/m².    ECOG  (0) Fully active, able to carry on all predisease performance without restriction    Physical Exam:   Physical Exam  Vitals reviewed.   Constitutional:       General: He is not in acute distress.     Appearance: He is not diaphoretic.   HENT:      Head: Normocephalic and atraumatic.   Eyes:      General: No scleral icterus.        Right eye: No discharge.         Left eye: No discharge.      Conjunctiva/sclera: Conjunctivae normal.   Neck:      Thyroid: No thyromegaly.   Cardiovascular:      Rate and Rhythm: Normal rate and regular rhythm.      Heart sounds: Normal heart sounds.      No friction rub. No gallop. "   Pulmonary:      Effort: Pulmonary effort is normal. No respiratory distress.      Breath sounds: No stridor. No wheezing.   Abdominal:      General: Bowel sounds are normal.      Palpations: Abdomen is soft. There is no mass.      Tenderness: There is no abdominal tenderness. There is no guarding or rebound.   Musculoskeletal:         General: No tenderness. Normal range of motion.      Cervical back: Normal range of motion and neck supple.   Lymphadenopathy:      Cervical: No cervical adenopathy.   Skin:     General: Skin is warm.      Coloration: Skin is not jaundiced.      Findings: No bruising, erythema, lesion or rash.   Neurological:      Mental Status: He is alert and oriented to person, place, and time.      Motor: No abnormal muscle tone.   Psychiatric:         Mood and Affect: Mood normal.         Behavior: Behavior normal.         Thought Content: Thought content normal.         Lab Results - Last 18 Months   Lab Units 01/31/25  1038 12/20/24  1225 11/08/24  1117   WBC 10*3/mm3 9.25 10.95* 9.65   HEMOGLOBIN g/dL 12.9* 11.7* 12.2*   HEMATOCRIT % 39.5 37.1* 38.0   PLATELETS 10*3/mm3 208 181 213   MCV fL 85.7 89.4 88.0     Lab Results - Last 18 Months   Lab Units 12/20/24  1216 12/20/24  1209 11/08/24  1125 09/27/24  0957 08/16/24  1101 07/13/24  0507   SODIUM mmol/L  --  139  --   --  140 141   POTASSIUM mmol/L  --  4.2  --   --  4.2 4.3   CHLORIDE mmol/L  --  103  --   --  104 105   CO2 mmol/L  --  24  --   --  22.4 24.9   BUN mg/dL  --  21  --   --  25* 24*   CREATININE mg/dL 1.50* 1.35* 1.50*   < > 1.40* 1.48*   CALCIUM mg/dL  --  9.8  --   --  10.1 9.6   BILIRUBIN mg/dL  --  0.7  --   --  0.8 0.8   ALK PHOS IU/L  --  58  --   --  68 63   ALT (SGPT) IU/L  --  35  --   --  18 15   AST (SGOT) IU/L  --  22  --   --  21 13   GLUCOSE mg/dL  --  92  --   --  133* 89    < > = values in this interval not displayed.       Lab Results   Component Value Date    GLUCOSE 92 12/20/2024    BUN 21 12/20/2024     "CREATININE 1.50 (H) 12/20/2024    BCR 16 12/20/2024    K 4.2 12/20/2024    CO2 24 12/20/2024    CALCIUM 9.8 12/20/2024    PROTENTOTREF 6.7 12/20/2024    ALBUMIN 4.1 12/20/2024    LABIL2 1.2 01/09/2024    AST 22 12/20/2024    ALT 35 12/20/2024       Lab Results - Last 18 Months   Lab Units 07/12/24  1351 01/30/24  0827 12/18/23  1722   INR  1.03 1.01 1.08   APTT seconds 27.5 25.3 28.7       Lab Results   Component Value Date    IRON 49 (L) 12/20/2024    TIBC 304 12/20/2024    FERRITIN 61.50 12/20/2024       No results found for: \"FOLATE\"    No results found for: \"OCCULTBLD\"    No results found for: \"RETICCTPCT\"  No results found for: \"WHSFMIHN61\"  No results found for: \"SPEP\", \"UPEP\"  No results found for: \"LDH\", \"URICACID\"  No results found for: \"OSKAR\", \"RF\", \"SEDRATE\"  Lab Results   Component Value Date    FIBRINOGEN 381 12/18/2023     Lab Results   Component Value Date    PTT 27.5 07/12/2024    INR 1.03 07/12/2024     No results found for: \"\"  No results found for: \"CEA\"  No components found for: \"CA-19-9\"  No results found for: \"PSA\"  No results found for: \"SEDRATE\"       Assessment & Plan     Patient is a 64 y.o. with stage IIIb T3b RCC status post radical nephrectomy in December 2023. Completed one year of adjuvant pembrolizumab in January 2025.    Renal cell carcinoma  Status post nephrectomy, CT chest with no evidence of metastasis questionable adrenal metastasis on scan status post adrenalectomy with no evidence of metastatic disease  Given patient's stage III clear-cell RCC, discussed adjuvant treatment with pembrolizumab based on the findings from keynote 564 trial with significant improvement in disease-free survival as compared to placebo for stage III clear-cell RCC.  Started Immunotherapy, G1 - pruritus otherwise good tolerance. Currently on  400 mg every 6 weeks.   Has ongoing pruritus no rash, takes benadryl.   -restaging scans as above, reported ANA. Continue surveillance every 6 " months.  -Patient tolerating treatment well, continue immunotherapy every 6 weeks for total 1 year.  Discussed with patient and wife regarding duration of treatment.  -Proceed with pembrolizumab today. This is last scheuduled infusion. Will return in about 3 weeks for follow up with Dr. Lama with restaging scans.     Pruritus  G1, continue immunotherapy  Use moisturizer stable  No significant worsening, continue same.  He will try atarax instead of benadryl  Patient reported significant improvement in symptoms with use of an over-the-counter lotion.    Will consider use of Leukotriene inhibitors on follow up if no improvement. Patient reports symptoms are well managed at  this time. Will continues to monitor      Syncope  Ziopatch on  F/u with cardiology  Has increased symptoms after immunotherapy  Patient is s/p hospital admission and evaluation as above.  Patient has been previously receiving normal saline 2-3 days after each infusion.  This has been helping him.  Held due to shortage of IV fluids.  Encouraged patient on improved oral intake. Reviewed  No further presyncopal episodes    Anemia:  Noted mild anemia. Iron panel is suggestive of iron deficiency anemia.  Was started on Oral iron in December 2024. Tolerates at this time. Will continue to monitor iron profile and CBC. To continue oral iron at this time.     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 Calixto]. Reviewed    Follow with Dr. Lama as previously scheduled (approximately 3 weeks) with restaging scans. Sooner follow up if condition indicates.    Electronically signed by Swapna Escamilla PA-C      Thank you very much for providing the opportunity to participate in this patient’s care. Please do not hesitate to call if there are any other questions.    Time spent on encounter including record review, history taking, exam, discussion, counseling and documentation at: 30 minutes

## 2025-01-31 ENCOUNTER — OFFICE VISIT (OUTPATIENT)
Dept: ONCOLOGY | Facility: CLINIC | Age: 65
End: 2025-01-31
Payer: COMMERCIAL

## 2025-01-31 ENCOUNTER — HOSPITAL ENCOUNTER (OUTPATIENT)
Dept: ONCOLOGY | Facility: HOSPITAL | Age: 65
Discharge: HOME | End: 2025-01-31
Payer: COMMERCIAL

## 2025-01-31 VITALS
DIASTOLIC BLOOD PRESSURE: 79 MMHG | TEMPERATURE: 98.2 F | BODY MASS INDEX: 34.93 KG/M2 | OXYGEN SATURATION: 94 % | SYSTOLIC BLOOD PRESSURE: 138 MMHG | HEIGHT: 70 IN | HEART RATE: 65 BPM | WEIGHT: 244 LBS

## 2025-01-31 DIAGNOSIS — C64.2 RENAL CELL CARCINOMA OF LEFT KIDNEY: Primary | ICD-10-CM

## 2025-01-31 DIAGNOSIS — L30.8 PRURITIC DERMATITIS: ICD-10-CM

## 2025-01-31 DIAGNOSIS — C64.9 RENAL CELL CARCINOMA, UNSPECIFIED LATERALITY: Primary | ICD-10-CM

## 2025-01-31 DIAGNOSIS — C64.2 RENAL CELL CARCINOMA OF LEFT KIDNEY: ICD-10-CM

## 2025-01-31 LAB
ALP BLD-CCNC: 60 U/L (ref 53–128)
BASOPHILS # BLD AUTO: 0.05 10*3/MM3 (ref 0–0.2)
BASOPHILS NFR BLD AUTO: 0.5 % (ref 0–1.5)
BUN BLDA-MCNC: 27 MG/DL (ref 7–22)
CALCIUM BLD QL: 10 MG/DL (ref 8–10.3)
CHLORIDE BLDA-SCNC: 103 MMOL/L (ref 98–108)
CO2 BLDA-SCNC: 29 MMOL/L (ref 18–33)
CREAT BLDA-MCNC: 1.4 MG/DL (ref 0.6–1.2)
DEPRECATED RDW RBC AUTO: 41.5 FL (ref 37–54)
EGFRCR SERPLBLD CKD-EPI 2021: 55.4 ML/MIN/1.73
EOSINOPHIL # BLD AUTO: 0.35 10*3/MM3 (ref 0–0.4)
EOSINOPHIL NFR BLD AUTO: 3.8 % (ref 0.3–6.2)
ERYTHROCYTE [DISTWIDTH] IN BLOOD BY AUTOMATED COUNT: 13.5 % (ref 12.3–15.4)
GLUCOSE BLDC GLUCOMTR-MCNC: 127 MG/DL (ref 73–118)
HCT VFR BLD AUTO: 39.5 % (ref 37.5–51)
HGB BLD-MCNC: 12.9 G/DL (ref 13–17.7)
LYMPHOCYTES # BLD AUTO: 2.64 10*3/MM3 (ref 0.7–3.1)
LYMPHOCYTES NFR BLD AUTO: 28.5 % (ref 19.6–45.3)
MCH RBC QN AUTO: 28 PG (ref 26.6–33)
MCHC RBC AUTO-ENTMCNC: 32.7 G/DL (ref 31.5–35.7)
MCV RBC AUTO: 85.7 FL (ref 79–97)
MONOCYTES # BLD AUTO: 0.71 10*3/MM3 (ref 0.1–0.9)
MONOCYTES NFR BLD AUTO: 7.7 % (ref 5–12)
NEUTROPHILS NFR BLD AUTO: 5.5 10*3/MM3 (ref 1.7–7)
NEUTROPHILS NFR BLD AUTO: 59.5 % (ref 42.7–76)
PLATELET # BLD AUTO: 208 10*3/MM3 (ref 140–450)
PMV BLD AUTO: 10.5 FL (ref 6–12)
POC ALBUMIN: 3.5 G/L (ref 3.3–5.5)
POC ALT (SGPT): 41 U/L (ref 10–47)
POC AST (SGOT): 34 U/L (ref 11–38)
POC TOTAL BILIRUBIN: 1.1 MG/DL (ref 0.2–1.6)
POC TOTAL PROTEIN: 7.4 G/DL (ref 6.4–8.1)
POTASSIUM BLDA-SCNC: 3.6 MMOL/L (ref 3.6–5.1)
RBC # BLD AUTO: 4.61 10*6/MM3 (ref 4.14–5.8)
SODIUM BLD-SCNC: 141 MMOL/L (ref 128–145)
T4 FREE SERPL-MCNC: 1.23 NG/DL (ref 0.93–1.7)
TSH SERPL DL<=0.05 MIU/L-ACNC: 2.26 UIU/ML (ref 0.27–4.2)
WBC NRBC COR # BLD AUTO: 9.25 10*3/MM3 (ref 3.4–10.8)

## 2025-01-31 PROCEDURE — 99214 OFFICE O/P EST MOD 30 MIN: CPT | Performed by: PHYSICIAN ASSISTANT

## 2025-01-31 PROCEDURE — 25810000003 SODIUM CHLORIDE 0.9 % SOLUTION: Performed by: STUDENT IN AN ORGANIZED HEALTH CARE EDUCATION/TRAINING PROGRAM

## 2025-01-31 PROCEDURE — 25010000002 PEMBROLIZUMAB 100 MG/4ML SOLUTION 4 ML VIAL: Performed by: STUDENT IN AN ORGANIZED HEALTH CARE EDUCATION/TRAINING PROGRAM

## 2025-01-31 PROCEDURE — 80050 GENERAL HEALTH PANEL: CPT | Performed by: STUDENT IN AN ORGANIZED HEALTH CARE EDUCATION/TRAINING PROGRAM

## 2025-01-31 PROCEDURE — 25010000002 HEPARIN LOCK FLUSH PER 10 UNITS: Performed by: STUDENT IN AN ORGANIZED HEALTH CARE EDUCATION/TRAINING PROGRAM

## 2025-01-31 PROCEDURE — 96413 CHEMO IV INFUSION 1 HR: CPT

## 2025-01-31 PROCEDURE — 84439 ASSAY OF FREE THYROXINE: CPT | Performed by: STUDENT IN AN ORGANIZED HEALTH CARE EDUCATION/TRAINING PROGRAM

## 2025-01-31 RX ORDER — SODIUM CHLORIDE 9 MG/ML
20 INJECTION, SOLUTION INTRAVENOUS ONCE
Status: COMPLETED | OUTPATIENT
Start: 2025-01-31 | End: 2025-01-31

## 2025-01-31 RX ORDER — HEPARIN SODIUM (PORCINE) LOCK FLUSH IV SOLN 100 UNIT/ML 100 UNIT/ML
500 SOLUTION INTRAVENOUS AS NEEDED
Status: DISCONTINUED | OUTPATIENT
Start: 2025-01-31 | End: 2025-02-01 | Stop reason: HOSPADM

## 2025-01-31 RX ORDER — HEPARIN SODIUM (PORCINE) LOCK FLUSH IV SOLN 100 UNIT/ML 100 UNIT/ML
500 SOLUTION INTRAVENOUS AS NEEDED
OUTPATIENT
Start: 2025-01-31

## 2025-01-31 RX ORDER — SODIUM CHLORIDE 0.9 % (FLUSH) 0.9 %
10 SYRINGE (ML) INJECTION AS NEEDED
Status: DISCONTINUED | OUTPATIENT
Start: 2025-01-31 | End: 2025-02-01 | Stop reason: HOSPADM

## 2025-01-31 RX ORDER — SODIUM CHLORIDE 0.9 % (FLUSH) 0.9 %
10 SYRINGE (ML) INJECTION AS NEEDED
OUTPATIENT
Start: 2025-01-31

## 2025-01-31 RX ADMIN — Medication 10 ML: at 12:15

## 2025-01-31 RX ADMIN — SODIUM CHLORIDE 20 ML/HR: 9 INJECTION, SOLUTION INTRAVENOUS at 11:38

## 2025-01-31 RX ADMIN — HEPARIN 500 UNITS: 100 SYRINGE at 12:15

## 2025-01-31 RX ADMIN — SODIUM CHLORIDE 400 MG: 9 INJECTION, SOLUTION INTRAVENOUS at 11:38

## 2025-01-31 NOTE — PROGRESS NOTES
Pt here for C11 D1 keytruda. He arrived to infusion from a follow-up visit with Swapna BUSBY, with his port accessed. Infusion given and pt tolerated well.

## 2025-01-31 NOTE — PROGRESS NOTES
Pt to injection room for port access and labs prior to appt with QI Barcenas and infusion. Port accessed and flushed with good blood return noted. 10cc of blood wasted prior to specimen collection. Blood specimen obtained and sent to lab for processing per protocol.  Port saline locked and capped. Pt to waiting room.

## 2025-02-06 DIAGNOSIS — M16.11 OSTEOARTHRITIS OF RIGHT HIP, UNSPECIFIED OSTEOARTHRITIS TYPE: ICD-10-CM

## 2025-02-06 DIAGNOSIS — M47.816 LUMBAR FACET ARTHROPATHY: ICD-10-CM

## 2025-02-06 RX ORDER — BUPRENORPHINE 5 UG/H
1 PATCH TRANSDERMAL WEEKLY
Qty: 4 PATCH | Refills: 0 | Status: SHIPPED | OUTPATIENT
Start: 2025-02-06

## 2025-02-07 ENCOUNTER — HOSPITAL ENCOUNTER (OUTPATIENT)
Dept: CT IMAGING | Facility: HOSPITAL | Age: 65
Discharge: HOME | End: 2025-02-07
Admitting: STUDENT IN AN ORGANIZED HEALTH CARE EDUCATION/TRAINING PROGRAM
Payer: COMMERCIAL

## 2025-02-07 DIAGNOSIS — C64.2 RENAL CELL CARCINOMA OF LEFT KIDNEY: ICD-10-CM

## 2025-02-07 PROCEDURE — 74176 CT ABD & PELVIS W/O CONTRAST: CPT

## 2025-02-07 PROCEDURE — 71250 CT THORAX DX C-: CPT

## 2025-02-13 NOTE — PROGRESS NOTES
HEMATOLOGY ONCOLOGY OUTPATIENT FOLLOW UP       Patient name: Louis Andino  : 1960  MRN: [id number]  Primary Care Physician: Harry Hsu MD  Referring Physician: Harry Hsu MD  Reason For Consult: Renal cell carcinoma      History of Present Illness:  Patient is a 64 y.o. male with RCC.    Patient initially presented with hematuria.  During hospitalization he had a CT of his abdomen which showed a large left lower pole renal mass with possible adrenal metastasis.    2023 CT abdomen pelvis with contrast showing mass arising from the lower pole of the left kidney consistent with renal cell carcinoma.  Associated uroepithelial thickening of the pelvis and proximal ureter.  Enhancing indeterminate left adrenal nodule potential metastasis no retroperitoneal lymphadenopathy.    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  Vessel with clear-cell renal cell carcinoma tumor thrombus    24 - pembrolizumab  3/1/24 - pembro  24 - increased pembro 400 mg  24 - pembro 400 mg  24 - pembro 400 mg  24 - was admitted with syncopal episode, ECHO was normal, CTA neck negative, has a Zio patch, will be seen by cardiology.  Morning metoprolol was stopped.    24: CT Chest Abd Pelvis WO Contrast:    IMPRESSION:     Status post left nephrectomy. No metastatic disease identified by  unenhanced imaging. Continued follow-up advised as indicated.      2024: Patient seen today for initial evaluation by me. He was preiovulsy being seen by Dr. Prakash in our practice.   He is on adjuvant Immunotherapy with pembrolizumab for RCC stage III. He has  had fairly good tolerance to IO therapy except for some adverse effects, Reported having persistent diffuse pruritus and Dermographism which has not improved significantly with PRN Antihistamines. He however denied any significant impairment of his quality of life with these symptoms. Reported some dizziness/ preSyncopal Episodes following immunotherapy. He was admitted to [facility] for these symptoms, extensive workup for neurocardiogenic and endocrine etiology was reported unremarkable at this time. He is status post Restaging scans as above.  He is scheduled to receive IV Fluids on 8/19/24 after treatment today in view of his Symptoms.    9/27/2024: Patient seen today for follow-up. Stated that he has tolerated immunotherapy much better after the last infusion.  Skin rash and dizziness are better controlled today.  Acute issues reported.      11/8/24: Improved itching with over-the-counter lotion.  Has recently started CPAP for sleep apnea.  Reported having better sleep at night and feeling refreshed during daytime.  Constipation has improved.  No other significant treatment-related side effects reported.  He is planning to travel to [place] in early December.    12/20/24: Seen today for follow-up visit prior to scheduled treatment.  No new complaints reported today.  Continues to have good tolerance to immunotherapy but reported having some fatigue and weakness for short duration thereafter.  Reported having presyncopal episodes recently, likely secondary to aggressive BP management following which his BP medications were adjusted.    1/31/2025: Seen today for follow-up prior to scheduled treatment. Reports overall feeling well. No new complaints reported today. Continues to tolerate pembrolizumab. He does report mild fatigue and pruritus following infusion and improves shortly thereafter.     2/7/25: CT chest Abd Pelvis W contrast:  IMPRESSION:  1. No interval change or evidence for metastatic disease in patient  with  left nephrectomy for renal cell carcinoma.  2. Advanced osteoarthritis of the right hip.  3. Hepatic steatosis.      Subjective:  2/21/25: pt seen today to follow up on restaging scans, no new complaints. Has some ongoing skin pruritus but controlled with Benadryl and topical triamcinolone. Weight/appetite stable. Energy levels are good.      Past Medical History:   Diagnosis Date    Anemia 12/18/2023    Due to surgery. Required transfusions    Ankle sprain     Multiple-both ankles over the years    Asthma     Chronic pain disorder 2020    Right hip and back    Clotting disorder     Required blood transfusion during and after surgery in excess of expected    Coronary artery disease Dec 2023    Bifascicular block    Deep vein thrombosis 12/18/2023    During cancer surgery a clot was surgically removed from the inferior vena cava which was cancer related    Emphysema/COPD     Erectile dysfunction Several years    Extremity pain 2020    Right hip and bilat shoulders    Fracture of ankle     Left ankle as a child    Fracture of wrist     Left wrist as a child    GERD (gastroesophageal reflux disease)     Headache, tension-type 1977    Rare    HL (hearing loss)     Progressive worsening over many years    Hyperglycemia 10/12/2023    Hyperlipidemia 10/12/2023    Hypertension     Injury of neck 2023    Previous fractures noted during chiropractic visit. Probably secondary to shoulder injury.    Joint pain 2020    Right hip    Low back pain     Worse last few year    Neck pain 2020    Mild    Obesity     Pneumonia 12/20/2023    Developed while hospitalized for cancer surgery    Prostate disease     Rash     Allergic reactions to laundry detergent and mild generalized itching secondary to immunotherapy.    Renal cell carcinoma 12/30/2023    Renal insufficiency 4/9/2024    Decreased renal function since nephrectomy in December, 2023, secondary to renal cancer, but not diagnosed as chronic kidney disease until recent  visit. Kidney functions have been stable since surgery, but are now being considered permanent.    Shoulder injury 1980s    Injured in     Sleep apnea     Visual impairment     Wear glasses for near and distant vision    Vitreous degeneration of right eye     Formatting of this note might be different from the original. Retinal tear and detachment warning symptoms reviewed and patient instructed to call immediately if increasing floaters, flashes, or decreasing peripheral vision. No retinal detachment or retinal tear noted. Recheck in 1 month with dilated exam.       Past Surgical History:   Procedure Laterality Date    ABDOMINAL SURGERY  December 18, 2023    Left Kidney and Adrenal Gland removed due to Renal Cell Carcinoma    NEPHRECTOMY Left 12/18/2023    Procedure: NEPHRECTOMY RADICAL WITH CAVAL THROMBECTOMY;  Surgeon: James Esquivel MD;  Location: [facility] MAIN OR;  Service: Urology;  Laterality: Left;    PORTACATH PLACEMENT  01/30/2024    SKIN LESION EXCISION  1990         Current Outpatient Medications:     budesonide (PULMICORT) 0.5 MG/2ML nebulizer solution, Take 2 mL (one vial) by nebulization 2 (Two) Times a Day., Disp: 180 mL, Rfl: 1    Buprenorphine (BUTRANS) 5 MCG/HR patch weekly transdermal patch, Place 1 patch on the skin as directed by provider 1 (One) Time Per Week., Disp: 4 patch, Rfl: 0    ferrous sulfate 325 (65 FE) MG tablet, Take 1 tablet by mouth Daily With Breakfast., Disp: 90 tablet, Rfl: 1    hydrocortisone (ANUSOL-HC) 25 MG suppository, Insert 1 suppository into the rectum 2 (Two) Times a Day., Disp: 20 each, Rfl: 0    ipratropium-albuterol (COMBIVENT RESPIMAT)  MCG/ACT inhaler, Inhale 1 puff 4 (Four) Times a Day As Needed for Wheezing., Disp: , Rfl:     lidocaine-prilocaine (EMLA) 2.5-2.5 % cream, Apply 1 Application topically to the appropriate area as directed See Admin Instructions. Apply to port site one hour prior to port being accessed., Disp: 30 g, Rfl: 3    losartan  (COZAAR) 100 MG tablet, Take 1 tablet by mouth Daily for 90 days., Disp: 90 tablet, Rfl: 0    metoprolol succinate XL (TOPROL-XL) 25 MG 24 hr tablet, Take 1 tablet by mouth Daily., Disp: 90 tablet, Rfl: 1    multivitamin with minerals tablet tablet, Take 1 tablet by mouth Daily., Disp: , Rfl:     ondansetron (ZOFRAN) 8 MG tablet, Take 1 tablet by mouth Every 8 (Eight) Hours As Needed for Nausea or Vomiting., Disp: 30 tablet, Rfl: 2    pantoprazole (PROTONIX) 40 MG EC tablet, Take 1 tablet by mouth Daily., Disp: 90 tablet, Rfl: 0    Plecanatide (Trulance) 3 MG tablet, TAKE 1 TABLET BY MOUTH DAILY, Disp: 30 tablet, Rfl: 4    sucralfate (CARAFATE) 1 g tablet, Take 1 tablet by mouth 3 (Three) Times a Day As Needed (heartburn)., Disp: 270 tablet, Rfl: 0    Symbicort 160-4.5 MCG/ACT inhaler, Inhale 2 puffs 2 (Two) Times a Day., Disp: , Rfl:     vitamin D3 125 MCG (5000 UT) capsule capsule, Take 1 capsule by mouth Daily., Disp: , Rfl:     No Known Allergies    Family History   Problem Relation Age of Onset    Arthritis Mother     Cancer Mother     Diabetes Mother     Heart disease Mother     Hyperlipidemia Mother     Miscarriages / Stillbirths Mother         Stillborn child    Hypertension Mother     Osteoporosis Mother     Kidney disease Mother         Kidney stones    Vision loss Mother         Near sighted    COPD Father     Hyperlipidemia Father     Migraines Father     Vision loss Father         Near sighted    Hyperlipidemia Brother     Hypertension Brother     Asthma Brother     Vision loss Daughter     Broken bones Daughter         Broke left forearm three times during childhood    Broken bones Daughter         Fractured right femur and right forearm during childhood    Anxiety disorder Daughter     Depression Daughter     Miscarriages / Stillbirths Daughter         First Trimester miscarriage    Dislocations Daughter         Recurrent radial head subluxations as a child    Anxiety disorder Daughter     Depression  "Daughter     Asthma Daughter     Asthma Son     Depression Son        Cancer-related family history includes Cancer in his mother.      Social History     Tobacco Use    Smoking status: Never     Passive exposure: Past    Smokeless tobacco: Never    Tobacco comments:     Passive due to Father and most male relatives smoking   Vaping Use    Vaping status: Never Used   Substance Use Topics    Alcohol use: Yes     Alcohol/week: 1.0 standard drink of alcohol     Types: 1 Glasses of wine per week     Comment: rare    Drug use: Never     Social History     Social History Narrative    Not on file       ROS:   Review of Systems   Constitutional:  Negative for appetite change, chills and fever.   HENT:  Negative for ear pain, mouth sores, nosebleeds and sore throat.    Eyes:  Negative for photophobia and visual disturbance.   Respiratory:  Negative for wheezing and stridor.    Cardiovascular:  Negative for chest pain and palpitations.   Gastrointestinal:  Negative for abdominal pain, diarrhea, nausea and vomiting.   Endocrine: Negative for cold intolerance and heat intolerance.   Genitourinary:  Negative for dysuria and hematuria.   Musculoskeletal:  Negative for joint swelling and neck stiffness.   Skin:  Negative for color change and rash.   Neurological:  Negative for seizures and syncope.   Hematological:  Negative for adenopathy.        No obvious bleeding   Psychiatric/Behavioral:  Negative for agitation, confusion and hallucinations.    pruritis    Objective:    Vital Signs:  Vitals:    02/21/25 1042   BP: 132/87   Pulse: 60   SpO2: 96%   Weight: 111 kg (244 lb 6.4 oz)   Height: 177.8 cm (70\")   PainSc: 0-No pain       Body mass index is 35.07 kg/m².    ECOG  (0) Fully active, able to carry on all predisease performance without restriction    Physical Exam:   Physical Exam  Vitals reviewed.   Constitutional:       General: He is not in acute distress.     Appearance: He is not diaphoretic.   HENT:      Head: " Normocephalic and atraumatic.   Eyes:      General: No scleral icterus.        Right eye: No discharge.         Left eye: No discharge.      Conjunctiva/sclera: Conjunctivae normal.   Neck:      Thyroid: No thyromegaly.   Cardiovascular:      Rate and Rhythm: Normal rate and regular rhythm.      Heart sounds: Normal heart sounds.      No friction rub. No gallop.   Pulmonary:      Effort: Pulmonary effort is normal. No respiratory distress.      Breath sounds: No stridor. No wheezing.   Abdominal:      General: Bowel sounds are normal.      Palpations: Abdomen is soft. There is no mass.      Tenderness: There is no abdominal tenderness. There is no guarding or rebound.   Musculoskeletal:         General: No tenderness. Normal range of motion.      Cervical back: Normal range of motion and neck supple.   Lymphadenopathy:      Cervical: No cervical adenopathy.   Skin:     General: Skin is warm.      Coloration: Skin is not jaundiced.      Findings: No bruising, erythema, lesion or rash.   Neurological:      Mental Status: He is alert and oriented to person, place, and time.      Motor: No abnormal muscle tone.   Psychiatric:         Mood and Affect: Mood normal.         Behavior: Behavior normal.         Thought Content: Thought content normal.         Lab Results - Last 18 Months   Lab Units 01/31/25  1038 12/20/24  1225 11/08/24  1117   WBC 10*3/mm3 9.25 10.95* 9.65   HEMOGLOBIN g/dL 12.9* 11.7* 12.2*   HEMATOCRIT % 39.5 37.1* 38.0   PLATELETS 10*3/mm3 208 181 213   MCV fL 85.7 89.4 88.0     Lab Results - Last 18 Months   Lab Units 01/31/25  1100 12/20/24  1216 12/20/24  1209 09/27/24  0957 08/16/24  1101 07/13/24  0507   SODIUM mmol/L  --   --  139  --  140 141   POTASSIUM mmol/L  --   --  4.2  --  4.2 4.3   CHLORIDE mmol/L  --   --  103  --  104 105   CO2 mmol/L  --   --  24  --  22.4 24.9   BUN mg/dL  --   --  21  --  25* 24*   CREATININE mg/dL 1.40* 1.50* 1.35*   < > 1.40* 1.48*   CALCIUM mg/dL  --   --  9.8  --   "10.1 9.6   BILIRUBIN mg/dL  --   --  0.7  --  0.8 0.8   ALK PHOS IU/L  --   --  58  --  68 63   ALT (SGPT) IU/L  --   --  35  --  18 15   AST (SGOT) IU/L  --   --  22  --  21 13   GLUCOSE mg/dL  --   --  92  --  133* 89    < > = values in this interval not displayed.       Lab Results   Component Value Date    GLUCOSE 92 12/20/2024    BUN 21 12/20/2024    CREATININE 1.40 (H) 01/31/2025    BCR 16 12/20/2024    K 4.2 12/20/2024    CO2 24 12/20/2024    CALCIUM 9.8 12/20/2024    ALBUMIN 4.1 12/20/2024    AST 22 12/20/2024    ALT 35 12/20/2024       Lab Results - Last 18 Months   Lab Units 07/12/24  1351 01/30/24  0827 12/18/23  1722   INR  1.03 1.01 1.08   APTT seconds 27.5 25.3 28.7       Lab Results   Component Value Date    IRON 49 (L) 12/20/2024    TIBC 304 12/20/2024    FERRITIN 61.50 12/20/2024       No results found for: \"FOLATE\"    No results found for: \"OCCULTBLD\"    No results found for: \"RETICCTPCT\"  No results found for: \"NYTJQZPI72\"  No results found for: \"SPEP\", \"UPEP\"  No results found for: \"LDH\", \"URICACID\"  No results found for: \"OSKAR\", \"RF\", \"SEDRATE\"  Lab Results   Component Value Date    FIBRINOGEN 381 12/18/2023     Lab Results   Component Value Date    PTT 27.5 07/12/2024    INR 1.03 07/12/2024     No results found for: \"\"  No results found for: \"CEA\"  No components found for: \"CA-19-9\"  No results found for: \"PSA\"  No results found for: \"SEDRATE\"     CASE SUMMARY: Patient is a 64 y.o. with stage IIIb T3b RCC status post radical nephrectomy in December 2023. Completed one year of adjuvant pembrolizumab in January 2025.  Status post nephrectomy, CT chest with no evidence of metastasis questionable adrenal metastasis on scan status post adrenalectomy with no evidence of metastatic disease  Given patient's stage III clear-cell RCC, discussed adjuvant treatment with pembrolizumab based on the findings from keynote 564 trial with significant improvement in disease-free survival as compared to " placebo for stage III clear-cell RCC.  Started Immunotherapy, G1 - pruritus otherwise good tolerance. Currently on  400 mg every 6 weeks.   Has ongoing pruritus no rash, takes benadryl.   -restaging scans as above, reported ANA. Continue surveillance every 6 months.  -Patient tolerating treatment well, continue immunotherapy every 6 weeks for total 1 year.    -Received last dose of adjuvant keytruda on 1/31/25.    Assessment & Plan       Renal cell carcinoma:T3bN0 (Stage IIIB)  Status post radical nephrectomy in December 2023. Completed one year of adjuvant pembrolizumab in January 2025.  -Surveillance CT CAP reviewed from 2/7/25, no evidence of progression.   -overall doing well. Continue surveillance scans every 6 months or as clinically indicated    Pruritus  G1, continue immunotherapy  Use moisturizer stable  No significant worsening, continue same.  He will try atarax instead of benadryl  Patient reported significant improvement in symptoms with use of an over-the-counter lotion.    Will consider use of Leukotriene inhibitors on follow up if no improvement.   -Patient reports symptoms are well managed at  this time.      Syncope  Ziopatch on  F/u with cardiology  Has increased symptoms after immunotherapy  Patient is s/p hospital admission and evaluation as above.  Patient has been previously receiving normal saline 2-3 days after each infusion.  This has been helping him.  Held due to shortage of IV fluids.  Encouraged patient on improved oral intake. Reviewed  No further presyncopal episodes    Anemia:  Noted mild anemia. Iron panel is suggestive of iron deficiency anemia.  Was started on Oral iron in December 2024. Tolerates at this time. Will continue to monitor iron profile and CBC. To continue oral iron at this time.   -Hb levels are normal.    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 Calixto]. Reviewed    3 month follow up for clinical  evaluation with labs, sooner as needed.      Thank you very much for providing the opportunity to participate in this patient’s care. Please do not hesitate to call if there are any other questions.    Time spent on encounter including record review, history taking, exam, discussion, counseling and documentation at: 30 minutes

## 2025-02-17 ENCOUNTER — OFFICE VISIT (OUTPATIENT)
Dept: INTERNAL MEDICINE | Facility: CLINIC | Age: 65
End: 2025-02-17
Payer: COMMERCIAL

## 2025-02-17 VITALS
SYSTOLIC BLOOD PRESSURE: 134 MMHG | WEIGHT: 244 LBS | HEIGHT: 70 IN | DIASTOLIC BLOOD PRESSURE: 82 MMHG | BODY MASS INDEX: 34.93 KG/M2

## 2025-02-17 DIAGNOSIS — I10 HYPERTENSION, UNSPECIFIED TYPE: Primary | ICD-10-CM

## 2025-02-17 DIAGNOSIS — N18.31 STAGE 3A CHRONIC KIDNEY DISEASE: ICD-10-CM

## 2025-02-17 DIAGNOSIS — E78.5 HYPERLIPIDEMIA, UNSPECIFIED HYPERLIPIDEMIA TYPE: ICD-10-CM

## 2025-02-17 DIAGNOSIS — Z12.11 SCREENING FOR COLON CANCER: ICD-10-CM

## 2025-02-17 DIAGNOSIS — R73.03 PRE-DIABETES: ICD-10-CM

## 2025-02-17 DIAGNOSIS — Z12.5 SCREENING FOR PROSTATE CANCER: ICD-10-CM

## 2025-02-17 PROCEDURE — 99214 OFFICE O/P EST MOD 30 MIN: CPT | Performed by: STUDENT IN AN ORGANIZED HEALTH CARE EDUCATION/TRAINING PROGRAM

## 2025-02-17 NOTE — PROGRESS NOTES
"Chief Complaint  Hypertension and Hyperlipidemia    Subjective        Louis Andino presents to [facility]  History of Present Illness    Presents to clinic today for follow-up    He is doing well since last visit, he has completed his last dose of immunotherapy for renal cell carcinoma.  He had recent scans that showed stable disease and has upcoming follow-up later this week    His blood pressure has been stable, no readings over 140/90        Objective   Vital Signs:  /82 (BP Location: Left arm, Patient Position: Sitting)   Ht 177.8 cm (70\")   Wt 111 kg (244 lb)   BMI 35.01 kg/m²   Estimated body mass index is 35.01 kg/m² as calculated from the following:    Height as of this encounter: 177.8 cm (70\").    Weight as of this encounter: 111 kg (244 lb).            Physical Exam  Vitals reviewed.   Constitutional:       General: He is not in acute distress.     Appearance: Normal appearance. He is not toxic-appearing.   HENT:      Head: Normocephalic and atraumatic.   Eyes:      General: No scleral icterus.     Conjunctiva/sclera: Conjunctivae normal.   Skin:     General: Skin is warm and dry.   Neurological:      Mental Status: He is alert and oriented to person, place, and time.      Gait: Gait abnormal (walker).   Psychiatric:         Mood and Affect: Mood normal.         Behavior: Behavior normal.        Result Review :                Assessment and Plan   Diagnoses and all orders for this visit:    1. Hypertension, unspecified type (Primary)  -     Comprehensive Metabolic Panel; Future    2. Stage 3a chronic kidney disease    3. Hyperlipidemia, unspecified hyperlipidemia type  -     Lipid Panel With / Chol / HDL Ratio; Future    4. Screening for colon cancer  -     Ambulatory Referral For Screening Colonoscopy    5. Pre-diabetes  -     Hemoglobin A1c; Future    6. Screening for prostate cancer  -     PSA Screen; Future      Blood pressure is at goal in office as well " as home readings, continue losartan and metoprolol    Continue atorvastatin for lipids we will recheck prior to next visit    Due for colonoscopy, now that he is completed immunotherapy will place referral    Return to clinic in 6 months for wellness visit, labs prior             Follow Up   Return in about 6 months (around 8/17/2025) for Medicare Wellness.  Patient was given instructions and counseling regarding his condition or for health maintenance advice. Please see specific information pulled into the AVS if appropriate.

## 2025-02-21 ENCOUNTER — OFFICE VISIT (OUTPATIENT)
Dept: ONCOLOGY | Facility: CLINIC | Age: 65
End: 2025-02-21
Payer: COMMERCIAL

## 2025-02-21 ENCOUNTER — HOSPITAL ENCOUNTER (OUTPATIENT)
Dept: ONCOLOGY | Facility: HOSPITAL | Age: 65
Discharge: HOME | End: 2025-02-21
Admitting: STUDENT IN AN ORGANIZED HEALTH CARE EDUCATION/TRAINING PROGRAM
Payer: COMMERCIAL

## 2025-02-21 VITALS
BODY MASS INDEX: 34.99 KG/M2 | WEIGHT: 244.4 LBS | HEART RATE: 60 BPM | SYSTOLIC BLOOD PRESSURE: 132 MMHG | HEIGHT: 70 IN | DIASTOLIC BLOOD PRESSURE: 87 MMHG | OXYGEN SATURATION: 96 %

## 2025-02-21 DIAGNOSIS — C64.2 RENAL CELL CARCINOMA OF LEFT KIDNEY: ICD-10-CM

## 2025-02-21 DIAGNOSIS — Z95.828 PORT-A-CATH IN PLACE: ICD-10-CM

## 2025-02-21 DIAGNOSIS — L30.8 PRURITIC DERMATITIS: ICD-10-CM

## 2025-02-21 DIAGNOSIS — D50.0 IRON DEFICIENCY ANEMIA DUE TO CHRONIC BLOOD LOSS: ICD-10-CM

## 2025-02-21 DIAGNOSIS — C64.9 RENAL CELL CARCINOMA, UNSPECIFIED LATERALITY: Primary | ICD-10-CM

## 2025-02-21 DIAGNOSIS — D64.9 ANEMIA, UNSPECIFIED TYPE: ICD-10-CM

## 2025-02-21 DIAGNOSIS — N17.9 AKI (ACUTE KIDNEY INJURY): ICD-10-CM

## 2025-02-21 PROCEDURE — 25010000002 HEPARIN LOCK FLUSH PER 10 UNITS: Performed by: STUDENT IN AN ORGANIZED HEALTH CARE EDUCATION/TRAINING PROGRAM

## 2025-02-21 PROCEDURE — 36591 DRAW BLOOD OFF VENOUS DEVICE: CPT

## 2025-02-21 RX ORDER — HEPARIN SODIUM (PORCINE) LOCK FLUSH IV SOLN 100 UNIT/ML 100 UNIT/ML
500 SOLUTION INTRAVENOUS AS NEEDED
OUTPATIENT
Start: 2025-02-21

## 2025-02-21 RX ORDER — SODIUM CHLORIDE 0.9 % (FLUSH) 0.9 %
10 SYRINGE (ML) INJECTION AS NEEDED
OUTPATIENT
Start: 2025-02-21

## 2025-02-21 RX ORDER — SODIUM CHLORIDE 0.9 % (FLUSH) 0.9 %
10 SYRINGE (ML) INJECTION AS NEEDED
Status: DISCONTINUED | OUTPATIENT
Start: 2025-02-21 | End: 2025-02-22 | Stop reason: HOSPADM

## 2025-02-21 RX ORDER — HEPARIN SODIUM (PORCINE) LOCK FLUSH IV SOLN 100 UNIT/ML 100 UNIT/ML
500 SOLUTION INTRAVENOUS AS NEEDED
Status: DISCONTINUED | OUTPATIENT
Start: 2025-02-21 | End: 2025-02-22 | Stop reason: HOSPADM

## 2025-02-21 RX ADMIN — Medication 10 ML: at 10:51

## 2025-02-21 RX ADMIN — HEPARIN 500 UNITS: 100 SYRINGE at 10:51

## 2025-02-21 NOTE — PROGRESS NOTES
Pt to port chair for PF and labs prior to appt with Dr. Lama. Port accessed using sterile technique and flushed with good blood return noted. 10cc of blood wasted prior to specimen collection. Blood specimen obtained and sent to lab for processing per protocol.  Port flushed with saline and heparin prior to needle removal. Pt to waiting room.

## 2025-02-23 DIAGNOSIS — J45.40 MODERATE PERSISTENT ASTHMA WITHOUT COMPLICATION: Chronic | ICD-10-CM

## 2025-02-23 DIAGNOSIS — I10 HYPERTENSION, UNSPECIFIED TYPE: Chronic | ICD-10-CM

## 2025-02-24 RX ORDER — LOSARTAN POTASSIUM 100 MG/1
100 TABLET ORAL
Qty: 90 TABLET | Refills: 0 | Status: SHIPPED | OUTPATIENT
Start: 2025-02-24 | End: 2025-05-26

## 2025-02-24 RX ORDER — BUDESONIDE 0.5 MG/2ML
0.5 INHALANT ORAL 2 TIMES DAILY
Qty: 180 ML | Refills: 1 | Status: SHIPPED | OUTPATIENT
Start: 2025-02-24

## 2025-02-28 ENCOUNTER — TELEPHONE (OUTPATIENT)
Dept: GASTROENTEROLOGY | Facility: CLINIC | Age: 65
End: 2025-02-28
Payer: COMMERCIAL

## 2025-02-28 ENCOUNTER — PREP FOR SURGERY (OUTPATIENT)
Dept: OTHER | Facility: HOSPITAL | Age: 65
End: 2025-02-28
Payer: COMMERCIAL

## 2025-02-28 DIAGNOSIS — Z12.11 ENCOUNTER FOR SCREENING FOR MALIGNANT NEOPLASM OF COLON: Primary | ICD-10-CM

## 2025-02-28 RX ORDER — SODIUM, POTASSIUM,MAG SULFATES 17.5-3.13G
1 SOLUTION, RECONSTITUTED, ORAL ORAL EVERY 12 HOURS
Qty: 354 ML | Refills: 0 | Status: SHIPPED | OUTPATIENT
Start: 2025-02-28

## 2025-03-03 ENCOUNTER — OFFICE VISIT (OUTPATIENT)
Dept: PAIN MEDICINE | Facility: CLINIC | Age: 65
End: 2025-03-03
Payer: COMMERCIAL

## 2025-03-03 VITALS
HEART RATE: 76 BPM | DIASTOLIC BLOOD PRESSURE: 79 MMHG | BODY MASS INDEX: 35.1 KG/M2 | SYSTOLIC BLOOD PRESSURE: 127 MMHG | WEIGHT: 245.2 LBS | TEMPERATURE: 97.5 F | OXYGEN SATURATION: 96 % | HEIGHT: 70 IN

## 2025-03-03 DIAGNOSIS — M47.816 LUMBAR FACET ARTHROPATHY: Primary | ICD-10-CM

## 2025-03-03 DIAGNOSIS — M54.31 RIGHT SIDED SCIATICA: ICD-10-CM

## 2025-03-03 DIAGNOSIS — Z79.899 ENCOUNTER FOR LONG-TERM (CURRENT) USE OF HIGH-RISK MEDICATION: ICD-10-CM

## 2025-03-03 DIAGNOSIS — M16.11 OSTEOARTHRITIS OF RIGHT HIP, UNSPECIFIED OSTEOARTHRITIS TYPE: ICD-10-CM

## 2025-03-03 PROCEDURE — 99214 OFFICE O/P EST MOD 30 MIN: CPT | Performed by: PHYSICIAN ASSISTANT

## 2025-03-03 NOTE — PROGRESS NOTES
CHIEF COMPLAINT  F/U hip pain- patient states that his pain has improved since his last visit.     Subjective   Louis Andino is a 64 y.o. male  who presents for follow-up.  He has a history of chronic right hip, low back and left shoulder pain.  Tj is extremely pleased to report significant improvement of pain as compared to how he was feeling prior to initiation of the Butrans patch.  Continues to have significant pain within the right hip joint due to end-stage osteoarthritis and is hopeful that after June of this year he will be able to proceed with replacement of the hip joint.  He also has intermittent pain in a bandlike distribution across the lumbar spine as well as pain localized within the left shoulder joint.    Medical history complicated with history of renal cell carcinoma with recurrence in December 2023 which required left nephrectomy and adrenalectomy on 12/18/2023.  The patient completed immunotherapy January 2025.    His current medication regimen consists of Butrans 5 mcg patch q. 7 days which he tolerates without adverse effects.  He obtains percent pain relief with the patch and states that he is even returned to working out and in the gym twice a week.  Failed previous trials of tramadol and hydrocodone.  He has tolerated oxycodone 5 mg of which she was only able to take 1/4 of a tablet daily.    Pain today 1/10 VAS in severity.    Back Pain  This is a chronic problem. The current episode started more than 1 year ago. The problem occurs constantly. The problem has been improved since onset. The pain is present in the lumbar spine and sacro-iliac. The quality of the pain is described as aching and burning. The pain radiates to the right buttock, right thigh and right anterolateral lower leg. The pain is at a severity of 1/10. The pain is mild. The pain is The same all the time. The symptoms are aggravated by sitting, position and standing. Associated symptoms include leg pain. Pertinent  negatives include no abdominal pain, chest pain, dysuria, fever, headaches, numbness or weakness. Risk factors include history of cancer, obesity, sedentary lifestyle, poor posture and lack of exercise. He has tried ice and heat for the symptoms. The treatment provided mild relief.   Hip Pain   There was no injury mechanism. The pain is present in the right hip. The quality of the pain is described as aching. The pain is at a severity of 2/10. The pain is mild. The pain has been Constant since onset. Associated symptoms include an inability to bear weight. Pertinent negatives include no numbness. The symptoms are aggravated by movement and weight bearing.        PEG Assessment   What number best describes your pain on average in the past week?3  What number best describes how, during the past week, pain has interfered with your enjoyment of life?3  What number best describes how, during the past week, pain has interfered with your general activity?  7    Review of Pertinent Medical Data ---  3 VIEW LUMBAR SPINE     HISTORY: Low back pain.     FINDINGS: There is some very minimal disc space narrowing at L3-4. There  is some prominent spurring of the posterior facets throughout the lumbar  spine and associated with some minimal posterior subluxation at L3-4  measuring 3 mm. These findings are unchanged from 04/09/2024.     2 VIEW RIGHT HIP AND 1 VIEW AP PELVIS     HISTORY: Right hip pain.     FINDINGS: There are very severe degenerative changes involving the right  hip much more than left with marked joint space narrowing and spurring  of the acetabulum and subchondral degenerative cystic change of the  femoral head. There are also slight degenerative changes at both  sacroiliac joints.     This report was finalized on 10/28/2024 10:15 AM by Dr. Cornelio Desai M.D on Workstation: [id number]    The following portions of the patient's history were reviewed and updated as appropriate: allergies, current medications,  "past family history, past medical history, past social history, past surgical history, and problem list.    Review of Systems   Constitutional:  Negative for activity change (increased), chills, fatigue and fever.   HENT:  Positive for hearing loss. Negative for congestion.    Eyes:  Negative for visual disturbance.   Respiratory:  Negative for chest tightness and shortness of breath.    Cardiovascular:  Negative for chest pain.   Gastrointestinal:  Negative for abdominal pain, constipation and diarrhea.   Genitourinary:  Negative for difficulty urinating and dysuria.   Musculoskeletal:  Positive for back pain.   Neurological:  Negative for dizziness, weakness, light-headedness, numbness and headaches.   Psychiatric/Behavioral:  Negative for agitation, self-injury, sleep disturbance and suicidal ideas. The patient is not nervous/anxious.      I have reviewed and confirmed the accuracy of the ROS as documented by the MA/LPN/RN QI Freed  Vitals:    03/03/25 1251   BP: 127/79   Pulse: 76   Temp: 97.5 °F (36.4 °C)   SpO2: 96%   Weight: 111 kg (245 lb 3.2 oz)   Height: 177.8 cm (70\")   PainSc: 1    PainLoc: Hip  Comment: right         Objective   Physical Exam  Vitals and nursing note reviewed.   Constitutional:       Appearance: Normal appearance. He is obese.   HENT:      Head: Normocephalic.   Neurological:      Mental Status: He is alert and oriented to person, place, and time.      Cranial Nerves: Cranial nerves 2-12 are intact.      Sensory: Sensation is intact.      Motor: Motor function is intact.      Gait: Gait abnormal (Slow antalgic gait ambulating with the use of a walker).   Psychiatric:         Mood and Affect: Mood normal.         Behavior: Behavior normal.         Thought Content: Thought content normal.         Judgment: Judgment normal.       PHQ-2 Depression Screening  Little interest or pleasure in doing things? Not at all   Feeling down, depressed, or hopeless? Not at all   PHQ-2 Total " Score 0     Tobacco Use: Low Risk  (3/3/2025)    Patient History     Smoking Tobacco Use: Passive Smoke Exposure - Never Smoker     Smokeless Tobacco Use: Never     Passive Exposure: Past           Assessment & Plan   Diagnoses and all orders for this visit:    1. Lumbar facet arthropathy (Primary)    2. Right sided sciatica    3. Osteoarthritis of right hip, unspecified osteoarthritis type    4. Encounter for long-term (current) use of high-risk medication        Louis Andino reports a pain score of 1.  Given his pain assessment as noted, treatment options were discussed and the following options were decided upon as a follow-up plan to address the patient's pain: continuation of current treatment plan for pain, prescription for opiod analgesics, and use of non-medical modalities (ice, heat, stretching and/or behavior modifications).      --- Follow-up 2 months or sooner for medication management  --- Refill will be provided with a Butrans 5 mcg patch. Patient appears stable with current regimen. No adverse effects. Regarding continuation of opioids, there is no evidence of aberrant behavior or any red flags.  The patient continues with appropriate response to opioid therapy. ADL's remain intact by self. '  --- Unfortunately this patient is having to pay $200 monthly out-of-pocket for the Butrans patch at his current pharmacy.  We have discussed today that he should do some investigation through the good Rx program to see if he can find his prescription cheaper elsewhere.  She will contact me if he finds the need to change the pharmacy for his next prescription refill to be sent to  --- Low risk for opiate abuse/diversion      The urine drug screen confirmation from 12/5/2024 has been reviewed and the result is negative based on patient history/low dose of Butrans transdermal patch and ERICA report     The patient signed an updated copy of the controlled substance agreement on 12/5/2024.      ERICA  REPORT  As part of the patient's treatment plan, I am prescribing controlled substances. The patient has been made aware of appropriate use of such medications, including potential risk of somnolence, limited ability to drive and/or work safely, and the potential for dependence or overdose. It has also been made clear that these medications are for use by this patient only, without concomitant use of alcohol or other substances unless prescribed.     Patient has completed prescribing agreement detailing terms of continued prescribing of controlled substances, including monitoring ERICA reports, urine drug screening, and pill counts if necessary. The patient is aware that inappropriate use will results in cessation of prescribing such medications.    As the clinician, I personally reviewed the ERICA from 3/3/2025 while the patient was in the office today.    History and physical exam exhibit continued safe and appropriate use of controlled substances.     Dictated utilizing Dragon dictation.

## 2025-03-05 DIAGNOSIS — M47.816 LUMBAR FACET ARTHROPATHY: ICD-10-CM

## 2025-03-05 DIAGNOSIS — M16.11 OSTEOARTHRITIS OF RIGHT HIP, UNSPECIFIED OSTEOARTHRITIS TYPE: ICD-10-CM

## 2025-03-05 RX ORDER — BUPRENORPHINE 5 UG/H
1 PATCH TRANSDERMAL WEEKLY
Qty: 4 PATCH | Refills: 0 | Status: SHIPPED | OUTPATIENT
Start: 2025-03-05

## 2025-03-19 ENCOUNTER — OFFICE VISIT (OUTPATIENT)
Dept: SLEEP MEDICINE | Facility: HOSPITAL | Age: 65
End: 2025-03-19
Payer: MEDICARE

## 2025-03-19 VITALS — WEIGHT: 246 LBS | HEIGHT: 70 IN | HEART RATE: 80 BPM | BODY MASS INDEX: 35.22 KG/M2 | OXYGEN SATURATION: 97 %

## 2025-03-19 DIAGNOSIS — E66.812 CLASS 2 SEVERE OBESITY WITH SERIOUS COMORBIDITY AND BODY MASS INDEX (BMI) OF 35.0 TO 35.9 IN ADULT, UNSPECIFIED OBESITY TYPE: ICD-10-CM

## 2025-03-19 DIAGNOSIS — G47.33 SEVERE OBSTRUCTIVE SLEEP APNEA: Primary | ICD-10-CM

## 2025-03-19 DIAGNOSIS — E66.01 CLASS 2 SEVERE OBESITY WITH SERIOUS COMORBIDITY AND BODY MASS INDEX (BMI) OF 35.0 TO 35.9 IN ADULT, UNSPECIFIED OBESITY TYPE: ICD-10-CM

## 2025-03-19 PROCEDURE — 1159F MED LIST DOCD IN RCRD: CPT | Performed by: NURSE PRACTITIONER

## 2025-03-19 PROCEDURE — 99213 OFFICE O/P EST LOW 20 MIN: CPT | Performed by: NURSE PRACTITIONER

## 2025-03-19 PROCEDURE — G0463 HOSPITAL OUTPT CLINIC VISIT: HCPCS

## 2025-03-19 PROCEDURE — 1160F RVW MEDS BY RX/DR IN RCRD: CPT | Performed by: NURSE PRACTITIONER

## 2025-03-19 NOTE — PROGRESS NOTES
"  [facility]  [address]  [address]  [place] [address]  Phone   Fax        SLEEP CLINIC FOLLOW-UP PROGRESS NOTE    Louis Andino Jr.  [id number]   1960  64 y.o.  male      PCP: Harry Hsu MD    DATE OF VISIT: 3/19/2025          CHIEF COMPLAINT: Severe obstructive sleep apnea    HPI:  This is a 64 y.o. year old patient who presents to the clinic today for the management of obstructive sleep apnea. Patient had a(n) home sleep study 9/2024 showing severe obstructive sleep apnea with AHI of 66/hr overall, 98/hr when supine.lowest SpO2 74% with 23.3 minutes spent with O2 sats below 89% the night of the study.  This patient is using positive airway pressure therapy with auto CPAP.   Patient's sleep apnea has improved with this therapy with residual AHI 6.3/hr.   Average usage is 8 hours 44 minutes per night on nights used.   Patient tolerating device well and improved with treatment.  AHI spiking at times.  We discussed trying to avoid sedative medications and substances in the evening.  Overnight oximetry study did not show significant hypoxia.  Will increase minimum pressure from 8 cm H2O to 9 cm H2O to help keep AHI at goal.          MEDICATIONS: reviewed     ALLERGIES:  Patient has no known allergies.    SOCIAL HISTORY (habits pertaining to sleep medicine):  Tobacco use: No   Alcohol use: 1 per month  Caffeine use: 2     REVIEW OF SYSTEMS:   Pertinent positive symptoms are:  [place] Sleepiness Scale :Total score: 4         PHYSICAL EXAMINATION:  CONSTITUTIONAL:  Vitals:    03/19/25 1058   Pulse: 80   SpO2: 97%   Weight: 112 kg (246 lb)   Height: 177.8 cm (70\")    Body mass index is 35.3 kg/m².   HEAD: atraumatic, normocephalic  RESP SYSTEM: not in respiratory distress, breathing unlabored  CARDIOVASULAR: normal rate, no edema noted   NEURO: Alert and oriented x 3, mood and affect appeared appropriate      DATA REVIEWED:  The PAP compliance summary " downloaded on 3/16/25 has been reviewed independently by me and discussed with the patient.   Compliance: 100%  More than 4 hr use: 100%  Average use of the device: 8 hours 44 minutes per night  Residual AHI: 6.3/hr   Device: ResMed air sense 11  Mask type: Fullface  DME: [employer]          ASSESSMENT AND PLAN:  Obstructive Sleep Apnea: sleep apnea has improved with the device and treatment.  Patient has excellent compliance with the device for treatment of sleep apnea.  I have personally reviewed the smart card download and discussed the download data with the patient and encouarged continued use of the device.  The residual AHI is acceptable. The device is benefiting the patient and the device is medically necessary.  Recommend patient get supplies from the DME company, change them on a regular basis, and clean as directed.  A prescription for supplies has been sent to the [employer] company.  Recommend continued usage of PAP device, most insurances require minimum usage of 4 hours per night at least 70% of the time, would recommend using all night every night if possible for optimum health.  Recommend prioritizing sleep to aid in overall health.  Do not drive or operate heavy machinery or do activities that require high concentration if feeling tired or drowsy.  Increase minimum device pressure from 8 cm H2O to 9 cm H2O.  Will do another device download in approximately 4 to 6 weeks.  Patient to call if he has any issues with pressures.  Severe obesity: Body mass index is 35.3 kg/m².. Patients who are overweight or obese are at increased risk of sleep apnea/ sleep disordered breathing. Weight reduction and healthy lifestyle are encouraged in overweight/ obese patients as part of a comprehensive approach to sleep apnea treatment.         Patient will follow-up in 6 months or follow-up sooner for any issues or concerns.  Patient's questions were answered.          Thank you for allowing me to participate in the care of  this patient.     Rossy Jama DNP, APRN  [facility]

## 2025-03-24 ENCOUNTER — ANESTHESIA (OUTPATIENT)
Dept: GASTROENTEROLOGY | Facility: HOSPITAL | Age: 65
End: 2025-03-24
Payer: MEDICARE

## 2025-03-24 ENCOUNTER — HOSPITAL ENCOUNTER (OUTPATIENT)
Facility: HOSPITAL | Age: 65
Setting detail: HOSPITAL OUTPATIENT SURGERY
Discharge: HOME | End: 2025-03-24
Attending: INTERNAL MEDICINE | Admitting: INTERNAL MEDICINE
Payer: MEDICARE

## 2025-03-24 ENCOUNTER — ANESTHESIA EVENT (OUTPATIENT)
Dept: GASTROENTEROLOGY | Facility: HOSPITAL | Age: 65
End: 2025-03-24
Payer: MEDICARE

## 2025-03-24 VITALS
WEIGHT: 241 LBS | SYSTOLIC BLOOD PRESSURE: 126 MMHG | RESPIRATION RATE: 20 BRPM | BODY MASS INDEX: 34.5 KG/M2 | HEIGHT: 70 IN | HEART RATE: 55 BPM | DIASTOLIC BLOOD PRESSURE: 70 MMHG | OXYGEN SATURATION: 99 %

## 2025-03-24 DIAGNOSIS — Z12.11 ENCOUNTER FOR SCREENING FOR MALIGNANT NEOPLASM OF COLON: ICD-10-CM

## 2025-03-24 PROCEDURE — 25010000002 PROPOFOL 10 MG/ML EMULSION

## 2025-03-24 PROCEDURE — 88305 TISSUE EXAM BY PATHOLOGIST: CPT | Performed by: INTERNAL MEDICINE

## 2025-03-24 PROCEDURE — 25010000002 LIDOCAINE 2% SOLUTION

## 2025-03-24 PROCEDURE — 88300 SURGICAL PATH GROSS: CPT | Performed by: INTERNAL MEDICINE

## 2025-03-24 PROCEDURE — 25010000002 PROPOFOL 1000 MG/100ML EMULSION

## 2025-03-24 PROCEDURE — 25810000003 LACTATED RINGERS PER 1000 ML: Performed by: INTERNAL MEDICINE

## 2025-03-24 PROCEDURE — 45385 COLONOSCOPY W/LESION REMOVAL: CPT | Performed by: INTERNAL MEDICINE

## 2025-03-24 PROCEDURE — 25010000002 GLYCOPYRROLATE 0.2 MG/ML SOLUTION

## 2025-03-24 PROCEDURE — S0260 H&P FOR SURGERY: HCPCS | Performed by: INTERNAL MEDICINE

## 2025-03-24 RX ORDER — SODIUM CHLORIDE, SODIUM LACTATE, POTASSIUM CHLORIDE, CALCIUM CHLORIDE 600; 310; 30; 20 MG/100ML; MG/100ML; MG/100ML; MG/100ML
1000 INJECTION, SOLUTION INTRAVENOUS CONTINUOUS
Status: DISCONTINUED | OUTPATIENT
Start: 2025-03-24 | End: 2025-03-24 | Stop reason: HOSPADM

## 2025-03-24 RX ORDER — PROPOFOL 10 MG/ML
INJECTION, EMULSION INTRAVENOUS AS NEEDED
Status: DISCONTINUED | OUTPATIENT
Start: 2025-03-24 | End: 2025-03-24 | Stop reason: SURG

## 2025-03-24 RX ORDER — SODIUM CHLORIDE 0.9 % (FLUSH) 0.9 %
10 SYRINGE (ML) INJECTION AS NEEDED
Status: DISCONTINUED | OUTPATIENT
Start: 2025-03-24 | End: 2025-03-24 | Stop reason: HOSPADM

## 2025-03-24 RX ORDER — LIDOCAINE HYDROCHLORIDE 20 MG/ML
INJECTION, SOLUTION INFILTRATION; PERINEURAL AS NEEDED
Status: DISCONTINUED | OUTPATIENT
Start: 2025-03-24 | End: 2025-03-24 | Stop reason: SURG

## 2025-03-24 RX ORDER — GLYCOPYRROLATE 0.2 MG/ML
INJECTION INTRAMUSCULAR; INTRAVENOUS AS NEEDED
Status: DISCONTINUED | OUTPATIENT
Start: 2025-03-24 | End: 2025-03-24 | Stop reason: SURG

## 2025-03-24 RX ADMIN — SODIUM CHLORIDE, SODIUM LACTATE, POTASSIUM CHLORIDE, CALCIUM CHLORIDE 1000 ML: 20; 30; 600; 310 INJECTION, SOLUTION INTRAVENOUS at 12:51

## 2025-03-24 RX ADMIN — PROPOFOL 160 MCG/KG/MIN: 10 INJECTION, EMULSION INTRAVENOUS at 13:34

## 2025-03-24 RX ADMIN — LIDOCAINE HYDROCHLORIDE 60 MG: 20 INJECTION, SOLUTION INFILTRATION; PERINEURAL at 13:34

## 2025-03-24 RX ADMIN — PROPOFOL INJECTABLE EMULSION 80 MG: 10 INJECTION, EMULSION INTRAVENOUS at 13:34

## 2025-03-24 RX ADMIN — GLYCOPYRROLATE 0.1 MG: 0.2 INJECTION INTRAMUSCULAR; INTRAVENOUS at 13:34

## 2025-03-24 NOTE — H&P
[facility]  Pre Procedure History & Physical    Chief Complaint:   screening    Subjective     HPI:   63 yo here today for first colonoscopy.  Pt reports no FH CRC/polyps.  Patient denies GI symptoms currently.   He has episodic constipation which he treats with trulance.  He has intermittent rectal bleeding associated with hard stools.    Past Medical History:   Past Medical History:   Diagnosis Date    Anemia 12/18/2023    Due to surgery. Required transfusions    Ankle sprain     Multiple-both ankles over the years    Arthritis Years    Worse R. Hip and back    Asthma     Cataract 2years    Optometrist is watching yearly    Chronic pain disorder 2020    Right hip and back    Clotting disorder     Required blood transfusion during and after surgery in excess of expected    Coronary artery disease Dec 2023    Bifascicular block    Deep vein thrombosis 12/18/2023    During cancer surgery a clot was surgically removed from the inferior vena cava which was cancer related    Emphysema/COPD     Erectile dysfunction Several years    Extremity pain 2020    Right hip and bilat shoulders    Fracture of ankle     Left ankle as a child    Fracture of wrist     Left wrist as a child    GERD (gastroesophageal reflux disease)     Headache     Headache, tension-type 1977    Rare    HL (hearing loss)     Progressive worsening over many years    Hyperglycemia 10/12/2023    Hyperlipidemia 10/12/2023    Hypertension     Injury of neck 2023    Previous fractures noted during chiropractic visit. Probably secondary to shoulder injury.    Joint pain 2020    Right hip    Low back pain     Worse last few year    Neck pain 2020    Mild    Obesity     Pneumonia 12/20/2023    Developed while hospitalized for cancer surgery    PONV (postoperative nausea and vomiting)     Prostate disease     Rash     Allergic reactions to laundry detergent and mild generalized itching secondary to immunotherapy.    Rectal bleeding      Renal cell carcinoma 12/30/2023    Renal insufficiency 4/9/2024    Decreased renal function since nephrectomy in December, 2023, secondary to renal cancer, but not diagnosed as chronic kidney disease until recent visit. Kidney functions have been stable since surgery, but are now being considered permanent.    Shoulder injury 1980s    Injured in     Sleep apnea     Visual impairment     Wear glasses for near and distant vision    Vitreous degeneration of right eye 10/12/2023    Formatting of this note might be different from the original. Retinal tear and detachment warning symptoms reviewed and patient instructed to call immediately if increasing floaters, flashes, or decreasing peripheral vision. No retinal detachment or retinal tear noted. Recheck in 1 month with dilated exam.       Past Surgical History:  Past Surgical History:   Procedure Laterality Date    ABDOMINAL SURGERY  December 18, 2023    Left Kidney and Adrenal Gland removed due to Renal Cell Carcinoma    NEPHRECTOMY Left 12/18/2023    Procedure: NEPHRECTOMY RADICAL WITH CAVAL THROMBECTOMY;  Surgeon: James Esquivel MD;  Location: [facility];  Service: Urology;  Laterality: Left;    PORTACATH PLACEMENT  01/30/2024    SKIN LESION EXCISION  1990       Family History:  Family History   Problem Relation Age of Onset    Arthritis Mother     Cancer Mother     Diabetes Mother     Heart disease Mother     Hyperlipidemia Mother     Miscarriages / Stillbirths Mother         Stillborn child    Hypertension Mother     Osteoporosis Mother     Kidney disease Mother         Kidney stones    Vision loss Mother         Near sighted    Coronary artery disease Mother     COPD Father     Hyperlipidemia Father     Migraines Father     Vision loss Father         Near sighted    Hyperlipidemia Brother     Hypertension Brother     Asthma Brother     Vision loss Daughter     Broken bones Daughter         Broke left forearm three times during childhood    Broken  bones Daughter         Fractured right femur and right forearm during childhood    Anxiety disorder Daughter     Depression Daughter     Miscarriages / Stillbirths Daughter         First Trimester miscarriage    Dislocations Daughter         Recurrent radial head subluxations as a child    Anxiety disorder Daughter     Depression Daughter     Asthma Daughter     Asthma Son     Depression Son     Malig Hyperthermia Neg Hx        Social History:   reports that he is a non-smoker but has been exposed to tobacco smoke. The exposure started about 66 years ago. He has been exposed to an average 4 packs per day for the past 18.6 years. He has never used smokeless tobacco. He reports current alcohol use of about 1.0 standard drink of alcohol per week. He reports that he does not use drugs.    Medications:   Medications Prior to Admission   Medication Sig Dispense Refill Last Dose/Taking    budesonide (PULMICORT) 0.5 MG/2ML nebulizer solution Take 2 mL (one vial) by nebulization 2 (Two) Times a Day. 180 mL 1 3/23/2025 Bedtime    Buprenorphine (BUTRANS) 5 MCG/HR patch weekly transdermal patch Place 1 patch on the skin as directed by provider 1 (One) Time Per Week. 4 patch 0 Past Week    ferrous sulfate 325 (65 FE) MG tablet Take 1 tablet by mouth Daily With Breakfast. 90 tablet 1 Past Week    hydrocortisone (ANUSOL-HC) 25 MG suppository Insert 1 suppository into the rectum 2 (Two) Times a Day. 20 each 0 Past Month    ipratropium-albuterol (COMBIVENT RESPIMAT)  MCG/ACT inhaler Inhale 1 puff 4 (Four) Times a Day As Needed for Wheezing.   Past Month    lidocaine-prilocaine (EMLA) 2.5-2.5 % cream Apply 1 Application topically to the appropriate area as directed See Admin Instructions. Apply to port site one hour prior to port being accessed. 30 g 3 Past Month    losartan (COZAAR) 100 MG tablet Take 1 tablet by mouth Daily for 90 days. 90 tablet 0 3/23/2025 Bedtime    metoprolol succinate XL (TOPROL-XL) 25 MG 24 hr tablet  "Take 1 tablet by mouth Daily. 90 tablet 1 3/23/2025 Bedtime    multivitamin with minerals tablet tablet Take 1 tablet by mouth Daily.   Past Week    ondansetron (ZOFRAN) 8 MG tablet Take 1 tablet by mouth Every 8 (Eight) Hours As Needed for Nausea or Vomiting. 30 tablet 2 Past Month    pantoprazole (PROTONIX) 40 MG EC tablet Take 1 tablet by mouth Daily. 90 tablet 0 3/23/2025 Morning    Plecanatide (Trulance) 3 MG tablet TAKE 1 TABLET BY MOUTH DAILY 30 tablet 4 Past Month    sucralfate (CARAFATE) 1 g tablet Take 1 tablet by mouth 3 (Three) Times a Day As Needed (heartburn). 270 tablet 0 3/23/2025    Symbicort 160-4.5 MCG/ACT inhaler Inhale 2 puffs 2 (Two) Times a Day.   Past Month    vitamin D3 125 MCG (5000 UT) capsule capsule Take 1 capsule by mouth Daily.   Past Week       Allergies:  Patient has no known allergies.    ROS:    Pertinent items are noted in HPI     Objective     Blood pressure 148/92, pulse 57, resp. rate 18, height 177.8 cm (70\"), weight 109 kg (241 lb), SpO2 94%.    Physical Exam   Constitutional: Pt is oriented to person, place, and time and well-developed, well-nourished, and in no distress.   Mouth/Throat: Oropharynx is clear and moist.   Neck: Normal range of motion.   Cardiovascular: Normal rate, regular rhythm    Pulmonary/Chest: Effort normal    Abdominal: Soft. Nontender  Skin: Skin is warm and dry.   Psychiatric: Mood, memory, affect and judgment normal.     Assessment & Plan     Diagnosis:  Screening for colon cancer    Anticipated Surgical Procedure:  colonoscopy    The risks, benefits, and alternatives of this procedure have been discussed with the patient or the responsible party- the patient understands and agrees to proceed.                                                              "

## 2025-03-24 NOTE — ANESTHESIA PREPROCEDURE EVALUATION
Anesthesia Evaluation     Patient summary reviewed and Nursing notes reviewed                Airway   Mallampati: III  Dental      Pulmonary    (+) COPD, asthma,sleep apnea  Cardiovascular     ECG reviewed  Patient on routine beta blocker  Rhythm: regular  Rate: normal    (+) hypertension, CAD, dysrhythmias (RBBB & LAFB) PAC, DVT, hyperlipidemia      Neuro/Psych  (+) headaches, numbness  GI/Hepatic/Renal/Endo    (+) morbid obesity, GERD, GI bleeding , renal disease- CRI    Musculoskeletal     (+) neck pain  Abdominal    Substance History - negative use     OB/GYN negative ob/gyn ROS         Other   arthritis,   history of cancer (left RCC s/p  resection)                Anesthesia Plan    ASA 4     MAC     intravenous induction     Anesthetic plan, risks, benefits, and alternatives have been provided, discussed and informed consent has been obtained with: patient.    CODE STATUS:

## 2025-03-24 NOTE — DISCHARGE INSTRUCTIONS
For the next 24 hours patient needs to be with a responsible adult.    For THE REST OF TODAY DO NOT drive, operate machinery, appliances, drink alcohol, make important decisions or sign legal documents.    Start with a light or bland diet if you are feeling sick to your stomach otherwise advance to regular diet as tolerated.    Follow recommendations on procedure report if provided by your doctor.    Call Dr Stephens for problems .    Problems may include but not limited to: large amounts of bleeding, trouble breathing, repeated vomiting, severe unrelieved pain, fever or chills.      If biopsies or polyps were taken, MD will call you with the results in about 7 days. If you don't hear from the MD in 2 weeks, call the number above.

## 2025-03-24 NOTE — ANESTHESIA POSTPROCEDURE EVALUATION
Patient: Louis Andino Jr.    Procedure Summary       Date: 03/24/25 Room / Location: [facility] ENDOSCOPY 1 /  [facility]    Anesthesia Start: 1332 Anesthesia Stop: 1400    Procedure: COLONOSCOPY to cecum and terminal ileum with cold and hot polypectomies Diagnosis:       Encounter for screening for malignant neoplasm of colon      (Encounter for screening for malignant neoplasm of colon [Z12.11])    Surgeons: Dolly Stephens MD Provider: Caio Nicholas MD    Anesthesia Type: MAC ASA Status: 4            Anesthesia Type: MAC    Vitals  Vitals Value Taken Time   /70 03/24/25 14:20   Temp     Pulse 52 03/24/25 14:30   Resp 20 03/24/25 14:19   SpO2 100 % 03/24/25 14:30   Vitals shown include unfiled device data.        Post Anesthesia Care and Evaluation    Patient location during evaluation: PACU  Patient participation: complete - patient participated  Level of consciousness: awake and alert  Pain management: adequate    Airway patency: patent  Anesthetic complications: No anesthetic complications    Cardiovascular status: acceptable  Respiratory status: acceptable  Hydration status: acceptable    Comments: --------------------            03/24/25               1419     --------------------   BP:       126/70     Pulse:      55       Resp:       20       SpO2:      99%      --------------------

## 2025-03-25 LAB
CYTO UR: NORMAL
LAB AP CASE REPORT: NORMAL
PATH REPORT.FINAL DX SPEC: NORMAL
PATH REPORT.GROSS SPEC: NORMAL

## 2025-03-26 ENCOUNTER — OFFICE VISIT (OUTPATIENT)
Dept: PAIN MEDICINE | Facility: CLINIC | Age: 65
End: 2025-03-26
Payer: COMMERCIAL

## 2025-03-26 VITALS
HEIGHT: 70 IN | DIASTOLIC BLOOD PRESSURE: 69 MMHG | TEMPERATURE: 96.9 F | BODY MASS INDEX: 35.13 KG/M2 | WEIGHT: 245.4 LBS | RESPIRATION RATE: 16 BRPM | SYSTOLIC BLOOD PRESSURE: 146 MMHG | HEART RATE: 73 BPM | OXYGEN SATURATION: 98 %

## 2025-03-26 DIAGNOSIS — M47.816 LUMBAR FACET ARTHROPATHY: Primary | ICD-10-CM

## 2025-03-26 DIAGNOSIS — M54.31 RIGHT SIDED SCIATICA: ICD-10-CM

## 2025-03-26 DIAGNOSIS — L50.9 URTICARIA OF UNKNOWN ORIGIN: Primary | ICD-10-CM

## 2025-03-26 DIAGNOSIS — M16.11 OSTEOARTHRITIS OF RIGHT HIP, UNSPECIFIED OSTEOARTHRITIS TYPE: ICD-10-CM

## 2025-03-26 DIAGNOSIS — Z79.899 ENCOUNTER FOR LONG-TERM (CURRENT) USE OF HIGH-RISK MEDICATION: ICD-10-CM

## 2025-03-26 DIAGNOSIS — M47.816 LUMBAR FACET ARTHROPATHY: ICD-10-CM

## 2025-03-26 PROCEDURE — 99214 OFFICE O/P EST MOD 30 MIN: CPT | Performed by: PHYSICIAN ASSISTANT

## 2025-03-26 RX ORDER — OXYCODONE AND ACETAMINOPHEN 5; 325 MG/1; MG/1
.5-1 TABLET ORAL DAILY PRN
Qty: 30 TABLET | Refills: 0 | Status: SHIPPED | OUTPATIENT
Start: 2025-03-26

## 2025-03-26 RX ORDER — TRIAMCINOLONE ACETONIDE 0.25 MG/G
1 OINTMENT TOPICAL 3 TIMES DAILY
Qty: 15 G | Refills: 0 | Status: SHIPPED | OUTPATIENT
Start: 2025-03-26

## 2025-03-26 NOTE — PROGRESS NOTES
CHIEF COMPLAINT  Right hip pain and bilateral shoulder pain.  Patient reports that his pain has gotten worse since last ov.      Subjective   Louis Andino Jr. is a 64 y.o. male  who presents for follow-up.  He has a history of chronic right hip, low back and left shoulder pain.  This patient contacted our office 3/24/2025 with report of severe skin reaction developing rash and pruritus secondary to the buprenorphine patch.  Patch continues to provide significant pain relief however it was beginning to have adverse effects.  He continues to have significant pain affecting the right hip joint due to end-stage osteoarthritis with the hopes of proceeding with hip arthroplasty in June 2025.  He also continues to have pain in a bandlike distribution across the lumbar spine as well as pain localized within the left shoulder joint.    Medical history complicated with history of renal cell carcinoma with recurrence in December 2023 which required left nephrectomy and adrenalectomy on 12/18/2023. The patient completed immunotherapy January 2025.     Patient was doing extremely well with use of Butrans 5 mcg patch q. 7 days which she tolerated without adverse effects until recent development skin rash and urticaria.  He has failed previous trials of tramadol due to suboptimal relief and hydrocodone caused severe nausea.  He previously tolerated oxycodone 5/325 mg of which at that time he was able to take one fourth of a tablet daily.    Pain today 4/10 VAS in severity.      Hip Pain   There was no injury mechanism. The pain is present in the right hip. The quality of the pain is described as aching. The pain is at a severity of 4/10. The pain is moderate. The pain has been Worsening since onset. Associated symptoms include an inability to bear weight and numbness (at night bilateral arm). The symptoms are aggravated by movement and weight bearing.   Back Pain  This is a chronic problem. The current episode started more  than 1 year ago. The problem occurs constantly. The problem has been worse since onset. The pain is present in the lumbar spine and sacro-iliac. The quality of the pain is described as aching and burning. The pain radiates to the right buttock, right thigh and right anterolateral lower leg. The pain is at a severity of 4/10. The pain is moderate. The pain is The same all the time. The symptoms are aggravated by sitting, position and standing. Associated symptoms include leg pain and numbness (at night bilateral arm). Pertinent negatives include no abdominal pain, chest pain, dysuria, fever, headaches or weakness. Risk factors include history of cancer, obesity, sedentary lifestyle, poor posture and lack of exercise. He has tried ice and heat for the symptoms. The treatment provided mild relief.        PEG Assessment   What number best describes your pain on average in the past week?3  What number best describes how, during the past week, pain has interfered with your enjoyment of life?2  What number best describes how, during the past week, pain has interfered with your general activity?  4    Review of Pertinent Medical Data ---  3 VIEW LUMBAR SPINE     HISTORY: Low back pain.     FINDINGS: There is some very minimal disc space narrowing at L3-4. There  is some prominent spurring of the posterior facets throughout the lumbar  spine and associated with some minimal posterior subluxation at L3-4  measuring 3 mm. These findings are unchanged from 04/09/2024.     2 VIEW RIGHT HIP AND 1 VIEW AP PELVIS     HISTORY: Right hip pain.     FINDINGS: There are very severe degenerative changes involving the right  hip much more than left with marked joint space narrowing and spurring  of the acetabulum and subchondral degenerative cystic change of the  femoral head. There are also slight degenerative changes at both  sacroiliac joints.     This report was finalized on 10/28/2024 10:15 AM by Dr. Cornelio Desai M.D    The  "following portions of the patient's history were reviewed and updated as appropriate: allergies, current medications, past family history, past medical history, past social history, past surgical history, and problem list.    Review of Systems   Constitutional:  Negative for fever.   Cardiovascular:  Negative for chest pain.   Gastrointestinal:  Negative for abdominal pain, constipation and diarrhea.   Genitourinary:  Negative for difficulty urinating and dysuria.   Musculoskeletal:  Positive for back pain.   Neurological:  Positive for numbness (at night bilateral arm). Negative for weakness and headaches.   Psychiatric/Behavioral:  Positive for sleep disturbance. Negative for suicidal ideas.      I have reviewed and confirmed the accuracy of the ROS as documented by the MA/LPN/RN QI Freed  Vitals:    03/26/25 1411   BP: 146/69   Pulse: 73   Resp: 16   Temp: 96.9 °F (36.1 °C)   SpO2: 98%   Weight: 111 kg (245 lb 6.4 oz)   Height: 177.8 cm (70\")   PainSc: 4          Objective   Physical Exam  Vitals and nursing note reviewed.   Constitutional:       Appearance: Normal appearance. He is obese.   HENT:      Head: Normocephalic.   Skin:            Comments: Notable areas of very light skin rash with a reddened area noting the border of the patch on his right bicep.   Neurological:      Mental Status: He is alert and oriented to person, place, and time.      Cranial Nerves: Cranial nerves 2-12 are intact.      Sensory: Sensation is intact.      Motor: Motor function is intact.      Gait: Gait abnormal (Slow antalgic gait ambulating with the use of a walker).   Psychiatric:         Mood and Affect: Mood normal.         Behavior: Behavior normal.         Thought Content: Thought content normal.         Judgment: Judgment normal.       Tobacco Use: Low Risk  (3/26/2025)    Patient History     Smoking Tobacco Use: Never     Smokeless Tobacco Use: Never     Passive Exposure: Past           Assessment & Plan "   Diagnoses and all orders for this visit:    1. Urticaria of unknown origin (Primary)  -     triamcinolone (KENALOG) 0.025 % ointment; Apply 1 Application topically to the appropriate area as directed 3 (Three) Times a Day. Apply to affected areas  Dispense: 15 g; Refill: 0    2. Lumbar facet arthropathy    3. Right sided sciatica    4. Osteoarthritis of right hip, unspecified osteoarthritis type    5. Encounter for long-term (current) use of high-risk medication        Louis Quirosbeltranheaven VillegasDaniel reports a pain score of 4.  Given his pain assessment as noted, treatment options were discussed and the following options were decided upon as a follow-up plan to address the patient's pain: continuation of current treatment plan for pain, prescription for non-opiod analgesics, prescription for opiod analgesics, and use of non-medical modalities (ice, heat, stretching and/or behavior modifications).      --- Follow-up in 1 month or sooner for medication manage  --- Due to recent failure to Butrans 5 mcg patch due to skin rash and urticaria I will have him discontinue the patch.  Will resume with low-dose oxycodone 5/325 mg 1/2-1 tablet p.o. daily as needed pain.Patient appears stable with current regimen. No adverse effects. Regarding continuation of opioids, there is no evidence of aberrant behavior or any red flags.  The patient continues with appropriate response to opioid therapy. ADL's remain intact by self.   --- Low risk for opiate abuse/diversion  --- I have sent in a triamcinolone steroid cream as he has found this to be helpful in the past when he has developed this urticaria generalized with his previous immunotherapy.  I have also advised that the patient begin utilizing Benadryl 12.5 mg 1 p.o. every 4-6 hours as tolerated for the generalized itching.  Reports sedation with adult dose of the medication.      The urine drug screen confirmation from 12/5/2024 has been reviewed and the result is negative based on  patient history/low dose of Butrans transdermal patch and ERICA report        The patient signed an updated copy of the controlled substance agreement on 12/5/2024.         ERICA REPORT  As part of the patient's treatment plan, I am prescribing controlled substances. The patient has been made aware of appropriate use of such medications, including potential risk of somnolence, limited ability to drive and/or work safely, and the potential for dependence or overdose. It has also been made clear that these medications are for use by this patient only, without concomitant use of alcohol or other substances unless prescribed.     Patient has completed prescribing agreement detailing terms of continued prescribing of controlled substances, including monitoring ERICA reports, urine drug screening, and pill counts if necessary. The patient is aware that inappropriate use will results in cessation of prescribing such medications.    As the clinician, I personally reviewed the ERICA from 3/26/2025 while the patient was in the office today.    History and physical exam exhibit continued safe and appropriate use of controlled substances.     Dictated utilizing Dragon dictation.

## 2025-03-26 NOTE — TELEPHONE ENCOUNTER
Pt failed trial of tramadol--severe nausea with hydrocodone; was doing well with Butrans patch but then developed skin reaction with rash and itching--has tolerated past use of oxycodone 5/325--seen today; please send rx

## 2025-03-27 ENCOUNTER — PATIENT MESSAGE (OUTPATIENT)
Dept: PAIN MEDICINE | Facility: CLINIC | Age: 65
End: 2025-03-27
Payer: COMMERCIAL

## 2025-03-27 DIAGNOSIS — L50.9 URTICARIA OF UNKNOWN ORIGIN: Primary | ICD-10-CM

## 2025-03-28 RX ORDER — METHYLPREDNISOLONE 4 MG/1
TABLET ORAL
Qty: 21 TABLET | Refills: 0 | Status: SHIPPED | OUTPATIENT
Start: 2025-03-28

## 2025-03-28 NOTE — TELEPHONE ENCOUNTER
Reviewed last office visit on 3/26/25. I also reviewed patient message from his oncologist Dr. Lama who stated it was ok to take a steroid. MDP sent to pharmacy at this time.     Covering for QI Hooper

## 2025-04-04 ENCOUNTER — HOSPITAL ENCOUNTER (OUTPATIENT)
Dept: ONCOLOGY | Facility: HOSPITAL | Age: 65
Discharge: HOME | End: 2025-04-04
Payer: COMMERCIAL

## 2025-04-04 ENCOUNTER — TELEPHONE (OUTPATIENT)
Dept: ONCOLOGY | Facility: CLINIC | Age: 65
End: 2025-04-04

## 2025-04-04 DIAGNOSIS — C64.9 RENAL CELL CARCINOMA, UNSPECIFIED LATERALITY: Primary | ICD-10-CM

## 2025-04-04 PROCEDURE — 96523 IRRIG DRUG DELIVERY DEVICE: CPT

## 2025-04-04 PROCEDURE — 25010000002 HEPARIN LOCK FLUSH PER 10 UNITS: Performed by: STUDENT IN AN ORGANIZED HEALTH CARE EDUCATION/TRAINING PROGRAM

## 2025-04-04 RX ORDER — HEPARIN SODIUM (PORCINE) LOCK FLUSH IV SOLN 100 UNIT/ML 100 UNIT/ML
500 SOLUTION INTRAVENOUS AS NEEDED
OUTPATIENT
Start: 2025-04-04

## 2025-04-04 RX ORDER — SODIUM CHLORIDE 0.9 % (FLUSH) 0.9 %
10 SYRINGE (ML) INJECTION AS NEEDED
Status: DISCONTINUED | OUTPATIENT
Start: 2025-04-04 | End: 2025-04-05 | Stop reason: HOSPADM

## 2025-04-04 RX ORDER — HEPARIN SODIUM (PORCINE) LOCK FLUSH IV SOLN 100 UNIT/ML 100 UNIT/ML
500 SOLUTION INTRAVENOUS AS NEEDED
Status: DISCONTINUED | OUTPATIENT
Start: 2025-04-04 | End: 2025-04-05 | Stop reason: HOSPADM

## 2025-04-04 RX ORDER — SODIUM CHLORIDE 0.9 % (FLUSH) 0.9 %
10 SYRINGE (ML) INJECTION AS NEEDED
OUTPATIENT
Start: 2025-04-04

## 2025-04-04 RX ADMIN — Medication 500 UNITS: at 11:30

## 2025-04-04 RX ADMIN — Medication 10 ML: at 11:29

## 2025-04-13 DIAGNOSIS — K21.9 GASTROESOPHAGEAL REFLUX DISEASE, UNSPECIFIED WHETHER ESOPHAGITIS PRESENT: Chronic | ICD-10-CM

## 2025-04-14 DIAGNOSIS — C64.2 RENAL CELL CARCINOMA OF LEFT KIDNEY: ICD-10-CM

## 2025-04-14 DIAGNOSIS — L30.8 PRURITIC DERMATITIS: Primary | ICD-10-CM

## 2025-04-14 RX ORDER — SUCRALFATE 1 G/1
1 TABLET ORAL 3 TIMES DAILY PRN
Qty: 270 TABLET | Refills: 0 | Status: SHIPPED | OUTPATIENT
Start: 2025-04-14

## 2025-04-18 ENCOUNTER — OFFICE VISIT (OUTPATIENT)
Dept: SPORTS MEDICINE | Facility: CLINIC | Age: 65
End: 2025-04-18
Payer: COMMERCIAL

## 2025-04-18 VITALS
HEIGHT: 70 IN | WEIGHT: 245 LBS | SYSTOLIC BLOOD PRESSURE: 120 MMHG | OXYGEN SATURATION: 96 % | HEART RATE: 79 BPM | DIASTOLIC BLOOD PRESSURE: 80 MMHG | BODY MASS INDEX: 35.07 KG/M2 | TEMPERATURE: 97.5 F

## 2025-04-18 DIAGNOSIS — M25.512 BILATERAL SHOULDER PAIN, UNSPECIFIED CHRONICITY: Primary | ICD-10-CM

## 2025-04-18 DIAGNOSIS — M25.511 BILATERAL SHOULDER PAIN, UNSPECIFIED CHRONICITY: Primary | ICD-10-CM

## 2025-04-18 DIAGNOSIS — M19.011 PRIMARY OSTEOARTHRITIS OF BOTH SHOULDERS: ICD-10-CM

## 2025-04-18 DIAGNOSIS — M19.012 PRIMARY OSTEOARTHRITIS OF BOTH SHOULDERS: ICD-10-CM

## 2025-04-18 RX ADMIN — METHYLPREDNISOLONE ACETATE 80 MG: 80 INJECTION, SUSPENSION INTRA-ARTICULAR; INTRALESIONAL; INTRAMUSCULAR; SOFT TISSUE at 14:26

## 2025-04-18 NOTE — PROGRESS NOTES
"Louis is a 64 y.o. year old male     Chief Complaint   Patient presents with    Shoulder Pain     BILATERAL SHOULDER- Patient is here for initial evaluation of bilateral shoulder pain that started 3 years ago, worsen in the last year.        History of Present Illness  History of Present Illness  The patient presents with left worse than right shoulder pain. He reports a left shoulder injury from  service years ago, which he managed and exercised pain-free. Pain has worsened over the past year or two, especially in the left shoulder, with milder pain in the right shoulder, exacerbated by raising the arm. He assists the left arm to get overhead.        I have reviewed the patient's medical, family, and social history in detail and updated the computerized patient record.    Review of Systems    /80 (BP Location: Left arm, Patient Position: Sitting, Cuff Size: Adult)   Pulse 79   Temp 97.5 °F (36.4 °C) (Temporal)   Ht 177.8 cm (70\")   Wt 111 kg (245 lb)   SpO2 96%   BMI 35.15 kg/m²      Physical Exam    Vital signs reviewed.   General: No acute distress.      Physical Exam  Right shoulder: normal appearance, no tenderness, normal ROM, mild painful arc, painful overhead reach, good rotator cuff strength without pain, mild crepitus. Left shoulder: restricted ROM, unable to actively flex or abduct above 90 degrees but can do so passively, negative drop arm, pain and weakness with empty can maneuver and external rotation, normal internal rotation without pain, notable crepitus.    Results  Results  X-rays AP, internal and external rotation views of bilateral shoulders show mild glenohumeral arthritis on the right side. Moderately severe glenohumeral arthritis on the left side with joint space narrowing and osteophyte formation on the inferior humeral head and superior posterior glenoid.    - Large Joint Arthrocentesis: bilateral glenohumeral on 4/18/2025 2:26 PM  Indications: pain  Details: 22 G " needle, ultrasound-guided  Medications (Right): 80 mg methylPREDNISolone acetate 80 MG/ML  Medications (Left): 80 mg methylPREDNISolone acetate 80 MG/ML  Outcome: tolerated well, no immediate complications  Procedure, treatment alternatives, risks and benefits explained, specific risks discussed. Immediately prior to procedure a time out was called to verify the correct patient, procedure, equipment, support staff and site/side marked as required. Patient was prepped and draped in the usual sterile fashion.            Diagnoses and all orders for this visit:    Bilateral shoulder pain, unspecified chronicity  -     XR Shoulder 2+ View Bilateral    Primary osteoarthritis of both shoulders  -     Ambulatory Referral to Physical Therapy for Evaluation & Treatment  -     - Large Joint Arthrocentesis: bilateral glenohumeral      Assessment & Plan  1. Bilateral shoulder pain. Right shoulder: mild glenohumeral arthritis. Left shoulder: likely rotator cuff arthropathy with advanced glenohumeral arthritis. Agreed on intra-articular injection bilaterally today, tolerated well. Plan for physical therapy to optimize function.    PROCEDURE  Administered intra-articular injections in both shoulders, well-tolerated.    Patient or patient representative verbalized consent for the use of Ambient Listening during the visit with  Matt Chavez MD for chart documentation. 4/29/2025  08:12 EDT    *Dictated after leaving exam room.     Matt Chavez MD   08:11 EDT   04/29/25

## 2025-04-24 ENCOUNTER — OFFICE VISIT (OUTPATIENT)
Dept: PAIN MEDICINE | Facility: CLINIC | Age: 65
End: 2025-04-24
Payer: COMMERCIAL

## 2025-04-24 VITALS
HEIGHT: 70 IN | HEART RATE: 55 BPM | BODY MASS INDEX: 35.05 KG/M2 | SYSTOLIC BLOOD PRESSURE: 120 MMHG | OXYGEN SATURATION: 96 % | DIASTOLIC BLOOD PRESSURE: 74 MMHG | TEMPERATURE: 96 F | WEIGHT: 244.8 LBS

## 2025-04-24 DIAGNOSIS — M47.816 LUMBAR FACET ARTHROPATHY: Primary | ICD-10-CM

## 2025-04-24 DIAGNOSIS — M54.16 LUMBAR RADICULOPATHY: ICD-10-CM

## 2025-04-24 DIAGNOSIS — M47.816 LUMBAR FACET ARTHROPATHY: ICD-10-CM

## 2025-04-24 DIAGNOSIS — Z79.899 ENCOUNTER FOR LONG-TERM (CURRENT) USE OF HIGH-RISK MEDICATION: ICD-10-CM

## 2025-04-24 DIAGNOSIS — M16.11 OSTEOARTHRITIS OF RIGHT HIP, UNSPECIFIED OSTEOARTHRITIS TYPE: ICD-10-CM

## 2025-04-24 DIAGNOSIS — M54.31 RIGHT SIDED SCIATICA: ICD-10-CM

## 2025-04-24 LAB
POC AMPHETAMINES: NEGATIVE
POC BARBITURATES: NEGATIVE
POC BENZODIAZEPHINES: NEGATIVE
POC BUPRENORPHINE: NEGATIVE
POC COCAINE: NEGATIVE
POC METHADONE: NEGATIVE
POC METHAMPHETAMINE SCREEN URINE: NEGATIVE
POC OPIATES: NEGATIVE
POC OXYCODONE: POSITIVE
POC PHENCYCLIDINE: NEGATIVE
POC THC: NEGATIVE

## 2025-04-24 PROCEDURE — 99214 OFFICE O/P EST MOD 30 MIN: CPT | Performed by: PHYSICIAN ASSISTANT

## 2025-04-24 PROCEDURE — 80305 DRUG TEST PRSMV DIR OPT OBS: CPT | Performed by: PHYSICIAN ASSISTANT

## 2025-04-24 RX ORDER — LISINOPRIL 10 MG/1
10 TABLET ORAL DAILY
COMMUNITY

## 2025-04-24 RX ORDER — OXYCODONE AND ACETAMINOPHEN 5; 325 MG/1; MG/1
.5-1 TABLET ORAL DAILY PRN
Qty: 30 TABLET | Refills: 0 | Status: SHIPPED | OUTPATIENT
Start: 2025-04-25

## 2025-04-24 NOTE — PROGRESS NOTES
CHIEF COMPLAINT    Follow up hip, back and shoulder pain. Patient had a shoulder injection last week, with improvement of pain symptoms, back and hip pain unchanged from last visit.  UDS +oxy, CSA signed      Subjective   Louis Andino Jr. is a 64 y.o. male  who presents for follow-up.  He has a history of chronic right hip, low back and left shoulder pain.  The patient was recently trialed on oxycodone 5 mg which she takes 1/4 tablet p.o. twice daily and 0.5 tablet p.o. nightly which he is tolerating well without the rash and pruritus that he began to experience with the buprenorphine often patch.  Patient continues to have pain affecting the right hip as well as the left shoulder as well as pain in a bandlike distribution across the lumbar spine.  He is hoping to proceed with right hip arthroplasty in June 2025.    Medical history complicated with history of renal cell carcinoma with recurrence in December 2023 which required left nephrectomy and adrenalectomy on 12/18/2023. The patient completed immunotherapy January 2025.     The patient is currently taking oxycodone 5/325 mg 1/4 tablet p.o. twice daily and 0.5 tablet p.o. q. nightly which he tolerates without adverse effects.  Overall this medication is providing over 90% pain relief allowing him to continue his functioning and going to the gym to workout with his attempts to reduce his weight.    Today for her 10 VAS in severity.      Back Pain  This is a chronic problem. The current episode started more than 1 year ago. The problem occurs constantly. The problem has been improved since onset. The pain is present in the lumbar spine and sacro-iliac. The quality of the pain is described as aching and burning. The pain radiates to the right buttock, right thigh and right anterolateral lower leg. The pain is at a severity of 4/10. The pain is moderate. The pain is The same all the time. The symptoms are aggravated by sitting, position and standing. Associated  symptoms include leg pain, numbness (occasionally when sleeping, bilateral arm, left worse than right) and weakness (left arm - chronic). Pertinent negatives include no abdominal pain, chest pain, dysuria, fever or headaches. Risk factors include history of cancer, obesity, sedentary lifestyle, poor posture and lack of exercise. He has tried ice and heat for the symptoms. The treatment provided mild relief.   Hip Pain   There was no injury mechanism. The pain is present in the right hip. The quality of the pain is described as aching. The pain is at a severity of 4/10. The pain is moderate. The pain has been Improving since onset. Associated symptoms include an inability to bear weight and numbness (occasionally when sleeping, bilateral arm, left worse than right). The symptoms are aggravated by movement and weight bearing.        PEG Assessment   What number best describes your pain on average in the past week?3  What number best describes how, during the past week, pain has interfered with your enjoyment of life?3  What number best describes how, during the past week, pain has interfered with your general activity?  8    Review of Pertinent Medical Data ---  No new imaging for review on today    The following portions of the patient's history were reviewed and updated as appropriate: allergies, current medications, past family history, past medical history, past social history, past surgical history, and problem list.    Review of Systems   Constitutional:  Negative for chills and fever.   Respiratory:  Negative for cough and shortness of breath.    Cardiovascular:  Negative for chest pain.   Gastrointestinal:  Negative for abdominal pain, constipation and diarrhea.   Genitourinary:  Negative for difficulty urinating and dysuria.   Musculoskeletal:  Positive for back pain and gait problem (ambulates with a walker).   Neurological:  Positive for weakness (left arm - chronic) and numbness (occasionally when  "sleeping, bilateral arm, left worse than right). Negative for dizziness, light-headedness and headaches.   Psychiatric/Behavioral:  Negative for agitation.      I have reviewed and confirmed the accuracy of the ROS as documented by the MA/LPN/RN QI Freed  Vitals:    04/24/25 1019   BP: 120/74   BP Location: Left arm   Patient Position: Sitting   Pulse: 55   Temp: 96 °F (35.6 °C)   TempSrc: Temporal   SpO2: 96%   Weight: 111 kg (244 lb 12.8 oz)   Height: 177.8 cm (70\")   PainSc: 4    PainLoc: Back         Objective   Physical Exam        Assessment & Plan   Diagnoses and all orders for this visit:    1. Lumbar facet arthropathy (Primary)  -     Urine Drug Screen Confirmation - Urine, Clean Catch; Future  -     POC Urine Drug Screen, Triage    2. Lumbar radiculopathy  -     Urine Drug Screen Confirmation - Urine, Clean Catch; Future  -     POC Urine Drug Screen, Triage    3. Right sided sciatica  -     Urine Drug Screen Confirmation - Urine, Clean Catch; Future  -     POC Urine Drug Screen, Triage    4. Encounter for long-term (current) use of high-risk medication  -     Urine Drug Screen Confirmation - Urine, Clean Catch; Future  -     POC Urine Drug Screen, Triage        Louis Andino Jr. reports a pain score of 4.  Given his pain assessment as noted, treatment options were discussed and the following options were decided upon as a follow-up plan to address the patient's pain: continuation of current treatment plan for pain, prescription for opiod analgesics, and use of non-medical modalities (ice, heat, stretching and/or behavior modifications).    PHQ-2 Depression Screening  Little interest or pleasure in doing things? Not at all   Feeling down, depressed, or hopeless? Not at all   PHQ-2 Total Score 0           --- Follow-up in 2 months or sooner for medication management  --- Refill oxycodone 5 mg. Patient appears stable with current regimen. No adverse effects. Regarding continuation of opioids, " there is no evidence of aberrant behavior or any red flags.  The patient continues with appropriate response to opioid therapy. ADL's remain intact by self.   --- Low risk for opiate abuse/diversion        Routine UDS in office today as part of monitoring requirements for controlled substances.  The specimen was viewed and the immunoassay result reviewed and is appropriately positive for oxycodone.  [facility]        The patient signed an updated copy of the controlled substance agreement on today, 4/24/2025    ERICA REPORT  As part of the patient's treatment plan, I am prescribing controlled substances. The patient has been made aware of appropriate use of such medications, including potential risk of somnolence, limited ability to drive and/or work safely, and the potential for dependence or overdose. It has also been made clear that these medications are for use by this patient only, without concomitant use of alcohol or other substances unless prescribed.     Patient has completed prescribing agreement detailing terms of continued prescribing of controlled substances, including monitoring ERICA reports, urine drug screening, and pill counts if necessary. The patient is aware that inappropriate use will results in cessation of prescribing such medications.    As the clinician, I personally reviewed the ERICA from 4/24/2025 while the patient was in the office today.    History and physical exam exhibit continued safe and appropriate use of controlled substances.     Dictated utilizing Dragon dictation.

## 2025-04-27 DIAGNOSIS — K21.9 GASTROESOPHAGEAL REFLUX DISEASE, UNSPECIFIED WHETHER ESOPHAGITIS PRESENT: Chronic | ICD-10-CM

## 2025-04-28 RX ORDER — PANTOPRAZOLE SODIUM 40 MG/1
40 TABLET, DELAYED RELEASE ORAL DAILY
Qty: 90 TABLET | Refills: 0 | Status: SHIPPED | OUTPATIENT
Start: 2025-04-28

## 2025-04-29 RX ORDER — METHYLPREDNISOLONE ACETATE 80 MG/ML
80 INJECTION, SUSPENSION INTRA-ARTICULAR; INTRALESIONAL; INTRAMUSCULAR; SOFT TISSUE
Status: COMPLETED | OUTPATIENT
Start: 2025-04-18 | End: 2025-04-18

## 2025-05-01 ENCOUNTER — TREATMENT (OUTPATIENT)
Dept: PHYSICAL THERAPY | Facility: CLINIC | Age: 65
End: 2025-05-01
Payer: COMMERCIAL

## 2025-05-01 DIAGNOSIS — R68.89 ACTIVITY INTOLERANCE: ICD-10-CM

## 2025-05-01 DIAGNOSIS — R29.898 LEFT ARM WEAKNESS: ICD-10-CM

## 2025-05-01 DIAGNOSIS — M25.612 DECREASED SHOULDER MOBILITY, LEFT: ICD-10-CM

## 2025-05-01 DIAGNOSIS — M19.012 ARTHRITIS OF BOTH SHOULDERS: Primary | ICD-10-CM

## 2025-05-01 DIAGNOSIS — M19.011 ARTHRITIS OF BOTH SHOULDERS: Primary | ICD-10-CM

## 2025-05-01 NOTE — PROGRESS NOTES
"Physical Therapy Initial Evaluation and Plan of Care  [facility] [place]   [address], [address]  [place] [address]  P: [phone]  F: [phone]    Patient: Louis Andino Jr.   : 1960  Diagnosis/ICD-10 Code:  Arthritis of both shoulders [M19.011, M19.012]  Referring practitioner: Matt Chavez MD  Date of Initial Visit: 2025  Today's Date: 2025  Patient seen for 1 session         Visit Diagnoses:    ICD-10-CM ICD-9-CM   1. Arthritis of both shoulders  M19.011 716.91    M19.012    2. Decreased shoulder mobility, left  M25.612 719.51   3. Left arm weakness  R29.898 729.89   4. Activity intolerance  R68.89 780.99       PMHx Reviewed : 2025      Subjective Evaluation    History of Present Illness  Mechanism of injury: Hx:   Pt reports to clinic for IE of B shoulder OA. Pt reports he has been having shoulder issues for 40+ years but has gotten worse the last 2 years w/ L > R. Pt denies radicular s/s but notes his arm will go numb if he \"sleeps on it\". Pt had a steroid injection last week and he has noticed a \"dramatic\" improvement in his pain.     PMH includes:  - Kidney cancer (in remission)  - Hip OA, waiting for ELAINA    Pt reported difficulties:  - self reported fxn status =  50/100%  - OHA, reaching movements   - reaching sideways   - can't turn steering wheel w/ L  - can't lift more than a pound w/ L arm sideways (full drink)  - can't reach behind back (washing)    Hobbies (impacted by dysfxn):  - goes to [employer]   - golf       Patient Occupation: retired Quality of life: good    Pain  Current pain ratin  At best pain ratin  At worst pain ratin  Quality: sharp and knife-like  Relieving factors: relaxation, rest and medications  Aggravating factors: overhead activity, lifting, outstretched reach and repetitive movement  Progression: improved    Diagnostic Tests  Abnormal x-ray: not availble for review.    Treatments  Previous " treatment: injection treatment and physical therapy  Patient Goals  Patient goals for therapy: decreased pain, return to sport/leisure activities, independence with ADLs/IADLs, increased strength and increased motion           Objective          Postural Observations  Seated posture: poor  Standing posture: poor      Active Range of Motion   Left Shoulder   Flexion: 130 (4/10) degrees with pain  Abduction: 80 (4/10) degrees with pain  External rotation 90°: 25 (6/10) degrees with pain    Right Shoulder   Flexion: 175 degrees   Abduction: 160 degrees   External rotation 90°: 50 degrees    Additional Active Range of Motion Details  BTB IR    R = T5  L = L5     Passive Range of Motion   Left Shoulder   Flexion: 175 degrees   Abduction: 165 degrees   External rotation 90°: 50 degrees with pain    Right Shoulder   Normal passive range of motion    Strength/Myotome Testing     Left Shoulder     Planes of Motion   Flexion: 4-   Abduction: 4+   External rotation at 90°: 3   Internal rotation at 90°: 4+     Right Shoulder   Normal muscle strength    Tests     Left Shoulder   Positive empty can and Yergason's.     Right Shoulder   Negative empty can and Yergason's.           Assessment & Plan       Assessment  Impairments: abnormal coordination, abnormal or restricted ROM, activity intolerance, impaired physical strength, lacks appropriate home exercise program, pain with function and weight-bearing intolerance   Functional limitations: carrying objects, lifting, sleeping, pulling, pushing, uncomfortable because of pain, moving in bed, reaching behind back, reaching overhead and unable to perform repetitive tasks   Assessment details: Pt presents to clinic for IE of B shoulder pain. Pt demonstrates decreased ROM, UE weakness, pain, decreased activity tolerance, and positive L empty can and Yergason test(s).  Pt will benefit from skilled PT services at this time to address found dysfxn/deficits in order to achieve POC goals  for pt return to PLOF and improve QoL.   Prognosis: good    Goals  Plan Goals: Goals  Plan Goals: STG: (achieve in 4+ weeks)  1. Pt will demo HEP compliance and attendance w/ scheduled therapy visits.   2. Pt will demo pain free 4/5 MMT grades of L UE for fxn strength w/ IADLs.   3. Pt will demo improved L UE ROM by 10 degrees or more for improved fxn mobility w/ IADLs.  4. Pt will report decreased pain from 8/10 at worst to 6/10 or less on pain scale for pt QoL and progression of PoC.      LTG: (achieve in 8+ weeks)  1. Pt will demo improved L UE ROM by 25 degrees or more for improved fxn mobility w/ IADLs.  2. Pt will demo pain free 4+/5 MMT grades of L UE for fxn strength w/ IADLs.  3. Pt will score 25% or less on the Quick DASH indicating little to no disability of the L UE for fxn performance of IADLs.   4. Pt will be negative for the L empty can, and or yergason special test indicating improvement/resolution of c/c and efficacy of skilled PT interventions for improved pt QoL and fxn performance of IADLs.     Plan  Therapy options: will be seen for skilled therapy services  Planned modality interventions: cryotherapy, dry needling, iontophoresis and TENS  Planned therapy interventions: ADL retraining, body mechanics training, fine motor coordination training, flexibility, functional ROM exercises, home exercise program, IADL retraining, joint mobilization, manual therapy, neuromuscular re-education, soft tissue mobilization, spinal/joint mobilization, strengthening, stretching and therapeutic activities  Frequency: 1x week  Duration in weeks: 8  Treatment plan discussed with: patient          Manual Therapy:     0     mins  64269;  Therapeutic Exercise:     24     mins  74142;     Neuromuscular Carla:       8    mins  22633;    Therapeutic Activity:      8     mins  50967;     Gait Trainin     mins  71324;     Ultrasound:      0     mins  88035;    Electrical Stimulation:     0     mins  06343 (  );  Dry Needling      0     mins self-pay  Traction      0     mins 91772  Canalith Repositioning    0     mins 47760      Timed Treatment:   40   mins   Total Treatment:     60   mins      PT: Rodrick Lopez PT     License Number: [id number]  Electronically signed by Rodrick Lopez PT, 05/01/25, 11:54 AM EDT    Certification Period: 5/1/2025 thru 7/29/2025  I certify that the therapy services are furnished while this patient is under my care.  The services outlined above are required by this patient, and will be reviewed every 90 days.         Physician Signature:__________________________________________________    PHYSICIAN: Matt Chavez MD  NPI: [id number]                                      DATE:      Please sign in Epic or return via fax to [email or web address] . Thank you, [facility].

## 2025-05-08 ENCOUNTER — TREATMENT (OUTPATIENT)
Dept: PHYSICAL THERAPY | Facility: CLINIC | Age: 65
End: 2025-05-08
Payer: COMMERCIAL

## 2025-05-08 DIAGNOSIS — R68.89 ACTIVITY INTOLERANCE: ICD-10-CM

## 2025-05-08 DIAGNOSIS — M25.612 DECREASED SHOULDER MOBILITY, LEFT: ICD-10-CM

## 2025-05-08 DIAGNOSIS — M19.012 ARTHRITIS OF BOTH SHOULDERS: Primary | ICD-10-CM

## 2025-05-08 DIAGNOSIS — M19.011 ARTHRITIS OF BOTH SHOULDERS: Primary | ICD-10-CM

## 2025-05-08 DIAGNOSIS — R29.898 LEFT ARM WEAKNESS: ICD-10-CM

## 2025-05-08 NOTE — PROGRESS NOTES
Physical Therapy Daily Treatment Note  [facility] [place]   [address], [address]  [place] [address]  P: [phone]  F: [phone]    Patient: Louis Andino Jr.   : 1960  Referring practitioner: Matt Chavez MD  Date of Initial Visit: Type: THERAPY  Noted: 2025  Today's Date: 2025  Patient seen for 2 sessions       Visit Diagnoses:    ICD-10-CM ICD-9-CM   1. Arthritis of both shoulders  M19.011 716.91    M19.012    2. Decreased shoulder mobility, left  M25.612 719.51   3. Left arm weakness  R29.898 729.89   4. Activity intolerance  R68.89 780.99         Louis Andino reports: Pt reports he is doing well albeit a bit sore from the HEP. Pt notes he seems to be able to turn his steering wheel a bit easier now. No new/other complaints/comments noted.       Subjective     Objective   See Exercise, Manual, and Modality Logs for complete treatment.       Assessment/Plan  Pt presented to clinic for first f/u visit since IE. Pt demonstrated correct performance of prescribed HEP interventions. HEP was updated today to progress PT PoC to achieve outlined goals for pt rehab.     Plan:  Pt will continue with current plan of care to achieve outlined goals. Therapeutic interventions will be progressed where and when appropriate.        Timed:         Manual Therapy:    0     mins  07016;     Therapeutic Exercise:    20     mins  78886;     Neuromuscular Carla:    10    mins  70216;    Therapeutic Activity:     8     mins  61088;     Gait Trainin     mins  03916;     Ultrasound:     0     mins  76533;    Ionto                               0    mins  05105  Self Care                       0     mins  39501  Traction     0     mins 80657      Un-Timed:  Canalith Repos    0     mins 47334  Electrical Stimulation:    0     mins  85113 (MC );  Dry Needling     0     mins self-pay  Traction     0     mins 84425        Timed Treatment:   38   mins   Total  Treatment:     38   mins    Rodrick Lopez, PT  [place] License #: [id number]    Physical Therapist

## 2025-05-13 ENCOUNTER — HOSPITAL ENCOUNTER (OUTPATIENT)
Dept: ONCOLOGY | Facility: HOSPITAL | Age: 65
Discharge: HOME | End: 2025-05-13
Admitting: STUDENT IN AN ORGANIZED HEALTH CARE EDUCATION/TRAINING PROGRAM
Payer: COMMERCIAL

## 2025-05-13 DIAGNOSIS — C64.9 RENAL CELL CARCINOMA, UNSPECIFIED LATERALITY: Primary | ICD-10-CM

## 2025-05-13 DIAGNOSIS — C64.2 RENAL CELL CARCINOMA OF LEFT KIDNEY: ICD-10-CM

## 2025-05-13 LAB
ALBUMIN SERPL-MCNC: 4.1 G/DL (ref 3.5–5.2)
ALBUMIN SERPL-MCNC: 4.3 G/DL (ref 3.5–5.2)
ALBUMIN/GLOB SERPL: 1.3 G/DL
ALP SERPL-CCNC: 58 U/L (ref 39–117)
ALT SERPL W P-5'-P-CCNC: 49 U/L (ref 1–41)
ANION GAP SERPL CALCULATED.3IONS-SCNC: 12.7 MMOL/L (ref 5–15)
ANION GAP SERPL CALCULATED.3IONS-SCNC: 9.5 MMOL/L (ref 5–15)
AST SERPL-CCNC: 26 U/L (ref 1–40)
BACTERIA UR QL AUTO: NORMAL /HPF
BASOPHILS # BLD AUTO: 0.05 10*3/MM3 (ref 0–0.2)
BASOPHILS NFR BLD AUTO: 0.4 % (ref 0–1.5)
BILIRUB SERPL-MCNC: 0.7 MG/DL (ref 0–1.2)
BILIRUB UR QL STRIP: NEGATIVE
BUN SERPL-MCNC: 26 MG/DL (ref 8–23)
BUN SERPL-MCNC: 27 MG/DL (ref 8–23)
BUN/CREAT SERPL: 15.8 (ref 7–25)
BUN/CREAT SERPL: 17.4 (ref 7–25)
CALCIUM SPEC-SCNC: 9.8 MG/DL (ref 8.6–10.5)
CALCIUM SPEC-SCNC: 9.8 MG/DL (ref 8.6–10.5)
CHLORIDE SERPL-SCNC: 101 MMOL/L (ref 98–107)
CHLORIDE SERPL-SCNC: 103 MMOL/L (ref 98–107)
CLARITY UR: CLEAR
CO2 SERPL-SCNC: 25.3 MMOL/L (ref 22–29)
CO2 SERPL-SCNC: 27.5 MMOL/L (ref 22–29)
COLOR UR: YELLOW
CREAT SERPL-MCNC: 1.55 MG/DL (ref 0.76–1.27)
CREAT SERPL-MCNC: 1.65 MG/DL (ref 0.76–1.27)
DEPRECATED RDW RBC AUTO: 47.6 FL (ref 37–54)
EGFRCR SERPLBLD CKD-EPI 2021: 45.5 ML/MIN/1.73
EGFRCR SERPLBLD CKD-EPI 2021: 49.1 ML/MIN/1.73
EOSINOPHIL # BLD AUTO: 0.67 10*3/MM3 (ref 0–0.4)
EOSINOPHIL NFR BLD AUTO: 6 % (ref 0.3–6.2)
ERYTHROCYTE [DISTWIDTH] IN BLOOD BY AUTOMATED COUNT: 15.3 % (ref 12.3–15.4)
GLOBULIN UR ELPH-MCNC: 3.1 GM/DL
GLUCOSE SERPL-MCNC: 124 MG/DL (ref 65–99)
GLUCOSE SERPL-MCNC: 128 MG/DL (ref 65–99)
GLUCOSE UR STRIP-MCNC: NEGATIVE MG/DL
HCT VFR BLD AUTO: 38.3 % (ref 37.5–51)
HGB BLD-MCNC: 12.5 G/DL (ref 13–17.7)
HGB UR QL STRIP.AUTO: NEGATIVE
HYALINE CASTS UR QL AUTO: NORMAL /LPF
KETONES UR QL STRIP: NEGATIVE
LEUKOCYTE ESTERASE UR QL STRIP.AUTO: NEGATIVE
LYMPHOCYTES # BLD AUTO: 2.24 10*3/MM3 (ref 0.7–3.1)
LYMPHOCYTES NFR BLD AUTO: 20 % (ref 19.6–45.3)
MCH RBC QN AUTO: 28.5 PG (ref 26.6–33)
MCHC RBC AUTO-ENTMCNC: 32.6 G/DL (ref 31.5–35.7)
MCV RBC AUTO: 87.2 FL (ref 79–97)
MONOCYTES # BLD AUTO: 0.86 10*3/MM3 (ref 0.1–0.9)
MONOCYTES NFR BLD AUTO: 7.7 % (ref 5–12)
NEUTROPHILS NFR BLD AUTO: 65.9 % (ref 42.7–76)
NEUTROPHILS NFR BLD AUTO: 7.39 10*3/MM3 (ref 1.7–7)
NITRITE UR QL STRIP: NEGATIVE
PH UR STRIP.AUTO: 6.5 [PH] (ref 5–8)
PHOSPHATE SERPL-MCNC: 2.5 MG/DL (ref 2.5–4.5)
PLATELET # BLD AUTO: 194 10*3/MM3 (ref 140–450)
PMV BLD AUTO: 10.3 FL (ref 6–12)
POTASSIUM SERPL-SCNC: 3.8 MMOL/L (ref 3.5–5.2)
POTASSIUM SERPL-SCNC: 3.9 MMOL/L (ref 3.5–5.2)
PROT SERPL-MCNC: 7.2 G/DL (ref 6–8.5)
PROT UR QL STRIP: NEGATIVE
RBC # BLD AUTO: 4.39 10*6/MM3 (ref 4.14–5.8)
RBC # UR STRIP: NORMAL /HPF
REF LAB TEST METHOD: NORMAL
SODIUM SERPL-SCNC: 139 MMOL/L (ref 136–145)
SODIUM SERPL-SCNC: 140 MMOL/L (ref 136–145)
SP GR UR STRIP: 1.01 (ref 1–1.03)
SQUAMOUS #/AREA URNS HPF: NORMAL /HPF
T4 FREE SERPL-MCNC: 1.24 NG/DL (ref 0.92–1.68)
TSH SERPL DL<=0.05 MIU/L-ACNC: 1.32 UIU/ML (ref 0.27–4.2)
UROBILINOGEN UR QL STRIP: NORMAL
WBC # UR STRIP: NORMAL /HPF
WBC NRBC COR # BLD AUTO: 11.21 10*3/MM3 (ref 3.4–10.8)

## 2025-05-13 PROCEDURE — 84439 ASSAY OF FREE THYROXINE: CPT | Performed by: STUDENT IN AN ORGANIZED HEALTH CARE EDUCATION/TRAINING PROGRAM

## 2025-05-13 PROCEDURE — 25010000002 HEPARIN LOCK FLUSH PER 10 UNITS: Performed by: STUDENT IN AN ORGANIZED HEALTH CARE EDUCATION/TRAINING PROGRAM

## 2025-05-13 PROCEDURE — 80050 GENERAL HEALTH PANEL: CPT | Performed by: STUDENT IN AN ORGANIZED HEALTH CARE EDUCATION/TRAINING PROGRAM

## 2025-05-13 PROCEDURE — 81015 MICROSCOPIC EXAM OF URINE: CPT | Performed by: INTERNAL MEDICINE

## 2025-05-13 PROCEDURE — 81003 URINALYSIS AUTO W/O SCOPE: CPT | Performed by: INTERNAL MEDICINE

## 2025-05-13 PROCEDURE — 80069 RENAL FUNCTION PANEL: CPT | Performed by: INTERNAL MEDICINE

## 2025-05-13 PROCEDURE — 36591 DRAW BLOOD OFF VENOUS DEVICE: CPT

## 2025-05-13 RX ORDER — SODIUM CHLORIDE 0.9 % (FLUSH) 0.9 %
10 SYRINGE (ML) INJECTION AS NEEDED
Status: DISCONTINUED | OUTPATIENT
Start: 2025-05-13 | End: 2025-05-14 | Stop reason: HOSPADM

## 2025-05-13 RX ORDER — HEPARIN SODIUM (PORCINE) LOCK FLUSH IV SOLN 100 UNIT/ML 100 UNIT/ML
500 SOLUTION INTRAVENOUS AS NEEDED
Status: DISCONTINUED | OUTPATIENT
Start: 2025-05-13 | End: 2025-05-14 | Stop reason: HOSPADM

## 2025-05-13 RX ORDER — SODIUM CHLORIDE 0.9 % (FLUSH) 0.9 %
10 SYRINGE (ML) INJECTION AS NEEDED
OUTPATIENT
Start: 2025-05-13

## 2025-05-13 RX ORDER — HEPARIN SODIUM (PORCINE) LOCK FLUSH IV SOLN 100 UNIT/ML 100 UNIT/ML
500 SOLUTION INTRAVENOUS AS NEEDED
OUTPATIENT
Start: 2025-05-13

## 2025-05-13 RX ADMIN — Medication 500 UNITS: at 13:26

## 2025-05-13 RX ADMIN — Medication 10 ML: at 13:25

## 2025-05-13 NOTE — PROGRESS NOTES
Port accessed and flushed with good blood return noted. 10cc of blood wasted prior to specimen collection. Blood specimen obtained and sent to lab for processing per protocol.  Requested to have labs Dr Calixto ordered done today, done per pt's request. Port flushed with saline and heparin prior to needle removal.

## 2025-05-15 ENCOUNTER — TREATMENT (OUTPATIENT)
Dept: PHYSICAL THERAPY | Facility: CLINIC | Age: 65
End: 2025-05-15
Payer: COMMERCIAL

## 2025-05-15 DIAGNOSIS — R68.89 ACTIVITY INTOLERANCE: ICD-10-CM

## 2025-05-15 DIAGNOSIS — M19.011 ARTHRITIS OF BOTH SHOULDERS: Primary | ICD-10-CM

## 2025-05-15 DIAGNOSIS — R29.898 LEFT ARM WEAKNESS: ICD-10-CM

## 2025-05-15 DIAGNOSIS — M25.612 DECREASED SHOULDER MOBILITY, LEFT: ICD-10-CM

## 2025-05-15 DIAGNOSIS — M19.012 ARTHRITIS OF BOTH SHOULDERS: Primary | ICD-10-CM

## 2025-05-15 NOTE — PROGRESS NOTES
HEMATOLOGY ONCOLOGY OUTPATIENT FOLLOW UP       Patient name: Louis Andino Jr.  : 1960  MRN: [id number]  Primary Care Physician: Harry Hsu MD  Referring Physician: No ref. provider found  Reason For Consult: Renal cell carcinoma      History of Present Illness:  Patient is a 64 y.o. male with RCC.    Patient initially presented with hematuria.  During hospitalization he had a CT of his abdomen which showed a large left lower pole renal mass with possible adrenal metastasis.    2023 CT abdomen pelvis with contrast showing mass arising from the lower pole of the left kidney consistent with renal cell carcinoma.  Associated uroepithelial thickening of the pelvis and proximal ureter.  Enhancing indeterminate left adrenal nodule potential metastasis no retroperitoneal lymphadenopathy.    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  Vessel with clear-cell renal cell carcinoma tumor thrombus    24 - pembrolizumab  3/1/24 - pembro  24 - increased pembro 400 mg  24 - pembro 400 mg  24 - pembro 400 mg  24 - was admitted with syncopal episode, ECHO was normal, CTA neck negative, has a Zio patch, will be seen by cardiology.  Morning metoprolol was stopped.    24: CT Chest Abd Pelvis WO Contrast:    IMPRESSION:     Status post left nephrectomy. No metastatic disease identified by  unenhanced imaging. Continued follow-up advised as indicated.      2024: Patient seen today for initial evaluation by me. He was preiovulsy being seen by Dr. Prakash in our practice.   He is on adjuvant Immunotherapy with pembrolizumab for RCC stage III.  He has had fairly good tolerance to IO therapy except for some adverse effects, Reported having persistent diffuse pruritus and Dermographism which has not improved significantly with PRN Antihistamines. He however denied any significant impairment of his quality of life with these symptoms. Reported some dizziness/ preSyncopal Episodes following immunotherapy. He was admitted to [facility] for these symptoms, extensive workup for neurocardiogenic and endocrine etiology was reported unremarkable at this time. He is status post Restaging scans as above.  He is scheduled to receive IV Fluids on 8/19/24 after treatment today in view of his Symptoms.    9/27/2024: Patient seen today for follow-up. Stated that he has tolerated immunotherapy much better after the last infusion.  Skin rash and dizziness are better controlled today.  Acute issues reported.      11/8/24: Improved itching with over-the-counter lotion.  Has recently started CPAP for sleep apnea.  Reported having better sleep at night and feeling refreshed during daytime.  Constipation has improved.  No other significant treatment-related side effects reported.  He is planning to travel to [place] in early December.    12/20/24: Seen today for follow-up visit prior to scheduled treatment.  No new complaints reported today.  Continues to have good tolerance to immunotherapy but reported having some fatigue and weakness for short duration thereafter.  Reported having presyncopal episodes recently, likely secondary to aggressive BP management following which his BP medications were adjusted.    1/31/2025: Seen today for follow-up prior to scheduled treatment. Reports overall feeling well. No new complaints reported today. Continues to tolerate pembrolizumab. He does report mild fatigue and pruritus following infusion and improves shortly thereafter.     2/7/25: CT chest Abd Pelvis W contrast:  IMPRESSION:  1. No interval change or evidence for metastatic disease in patient  with left nephrectomy for renal cell carcinoma.  2. Advanced osteoarthritis of the right hip.  3. Hepatic steatosis.    2/21/25: pt seen today to follow up on restaging scans, no new complaints. Has some ongoing skin pruritus but controlled with Benadryl and topical triamcinolone. Weight/appetite stable. Energy levels are good.        Subjective:  5/16/2025: Patient seen today for follow-up.  He has new onset diffuse maculopapular rash involving his trunk, back as well as upper and lower extremities with a few vesicular bullous lesion.  This rash is intensely pruritic and per patient, use of a Medrol Dosepak recently led to improvement in symptoms transiently followed by worsening thereafter.  Denied any other symptoms presently.  Weight/appetite is stable.    Past Medical History:   Diagnosis Date    Abnormal ECG Dec 2024    Allergic Feb. 2025    Adhesive on pain patch    Anemia 12/18/2023    Due to surgery. Required transfusions    Ankle sprain     Multiple-both ankles over the years    Arthritis Years    Worse R. Hip and back    Asthma     Stable now    Cataract 2years    Optometrist is watching yearly    Chronic pain disorder 2020    Right hip and back    Clotting disorder     Required blood transfusion during and after surgery in excess of expected    Colon polyp 2025    Coronary artery disease Dec 2023    Bifascicular block    Deep vein thrombosis 12/18/2023    During cancer surgery a clot was surgically removed from the inferior vena cava which was cancer related    Emphysema/COPD     Erectile dysfunction Several years    Extremity pain 2020    Right hip and bilat shoulders    Fracture of ankle     Left ankle as a child    Fracture of wrist     Left wrist as a child    GERD (gastroesophageal reflux disease)     Headache Years    Now rare - seem to be due to sleep deprivation    Headache, tension-type 1977    Rare    HL (hearing loss)     Progressive worsening over many years    Hyperglycemia 10/12/2023     Hyperlipidemia 10/12/2023    Hypertension     Injury of neck 2023    Previous fractures noted during chiropractic visit. Probably secondary to shoulder injury.    Joint pain 2020    Right hip    Low back pain     Worse last few year    Neck pain 2020    Mild    Obesity     Pneumonia 12/20/2023    Developed while hospitalized for cancer surgery    PONV (postoperative nausea and vomiting)     Prostate disease     Rash     Allergic reactions to laundry detergent and mild generalized itching secondary to immunotherapy.    Rectal bleeding     Renal cell carcinoma 12/30/2023    Renal insufficiency 4/9/2024    Decreased renal function since nephrectomy in December, 2023, secondary to renal cancer, but not diagnosed as chronic kidney disease until recent visit. Kidney functions have been stable since surgery, but are now being considered permanent.    Shoulder injury 1980s    Injured in     Sleep apnea     Visual impairment     Wear glasses for near and distant vision    Vitreous degeneration of right eye 10/12/2023    Formatting of this note might be different from the original. Retinal tear and detachment warning symptoms reviewed and patient instructed to call immediately if increasing floaters, flashes, or decreasing peripheral vision. No retinal detachment or retinal tear noted. Recheck in 1 month with dilated exam.       Past Surgical History:   Procedure Laterality Date    ABDOMINAL SURGERY  December 18, 2023    Left Kidney and Adrenal Gland removed due to Renal Cell Carcinoma    COLONOSCOPY N/A 3/24/2025    Procedure: COLONOSCOPY to cecum and terminal ileum with cold and hot polypectomies;  Surgeon: Dolly Stephens MD;  Location: [facility] ENDOSCOPY;  Service: Gastroenterology;  Laterality: N/A;  screening//colon polyps, diverticulosis    NEPHRECTOMY Left 12/18/2023    Procedure: NEPHRECTOMY RADICAL WITH CAVAL THROMBECTOMY;  Surgeon: James Esquivel MD;  Location: [facility] OR;  Service: Urology;   Laterality: Left;    PORTACATH PLACEMENT  01/30/2024    SKIN LESION EXCISION  1990         Current Outpatient Medications:     budesonide (PULMICORT) 0.5 MG/2ML nebulizer solution, Take 2 mL (one vial) by nebulization 2 (Two) Times a Day., Disp: 180 mL, Rfl: 1    ferrous sulfate 325 (65 FE) MG tablet, Take 1 tablet by mouth Daily With Breakfast., Disp: 90 tablet, Rfl: 1    hydrocortisone (ANUSOL-HC) 25 MG suppository, Insert 1 suppository into the rectum 2 (Two) Times a Day., Disp: 20 each, Rfl: 0    ipratropium-albuterol (COMBIVENT RESPIMAT)  MCG/ACT inhaler, Inhale 1 puff 4 (Four) Times a Day As Needed for Wheezing., Disp: , Rfl:     lidocaine-prilocaine (EMLA) 2.5-2.5 % cream, Apply 1 Application topically to the appropriate area as directed See Admin Instructions. Apply to port site one hour prior to port being accessed., Disp: 30 g, Rfl: 3    lisinopril (PRINIVIL,ZESTRIL) 10 MG tablet, Take 1 tablet by mouth Daily., Disp: , Rfl:     losartan (COZAAR) 100 MG tablet, Take 1 tablet by mouth Daily for 90 days., Disp: 90 tablet, Rfl: 0    methylPREDNISolone (MEDROL) 4 MG dose pack, Take as directed on package instructions. (Patient not taking: Reported on 4/24/2025), Disp: 21 tablet, Rfl: 0    metoprolol succinate XL (TOPROL-XL) 25 MG 24 hr tablet, Take 1 tablet by mouth Daily., Disp: 90 tablet, Rfl: 1    multivitamin with minerals tablet tablet, Take 1 tablet by mouth Daily., Disp: , Rfl:     ondansetron (ZOFRAN) 8 MG tablet, Take 1 tablet by mouth Every 8 (Eight) Hours As Needed for Nausea or Vomiting., Disp: 30 tablet, Rfl: 2    oxyCODONE-acetaminophen (PERCOCET) 5-325 MG per tablet, Take 0.5-1 tablets by mouth Daily As Needed for Moderate Pain., Disp: 30 tablet, Rfl: 0    pantoprazole (PROTONIX) 40 MG EC tablet, Take 1 tablet by mouth Daily., Disp: 90 tablet, Rfl: 0    Plecanatide (Trulance) 3 MG tablet, TAKE 1 TABLET BY MOUTH DAILY, Disp: 30 tablet, Rfl: 4    sucralfate (CARAFATE) 1 g tablet, Take 1  tablet by mouth 3 (Three) Times a Day As Needed (heartburn)., Disp: 270 tablet, Rfl: 0    Symbicort 160-4.5 MCG/ACT inhaler, Inhale 2 puffs 2 (Two) Times a Day., Disp: , Rfl:     triamcinolone (KENALOG) 0.025 % ointment, Apply 1 Application topically to the appropriate area as directed 3 (Three) Times a Day. Apply to affected areas, Disp: 15 g, Rfl: 0    vitamin D3 125 MCG (5000 UT) capsule capsule, Take 1 capsule by mouth Daily., Disp: , Rfl:     Allergies   Allergen Reactions    Butrans [Buprenorphine] Itching and Rash     Butrans patch       Family History   Problem Relation Age of Onset    Arthritis Mother     Cancer Mother     Diabetes Mother     Heart disease Mother     Hyperlipidemia Mother     Miscarriages / Stillbirths Mother         Stillborn child    Hypertension Mother     Osteoporosis Mother     Kidney disease Mother         Kidney stones    Vision loss Mother         Near sighted    Coronary artery disease Mother     COPD Father     Hyperlipidemia Father     Migraines Father     Vision loss Father         Near sighted    Emphysema Father     Chronic bronchitis Father     Hyperlipidemia Brother     Hypertension Brother     Asthma Brother     Vision loss Daughter     Broken bones Daughter         Broke left forearm three times during childhood    Broken bones Daughter         Fractured right femur and right forearm during childhood    Anxiety disorder Daughter     Depression Daughter     Miscarriages / Stillbirths Daughter         First Trimester miscarriage    Dislocations Daughter         Recurrent radial head subluxations as a child    Anxiety disorder Daughter     Depression Daughter     Asthma Daughter     Asthma Son     Depression Son     Malig Hyperthermia Neg Hx        Cancer-related family history includes Cancer in his mother.      Social History     Tobacco Use    Smoking status: Never     Passive exposure: Past    Smokeless tobacco: Never    Tobacco comments:     Passive due to Father and  "most male relatives smoking   Vaping Use    Vaping status: Never Used   Substance Use Topics    Alcohol use: Yes     Alcohol/week: 1.0 standard drink of alcohol     Types: 1 Glasses of wine per week     Comment: rare    Drug use: Never     Social History     Social History Narrative    Not on file       ROS:   Review of Systems   Constitutional:  Negative for appetite change, chills and fever.   HENT:  Negative for ear pain, mouth sores, nosebleeds and sore throat.    Eyes:  Negative for photophobia and visual disturbance.   Respiratory:  Negative for wheezing and stridor.    Cardiovascular:  Negative for chest pain and palpitations.   Gastrointestinal:  Negative for abdominal pain, diarrhea, nausea and vomiting.   Endocrine: Negative for cold intolerance and heat intolerance.   Genitourinary:  Negative for dysuria and hematuria.   Musculoskeletal:  Negative for joint swelling and neck stiffness.   Skin:  Negative for color change and rash.   Neurological:  Negative for seizures and syncope.   Hematological:  Negative for adenopathy.        No obvious bleeding   Psychiatric/Behavioral:  Negative for agitation, confusion and hallucinations.    pruritis    Objective:    Vital Signs:  Vitals:    05/16/25 1208   BP: 148/90   Pulse: 56   SpO2: 97%   Weight: 112 kg (246 lb 12.8 oz)   Height: 177.8 cm (70\")   PainSc: 0-No pain         Body mass index is 35.41 kg/m².    ECOG  (0) Fully active, able to carry on all predisease performance without restriction    Physical Exam:   Physical Exam  Vitals reviewed.   Constitutional:       General: He is not in acute distress.     Appearance: He is not diaphoretic.   HENT:      Head: Normocephalic and atraumatic.   Eyes:      General: No scleral icterus.        Right eye: No discharge.         Left eye: No discharge.      Conjunctiva/sclera: Conjunctivae normal.   Neck:      Thyroid: No thyromegaly.   Cardiovascular:      Rate and Rhythm: Normal rate and regular rhythm.      Heart " sounds: Normal heart sounds.      No friction rub. No gallop.   Pulmonary:      Effort: Pulmonary effort is normal. No respiratory distress.      Breath sounds: No stridor. No wheezing.   Abdominal:      General: Bowel sounds are normal.      Palpations: Abdomen is soft. There is no mass.      Tenderness: There is no abdominal tenderness. There is no guarding or rebound.   Musculoskeletal:         General: No tenderness. Normal range of motion.      Cervical back: Normal range of motion and neck supple.   Lymphadenopathy:      Cervical: No cervical adenopathy.   Skin:     General: Skin is warm.      Coloration: Skin is not jaundiced.      Findings: No bruising, erythema, lesion or rash.   Neurological:      Mental Status: He is alert and oriented to person, place, and time.      Motor: No abnormal muscle tone.   Psychiatric:         Mood and Affect: Mood normal.         Behavior: Behavior normal.         Thought Content: Thought content normal.         Lab Results - Last 18 Months   Lab Units 05/13/25  1320 01/31/25  1038 12/20/24  1225   WBC 10*3/mm3 11.21* 9.25 10.95*   HEMOGLOBIN g/dL 12.5* 12.9* 11.7*   HEMATOCRIT % 38.3 39.5 37.1*   PLATELETS 10*3/mm3 194 208 181   MCV fL 87.2 85.7 89.4     Lab Results - Last 18 Months   Lab Units 05/13/25  1321 01/31/25  1100 12/20/24  1216 12/20/24  1209 09/27/24  0957 08/16/24  1101   SODIUM mmol/L 139  140  --   --  139  --  140   POTASSIUM mmol/L 3.8  3.9  --   --  4.2  --  4.2   CHLORIDE mmol/L 101  103  --   --  103  --  104   CO2 mmol/L 25.3  27.5  --   --  24  --  22.4   BUN mg/dL 26*  27*  --   --  21  --  25*   CREATININE mg/dL 1.65*  1.55* 1.40* 1.50* 1.35*   < > 1.40*   CALCIUM mg/dL 9.8  9.8  --   --  9.8  --  10.1   BILIRUBIN mg/dL 0.7  --   --  0.7  --  0.8   ALK PHOS U/L 58  --   --  58  --  68   ALT (SGPT) U/L 49*  --   --  35  --  18   AST (SGOT) U/L 26  --   --  22  --  21   GLUCOSE mg/dL 124*  128*  --   --  92  --  133*    < > = values in this  "interval not displayed.       Lab Results   Component Value Date    GLUCOSE 124 (H) 05/13/2025    GLUCOSE 128 (H) 05/13/2025    BUN 26 (H) 05/13/2025    BUN 27 (H) 05/13/2025    CREATININE 1.65 (H) 05/13/2025    CREATININE 1.55 (H) 05/13/2025    BCR 15.8 05/13/2025    BCR 17.4 05/13/2025    K 3.8 05/13/2025    K 3.9 05/13/2025    CO2 25.3 05/13/2025    CO2 27.5 05/13/2025    CALCIUM 9.8 05/13/2025    CALCIUM 9.8 05/13/2025    ALBUMIN 4.1 05/13/2025    ALBUMIN 4.3 05/13/2025    AST 26 05/13/2025    ALT 49 (H) 05/13/2025       Lab Results - Last 18 Months   Lab Units 07/12/24  1351 01/30/24  0827 12/18/23  1722   INR  1.03 1.01 1.08   APTT seconds 27.5 25.3 28.7       Lab Results   Component Value Date    IRON 49 (L) 12/20/2024    TIBC 304 12/20/2024    FERRITIN 61.50 12/20/2024       No results found for: \"FOLATE\"    No results found for: \"OCCULTBLD\"    No results found for: \"RETICCTPCT\"  No results found for: \"UDNVPQQR33\"  No results found for: \"SPEP\", \"UPEP\"  No results found for: \"LDH\", \"URICACID\"  No results found for: \"OSKAR\", \"RF\", \"SEDRATE\"  Lab Results   Component Value Date    FIBRINOGEN 381 12/18/2023     Lab Results   Component Value Date    PTT 27.5 07/12/2024    INR 1.03 07/12/2024     No results found for: \"\"  No results found for: \"CEA\"  No components found for: \"CA-19-9\"  No results found for: \"PSA\"  No results found for: \"SEDRATE\"     CASE SUMMARY: Patient is a 64 y.o. with stage IIIb T3b RCC status post radical nephrectomy in December 2023. Completed one year of adjuvant pembrolizumab in January 2025.  Status post nephrectomy, CT chest with no evidence of metastasis questionable adrenal metastasis on scan status post adrenalectomy with no evidence of metastatic disease  Given patient's stage III clear-cell RCC, discussed adjuvant treatment with pembrolizumab based on the findings from keynote 564 trial with significant improvement in disease-free survival as compared to placebo for stage III " clear-cell RCC.  Started Immunotherapy, G1 - pruritus otherwise good tolerance. Currently on  400 mg every 6 weeks.   Has ongoing pruritus no rash, takes benadryl.   -restaging scans as above, reported ANA. Continue surveillance every 6 months.  -Patient tolerating treatment well, continue immunotherapy every 6 weeks for total 1 year.    -Received last dose of adjuvant keytruda on 1/31/25.    Assessment & Plan       Renal cell carcinoma:T3bN0 (Stage IIIB)  Status post radical nephrectomy in December 2023. Completed one year of adjuvant pembrolizumab in January 2025.  -Surveillance CT CAP reviewed from 2/7/25, no evidence of progression.   -overall doing well. Continue surveillance scans every 6 months or as clinically indicated    Diffuse maculopapular rash:  - Patient has history of skin pruritus while undergoing immunotherapy which was managed with as needed Benadryl/Atarax and topical hydrocortisone.  -Noted to have significant worsening of skin rash over the last few months with associated pruritus  -He has upcoming appointment with dermatology early next month.  Will start him on a prolonged taper of prednisone due to concerns about delayed adverse effects from immunotherapy.  - Also sent topical triamcinolone.  Low concern for viral exanthem given clinical presentation.  No systemic signs/symptoms otherwise.      Syncope  Ziopatch on  F/u with cardiology  Had s increased symptoms after immunotherapy, managed with Supportive care/IV fluids etc.  No further presyncopal episodes    Anemia:  Noted mild anemia. Iron panel is suggestive of iron deficiency anemia.  Was started on Oral iron in December 2024. Tolerates at this time. Will continue to monitor iron profile and CBC. To continue oral iron at this time.   -Hb levels are normal.    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 Calixto]. Reviewed    3 month follow up for clinical evaluation with labs and  scans, sooner as needed.      Thank you very much for providing the opportunity to participate in this patient’s care. Please do not hesitate to call if there are any other questions.         15

## 2025-05-15 NOTE — PROGRESS NOTES
Physical Therapy Daily Treatment Note  [facility] [place]   [address], [address]  [place] [address]  P: [phone]  F: [phone]    Patient: Louis Andino Jr.   : 1960  Referring practitioner: Matt Chavez MD  Date of Initial Visit: Type: THERAPY  Noted: 2025  Today's Date: 5/15/2025  Patient seen for 3 sessions       Visit Diagnoses:    ICD-10-CM ICD-9-CM   1. Arthritis of both shoulders  M19.011 716.91    M19.012    2. Decreased shoulder mobility, left  M25.612 719.51   3. Left arm weakness  R29.898 729.89   4. Activity intolerance  R68.89 780.99         Louis Andino reports: Pt reports he is doing well. Pt notes his shoulder is feeling better and has been able to resume using his small green golf  at home. Pt notes he has been able to increase the amount of weight he has been using at the gym as well. No new/other complaints/comments noted.       Subjective     Objective   See Exercise, Manual, and Modality Logs for complete treatment.       Assessment/Plan  Pt continues to demo improvements w/ ROM and tolerance to progressed and new interventions w/in this PoC. HEP was updated today to progress PT PoC to achieve outlined goals for pt rehab.       Plan:  Pt will continue with current plan of care to achieve outlined goals. Therapeutic interventions will be progressed where and when appropriate.          Timed:         Manual Therapy:    0     mins  09676;     Therapeutic Exercise:    15     mins  77881;     Neuromuscular Carla:    15    mins  90109;    Therapeutic Activity:     0     mins  63662;     Gait Trainin     mins  31619;     Ultrasound:     0     mins  44978;    Ionto                               0    mins  49427  Self Care                       0     mins  92110  Traction     0     mins 12564      Un-Timed:  Canalith Repos    0     mins 14266  Electrical Stimulation:    0     mins  90637 ( );  Dry Needling     0      mins self-pay  Traction     0     mins 96588        Timed Treatment:   30   mins   Total Treatment:     30   mins    Rodrick Lopez, PT  [place] License #: [id number]    Physical Therapist

## 2025-05-16 ENCOUNTER — OFFICE VISIT (OUTPATIENT)
Dept: ONCOLOGY | Facility: CLINIC | Age: 65
End: 2025-05-16
Payer: COMMERCIAL

## 2025-05-16 VITALS
OXYGEN SATURATION: 97 % | DIASTOLIC BLOOD PRESSURE: 90 MMHG | SYSTOLIC BLOOD PRESSURE: 148 MMHG | HEART RATE: 56 BPM | HEIGHT: 70 IN | WEIGHT: 246.8 LBS | BODY MASS INDEX: 35.33 KG/M2

## 2025-05-16 DIAGNOSIS — D64.9 ANEMIA, UNSPECIFIED TYPE: ICD-10-CM

## 2025-05-16 DIAGNOSIS — L30.8 PRURITIC DERMATITIS: ICD-10-CM

## 2025-05-16 DIAGNOSIS — C64.2 RENAL CELL CARCINOMA OF LEFT KIDNEY: ICD-10-CM

## 2025-05-16 DIAGNOSIS — K21.9 GASTROESOPHAGEAL REFLUX DISEASE, UNSPECIFIED WHETHER ESOPHAGITIS PRESENT: Chronic | ICD-10-CM

## 2025-05-16 DIAGNOSIS — C64.9 RENAL CELL CARCINOMA, UNSPECIFIED LATERALITY: Primary | ICD-10-CM

## 2025-05-16 DIAGNOSIS — D50.0 IRON DEFICIENCY ANEMIA DUE TO CHRONIC BLOOD LOSS: ICD-10-CM

## 2025-05-16 PROCEDURE — 99214 OFFICE O/P EST MOD 30 MIN: CPT | Performed by: STUDENT IN AN ORGANIZED HEALTH CARE EDUCATION/TRAINING PROGRAM

## 2025-05-16 RX ORDER — SULFAMETHOXAZOLE AND TRIMETHOPRIM 800; 160 MG/1; MG/1
1 TABLET ORAL
Qty: 20 TABLET | Refills: 0 | Status: SHIPPED | OUTPATIENT
Start: 2025-05-16

## 2025-05-16 RX ORDER — PANTOPRAZOLE SODIUM 40 MG/1
40 TABLET, DELAYED RELEASE ORAL DAILY
Qty: 90 TABLET | Refills: 0 | Status: SHIPPED | OUTPATIENT
Start: 2025-05-16

## 2025-05-16 RX ORDER — PREDNISONE 10 MG/1
TABLET ORAL
Qty: 210 TABLET | Refills: 0 | Status: SHIPPED | OUTPATIENT
Start: 2025-05-16 | End: 2025-07-04

## 2025-05-16 RX ORDER — DIPHENHYDRAMINE HCL 25 MG
1 CAPSULE ORAL
COMMUNITY

## 2025-05-19 DIAGNOSIS — I10 HYPERTENSION, UNSPECIFIED TYPE: Chronic | ICD-10-CM

## 2025-05-19 RX ORDER — LOSARTAN POTASSIUM 100 MG/1
100 TABLET ORAL
Qty: 90 TABLET | Refills: 0 | Status: SHIPPED | OUTPATIENT
Start: 2025-05-19 | End: 2025-08-17

## 2025-05-22 ENCOUNTER — TREATMENT (OUTPATIENT)
Dept: PHYSICAL THERAPY | Facility: CLINIC | Age: 65
End: 2025-05-22
Payer: COMMERCIAL

## 2025-05-22 DIAGNOSIS — M25.612 DECREASED SHOULDER MOBILITY, LEFT: ICD-10-CM

## 2025-05-22 DIAGNOSIS — R29.898 LEFT ARM WEAKNESS: ICD-10-CM

## 2025-05-22 DIAGNOSIS — M19.012 ARTHRITIS OF BOTH SHOULDERS: Primary | ICD-10-CM

## 2025-05-22 DIAGNOSIS — R68.89 ACTIVITY INTOLERANCE: ICD-10-CM

## 2025-05-22 DIAGNOSIS — M19.011 ARTHRITIS OF BOTH SHOULDERS: Primary | ICD-10-CM

## 2025-05-26 DIAGNOSIS — M16.11 OSTEOARTHRITIS OF RIGHT HIP, UNSPECIFIED OSTEOARTHRITIS TYPE: ICD-10-CM

## 2025-05-26 DIAGNOSIS — Z79.899 ENCOUNTER FOR LONG-TERM (CURRENT) USE OF HIGH-RISK MEDICATION: ICD-10-CM

## 2025-05-26 DIAGNOSIS — M47.816 LUMBAR FACET ARTHROPATHY: ICD-10-CM

## 2025-05-27 ENCOUNTER — PREP FOR SURGERY (OUTPATIENT)
Dept: OTHER | Facility: HOSPITAL | Age: 65
End: 2025-05-27
Payer: COMMERCIAL

## 2025-05-27 DIAGNOSIS — M16.11 PRIMARY OSTEOARTHRITIS OF RIGHT HIP: Primary | ICD-10-CM

## 2025-05-27 DIAGNOSIS — E89.6 H/O PARTIAL ADRENALECTOMY: Primary | ICD-10-CM

## 2025-05-27 RX ORDER — CHLORHEXIDINE GLUCONATE 500 MG/1
CLOTH TOPICAL
OUTPATIENT
Start: 2025-05-27 | End: 2025-05-27

## 2025-05-27 RX ORDER — PREGABALIN 75 MG/1
75 CAPSULE ORAL ONCE
OUTPATIENT
Start: 2025-05-27 | End: 2025-05-27

## 2025-05-27 RX ORDER — ACETAMINOPHEN 10 MG/ML
1000 INJECTION, SOLUTION INTRAVENOUS ONCE
OUTPATIENT
Start: 2025-05-27 | End: 2025-05-27

## 2025-05-27 RX ORDER — TRANEXAMIC ACID 10 MG/ML
1000 INJECTION, SOLUTION INTRAVENOUS ONCE
OUTPATIENT
Start: 2025-05-27 | End: 2025-05-27

## 2025-05-27 RX ORDER — CHLORHEXIDINE GLUCONATE 500 MG/1
CLOTH TOPICAL ONCE
OUTPATIENT
Start: 2025-05-27

## 2025-05-27 RX ORDER — OXYCODONE AND ACETAMINOPHEN 5; 325 MG/1; MG/1
.5-1 TABLET ORAL DAILY PRN
Qty: 30 TABLET | Refills: 0 | Status: SHIPPED | OUTPATIENT
Start: 2025-05-27

## 2025-05-27 NOTE — H&P
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  He was seen by me in my previous office and patient was recommended a right total hip replacement.  His surgery was canceled due to his diagnosis of renal cell carcinoma.      Patient has longstanding right hip pain due to severe osteoarthritis.  He ambulates with the use of a walker due to his extensive right hip pain.  Reports pain has become debilitating and started to affect his overall quality of life.  Has tried multiple means of conservative management consisting of activity modification, home exercises, and NSAIDs but is unable to take anti-inflammatory medications due to his renal issues.    He has been using a walker due to severe right hip pain for several months.    Patient has history of previous left renal cell carcinoma in which she underwent a nephrectomy on 12/18/2023.  He was on chemotherapy for this.  His current urologist reports that he is in remission.  He does have resulting stage III CKD with a functioning right kidney.    Patient denies any history of stroke or heart attacks.  Of note he does have a right bundle branch block and left anterior fascicular block.  This results in abnormal APCs at times.  He does see Dr. Ahuja for his heart issues.    He was using Percocet for pain control.  It was not helping much.  He was placed on Suboxone patches.  Unfortunately he broke out in severe rash and is currently on a prednisone taper.    Medications:  [Medications Ordered Prior to Encounter]    [Medications Ordered Prior to Encounter]  Current Outpatient Medications on File Prior to Visit   Medication Sig Dispense Refill    albuterol (2.5 MG/3ML) 0.083% nebulizer solution       budesonide (Pulmicort) 0.5 MG/2ML nebulizer solution Inhale 0.5 mg in the morning and 0.5 mg in the evening.      ferrous sulfate 325 (65 Fe) MG tablet Take 1 tablet by mouth  1 (one) time each day with breakfast.      hydrocortisone (Anusol-HC) 25 MG suppository Insert 25 mg into the rectum in the morning and 25 mg in the evening.      losartan (Cozaar) 100 MG tablet Take 100 mg by mouth in the morning.      metoprolol succinate XL (Toprol-XL) 25 MG 24 hr tablet Take 25 mg by mouth in the morning.      oxyCODONE-acetaminophen (Percocet) 5-325 MG tablet Take 0.5-1 tablets by mouth.      pantoprazole (ProtoNix) 40 MG EC tablet Take 40 mg by mouth in the morning.      predniSONE (Deltasone) 10 MG tablet Take  by mouth.      sucralfate (Carafate) 1 g tablet Take 1 g by mouth.      sulfamethoxazole-trimethoprim (Bactrim DS) 800-160 MG tablet Take 1 tablet by mouth every other day.      triamcinolone (Kenalog) 0.025 % ointment       cholecalciferol (Vitamin D-3) 125 MCG (5000 UT) capsule Take 5,000 Units by mouth in the morning.      diphenhydrAMINE (BENADryl) 25 MG capsule Take 25 mg by mouth.      hydrocortisone 0.5 % cream Apply 1 Application topically.      ipratropium-albuterol (Combivent Respimat)  MCG/ACT inhaler Inhale 1 puff.      lidocaine-prilocaine (Emla) 2.5-2.5 % cream Apply 1 Application topically.      multivitamin (Theragran-M) tablet Take 1 tablet by mouth in the morning.      potassium chloride CR (K-Tab) 20 MEQ ER tablet Take 1 tablet (20 mEq total) by mouth 1 (one) time each day. 90 tablet 3    Viagra 100 MG tablet Take 1 tablet every day by oral route.       No current facility-administered medications on file prior to visit.       Allergies:  [Allergies]    [Allergies]  Allergen Reactions    Adhesive Tape Itching and Rash    Buprenorphine Itching and Rash     Butrans patch       Social Hx:  Social History     Substance and Sexual Activity   Alcohol Use Yes    Alcohol/week: 1.0 standard drink of alcohol    Types: 1 Standard drinks or equivalent per week    Comment: About 1 or 2 standard drinks per month on separate occasions     [Tobacco Use History]    [Tobacco Use  "History]  Tobacco Use   Smoking Status Never   Smokeless Tobacco Never   Tobacco Comments    Heavy passive tobacco exposure during childhood and adolescence       Surgical Hx  [Surgical History]    [Surgical History]  Past Surgical History  History reviewed. No pertinent surgical history.      Past Med Hx  [Medical History]    [Medical History]  Past Medical History  Diagnosis Date    Asthma     Chronic kidney disease December 2023    Closed fracture of clavicle, unspecified part As a high schooler    Disorders of bursae and tendons in shoulder region, unspecified May 2025    Gastroesophageal reflux disease     Hyperglycemia     Hyperlipidemia     Hypertension     Other malignant neoplasm of unspecified site December 2023    Spondylolisthesis, congenital     Unspecified site of ankle sprain Many as child, adolescent, and younger adult         Vitals  Visit Vitals   5' 10\" (1.778 m)   Wt 245 lb (111 kg)   BMI 35.15 kg/m²   Smoking Status Never   BSA 2.34 m²         Review of Systems:  12 systems are reviewed and are negative other than what is stated in HPI    Physical Exam:    General Appearance:Patient is awake, alert, and oriented and appears in no acute distress  Head:Atraumatic, normocephalic  Eyes:EOMs are intact  Lungs:Patient is in no acute respiratory distress  Neurologic:No acute neurological deficits or lateralizing findings  Peripheral Pulses:No sign of acute vascular compromise  Skin:No sign of acute infection    Musculoskeletal:     GAIT -placed with use of a walker    Right HIP  Active ROM Internal Rotation: <30 External Rotation: <40 Abduction: <45 Adduction: <15 Flexion: <100 Extension: <5 Tenderness   Location: groin   Radiation of Pain: anterior thigh.   Pain w/ Palpation: none   Florinda Test: negative   Impingement: negative   Straight Leg Raise: negative   Strength Quad: normal Abduction: normal Adduction: normal Flexion: normal Extension: normal   Positive Stinchfield sign.   Positive " Trendelenburg sign.   Attempted movements of the hip are painful and restricted  Neurovascular status is intact. Sensation in hip is normal. DP Pulse is 3+. PT PULSE is 3+. Capillary Refill is normal. Reflexes are normal.       Imaging:      XR hip right 2 or 3 views 80725  Right hip demonstrates severe osteoarthritis with significant joint space   narrowing, subchondral sclerosis, aspherocity of the femoral head,   osteophytic lipping.       Assessment:    1. Osteoarthritis of right hip joint         Treatment Plan:     Options and alternatives were discussed in detail with the patient.    The patient has reached the point of disability and failed nonoperative management.    The patient is indicated for a right total hip replacement .    The likely Risks and benefits of the procedure including but not limited to infection, DVT, pulmonary embolism, recurrent dislocation, Leg length discrepancy, periprosthetic fractures, possibility of injury to nerves or vessels, tendons, possibility of morbidity and mortality likely medical risks for stroke and heart attack, have been discussed in detail. Despite the risks involved the patient would like to proceed.     The patient is being scheduled for a right total HIP arthroplasty by DIRECT ANTERIOR APPROACH at [facility] on July 18, 2025.    I will request for Medical and cardiac clearance from Harry Hsu MD  and Dr Seymour MD , Cardiology.    Postoperative DVT prophylaxis - Patient has no high risk factors Plan for ASPIRIN     Preoperative antibiotic prophylaxis - Plan for SCIP protocol with CEFAZOLIN weight based. Will give VANCOMYCIN in addition due to increased BMI.    Surgery will be scheduled for observation given his history of previous renal cell carcinoma and stage III CKD    Will continue to monitor his prednisone taper.    He is currently off Suboxone patches.  He is likely to require Percocet for his pain control postoperatively.  Continue his walker  pending surgery.

## 2025-05-29 ENCOUNTER — TREATMENT (OUTPATIENT)
Dept: PHYSICAL THERAPY | Facility: CLINIC | Age: 65
End: 2025-05-29
Payer: COMMERCIAL

## 2025-05-29 DIAGNOSIS — R29.898 LEFT ARM WEAKNESS: ICD-10-CM

## 2025-05-29 DIAGNOSIS — R68.89 ACTIVITY INTOLERANCE: ICD-10-CM

## 2025-05-29 DIAGNOSIS — M19.012 ARTHRITIS OF BOTH SHOULDERS: Primary | ICD-10-CM

## 2025-05-29 DIAGNOSIS — M19.011 ARTHRITIS OF BOTH SHOULDERS: Primary | ICD-10-CM

## 2025-05-29 DIAGNOSIS — M25.612 DECREASED SHOULDER MOBILITY, LEFT: ICD-10-CM

## 2025-05-29 NOTE — PROGRESS NOTES
Physical Therapy Daily Treatment Note  [facility] [place]   [address], [address]  [place] [address]  P: [phone]  F: [phone]    Patient: Louis Andino Jr.   : 1960  Referring practitioner: Matt Chavez MD  Date of Initial Visit: Type: THERAPY  Noted: 2025  Today's Date: 2025  Patient seen for 5 sessions       Visit Diagnoses:    ICD-10-CM ICD-9-CM   1. Arthritis of both shoulders  M19.011 716.91    M19.012    2. Decreased shoulder mobility, left  M25.612 719.51   3. Left arm weakness  R29.898 729.89   4. Activity intolerance  R68.89 780.99         Louis Andino reports: Pt reports he is doing very well. Pt notes less pain and easier time performing activities that were previously painful/difficult. No new/other complaints/comments noted.       Subjective   Pt reported difficulties:  - self reported fxn status =  50/100% = 70%  - OHA, reaching movements = Improved   - reaching sideways = Greatly improved   - can't turn steering wheel w/ L = Improved (still difficult w/ going across his body)  - can't lift more than a pound w/ L arm sideways (full drink) = able to do now w/out issue   - can't reach behind back (washing) = slightly improved     Pain scale at worst =  4/10     Hobbies (impacted by dysfxn):  - goes to [employer] = continues and improved   - golf = not done but has been chipping       Objective   See Exercise, Manual, and Modality Logs for complete treatment.   Postural Observations  Seated posture: poor = fair  Standing posture: poor = fair         Active Range of Motion   Left Shoulder   Flexion: 130 (4/10) degrees with pain = 150 0/10   Abduction: 80 (4/10) degrees with pain = 140 0/10   External rotation 90°: 25 (6/10) degrees with pain = 45 0/10      Right Shoulder   Flexion: 175 degrees    Abduction: 160 degrees   External rotation 90°: 50 degrees     Additional Active Range of Motion Details  BTB IR     R = T5  L = L5 =  T10 3/10      Passive Range of Motion   Left Shoulder   Flexion: 175 degrees   Abduction: 165 degrees   External rotation 90°: 50 degrees with pain     Right Shoulder   Normal passive range of motion     Strength/Myotome Testing      Left Shoulder      Planes of Motion   Flexion: 4- = 4 (4/10 pain)   Abduction: 4+ = 5   External rotation at 90°: 3 = 4+ (1-2/10 pain)  Internal rotation at 90°: 4+ = 5      Right Shoulder   Normal muscle strength     Tests      Left Shoulder   Positive empty can and Yergason's.      Right Shoulder   Negative empty can and Yergason's.             Assessment/Plan  Pt was re-assessed for progress w/ PT PoC today. Pt has met 75% of STG at time of PN. HEP was updated today to progress PT PoC to achieve outlined goals for pt rehab.   Pt has unmet goals and remaining functional deficits that warrant continued skilled PT services w/in the PoC at this time.       Goals  Plan Goals: Goals  Plan Goals: STG: (achieve in 4+ weeks)  1. Pt will demo HEP compliance and attendance w/ scheduled therapy visits. = MET  2. Pt will demo pain free 4/5 MMT grades of L UE for fxn strength w/ IADLs. = PROGRESSING   3. Pt will demo improved L UE ROM by 10 degrees or more for improved fxn mobility w/ IADLs. = MET  4. Pt will report decreased pain from 8/10 at worst to 6/10 or less on pain scale for pt QoL and progression of PoC. = MET (4/10)     LTG: (achieve in 8+ weeks) = Not assessed at 4wk PN  1. Pt will demo improved L UE ROM by 25 degrees or more for improved fxn mobility w/ IADLs.  2. Pt will demo pain free 4+/5 MMT grades of L UE for fxn strength w/ IADLs.  3. Pt will score 25% or less on the Quick DASH indicating little to no disability of the L UE for fxn performance of IADLs.   4. Pt will be negative for the L empty can, and or yergason special test indicating improvement/resolution of c/c and efficacy of skilled PT interventions for improved pt QoL and fxn performance of IADLs.        Timed:          Manual Therapy:    0     mins  77083;     Therapeutic Exercise:    24     mins  12434;     Neuromuscular Carla:    8    mins  64303;    Therapeutic Activity:     8     mins  31687;     Gait Trainin     mins  80069;     Ultrasound:     0     mins  67851;    Ionto                               0    mins  26383  Self Care                       0     mins  26926  Traction     0     mins 74995      Un-Timed:  Canalith Repos    0     mins 30227  Electrical Stimulation:    0     mins  35615 ( );  Dry Needling     0     mins self-pay  Traction     0     mins 07135        Timed Treatment:   40   mins   Total Treatment:     40   mins    Rodrick Lopez PT  [place] License #: [id number]    Physical Therapist

## 2025-05-30 ENCOUNTER — OFFICE VISIT (OUTPATIENT)
Dept: ENDOCRINOLOGY | Age: 65
End: 2025-05-30
Payer: MEDICARE

## 2025-05-30 VITALS
BODY MASS INDEX: 35.33 KG/M2 | SYSTOLIC BLOOD PRESSURE: 134 MMHG | WEIGHT: 246.8 LBS | OXYGEN SATURATION: 96 % | HEART RATE: 65 BPM | DIASTOLIC BLOOD PRESSURE: 78 MMHG | HEIGHT: 70 IN

## 2025-05-30 DIAGNOSIS — E89.6 H/O TOTAL ADRENALECTOMY: Primary | ICD-10-CM

## 2025-05-30 PROCEDURE — 1160F RVW MEDS BY RX/DR IN RCRD: CPT | Performed by: INTERNAL MEDICINE

## 2025-05-30 PROCEDURE — 1159F MED LIST DOCD IN RCRD: CPT | Performed by: INTERNAL MEDICINE

## 2025-05-30 PROCEDURE — 3075F SYST BP GE 130 - 139MM HG: CPT | Performed by: INTERNAL MEDICINE

## 2025-05-30 PROCEDURE — 3078F DIAST BP <80 MM HG: CPT | Performed by: INTERNAL MEDICINE

## 2025-05-30 PROCEDURE — 99203 OFFICE O/P NEW LOW 30 MIN: CPT | Performed by: INTERNAL MEDICINE

## 2025-05-30 RX ORDER — SILDENAFIL 100 MG/1
100 TABLET, FILM COATED ORAL DAILY
COMMUNITY

## 2025-05-30 RX ORDER — DIAPER,BRIEF,INFANT-TODD,DISP
1 EACH MISCELLANEOUS
COMMUNITY

## 2025-05-30 RX ORDER — TRIAMCINOLONE ACETONIDE 0.25 MG/G
OINTMENT TOPICAL
COMMUNITY
Start: 2025-03-26

## 2025-05-30 NOTE — PROGRESS NOTES
Referring provider: Harry Hsu MD     Chief complaint/Reason for consult: left adrenalectomy    HPI:   - 64 year old male here left adrenalectomy and nephrectomy for renal cell carcinoma  - He is also taken Keytruda  - He states he was started on prednisone 80 mg daily due to a rash from Keytruda and has been tapering his corticosteroids  - He states he had more energy after starting prednisone      The following portions of the patient's history were reviewed and updated as appropriate: allergies, current medications, past family history, past medical history, past social history, past surgical history, and problem list.      Objective     Vitals:    05/30/25 1148   BP: 134/78   Pulse: 65   SpO2: 96%        Physical Exam  Vitals reviewed.   Constitutional:       Appearance: Normal appearance.   HENT:      Head: Normocephalic and atraumatic.   Eyes:      General: No scleral icterus.  Pulmonary:      Effort: Pulmonary effort is normal. No respiratory distress.   Neurological:      Mental Status: He is alert.      Gait: Gait normal.   Psychiatric:         Mood and Affect: Mood normal.         Behavior: Behavior normal.         Thought Content: Thought content normal.         Judgment: Judgment normal.       Assessment & Plan   Left adrenalectomy  - He has risk factors for adrenal insufficiency including his left adrenalectomy and Keytruda use  - He is currently on a tapering dose of prednisone  - Will plan on checking cortisol and ACTH in the morning after he tapers his prednisone  - I did ask him to notify me if he is not able to taper prednisone    - Return appt. Will depend on lab work

## 2025-06-05 ENCOUNTER — TREATMENT (OUTPATIENT)
Dept: PHYSICAL THERAPY | Facility: CLINIC | Age: 65
End: 2025-06-05
Payer: COMMERCIAL

## 2025-06-05 ENCOUNTER — TELEPHONE (OUTPATIENT)
Dept: CARDIOLOGY | Age: 65
End: 2025-06-05
Payer: COMMERCIAL

## 2025-06-05 DIAGNOSIS — M19.011 ARTHRITIS OF BOTH SHOULDERS: Primary | ICD-10-CM

## 2025-06-05 DIAGNOSIS — M19.012 ARTHRITIS OF BOTH SHOULDERS: Primary | ICD-10-CM

## 2025-06-05 DIAGNOSIS — R29.898 LEFT ARM WEAKNESS: ICD-10-CM

## 2025-06-05 DIAGNOSIS — R68.89 ACTIVITY INTOLERANCE: ICD-10-CM

## 2025-06-05 DIAGNOSIS — M25.612 DECREASED SHOULDER MOBILITY, LEFT: ICD-10-CM

## 2025-06-05 NOTE — PROGRESS NOTES
"Physical Therapy Daily Treatment Note  [facility] [place]   [address], [address]  [place] [address]  P: [phone]  F: [phone]    Patient: Louis Andino Jr.   : 1960  Referring practitioner: Matt Chavez MD  Date of Initial Visit: Type: THERAPY  Noted: 2025  Today's Date: 2025  Patient seen for 6 sessions       Visit Diagnoses:    ICD-10-CM ICD-9-CM   1. Arthritis of both shoulders  M19.011 716.91    M19.012    2. Decreased shoulder mobility, left  M25.612 719.51   3. Left arm weakness  R29.898 729.89   4. Activity intolerance  R68.89 780.99         Louis Andino reports: Pt reports he is ok. Pt notes chopping firewood and he is feeling \"stiff\" in his shoulders and other parts of his body. Although he is sore, the pt is pleased that he was able to chop wood which would have been \"impossible\" before starting PT. No new/other complaints/comments noted.       Subjective     Objective   See Exercise, Manual, and Modality Logs for complete treatment.       Assessment/Plan  Pt continues to demo improved ROM and fxn strength gains w/ exercise progressions. Wall crawls were added today to continue w/ improving UE fxn for fxn performance of IADLs. HEP was updated today to progress PT PoC to achieve outlined goals for pt rehab.     Plan:  Pt will continue with current plan of care to achieve outlined goals. Therapeutic interventions will be progressed where and when appropriate.        Timed:         Manual Therapy:    0     mins  88487;     Therapeutic Exercise:    24     mins  64317;     Neuromuscular Carla:    8    mins  54004;    Therapeutic Activity:     8     mins  29031;     Gait Trainin     mins  04816;     Ultrasound:     0     mins  76492;    Ionto                               0    mins  98687  Self Care                       0     mins  41828  Traction     0     mins 15480      Un-Timed:  Canalith Repos    0     mins 24560  Electrical " Stimulation:    0     mins  02605 ( );  Dry Needling     0     mins self-pay  Traction     0     mins 95402        Timed Treatment:   40   mins   Total Treatment:     40   mins    Rodrick Lopez, PT  [place] License #: [id number]    Physical Therapist

## 2025-06-05 NOTE — TELEPHONE ENCOUNTER
CARDIOLOGY    Patient Name: Louis Andino Jr.  :1960  Age: 64 y.o.    25    To whom it may concern:    Louis Andino Jr. was referred to my office for cardiovascular risk assessment exam for upcoming orthopedic surgery.    He has a bifascicular block. He does not have CAD or CHF. He has benign atrial ectopy.    From a cardiovascular standpoint, the patient is at the following risk of major cardiovascular event in the john-operative setting:  [x] Low         [] Modifiable  [] Low-Moderate       [] Non-Modifiable   [] Moderate  [] Moderare - high  [] High    Cardiac Testing:  [] Needs further cardiac testing  [x] Does not need further cardiac testing    Anticoagulant Status:  [x] No anticoagulants    [] The patient is on  [] ASA; hold for ___ days.  [] Coumadin; hold for ___ days.  [] Plavix; hold for ___ days.  [] Eliquis; hold for ___ days.  [] Brilinta; hold for ___ days.  [] Xarelto; hold for ___ days.  [] Effient; hold for ___ days.    [] The patient requires Lovenox bridging, which has been prescribed.     Beta Blockers:  [] The patient will need pre-op beta blockers which will be prescribed by my clinic.    If there are any problems during surgery, please do not hesitate to contact my office.    Thank you for allowing me to participate in this patient's care.    Sincerely,         Bradley Ahuja MD  [facility]   [facility]  [address] [place] [address]  Office: [phone]

## 2025-06-05 NOTE — TELEPHONE ENCOUNTER
Pt is scheduled for total hip arthroplasty with Dr. Yovani Lund under general ANES.  They are requesting pre op risk assessment     Pt's last OV 08/27/24 to follow up in one year.

## 2025-06-07 DIAGNOSIS — I10 HYPERTENSION, UNSPECIFIED TYPE: ICD-10-CM

## 2025-06-09 RX ORDER — METOPROLOL SUCCINATE 25 MG/1
25 TABLET, EXTENDED RELEASE ORAL DAILY
Qty: 90 TABLET | Refills: 1 | Status: SHIPPED | OUTPATIENT
Start: 2025-06-09

## 2025-06-12 ENCOUNTER — TREATMENT (OUTPATIENT)
Dept: PHYSICAL THERAPY | Facility: CLINIC | Age: 65
End: 2025-06-12
Payer: COMMERCIAL

## 2025-06-12 DIAGNOSIS — M25.612 DECREASED SHOULDER MOBILITY, LEFT: ICD-10-CM

## 2025-06-12 DIAGNOSIS — R29.898 LEFT ARM WEAKNESS: ICD-10-CM

## 2025-06-12 DIAGNOSIS — M19.012 ARTHRITIS OF BOTH SHOULDERS: Primary | ICD-10-CM

## 2025-06-12 DIAGNOSIS — M19.011 ARTHRITIS OF BOTH SHOULDERS: Primary | ICD-10-CM

## 2025-06-12 DIAGNOSIS — R68.89 ACTIVITY INTOLERANCE: ICD-10-CM

## 2025-06-12 NOTE — PROGRESS NOTES
"Physical Therapy Daily Treatment Note  [facility] [place]   [address], [address]  [place] [address]  P: [phone]  F: [phone]    Patient: Louis Andino Jr.   : 1960  Referring practitioner: Matt Chavez MD  Date of Initial Visit: Type: THERAPY  Noted: 2025  Today's Date: 2025  Patient seen for 7 sessions       Visit Diagnoses:    ICD-10-CM ICD-9-CM   1. Arthritis of both shoulders  M19.011 716.91    M19.012    2. Decreased shoulder mobility, left  M25.612 719.51   3. Left arm weakness  R29.898 729.89   4. Activity intolerance  R68.89 780.99         Louis Andino reports: Pt reports he is doing \"really well\" and he feels like a \"happy annabella\" regarding his progress w/ his shoulders. Pt notes his ability to drive w/ his L arm is much better now w/ improved turns. Pt notes as the day goes on this decreases. No new/other complaints/comments noted.       Subjective     Objective   See Exercise, Manual, and Modality Logs for complete treatment.       Assessment/Plan  Interventions were performed in line w/ established PT PoC to continue w/ fxn strength gains and ROM improvements of B UE for fxn performance of IADLs and UE biomechanics. Pt was able to perform requested interventions today w/out exasperation of B shoulder pain s/s and w/ appropriate levels of resistance/challenge w/ little to no compensatory motions observed.      Plan:  Pt will continue with current plan of care to achieve outlined goals. Therapeutic interventions will be progressed where and when appropriate.        Timed:         Manual Therapy:    0     mins  98232;     Therapeutic Exercise:    24     mins  29627;     Neuromuscular Carla:    8    mins  25400;    Therapeutic Activity:     8     mins  52807;     Gait Trainin     mins  62796;     Ultrasound:     0     mins  66626;    Ionto                               0    mins  07752  Self Care                       0     " mins  82511  Traction     0     mins 88223      Un-Timed:  Canalith Repos    0     mins 88861  Electrical Stimulation:    0     mins  63365 ( );  Dry Needling     0     mins self-pay  Traction     0     mins 43039        Timed Treatment:   40   mins   Total Treatment:     40   mins    Rodrick Lopez, PT  [place] License #: [id number]    Physical Therapist

## 2025-06-23 DIAGNOSIS — J45.40 MODERATE PERSISTENT ASTHMA WITHOUT COMPLICATION: Chronic | ICD-10-CM

## 2025-06-23 RX ORDER — BUDESONIDE 0.5 MG/2ML
0.5 INHALANT ORAL 2 TIMES DAILY
Qty: 180 ML | Refills: 1 | Status: SHIPPED | OUTPATIENT
Start: 2025-06-23

## 2025-06-26 ENCOUNTER — APPOINTMENT (OUTPATIENT)
Dept: GENERAL RADIOLOGY | Facility: HOSPITAL | Age: 65
End: 2025-06-26
Payer: COMMERCIAL

## 2025-06-26 ENCOUNTER — TELEMEDICINE (OUTPATIENT)
Dept: PAIN MEDICINE | Facility: CLINIC | Age: 65
End: 2025-06-26
Payer: COMMERCIAL

## 2025-06-26 ENCOUNTER — HOSPITAL ENCOUNTER (EMERGENCY)
Facility: HOSPITAL | Age: 65
Discharge: HOME | End: 2025-06-26
Attending: STUDENT IN AN ORGANIZED HEALTH CARE EDUCATION/TRAINING PROGRAM
Payer: COMMERCIAL

## 2025-06-26 ENCOUNTER — TELEPHONE (OUTPATIENT)
Dept: PHYSICAL THERAPY | Facility: CLINIC | Age: 65
End: 2025-06-26

## 2025-06-26 VITALS
DIASTOLIC BLOOD PRESSURE: 94 MMHG | SYSTOLIC BLOOD PRESSURE: 154 MMHG | OXYGEN SATURATION: 95 % | RESPIRATION RATE: 18 BRPM | HEART RATE: 65 BPM | TEMPERATURE: 97.5 F

## 2025-06-26 DIAGNOSIS — M16.11 OSTEOARTHRITIS OF RIGHT HIP, UNSPECIFIED OSTEOARTHRITIS TYPE: ICD-10-CM

## 2025-06-26 DIAGNOSIS — M47.816 LUMBAR FACET ARTHROPATHY: ICD-10-CM

## 2025-06-26 DIAGNOSIS — M25.512 ACUTE PAIN OF LEFT SHOULDER: Primary | ICD-10-CM

## 2025-06-26 DIAGNOSIS — Z79.899 ENCOUNTER FOR LONG-TERM (CURRENT) USE OF HIGH-RISK MEDICATION: ICD-10-CM

## 2025-06-26 DIAGNOSIS — M51.360 DEGENERATION OF INTERVERTEBRAL DISC OF LUMBAR REGION WITH DISCOGENIC BACK PAIN: ICD-10-CM

## 2025-06-26 DIAGNOSIS — M54.16 LUMBAR RADICULOPATHY: Primary | ICD-10-CM

## 2025-06-26 PROCEDURE — 99214 OFFICE O/P EST MOD 30 MIN: CPT | Performed by: PHYSICIAN ASSISTANT

## 2025-06-26 PROCEDURE — 99283 EMERGENCY DEPT VISIT LOW MDM: CPT

## 2025-06-26 PROCEDURE — 73030 X-RAY EXAM OF SHOULDER: CPT

## 2025-06-26 RX ORDER — OXYCODONE AND ACETAMINOPHEN 5; 325 MG/1; MG/1
.5-1 TABLET ORAL DAILY PRN
Qty: 30 TABLET | Refills: 0 | Status: SHIPPED | OUTPATIENT
Start: 2025-06-26

## 2025-06-26 NOTE — ED PROVIDER NOTES
EMERGENCY DEPARTMENT ENCOUNTER  Room Number:  26/26  PCP: Harry Hsu MD  Independent Historians: Patient and Family      HPI:  Chief Complaint: had concerns including Shoulder Injury (L).       Context: Louis Andino Jr. is a 64 y.o. male with a medical history of HTN, HLD, COPD who presents to the ED c/o acute left shoulder pain.  Patient has a history of pain in the left shoulder and has been seeing physical therapy.  He states he was making significant progress until yesterday when he had his arms up above his head for an extended period of time.  When patient lowered his left arm he felt a pop and has had severe pain in the left shoulder ever since this occurred.  Patient has been following with sports medicine due to history of pain in the left shoulder.  Patient has Percocet at home.      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/2024    APC (atrial premature contractions) 08/27/2024    Lumbar facet arthropathy 11/06/2024    Right sided sciatica 11/06/2024    Osteoarthritis of right hip 11/06/2024    Chronic pain syndrome 11/06/2024    Encounter for long-term (current) use of high-risk medication 01/09/2025    Encounter for screening for malignant neoplasm of colon 02/28/2025    Urticaria of unknown origin 03/26/2025    Lumbar radiculopathy 04/24/2025    Degeneration of intervertebral disc of lumbar region with discogenic back pain 06/26/2025      Resolved Ambulatory Problems     Diagnosis Date Noted    Hyperglycemia 10/12/2023    Vitreous degeneration of right eye 10/12/2023     Past Medical History:   Diagnosis Date    Abnormal ECG Dec 2024    Allergic Feb. 2025    Anemia 12/18/2023    Ankle sprain     Arthritis Years    Cataract 2years    Chronic pain disorder 2020    Clotting disorder     Colon polyp 2025    Coronary artery disease Dec 2023    Deep vein thrombosis 12/18/2023    Emphysema/COPD     Erectile dysfunction Several years    Extremity pain 2020    Fracture of ankle     Fracture of wrist     GERD (gastroesophageal reflux disease)     Headache Years    Headache, tension-type 1977    HL (hearing loss)     Injury of neck 2023    Joint pain 2020    Low back pain     Neck pain 2020    Obesity     Pneumonia 12/20/2023    PONV (postoperative nausea and vomiting)     Prostate disease     Rash     Rectal bleeding     Renal insufficiency 4/9/2024    Shoulder injury 1980s    Sleep apnea     Visual impairment          PAST SURGICAL HISTORY  Past Surgical History:   Procedure Laterality Date    ABDOMINAL SURGERY  December 18, 2023    Left Kidney and Adrenal Gland removed due to Renal Cell Carcinoma    COLONOSCOPY N/A 3/24/2025    Procedure: COLONOSCOPY to cecum and terminal ileum with cold and hot polypectomies;  Surgeon: Dolly Stephens MD;  Location: [facility] ENDOSCOPY;  Service: Gastroenterology;  Laterality: N/A;  screening//colon polyps, diverticulosis    NEPHRECTOMY Left 12/18/2023    Procedure: NEPHRECTOMY RADICAL WITH CAVAL THROMBECTOMY;  Surgeon: James Esquivel MD;  Location: [facility];  Service: Urology;  Laterality: Left;    PORTACATH PLACEMENT  01/30/2024    SKIN LESION EXCISION  1990         FAMILY HISTORY  Family History   Problem Relation Age of Onset    Arthritis Mother     Cancer Mother     Diabetes Mother     Heart disease Mother     Hyperlipidemia Mother     Miscarriages / Stillbirths Mother         Stillborn child    Hypertension  Mother     Osteoporosis Mother     Kidney disease Mother         Kidney stones    Vision loss Mother         Near sighted    Coronary artery disease Mother     COPD Father     Hyperlipidemia Father     Migraines Father     Vision loss Father         Near sighted    Emphysema Father     Chronic bronchitis Father     Hyperlipidemia Brother     Hypertension Brother     Asthma Brother     Vision loss Daughter     Broken bones Daughter         Broke left forearm three times during childhood    Broken bones Daughter         Fractured right femur and right forearm during childhood    Anxiety disorder Daughter     Depression Daughter     Miscarriages / Stillbirths Daughter         First Trimester miscarriage    Dislocations Daughter         Recurrent radial head subluxations as a child    Anxiety disorder Daughter     Depression Daughter     Asthma Daughter     Asthma Son     Depression Son     Malig Hyperthermia Neg Hx          SOCIAL HISTORY  Social History     Socioeconomic History    Marital status:     Number of children: 6   Tobacco Use    Smoking status: Never     Passive exposure: Past    Smokeless tobacco: Never    Tobacco comments:     Passive due to Father and most male relatives smoking   Vaping Use    Vaping status: Never Used   Substance and Sexual Activity    Alcohol use: Yes     Alcohol/week: 1.0 standard drink of alcohol     Types: 1 Glasses of wine per week     Comment: rare    Drug use: Never    Sexual activity: Yes     Partners: Female     Birth control/protection: None       Chronic or social conditions impacting patient care (Social Determinants of Health):  Social Drivers of Health     Tobacco Use: Low Risk  (6/26/2025)    Patient History     Smoking Tobacco Use: Never     Smokeless Tobacco Use: Never     Passive Exposure: Past   Alcohol Use: Not At Risk (7/12/2024)    AUDIT-C     Frequency of Alcohol Consumption: 2-4 times a month     Average Number of Drinks: 1 or 2     Frequency of Binge  Drinking: Never   Financial Resource Strain: Not on file   Food Insecurity: No Food Insecurity (12/19/2023)    Hunger Vital Sign     Worried About Running Out of Food in the Last Year: Never true     Ran Out of Food in the Last Year: Never true   Transportation Needs: Not on file   Physical Activity: Sufficiently Active (12/19/2023)    Exercise Vital Sign     Days of Exercise per Week: 3 days     Minutes of Exercise per Session: 120 min   Stress: Not on file   Social Connections: Not on file   Interpersonal Safety: Not At Risk (6/26/2025)    Abuse Screen     Unsafe at Home or Work/School: no     Feels Threatened by Someone?: no     Does Anyone Keep You from Contacting Others or Doint Things Outside the Home?: no     Physical Sign of Abuse Present: no   Depression: Not at risk (4/24/2025)    PHQ-2     PHQ-2 Score: 0   Housing Stability: Unknown (3/21/2025)    Housing Stability     Current Living Arrangements: home     Potentially Unsafe Housing Conditions: Not on file   Utilities: Not on file   Health Literacy: Unknown (12/19/2023)    Education     Help with school or training?: Not on file     Preferred Language: English   Employment: Not on file   Disabilities: Not At Risk (3/21/2025)    Disabilities     Concentrating, Remembering, or Making Decisions Difficulty: no     Doing Errands Independently Difficulty: no       ALLERGIES  Adhesive tape and Butrans [buprenorphine]      REVIEW OF SYSTEMS  Review of Systems  Included in HPI  All systems reviewed and negative except for those discussed in HPI.      PHYSICAL EXAM    I have reviewed the triage vital signs and nursing notes.    ED Triage Vitals   Temp Heart Rate Resp BP SpO2   06/26/25 1418 06/26/25 1418 06/26/25 1418 06/26/25 1419 06/26/25 1418   97.5 °F (36.4 °C) 66 18 (!) 173/108 96 %      Temp src Heart Rate Source Patient Position BP Location FiO2 (%)   06/26/25 1418 -- -- -- --   Oral           Physical Exam  GENERAL: alert, no acute distress  SKIN: Warm,  dry  HENT: Normocephalic, atraumatic  EYES: no scleral icterus  CV: regular rhythm, regular rate  RESPIRATORY: normal effort, lungs clear  ABDOMEN: soft, nontender, nondistended  MUSCULOSKELETAL: no deformity.  Tenderness to palpation along the left shoulder joint with limitation of abduction and flexion secondary to pain  NEURO: alert, moves all extremities, follows commands            LAB RESULTS  No results found for this or any previous visit (from the past 24 hours).      RADIOLOGY  XR Shoulder 2+ View Left  Result Date: 6/26/2025  XR SHOULDER 2+ VW LEFT-  INDICATIONS: Pain  TECHNIQUE: 2 views of the left shoulder  COMPARISON: None available  FINDINGS:  Moderate to prominent osteoarthritic degenerative changes are present. No acute fracture, erosion, or dislocation is identified. Follow-up/further evaluation can be obtained as indications persist.       As described.    This report was finalized on 6/26/2025 2:54 PM by Dr. Emmanuel Salas M.D on Workstation: [id number]          MEDICATIONS GIVEN IN ER  Medications - No data to display      ORDERS PLACED DURING THIS VISIT:  Orders Placed This Encounter   Procedures    [facility] 03. Shoulder Immobilizer/Sling & Swathe (); Left    XR Shoulder 2+ View Left         OUTPATIENT MEDICATION MANAGEMENT:  No current Epic-ordered facility-administered medications on file.     Current Outpatient Medications Ordered in Epic   Medication Sig Dispense Refill    budesonide (PULMICORT) 0.5 MG/2ML nebulizer solution Take 2 mL (one vial) by nebulization 2 (Two) Times a Day. 180 mL 1    clobetasol propionate (TEMOVATE) 0.05 % cream Apply a small amount to affected area twice a day 60 g 1    diphenhydrAMINE (BENADRYL) 25 mg capsule Take 1 capsule by mouth.      ferrous sulfate 325 (65 FE) MG tablet Take 1 tablet by mouth Daily With Breakfast. 90 tablet 1    hydrocortisone (ANUSOL-HC) 25 MG suppository Insert 1 suppository into the rectum 2 (Two) Times a Day. 20 each 0     hydrocortisone 0.5 % cream Apply 1 Application topically to the appropriate area as directed.      ipratropium-albuterol (COMBIVENT RESPIMAT)  MCG/ACT inhaler Inhale 1 puff 4 (Four) Times a Day As Needed for Wheezing.      lidocaine-prilocaine (EMLA) 2.5-2.5 % cream Apply 1 Application topically to the appropriate area as directed See Admin Instructions. Apply to port site one hour prior to port being accessed. 30 g 3    losartan (COZAAR) 100 MG tablet Take 1 tablet by mouth Daily for 90 days. 90 tablet 0    metoprolol succinate XL (TOPROL-XL) 25 MG 24 hr tablet Take 1 tablet by mouth Daily. 90 tablet 1    multivitamin with minerals tablet tablet Take 1 tablet by mouth Daily.      ondansetron (ZOFRAN) 8 MG tablet Take 1 tablet by mouth Every 8 (Eight) Hours As Needed for Nausea or Vomiting. 30 tablet 2    oxyCODONE-acetaminophen (PERCOCET) 5-325 MG per tablet Take 0.5-1 tablets by mouth Daily As Needed for Moderate Pain. 30 tablet 0    pantoprazole (PROTONIX) 40 MG EC tablet Take 1 tablet by mouth Daily. 90 tablet 0    Plecanatide (Trulance) 3 MG tablet TAKE 1 TABLET BY MOUTH DAILY 30 tablet 4    predniSONE (DELTASONE) 10 MG tablet Take 6 tablets by mouth 2 (Two) Times a Day for 7 days, THEN 4 tablets 2 (Two) Times a Day for 7 days, THEN 2 tablets 2 (Two) Times a Day for 7 days, THEN 2 tablets Daily for 14 days, THEN 1 tablet Daily for 14 days. 210 tablet 0    sildenafil (Viagra) 100 MG tablet Take 1 tablet by mouth Daily.      sucralfate (CARAFATE) 1 g tablet Take 1 tablet by mouth 3 (Three) Times a Day As Needed (heartburn). 270 tablet 0    sulfamethoxazole-trimethoprim (BACTRIM DS,SEPTRA DS) 800-160 MG per tablet Take 1 tablet by mouth Every Other Day. 20 tablet 0    triamcinolone (KENALOG) 0.025 % ointment       vitamin D3 125 MCG (5000 UT) capsule capsule Take 1 capsule by mouth Daily.           PROCEDURES  Procedures            PROGRESS, DATA ANALYSIS, CONSULTS, AND MEDICAL DECISION MAKING  All labs have  been independently interpreted by me.  All radiology studies have been reviewed by me. All EKG's have been independently viewed and interpreted by me.  Discussion below represents my analysis of pertinent findings related to patient's condition, differential diagnosis, treatment plan and final disposition.    Differential diagnosis includes but is not limited to osteoarthritis, rotator cuff injury, dislocation, fracture.    Clinical Scores:                                       ED Course as of 06/26/25 1508   Thu Jun 26, 2025   1453 Shoulder x-ray interpreted by me and demonstrates no evidence of fracture or malalignment [MW]   1507 X-ray imaging demonstrates severe osteoarthritis without evidence of fracture or malalignment.  Offered narcotic pain pill but patient states he has them at home and he prefers to take his home medication.  Offered Toradol but patient states he has kidney disease and cannot take anti-inflammatories.  Offered Lidoderm patch but patient states he is allergic to adhesive.  Given patient has pain pills at home and is already established with sports medicine we will have him follow-up on an outpatient basis with supportive care instructions. [MW]      ED Course User Index  [MW] Nestor Carpio MD             AS OF 15:08 EDT VITALS:    BP - (!) 173/108  HR - 66  TEMP - 97.5 °F (36.4 °C) (Oral)  O2 SATS - 96%    COMPLEXITY OF CARE  Admission was considered but after careful review of the patient's presentation, physical examination, diagnostic results, and response to treatment the patient may be safely discharged with outpatient follow-up.      DIAGNOSIS  Final diagnoses:   Acute pain of left shoulder         DISPOSITION  ED Disposition       ED Disposition   Discharge    Condition   Stable    Comment   --                Please note that portions of this document were completed with a voice recognition program.    Note Disclaimer: At [facility], we believe that sharing information  builds trust and better relationships. You are receiving this note because you recently visited [facility]. It is possible you will see health information before a provider has talked with you about it. This kind of information can be easy to misunderstand. To help you fully understand what it means for your health, we urge you to discuss this note with your provider.         Nestor Carpio MD  06/26/25 9912

## 2025-06-26 NOTE — TELEPHONE ENCOUNTER
"Caller: Louis Andino Jr. \"Tj\"    Relationship:  Self    PATIENT CALLED REQUESTING TO CANCEL SAME DAY APPT.    Did the patient call AFTER the start time of their scheduled appointment?  []YES  [x]NO    Was the patient's appointment rescheduled? []YES  [x]NO    Any additional information: NOT FEELING WELL GOING TO URGENT CARE   "

## 2025-06-26 NOTE — DISCHARGE INSTRUCTIONS
Please follow-up with your orthopedic physician at your earliest diagnosed with at your earliest possible appointment    Please follow-up with your primary care provider within 3 to 4 days for repeat evaluation    Please return to the ER with new or worsening symptoms include but not limited to uncontrolled pain, fever, chills, nausea, vomiting, change in pain, shortness of breath, lightheadedness, changes in mental status

## 2025-06-26 NOTE — PROGRESS NOTES
TELEMEDICINE - VIDEO VISIT    You have chosen to receive care through a telehealth visit.  Do you consent to use a video/audio connection for your medical care today? Yes    Location of patient:home in [place], [place]  Location of Provider-[place] office  Anyone else present-no, if yes- who-   Type of Technology used: Twilio through use of Epic/Enpiriont visit    CHIEF COMPLAINT  Right hip, low back and left shoulder pain    Subjective   Louis Andino Jr. is a 64 y.o. male  who presents for a video visit follow-up.He has a history of chronic right hip, low back and left shoulder pain.  The patient states that he has slightly increased back pain which he attributes to recent vacation where he and his wife drove to [place].  He feels that he is doing relatively well.  Continues to have pain within the left shoulder as well as persistent right hip pain.  The patient is scheduled for right total hip arthroplasty on 7/18/2025 with Dr. Lund.    Was not feeling well today following his recent return from [place].  Reports having slight fever, cough, sore throat and chills.  Requested to have a video visit on today.    Medical history complicated with history of renal cell carcinoma with recurrence in December 2023 which required left nephrectomy and adrenalectomy on 12/18/2023. The patient completed immunotherapy January 2025.     His current medication regimen consist of oxycodone 5/325 mg 1/4 tablet p.o. twice daily and 0.5 tablet p.o. nightly.  He is tolerating the medication extremely well and it provides at least 25-80% ongoing relief which allows him to maintain his activities of daily living as well as going to the gym to workout.  Denies adverse effects.    Pain today 3/10 VAS in severity.      Back Pain  This is a chronic problem. The current episode started more than 1 year ago. The problem occurs constantly. The problem has been improved since onset. The pain is present in the lumbar spine and  sacro-iliac. The quality of the pain is described as aching and burning. The pain radiates to the right buttock, right thigh and right anterolateral lower leg. The pain is at a severity of 3/10. The pain is moderate. The pain is The same all the time. The symptoms are aggravated by sitting, position and standing. Associated symptoms include a fever, leg pain, numbness (occasionally when sleeping, bilateral arm, left worse than right) and weakness (left arm - chronic). Pertinent negatives include no abdominal pain, chest pain, dysuria or headaches. Risk factors include history of cancer, obesity, sedentary lifestyle, poor posture and lack of exercise. He has tried ice and heat for the symptoms. The treatment provided mild relief.   Hip Pain   There was no injury mechanism. The pain is present in the right hip. The quality of the pain is described as aching. The pain is at a severity of 3/10. The pain is moderate. The pain has been Improving since onset. Associated symptoms include an inability to bear weight and numbness (occasionally when sleeping, bilateral arm, left worse than right). The symptoms are aggravated by movement and weight bearing.        PEG Assessment   What number best describes your pain on average in the past week?2  What number best describes how, during the past week, pain has interfered with your enjoyment of life?0  What number best describes how, during the past week, pain has interfered with your general activity?  8    Review of Pertinent Medical Data ---  3 VIEW LUMBAR SPINE     HISTORY: Low back pain.     FINDINGS: There is some very minimal disc space narrowing at L3-4. There  is some prominent spurring of the posterior facets throughout the lumbar  spine and associated with some minimal posterior subluxation at L3-4  measuring 3 mm. These findings are unchanged from 04/09/2024.     2 VIEW RIGHT HIP AND 1 VIEW AP PELVIS     HISTORY: Right hip pain.     FINDINGS: There are very severe  degenerative changes involving the right  hip much more than left with marked joint space narrowing and spurring  of the acetabulum and subchondral degenerative cystic change of the  femoral head. There are also slight degenerative changes at both  sacroiliac joints.     This report was finalized on 10/28/2024 10:15 AM by Dr. Cornelio Desai M.D on Workstation: [id number]    The following portions of the patient's history were reviewed and updated as appropriate: allergies, current medications, past family history, past medical history, past social history, past surgical history, and problem list.      Review of Systems   Constitutional:  Positive for chills and fever.   HENT:  Positive for sore throat.    Respiratory:  Positive for cough.    Cardiovascular:  Negative for chest pain.   Gastrointestinal:  Negative for abdominal pain.   Genitourinary:  Negative for dysuria.   Musculoskeletal:  Positive for back pain.   Neurological:  Positive for weakness (left arm - chronic) and numbness (occasionally when sleeping, bilateral arm, left worse than right). Negative for headaches.   Psychiatric/Behavioral: Negative.           I have reviewed and confirmed the accuracy of the ROS as documented by the MA/LPN/RN QI Freed    Vitals:    06/26/25 1101   PainSc: 3    PainLoc: Back         Objective   Physical Exam  Vitals and nursing note reviewed.   Constitutional:       Appearance: Normal appearance. He is obese.   HENT:      Head: Normocephalic.   Eyes:      Conjunctiva/sclera: Conjunctivae normal.   Pulmonary:      Effort: Pulmonary effort is normal.   Neurological:      Mental Status: He is alert and oriented to person, place, and time.      Cranial Nerves: Cranial nerves 2-12 are intact.   Psychiatric:         Mood and Affect: Mood normal.         Behavior: Behavior normal.         Thought Content: Thought content normal.         Judgment: Judgment normal.         Assessment & Plan   Diagnoses and all orders  for this visit:    1. Lumbar radiculopathy (Primary)    2. Degeneration of intervertebral disc of lumbar region with discogenic back pain    3. Lumbar facet arthropathy    4. Osteoarthritis of right hip, unspecified osteoarthritis type    5. Encounter for long-term (current) use of high-risk medication          --- Follow-up in 2 months or sooner for medication management  --- Refill oxycodone 5 mg. Patient appears stable with current regimen. No adverse effects. Regarding continuation of opioids, there is no evidence of aberrant behavior or any red flags.  The patient continues with appropriate response to opioid therapy. ADL's remain intact by self.   --- Low risk for opiate abuse/diversion  --- I have discussed with the patient that postsurgical care will be under the guidance of the surgeon until they have a release back to our office for further coverage of her chronic pain medications.  The patient has been instructed that they may not utilize both prescriptions simultaneously.         The urine drug screen confirmation from 4/28/25 has been reviewed and the result is appropriate based on patient history and ERICA report.    The patient signed an updated copy of the controlled substance agreement on 4/24/2025.      ERICA REPORT  As part of the patient's treatment plan, I am prescribing controlled substances. The patient has been made aware of appropriate use of such medications, including potential risk of somnolence, limited ability to drive and/or work safely, and the potential for dependence or overdose. It has also been made clear that these medications are for use by this patient only, without concomitant use of alcohol or other substances unless prescribed.     Patient has completed prescribing agreement detailing terms of continued prescribing of controlled substances, including monitoring ERICA reports, urine drug screening, and pill counts if necessary. The patient is aware that inappropriate use  will results in cessation of prescribing such medications.    As the clinician, I personally reviewed the ERICA from 6/26/25 while the patient was in the office today.    History and physical exam exhibit continued safe and appropriate use of controlled substances.  -------    Dictated utilizing Dragon Dictation

## 2025-06-30 RX ORDER — FERROUS SULFATE 325(65) MG
325 TABLET ORAL
Qty: 90 TABLET | Refills: 1 | Status: SHIPPED | OUTPATIENT
Start: 2025-06-30

## 2025-07-01 ENCOUNTER — HOSPITAL ENCOUNTER (OUTPATIENT)
Dept: ONCOLOGY | Facility: HOSPITAL | Age: 65
Discharge: HOME | End: 2025-07-01
Admitting: STUDENT IN AN ORGANIZED HEALTH CARE EDUCATION/TRAINING PROGRAM
Payer: COMMERCIAL

## 2025-07-01 DIAGNOSIS — C64.9 RENAL CELL CARCINOMA, UNSPECIFIED LATERALITY: Primary | ICD-10-CM

## 2025-07-01 PROCEDURE — 25010000002 HEPARIN LOCK FLUSH PER 10 UNITS: Performed by: STUDENT IN AN ORGANIZED HEALTH CARE EDUCATION/TRAINING PROGRAM

## 2025-07-01 PROCEDURE — 36591 DRAW BLOOD OFF VENOUS DEVICE: CPT

## 2025-07-01 RX ORDER — HEPARIN SODIUM (PORCINE) LOCK FLUSH IV SOLN 100 UNIT/ML 100 UNIT/ML
500 SOLUTION INTRAVENOUS AS NEEDED
OUTPATIENT
Start: 2025-07-01

## 2025-07-01 RX ORDER — SODIUM CHLORIDE 0.9 % (FLUSH) 0.9 %
10 SYRINGE (ML) INJECTION AS NEEDED
OUTPATIENT
Start: 2025-07-01

## 2025-07-01 RX ORDER — SODIUM CHLORIDE 0.9 % (FLUSH) 0.9 %
10 SYRINGE (ML) INJECTION AS NEEDED
Status: DISCONTINUED | OUTPATIENT
Start: 2025-07-01 | End: 2025-07-02 | Stop reason: HOSPADM

## 2025-07-01 RX ORDER — HEPARIN SODIUM (PORCINE) LOCK FLUSH IV SOLN 100 UNIT/ML 100 UNIT/ML
500 SOLUTION INTRAVENOUS AS NEEDED
Status: DISCONTINUED | OUTPATIENT
Start: 2025-07-01 | End: 2025-07-02 | Stop reason: HOSPADM

## 2025-07-01 RX ADMIN — Medication 10 ML: at 10:03

## 2025-07-01 RX ADMIN — Medication 500 UNITS: at 10:03

## 2025-07-01 NOTE — PROGRESS NOTES
Pt to port chair for PF and requesting labs be drawn for endocrinology. Port accessed using sterile technique and flushed with good blood return noted. 10cc of blood wasted prior to specimen collection. Blood specimen obtained and sent to lab for processing per protocol.  Port flushed with saline and heparin prior to needle removal. Pt aware of next appt and discharged.

## 2025-07-02 ENCOUNTER — OFFICE VISIT (OUTPATIENT)
Dept: SPORTS MEDICINE | Facility: CLINIC | Age: 65
End: 2025-07-02
Payer: COMMERCIAL

## 2025-07-02 VITALS
WEIGHT: 246 LBS | OXYGEN SATURATION: 96 % | BODY MASS INDEX: 35.22 KG/M2 | TEMPERATURE: 97.6 F | HEIGHT: 70 IN | HEART RATE: 73 BPM | SYSTOLIC BLOOD PRESSURE: 138 MMHG | DIASTOLIC BLOOD PRESSURE: 88 MMHG

## 2025-07-02 DIAGNOSIS — M19.012 PRIMARY OSTEOARTHRITIS OF LEFT SHOULDER: Primary | ICD-10-CM

## 2025-07-02 NOTE — PROGRESS NOTES
"Louis is a 64 y.o. year old male     Chief Complaint   Patient presents with    Left Shoulder - Follow-up, Pain     Patient is here to follow up on his left shoulder pain, ER visit on 06/25 after hearing a pop and sudden pain.        History of Present Illness  History of Present Illness  The patient presents with a history of left shoulder pain.    Left Shoulder Pain  - The patient was satisfied with previous injections for osteoarthritis, which improved pain and range of motion.  - Recently, while traveling, his left arm became stiff, and he felt a pop with immediate pain while moving his arm in a recliner.  - ER visit and repeat x-rays show stable severe arthritic changes.    I have reviewed the patient's medical, family, and social history in detail and updated the computerized patient record.    Review of Systems    /88 (BP Location: Right arm, Patient Position: Sitting, Cuff Size: Adult)   Pulse 73   Temp 97.6 °F (36.4 °C) (Temporal)   Ht 177.8 cm (70\")   Wt 112 kg (246 lb)   SpO2 96%   BMI 35.30 kg/m²      Physical Exam    Vital signs reviewed.   General: No acute distress.      Physical Exam  The physical examination revealed mild nonspecific tenderness both anteriorly and posteriorly. There was limited active and passive range of motion, with 30 degrees of forward flexion, 30 degrees of abduction, 30 degrees of external rotation, and 45 degrees of internal rotation. Additionally, there was diffuse rotator cuff weakness, although activation remained intact.    Results  Results  X-rays of the left shoulder show stable severe arthritic changes.    - Large Joint Arthrocentesis: L glenohumeral on 7/2/2025 10:25 AM  Indications: pain  Details: 22 G needle, ultrasound-guided posterior approach  Medications: 2 mL lidocaine 1 %, 1 mL triamcinolone acetonide 40 MG/ML  Outcome: tolerated well, no immediate complications  Procedure, treatment alternatives, risks and benefits explained, specific risks " discussed. Immediately prior to procedure a time out was called to verify the correct patient, procedure, equipment, support staff and site/side marked as required. Patient was prepped and draped in the usual sterile fashion.            Diagnoses and all orders for this visit:    Primary osteoarthritis of left shoulder  -     - Large Joint Arthrocentesis: L glenohumeral      Assessment & Plan  Left shoulder pain  Likely due to arthritis flare-up, stiffness from immobility, and aggravation event. Possible advanced rotator cuff changes.  Treatment plan: Administered repeat injection to improve motion before hip replacement surgery. Injection tolerated well.  Clinical decision making: If no significant improvement, consider advanced imaging and possible shoulder replacement.    PROCEDURE  Procedure Performed  Repeat injection to the left shoulder for osteoarthritis.    Patient or patient representative verbalized consent for the use of Ambient Listening during the visit with  Matt Chavez MD for chart documentation. 13:01 EDT 07/02/25    *Dictated after leaving exam room.     Matt Chavez MD   13:01 EDT   07/02/25

## 2025-07-08 ENCOUNTER — PRE-ADMISSION TESTING (OUTPATIENT)
Dept: PREADMISSION TESTING | Facility: HOSPITAL | Age: 65
End: 2025-07-08
Payer: COMMERCIAL

## 2025-07-08 ENCOUNTER — HOSPITAL ENCOUNTER (OUTPATIENT)
Dept: GENERAL RADIOLOGY | Facility: HOSPITAL | Age: 65
Discharge: HOME | End: 2025-07-08
Payer: COMMERCIAL

## 2025-07-08 VITALS
TEMPERATURE: 98 F | DIASTOLIC BLOOD PRESSURE: 76 MMHG | SYSTOLIC BLOOD PRESSURE: 116 MMHG | RESPIRATION RATE: 22 BRPM | HEART RATE: 78 BPM | HEIGHT: 68 IN | BODY MASS INDEX: 37.96 KG/M2 | WEIGHT: 250.5 LBS | OXYGEN SATURATION: 96 %

## 2025-07-08 LAB
ALBUMIN SERPL-MCNC: 3.8 G/DL (ref 3.5–5.2)
ALBUMIN/GLOB SERPL: 1.4 G/DL
ALP SERPL-CCNC: 55 U/L (ref 39–117)
ALT SERPL W P-5'-P-CCNC: 24 U/L (ref 1–41)
ANION GAP SERPL CALCULATED.3IONS-SCNC: 10.3 MMOL/L (ref 5–15)
APTT PPP: 29.1 SECONDS (ref 22.7–35.4)
AST SERPL-CCNC: 14 U/L (ref 1–40)
BASOPHILS # BLD AUTO: 0.07 10*3/MM3 (ref 0–0.2)
BASOPHILS NFR BLD AUTO: 0.5 % (ref 0–1.5)
BILIRUB SERPL-MCNC: 0.8 MG/DL (ref 0–1.2)
BUN SERPL-MCNC: 23 MG/DL (ref 8–23)
BUN/CREAT SERPL: 15.8 (ref 7–25)
CALCIUM SPEC-SCNC: 9.4 MG/DL (ref 8.6–10.5)
CHLORIDE SERPL-SCNC: 105 MMOL/L (ref 98–107)
CO2 SERPL-SCNC: 23.7 MMOL/L (ref 22–29)
CREAT SERPL-MCNC: 1.46 MG/DL (ref 0.76–1.27)
DEPRECATED RDW RBC AUTO: 50.5 FL (ref 37–54)
EGFRCR SERPLBLD CKD-EPI 2021: 52.7 ML/MIN/1.73
EOSINOPHIL # BLD AUTO: 0.49 10*3/MM3 (ref 0–0.4)
EOSINOPHIL NFR BLD AUTO: 3.3 % (ref 0.3–6.2)
ERYTHROCYTE [DISTWIDTH] IN BLOOD BY AUTOMATED COUNT: 15.6 % (ref 12.3–15.4)
GLOBULIN UR ELPH-MCNC: 2.7 GM/DL
GLUCOSE SERPL-MCNC: 119 MG/DL (ref 65–99)
HBA1C MFR BLD: 6.1 % (ref 4.8–5.6)
HCT VFR BLD AUTO: 39.1 % (ref 37.5–51)
HGB BLD-MCNC: 12.8 G/DL (ref 13–17.7)
IMM GRANULOCYTES # BLD AUTO: 0.35 10*3/MM3 (ref 0–0.05)
IMM GRANULOCYTES NFR BLD AUTO: 2.4 % (ref 0–0.5)
INR PPP: 1.05 (ref 0.9–1.1)
LYMPHOCYTES # BLD AUTO: 2.36 10*3/MM3 (ref 0.7–3.1)
LYMPHOCYTES NFR BLD AUTO: 16 % (ref 19.6–45.3)
MCH RBC QN AUTO: 29 PG (ref 26.6–33)
MCHC RBC AUTO-ENTMCNC: 32.7 G/DL (ref 31.5–35.7)
MCV RBC AUTO: 88.7 FL (ref 79–97)
MONOCYTES # BLD AUTO: 0.96 10*3/MM3 (ref 0.1–0.9)
MONOCYTES NFR BLD AUTO: 6.5 % (ref 5–12)
NEUTROPHILS NFR BLD AUTO: 10.5 10*3/MM3 (ref 1.7–7)
NEUTROPHILS NFR BLD AUTO: 71.3 % (ref 42.7–76)
NRBC BLD AUTO-RTO: 0 /100 WBC (ref 0–0.2)
PLATELET # BLD AUTO: 223 10*3/MM3 (ref 140–450)
PMV BLD AUTO: 10.2 FL (ref 6–12)
POTASSIUM SERPL-SCNC: 4 MMOL/L (ref 3.5–5.2)
PROT SERPL-MCNC: 6.5 G/DL (ref 6–8.5)
PROTHROMBIN TIME: 13.6 SECONDS (ref 11.7–14.2)
QT INTERVAL: 410 MS
QTC INTERVAL: 445 MS
RBC # BLD AUTO: 4.41 10*6/MM3 (ref 4.14–5.8)
SODIUM SERPL-SCNC: 139 MMOL/L (ref 136–145)
WBC NRBC COR # BLD AUTO: 14.73 10*3/MM3 (ref 3.4–10.8)

## 2025-07-08 PROCEDURE — 85025 COMPLETE CBC W/AUTO DIFF WBC: CPT

## 2025-07-08 PROCEDURE — 83036 HEMOGLOBIN GLYCOSYLATED A1C: CPT

## 2025-07-08 PROCEDURE — 93005 ELECTROCARDIOGRAM TRACING: CPT

## 2025-07-08 PROCEDURE — 80053 COMPREHEN METABOLIC PANEL: CPT

## 2025-07-08 PROCEDURE — 87081 CULTURE SCREEN ONLY: CPT

## 2025-07-08 PROCEDURE — 71046 X-RAY EXAM CHEST 2 VIEWS: CPT

## 2025-07-08 PROCEDURE — 85610 PROTHROMBIN TIME: CPT

## 2025-07-08 PROCEDURE — 85730 THROMBOPLASTIN TIME PARTIAL: CPT

## 2025-07-08 PROCEDURE — 36415 COLL VENOUS BLD VENIPUNCTURE: CPT

## 2025-07-08 NOTE — DISCHARGE INSTRUCTIONS
Take the following medications the morning of surgery:    NEBULIZER  HYDROCODONE    If you are on an Aspirin or a Blood Thinner please clarify with the surgeon and prescribing physician if and when you are to hold the medication or if you are to continue the medication.  If you are on prescription narcotic pain medication to control your pain you may also take that medication the morning of surgery.      General Instructions:     Do not eat solid food after midnight the night before surgery.  Clear liquids day of surgery are allowed but must be stopped at least two hours before your hospital arrival time.       Allowed clear liquids      Water, sodas, and tea or coffee with no cream or milk added.       12 to 20 ounces of a clear liquid that contains carbohydrates is recommended.  If non-diabetic, have Gatorade or Powerade.  If diabetic, have G2 or Powerade Zero.     Do not have liquids red in color.  Do not consume chicken, beef, pork or vegetable broth or bouillon cubes of any variety as they are not considered clear liquids and are not allowed.      Infants may have breast milk up to four hours before surgery.  Infants drinking formula may drink formula up to six hours before surgery.   Patients who avoid smoking, chewing tobacco and alcohol for 4 weeks prior to surgery have a reduced risk of post-operative complications.  Quit smoking as many days before surgery as you can.  Do not smoke, use chewing tobacco or drink alcohol the day of surgery.   If applicable bring your C-PAP/ BI-PAP machine in with you to preop day of surgery.  Bring any papers given to you in the doctor’s office.  Wear clean comfortable clothes.  Do not wear contact lenses, false eyelashes or make-up.  Bring a case for your glasses.   Bring crutches or walker if applicable.  Remove all piercings.  Leave jewelry and any other valuables at home.  Hair extensions with metal clips must be removed prior to surgery.  The Pre-Admission Testing nurse  will instruct you to bring medications if unable to obtain an accurate list in Pre-Admission Testing.    Day of surgery you will need to let the preoperative nurse know the last time you took each of your medications.  To ensure a safe environment for patients and staff, we kindly ask that children under the age of 16 not accompany patients.  If you must bring a dependent child or dependent adult please ensure a responsible adult, other than yourself, is present to supervise them.      If you were given a blood bank ID arm band remember to bring it with you the day of surgery.    Preventing a Surgical Site Infection:  For 2 to 3 days before surgery, avoid shaving with a razor because the razor can irritate skin and make it easier to develop an infection.    Any areas of open skin can increase the risk of a post-operative wound infection by allowing bacteria to enter and travel throughout the body.  Notify your surgeon if you have any skin wounds / rashes even if it is not near the expected surgical site.  The area will need assessed to determine if surgery should be delayed until it is healed.  The night prior to surgery shower using a fresh bar of anti-bacterial soap (such as Dial) and clean washcloth.  Sleep in a clean bed with clean clothing.  Do not allow pets to sleep with you.  Shower on the morning of surgery using a fresh bar of anti-bacterial soap (such as Dial) and clean washcloth.  Dry with a clean towel and dress in clean clothing.  Ask your surgeon if you will be receiving antibiotics prior to surgery.  Make sure you, your family, and all healthcare providers clean their hands with soap and water or an alcohol based hand  before caring for you or your wound.    Day of surgery:  Your arrival time is approximately two hours before your scheduled surgery time.  Please note if you have an early arrival time the surgery doors do not open before 5:00 AM.  Upon arrival, a Pre-op nurse and  Anesthesiologist will review your health history, obtain vital signs, and answer questions you may have.  The only belongings needed at this time will be a list of your home medications and if applicable your C-PAP/BI-PAP machine.  A Pre-op nurse will start an IV and you may receive medication in preparation for surgery, including something to help you relax.     Please be aware that surgery does come with discomfort.  We want to make every effort to control your discomfort so please discuss any uncontrolled symptoms with your nurse.   Your doctor will most likely have prescribed pain medications.      If you are going home after surgery you will receive individualized written care instructions before being discharged.  A responsible adult must drive you to and from the hospital on the day of your surgery and ideally stay with you through the night.   .  Discharge prescriptions can be filled by the hospital pharmacy during regular pharmacy hours.  If you are having surgery late in the day/evening your prescription may be e-prescribed to your pharmacy.  Please verify your pharmacy hours or chose a 24 hour pharmacy to avoid not having access to your prescription because your pharmacy has closed for the day.    If you are staying overnight following surgery, you will be transported to your hospital room following the recovery period.  [facility] has all private rooms.    If you have any questions please call Pre-Admission Testing at [phone].  Deductibles and co-payments are collected on the day of service. Please be prepared to pay the required co-pay, deductible or deposit on the day of service as defined by your plan.    Call your surgeon immediately if you experience any of the following symptoms:  Sore Throat  Shortness of Breath or difficulty breathing  Cough  Chills  Body soreness or muscle pain  Headache  Fever  New loss of taste or smell  Do not arrive for your surgery ill.  Your procedure  will need to be rescheduled to another time.  You will need to call your physician before the day of surgery to avoid any unnecessary exposure to hospital staff as well as other patients.        CHLORHEXIDINE CLOTH INSTRUCTIONS  The morning of surgery follow these instructions using the Chlorhexidine cloths you've been given.  These steps reduce bacteria on the body.  Do not use the cloths near your eyes, ears mouth, genitalia or on open wounds.  Throw the cloths away after use but do not try to flush them down a toilet.      Open and remove one cloth at a time from the package.    Leave the cloth unfolded and begin the bathing.  Massage the skin with the cloths using gentle pressure to remove bacteria.  Do not scrub harshly.   Follow the steps below with one 2% CHG cloth per area (6 total cloths).  One cloth for neck, shoulders and chest.  One cloth for both arms, hands, fingers and underarms (do underarms last).  One cloth for the abdomen followed by groin.  One cloth for right leg and foot including between the toes.  One cloth for left leg and foot including between the toes.  The last cloth is to be used for the back of the neck, back and buttocks.    Allow the CHG to air dry 3 minutes on the skin which will give it time to work and decrease the chance of irritation.  The skin may feel sticky until it is dry.  Do not rinse with water or any other liquid or you will lose the beneficial effects of the CHG.  If mild skin irritation occurs, do rinse the skin to remove the CHG.  Report this to the nurse at time of admission.  Do not apply lotions, creams, ointments, deodorants or perfumes after using the clothes. Dress in clean clothes before coming to the hospital.

## 2025-07-09 ENCOUNTER — TREATMENT (OUTPATIENT)
Dept: PHYSICAL THERAPY | Facility: CLINIC | Age: 65
End: 2025-07-09
Payer: COMMERCIAL

## 2025-07-09 DIAGNOSIS — M19.011 ARTHRITIS OF BOTH SHOULDERS: Primary | ICD-10-CM

## 2025-07-09 DIAGNOSIS — R29.898 LEFT ARM WEAKNESS: ICD-10-CM

## 2025-07-09 DIAGNOSIS — M25.612 DECREASED SHOULDER MOBILITY, LEFT: ICD-10-CM

## 2025-07-09 DIAGNOSIS — M19.012 ARTHRITIS OF BOTH SHOULDERS: Primary | ICD-10-CM

## 2025-07-09 DIAGNOSIS — R68.89 ACTIVITY INTOLERANCE: ICD-10-CM

## 2025-07-09 LAB — MRSA SPEC QL CULT: NORMAL

## 2025-07-09 NOTE — PROGRESS NOTES
"Physical Therapy Daily Treatment Note  [facility]   [address], [address]  [place] [address]  P: [phone]  F: [phone]    Patient: Louis Andino Jr.   : 1960  Referring practitioner: Matt Chavez MD  Date of Initial Visit: Type: THERAPY  Noted: 2025  Today's Date: 2025  Patient seen for 8 sessions       Visit Diagnoses:    ICD-10-CM ICD-9-CM   1. Arthritis of both shoulders  M19.011 716.91    M19.012    2. Decreased shoulder mobility, left  M25.612 719.51   3. Left arm weakness  R29.898 729.89   4. Activity intolerance  R68.89 780.99         Louis Andino reports: Pt reports he is alright. Pt note he did very well w/ his shoulder on his vacation (3 day drive one way to [place]) but upon his return he \"re-injured\" himself w/ reaching behind his head. Pt went to  and was told to keep his arm in a sling which did not help. Pt went to see his referring provider and received an injection which has seemed to help but he is still dealing w/ shoulder pain and feels he is \"worse than before starting PT\". Pt is scheduled for a  R ELAINA next week .     Subjective   Pt reported difficulties:  - self reported fxn status =  50/100% = 70% = 35% (worse than when he first started)   - OHA, reaching movements = Improved = back to base line  - reaching sideways = Greatly improved = back to base line   - can't turn steering wheel w/ L = Improved (still difficult w/ going across his body) = back to base line   - can't lift more than a pound w/ L arm sideways (full drink) = able to do now w/out issue = back to base line  - can't reach behind back (washing) = slightly improved = back to base line       Objective   See Exercise, Manual, and Modality Logs for complete treatment.   Postural Observations  Seated posture: poor = fair  Standing posture: poor = fair         Active Range of Motion   Left Shoulder   Flexion: 130 (4/10) degrees with pain = 150 0/10 = 95 " 8/10   Abduction: 80 (4/10) degrees with pain = 140 0/10 = 90 5/10  External rotation 90°: 25 (6/10) degrees with pain = 45 0/10 = 30 5/10     Right Shoulder   Flexion: 175 degrees    Abduction: 160 degrees   External rotation 90°: 50 degrees     Additional Active Range of Motion Details  BTB IR     R = T5  L = L5 = T10 3/10      Passive Range of Motion   Left Shoulder   Flexion: 175 degrees   Abduction: 165 degrees   External rotation 90°: 50 degrees with pain     Right Shoulder   Normal passive range of motion     Strength/Myotome Testing      Left Shoulder      Planes of Motion   Flexion: 4- = 4 (4/10 pain) = 4 (8/10 pain)  Abduction: 4+ = 5   External rotation at 90°: 3 = 4+ (1-2/10 pain) = 4 (5/10)  Internal rotation at 90°: 4+ = 5      Right Shoulder   Normal muscle strength     Tests      Left Shoulder   Positive empty can and Yergason's. = Not tested secondary to pt pain levels      Right Shoulder   Negative empty can and Yergason's.      Assessment/Plan  Pt was re-assessed for progress today. Due to pt's setback secondary to his trip, progress previously made has regressed to below initial baseline. Due to pt scheduled R ELAINA, PoC will not be extended. Pt will attend last scheduled visit and be d/c at that time. PT and pt are in agreement w/ this plan.     Goals  Plan Goals: Goals  Plan Goals: STG: (achieve in 4+ weeks)  1. Pt will demo HEP compliance and attendance w/ scheduled therapy visits. = MET  2. Pt will demo pain free 4/5 MMT grades of L UE for fxn strength w/ IADLs. = PROGRESSING   3. Pt will demo improved L UE ROM by 10 degrees or more for improved fxn mobility w/ IADLs. = MET  4. Pt will report decreased pain from 8/10 at worst to 6/10 or less on pain scale for pt QoL and progression of PoC. = MET (4/10)     LTG: (achieve in 8+ weeks) = ONGOING   1. Pt will demo improved L UE ROM by 25 degrees or more for improved fxn mobility w/ IADLs.  2. Pt will demo pain free 4+/5 MMT grades of L UE for fxn  strength w/ IADLs.  3. Pt will score 25% or less on the Quick DASH indicating little to no disability of the L UE for fxn performance of IADLs.   4. Pt will be negative for the L empty can, and or yergason special test indicating improvement/resolution of c/c and efficacy of skilled PT interventions for improved pt QoL and fxn performance of IADLs. = ONGOING     Timed:         Manual Therapy:    0     mins  78197;     Therapeutic Exercise:    24     mins  85978;     Neuromuscular Carla:    8    mins  74530;    Therapeutic Activity:     8     mins  47354;     Gait Trainin     mins  84271;     Ultrasound:     0     mins  31981;    Ionto                               0    mins  30073  Self Care                       0     mins  87555  Traction     0     mins 70648      Un-Timed:  Canalith Repos    0     mins 88938  Electrical Stimulation:    0     mins  57139 ( );  Dry Needling     0     mins self-pay  Traction     0     mins 02486        Timed Treatment:   40   mins   Total Treatment:     40   mins    Rodrick Lopez PT  [place] License #: [id number]    Physical Therapist

## 2025-07-16 ENCOUNTER — TREATMENT (OUTPATIENT)
Dept: PHYSICAL THERAPY | Facility: CLINIC | Age: 65
End: 2025-07-16
Payer: COMMERCIAL

## 2025-07-16 DIAGNOSIS — M25.612 DECREASED SHOULDER MOBILITY, LEFT: ICD-10-CM

## 2025-07-16 DIAGNOSIS — R29.898 LEFT ARM WEAKNESS: ICD-10-CM

## 2025-07-16 DIAGNOSIS — M19.011 ARTHRITIS OF BOTH SHOULDERS: Primary | ICD-10-CM

## 2025-07-16 DIAGNOSIS — M19.012 ARTHRITIS OF BOTH SHOULDERS: Primary | ICD-10-CM

## 2025-07-16 DIAGNOSIS — R68.89 ACTIVITY INTOLERANCE: ICD-10-CM

## 2025-07-16 NOTE — H&P
"The patient presents today for preoperative evaluation and review of preoperative labs and medical clearance.  Patient is scheduled to undergo right total hip arthroplasty on July 18, 2025 at [facility].      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  He was seen by me in my previous office and patient was recommended a right total hip replacement.  His surgery was canceled due to his diagnosis of renal cell carcinoma.       Patient has longstanding right hip pain due to severe osteoarthritis.  He ambulates with the use of a walker due to his extensive right hip pain.  Reports pain has become debilitating and started to affect his overall quality of life.  Has tried multiple means of conservative management consisting of activity modification, home exercises, and NSAIDs but is unable to take anti-inflammatory medications due to his renal issues.     He has been using a walker due to severe right hip pain for several months.     Patient has history of previous left renal cell carcinoma in which she underwent a nephrectomy on 12/18/2023.  He was on chemotherapy for this.  His current urologist reports that he is in remission.  He does have resulting stage III CKD with a functioning right kidney.     Patient denies any history of stroke or heart attacks.  Of note he does have a right bundle branch block and left anterior fascicular block.  This results in abnormal APCs at times.  He does see Dr. Ahuja for his heart issues.     Relevant point review of (Review of Systems Form, Injury Forms, and/or History Forms) dated 7/8/2025 was reviewed and all pertinent positives were reviewed and are available in the patient's chart     Vitals       Vitals:     07/08/25 1513   Weight: 250 lb (113 kg)   Height: 5' 8\" (1.727 m)               OBJECTIVE      General: Well-appearing with appropriate affect. No acute distress. Appears stated age.  Head: Normocephalic, atraumatic. Extraocular " muscles intact  Neuro: Alert and oriented  Pulmonary: Respirations are unlabored  Psychiatric: Mood is appropriate for circumstances.     Musculoskeletal Exam:     GAIT -placed with use of a walker     Right HIP  Active ROM Internal Rotation: <30 External Rotation: <40 Abduction: <45 Adduction: <15 Flexion: <100 Extension: <5 Tenderness   Location: groin   Radiation of Pain: anterior thigh.   Pain w/ Palpation: none   Florinda Test: negative   Impingement: negative   Straight Leg Raise: negative   Strength Quad: normal Abduction: normal Adduction: normal Flexion: normal Extension: normal   Positive Stinchfield sign.   Positive Trendelenburg sign.   Attempted movements of the hip are painful and restricted  Neurovascular status is intact. Sensation in hip is normal. DP Pulse is 3+. PT PULSE is 3+. Capillary Refill is normal. Reflexes are normal.         IMAGING:         Xrays:         XR hip right 2 or 3 views [id number]  Right hip demonstrates severe osteoarthritis with significant joint space   narrowing, subchondral sclerosis, aspherocity of the femoral head,   osteophytic lipping.     XR Chest PA & Lateral [[id number]] (Order [id number])  Order  Status: Edited Result - FINAL     Study Notes     Francis Tamez on 7/8/2025  2:35 PM EDT   Pre-op for right hip replacement on 07/18/25  Hx of HBP  Hx of asthma  Hx of cancer  Prev port placement  Non-smoker     Appointment Information    PACS Images     Radiology Images  Addendum    ADDENDUM to the chest radiograph 7/8/2025     COMPARISON made to previous CT scan of the chest 8/9/2024 and 2/7/2025     ADDENDUM: Previous two CT examinations demonstrate stable smooth pleural  thickening along the posterior lateral right and left lower lobes,  corresponding to the current chest radiograph allowing for projectional  factors. No additional CT follow-up is indicated.     This report was finalized on 7/8/2025 8:02 PM by Dr. Adrien Aranda M.D  on Workstation: [id number]      Addended  by Adrien Aranda MD on 2025  8:02 PM     Study Result    Narrative & Impression   XR CHEST PA AND LATERAL-     Clinical: Preop     COMPARISON 2024     FINDINGS: Heart size within normal limits. Mediport catheter in position  as before. No vascular congestion or acute airspace disease. There has  been interval development of pleural thickening demonstrated along the  lateral aspect of the right and left lower lobes. This could be  infectious/inflammatory or neoplastic. Computed tomography of the chest  would be helpful as follow-up.     FINDINGS called to Dr. Lund's office the afternoon of 2025.     This report was finalized on 2025 3:58 PM by Dr. Adrien Aranda M.D  on Workstation: [id number]              PRE-OP LABS:      HEMOGLOBIN A1C  Order: [id number]   Status: Final result            Component  Ref Range & Units (hover) 13:46 6 mo ago   Hemoglobin A1C 6.1 High  5.94 High    Resulting Agency [facility]                Narrative  Performed by: [facility]           Lab Results   Component Value Date     HGBA1C 6.1 (H) 2025      [facility]  Outside Information  Patient Information     Patient Name  Louis Andino Legal Sex  Male   1960          suggestion  Information displayed in this report may not trend or trigger automated decision support.      Protime-INR  Order: [id number]  Component  Ref Range & Units 13:46   Protime  11.7 - 14.2 Seconds 13.6   INR  0.90 - 1.10 1.05   Resulting Agency [facility]           Specimen Collected: 25 13:46     Performed by: [facility] Last Resulted: 25 14:27   Received From: [facility]  Result Received: 25 15:03     View Encounter        Received Information       [facility]  Outside Information  Patient Information     Patient Name  Louis Andino Legal Sex  Male    1960          suggestion  Information displayed in this report may not trend or trigger automated decision support.       Contains abnormal data Comprehensive Metabolic Panel  Order: [id number]  Component  Ref Range & Units 13:46   Glucose  65 - 99 mg/dL 119 High    BUN  8.0 - 23.0 mg/dL 23   Creatinine  0.76 - 1.27 mg/dL 1.46 High    Sodium  136 - 145 mmol/L 139   Potassium  3.5 - 5.2 mmol/L 4   Chloride  98 - 107 mmol/L 105   CO2  22.0 - 29.0 mmol/L 23.7   Calcium  8.6 - 10.5 mg/dL 9.4   Total Protein  6.0 - 8.5 g/dL 6.5   Albumin  3.5 - 5.2 g/dL 3.8   ALT (SGPT)  1 - 41 U/L 24   AST (SGOT)  1 - 40 U/L 14   Alkaline Phosphatase  39 - 117 U/L 55   Total Bilirubin  0.0 - 1.2 mg/dL 0.8   Globulin  gm/dL 2.7   A/G Ratio  g/dL 1.4   BUN/Creatinine Ratio  7.0 - 25.0 15.8   Anion Gap  5.0 - 15.0 mmol/L 10.3   eGFR  >60.0 mL/min/1.73 52.7 Low    Resulting Agency [facility]   Narrative  Performed by [facility]  GFR Categories in Chronic Kidney Disease (CKD)            GFR Category          GFR (mL/min/1.73)    Interpretation  G1                    90 or greater        Normal or high (1)  G2                    60-89                Mild decrease (1)  G3a                   45-59                Mild to moderate decrease  G3b                   30-44                Moderate to severe decrease  G4                    15-29                Severe decrease  G5                    14 or less           Kidney failure    (1)In the absence of evidence of kidney disease, neither GFR category G1 or G2 fulfill the criteria for CKD.    eGFR calculation  CKD-EPI creatinine equation, which does not include race as a factor          Specimen Collected: 25 13:46     Performed by: [facility] Last Resulted: 25 14:36   Received From: [employer]  Result Received: 25 15:03     View Encounter        Received Information  Result  Report     Comprehensive Metabolic Panel (Order #[id number]) on 25      Lab Component Bowen Guide     Comprehensive Metabolic Panel (Order #[id number]) on 25      [facility]  Outside Information  Patient Information     Patient Name  Louis Andino Legal Sex  Male   1960          suggestion  Information displayed in this report may not trend or trigger automated decision support.       Contains abnormal data CBC Auto Differential  Order: [id number]  Component  Ref Range & Units 13:46   WBC  3.40 - 10.80 10*3/mm3 14.73 High    RBC  4.14 - 5.80 10*6/mm3 4.41   Hemoglobin  13.0 - 17.7 g/dL 12.8 Low    Hematocrit  37.5 - 51.0 % 39.1   MCV  79.0 - 97.0 fL 88.7   MCH  26.6 - 33.0 pg 29   MCHC  31.5 - 35.7 g/dL 32.7   RDW  12.3 - 15.4 % 15.6 High    RDW-SD  37.0 - 54.0 fl 50.5   MPV  6.0 - 12.0 fL 10.2   Platelets  140 - 450 10*3/mm3 223   Neutrophil %  42.7 - 76.0 % 71.3   Lymphocyte %  19.6 - 45.3 % 16 Low    Monocyte %  5.0 - 12.0 % 6.5   Eosinophil %  0.3 - 6.2 % 3.3   Basophil %  0.0 - 1.5 % 0.5   Immature Grans %  0.0 - 0.5 % 2.4 High    Neutrophils, Absolute  1.70 - 7.00 10*3/mm3 10.5 High    Lymphocytes, Absolute  0.70 - 3.10 10*3/mm3 2.36   Monocytes, Absolute  0.10 - 0.90 10*3/mm3 0.96 High    Eosinophils, Absolute  0.00 - 0.40 10*3/mm3 0.49 High    Basophils, Absolute  0.00 - 0.20 10*3/mm3 0.07   Immature Grans, Absolute  0.00 - 0.05 10*3/mm3 0.35 High    nRBC  0.0 - 0.2 /100 WBC 0   Resulting Agency [facility]           Specimen Collected: 25 13:46     Performed by: [facility] Last Resulted: 25 14:14   Received From: [facility]  Result Received: /       ASSESSMENT:         Assessment  Louis was seen today for follow-up.  Diagnoses and all orders for this visit:  Primary osteoarthritis of right hip (Primary)           THE PLAN IS OUTLINED BELOW:      I did have an extensive discussion  with the patient in regards to their situation in the office today.  Evaluation of external records from external providers including preoperative medical clearance and review of test results and unique tests have been reviewed including preoperative laboratory studies as well as chest x-ray.  After further review of the medical records as well as preoperative laboratory studies and chest x-ray.  Patient has been previously seen in the office.  Patient has been found to be a candidate for right total hip arthroplasty.  We have received clearance from his nephrologist, primary care physician, and cardiologist in anticipation for surgery.     I did have an extensive discussion with the patient regards to his situation in the office today.  I have reviewed the above.  Patient has failed all conservative treatment.  For greater than 3 months without any relief.  Home exercise program previously discussed.  As well as weight loss.  Patient has failed and tried weight loss for greater than 3 months without any relief.  Previous weight loss plan discussion was had with the patient and he did try to lose weight prior to surgery.  However unsuccessful given the significant amount of pain in the hip.  He tried this for greater than 3 months.     Options and alternatives were discussed in detail with the patient.     The patient has reached the point of disability and failed nonoperative management.     The patient is indicated for a right total hip replacement .     The likely Risks and benefits of the procedure including but not limited to infection, DVT, pulmonary embolism, recurrent dislocation, Leg length discrepancy, periprosthetic fractures, possibility of injury to nerves or vessels, tendons, possibility of morbidity and mortality likely medical risks for stroke and heart attack, have been discussed in detail. Despite the risks involved the patient would like to proceed.      The patient is being scheduled for a right  total HIP arthroplasty by DIRECT ANTERIOR APPROACH at [facility] on July 18, 2025.     I will request for Medical and cardiac clearance from Harry Hsu MD  and Dr Seymour MD , Cardiology.     Postoperative DVT prophylaxis - Patient has no high risk factors Plan for ASPIRIN      Preoperative antibiotic prophylaxis - Plan for SCIP protocol with CEFAZOLIN weight based. Will give VANCOMYCIN in addition due to increased BMI.     Surgery will be scheduled for observation given his history of previous renal cell carcinoma and stage III CKD     He is currently off Suboxone patches.  He is likely to require Percocet for his pain control postoperatively.  Continue his walker pending surgery.     Patient was seen and evaluated by myself as well as by Dr. Lund in the office today.  Patient does have a full body rash for which she is undergoing care with dermatology to find out the etiology of the rash.  There is no active rash over the hip today.  Therefore okay to proceed with right total hip arthroplasty.  Patient understands this.  Plan of care management with my primary care physician in the office today

## 2025-07-16 NOTE — PROGRESS NOTES
"Physical Therapy Re-Eval/Discharge  [facility] [place]   [address], [address]  [place] [address]  P: [phone]  F: [phone]    Patient: Louis Andino Jr.   : 1960  Referring practitioner: Matt Chavez MD  Date of Initial Visit: Type: THERAPY  Noted: 2025  Today's Date: 2025  Patient seen for 9 sessions       Visit Diagnoses:    ICD-10-CM ICD-9-CM   1. Arthritis of both shoulders  M19.011 716.91    M19.012    2. Decreased shoulder mobility, left  M25.612 719.51   3. Left arm weakness  R29.898 729.89   4. Activity intolerance  R68.89 780.99         Louis Andino reports: Pt reports feeling about the same as last visit. Pt notes he has a \"rash flare up\" that is \"annoying him\" and is on various parts of his body. Pt's R ELAINA is scheduled for Friday but may be delayed due to the rash. No new/other complaints/comments noted.        Subjective     Objective   See Exercise, Manual, and Modality Logs for complete treatment.   Active Range of Motion   Left Shoulder   Flexion: 130 (4/10) degrees with pain = 150 0/10 = 95 8/10   Abduction: 80 (4/10) degrees with pain = 140 0/10 = 90 5/10  External rotation 90°: 25 (6/10) degrees with pain = 45 0/10 = 30 5/10     Right Shoulder   Flexion: 175 degrees    Abduction: 160 degrees   External rotation 90°: 50 degrees     Additional Active Range of Motion Details  BTB IR     R = T5  L = L5 = T10 3/10      Passive Range of Motion   Left Shoulder   Flexion: 175 degrees   Abduction: 165 degrees   External rotation 90°: 50 degrees with pain     Right Shoulder   Normal passive range of motion     Strength/Myotome Testing      Left Shoulder      Planes of Motion   Flexion: 4- = 4 (4/10 pain) = 4 (8/10 pain)  Abduction: 4+ = 5   External rotation at 90°: 3 = 4+ (1-2/10 pain) = 4 (5/10)  Internal rotation at 90°: 4+ = 5      Right Shoulder   Normal muscle strength     Tests      Left Shoulder   Positive empty can and " Yergason's. = Not tested secondary to pt pain levels      Right Shoulder   Negative empty can and Yergason's.         Assessment/Plan  Re-evaluation performed in preparation for d/c. Pt progress unchanged from last PN assessment. Due to pt scheduled R ELAINA, pt is d/c from current PT PoC. PT and pt are in agreement w/ this plan.     Pt experienced an episode of dizziness after an exercise and required to lay down for a period of time to return to base line. PT interviewed pt about s/s and HX which pt confirmed occurrence of before. Pt denied s/s of cardiac involvement. Pt likely experienced an episode of orthostatic hypotension. Pt was able to return to base line subjectively and escorted safely out of clinic w/out incidence.      Goals  Plan Goals: Goals  Plan Goals: STG: (achieve in 4+ weeks)  1. Pt will demo HEP compliance and attendance w/ scheduled therapy visits. = MET  2. Pt will demo pain free 4/5 MMT grades of L UE for fxn strength w/ IADLs. = PROGRESSING   3. Pt will demo improved L UE ROM by 10 degrees or more for improved fxn mobility w/ IADLs. = MET  4. Pt will report decreased pain from 8/10 at worst to 6/10 or less on pain scale for pt QoL and progression of PoC. = MET (4/10)     LTG: (achieve in 8+ weeks) = ONGOING   1. Pt will demo improved L UE ROM by 25 degrees or more for improved fxn mobility w/ IADLs.  2. Pt will demo pain free 4+/5 MMT grades of L UE for fxn strength w/ IADLs.  3. Pt will score 25% or less on the Quick DASH indicating little to no disability of the L UE for fxn performance of IADLs.   4. Pt will be negative for the L empty can, and or yergason special test indicating improvement/resolution of c/c and efficacy of skilled PT interventions for improved pt QoL and fxn performance of IADLs. = ONGOING        Timed:         Manual Therapy:    0     mins  72030;     Therapeutic Exercise:    10     mins  81209;     Neuromuscular Carla:    8    mins  28222;    Therapeutic Activity:     12      mins  72308;     Gait Trainin     mins  83053;     Ultrasound:     0     mins  21519;    Ionto                               0    mins  28353  Self Care                       0     mins  43113  Traction     0     mins 11130      Un-Timed:  Canalith Repos    0     mins 93465  Electrical Stimulation:    0     mins  33601 ( );  Dry Needling     0     mins self-pay  Traction     0     mins 90476  Pt Re-eval                       8          mins 06850       Timed Treatment: 30   mins   Total Treatment:    38   mins    Rodrick Lopez PT  [place] License #: [id number]    Physical Therapist

## 2025-07-18 ENCOUNTER — APPOINTMENT (OUTPATIENT)
Dept: GENERAL RADIOLOGY | Facility: HOSPITAL | Age: 65
End: 2025-07-18
Payer: MEDICARE

## 2025-07-18 ENCOUNTER — APPOINTMENT (OUTPATIENT)
Dept: GENERAL RADIOLOGY | Facility: HOSPITAL | Age: 65
End: 2025-07-18
Payer: COMMERCIAL

## 2025-07-18 ENCOUNTER — HOSPITAL ENCOUNTER (OUTPATIENT)
Facility: HOSPITAL | Age: 65
Setting detail: OBSERVATION
Discharge: HOME HEALTH | End: 2025-07-23
Attending: ORTHOPAEDIC SURGERY | Admitting: ORTHOPAEDIC SURGERY
Payer: COMMERCIAL

## 2025-07-18 ENCOUNTER — ANESTHESIA EVENT (OUTPATIENT)
Dept: PERIOP | Facility: HOSPITAL | Age: 65
End: 2025-07-18
Payer: COMMERCIAL

## 2025-07-18 ENCOUNTER — ANESTHESIA (OUTPATIENT)
Dept: PERIOP | Facility: HOSPITAL | Age: 65
End: 2025-07-18
Payer: COMMERCIAL

## 2025-07-18 DIAGNOSIS — M16.11 OSTEOARTHRITIS OF RIGHT HIP, UNSPECIFIED OSTEOARTHRITIS TYPE: ICD-10-CM

## 2025-07-18 DIAGNOSIS — M16.11 PRIMARY OSTEOARTHRITIS OF RIGHT HIP: ICD-10-CM

## 2025-07-18 DIAGNOSIS — M47.816 LUMBAR FACET ARTHROPATHY: ICD-10-CM

## 2025-07-18 DIAGNOSIS — Z79.899 ENCOUNTER FOR LONG-TERM (CURRENT) USE OF HIGH-RISK MEDICATION: ICD-10-CM

## 2025-07-18 DIAGNOSIS — Z96.641 STATUS POST TOTAL HIP REPLACEMENT, RIGHT: Primary | ICD-10-CM

## 2025-07-18 PROCEDURE — C1776 JOINT DEVICE (IMPLANTABLE): HCPCS | Performed by: ORTHOPAEDIC SURGERY

## 2025-07-18 PROCEDURE — 25010000002 ONDANSETRON PER 1 MG: Performed by: NURSE ANESTHETIST, CERTIFIED REGISTERED

## 2025-07-18 PROCEDURE — 97530 THERAPEUTIC ACTIVITIES: CPT

## 2025-07-18 PROCEDURE — 25010000002 FAMOTIDINE 10 MG/ML SOLUTION: Performed by: STUDENT IN AN ORGANIZED HEALTH CARE EDUCATION/TRAINING PROGRAM

## 2025-07-18 PROCEDURE — 25010000002 PROPOFOL 200 MG/20ML EMULSION: Performed by: NURSE ANESTHETIST, CERTIFIED REGISTERED

## 2025-07-18 PROCEDURE — 25010000002 SUGAMMADEX 200 MG/2ML SOLUTION: Performed by: NURSE ANESTHETIST, CERTIFIED REGISTERED

## 2025-07-18 PROCEDURE — 25810000003 LACTATED RINGERS PER 1000 ML: Performed by: STUDENT IN AN ORGANIZED HEALTH CARE EDUCATION/TRAINING PROGRAM

## 2025-07-18 PROCEDURE — 25010000002 ESMOLOL 100 MG/10ML SOLUTION: Performed by: NURSE ANESTHETIST, CERTIFIED REGISTERED

## 2025-07-18 PROCEDURE — 63710000001 DIPHENHYDRAMINE PER 50 MG: Performed by: ORTHOPAEDIC SURGERY

## 2025-07-18 PROCEDURE — 76000 FLUOROSCOPY <1 HR PHYS/QHP: CPT

## 2025-07-18 PROCEDURE — 97161 PT EVAL LOW COMPLEX 20 MIN: CPT

## 2025-07-18 PROCEDURE — 25010000002 MAGNESIUM SULFATE PER 500 MG OF MAGNESIUM: Performed by: NURSE ANESTHETIST, CERTIFIED REGISTERED

## 2025-07-18 PROCEDURE — 25010000002 ROPIVACAINE PER 1 MG: Performed by: ORTHOPAEDIC SURGERY

## 2025-07-18 PROCEDURE — 94799 UNLISTED PULMONARY SVC/PX: CPT

## 2025-07-18 PROCEDURE — 25010000002 CLONIDINE PER 1 MG: Performed by: ORTHOPAEDIC SURGERY

## 2025-07-18 PROCEDURE — 25010000002 DEXAMETHASONE SODIUM PHOSPHATE 20 MG/5ML SOLUTION: Performed by: NURSE ANESTHETIST, CERTIFIED REGISTERED

## 2025-07-18 PROCEDURE — G0378 HOSPITAL OBSERVATION PER HR: HCPCS

## 2025-07-18 PROCEDURE — 25010000002 CEFAZOLIN PER 500 MG: Performed by: ORTHOPAEDIC SURGERY

## 2025-07-18 PROCEDURE — 25010000002 FENTANYL CITRATE (PF) 100 MCG/2ML SOLUTION: Performed by: NURSE ANESTHETIST, CERTIFIED REGISTERED

## 2025-07-18 PROCEDURE — 25810000003 SODIUM CHLORIDE 0.9 % SOLUTION: Performed by: ORTHOPAEDIC SURGERY

## 2025-07-18 PROCEDURE — 94640 AIRWAY INHALATION TREATMENT: CPT

## 2025-07-18 PROCEDURE — 25010000002 FENTANYL CITRATE (PF) 50 MCG/ML SOLUTION: Performed by: NURSE ANESTHETIST, CERTIFIED REGISTERED

## 2025-07-18 PROCEDURE — 94761 N-INVAS EAR/PLS OXIMETRY MLT: CPT

## 2025-07-18 PROCEDURE — 73501 X-RAY EXAM HIP UNI 1 VIEW: CPT

## 2025-07-18 PROCEDURE — 25010000002 VANCOMYCIN 10 G RECONSTITUTED SOLUTION: Performed by: ORTHOPAEDIC SURGERY

## 2025-07-18 PROCEDURE — 25010000002 LIDOCAINE PF 2% 2 % SOLUTION: Performed by: NURSE ANESTHETIST, CERTIFIED REGISTERED

## 2025-07-18 PROCEDURE — 25010000002 HYDROMORPHONE PER 4 MG: Performed by: NURSE ANESTHETIST, CERTIFIED REGISTERED

## 2025-07-18 PROCEDURE — 25810000003 SODIUM CHLORIDE 0.9 % SOLUTION: Performed by: INTERNAL MEDICINE

## 2025-07-18 PROCEDURE — 25010000002 HYDROMORPHONE 1 MG/ML SOLUTION: Performed by: NURSE ANESTHETIST, CERTIFIED REGISTERED

## 2025-07-18 PROCEDURE — 94760 N-INVAS EAR/PLS OXIMETRY 1: CPT

## 2025-07-18 PROCEDURE — 25010000002 ACETAMINOPHEN 10 MG/ML SOLUTION: Performed by: NURSE ANESTHETIST, CERTIFIED REGISTERED

## 2025-07-18 PROCEDURE — 94664 DEMO&/EVAL PT USE INHALER: CPT

## 2025-07-18 DEVICE — PINNACLE HIP SOLUTIONS ALTRX POLYETHYLENE ACETABULAR LINER NEUTRAL 36MM ID 52MM OD
Type: IMPLANTABLE DEVICE | Site: HIP | Status: FUNCTIONAL
Brand: PINNACLE ALTRX

## 2025-07-18 DEVICE — PINNACLE POROCOAT ACETABULAR SHELL SECTOR II 52MM OD
Type: IMPLANTABLE DEVICE | Site: HIP | Status: FUNCTIONAL
Brand: PINNACLE POROCOAT

## 2025-07-18 DEVICE — BIOLOX DELTA CERAMIC FEMORAL HEAD +5.0 36MM DIA 12/14 TAPER
Type: IMPLANTABLE DEVICE | Site: HIP | Status: FUNCTIONAL
Brand: BIOLOX DELTA

## 2025-07-18 DEVICE — ACTIS DUOFIX HIP PROSTHESIS (FEMORAL STEM 12/14 TAPER CEMENTLESS SIZE 4 STD COLLAR)  CE
Type: IMPLANTABLE DEVICE | Site: HIP | Status: FUNCTIONAL
Brand: ACTIS

## 2025-07-18 DEVICE — HEMOST ABS SURGIFOAM SZ100 8X12 10MM: Type: IMPLANTABLE DEVICE | Site: HIP | Status: FUNCTIONAL

## 2025-07-18 DEVICE — KNOTLESS TISSUE CONTROL DEVICE, UNDYED UNIDIRECTIONAL (ANTIBACTERIAL) SYNTHETIC ABSORBABLE DEVICE
Type: IMPLANTABLE DEVICE | Site: HIP | Status: FUNCTIONAL
Brand: STRATAFIX

## 2025-07-18 DEVICE — FW,BPB #2 SUTR,BLU W/NDL
Type: IMPLANTABLE DEVICE | Site: HIP | Status: FUNCTIONAL
Brand: ARTHREX®

## 2025-07-18 DEVICE — TOTL HIP COP DEPUY 9641334: Type: IMPLANTABLE DEVICE | Status: FUNCTIONAL

## 2025-07-18 DEVICE — KNOTLESS TISSUE CONTROL DEVICE, VIOLET UNIDIRECTIONAL (ANTIBACTERIAL) SYNTHETIC ABSORBABLE DEVICE
Type: IMPLANTABLE DEVICE | Site: HIP | Status: FUNCTIONAL
Brand: STRATAFIX

## 2025-07-18 RX ORDER — SODIUM CHLORIDE 9 MG/ML
75 INJECTION, SOLUTION INTRAVENOUS CONTINUOUS
Status: ACTIVE | OUTPATIENT
Start: 2025-07-18 | End: 2025-07-20

## 2025-07-18 RX ORDER — LIDOCAINE HYDROCHLORIDE 10 MG/ML
0.5 INJECTION, SOLUTION INFILTRATION; PERINEURAL ONCE AS NEEDED
Status: DISCONTINUED | OUTPATIENT
Start: 2025-07-18 | End: 2025-07-18 | Stop reason: HOSPADM

## 2025-07-18 RX ORDER — OXYCODONE AND ACETAMINOPHEN 7.5; 325 MG/1; MG/1
1 TABLET ORAL EVERY 4 HOURS PRN
Status: DISCONTINUED | OUTPATIENT
Start: 2025-07-18 | End: 2025-07-18 | Stop reason: HOSPADM

## 2025-07-18 RX ORDER — SODIUM CHLORIDE 0.9 % (FLUSH) 0.9 %
3-10 SYRINGE (ML) INJECTION AS NEEDED
Status: DISCONTINUED | OUTPATIENT
Start: 2025-07-18 | End: 2025-07-18 | Stop reason: HOSPADM

## 2025-07-18 RX ORDER — BISACODYL 5 MG/1
10 TABLET, DELAYED RELEASE ORAL DAILY PRN
Status: DISCONTINUED | OUTPATIENT
Start: 2025-07-19 | End: 2025-07-23 | Stop reason: HOSPADM

## 2025-07-18 RX ORDER — METOPROLOL SUCCINATE 25 MG/1
25 TABLET, EXTENDED RELEASE ORAL NIGHTLY
Status: DISCONTINUED | OUTPATIENT
Start: 2025-07-18 | End: 2025-07-23 | Stop reason: HOSPADM

## 2025-07-18 RX ORDER — PROMETHAZINE HYDROCHLORIDE 25 MG/1
25 SUPPOSITORY RECTAL ONCE AS NEEDED
Status: DISCONTINUED | OUTPATIENT
Start: 2025-07-18 | End: 2025-07-18 | Stop reason: HOSPADM

## 2025-07-18 RX ORDER — TRANEXAMIC ACID 10 MG/ML
1000 INJECTION, SOLUTION INTRAVENOUS ONCE
Status: DISCONTINUED | OUTPATIENT
Start: 2025-07-18 | End: 2025-07-18 | Stop reason: HOSPADM

## 2025-07-18 RX ORDER — ONDANSETRON 2 MG/ML
4 INJECTION INTRAMUSCULAR; INTRAVENOUS ONCE AS NEEDED
Status: DISCONTINUED | OUTPATIENT
Start: 2025-07-18 | End: 2025-07-18 | Stop reason: HOSPADM

## 2025-07-18 RX ORDER — DROPERIDOL 2.5 MG/ML
0.62 INJECTION, SOLUTION INTRAMUSCULAR; INTRAVENOUS
Status: DISCONTINUED | OUTPATIENT
Start: 2025-07-18 | End: 2025-07-18 | Stop reason: HOSPADM

## 2025-07-18 RX ORDER — NALOXONE HCL 0.4 MG/ML
0.1 VIAL (ML) INJECTION
Status: DISCONTINUED | OUTPATIENT
Start: 2025-07-18 | End: 2025-07-23 | Stop reason: HOSPADM

## 2025-07-18 RX ORDER — LABETALOL HYDROCHLORIDE 5 MG/ML
5 INJECTION, SOLUTION INTRAVENOUS
Status: DISCONTINUED | OUTPATIENT
Start: 2025-07-18 | End: 2025-07-18 | Stop reason: HOSPADM

## 2025-07-18 RX ORDER — KETAMINE HCL IN NACL, ISO-OSM 100MG/10ML
SYRINGE (ML) INJECTION AS NEEDED
Status: DISCONTINUED | OUTPATIENT
Start: 2025-07-18 | End: 2025-07-18 | Stop reason: SURG

## 2025-07-18 RX ORDER — PROPOFOL 10 MG/ML
INJECTION, EMULSION INTRAVENOUS AS NEEDED
Status: DISCONTINUED | OUTPATIENT
Start: 2025-07-18 | End: 2025-07-18 | Stop reason: SURG

## 2025-07-18 RX ORDER — ATROPINE SULFATE 0.4 MG/ML
0.4 INJECTION, SOLUTION INTRAMUSCULAR; INTRAVENOUS; SUBCUTANEOUS ONCE AS NEEDED
Status: DISCONTINUED | OUTPATIENT
Start: 2025-07-18 | End: 2025-07-18 | Stop reason: HOSPADM

## 2025-07-18 RX ORDER — CHLORHEXIDINE GLUCONATE 500 MG/1
CLOTH TOPICAL
Status: DISCONTINUED | OUTPATIENT
Start: 2025-07-18 | End: 2025-07-18 | Stop reason: HOSPADM

## 2025-07-18 RX ORDER — PROMETHAZINE HYDROCHLORIDE 25 MG/1
25 TABLET ORAL ONCE AS NEEDED
Status: DISCONTINUED | OUTPATIENT
Start: 2025-07-18 | End: 2025-07-18 | Stop reason: HOSPADM

## 2025-07-18 RX ORDER — HYDROCODONE BITARTRATE AND ACETAMINOPHEN 7.5; 325 MG/1; MG/1
2 TABLET ORAL EVERY 4 HOURS PRN
Status: DISCONTINUED | OUTPATIENT
Start: 2025-07-18 | End: 2025-07-19

## 2025-07-18 RX ORDER — ESMOLOL HYDROCHLORIDE 10 MG/ML
INJECTION INTRAVENOUS AS NEEDED
Status: DISCONTINUED | OUTPATIENT
Start: 2025-07-18 | End: 2025-07-18 | Stop reason: SURG

## 2025-07-18 RX ORDER — IPRATROPIUM BROMIDE AND ALBUTEROL SULFATE 2.5; .5 MG/3ML; MG/3ML
3 SOLUTION RESPIRATORY (INHALATION) ONCE AS NEEDED
Status: DISCONTINUED | OUTPATIENT
Start: 2025-07-18 | End: 2025-07-18 | Stop reason: HOSPADM

## 2025-07-18 RX ORDER — FENTANYL CITRATE 50 UG/ML
50 INJECTION, SOLUTION INTRAMUSCULAR; INTRAVENOUS
Status: DISCONTINUED | OUTPATIENT
Start: 2025-07-18 | End: 2025-07-18 | Stop reason: HOSPADM

## 2025-07-18 RX ORDER — ROCURONIUM BROMIDE 10 MG/ML
INJECTION, SOLUTION INTRAVENOUS AS NEEDED
Status: DISCONTINUED | OUTPATIENT
Start: 2025-07-18 | End: 2025-07-18 | Stop reason: SURG

## 2025-07-18 RX ORDER — EPHEDRINE SULFATE 50 MG/ML
INJECTION, SOLUTION INTRAVENOUS AS NEEDED
Status: DISCONTINUED | OUTPATIENT
Start: 2025-07-18 | End: 2025-07-18 | Stop reason: SURG

## 2025-07-18 RX ORDER — EPHEDRINE SULFATE 50 MG/ML
5 INJECTION, SOLUTION INTRAVENOUS ONCE AS NEEDED
Status: DISCONTINUED | OUTPATIENT
Start: 2025-07-18 | End: 2025-07-18 | Stop reason: HOSPADM

## 2025-07-18 RX ORDER — MIDAZOLAM HYDROCHLORIDE 1 MG/ML
0.5 INJECTION, SOLUTION INTRAMUSCULAR; INTRAVENOUS
Status: DISCONTINUED | OUTPATIENT
Start: 2025-07-18 | End: 2025-07-18 | Stop reason: HOSPADM

## 2025-07-18 RX ORDER — HYDROCODONE BITARTRATE AND ACETAMINOPHEN 7.5; 325 MG/1; MG/1
1 TABLET ORAL EVERY 4 HOURS PRN
Status: DISCONTINUED | OUTPATIENT
Start: 2025-07-18 | End: 2025-07-19

## 2025-07-18 RX ORDER — ACETAMINOPHEN 10 MG/ML
INJECTION, SOLUTION INTRAVENOUS AS NEEDED
Status: DISCONTINUED | OUTPATIENT
Start: 2025-07-18 | End: 2025-07-18 | Stop reason: SURG

## 2025-07-18 RX ORDER — DIPHENHYDRAMINE HCL 25 MG
25 CAPSULE ORAL EVERY 6 HOURS PRN
Status: DISCONTINUED | OUTPATIENT
Start: 2025-07-18 | End: 2025-07-23 | Stop reason: HOSPADM

## 2025-07-18 RX ORDER — SODIUM CHLORIDE, SODIUM LACTATE, POTASSIUM CHLORIDE, CALCIUM CHLORIDE 600; 310; 30; 20 MG/100ML; MG/100ML; MG/100ML; MG/100ML
9 INJECTION, SOLUTION INTRAVENOUS CONTINUOUS
Status: DISCONTINUED | OUTPATIENT
Start: 2025-07-18 | End: 2025-07-18

## 2025-07-18 RX ORDER — ASPIRIN 81 MG/1
81 TABLET ORAL EVERY 12 HOURS SCHEDULED
Status: DISCONTINUED | OUTPATIENT
Start: 2025-07-19 | End: 2025-07-23 | Stop reason: HOSPADM

## 2025-07-18 RX ORDER — FENTANYL CITRATE 50 UG/ML
INJECTION, SOLUTION INTRAMUSCULAR; INTRAVENOUS AS NEEDED
Status: DISCONTINUED | OUTPATIENT
Start: 2025-07-18 | End: 2025-07-18 | Stop reason: SURG

## 2025-07-18 RX ORDER — BUDESONIDE 0.5 MG/2ML
0.5 INHALANT ORAL 2 TIMES DAILY
Status: DISCONTINUED | OUTPATIENT
Start: 2025-07-18 | End: 2025-07-23 | Stop reason: HOSPADM

## 2025-07-18 RX ORDER — DEXAMETHASONE SODIUM PHOSPHATE 4 MG/ML
INJECTION, SOLUTION INTRA-ARTICULAR; INTRALESIONAL; INTRAMUSCULAR; INTRAVENOUS; SOFT TISSUE AS NEEDED
Status: DISCONTINUED | OUTPATIENT
Start: 2025-07-18 | End: 2025-07-18 | Stop reason: SURG

## 2025-07-18 RX ORDER — HYDRALAZINE HYDROCHLORIDE 20 MG/ML
5 INJECTION INTRAMUSCULAR; INTRAVENOUS
Status: DISCONTINUED | OUTPATIENT
Start: 2025-07-18 | End: 2025-07-18 | Stop reason: HOSPADM

## 2025-07-18 RX ORDER — ONDANSETRON 2 MG/ML
4 INJECTION INTRAMUSCULAR; INTRAVENOUS EVERY 6 HOURS PRN
Status: DISCONTINUED | OUTPATIENT
Start: 2025-07-18 | End: 2025-07-23 | Stop reason: HOSPADM

## 2025-07-18 RX ORDER — CLOBETASOL PROPIONATE 0.5 MG/G
CREAM TOPICAL EVERY 12 HOURS SCHEDULED
Status: DISCONTINUED | OUTPATIENT
Start: 2025-07-18 | End: 2025-07-23 | Stop reason: HOSPADM

## 2025-07-18 RX ORDER — ONDANSETRON 2 MG/ML
INJECTION INTRAMUSCULAR; INTRAVENOUS AS NEEDED
Status: DISCONTINUED | OUTPATIENT
Start: 2025-07-18 | End: 2025-07-18 | Stop reason: SURG

## 2025-07-18 RX ORDER — ONDANSETRON 4 MG/1
4 TABLET, ORALLY DISINTEGRATING ORAL EVERY 6 HOURS PRN
Status: DISCONTINUED | OUTPATIENT
Start: 2025-07-18 | End: 2025-07-23 | Stop reason: HOSPADM

## 2025-07-18 RX ORDER — NALOXONE HCL 0.4 MG/ML
0.2 VIAL (ML) INJECTION AS NEEDED
Status: DISCONTINUED | OUTPATIENT
Start: 2025-07-18 | End: 2025-07-18 | Stop reason: HOSPADM

## 2025-07-18 RX ORDER — CHLORHEXIDINE GLUCONATE 500 MG/1
CLOTH TOPICAL ONCE
Status: DISCONTINUED | OUTPATIENT
Start: 2025-07-18 | End: 2025-07-23 | Stop reason: HOSPADM

## 2025-07-18 RX ORDER — DOCUSATE SODIUM 100 MG/1
100 CAPSULE, LIQUID FILLED ORAL 2 TIMES DAILY
Status: DISCONTINUED | OUTPATIENT
Start: 2025-07-18 | End: 2025-07-23 | Stop reason: HOSPADM

## 2025-07-18 RX ORDER — TRANEXAMIC ACID 100 MG/ML
INJECTION, SOLUTION INTRAVENOUS AS NEEDED
Status: DISCONTINUED | OUTPATIENT
Start: 2025-07-18 | End: 2025-07-18 | Stop reason: SURG

## 2025-07-18 RX ORDER — PREGABALIN 75 MG/1
75 CAPSULE ORAL ONCE
Status: COMPLETED | OUTPATIENT
Start: 2025-07-18 | End: 2025-07-18

## 2025-07-18 RX ORDER — ACETAMINOPHEN 500 MG
1000 TABLET ORAL EVERY 8 HOURS
Status: DISCONTINUED | OUTPATIENT
Start: 2025-07-18 | End: 2025-07-21

## 2025-07-18 RX ORDER — LOSARTAN POTASSIUM 100 MG/1
100 TABLET ORAL
Status: DISCONTINUED | OUTPATIENT
Start: 2025-07-18 | End: 2025-07-23 | Stop reason: HOSPADM

## 2025-07-18 RX ORDER — MAGNESIUM HYDROXIDE 1200 MG/15ML
LIQUID ORAL AS NEEDED
Status: DISCONTINUED | OUTPATIENT
Start: 2025-07-18 | End: 2025-07-18 | Stop reason: HOSPADM

## 2025-07-18 RX ORDER — PANTOPRAZOLE SODIUM 40 MG/1
40 TABLET, DELAYED RELEASE ORAL NIGHTLY
Status: DISCONTINUED | OUTPATIENT
Start: 2025-07-18 | End: 2025-07-23 | Stop reason: HOSPADM

## 2025-07-18 RX ORDER — LIDOCAINE HYDROCHLORIDE 20 MG/ML
INJECTION, SOLUTION EPIDURAL; INFILTRATION; INTRACAUDAL; PERINEURAL AS NEEDED
Status: DISCONTINUED | OUTPATIENT
Start: 2025-07-18 | End: 2025-07-18 | Stop reason: SURG

## 2025-07-18 RX ORDER — IPRATROPIUM BROMIDE AND ALBUTEROL SULFATE 2.5; .5 MG/3ML; MG/3ML
3 SOLUTION RESPIRATORY (INHALATION)
Status: DISCONTINUED | OUTPATIENT
Start: 2025-07-18 | End: 2025-07-23 | Stop reason: HOSPADM

## 2025-07-18 RX ORDER — HYDROMORPHONE HYDROCHLORIDE 1 MG/ML
0.5 INJECTION, SOLUTION INTRAMUSCULAR; INTRAVENOUS; SUBCUTANEOUS
Status: DISCONTINUED | OUTPATIENT
Start: 2025-07-18 | End: 2025-07-23 | Stop reason: HOSPADM

## 2025-07-18 RX ORDER — FENTANYL CITRATE 50 UG/ML
50 INJECTION, SOLUTION INTRAMUSCULAR; INTRAVENOUS ONCE AS NEEDED
Status: DISCONTINUED | OUTPATIENT
Start: 2025-07-18 | End: 2025-07-18 | Stop reason: HOSPADM

## 2025-07-18 RX ORDER — MAGNESIUM SULFATE HEPTAHYDRATE 500 MG/ML
INJECTION, SOLUTION INTRAMUSCULAR; INTRAVENOUS AS NEEDED
Status: DISCONTINUED | OUTPATIENT
Start: 2025-07-18 | End: 2025-07-18 | Stop reason: SURG

## 2025-07-18 RX ORDER — HYDROMORPHONE HYDROCHLORIDE 1 MG/ML
0.5 INJECTION, SOLUTION INTRAMUSCULAR; INTRAVENOUS; SUBCUTANEOUS
Status: DISCONTINUED | OUTPATIENT
Start: 2025-07-18 | End: 2025-07-18 | Stop reason: HOSPADM

## 2025-07-18 RX ORDER — DIPHENHYDRAMINE HYDROCHLORIDE 50 MG/ML
12.5 INJECTION, SOLUTION INTRAMUSCULAR; INTRAVENOUS
Status: DISCONTINUED | OUTPATIENT
Start: 2025-07-18 | End: 2025-07-18 | Stop reason: HOSPADM

## 2025-07-18 RX ORDER — SODIUM CHLORIDE 0.9 % (FLUSH) 0.9 %
3 SYRINGE (ML) INJECTION EVERY 12 HOURS SCHEDULED
Status: DISCONTINUED | OUTPATIENT
Start: 2025-07-18 | End: 2025-07-18 | Stop reason: HOSPADM

## 2025-07-18 RX ORDER — FLUMAZENIL 0.1 MG/ML
0.2 INJECTION INTRAVENOUS AS NEEDED
Status: DISCONTINUED | OUTPATIENT
Start: 2025-07-18 | End: 2025-07-18 | Stop reason: HOSPADM

## 2025-07-18 RX ORDER — FAMOTIDINE 10 MG/ML
20 INJECTION, SOLUTION INTRAVENOUS ONCE
Status: COMPLETED | OUTPATIENT
Start: 2025-07-18 | End: 2025-07-18

## 2025-07-18 RX ORDER — HYDROCODONE BITARTRATE AND ACETAMINOPHEN 5; 325 MG/1; MG/1
1 TABLET ORAL ONCE AS NEEDED
Status: DISCONTINUED | OUTPATIENT
Start: 2025-07-18 | End: 2025-07-18 | Stop reason: HOSPADM

## 2025-07-18 RX ORDER — PHENYLEPHRINE HCL IN 0.9% NACL 1 MG/10 ML
SYRINGE (ML) INTRAVENOUS AS NEEDED
Status: DISCONTINUED | OUTPATIENT
Start: 2025-07-18 | End: 2025-07-18 | Stop reason: SURG

## 2025-07-18 RX ADMIN — FENTANYL CITRATE 50 MCG: 50 INJECTION, SOLUTION INTRAMUSCULAR; INTRAVENOUS at 11:33

## 2025-07-18 RX ADMIN — Medication 100 MCG: at 10:35

## 2025-07-18 RX ADMIN — IPRATROPIUM BROMIDE AND ALBUTEROL SULFATE 3 ML: .5; 3 SOLUTION RESPIRATORY (INHALATION) at 15:00

## 2025-07-18 RX ADMIN — HYDROCODONE BITARTRATE AND ACETAMINOPHEN 1 TABLET: 7.5; 325 TABLET ORAL at 22:53

## 2025-07-18 RX ADMIN — IPRATROPIUM BROMIDE AND ALBUTEROL SULFATE 3 ML: .5; 3 SOLUTION RESPIRATORY (INHALATION) at 19:17

## 2025-07-18 RX ADMIN — EPHEDRINE SULFATE 10 MG: 50 INJECTION INTRAVENOUS at 10:56

## 2025-07-18 RX ADMIN — EPHEDRINE SULFATE 10 MG: 50 INJECTION INTRAVENOUS at 10:40

## 2025-07-18 RX ADMIN — OXYCODONE AND ACETAMINOPHEN 1 TABLET: 7.5; 325 TABLET ORAL at 12:46

## 2025-07-18 RX ADMIN — FENTANYL CITRATE 50 MCG: 50 INJECTION, SOLUTION INTRAMUSCULAR; INTRAVENOUS at 11:15

## 2025-07-18 RX ADMIN — TRANEXAMIC ACID 1000 MG: 100 INJECTION INTRAVENOUS at 10:15

## 2025-07-18 RX ADMIN — LIDOCAINE HYDROCHLORIDE 100 MG: 20 INJECTION, SOLUTION EPIDURAL; INFILTRATION; INTRACAUDAL; PERINEURAL at 10:03

## 2025-07-18 RX ADMIN — SODIUM CHLORIDE 1750 MG: 9 INJECTION, SOLUTION INTRAVENOUS at 08:47

## 2025-07-18 RX ADMIN — MAGNESIUM SULFATE HEPTAHYDRATE 2 G: 500 INJECTION, SOLUTION INTRAMUSCULAR; INTRAVENOUS at 10:19

## 2025-07-18 RX ADMIN — HYDROMORPHONE HYDROCHLORIDE 0.5 MG: 1 INJECTION, SOLUTION INTRAMUSCULAR; INTRAVENOUS; SUBCUTANEOUS at 11:45

## 2025-07-18 RX ADMIN — HYDROMORPHONE HYDROCHLORIDE 0.5 MG: 1 INJECTION, SOLUTION INTRAMUSCULAR; INTRAVENOUS; SUBCUTANEOUS at 13:21

## 2025-07-18 RX ADMIN — Medication 100 MCG: at 10:26

## 2025-07-18 RX ADMIN — SUGAMMADEX 200 MG: 100 INJECTION, SOLUTION INTRAVENOUS at 11:52

## 2025-07-18 RX ADMIN — SODIUM CHLORIDE, POTASSIUM CHLORIDE, SODIUM LACTATE AND CALCIUM CHLORIDE 9 ML/HR: 600; 310; 30; 20 INJECTION, SOLUTION INTRAVENOUS at 08:46

## 2025-07-18 RX ADMIN — PANTOPRAZOLE SODIUM 40 MG: 40 TABLET, DELAYED RELEASE ORAL at 22:52

## 2025-07-18 RX ADMIN — EPHEDRINE SULFATE 5 MG: 50 INJECTION INTRAVENOUS at 11:15

## 2025-07-18 RX ADMIN — FENTANYL CITRATE 50 MCG: 50 INJECTION, SOLUTION INTRAMUSCULAR; INTRAVENOUS at 12:22

## 2025-07-18 RX ADMIN — DIPHENHYDRAMINE HYDROCHLORIDE 25 MG: 25 CAPSULE ORAL at 22:51

## 2025-07-18 RX ADMIN — DEXAMETHASONE SODIUM PHOSPHATE 10 MG: 4 INJECTION, SOLUTION INTRAMUSCULAR; INTRAVENOUS at 10:25

## 2025-07-18 RX ADMIN — DOCUSATE SODIUM 100 MG: 100 CAPSULE, LIQUID FILLED ORAL at 22:51

## 2025-07-18 RX ADMIN — CLOBETASOL PROPIONATE CREAM USP, 0.05%: 0.5 CREAM TOPICAL at 22:55

## 2025-07-18 RX ADMIN — Medication 50 MG: at 10:29

## 2025-07-18 RX ADMIN — BUDESONIDE 0.5 MG: 0.5 INHALANT RESPIRATORY (INHALATION) at 19:18

## 2025-07-18 RX ADMIN — PREGABALIN 75 MG: 75 CAPSULE ORAL at 08:59

## 2025-07-18 RX ADMIN — ACETAMINOPHEN 1000 MG: 1000 INJECTION INTRAVENOUS at 10:15

## 2025-07-18 RX ADMIN — CEFAZOLIN 2000 MG: 2 INJECTION, POWDER, FOR SOLUTION INTRAMUSCULAR; INTRAVENOUS at 17:36

## 2025-07-18 RX ADMIN — Medication 100 MCG: at 10:56

## 2025-07-18 RX ADMIN — EPHEDRINE SULFATE 5 MG: 50 INJECTION INTRAVENOUS at 11:33

## 2025-07-18 RX ADMIN — CEFAZOLIN 2 G: 2 INJECTION, POWDER, FOR SOLUTION INTRAMUSCULAR; INTRAVENOUS at 09:53

## 2025-07-18 RX ADMIN — PROPOFOL 150 MG: 10 INJECTION, EMULSION INTRAVENOUS at 10:03

## 2025-07-18 RX ADMIN — ONDANSETRON 4 MG: 2 INJECTION, SOLUTION INTRAMUSCULAR; INTRAVENOUS at 11:40

## 2025-07-18 RX ADMIN — METOPROLOL SUCCINATE 25 MG: 25 TABLET, EXTENDED RELEASE ORAL at 22:52

## 2025-07-18 RX ADMIN — FAMOTIDINE 20 MG: 10 INJECTION INTRAVENOUS at 08:55

## 2025-07-18 RX ADMIN — ESMOLOL HYDROCHLORIDE 30 MG: 100 INJECTION, SOLUTION INTRAVENOUS at 10:06

## 2025-07-18 RX ADMIN — ROCURONIUM BROMIDE 80 MG: 10 INJECTION, SOLUTION INTRAVENOUS at 10:03

## 2025-07-18 NOTE — CASE MANAGEMENT/SOCIAL WORK
Continued Stay Note  [facility]     Patient Name: Louis Andino Jr.  MRN: [id number]  Today's Date: 7/18/2025    Admit Date: 7/18/2025    Plan: Home with HH   Discharge Plan       Row Name 07/18/25 1644       Plan    Plan Home with HH    Patient/Family in Agreement with Plan yes    Provided Post Acute Provider List? N/A    N/A Provider List Comment Patient/spouse agreeable to any [facility] that can accept insurance and provide needed services timely    Plan Comments Plan home with [facility] who has accepted case. Patient/spouse agreeable to any [facility] that can accept insurance and provide needed services timely. [facility] Patient already has a rwx at home.                   Discharge Codes    No documentation.                       Hazel Ortiz RN

## 2025-07-18 NOTE — PLAN OF CARE
Goal Outcome Evaluation:         Pt reports feeling tired, sleeping between care. Denies pain while laying in bed. Dizziness with position change, unable to ambulate due to dizziness. IV antibiotics given. Alert and oriented x4. Plan of care discussed w pt. Questions encouraged and answered. Care ongoing

## 2025-07-18 NOTE — THERAPY EVALUATION
Patient Name: Louis Andino Jr.  : 1960    MRN: [id number]                              Today's Date: 2025       Admit Date: 2025    Visit Dx:     ICD-10-CM ICD-9-CM   1. Primary osteoarthritis of right hip  M16.11 715.15     Patient Active Problem List   Diagnosis    Renal mass    Asthma    Gastroesophageal reflux disease    Hyperlipidemia    Hypertension    BPH (benign prostatic hyperplasia)    COPD (chronic obstructive pulmonary disease)    KARON (acute kidney injury)    Acute respiratory failure with hypoxia    Renal cell carcinoma    Port-A-Cath in place    Lightheadedness    Right bundle branch block (RBBB) with left anterior fascicular block (LAFB)    APC (atrial premature contractions)    Lumbar facet arthropathy    Right sided sciatica    Chronic pain syndrome    Encounter for long-term (current) use of high-risk medication    Encounter for screening for malignant neoplasm of colon    Urticaria of unknown origin    Lumbar radiculopathy    Degeneration of intervertebral disc of lumbar region with discogenic back pain    Status post total hip replacement, right     Past Medical History:   Diagnosis Date    Abnormal ECG Dec 2024    Allergic 2025    Adhesive on pain patch    Anemia 2023    Due to surgery. Required transfusions    Ankle sprain     Multiple-both ankles over the years    Arthritis Years    Worse R. Hip and back    Asthma     Stable now    Cataract 2years    Optometrist is watching yearly    Chronic pain disorder     Right hip and back    Clotting disorder     Required blood transfusion during and after surgery in excess of expected    Colon polyp     Coronary artery disease Dec 2023    Bifascicular block    Deep vein thrombosis 2023    During cancer surgery a clot was surgically removed from the inferior vena cava which was cancer related    Emphysema/COPD     Erectile dysfunction Several years    Extremity pain     Right hip and bilat shoulders     Fracture of ankle     Left ankle as a child    Fracture of wrist     Left wrist as a child    GERD (gastroesophageal reflux disease)     Headache Years    Now rare - seem to be due to sleep deprivation    Headache, tension-type 1977    Rare    HL (hearing loss)     Progressive worsening over many years    Hyperglycemia 10/12/2023    Hyperlipidemia 10/12/2023    Hypertension     Injury of neck 2023    Previous fractures noted during chiropractic visit. Probably secondary to shoulder injury.    Joint pain 2020    Right hip    Low back pain     Worse last few year    Neck pain 2020    Mild    Obesity     Pneumonia 12/20/2023    Developed while hospitalized for cancer surgery    PONV (postoperative nausea and vomiting)     Prostate disease     Rash     Allergic reactions to laundry detergent and mild generalized itching secondary to immunotherapy.    Rectal bleeding     Renal cell carcinoma 12/30/2023    Renal insufficiency 4/9/2024    Decreased renal function since nephrectomy in December, 2023, secondary to renal cancer, but not diagnosed as chronic kidney disease until recent visit. Kidney functions have been stable since surgery, but are now being considered permanent.    Shoulder injury 1980s    Injured in     Sleep apnea     USES CPAP    Visual impairment     Wear glasses for near and distant vision    Vitreous degeneration of right eye 10/12/2023    Formatting of this note might be different from the original. Retinal tear and detachment warning symptoms reviewed and patient instructed to call immediately if increasing floaters, flashes, or decreasing peripheral vision. No retinal detachment or retinal tear noted. Recheck in 1 month with dilated exam.     Past Surgical History:   Procedure Laterality Date    ABDOMINAL SURGERY  December 18, 2023    Left Kidney and Adrenal Gland removed due to Renal Cell Carcinoma    COLONOSCOPY N/A 3/24/2025    Procedure: COLONOSCOPY to cecum and terminal ileum with cold  and hot polypectomies;  Surgeon: Dolly Stephens MD;  Location: [facility] ENDOSCOPY;  Service: Gastroenterology;  Laterality: N/A;  screening//colon polyps, diverticulosis    NEPHRECTOMY Left 12/18/2023    Procedure: NEPHRECTOMY RADICAL WITH CAVAL THROMBECTOMY;  Surgeon: James Esquivel MD;  Location: [facility] OR;  Service: Urology;  Laterality: Left;    PORTACATH PLACEMENT  01/30/2024    SKIN LESION EXCISION  1990      General Information       Row Name 07/18/25 1558          Physical Therapy Time and Intention    Document Type evaluation  -EF     Mode of Treatment individual therapy;physical therapy  -EF       Row Name 07/18/25 1554          General Information    Patient Profile Reviewed yes  -EF     Prior Level of Function independent:;all household mobility;community mobility  -EF     Existing Precautions/Restrictions fall;right;hip, anterior  -EF     Barriers to Rehab none identified  -EF       Row Name 07/18/25 1558          Living Environment    Current Living Arrangements home  -EF     People in Home spouse  -EF       Row Name 07/18/25 1558          Home Main Entrance    Number of Stairs, Main Entrance five  -EF     Stair Railings, Main Entrance railings safe and in good condition;railings on both sides of stairs  -EF       Row Name 07/18/25 155          Cognition    Orientation Status (Cognition) oriented x 4  -EF       Row Name 07/18/25 1559          Safety Issues/Impairments Affecting Functional Mobility    Impairments Affecting Function (Mobility) balance;endurance/activity tolerance;pain;strength;postural/trunk control;range of motion (ROM)  -EF               User Key  (r) = Recorded By, (t) = Taken By, (c) = Cosigned By      Initials Name Provider Type    EF Denisse Merritt PT Physical Therapist                   Mobility       Row Name 07/18/25 1559          Bed Mobility    Bed Mobility supine-sit;sit-supine  -EF     Supine-Sit [place] (Bed Mobility) standby assist  -EF     Sit-Supine  [place] (Bed Mobility) moderate assist (50% patient effort)  -EF     Assistive Device (Bed Mobility) head of bed elevated;bed rails  -EF     Comment, (Bed Mobility) assist for B LEs during sit to supine  -EF       Row Name 07/18/25 7787          Sit-Stand Transfer    Sit-Stand [place] (Transfers) contact guard;verbal cues  -EF     Assistive Device (Sit-Stand Transfers) walker, front-wheeled  -EF     Comment, (Sit-Stand Transfer) cues for hand placement  -EF       Row Name 07/18/25 1550          Gait/Stairs (Locomotion)    [place] Level (Gait) unable to assess  -EF     Patient was able to Ambulate no, other medical factors prevent ambulation  -EF     Reason Patient was unable to Ambulate Dizziness  -EF     Comment, (Gait/Stairs) Pt unable to ambulate. Immediately upon standing, pt reported severe lightheadedness and feeling like he was going to pass out. Pt assisted back to supine for safety, BP assessed and was 115/69.  -EF               User Key  (r) = Recorded By, (t) = Taken By, (c) = Cosigned By      Initials Name Provider Type    EF Denisse Merritt, PT Physical Therapist                   Obj/Interventions       Row Name 07/18/25 1603          Range of Motion Comprehensive    General Range of Motion lower extremity range of motion deficits identified  -EF     Comment, General Range of Motion R hip ROM limited by pain; L LE AROM WFL  -EF       Row Name 07/18/25 1603          Strength Comprehensive (MMT)    General Manual Muscle Testing (MMT) Assessment lower extremity strength deficits identified  -EF     Comment, General Manual Muscle Testing (MMT) Assessment R hip weakness noted post operatively; L LE WFL  -EF       Row Name 07/18/25 1603          Motor Skills    Therapeutic Exercise --  ELAINA Protocol x 10 reps  -EF       [facility] Name 07/18/25 1603          Balance    Balance Assessment standing static balance;standing dynamic balance;sitting static balance  -EF     Static Sitting Balance standby  assist  -EF     Position, Sitting Balance sitting edge of bed  -EF     Static Standing Balance contact guard;minimal assist  -EF     Position/Device Used, Standing Balance supported;walker, front-wheeled  -EF       Row Name 07/18/25 1603          Sensory Assessment (Somatosensory)    Sensory Assessment (Somatosensory) LE sensation intact  -EF               User Key  (r) = Recorded By, (t) = Taken By, (c) = Cosigned By      Initials Name Provider Type    EF Denisse Merritt, PT Physical Therapist                   Goals/Plan       Row Name 07/18/25 1605          Bed Mobility Goal 1 (PT)    Activity/Assistive Device (Bed Mobility Goal 1, PT) sit to supine/supine to sit  -EF     [place] Level/Cues Needed (Bed Mobility Goal 1, PT) modified independence  -EF     Time Frame (Bed Mobility Goal 1, PT) 1 week;long term goal (LTG)  -EF     Progress/Outcomes (Bed Mobility Goal 1, PT) goal ongoing  -EF       Row Name 07/18/25 1605          Transfer Goal 1 (PT)    Activity/Assistive Device (Transfer Goal 1, PT) sit-to-stand/stand-to-sit;walker, rolling  -EF     [place] Level/Cues Needed (Transfer Goal 1, PT) modified independence  -EF     Time Frame (Transfer Goal 1, PT) long term goal (LTG);1 week  -EF     Progress/Outcome (Transfer Goal 1, PT) goal ongoing  -EF       Row Name 07/18/25 1605          Gait Training Goal 1 (PT)    Activity/Assistive Device (Gait Training Goal 1, PT) gait (walking locomotion);assistive device use  -EF     [place] Level (Gait Training Goal 1, PT) modified independence  -EF     Distance (Gait Training Goal 1, PT) 150'  -EF     Time Frame (Gait Training Goal 1, PT) 1 week;long term goal (LTG)  -EF     Progress/Outcome (Gait Training Goal 1, PT) goal ongoing  -EF       Row Name 07/18/25 1605          Patient Education Goal (PT)    Activity (Patient Education Goal, PT) ELAINA HEP  -EF     [place]/Cues/Accuracy (Memory Goal 2, PT) independent;demonstrates adequately  -EF     Time Frame  (Patient Education Goal, PT) 1 week;long term goal (LTG)  -EF     Progress/Outcome (Patient Education Goal, PT) goal ongoing  -EF       Row Name 07/18/25 1607          Therapy Assessment/Plan (PT)    Planned Therapy Interventions (PT) balance training;bed mobility training;gait training;home exercise program;patient/family education;ROM (range of motion);stair training;strengthening;transfer training  -EF               User Key  (r) = Recorded By, (t) = Taken By, (c) = Cosigned By      Initials Name Provider Type    EF Denisse Merritt, PT Physical Therapist                   Clinical Impression       Row Name 07/18/25 1601          Pain    Pretreatment Pain Rating 0/10 - no pain  -EF     Posttreatment Pain Rating 2/10  -EF     Pain Location hip  -EF     Pain Side/Orientation right  -EF     Pain Management Interventions cold applied;premedicated for activity;positioning techniques utilized  -EF     Response to Pain Interventions activity participation with tolerable pain  -EF       Row Name 07/18/25 1602          Plan of Care Review    Plan of Care Reviewed With patient;spouse  -EF     Outcome Evaluation Pt is a 63 yo male who presents POD0 s/p R ant ELAINA. Prior to admission, pt was living at home and independent with mobility. Upon exam, pt demos post op pain, impaired R hip ROM, R LE weakness, and impaired gait mechanics. Pt performed bed mobility with SBA and transfers with CGA and rwx. Upon standing, pt reported lightheadedness and feeling as though he were going to pass out. Pt assisted back to supine for safety. /69; RN aware. Pt completed ELAINA exercises with supervision. PT will continue to follow for strengthening and progression of mobility. Pt planning to DC home tomorrow with  PT.  -EF       Row Name 07/18/25 1609          Therapy Assessment/Plan (PT)    Rehab Potential (PT) good  -EF     Criteria for Skilled Interventions Met (PT) yes;meets criteria;skilled treatment is necessary  -EF     Therapy  Frequency (PT) daily  -EF       Row Name 07/18/25 1603          Vital Signs    Post Systolic BP Rehab 115  -EF     Post Treatment Diastolic BP 69  -EF     Post Patient Position Supine  -EF       Row Name 07/18/25 1603          Positioning and Restraints    Pre-Treatment Position in bed  -EF     Post Treatment Position bed  -EF     In Bed side lying left;call light within reach;encouraged to call for assist;exit alarm on;SCD pump applied;pillow between legs;with family/caregiver;notified nsg  -EF               User Key  (r) = Recorded By, (t) = Taken By, (c) = Cosigned By      Initials Name Provider Type    Denisse Nunez, PT Physical Therapist                   Outcome Measures       Row Name 07/18/25 1606 07/18/25 1436       How much help from another person do you currently need...    Turning from your back to your side while in flat bed without using bedrails? 4  -EF 4  -HENRI    Moving from lying on back to sitting on the side of a flat bed without bedrails? 3  -EF 3  -HENRI    Moving to and from a bed to a chair (including a wheelchair)? 2  -EF 2  -HENRI    Standing up from a chair using your arms (e.g., wheelchair, bedside chair)? 3  -EF 2  -HENRI    Climbing 3-5 steps with a railing? 1  -EF 2  -HENRI    To walk in hospital room? 1  -EF 2  -HENRI    AM-PAC 6 Clicks Score (PT) 14  -EF 15  -HENRI              User Key  (r) = Recorded By, (t) = Taken By, (c) = Cosigned By      Initials Name Provider Type    Denisse Nunez, DANIEL Physical Therapist    Hazel Bustillos, RN Registered Nurse                                 Physical Therapy Education       Title: PT OT SLP Therapies (Done)       Topic: Physical Therapy (Done)       Point: Mobility training (Done)       Learning Progress Summary            Patient Acceptance, E,TB, VU,NR by  at 7/18/2025 1607                      Point: Home exercise program (Done)       Learning Progress Summary            Patient Acceptance, E,TB, VU,NR by  at 7/18/2025 1607                       Point: Body mechanics (Done)       Learning Progress Summary            Patient Acceptance, E,TB, VU,NR by  at 7/18/2025 1607                      Point: Precautions (Done)       Learning Progress Summary            Patient Acceptance, E,TB, VU,NR by  at 7/18/2025 1607                                      User Key       Initials Effective Dates Name Provider Type Discipline     05/31/24 -  Denisse Merritt, PT Physical Therapist PT                  PT Recommendation and Plan  Planned Therapy Interventions (PT): balance training, bed mobility training, gait training, home exercise program, patient/family education, ROM (range of motion), stair training, strengthening, transfer training  Outcome Evaluation: Pt is a 63 yo male who presents POD0 s/p R ant ELAINA. Prior to admission, pt was living at home and independent with mobility. Upon exam, pt demos post op pain, impaired R hip ROM, R LE weakness, and impaired gait mechanics. Pt performed bed mobility with SBA and transfers with CGA and rwx. Upon standing, pt reported lightheadedness and feeling as though he were going to pass out. Pt assisted back to supine for safety. /69; RN aware. Pt completed ELAINA exercises with supervision. PT will continue to follow for strengthening and progression of mobility. Pt planning to DC home tomorrow with  PT.     Time Calculation:         PT Charges       Row Name 07/18/25 1607             Time Calculation    Start Time 1512  -EF      Stop Time 1532  -EF      Time Calculation (min) 20 min  -EF      PT Received On 07/18/25  -EF      PT - Next Appointment 07/19/25  -EF      PT Goal Re-Cert Due Date 07/25/25  -EF         Time Calculation- PT    Total Timed Code Minutes- PT 10 minute(s)  -EF         Timed Charges    91244 - PT Therapeutic Activity Minutes 10  -EF         Total Minutes    Timed Charges Total Minutes 10  -EF       Total Minutes 10  -EF                User Key  (r) = Recorded By, (t) = Taken By, (c) =  Cosigned By      Initials Name Provider Type    EF Denisse Merritt, PT Physical Therapist                  Therapy Charges for Today       Code Description Service Date Service Provider Modifiers Qty    92434101391 HC PT THERAPEUTIC ACT EA 15 MIN 7/18/2025 Denisse Merritt, PT GP 1    82206032133 HC PT EVAL LOW COMPLEXITY 3 7/18/2025 Denisse Merritt, PT GP 1            PT G-Codes  AM-PAC 6 Clicks Score (PT): 14  PT Discharge Summary  Anticipated Discharge Disposition (PT): home with home health, home with assist    Denisse Merritt, PT  7/18/2025

## 2025-07-18 NOTE — ANESTHESIA PROCEDURE NOTES
Airway  Reason: elective    Date/Time: 7/18/2025 10:08 AM    General Information and Staff    Patient location during procedure: OR  CRNA/CAA: Sheila Marti CRNA    Indications and Patient Condition  Indications for airway management: airway protection    Preoxygenated: yes    Mask difficulty assessment: 3 - difficult mask (inadequate, unstable or two providers) +/- NMBA    Final Airway Details    Final airway type: endotracheal airway      Successful airway: ETT  Cuffed: yes   Successful intubation technique: video laryngoscopy  Adjuncts used in placement: intubating stylet  Endotracheal tube insertion site: oral  Blade: CMAC  Blade size: D  ETT size (mm): 7.5  Cormack-Lehane Classification: grade IIb - view of arytenoids or posterior of glottis only  Placement verified by: capnometry   Measured from: lips  ETT/EBT  to lips (cm): 23  Number of attempts at approach: 1  Assessment: lips, teeth, and gum same as pre-op and atraumatic intubation    Additional Comments  Large omega shaped epiglottis allowed for 2b view with CMAC D blade. Dentition as before

## 2025-07-18 NOTE — ANESTHESIA POSTPROCEDURE EVALUATION
Patient: Louis Andino Jr.    Procedure Summary       Date: 07/18/25 Room / Location:  [facility] OR 03 /  [facility]    Anesthesia Start: 0956 Anesthesia Stop: 1219    Procedure: TOTAL HIP ARTHROPLASTY ANTERIOR WITH HANA TABLE (Right: Hip) Diagnosis:       Primary osteoarthritis of right hip      (Primary osteoarthritis of right hip [M16.11])    Surgeons: Yovani Lund MD Provider: Grant Patiño MD    Anesthesia Type: general ASA Status: 3            Anesthesia Type: general    Vitals  Vitals Value Taken Time   /65 07/18/25 12:48   Temp 36.9 °C (98.5 °F) 07/18/25 12:00   Pulse 63 07/18/25 12:51   Resp 16 07/18/25 12:15   SpO2 99 % 07/18/25 12:51   Vitals shown include unfiled device data.        Post Anesthesia Care and Evaluation    Patient location during evaluation: bedside  Level of consciousness: awake  Pain management: adequate    Airway patency: patent    Cardiovascular status: acceptable  Respiratory status: acceptable    Comments: /60 (BP Location: Right arm, Patient Position: Lying)   Pulse 70   Temp 36.9 °C (98.5 °F) (Oral)   Resp 16   SpO2 95%

## 2025-07-18 NOTE — PLAN OF CARE
Goal Outcome Evaluation:  Plan of Care Reviewed With: patient, spouse           Outcome Evaluation: Pt is a 65 yo male who presents POD0 s/p R ant ELAINA. Prior to admission, pt was living at home and independent with mobility. Upon exam, pt demos post op pain, impaired R hip ROM, R LE weakness, and impaired gait mechanics. Pt performed bed mobility with SBA and transfers with CGA and rwx. Upon standing, pt reported lightheadedness and feeling as though he were going to pass out. Pt assisted back to supine for safety. /69; RN aware. Pt completed ELAINA exercises with supervision. PT will continue to follow for strengthening and progression of mobility. Pt planning to DC home tomorrow with  PT.    Anticipated Discharge Disposition (PT): home with home health, home with assist

## 2025-07-18 NOTE — DISCHARGE PLACEMENT REQUEST
"Louis Andino JrDaniel \"Tj\" (64 y.o. Male)       Date of Birth   1960    Social Security Number       Address   [address] [place] [address]    Home Phone       MRN   [id number]       Roman [employer]   Lutheran    Marital Status                               Admission Date   7/18/2025    Admission Type   Elective    Admitting Provider   Yovani Lund MD    Attending Provider   Yovani Lund MD    Department, Room/Bed   [address], P779/1       Discharge Date       Discharge Disposition       Discharge Destination                                 Attending Provider: Yovani Lund MD    Allergies: Adhesive Tape, Bactrim [Sulfamethoxazole-trimethoprim], Butrans [Buprenorphine]    Isolation: None   Infection: None   Code Status: CPR    Ht: 172.7 cm (68\")   Wt: 113 kg (250 lb)    Admission Cmt: None   Principal Problem: Osteoarthritis of right hip [M16.11]                   Active Insurance as of 7/18/2025       Primary Coverage       Payor Plan Insurance Group Employer/Plan Group    MEDICARE MEDICARE A & B        Payor Plan Address Payor Plan Phone Number Payor Plan Fax Number Effective Dates    [address] [phone]  11/1/2023 - None Entered    [facility] [id number]         Subscriber Name Subscriber Birth Date Member ID       LOUIS ANDINO JR. 1960 [id number]               Secondary Coverage       Payor Plan Insurance Group Employer/Plan Group    ANTHEM BLUE CROSS ANTHEM BLUE CROSS BLUE SHIELD PPO [id number]       Payor Plan Address Payor Plan Phone Number Payor Plan Fax Number Effective Dates    [address] [phone]  1/1/2024 - None Entered    [facility] [id number]         Subscriber Name Subscriber Birth Date Member ID       MARK ANDINO 1960 [id number]               Tertiary Coverage       Payor Plan Insurance Group Employer/Plan Group    [id number]  SUP [facility] [employer]  SUP PLAN G  "      Payor Plan Address Payor Plan Phone Number Payor Plan Fax Number Effective Dates    [address] [address]   11/1/2023 - None Entered    [facility] [id number]         Subscriber Name Subscriber Birth Date Member ID       YESIKA ANDINO  1960 [id number]                     Emergency Contacts        (Rel.) Home Phone Work Phone Mobile Phone    Dahlia Andino (Spouse) -- -- [phone]

## 2025-07-18 NOTE — OP NOTE
Operative  Note    Patient: Louis Andino Jr.  YOB: 1960    Medical Record Number: [id number]    Attending Physician: Yovani Lund,*    Date of Service: 7/18/2025     Pre-op Diagnosis:   Primary osteoarthritis of right hip [M16.11]    Post-Op Diagnosis Codes:     * Primary osteoarthritis of right hip [M16.11]    PROCEDURE PERFORMED: RIGHT TOTAL HIP ARTHROPLASTY ANTERIOR WITH HANA TABLE by utilizing a Direct anterior approach with      Depuy   [place]  Porocoat Acetabular  Shell Sector with  holes size 52 mm outer diameter   Neutral ALTRX  Polyethylene acetabular  liner- 36 mm internal diameter,   Actis Duo fix Cementless  Standard Collar femoral stem size # 4    Delta ceramic femoral head- 36 mm outer diameter, + 5 neck length by utilizing a [place] table and image intensifier.     Implant Name Type Inv. Item Serial No.  Lot No. LRB No. Used Action   DEV CONTRL TISS STRATAFIX SPIRAL MNCRYL UD 3/0 PLS 30CM - [id number] Implant DEV CONTRL TISS STRATAFIX SPIRAL MNCRYL UD 3/0 PLS 30CM  [employer]  DIV OF  AND J [id number] Right 1 Implanted   DEV CONTRL TISS STRATAFIX SPIRAL PDS PLS CT1 2-0 45CM - [id number] Implant DEV CONTRL TISS STRATAFIX SPIRAL PDS PLS CT1 2-0 45CM  [employer]  DIV OF J AND J [id number] Right 1 Implanted   HEMOST ABS SURGIFOAM  8X12 10MM - [id number] Implant HEMOST ABS SURGIFOAM  8X12 10MM  [employer]  DIV OF J AND J [id number] Right 1 Implanted   SUT FW #2 W/TPR NDL 1/2 CIR 38IN 97CM 26.5MM COLTEN - [id number] Implant SUT FW #2 W/TPR NDL 1/2 CIR 38IN 97CM 26.5MM COLTEN  ARTHREX [id number] Right 2 Implanted   CUP ACET PINN SECTOR 52MM - [id number] Implant CUP ACET PINN SECTOR 52MM  DEPUY [id number] Right 1 Implanted   LINER ACET ALTRX PINN NTRL 04Q14AD - [id number] Implant LINER ACET ALTRX PINN NTRL [id number]  DEPUY [id number] Right 1 Implanted   STEM FEM/HIP ACTIS COLR CMTLS STD OFFST 12/14TPR SZ4 - [id number] Implant STEM FEM/HIP ACTIS COLR [facility]  STD OFFST 12/14TPR SZ4  DEPUY SYNTHES [id number] Right 1 Implanted   HD FEM BIOLOXDELTA/ART CERAM 36MM PLS5 - [id number] Implant HD FEM BIOLOXDELTA/ART CERAM 36MM PLS5  DEPUY [id number] Right 1 Implanted       SURGEON: Yovani Lund MD     FIRST ASSISTANT: Matt Avalos MD, Fellow    ANESTHESIA: General  Anesthesiologist: Grant Patiño MD  CRNA: Sheila Marti CRNA     Estimated Blood Loss: 200ml    Specimens: * No orders in the log *    COMPLICATIONS: Nil.     DRAINS: * No LDAs found *    INDICATIONS: The patient is a 64 y.o. male who presented to my office with complaints of progressively worsening pain in the hip.  The patient has reached a point of disability secondary to the severe pain and immobility. The patient had difficulty with activities of daily living.  The patient has failed nonoperative management.      The medical history was reviewed. The patient was indicated for a  total hip arthroplasty. Likely risks and benefits of the procedure including, but not limited to infection, DVT, pulmonary embolism, leg length discrepancy, recurrent dislocation, possibility of injury to nerves or vessels, and periprosthetic fractures, Possibility of medical complications including but not limited to stroke, heart attack have been discussed in detail. Despite the risks involved, the patient elected to proceed and informed consent was obtained. The patient was seen in the preoperative holding area, and the  operative site was marked.     DESCRIPTION OF PROCEDURE: The patient has been transferred to [facility] Operating Room.   Preoperative antibiotics in the form of Vancomycin and  Kefzol was given intravenously prior to the incision.   After achieving adequate  anesthesia, the patient was transferred onto the [place] table. All bony prominences were padded adequately.   The operative hip was prepped and draped in the usual sterile fashion.   Surgical timeout was done. Correct patient, surgical  side and site were identified.      Tranexamic acid was given intravenously at the time of skin incision.    A skin incision was made overlying the anterolateral aspect of the  hip for about 10 cm from just below and lateral to the anterior superior iliac spine. Skin and subcutaneous tissue were incised and the deep fascia was incised in line with the skin incision overlying the tensor fascia damaris muscle. The tensor muscle was retracted laterally and interval between the sartorius and the rectus femoris medially and the tensor fascia damaris muscle laterally was developed. The lateral circumflex femoral vessels were identified. They were clamped, cut, and ligated. Unnamed deep fascia was incised. Capsule of the hip joint was identified. Retractors were placed extracapsularly.     The capsule was incised in a L-shaped manner and the anterior capsule was tagged with FiberWire.  Followed by this, the retractors were placed intracapsularly.     There was a large effusion and a thickened capsule. Appropriate capsular releases were done along the inferior and  medial neck and along the saddle of the femoral neck.  The femoral neck was identified. The femoral neck osteotomy was done according to the preoperative templating , utilizing an oscillating saw. Femoral head was extracted using a corkscrew without any difficulty. The femoral head revealed presence of extensive loss of articular cartilage in the superior weight bearing portion along with femoral head osteophytes.      The acetabular cavity was inspected and exposed appropriately by placing retractors taking care to protect the anterior neurovascular structures. . The labrum was excised, pulvinar was excised from the cotyloid fossa. Acetabular cavity was progressively reamed, maintaining the correct inclination and version under image intensifier control. Adequate medialization was obtained. Adequate fit was found to be obtained at reamer size 52 .  The  [place]   Porocoat  Acetabular Shell Sector with  holes  52 mm was seated into position. It was found to be seated satisfactorily with adequate inclination and anteversion. There was good stability. Followed by this, a polyethylene liner was seated into position, checked for stability. This was a 36 mm internal diameter,  highly cross linked neutral 0° lip liner.    The periarticular tissue was infiltrated with local anaesthetic injection which consisted of Naropin, clonidine and ketorolac mixture.     Attention was then directed to the proximal femur. The proximal femur was delivered by utilizing a trochanteric hook placed just distal to the vastus tubercle and proximal to the gluteus tato tendon. The leg was then externally rotated with the gross traction released and  hyperextension, adduction was done using the Roll table spar. The mechanical lift on the table was utilized to support the femur.  Appropriate capsular releases were made to expose the medial slope of the greater trochanter and the proximal femur was delivered. The femoral canal was entered by removing the lateral femoral neck with a box chisel followed by serial broaching. Adequate fit was found to be obtained with a size 4 broach. A trial reduction was performed. Leg lengths and offset were found to be satisfactory under image intensifier on comparison with the opposite hip under image intensifier. Reduction was found to be satisfactory and there was good stability with range of motion of the hip in internal and external rotation. Having been satisfied with the trials, the hip joint was dislocated.     The femoral  trial broach was removed and  Actis Duo fix Cementless  Standard Collar femoral stem size # 4 was seated into position. This was found to be satisfactorily seated with good stability.      The delta ceramic femoral head 36 mm +5 mm neck length was seated onto the trunnion and impacted. Followed by this, the hip joint was reduced. Reduction was  found to be satisfactory and image confirmed leg length, offset , implant position and stem fill of the femoral canal.  There were no iatrogenic fractures. Hip joint was thoroughly irrigated with saline. Soft tissue hemostasis was secured. The sponge count and needle count was correct. The FiberWire sutures was tagged to the anterior capsular flaps and they were tied to each other. The incision was closed in layers with vicryl and monocryl sutures and the patient was transferred to the recovery room in a stable condition.     The patient tolerated the procedure well. There were no complications. The patient had adequate distal pulses and good capillary refill.     The patient will be mobilized with physical therapy.   Antibiotic prophylaxis  will be given postoperatively.     The  patient will be started on DVT prophylaxis with  Aspirin 81 mg twice daily.    I discussed the satisfactory performance of the procedure with the patient's family and discussed with them the postoperative management.    CPT CODE : 04471    Yovani Lund MD     Date: 7/18/2025  Time: 11:49 EDT    cc: Harry Hsu MD; MD Ashely Saunders Madhusudhan R,*

## 2025-07-18 NOTE — ANESTHESIA PREPROCEDURE EVALUATION
Anesthesia Evaluation     Patient summary reviewed and Nursing notes reviewed   history of anesthetic complications:  PONV  NPO Solid Status: > 8 hours  NPO Liquid Status: > 2 hours           Airway   Mallampati: III  TM distance: >3 FB  Neck ROM: full  Possible difficult intubation and Large neck circumference  Dental      Pulmonary    (+) COPD, asthma,sleep apnea on CPAP  Cardiovascular     ECG reviewed    (+) hypertension, CAD, DVT, hyperlipidemia  (-) angina, CHF, cardiac stents, CABG    ROS comment: TTE 7/2024    ·  Left ventricular systolic function is normal. Left ventricular ejection fraction appears to be 56 - 60%.  ·  Left ventricular wall thickness is consistent with moderate concentric hypertrophy.  ·  Left ventricular diastolic function was normal.  ·  Estimated right ventricular systolic pressure from tricuspid regurgitation is normal (<35 mmHg). Calculated right ventricular systolic pressure from tricuspid regurgitation is 22 mmHg.  ·  Normal global longitudinal LV strain (GLS) = -20.4%.    EKG- chronic RBBB, LAFB      Neuro/Psych  (+) headaches, numbness  GI/Hepatic/Renal/Endo    (+) obesity, morbid obesity, GERD, renal disease- CRI    Musculoskeletal     (+) chronic pain, neck pain  Abdominal    Substance History      OB/GYN          Other   arthritis,   history of cancer (h/o renal cell ca)                  Anesthesia Plan    ASA 3     general     (Previously used montague for intubation)  intravenous induction     Anesthetic plan, risks, benefits, and alternatives have been provided, discussed and informed consent has been obtained with: patient.      CODE STATUS:

## 2025-07-19 PROBLEM — N18.31 STAGE 3A CHRONIC KIDNEY DISEASE: Status: ACTIVE | Noted: 2025-07-19

## 2025-07-19 LAB
ANION GAP SERPL CALCULATED.3IONS-SCNC: 11.1 MMOL/L (ref 5–15)
BASOPHILS # BLD AUTO: 0.05 10*3/MM3 (ref 0–0.2)
BASOPHILS NFR BLD AUTO: 0.3 % (ref 0–1.5)
BUN SERPL-MCNC: 17 MG/DL (ref 8–23)
BUN/CREAT SERPL: 13 (ref 7–25)
CALCIUM SPEC-SCNC: 8.5 MG/DL (ref 8.6–10.5)
CHLORIDE SERPL-SCNC: 103 MMOL/L (ref 98–107)
CO2 SERPL-SCNC: 19.9 MMOL/L (ref 22–29)
CREAT SERPL-MCNC: 1.31 MG/DL (ref 0.76–1.27)
DEPRECATED RDW RBC AUTO: 48 FL (ref 37–54)
EGFRCR SERPLBLD CKD-EPI 2021: 60 ML/MIN/1.73
EOSINOPHIL # BLD AUTO: 0.05 10*3/MM3 (ref 0–0.4)
EOSINOPHIL NFR BLD AUTO: 0.3 % (ref 0.3–6.2)
ERYTHROCYTE [DISTWIDTH] IN BLOOD BY AUTOMATED COUNT: 14.6 % (ref 12.3–15.4)
GLUCOSE SERPL-MCNC: 134 MG/DL (ref 65–99)
HCT VFR BLD AUTO: 33 % (ref 37.5–51)
HGB BLD-MCNC: 11 G/DL (ref 13–17.7)
IMM GRANULOCYTES # BLD AUTO: 0.19 10*3/MM3 (ref 0–0.05)
IMM GRANULOCYTES NFR BLD AUTO: 1 % (ref 0–0.5)
LYMPHOCYTES # BLD AUTO: 1.97 10*3/MM3 (ref 0.7–3.1)
LYMPHOCYTES NFR BLD AUTO: 10.1 % (ref 19.6–45.3)
MCH RBC QN AUTO: 30.1 PG (ref 26.6–33)
MCHC RBC AUTO-ENTMCNC: 33.3 G/DL (ref 31.5–35.7)
MCV RBC AUTO: 90.2 FL (ref 79–97)
MONOCYTES # BLD AUTO: 1.45 10*3/MM3 (ref 0.1–0.9)
MONOCYTES NFR BLD AUTO: 7.4 % (ref 5–12)
NEUTROPHILS NFR BLD AUTO: 15.81 10*3/MM3 (ref 1.7–7)
NEUTROPHILS NFR BLD AUTO: 80.9 % (ref 42.7–76)
NRBC BLD AUTO-RTO: 0 /100 WBC (ref 0–0.2)
PLATELET # BLD AUTO: 193 10*3/MM3 (ref 140–450)
PMV BLD AUTO: 10.2 FL (ref 6–12)
POTASSIUM SERPL-SCNC: 4.7 MMOL/L (ref 3.5–5.2)
RBC # BLD AUTO: 3.66 10*6/MM3 (ref 4.14–5.8)
SODIUM SERPL-SCNC: 134 MMOL/L (ref 136–145)
WBC NRBC COR # BLD AUTO: 19.52 10*3/MM3 (ref 3.4–10.8)

## 2025-07-19 PROCEDURE — 94761 N-INVAS EAR/PLS OXIMETRY MLT: CPT

## 2025-07-19 PROCEDURE — G0378 HOSPITAL OBSERVATION PER HR: HCPCS

## 2025-07-19 PROCEDURE — 94760 N-INVAS EAR/PLS OXIMETRY 1: CPT

## 2025-07-19 PROCEDURE — 25010000002 DEXAMETHASONE PER 1 MG: Performed by: ORTHOPAEDIC SURGERY

## 2025-07-19 PROCEDURE — 94799 UNLISTED PULMONARY SVC/PX: CPT

## 2025-07-19 PROCEDURE — 97530 THERAPEUTIC ACTIVITIES: CPT

## 2025-07-19 PROCEDURE — 25810000003 SODIUM CHLORIDE 0.9 % SOLUTION: Performed by: INTERNAL MEDICINE

## 2025-07-19 PROCEDURE — 94664 DEMO&/EVAL PT USE INHALER: CPT

## 2025-07-19 PROCEDURE — 97110 THERAPEUTIC EXERCISES: CPT

## 2025-07-19 PROCEDURE — 63710000001 DIPHENHYDRAMINE PER 50 MG: Performed by: ORTHOPAEDIC SURGERY

## 2025-07-19 PROCEDURE — 85025 COMPLETE CBC W/AUTO DIFF WBC: CPT | Performed by: ORTHOPAEDIC SURGERY

## 2025-07-19 PROCEDURE — 80048 BASIC METABOLIC PNL TOTAL CA: CPT | Performed by: ORTHOPAEDIC SURGERY

## 2025-07-19 PROCEDURE — 25010000002 CEFAZOLIN PER 500 MG: Performed by: ORTHOPAEDIC SURGERY

## 2025-07-19 RX ORDER — OXYCODONE AND ACETAMINOPHEN 5; 325 MG/1; MG/1
1 TABLET ORAL EVERY 4 HOURS PRN
Refills: 0 | Status: DISCONTINUED | OUTPATIENT
Start: 2025-07-19 | End: 2025-07-21

## 2025-07-19 RX ORDER — LIDOCAINE HYDROCHLORIDE 20 MG/ML
JELLY TOPICAL ONCE AS NEEDED
Status: DISCONTINUED | OUTPATIENT
Start: 2025-07-19 | End: 2025-07-23 | Stop reason: HOSPADM

## 2025-07-19 RX ORDER — OXYCODONE AND ACETAMINOPHEN 5; 325 MG/1; MG/1
1 TABLET ORAL EVERY 6 HOURS PRN
Qty: 16 TABLET | Refills: 0 | Status: SHIPPED | OUTPATIENT
Start: 2025-07-19 | End: 2025-07-23 | Stop reason: HOSPADM

## 2025-07-19 RX ORDER — DEXAMETHASONE SODIUM PHOSPHATE 4 MG/ML
4 INJECTION, SOLUTION INTRA-ARTICULAR; INTRALESIONAL; INTRAMUSCULAR; INTRAVENOUS; SOFT TISSUE ONCE
Status: COMPLETED | OUTPATIENT
Start: 2025-07-19 | End: 2025-07-19

## 2025-07-19 RX ORDER — ALUMINA, MAGNESIA, AND SIMETHICONE 2400; 2400; 240 MG/30ML; MG/30ML; MG/30ML
15 SUSPENSION ORAL EVERY 6 HOURS
Status: DISCONTINUED | OUTPATIENT
Start: 2025-07-19 | End: 2025-07-19

## 2025-07-19 RX ORDER — PSEUDOEPHEDRINE HCL 30 MG
100 TABLET ORAL 2 TIMES DAILY
Qty: 31 CAPSULE | Refills: 0 | Status: SHIPPED | OUTPATIENT
Start: 2025-07-19 | End: 2025-08-08

## 2025-07-19 RX ORDER — ONDANSETRON 4 MG/1
4 TABLET, ORALLY DISINTEGRATING ORAL EVERY 6 HOURS PRN
Qty: 30 TABLET | Refills: 0 | Status: SHIPPED | OUTPATIENT
Start: 2025-07-19 | End: 2025-07-29

## 2025-07-19 RX ORDER — ALUMINA, MAGNESIA, AND SIMETHICONE 2400; 2400; 240 MG/30ML; MG/30ML; MG/30ML
15 SUSPENSION ORAL EVERY 6 HOURS PRN
Status: DISCONTINUED | OUTPATIENT
Start: 2025-07-19 | End: 2025-07-23 | Stop reason: HOSPADM

## 2025-07-19 RX ORDER — ASPIRIN 81 MG/1
81 TABLET ORAL EVERY 12 HOURS SCHEDULED
Qty: 60 TABLET | Refills: 0 | Status: SHIPPED | OUTPATIENT
Start: 2025-07-19

## 2025-07-19 RX ORDER — BISACODYL 5 MG/1
10 TABLET, DELAYED RELEASE ORAL DAILY PRN
Qty: 10 TABLET | Refills: 0 | Status: SHIPPED | OUTPATIENT
Start: 2025-07-19

## 2025-07-19 RX ADMIN — METOPROLOL SUCCINATE 25 MG: 25 TABLET, EXTENDED RELEASE ORAL at 20:01

## 2025-07-19 RX ADMIN — IPRATROPIUM BROMIDE AND ALBUTEROL SULFATE 3 ML: .5; 3 SOLUTION RESPIRATORY (INHALATION) at 06:59

## 2025-07-19 RX ADMIN — ASPIRIN 81 MG: 81 TABLET, COATED ORAL at 20:01

## 2025-07-19 RX ADMIN — CEFAZOLIN 2000 MG: 2 INJECTION, POWDER, FOR SOLUTION INTRAMUSCULAR; INTRAVENOUS at 02:45

## 2025-07-19 RX ADMIN — OXYCODONE AND ACETAMINOPHEN 1 TABLET: 5; 325 TABLET ORAL at 12:41

## 2025-07-19 RX ADMIN — PANTOPRAZOLE SODIUM 40 MG: 40 TABLET, DELAYED RELEASE ORAL at 20:01

## 2025-07-19 RX ADMIN — DOCUSATE SODIUM 100 MG: 100 CAPSULE, LIQUID FILLED ORAL at 09:19

## 2025-07-19 RX ADMIN — IPRATROPIUM BROMIDE AND ALBUTEROL SULFATE 3 ML: .5; 3 SOLUTION RESPIRATORY (INHALATION) at 20:45

## 2025-07-19 RX ADMIN — DIPHENHYDRAMINE HYDROCHLORIDE 25 MG: 25 CAPSULE ORAL at 06:45

## 2025-07-19 RX ADMIN — OXYCODONE AND ACETAMINOPHEN 1 TABLET: 5; 325 TABLET ORAL at 20:04

## 2025-07-19 RX ADMIN — HYDROCODONE BITARTRATE AND ACETAMINOPHEN 1 TABLET: 7.5; 325 TABLET ORAL at 06:39

## 2025-07-19 RX ADMIN — ASPIRIN 81 MG: 81 TABLET, COATED ORAL at 09:19

## 2025-07-19 RX ADMIN — DOCUSATE SODIUM 100 MG: 100 CAPSULE, LIQUID FILLED ORAL at 20:01

## 2025-07-19 RX ADMIN — ACETAMINOPHEN 1000 MG: 500 TABLET, FILM COATED ORAL at 15:44

## 2025-07-19 RX ADMIN — HYDROCODONE BITARTRATE AND ACETAMINOPHEN 1 TABLET: 7.5; 325 TABLET ORAL at 02:40

## 2025-07-19 RX ADMIN — CLOBETASOL PROPIONATE CREAM USP, 0.05%: 0.5 CREAM TOPICAL at 09:22

## 2025-07-19 RX ADMIN — ALUMINUM HYDROXIDE, MAGNESIUM HYDROXIDE, AND DIMETHICONE 15 ML: 400; 400; 40 SUSPENSION ORAL at 13:02

## 2025-07-19 RX ADMIN — DIPHENHYDRAMINE HYDROCHLORIDE 25 MG: 25 CAPSULE ORAL at 15:33

## 2025-07-19 RX ADMIN — BUDESONIDE 0.5 MG: 0.5 INHALANT RESPIRATORY (INHALATION) at 07:02

## 2025-07-19 RX ADMIN — BUDESONIDE 0.5 MG: 0.5 INHALANT RESPIRATORY (INHALATION) at 20:45

## 2025-07-19 RX ADMIN — ACETAMINOPHEN 1000 MG: 500 TABLET, FILM COATED ORAL at 09:19

## 2025-07-19 RX ADMIN — IPRATROPIUM BROMIDE AND ALBUTEROL SULFATE 3 ML: .5; 3 SOLUTION RESPIRATORY (INHALATION) at 13:27

## 2025-07-19 RX ADMIN — CLOBETASOL PROPIONATE CREAM USP, 0.05%: 0.5 CREAM TOPICAL at 20:21

## 2025-07-19 RX ADMIN — SODIUM CHLORIDE 75 ML/HR: 9 INJECTION, SOLUTION INTRAVENOUS at 02:42

## 2025-07-19 RX ADMIN — ALUMINUM HYDROXIDE, MAGNESIUM HYDROXIDE, AND DIMETHICONE 15 ML: 400; 400; 40 SUSPENSION ORAL at 20:04

## 2025-07-19 RX ADMIN — DEXAMETHASONE SODIUM PHOSPHATE 4 MG: 4 INJECTION, SOLUTION INTRA-ARTICULAR; INTRALESIONAL; INTRAMUSCULAR; INTRAVENOUS; SOFT TISSUE at 10:56

## 2025-07-19 NOTE — PROGRESS NOTES
Continued Stay Note  [facility]     Patient Name: Louis Andino Jr.  MRN: [id number]  Today's Date: 7/19/2025    Admit Date: 7/18/2025    [facility]   Discharge Plan       Row Name 07/19/25 1446       Plan    Plan Home with [facility]     Roadmap to Recovery Yes    Patient/Family in Agreement with Plan yes    Provided Post Acute Provider List? Refused    Provided Post Acute Provider Quality & Resource List? Refused    [facility]                   Discharge Codes    No documentation.                 Expected Discharge Date and Time       Expected Discharge Date Expected Discharge Time    Jul 19, 2025               Luli Carpio RN

## 2025-07-19 NOTE — NURSING NOTE
Pt has red rash over body and light headedness when getting up and takes a while to go away even once laid back down.

## 2025-07-19 NOTE — PLAN OF CARE
Goal Outcome Evaluation:  Plan of Care Reviewed With: patient, spouse           Outcome Evaluation: Pt able to complet supine, seated and standing LE exercises to improve strength, AROM, and address pain. He req mod A x1 for supine-sitting EOB transfer for LE transition and trunk control. He was able to sit EOB c supervision to complete exercises and then compelte STS c CGA/Tushar.Pt able to complete standing marches with CGA and side steps at EOB for 6 feet wtih Tushar. He requires cueing for RW management. The Pt did experience dizziness and complaints of R anterior hip pain during session but was able to tolerate all activities wtihout LOB. Pt transferred from standing EOB to supine c Tushar and assist for LE placement. Pt will require continued skilled PT services is expected to DC home c HH vs SNF pending ambulation tolerance over the next day.    Anticipated Discharge Disposition (PT): skilled nursing facility, home with assist

## 2025-07-19 NOTE — PLAN OF CARE
Problem: Adult Inpatient Plan of Care  Goal: Plan of Care Review  Outcome: Progressing  Goal: Patient-Specific Goal (Individualized)  Outcome: Progressing  Goal: Absence of Hospital-Acquired Illness or Injury  Outcome: Progressing  Goal: Optimal Comfort and Wellbeing  Outcome: Progressing  Goal: Readiness for Transition of Care  Outcome: Progressing     Problem: Skin Injury Risk Increased  Goal: Skin Health and Integrity  Outcome: Progressing     Problem: Comorbidity Management  Goal: Maintenance of Asthma Control  Outcome: Progressing  Goal: Maintenance of COPD Symptom Control  Outcome: Progressing  Goal: Blood Pressure in Desired Range  Outcome: Progressing     Problem: Fall Injury Risk  Goal: Absence of Fall and Fall-Related Injury  Outcome: Progressing     Problem: Hip Arthroplasty  Goal: Optimal Coping  Outcome: Progressing  Goal: Absence of Bleeding  Outcome: Progressing  Goal: Effective Bowel Elimination  Outcome: Progressing  Goal: Fluid and Electrolyte Balance  Outcome: Progressing  Goal: Optimal Functional Ability  Outcome: Progressing  Goal: Absence of Infection Signs and Symptoms  Outcome: Progressing  Goal: Intact Neurovascular Status  Outcome: Progressing  Goal: Anesthesia/Sedation Recovery  Outcome: Progressing  Goal: Optimal Pain Control and Function  Outcome: Progressing  Goal: Nausea and Vomiting Relief  Outcome: Progressing  Goal: Effective Urinary Elimination  Outcome: Progressing  Goal: Effective Oxygenation and Ventilation  Outcome: Progressing     Problem: Pain Acute  Goal: Optimal Pain Control and Function  Outcome: Progressing   Goal Outcome Evaluation:

## 2025-07-19 NOTE — PROGRESS NOTES
"      Patient: Louis Andino Jr.  YOB: 1960     Date of Admission: 7/18/2025  7:36 AM Medical Record Number: [id number]     Attending Physician: Yovani Lund,*    Procedure(s):  TOTAL HIP ARTHROPLASTY ANTERIOR WITH HANA TABLE Post Operative Day Number: 1    Subjective : No new orthopaedic complaints     Pain Relief: some relief with present medication.     Systemic Complaints: No Complaints  Vitals:    07/19/25 0335 07/19/25 0449 07/19/25 0659 07/19/25 0733   BP: (!) 79/51 109/68  124/68   BP Location: Right arm Left arm     Patient Position: Lying Lying     Pulse: 84 85 68 77   Resp: 16 16 16 16   Temp: 97.8 °F (36.6 °C) 97.5 °F (36.4 °C)  98.4 °F (36.9 °C)   TempSrc: Oral Oral     SpO2: 97% 99% 94% 97%   Weight:       Height:           Physical Exam: 64 y.o. male    General Appearance:       Alert, cooperative, in no acute distress                  Extremities:    Dressing Clean, Dry and Intact         Incision healthy without signs or symptoms of infections         No clinical sign of DVT        Able to do good movements of digits    Pulses:   Pulses palpable and equal bilaterally           Diagnostic Tests:     Results from last 7 days   Lab Units 07/19/25  0328   WBC 10*3/mm3 19.52*   HEMOGLOBIN g/dL 11.0*   HEMATOCRIT % 33.0*   PLATELETS 10*3/mm3 193     Results from last 7 days   Lab Units 07/19/25  0328   SODIUM mmol/L 134*   POTASSIUM mmol/L 4.7   CHLORIDE mmol/L 103   CO2 mmol/L 19.9*   BUN mg/dL 17.0   CREATININE mg/dL 1.31*   GLUCOSE mg/dL 134*   CALCIUM mg/dL 8.5*         No results found for: \"CRP\"  No results found for: \"SEDRATE\"  No results found for: \"URICACID\"  No results found for: \"CRYSTAL\"  Microbiology Results (last 10 days)       ** No results found for the last 240 hours. **          XR Hip With or Without Pelvis 1 View Right  Result Date: 7/18/2025   Postsurgical changes.    This report was finalized on 7/18/2025 12:43 PM by Dr. Emmanuel Salas M.D on " Workstation: [id number]              Current Medications:  Scheduled Meds:acetaminophen, 1,000 mg, Oral, Q8H  aspirin, 81 mg, Oral, Q12H  budesonide, 0.5 mg, Nebulization, BID  Chlorhexidine Gluconate Cloth, , Apply externally, Once  clobetasol propionate, , Topical, Q12H  docusate sodium, 100 mg, Oral, BID  ipratropium-albuterol, 3 mL, Nebulization, Q6H While Awake - RT  losartan, 100 mg, Oral, Q24H  metoprolol succinate XL, 25 mg, Oral, Nightly  pantoprazole, 40 mg, Oral, Nightly      Continuous Infusions:sodium chloride, 75 mL/hr, Last Rate: 75 mL/hr (07/19/25 0242)      PRN Meds:.  bisacodyl    diphenhydrAMINE    HYDROcodone-acetaminophen    HYDROcodone-acetaminophen    HYDROmorphone **AND** naloxone    lidocaine    ondansetron ODT **OR** ondansetron    Assessment: Procedure(s):  TOTAL HIP ARTHROPLASTY ANTERIOR WITH HANA TABLE      Status post total hip replacement, right    Stage 3a chronic kidney disease      PLAN:   Continues current post-op course  Anticoagulation: Aspirin started  Mobilize with PT as tolerated per protocol  Was dizzy yesterday , BP has improved, slow to mobilize will check with PT again today  Had to be straight cath for urinary retention , will observe today, may start flomax  WBC elevated, but had high WBC prior to surgery, reactive    Weight Bearing: WBAT  Discharge Plan: OK to plan for discharge in  today to home and home health  from orthopadic perspective.      Yovani Lund MD    Date: 7/19/2025    Time: 08:15 EDT

## 2025-07-19 NOTE — THERAPY TREATMENT NOTE
Patient Name: Louis Andino Jr.  : 1960    MRN: [id number]                              Today's Date: 2025       Admit Date: 2025    Visit Dx:     ICD-10-CM ICD-9-CM   1. Status post total hip replacement, right  Z96.641 V43.64   2. Primary osteoarthritis of right hip  M16.11 715.15   3. Lumbar facet arthropathy  M47.816 721.3   4. Osteoarthritis of right hip, unspecified osteoarthritis type  M16.11 715.95   5. Encounter for long-term (current) use of high-risk medication  Z79.899 V58.69     Patient Active Problem List   Diagnosis    Renal mass    Asthma    Gastroesophageal reflux disease    Hyperlipidemia    Hypertension    BPH (benign prostatic hyperplasia)    COPD (chronic obstructive pulmonary disease)    KARON (acute kidney injury)    Acute respiratory failure with hypoxia    Renal cell carcinoma    Port-A-Cath in place    Lightheadedness    Right bundle branch block (RBBB) with left anterior fascicular block (LAFB)    APC (atrial premature contractions)    Lumbar facet arthropathy    Right sided sciatica    Chronic pain syndrome    Encounter for long-term (current) use of high-risk medication    Encounter for screening for malignant neoplasm of colon    Urticaria of unknown origin    Lumbar radiculopathy    Degeneration of intervertebral disc of lumbar region with discogenic back pain    Status post total hip replacement, right    Stage 3a chronic kidney disease     Past Medical History:   Diagnosis Date    Abnormal ECG Dec 2024    Allergic 2025    Adhesive on pain patch    Anemia 2023    Due to surgery. Required transfusions    Ankle sprain     Multiple-both ankles over the years    Arthritis Years    Worse R. Hip and back    Asthma     Stable now    Cataract 2years    Optometrist is watching yearly    Chronic pain disorder     Right hip and back    Clotting disorder     Required blood transfusion during and after surgery in excess of expected    Colon polyp      Coronary artery disease Dec 2023    Bifascicular block    Deep vein thrombosis 12/18/2023    During cancer surgery a clot was surgically removed from the inferior vena cava which was cancer related    Emphysema/COPD     Erectile dysfunction Several years    Extremity pain 2020    Right hip and bilat shoulders    Fracture of ankle     Left ankle as a child    Fracture of wrist     Left wrist as a child    GERD (gastroesophageal reflux disease)     Headache Years    Now rare - seem to be due to sleep deprivation    Headache, tension-type 1977    Rare    HL (hearing loss)     Progressive worsening over many years    Hyperglycemia 10/12/2023    Hyperlipidemia 10/12/2023    Hypertension     Injury of neck 2023    Previous fractures noted during chiropractic visit. Probably secondary to shoulder injury.    Joint pain 2020    Right hip    Low back pain     Worse last few year    Neck pain 2020    Mild    Obesity     Pneumonia 12/20/2023    Developed while hospitalized for cancer surgery    PONV (postoperative nausea and vomiting)     Prostate disease     Rash     Allergic reactions to laundry detergent and mild generalized itching secondary to immunotherapy.    Rectal bleeding     Renal cell carcinoma 12/30/2023    Renal insufficiency 4/9/2024    Decreased renal function since nephrectomy in December, 2023, secondary to renal cancer, but not diagnosed as chronic kidney disease until recent visit. Kidney functions have been stable since surgery, but are now being considered permanent.    Shoulder injury 1980s    Injured in     Sleep apnea     USES CPAP    Visual impairment     Wear glasses for near and distant vision    Vitreous degeneration of right eye 10/12/2023    Formatting of this note might be different from the original. Retinal tear and detachment warning symptoms reviewed and patient instructed to call immediately if increasing floaters, flashes, or decreasing peripheral vision. No retinal detachment or  retinal tear noted. Recheck in 1 month with dilated exam.     Past Surgical History:   Procedure Laterality Date    ABDOMINAL SURGERY  December 18, 2023    Left Kidney and Adrenal Gland removed due to Renal Cell Carcinoma    COLONOSCOPY N/A 3/24/2025    Procedure: COLONOSCOPY to cecum and terminal ileum with cold and hot polypectomies;  Surgeon: Dolly Stephens MD;  Location: [facility] ENDOSCOPY;  Service: Gastroenterology;  Laterality: N/A;  screening//colon polyps, diverticulosis    NEPHRECTOMY Left 12/18/2023    Procedure: NEPHRECTOMY RADICAL WITH CAVAL THROMBECTOMY;  Surgeon: James Esquivel MD;  Location: [facility] OR;  Service: Urology;  Laterality: Left;    PORTACATH PLACEMENT  01/30/2024    SKIN LESION EXCISION  1990      General Information       Row Name 07/19/25 1051          Physical Therapy Time and Intention    Document Type therapy note (daily note)  -HR     Mode of Treatment individual therapy;physical therapy  -HR       Row Name 07/19/25 1051          General Information    Patient Profile Reviewed yes  -HR     Prior Level of Function independent:;all household mobility;community mobility  -HR     Existing Precautions/Restrictions fall;right;hip, anterior  -HR     Barriers to Rehab none identified  -HR       Row Name 07/19/25 1051          Living Environment    Current Living Arrangements home;other (see comments)  mobile home  -HR     People in Home spouse  -HR       Row Name 07/19/25 1051          Home Main Entrance    Number of Stairs, Main Entrance five  -HR     Stair Railings, Main Entrance railings safe and in good condition  -HR       Row Name 07/19/25 1051          Cognition    Orientation Status (Cognition) oriented x 4  -HR       Row Name 07/19/25 1051          Safety Issues/Impairments Affecting Functional Mobility    Impairments Affecting Function (Mobility) balance;endurance/activity tolerance;pain;strength;postural/trunk control;range of motion (ROM)  -HR               User Key  (r) =  Recorded By, (t) = Taken By, (c) = Cosigned By      Initials Name Provider Type    HR Patricia Dc PT Physical Therapist                   Mobility       Row Name 07/19/25 1053          Bed Mobility    Bed Mobility supine-sit;sit-supine  -HR     Supine-Sit [place] (Bed Mobility) standby assist  -HR     Sit-Supine [place] (Bed Mobility) moderate assist (50% patient effort)  -HR     Assistive Device (Bed Mobility) bed rails  -HR     Comment, (Bed Mobility) Pt req assist of R LE and trunk support during supine to sit from flat bed  -HR       Row Name 07/19/25 1053          Sit-Stand Transfer    Sit-Stand [place] (Transfers) contact guard;verbal cues  -HR     Assistive Device (Sit-Stand Transfers) walker, front-wheeled  -HR       Row Name 07/19/25 1053          Gait/Stairs (Locomotion)    [place] Level (Gait) minimum assist (75% patient effort)  -HR     Assistive Device (Gait) walker, front-wheeled  -HR     Patient was able to Ambulate no, other medical factors prevent ambulation  -HR     Distance in Feet (Gait) 6  -HR     Comment, (Gait/Stairs) Pt with continued dizziness during transfers and when standing but able to complete 4 side steps toward end of bed and 5 towards HOB with LOB. Vitals stable during transfers and side stepping c RW.  -HR               User Key  (r) = Recorded By, (t) = Taken By, (c) = Cosigned By      Initials Name Provider Type    HR Patricia Dc PT Physical Therapist                   Obj/Interventions       Row Name 07/19/25 1058          Range of Motion Comprehensive    General Range of Motion lower extremity range of motion deficits identified  -HR     Comment, General Range of Motion R hip AROM limited by pain  -HR       Row Name 07/19/25 1058          Strength Comprehensive (MMT)    General Manual Muscle Testing (MMT) Assessment lower extremity strength deficits identified  -HR     Comment, General Manual Muscle Testing (MMT) Assessment R hip post op weakness  -HR        Row Name 07/19/25 1058          Motor Skills    Motor Skills functional endurance  -HR     Therapeutic Exercise hip;knee;ankle  supine ankle pumps x10, hip abd /add small AROM x10, glute set x15, seated MIP x10, LAQ x10, standing MIP x10  -HR       Row Name 07/19/25 1058          Advanced Stepping/Walking Interventions    Stepping/Walking Interventions side stepping  -HR     Side Stepping (Stepping/Walking Interventions) c RW  -HR       Row Name 07/19/25 1058          Balance    Balance Assessment sitting static balance;sitting dynamic balance;standing dynamic balance;standing static balance  -HR     Static Sitting Balance standby assist  -HR     Dynamic Sitting Balance standby assist  -HR     Position, Sitting Balance sitting edge of bed  -HR     Static Standing Balance contact guard;minimal assist  -HR     Dynamic Standing Balance minimal assist;contact guard  -HR     Position/Device Used, Standing Balance supported;walker, front-wheeled  -HR     Balance Interventions sitting;sit to stand;standing;supported;dynamic  -HR               User Key  (r) = Recorded By, (t) = Taken By, (c) = Cosigned By      Initials Name Provider Type    HR Patricia Dc, PT Physical Therapist                   Goals/Plan    No documentation.                  Clinical Impression       Row Name 07/19/25 1104          Pain    Pretreatment Pain Rating 3/10  -HR     Posttreatment Pain Rating 3/10  -HR     Pain Location hip  -HR     Pain Side/Orientation right  -HR     Pain Management Interventions positioning techniques utilized;exercise or physical activity utilized  -HR     Response to Pain Interventions activity participation with tolerable pain  -HR       Row Name 07/19/25 1104          Plan of Care Review    Plan of Care Reviewed With patient;spouse  -HR     Outcome Evaluation Pt able to complet supine, seated and standing LE exercises to improve strength, AROM, and address pain. He req mod A x1 for supine-sitting EOB transfer  for LE transition and trunk control. He was able to sit EOB c supervision to complete exercises and then compelte STS c CGA/Tusahr.Pt able to complete standing marches with CGA and side steps at EOB for 6 feet wtih Tushar. He requires cueing for RW management. The Pt did experience dizziness and complaints of R anterior hip pain during session but was able to tolerate all activities wtihout LOB. Pt transferred from standing EOB to supine c Tushar and assist for LE placement. Pt will require continued skilled PT services is expected to DC home c HH vs SNF pending ambulation tolerance over the next day.  -HR       Row Name 07/19/25 1104          Therapy Assessment/Plan (PT)    Rehab Potential (PT) good  -HR     Criteria for Skilled Interventions Met (PT) yes;meets criteria;skilled treatment is necessary  -HR     Therapy Frequency (PT) daily  -HR       Row Name 07/19/25 1104          Positioning and Restraints    Pre-Treatment Position in bed  -HR     Post Treatment Position bed  -HR     In Bed side lying right;notified nsg;call light within reach;encouraged to call for assist;exit alarm on;with family/caregiver;side rails up x3  -HR               User Key  (r) = Recorded By, (t) = Taken By, (c) = Cosigned By      Initials Name Provider Type    HR Patricia Dc, PT Physical Therapist                   Outcome Measures       Row Name 07/19/25 1110          How much help from another person do you currently need...    Turning from your back to your side while in flat bed without using bedrails? 4  -HR     Moving from lying on back to sitting on the side of a flat bed without bedrails? 2  -HR     Moving to and from a bed to a chair (including a wheelchair)? 2  -HR     Standing up from a chair using your arms (e.g., wheelchair, bedside chair)? 3  -HR     Climbing 3-5 steps with a railing? 1  -HR     To walk in hospital room? 1  -HR     AM-PAC 6 Clicks Score (PT) 13  -HR               User Key  (r) = Recorded By, (t) = Taken By,  (c) = Cosigned By      Initials Name Provider Type    HR Patricia Dc, PT Physical Therapist                                 Physical Therapy Education       Title: PT OT SLP Therapies (Done)       Topic: Physical Therapy (Done)       Point: Mobility training (Done)       Learning Progress Summary            Patient Acceptance, E, VU by HR at 7/19/2025 1110    Acceptance, E,TB, VU,NR by EF at 7/18/2025 1607                      Point: Home exercise program (Done)       Learning Progress Summary            Patient Acceptance, E, VU by HR at 7/19/2025 1110    Acceptance, E,TB, VU,NR by EF at 7/18/2025 1607                      Point: Body mechanics (Done)       Learning Progress Summary            Patient Acceptance, E, VU by HR at 7/19/2025 1110    Acceptance, E,TB, VU,NR by EF at 7/18/2025 1607                      Point: Precautions (Done)       Learning Progress Summary            Patient Acceptance, E, VU by HR at 7/19/2025 1110    Acceptance, E,TB, VU,NR by EF at 7/18/2025 1607                                      User Key       Initials Effective Dates Name Provider Type Discipline    EF 05/31/24 -  Denisse Merritt, DANIEL Physical Therapist PT     04/29/25 -  Patricia Dc, DANIEL Physical Therapist PT                  PT Recommendation and Plan     Outcome Evaluation: Pt able to complet supine, seated and standing LE exercises to improve strength, AROM, and address pain. He req mod A x1 for supine-sitting EOB transfer for LE transition and trunk control. He was able to sit EOB c supervision to complete exercises and then compelte STS c CGA/Tushar.Pt able to complete standing marches with CGA and side steps at EOB for 6 feet wtih Tushar. He requires cueing for RW management. The Pt did experience dizziness and complaints of R anterior hip pain during session but was able to tolerate all activities wtihout LOB. Pt transferred from standing EOB to supine c Tushar and assist for LE placement. Pt will require continued  skilled PT services is expected to DC home c HH vs SNF pending ambulation tolerance over the next day.     Time Calculation:         PT Charges       Row Name 07/19/25 1111             Time Calculation    Start Time 0943  -HR      Stop Time 1008  -HR      Time Calculation (min) 25 min  -HR         Timed Charges    57862 - PT Therapeutic Exercise Minutes 10  -HR      88644 - PT Therapeutic Activity Minutes 15  -HR         Total Minutes    Timed Charges Total Minutes 25  -HR       Total Minutes 25  -HR                User Key  (r) = Recorded By, (t) = Taken By, (c) = Cosigned By      Initials Name Provider Type    HR Patricia Dc, PT Physical Therapist                  Therapy Charges for Today       Code Description Service Date Service Provider Modifiers Qty    29508009787 HC PT THER PROC EA 15 MIN 7/19/2025 Patricia Dc, PT GP 1    97682639662 HC PT THERAPEUTIC ACT EA 15 MIN 7/19/2025 Patricia Dc, PT GP 1            PT G-Codes  AM-PAC 6 Clicks Score (PT): 13  PT Discharge Summary  Anticipated Discharge Disposition (PT): skilled nursing facility, home with assist    Patricia Dc PT  7/19/2025

## 2025-07-19 NOTE — CONSULTS
CONSULT NOTE    INTERNAL MEDICINE   [facility]       Patient Identification:  Name: Louis Andino Jr.  Age: 64 y.o.  Sex: male  :  1960  MRN: [id number]             Date of Consultation:  25          Primary Care Physician: Harry Hsu MD                               Requesting Physician: dr mijares  Reason for Consultation:medical management postop    Chief Complaint:  64 year old gentleman was admitted after a hip replacement; we are asked to see him regarding postop medical management by dr mijares; he is complaining of some dizziness postop but is otherwise doing well; no visitors are present with him    History of Present Illness:   As above      Past Medical History:  Past Medical History:   Diagnosis Date    Abnormal ECG Dec 2024    Allergic 2025    Adhesive on pain patch    Anemia 2023    Due to surgery. Required transfusions    Ankle sprain     Multiple-both ankles over the years    Arthritis Years    Worse R. Hip and back    Asthma     Stable now    Cataract 2years    Optometrist is watching yearly    Chronic pain disorder     Right hip and back    Clotting disorder     Required blood transfusion during and after surgery in excess of expected    Colon polyp     Coronary artery disease Dec 2023    Bifascicular block    Deep vein thrombosis 2023    During cancer surgery a clot was surgically removed from the inferior vena cava which was cancer related    Emphysema/COPD     Erectile dysfunction Several years    Extremity pain     Right hip and bilat shoulders    Fracture of ankle     Left ankle as a child    Fracture of wrist     Left wrist as a child    GERD (gastroesophageal reflux disease)     Headache Years    Now rare - seem to be due to sleep deprivation    Headache, tension-type     Rare    HL (hearing loss)     Progressive worsening over many years    Hyperglycemia 10/12/2023    Hyperlipidemia 10/12/2023    Hypertension      Injury of neck 2023    Previous fractures noted during chiropractic visit. Probably secondary to shoulder injury.    Joint pain 2020    Right hip    Low back pain     Worse last few year    Neck pain 2020    Mild    Obesity     Pneumonia 12/20/2023    Developed while hospitalized for cancer surgery    PONV (postoperative nausea and vomiting)     Prostate disease     Rash     Allergic reactions to laundry detergent and mild generalized itching secondary to immunotherapy.    Rectal bleeding     Renal cell carcinoma 12/30/2023    Renal insufficiency 4/9/2024    Decreased renal function since nephrectomy in December, 2023, secondary to renal cancer, but not diagnosed as chronic kidney disease until recent visit. Kidney functions have been stable since surgery, but are now being considered permanent.    Shoulder injury 1980s    Injured in     Sleep apnea     USES CPAP    Visual impairment     Wear glasses for near and distant vision    Vitreous degeneration of right eye 10/12/2023    Formatting of this note might be different from the original. Retinal tear and detachment warning symptoms reviewed and patient instructed to call immediately if increasing floaters, flashes, or decreasing peripheral vision. No retinal detachment or retinal tear noted. Recheck in 1 month with dilated exam.     Past Surgical History:  Past Surgical History:   Procedure Laterality Date    ABDOMINAL SURGERY  December 18, 2023    Left Kidney and Adrenal Gland removed due to Renal Cell Carcinoma    COLONOSCOPY N/A 3/24/2025    Procedure: COLONOSCOPY to cecum and terminal ileum with cold and hot polypectomies;  Surgeon: Dolly Stephens MD;  Location: [facility] ENDOSCOPY;  Service: Gastroenterology;  Laterality: N/A;  screening//colon polyps, diverticulosis    NEPHRECTOMY Left 12/18/2023    Procedure: NEPHRECTOMY RADICAL WITH CAVAL THROMBECTOMY;  Surgeon: James Esquivel MD;  Location: [facility] OR;  Service: Urology;  Laterality: Left;     PORTACATH PLACEMENT  01/30/2024    SKIN LESION EXCISION  1990      Home Meds:  Medications Prior to Admission   Medication Sig Dispense Refill Last Dose/Taking    budesonide (PULMICORT) 0.5 MG/2ML nebulizer solution Take 2 mL (one vial) by nebulization 2 (Two) Times a Day. 180 mL 1 7/17/2025 at 10:00 PM    diphenhydrAMINE (BENADRYL) 25 mg capsule Take 1 capsule by mouth.   7/17/2025 at 10:00 PM    ferrous sulfate 325 (65 FE) MG tablet Take 1 tablet by mouth Daily With Breakfast. 90 tablet 1 7/17/2025 Morning    hydrocortisone (ANUSOL-HC) 25 MG suppository Insert 1 suppository into the rectum 2 (Two) Times a Day. 20 each 0 Past Month    lidocaine-prilocaine (EMLA) 2.5-2.5 % cream Apply 1 Application topically to the appropriate area as directed See Admin Instructions. Apply to port site one hour prior to port being accessed. 30 g 3 Past Month    losartan (COZAAR) 100 MG tablet Take 1 tablet by mouth Daily for 90 days. 90 tablet 0 7/17/2025 Morning    metoprolol succinate XL (TOPROL-XL) 25 MG 24 hr tablet Take 1 tablet by mouth Daily. (Patient taking differently: Take 1 tablet by mouth Every Night.) 90 tablet 1 7/17/2025 at 10:00 PM    ondansetron (ZOFRAN) 8 MG tablet Take 1 tablet by mouth Every 8 (Eight) Hours As Needed for Nausea or Vomiting. 30 tablet 2 Past Month    oxyCODONE-acetaminophen (PERCOCET) 5-325 MG per tablet Take 0.5-1 tablets by mouth Daily As Needed for Moderate Pain. 30 tablet 0 7/17/2025 at 10:00 PM    pantoprazole (PROTONIX) 40 MG EC tablet Take 1 tablet by mouth Daily. (Patient taking differently: Take 1 tablet by mouth Every Night.) 90 tablet 0 7/17/2025 at 10:00 PM    Plecanatide (Trulance) 3 MG tablet TAKE 1 TABLET BY MOUTH DAILY 30 tablet 4 Past Month    sildenafil (Viagra) 100 MG tablet Take 1 tablet by mouth Daily. HOLD PER MD PARIKH   7/4/2025    sucralfate (CARAFATE) 1 g tablet Take 1 tablet by mouth 3 (Three) Times a Day As Needed (heartburn). 270 tablet 0 7/17/2025 at 10:00 PM     triamcinolone (KENALOG) 0.025 % ointment    Past Month    vitamin D3 125 MCG (5000 UT) capsule capsule Take 1 capsule by mouth Daily.   7/17/2025 Morning    clobetasol propionate (TEMOVATE) 0.05 % cream Apply a small amount to affected area twice a day 60 g 1 7/16/2025 Evening    hydrocortisone 0.5 % cream Apply 1 Application topically to the appropriate area as directed.   7/16/2025 Evening    ipratropium-albuterol (COMBIVENT RESPIMAT)  MCG/ACT inhaler Inhale 1 puff 4 (Four) Times a Day As Needed for Wheezing.   More than a month    multivitamin with minerals tablet tablet Take 1 tablet by mouth Daily. HOLD PER MD INSTR   7/14/2025     Current Meds:     Current Facility-Administered Medications:     acetaminophen (TYLENOL) tablet 1,000 mg, 1,000 mg, Oral, Q8H, Yovani Lund MD    [START ON 7/19/2025] aspirin EC tablet 81 mg, 81 mg, Oral, Q12H, Yovani Lund MD    [START ON 7/19/2025] bisacodyl (DULCOLAX) EC tablet 10 mg, 10 mg, Oral, Daily PRN, Yovani Lund MD    budesonide (PULMICORT) nebulizer solution 0.5 mg, 0.5 mg, Nebulization, BID, Yovani Lund MD, 0.5 mg at 07/18/25 1918    ceFAZolin 2000 mg IVPB in 100 mL NS (MBP), 2,000 mg, Intravenous, Q8H, Yovani Lund MD, 2,000 mg at 07/18/25 1736    Chlorhexidine Gluconate Cloth 2 % pads, , Apply externally, Once, Yovani Lund MD    clobetasol propionate (TEMOVATE) 0.05 % cream, , Topical, Q12H, Yovani Lund MD    diphenhydrAMINE (BENADRYL) capsule 25 mg, 25 mg, Oral, Q6H PRN, Yovani Lund MD    docusate sodium (COLACE) capsule 100 mg, 100 mg, Oral, BID, Yovani Lund MD    HYDROcodone-acetaminophen (NORCO) 7.5-325 MG per tablet 1 tablet, 1 tablet, Oral, Q4H PRN, Yovani Lund MD    HYDROcodone-acetaminophen (NORCO) 7.5-325 MG per tablet 2 tablet, 2 tablet, Oral, Q4H PRN, Yovani Lund MD    HYDROmorphone (DILAUDID) injection 0.5 mg, 0.5  mg, Intravenous, Q2H PRN **AND** naloxone (NARCAN) injection 0.1 mg, 0.1 mg, Intravenous, Q5 Min PRN, Yovani Lund MD    ipratropium-albuterol (DUO-NEB) nebulizer solution 3 mL, 3 mL, Nebulization, Q6H While Awake - RT, Yovani Lund MD, 3 mL at 07/18/25 1917    losartan (COZAAR) tablet 100 mg, 100 mg, Oral, Q24H, Yovani Lund MD    metoprolol succinate XL (TOPROL-XL) 24 hr tablet 25 mg, 25 mg, Oral, Nightly, Yovani Lund MD    ondansetron ODT (ZOFRAN-ODT) disintegrating tablet 4 mg, 4 mg, Oral, Q6H PRN **OR** ondansetron (ZOFRAN) injection 4 mg, 4 mg, Intravenous, Q6H PRN, Yovani Lund MD    pantoprazole (PROTONIX) EC tablet 40 mg, 40 mg, Oral, Nightly, Yovani Lund MD    sodium chloride 0.9 % infusion, 75 mL/hr, Intravenous, Continuous, StinglOmaira MD  Allergies:  Allergies   Allergen Reactions    Adhesive Tape Itching and Rash    Bactrim [Sulfamethoxazole-Trimethoprim] Rash    Butrans [Buprenorphine] Itching and Rash     Butrans patch     Social History:   Social History     Socioeconomic History    Marital status:     Number of children: 6   Tobacco Use    Smoking status: Never     Passive exposure: Past    Smokeless tobacco: Never    Tobacco comments:     Passive due to Father and most male relatives smoking   Vaping Use    Vaping status: Never Used   Substance and Sexual Activity    Alcohol use: Yes     Alcohol/week: 1.0 standard drink of alcohol     Types: 1 Glasses of wine per week     Comment: 1 drink in a day 2-3 times per month.    Drug use: Never    Sexual activity: Yes     Partners: Female     Birth control/protection: None     Family History:  Family History   Problem Relation Age of Onset    Arthritis Mother     Cancer Mother     Diabetes Mother     Heart disease Mother     Hyperlipidemia Mother     Miscarriages / Stillbirths Mother         Stillborn child    Hypertension Mother     Osteoporosis Mother     Kidney  "disease Mother         Kidney stones    Vision loss Mother         Near sighted    Coronary artery disease Mother     COPD Father     Hyperlipidemia Father     Migraines Father     Vision loss Father         Near sighted    Emphysema Father     Chronic bronchitis Father     Hyperlipidemia Brother     Hypertension Brother     Asthma Brother     Vision loss Daughter     Broken bones Daughter         Broke left forearm three times during childhood    Broken bones Daughter         Fractured right femur and right forearm during childhood    Anxiety disorder Daughter     Depression Daughter     Miscarriages / Stillbirths Daughter         First Trimester miscarriage    Dislocations Daughter         Recurrent radial head subluxations as a child    Anxiety disorder Daughter     Depression Daughter     Asthma Daughter     Asthma Son     Depression Son     Malig Hyperthermia Neg Hx           Review of Systems  See history of present illness and past medical history.  Patient denies headache, dizziness, syncope, falls, trauma, change in vision, change in hearing, change in taste, changes in weight, changes in appetite, focal weakness, numbness, or paresthesia.  Patient denies chest pain, palpitations, dyspnea, orthopnea, PND, cough, sinus pressure, rhinorrhea, epistaxis, hemoptysis, nausea, vomiting,hematemesis, diarrhea, constipation or hematochezia. Denies cold or heat intolerance, polydipsia, polyuria, polyphagia. Denies hematuria, pyuria, dysuria, hesitancy, frequency or urgency. Denies consumption of raw and under cooked meats foods or change in water source.  Denies fever, chills, sweats, night sweats.  Denies missing any routine medications. Remainder of ROS is negative.      Vitals:   /72 (BP Location: Right arm, Patient Position: Lying)   Pulse 76   Temp 97.6 °F (36.4 °C) (Oral)   Resp 16   Ht 172.7 cm (68\")   Wt 113 kg (250 lb)   SpO2 95%   BMI 38.01 kg/m²   I/O:   Intake/Output Summary (Last 24 hours) at " "7/18/2025 2006  Last data filed at 7/18/2025 1700  Gross per 24 hour   Intake 1220 ml   Output --   Net 1220 ml     Exam:  General Appearance:    Alert, cooperative, no distress, appears stated age   Head:    Normocephalic, without obvious abnormality, atraumatic   Eyes:    PERRL, conjunctivae/corneas clear, EOM's intact, both eyes   Ears:    Normal external ear canals, both ears   Nose:   Nares normal, septum midline, mucosa normal, no drainage    or sinus tenderness   Throat:   Lips, tongue, gums normal; oral mucosa pink and moist   Neck:   Supple, symmetrical, trachea midline, no adenopathy;     thyroid:  no enlargement/tenderness/nodules; no carotid    bruit or JVD   Back:     Symmetric, no curvature, ROM normal, no CVA tenderness   Lungs:     Clear to auscultation bilaterally, respirations unlabored   Chest Wall:    No tenderness or deformity    Heart:    Regular rate and rhythm, S1 and S2 normal, no murmur, rub   or gallop   Abdomen:     Soft, nontender, bowel sounds active all four quadrants,     no masses, no hepatomegaly, no splenomegaly   Extremities:   Extremities normal, atraumatic, no cyanosis or edema                Data Review:  Labs in chart were reviewed.  No results found for: \"WBC\", \"HGB\", \"HCT\", \"PLT\"  No results found for: \"NA\", \"K\", \"CL\", \"CO2\", \"BUN\", \"CREATININE\", \"GLUCOSE\"  No results found for: \"CALCIUM\", \"MG\", \"PHOS\"  No results found for: \"AST\", \"ALT\", \"ALKPHOS\"            Imaging Results (Last 7 Days)       Procedure Component Value Units Date/Time    XR Hip With or Without Pelvis 1 View Right [[id number]] Collected: 07/18/25 1242     Updated: 07/18/25 1250    Narrative:      XR HIP W OR WO PELVIS 1 VIEW RIGHT-     INDICATIONS: Postoperative evaluation.     TECHNIQUE: Frontal view of the pelvis     COMPARISON: 10/23/2024     FINDINGS:      Intact appearing right hip arthroplasty hardware is seen with adjacent  surgical soft tissue gas. No acute fracture is identified.          Impression: "         Postsurgical changes.           This report was finalized on 7/18/2025 12:43 PM by Dr. Emmanuel Salas M.D on Workstation: [id number]       FL C Arm During Surgery [[id number]] Resulted: 07/18/25 1132     Updated: 07/18/25 1132    Narrative:      This procedure was auto-finalized with no dictation required.          Past Medical History:   Diagnosis Date    Abnormal ECG Dec 2024    Allergic Feb. 2025    Adhesive on pain patch    Anemia 12/18/2023    Due to surgery. Required transfusions    Ankle sprain     Multiple-both ankles over the years    Arthritis Years    Worse R. Hip and back    Asthma     Stable now    Cataract 2years    Optometrist is watching yearly    Chronic pain disorder 2020    Right hip and back    Clotting disorder     Required blood transfusion during and after surgery in excess of expected    Colon polyp 2025    Coronary artery disease Dec 2023    Bifascicular block    Deep vein thrombosis 12/18/2023    During cancer surgery a clot was surgically removed from the inferior vena cava which was cancer related    Emphysema/COPD     Erectile dysfunction Several years    Extremity pain 2020    Right hip and bilat shoulders    Fracture of ankle     Left ankle as a child    Fracture of wrist     Left wrist as a child    GERD (gastroesophageal reflux disease)     Headache Years    Now rare - seem to be due to sleep deprivation    Headache, tension-type 1977    Rare    HL (hearing loss)     Progressive worsening over many years    Hyperglycemia 10/12/2023    Hyperlipidemia 10/12/2023    Hypertension     Injury of neck 2023    Previous fractures noted during chiropractic visit. Probably secondary to shoulder injury.    Joint pain 2020    Right hip    Low back pain     Worse last few year    Neck pain 2020    Mild    Obesity     Pneumonia 12/20/2023    Developed while hospitalized for cancer surgery    PONV (postoperative nausea and vomiting)     Prostate disease     Rash     Allergic reactions to  laundry detergent and mild generalized itching secondary to immunotherapy.    Rectal bleeding     Renal cell carcinoma 12/30/2023    Renal insufficiency 4/9/2024    Decreased renal function since nephrectomy in December, 2023, secondary to renal cancer, but not diagnosed as chronic kidney disease until recent visit. Kidney functions have been stable since surgery, but are now being considered permanent.    Shoulder injury 1980s    Injured in     Sleep apnea     USES CPAP    Visual impairment     Wear glasses for near and distant vision    Vitreous degeneration of right eye 10/12/2023    Formatting of this note might be different from the original. Retinal tear and detachment warning symptoms reviewed and patient instructed to call immediately if increasing floaters, flashes, or decreasing peripheral vision. No retinal detachment or retinal tear noted. Recheck in 1 month with dilated exam.       Assessment:  Active Hospital Problems    Diagnosis  POA    Status post total hip replacement, right [Z96.641]  Not Applicable      Resolved Hospital Problems    Diagnosis Date Resolved POA    **Osteoarthritis of right hip [M16.11] 07/18/2025 Yes    Primary osteoarthritis of right hip [M16.11] 07/18/2025 Yes   Hypertension  Hyperlipidemia  Hyperglycemia  Copd  Cad      Plan:  Fluids  Will follow with you  Monitor bp  Check labs in am  Dw patient    Omaira Lucia MD   7/18/2025  20:06 EDT

## 2025-07-19 NOTE — DISCHARGE SUMMARY
orthopedic Discharge Summary      Patient: Louis Andino Jr.    YOB: 1960    Medical Record Number: [id number]    Attending Physician: Yovani Lund,*    Consulting Physician(s):   Consulting Physician(s)         Provider   Role Specialty     Wade Gutiérrez MD      Consulting Physician Hospitalist            Date of Admission: 7/18/2025  7:36 AM  Date of Discharge:     Admitting Diagnosis: Primary osteoarthritis of right hip [M16.11]    Procedure(s):  TOTAL HIP ARTHROPLASTY ANTERIOR WITH HANA TABLE      Status post total hip replacement, right    Stage 3a chronic kidney disease         Allergies   Allergen Reactions    Adhesive Tape Itching and Rash    Bactrim [Sulfamethoxazole-Trimethoprim] Rash    Butrans [Buprenorphine] Itching and Rash     Butrans patch          Discharge Medications        New Medications        Instructions Start Date   Aspirin Low Dose 81 MG EC tablet  Generic drug: aspirin   Take 1 tablet by mouth Every 12 (Twelve) Hours.      bisacodyl 5 MG EC tablet  Generic drug: bisacodyl   10 mg, Oral, Daily PRN      docusate sodium 100 MG capsule  Commonly known as: COLACE   100 mg, Oral, 2 Times Daily      HYDROcodone-acetaminophen 5-325 MG per tablet  Commonly known as: NORCO   1 tablet, Oral, Every 4 Hours PRN, Take 1 tablet orally every 4-6 hours as needed for pain. .      ondansetron ODT 4 MG disintegrating tablet  Commonly known as: ZOFRAN-ODT   4 mg, Sublingual, Every 6 Hours PRN             Changes to Medications        Instructions Start Date   metoprolol succinate XL 25 MG 24 hr tablet  Commonly known as: TOPROL-XL  What changed: when to take this   25 mg, Oral, Daily      pantoprazole 40 MG EC tablet  Commonly known as: PROTONIX  What changed: when to take this   40 mg, Oral, Daily             Continue These Medications        Instructions Start Date   budesonide 0.5 MG/2ML nebulizer solution  Commonly known as: PULMICORT   Take 2 mL (one vial) by nebulization  2 (Two) Times a Day.      clobetasol propionate 0.05 % cream  Commonly known as: TEMOVATE   Apply a small amount to affected area twice a day      diphenhydrAMINE 25 mg capsule  Commonly known as: BENADRYL   1 capsule      ferrous sulfate 325 (65 FE) MG tablet   325 mg, Oral, Daily With Breakfast      hydrocortisone 0.5 % cream   1 Application      hydrocortisone 25 MG suppository  Commonly known as: ANUSOL-HC   25 mg, Rectal, 2 Times Daily      ipratropium-albuterol  MCG/ACT inhaler  Commonly known as: COMBIVENT RESPIMAT   1 puff, 4 Times Daily PRN      lidocaine-prilocaine 2.5-2.5 % cream  Commonly known as: EMLA   1 Application, Topical, See Admin Instructions, Apply to port site one hour prior to port being accessed.      losartan 100 MG tablet  Commonly known as: COZAAR   100 mg, Oral, Every 24 Hours Scheduled      multivitamin with minerals tablet tablet   1 tablet, Daily      ondansetron 8 MG tablet  Commonly known as: ZOFRAN   8 mg, Oral, Every 8 Hours PRN      sucralfate 1 g tablet  Commonly known as: CARAFATE   1 g, Oral, 3 Times Daily PRN      triamcinolone 0.025 % ointment  Commonly known as: KENALOG       Trulance 3 MG tablet  Generic drug: Plecanatide   3 mg, Oral, Daily      Viagra 100 MG tablet  Generic drug: sildenafil   100 mg, Daily      vitamin D3 125 MCG (5000 UT) capsule capsule   5,000 Units, Daily             Stop These Medications      oxyCODONE-acetaminophen 5-325 MG per tablet  Commonly known as: PERCOCET                   Past Medical History:   Diagnosis Date    Abnormal ECG Dec 2024    Allergic Feb. 2025    Adhesive on pain patch    Anemia 12/18/2023    Due to surgery. Required transfusions    Ankle sprain     Multiple-both ankles over the years    Arthritis Years    Worse R. Hip and back    Asthma     Stable now    Cataract 2years    Optometrist is watching yearly    Chronic pain disorder 2020    Right hip and back    Clotting disorder     Required blood transfusion during and  after surgery in excess of expected    Colon polyp 2025    Coronary artery disease Dec 2023    Bifascicular block    Deep vein thrombosis 12/18/2023    During cancer surgery a clot was surgically removed from the inferior vena cava which was cancer related    Emphysema/COPD     Erectile dysfunction Several years    Extremity pain 2020    Right hip and bilat shoulders    Fracture of ankle     Left ankle as a child    Fracture of wrist     Left wrist as a child    GERD (gastroesophageal reflux disease)     Headache Years    Now rare - seem to be due to sleep deprivation    Headache, tension-type 1977    Rare    HL (hearing loss)     Progressive worsening over many years    Hyperglycemia 10/12/2023    Hyperlipidemia 10/12/2023    Hypertension     Injury of neck 2023    Previous fractures noted during chiropractic visit. Probably secondary to shoulder injury.    Joint pain 2020    Right hip    Low back pain     Worse last few year    Neck pain 2020    Mild    Obesity     Pneumonia 12/20/2023    Developed while hospitalized for cancer surgery    PONV (postoperative nausea and vomiting)     Prostate disease     Rash     Allergic reactions to laundry detergent and mild generalized itching secondary to immunotherapy.    Rectal bleeding     Renal cell carcinoma 12/30/2023    Renal insufficiency 4/9/2024    Decreased renal function since nephrectomy in December, 2023, secondary to renal cancer, but not diagnosed as chronic kidney disease until recent visit. Kidney functions have been stable since surgery, but are now being considered permanent.    Shoulder injury 1980s    Injured in     Sleep apnea     USES CPAP    Visual impairment     Wear glasses for near and distant vision    Vitreous degeneration of right eye 10/12/2023    Formatting of this note might be different from the original. Retinal tear and detachment warning symptoms reviewed and patient instructed to call immediately if increasing floaters, flashes,  or decreasing peripheral vision. No retinal detachment or retinal tear noted. Recheck in 1 month with dilated exam.        Past Surgical History:   Procedure Laterality Date    ABDOMINAL SURGERY  December 18, 2023    Left Kidney and Adrenal Gland removed due to Renal Cell Carcinoma    COLONOSCOPY N/A 3/24/2025    Procedure: COLONOSCOPY to cecum and terminal ileum with cold and hot polypectomies;  Surgeon: Dolly Stephens MD;  Location: [facility] ENDOSCOPY;  Service: Gastroenterology;  Laterality: N/A;  screening//colon polyps, diverticulosis    NEPHRECTOMY Left 12/18/2023    Procedure: NEPHRECTOMY RADICAL WITH CAVAL THROMBECTOMY;  Surgeon: James Esquivel MD;  Location: [facility] MAIN OR;  Service: Urology;  Laterality: Left;    PORTACATH PLACEMENT  01/30/2024    SKIN LESION EXCISION  1990        Social History     Occupational History    Occupation: RETIRED     Comment: WORKS AS A PROFESSOR   Tobacco Use    Smoking status: Never     Passive exposure: Past    Smokeless tobacco: Never    Tobacco comments:     Passive due to Father and most male relatives smoking   Vaping Use    Vaping status: Never Used   Substance and Sexual Activity    Alcohol use: Yes     Alcohol/week: 1.0 standard drink of alcohol     Types: 1 Glasses of wine per week     Comment: 1 drink in a day 2-3 times per month.    Drug use: Never    Sexual activity: Yes     Partners: Female     Birth control/protection: None      Social History     Social History Narrative    Not on file        Family History   Problem Relation Age of Onset    Arthritis Mother     Cancer Mother     Diabetes Mother     Heart disease Mother     Hyperlipidemia Mother     Miscarriages / Stillbirths Mother         Stillborn child    Hypertension Mother     Osteoporosis Mother     Kidney disease Mother         Kidney stones    Vision loss Mother         Near sighted    Coronary artery disease Mother     COPD Father     Hyperlipidemia Father     Migraines Father     Vision loss  Father         Near sighted    Emphysema Father     Chronic bronchitis Father     Hyperlipidemia Brother     Hypertension Brother     Asthma Brother     Vision loss Daughter     Broken bones Daughter         Broke left forearm three times during childhood    Broken bones Daughter         Fractured right femur and right forearm during childhood    Anxiety disorder Daughter     Depression Daughter     Miscarriages / Stillbirths Daughter         First Trimester miscarriage    Dislocations Daughter         Recurrent radial head subluxations as a child    Anxiety disorder Daughter     Depression Daughter     Asthma Daughter     Asthma Son     Depression Son     Malig Hyperthermia Neg Hx        Physical Exam: 64 y.o. male  General Appearance:    Alert, cooperative, in no acute distress                      Vitals:    07/22/25 2030 07/23/25 0427 07/23/25 0651 07/23/25 0814   BP: 130/57 139/68  132/80   BP Location:  Left arm  Left arm   Patient Position:  Lying  Lying   Pulse: 95 73 78 95   Resp:  20 16 16   Temp:  97.8 °F (36.6 °C)  97.5 °F (36.4 °C)   TempSrc:  Axillary  Oral   SpO2:  98% 97% 97%   Weight:       Height:            Hospital Course:  64 y.o. male admitted to [facility] to services of Yovani Lund * with  on 7/18/2025 and underwent a Right total hip arthroplasty Per Yovani Lund MD. Antibiotic  Kefzol  every 8 hours and VTE prophylaxis  Aspirin  orally  were per [facility]IP protocols. Post-operatively the patient transferred to the post-operative floor where the patient underwent mobilization therapy that included active ROM exercises. Opioids were titrated to achieve appropriate pain management to allow for participation in mobilization exercises. Vital signs are now stable. The incision is intact without signs or symptoms of infection. Operative extremity neurovascular status remains intact.     Appropriate education re: incision care, activity levels, medications, hip dislocation  precautions, and follow up visits was completed and all questions were answered.     The patient was slow to mobilize.  He was changed to inpatient status.  He had significant dizziness on trying to stand up and walk.  He was seen and evaluated by the hospitalist.  He received several boluses of IV fluids and also has been given 2 doses of dexamethasone intravenously.  He also had a rash which has been his pre-existing condition prior to surgery.  He improved significantly and was mobilized with physical therapy.    The patient is now deemed stable for discharge.    DISCHARGE DISPOSITION AND PLAN:  The  Patient is being discharged home with home health for PT   2-3 X per week for 2-3 weeks and nursing care as needed.     DIAGNOSTIC TESTS:     Admission on 07/18/2025   Component Date Value Ref Range Status    Glucose 07/19/2025 134 (H)  65 - 99 mg/dL Final    BUN 07/19/2025 17.0  8.0 - 23.0 mg/dL Final    Creatinine 07/19/2025 1.31 (H)  0.76 - 1.27 mg/dL Final    Sodium 07/19/2025 134 (L)  136 - 145 mmol/L Final    Potassium 07/19/2025 4.7  3.5 - 5.2 mmol/L Final    Slight hemolysis detected by analyzer. Result may be falsely elevated.    Chloride 07/19/2025 103  98 - 107 mmol/L Final    CO2 07/19/2025 19.9 (L)  22.0 - 29.0 mmol/L Final    Calcium 07/19/2025 8.5 (L)  8.6 - 10.5 mg/dL Final    BUN/Creatinine Ratio 07/19/2025 13.0  7.0 - 25.0 Final    Anion Gap 07/19/2025 11.1  5.0 - 15.0 mmol/L Final    eGFR 07/19/2025 60.0 (L)  >60.0 mL/min/1.73 Final    WBC 07/19/2025 19.52 (H)  3.40 - 10.80 10*3/mm3 Final    RBC 07/19/2025 3.66 (L)  4.14 - 5.80 10*6/mm3 Final    Hemoglobin 07/19/2025 11.0 (L)  13.0 - 17.7 g/dL Final    Hematocrit 07/19/2025 33.0 (L)  37.5 - 51.0 % Final    MCV 07/19/2025 90.2  79.0 - 97.0 fL Final    MCH 07/19/2025 30.1  26.6 - 33.0 pg Final    MCHC 07/19/2025 33.3  31.5 - 35.7 g/dL Final    RDW 07/19/2025 14.6  12.3 - 15.4 % Final    RDW-SD 07/19/2025 48.0  37.0 - 54.0 fl Final    MPV 07/19/2025  10.2  6.0 - 12.0 fL Final    Platelets 07/19/2025 193  140 - 450 10*3/mm3 Final    Neutrophil % 07/19/2025 80.9 (H)  42.7 - 76.0 % Final    Lymphocyte % 07/19/2025 10.1 (L)  19.6 - 45.3 % Final    Monocyte % 07/19/2025 7.4  5.0 - 12.0 % Final    Eosinophil % 07/19/2025 0.3  0.3 - 6.2 % Final    Basophil % 07/19/2025 0.3  0.0 - 1.5 % Final    Immature Grans % 07/19/2025 1.0 (H)  0.0 - 0.5 % Final    Neutrophils, Absolute 07/19/2025 15.81 (H)  1.70 - 7.00 10*3/mm3 Final    Lymphocytes, Absolute 07/19/2025 1.97  0.70 - 3.10 10*3/mm3 Final    Monocytes, Absolute 07/19/2025 1.45 (H)  0.10 - 0.90 10*3/mm3 Final    Eosinophils, Absolute 07/19/2025 0.05  0.00 - 0.40 10*3/mm3 Final    Basophils, Absolute 07/19/2025 0.05  0.00 - 0.20 10*3/mm3 Final    Immature Grans, Absolute 07/19/2025 0.19 (H)  0.00 - 0.05 10*3/mm3 Final    nRBC 07/19/2025 0.0  0.0 - 0.2 /100 WBC Final    Glucose 07/20/2025 87  65 - 99 mg/dL Final    BUN 07/20/2025 15.0  8.0 - 23.0 mg/dL Final    Creatinine 07/20/2025 1.25  0.76 - 1.27 mg/dL Final    Sodium 07/20/2025 140  136 - 145 mmol/L Final    Potassium 07/20/2025 4.0  3.5 - 5.2 mmol/L Final    Chloride 07/20/2025 105  98 - 107 mmol/L Final    CO2 07/20/2025 26.1  22.0 - 29.0 mmol/L Final    Calcium 07/20/2025 9.0  8.6 - 10.5 mg/dL Final    Total Protein 07/20/2025 6.2  6.0 - 8.5 g/dL Final    Albumin 07/20/2025 3.5  3.5 - 5.2 g/dL Final    ALT (SGPT) 07/20/2025 14  1 - 41 U/L Final    AST (SGOT) 07/20/2025 16  1 - 40 U/L Final    Alkaline Phosphatase 07/20/2025 64  39 - 117 U/L Final    Total Bilirubin 07/20/2025 0.6  0.0 - 1.2 mg/dL Final    Globulin 07/20/2025 2.7  gm/dL Final    A/G Ratio 07/20/2025 1.3  g/dL Final    BUN/Creatinine Ratio 07/20/2025 12.0  7.0 - 25.0 Final    Anion Gap 07/20/2025 8.9  5.0 - 15.0 mmol/L Final    eGFR 07/20/2025 63.5  >60.0 mL/min/1.73 Final    Phosphorus 07/20/2025 1.8 (C)  2.5 - 4.5 mg/dL Final    Magnesium 07/20/2025 2.4  1.6 - 2.4 mg/dL Final    WBC 07/20/2025  14.21 (H)  3.40 - 10.80 10*3/mm3 Final    RBC 07/20/2025 3.67 (L)  4.14 - 5.80 10*6/mm3 Final    Hemoglobin 07/20/2025 10.9 (L)  13.0 - 17.7 g/dL Final    Hematocrit 07/20/2025 32.9 (L)  37.5 - 51.0 % Final    MCV 07/20/2025 89.6  79.0 - 97.0 fL Final    MCH 07/20/2025 29.7  26.6 - 33.0 pg Final    MCHC 07/20/2025 33.1  31.5 - 35.7 g/dL Final    RDW 07/20/2025 14.8  12.3 - 15.4 % Final    RDW-SD 07/20/2025 48.4  37.0 - 54.0 fl Final    MPV 07/20/2025 10.3  6.0 - 12.0 fL Final    Platelets 07/20/2025 191  140 - 450 10*3/mm3 Final    Neutrophil % 07/20/2025 67.4  42.7 - 76.0 % Final    Lymphocyte % 07/20/2025 18.6 (L)  19.6 - 45.3 % Final    Monocyte % 07/20/2025 9.8  5.0 - 12.0 % Final    Eosinophil % 07/20/2025 2.9  0.3 - 6.2 % Final    Basophil % 07/20/2025 0.4  0.0 - 1.5 % Final    Immature Grans % 07/20/2025 0.9 (H)  0.0 - 0.5 % Final    Neutrophils, Absolute 07/20/2025 9.57 (H)  1.70 - 7.00 10*3/mm3 Final    Lymphocytes, Absolute 07/20/2025 2.65  0.70 - 3.10 10*3/mm3 Final    Monocytes, Absolute 07/20/2025 1.39 (H)  0.10 - 0.90 10*3/mm3 Final    Eosinophils, Absolute 07/20/2025 0.41 (H)  0.00 - 0.40 10*3/mm3 Final    Basophils, Absolute 07/20/2025 0.06  0.00 - 0.20 10*3/mm3 Final    Immature Grans, Absolute 07/20/2025 0.13 (H)  0.00 - 0.05 10*3/mm3 Final    nRBC 07/20/2025 0.0  0.0 - 0.2 /100 WBC Final    Phosphorus 07/20/2025 2.0 (L)  2.5 - 4.5 mg/dL Final    Phosphorus 07/21/2025 2.3 (L)  2.5 - 4.5 mg/dL Final    Glucose 07/22/2025 96  65 - 99 mg/dL Final    BUN 07/22/2025 14.0  8.0 - 23.0 mg/dL Final    Creatinine 07/22/2025 1.33 (H)  0.76 - 1.27 mg/dL Final    Sodium 07/22/2025 136  136 - 145 mmol/L Final    Potassium 07/22/2025 4.4  3.5 - 5.2 mmol/L Final    Chloride 07/22/2025 102  98 - 107 mmol/L Final    CO2 07/22/2025 23.6  22.0 - 29.0 mmol/L Final    Calcium 07/22/2025 8.6  8.6 - 10.5 mg/dL Final    Total Protein 07/22/2025 5.3 (L)  6.0 - 8.5 g/dL Final    Albumin 07/22/2025 2.7 (L)  3.5 - 5.2 g/dL  "Final    ALT (SGPT) 07/22/2025 15  1 - 41 U/L Final    AST (SGOT) 07/22/2025 19  1 - 40 U/L Final    Alkaline Phosphatase 07/22/2025 51  39 - 117 U/L Final    Total Bilirubin 07/22/2025 1.1  0.0 - 1.2 mg/dL Final    Globulin 07/22/2025 2.6  gm/dL Final    A/G Ratio 07/22/2025 1.0  g/dL Final    BUN/Creatinine Ratio 07/22/2025 10.5  7.0 - 25.0 Final    Anion Gap 07/22/2025 10.4  5.0 - 15.0 mmol/L Final    eGFR 07/22/2025 59.0 (L)  >60.0 mL/min/1.73 Final    WBC 07/22/2025 15.01 (H)  3.40 - 10.80 10*3/mm3 Final    RBC 07/22/2025 3.80 (L)  4.14 - 5.80 10*6/mm3 Final    Hemoglobin 07/22/2025 11.3 (L)  13.0 - 17.7 g/dL Final    Hematocrit 07/22/2025 34.3 (L)  37.5 - 51.0 % Final    MCV 07/22/2025 90.3  79.0 - 97.0 fL Final    MCH 07/22/2025 29.7  26.6 - 33.0 pg Final    MCHC 07/22/2025 32.9  31.5 - 35.7 g/dL Final    RDW 07/22/2025 14.4  12.3 - 15.4 % Final    RDW-SD 07/22/2025 47.3  37.0 - 54.0 fl Final    MPV 07/22/2025 9.9  6.0 - 12.0 fL Final    Platelets 07/22/2025 201  140 - 450 10*3/mm3 Final    nRBC 07/22/2025 0.0  0.0 - 0.2 /100 WBC Final    Neutrophil % 07/22/2025 73.9  42.7 - 76.0 % Final    Lymphocyte % 07/22/2025 13.0 (L)  19.6 - 45.3 % Final    Monocyte % 07/22/2025 2.2 (L)  5.0 - 12.0 % Final    Eosinophil % 07/22/2025 10.9 (H)  0.3 - 6.2 % Final    Basophil % 07/22/2025 0.0  0.0 - 1.5 % Final    Neutrophils Absolute 07/22/2025 11.09 (H)  1.70 - 7.00 10*3/mm3 Final    Lymphocytes Absolute 07/22/2025 1.95  0.70 - 3.10 10*3/mm3 Final    Monocytes Absolute 07/22/2025 0.33  0.10 - 0.90 10*3/mm3 Final    Eosinophils Absolute 07/22/2025 1.64 (H)  0.00 - 0.40 10*3/mm3 Final    Basophils Absolute 07/22/2025 0.00  0.00 - 0.20 10*3/mm3 Final    RBC Morphology 07/22/2025 Normal  Normal Final    Smudge Cells 07/22/2025 Slight/1+  None Seen Final    Platelet Morphology 07/22/2025 Normal  Normal Final     No results found for: \"URICACID\"  No results found for: \"CRYSTAL\"  Microbiology Results (last 10 days)       ** No " results found for the last 240 hours. **          XR Hip With or Without Pelvis 1 View Right  Result Date: 7/18/2025   Postsurgical changes.    This report was finalized on 7/18/2025 12:43 PM by Dr. Emmanuel Salas M.D on Workstation: [id number]          Follow-up Appointments  Future Appointments   Date Time Provider Department Center   8/13/2025  9:00 AM [facility] PC STMAT DEE   8/15/2025 12:30 PM SADE CC CT BH SADE PET SADE   8/15/2025  1:15 PM [facility] INJECTION CHAIR SADE BH LAG CC NA LAG   8/20/2025  2:15 PM Harry Hsu MD [facility] PC STMAT DEE   8/22/2025 10:30 AM [facility] ONLY ONC LAB NA BH LAG ONAL SADE   8/22/2025 10:30 AM Praveen Lama MD [facility] ONC NA SADE   8/27/2025 10:40 AM Asa Luis PA [facility] PM EASPT DEE   8/27/2025  2:30 PM Bradley Ahuja MD [facility] CD [id number] DEE   9/17/2025 11:00 AM Becky Jama, DNP, APRN [facility] SLP DEE None         Discharge and Follow up Instructions:     I. ACTIVITIES:  1. Exercises:  Complete exercise program as taught by the hospital physical therapist 2 times per day  Exercise program will be advanced by the physical therapist  During the day be up ambulating every 2 hours (while awake) for short distances  Complete the ankle pump exercises at least 10 times per hour (while awake)  Elevate legs when in bed and for at least 30 minutes during the day.Use cold packs 20-30 minutes approximately 5 times per day. This should be done before and after completing your exercises and at any time you are experiencing pain/ stiffness in your operative extremity.      2. Activities of Daily Living:  No tub baths, hot tubs, or swimming pools for 4 weeks  May shower and let water run over the incision on post-operative day #5 if no drainage. Do not scrub or rub the incision. Simply let the water run over the incision and pat dry.    II. Restrictions  Continue  Anterior hip precautions as taught at the hospital  Your surgeon will discuss with you when you will be able to  drive again. Usual guidelines are you are to be off pain medications prior to driving.  Weight bearing is as tolerated  First week stay inside on even terrain. May go up and down stairs one stair at a time utilizing the hand rail once cleared by physical therapy to do so.  After one week, you may venture outside (if cleared to do so by physical therapist).    III. Precautions:  Everyone that comes near you should wash their hands  No elective dental, genital-urinary, or colon procedures or surgical procedures for 12 weeks after surgery unless absolutely necessary.   If dental work or surgical procedure is deemed absolutely necessary, you will need to contact your surgeon as you will need to take antibiotics 1 hour prior to any dental work (including teeth cleanings).  Please discuss with your surgeon prophylactic antibiotics as the length of time this intervention will be necessary for you varies with each patient’s health history and situation.  Avoid sick people. If you must be around someone who is ill, they should wear a mask.  Avoid visits to the Emergency Room or Urgent Care. If you feel you need to go to the Emergency Room, please notify your Surgeon's office.  Stockings are to be worn for one week after surgery and are to be placed on in the morning and removed at night. Observe your skin when stocking is removed for any problems. Monitor the stockings to ensure that any swelling is not causing the stockings to become too tight. In this case, remove stockings immediately.      IV. INCISION CARE:  Wash your hands prior to dressing changes  Change the dressing as needed to keep incision clean and dry. Utilize dry gauze and paper tape. Avoid touching the side of the gauze that goes against the incision with your hands.  No creams or ointments to the incision  May remove dressing once the incision is free of drainage  Do not touch or pick at the incision  Check incision every day and notify surgeon immediately  if any of the following signs or symptoms are noted:  Increase in redness  Increase in swelling around the incision and of the entire extremity  Increase in pain  Drainage oozing from the incision  Pulling apart of the edges of the incision  Increase in overall body temperature (greater than 100.5 degrees)     You have absorbable sutures with Exofin Fusion, please do not remove the steristrips/ Exofin Fusion for 14 days, you can shower on them 6 days after surgery.       V. Medications:   1. Anticoagulants: You will be discharged on an anticoagulant. This is a prophylactic medication that helps prevent blood clots during your post-operative period.  You will be on Aspirin  81 mg twice daily for 30 days. If you were on Aspirin 81 mg prior to surgery you can go back to home dose once the 30 days are completed.     While taking the anticoagulant, you should avoid taking any additional aspirin, ibuprofen (Advil or Motrin), Aleve (Naprosyn) or other non-steroidal anti-inflammatory medications.   Notify surgeon immediately if any ethan bleeding is noted in the urine, stool, emesis, or from the nose or the incision. Blood in the stool will often appear as black rather than red. Blood in urine may appear as pink. Blood in emesis may appear as brown/black like coffee grounds.  You will need to apply pressure for longer periods of time to any cuts or abrasions to stop bleeding  Avoid alcohol while taking anticoagulants    2. Stool Softeners: You will be at greater risk of constipation after surgery due to being less mobile and the pain medications.   Take stool softeners as instructed by your surgeon while on pain medications. Over the counter Colace 100 mg 1-2 capsules twice daily.   If stools become too loose or too frequent, please decreases the dosage or stop the stool softener.  If constipation occurs despite use of stool softeners, you are to continue the stool softeners and add a laxative (Milk of Magnesia 1 ounce  daily as needed).  Dulcolax oral tabs or suppository, or a fleets enema can also be utilized for constipation and can be obtained over the counter.   If above interventions are unsuccessful in inducing bowel movements, please contact your surgeon's office / family physician's office.  Drink plenty of fluids, and eat fruits and vegetables during your recovery time    3. Pain Medications utilized after surgery are narcotics and the law requires that the following information be given to all patients that are prescribed narcotics:  CLASSIFICATION: Pain medications are called Opioids and are narcotics  LEGALITIES: It is illegal to share narcotics with others and to drive within 24 hours of taking narcotics  POTENTIAL SIDE EFFECTS: Potential side effects of opioids include: nausea, vomiting, itching, dizziness, drowsiness, dry mouth, constipation, and difficulty urinating.  POTENTIAL ADVERSE EFFECTS:   Opioid tolerance can develop with use of pain medications and this simply means that it requires more and more of the medication to control pain; however, this is seen more in patients that use opioids for longer periods of time.  Opioid dependence can develop with use of Opioids and this simply means that to stop the medication can cause withdrawal symptoms; however, this is seen with patients that use Opioids for longer periods of time.  Opioid addiction can develop with use of Opioids and the incidence of this is very unlikely in patients who take the medications as ordered and stop the medications as instructed.  Opioid overdose can be dangerous, but is unlikely when the medication is taken as ordered and stopped when ordered. It is important not to mix opioids with alcohol or with and type of sedative such as Benadryl as this can lead to over sedation and respiratory difficulty.  DOSAGE:   Pain medications will need to be taken consistently for the first week to decrease pain and promote adequate pain relief and  participation in physical therapy.  After the initial surgical pain begins to resolve, you may begin to decrease the pain medication. By the end of 6 weeks, you should be off of pain medications.  Refills will not be given by the office during evening hours, on weekends, or after 6 weeks post-op.  To seek refills on pain medications during the initial 6 week post-operative period, you must call the office 48 hours in advance to request the refill. The office will then notify you when to  the prescription. DO NOT wait until you are out of the medication to request a refill.    V. FOLLOW-UP VISITS:  You will need to follow up in the office with your surgeon on Aug 6 ,2025. Please call this number [phone]  to schedule this appointment.  If you have any concerns or suspected complications prior to your follow up visit, please call your surgeons office. Do not wait until your appointment time if you suspect complications. These will need to be addressed in the office promptly.    Date: 7/19/2025    Yovani Lund MD    CC: Harry Hsu MD; MD Ashely Saunders, Yovani CASE,*

## 2025-07-20 PROBLEM — Z96.641 AFTERCARE FOLLOWING RIGHT HIP JOINT REPLACEMENT SURGERY: Status: ACTIVE | Noted: 2025-07-20

## 2025-07-20 PROBLEM — Z47.1 AFTERCARE FOLLOWING RIGHT HIP JOINT REPLACEMENT SURGERY: Status: ACTIVE | Noted: 2025-07-20

## 2025-07-20 LAB
ALBUMIN SERPL-MCNC: 3.5 G/DL (ref 3.5–5.2)
ALBUMIN/GLOB SERPL: 1.3 G/DL
ALP SERPL-CCNC: 64 U/L (ref 39–117)
ALT SERPL W P-5'-P-CCNC: 14 U/L (ref 1–41)
ANION GAP SERPL CALCULATED.3IONS-SCNC: 8.9 MMOL/L (ref 5–15)
AST SERPL-CCNC: 16 U/L (ref 1–40)
BASOPHILS # BLD AUTO: 0.06 10*3/MM3 (ref 0–0.2)
BASOPHILS NFR BLD AUTO: 0.4 % (ref 0–1.5)
BILIRUB SERPL-MCNC: 0.6 MG/DL (ref 0–1.2)
BUN SERPL-MCNC: 15 MG/DL (ref 8–23)
BUN/CREAT SERPL: 12 (ref 7–25)
CALCIUM SPEC-SCNC: 9 MG/DL (ref 8.6–10.5)
CHLORIDE SERPL-SCNC: 105 MMOL/L (ref 98–107)
CO2 SERPL-SCNC: 26.1 MMOL/L (ref 22–29)
CREAT SERPL-MCNC: 1.25 MG/DL (ref 0.76–1.27)
DEPRECATED RDW RBC AUTO: 48.4 FL (ref 37–54)
EGFRCR SERPLBLD CKD-EPI 2021: 63.5 ML/MIN/1.73
EOSINOPHIL # BLD AUTO: 0.41 10*3/MM3 (ref 0–0.4)
EOSINOPHIL NFR BLD AUTO: 2.9 % (ref 0.3–6.2)
ERYTHROCYTE [DISTWIDTH] IN BLOOD BY AUTOMATED COUNT: 14.8 % (ref 12.3–15.4)
GLOBULIN UR ELPH-MCNC: 2.7 GM/DL
GLUCOSE SERPL-MCNC: 87 MG/DL (ref 65–99)
HCT VFR BLD AUTO: 32.9 % (ref 37.5–51)
HGB BLD-MCNC: 10.9 G/DL (ref 13–17.7)
IMM GRANULOCYTES # BLD AUTO: 0.13 10*3/MM3 (ref 0–0.05)
IMM GRANULOCYTES NFR BLD AUTO: 0.9 % (ref 0–0.5)
LYMPHOCYTES # BLD AUTO: 2.65 10*3/MM3 (ref 0.7–3.1)
LYMPHOCYTES NFR BLD AUTO: 18.6 % (ref 19.6–45.3)
MAGNESIUM SERPL-MCNC: 2.4 MG/DL (ref 1.6–2.4)
MCH RBC QN AUTO: 29.7 PG (ref 26.6–33)
MCHC RBC AUTO-ENTMCNC: 33.1 G/DL (ref 31.5–35.7)
MCV RBC AUTO: 89.6 FL (ref 79–97)
MONOCYTES # BLD AUTO: 1.39 10*3/MM3 (ref 0.1–0.9)
MONOCYTES NFR BLD AUTO: 9.8 % (ref 5–12)
NEUTROPHILS NFR BLD AUTO: 67.4 % (ref 42.7–76)
NEUTROPHILS NFR BLD AUTO: 9.57 10*3/MM3 (ref 1.7–7)
NRBC BLD AUTO-RTO: 0 /100 WBC (ref 0–0.2)
PHOSPHATE SERPL-MCNC: 1.8 MG/DL (ref 2.5–4.5)
PHOSPHATE SERPL-MCNC: 2 MG/DL (ref 2.5–4.5)
PLATELET # BLD AUTO: 191 10*3/MM3 (ref 140–450)
PMV BLD AUTO: 10.3 FL (ref 6–12)
POTASSIUM SERPL-SCNC: 4 MMOL/L (ref 3.5–5.2)
PROT SERPL-MCNC: 6.2 G/DL (ref 6–8.5)
RBC # BLD AUTO: 3.67 10*6/MM3 (ref 4.14–5.8)
SODIUM SERPL-SCNC: 140 MMOL/L (ref 136–145)
WBC NRBC COR # BLD AUTO: 14.21 10*3/MM3 (ref 3.4–10.8)

## 2025-07-20 PROCEDURE — 94799 UNLISTED PULMONARY SVC/PX: CPT

## 2025-07-20 PROCEDURE — 94761 N-INVAS EAR/PLS OXIMETRY MLT: CPT

## 2025-07-20 PROCEDURE — 97530 THERAPEUTIC ACTIVITIES: CPT

## 2025-07-20 PROCEDURE — 83735 ASSAY OF MAGNESIUM: CPT | Performed by: HOSPITALIST

## 2025-07-20 PROCEDURE — G0378 HOSPITAL OBSERVATION PER HR: HCPCS

## 2025-07-20 PROCEDURE — 80053 COMPREHEN METABOLIC PANEL: CPT | Performed by: HOSPITALIST

## 2025-07-20 PROCEDURE — 84100 ASSAY OF PHOSPHORUS: CPT | Performed by: HOSPITALIST

## 2025-07-20 PROCEDURE — 85025 COMPLETE CBC W/AUTO DIFF WBC: CPT | Performed by: HOSPITALIST

## 2025-07-20 PROCEDURE — 25810000003 SODIUM CHLORIDE 0.9 % SOLUTION: Performed by: HOSPITALIST

## 2025-07-20 PROCEDURE — 63710000001 DIPHENHYDRAMINE PER 50 MG: Performed by: ORTHOPAEDIC SURGERY

## 2025-07-20 PROCEDURE — 94664 DEMO&/EVAL PT USE INHALER: CPT

## 2025-07-20 RX ORDER — FENTANYL/ROPIVACAINE/NS/PF 2-625MCG/1
15 PLASTIC BAG, INJECTION (ML) EPIDURAL ONCE
Status: COMPLETED | OUTPATIENT
Start: 2025-07-20 | End: 2025-07-20

## 2025-07-20 RX ADMIN — OXYCODONE AND ACETAMINOPHEN 1 TABLET: 5; 325 TABLET ORAL at 22:07

## 2025-07-20 RX ADMIN — DIPHENHYDRAMINE HYDROCHLORIDE 25 MG: 25 CAPSULE ORAL at 08:04

## 2025-07-20 RX ADMIN — DIPHENHYDRAMINE HYDROCHLORIDE 25 MG: 25 CAPSULE ORAL at 14:39

## 2025-07-20 RX ADMIN — IPRATROPIUM BROMIDE AND ALBUTEROL SULFATE 3 ML: .5; 3 SOLUTION RESPIRATORY (INHALATION) at 20:20

## 2025-07-20 RX ADMIN — POTASSIUM PHOSPHATE, MONOBASIC POTASSIUM PHOSPHATE, DIBASIC INJECTION, 15 MMOL: 236; 224 SOLUTION, CONCENTRATE INTRAVENOUS at 22:07

## 2025-07-20 RX ADMIN — ACETAMINOPHEN 1000 MG: 500 TABLET, FILM COATED ORAL at 16:29

## 2025-07-20 RX ADMIN — OXYCODONE AND ACETAMINOPHEN 1 TABLET: 5; 325 TABLET ORAL at 00:30

## 2025-07-20 RX ADMIN — METOPROLOL SUCCINATE 25 MG: 25 TABLET, EXTENDED RELEASE ORAL at 20:42

## 2025-07-20 RX ADMIN — BUDESONIDE 0.5 MG: 0.5 INHALANT RESPIRATORY (INHALATION) at 06:47

## 2025-07-20 RX ADMIN — DIPHENHYDRAMINE HYDROCHLORIDE 25 MG: 25 CAPSULE ORAL at 00:30

## 2025-07-20 RX ADMIN — OXYCODONE AND ACETAMINOPHEN 1 TABLET: 5; 325 TABLET ORAL at 18:21

## 2025-07-20 RX ADMIN — DOCUSATE SODIUM 100 MG: 100 CAPSULE, LIQUID FILLED ORAL at 08:04

## 2025-07-20 RX ADMIN — IPRATROPIUM BROMIDE AND ALBUTEROL SULFATE 3 ML: .5; 3 SOLUTION RESPIRATORY (INHALATION) at 13:22

## 2025-07-20 RX ADMIN — CLOBETASOL PROPIONATE CREAM USP, 0.05%: 0.5 CREAM TOPICAL at 08:05

## 2025-07-20 RX ADMIN — DOCUSATE SODIUM 100 MG: 100 CAPSULE, LIQUID FILLED ORAL at 20:43

## 2025-07-20 RX ADMIN — IPRATROPIUM BROMIDE AND ALBUTEROL SULFATE 3 ML: .5; 3 SOLUTION RESPIRATORY (INHALATION) at 06:44

## 2025-07-20 RX ADMIN — DIPHENHYDRAMINE HYDROCHLORIDE 25 MG: 25 CAPSULE ORAL at 22:07

## 2025-07-20 RX ADMIN — OXYCODONE AND ACETAMINOPHEN 1 TABLET: 5; 325 TABLET ORAL at 12:17

## 2025-07-20 RX ADMIN — BUDESONIDE 0.5 MG: 0.5 INHALANT RESPIRATORY (INHALATION) at 20:20

## 2025-07-20 RX ADMIN — PANTOPRAZOLE SODIUM 40 MG: 40 TABLET, DELAYED RELEASE ORAL at 20:42

## 2025-07-20 RX ADMIN — POTASSIUM PHOSPHATE, MONOBASIC POTASSIUM PHOSPHATE, DIBASIC INJECTION, 15 MMOL: 236; 224 SOLUTION, CONCENTRATE INTRAVENOUS at 10:58

## 2025-07-20 RX ADMIN — OXYCODONE AND ACETAMINOPHEN 1 TABLET: 5; 325 TABLET ORAL at 08:04

## 2025-07-20 RX ADMIN — ASPIRIN 81 MG: 81 TABLET, COATED ORAL at 20:42

## 2025-07-20 RX ADMIN — ASPIRIN 81 MG: 81 TABLET, COATED ORAL at 08:03

## 2025-07-20 NOTE — PLAN OF CARE
Goal Outcome Evaluation:  Plan of Care Reviewed With: patient, spouse        Progress: improving  Outcome Evaluation: Pt able to participate in PT Tx today with improved mobility and strength. He req Tushar for bed mobility to transfer from supine to sitting EOB with primary limitations being L shoulder pain and R hip weakness 2/2 R ELAINA. The Pt completed LE exercises sitting EOB with improved AROM and strength compared to yesterday. He required Tushar for STS transfer to RW and cueing for hand palcement. He then complete standing marches to prepare for ambulation. The Pt amublated from EOB to recliner 4 feet CGA and VC for sequencing and was able to complete Stand-Sit transfer c CGA and cueing. Extra time spent on Pt/ family edu regarding safety c transfers and stair navigation at home. His overall status is improving and it is recmomended he DC home c .    Anticipated Discharge Disposition (PT): home with home health

## 2025-07-20 NOTE — PLAN OF CARE
Goal Outcome Evaluation:  Plan of Care Reviewed With: patient        Progress: improving  Outcome Evaluation: vss. nvi. dressing CDI. x1 stand eob. hasn't worked with PT yet due to dizziness. voiding more successfully. pain managed with PO meds. plan to d/c home when medically stable.

## 2025-07-20 NOTE — PROGRESS NOTES
Did not see pt yesterday as it appeared he was discharged  Chart reviewed this AM--labs and vitals all look good (phos replaced)  PT eval/recs reviewed  No objection to discharge from medical standpoint

## 2025-07-20 NOTE — THERAPY TREATMENT NOTE
Patient Name: Louis Andino Jr.  : 1960    MRN: [id number]                              Today's Date: 2025       Admit Date: 2025    Visit Dx:     ICD-10-CM ICD-9-CM   1. Status post total hip replacement, right  Z96.641 V43.64   2. Primary osteoarthritis of right hip  M16.11 715.15   3. Lumbar facet arthropathy  M47.816 721.3   4. Osteoarthritis of right hip, unspecified osteoarthritis type  M16.11 715.95   5. Encounter for long-term (current) use of high-risk medication  Z79.899 V58.69     Patient Active Problem List   Diagnosis    Renal mass    Asthma    Gastroesophageal reflux disease    Hyperlipidemia    Hypertension    BPH (benign prostatic hyperplasia)    COPD (chronic obstructive pulmonary disease)    KARON (acute kidney injury)    Acute respiratory failure with hypoxia    Renal cell carcinoma    Port-A-Cath in place    Lightheadedness    Right bundle branch block (RBBB) with left anterior fascicular block (LAFB)    APC (atrial premature contractions)    Lumbar facet arthropathy    Right sided sciatica    Chronic pain syndrome    Encounter for long-term (current) use of high-risk medication    Encounter for screening for malignant neoplasm of colon    Urticaria of unknown origin    Lumbar radiculopathy    Degeneration of intervertebral disc of lumbar region with discogenic back pain    Status post total hip replacement, right    Stage 3a chronic kidney disease     Past Medical History:   Diagnosis Date    Abnormal ECG Dec 2024    Allergic 2025    Adhesive on pain patch    Anemia 2023    Due to surgery. Required transfusions    Ankle sprain     Multiple-both ankles over the years    Arthritis Years    Worse R. Hip and back    Asthma     Stable now    Cataract 2years    Optometrist is watching yearly    Chronic pain disorder     Right hip and back    Clotting disorder     Required blood transfusion during and after surgery in excess of expected    Colon polyp      Coronary artery disease Dec 2023    Bifascicular block    Deep vein thrombosis 12/18/2023    During cancer surgery a clot was surgically removed from the inferior vena cava which was cancer related    Emphysema/COPD     Erectile dysfunction Several years    Extremity pain 2020    Right hip and bilat shoulders    Fracture of ankle     Left ankle as a child    Fracture of wrist     Left wrist as a child    GERD (gastroesophageal reflux disease)     Headache Years    Now rare - seem to be due to sleep deprivation    Headache, tension-type 1977    Rare    HL (hearing loss)     Progressive worsening over many years    Hyperglycemia 10/12/2023    Hyperlipidemia 10/12/2023    Hypertension     Injury of neck 2023    Previous fractures noted during chiropractic visit. Probably secondary to shoulder injury.    Joint pain 2020    Right hip    Low back pain     Worse last few year    Neck pain 2020    Mild    Obesity     Pneumonia 12/20/2023    Developed while hospitalized for cancer surgery    PONV (postoperative nausea and vomiting)     Prostate disease     Rash     Allergic reactions to laundry detergent and mild generalized itching secondary to immunotherapy.    Rectal bleeding     Renal cell carcinoma 12/30/2023    Renal insufficiency 4/9/2024    Decreased renal function since nephrectomy in December, 2023, secondary to renal cancer, but not diagnosed as chronic kidney disease until recent visit. Kidney functions have been stable since surgery, but are now being considered permanent.    Shoulder injury 1980s    Injured in     Sleep apnea     USES CPAP    Visual impairment     Wear glasses for near and distant vision    Vitreous degeneration of right eye 10/12/2023    Formatting of this note might be different from the original. Retinal tear and detachment warning symptoms reviewed and patient instructed to call immediately if increasing floaters, flashes, or decreasing peripheral vision. No retinal detachment or  retinal tear noted. Recheck in 1 month with dilated exam.     Past Surgical History:   Procedure Laterality Date    ABDOMINAL SURGERY  December 18, 2023    Left Kidney and Adrenal Gland removed due to Renal Cell Carcinoma    COLONOSCOPY N/A 3/24/2025    Procedure: COLONOSCOPY to cecum and terminal ileum with cold and hot polypectomies;  Surgeon: Dolly Stephens MD;  Location: [facility] ENDOSCOPY;  Service: Gastroenterology;  Laterality: N/A;  screening//colon polyps, diverticulosis    NEPHRECTOMY Left 12/18/2023    Procedure: NEPHRECTOMY RADICAL WITH CAVAL THROMBECTOMY;  Surgeon: James Esquivel MD;  Location: [facility] OR;  Service: Urology;  Laterality: Left;    PORTACATH PLACEMENT  01/30/2024    SKIN LESION EXCISION  1990      General Information       Row Name 07/20/25 1016          Physical Therapy Time and Intention    Document Type therapy note (daily note)  -HR       Row Name 07/20/25 1016          General Information    Patient Profile Reviewed yes  -HR     Existing Precautions/Restrictions fall;right;hip, anterior  -HR       Row Name 07/20/25 1016          Cognition    Orientation Status (Cognition) oriented x 4  -HR       Row Name 07/20/25 1016          Safety Issues/Impairments Affecting Functional Mobility    Impairments Affecting Function (Mobility) balance;endurance/activity tolerance;pain;strength;postural/trunk control;range of motion (ROM)  -HR               User Key  (r) = Recorded By, (t) = Taken By, (c) = Cosigned By      Initials Name Provider Type    HR Patricia Dc, PT Physical Therapist                   Mobility       Row Name 07/20/25 1016          Bed Mobility    Bed Mobility supine-sit  -HR     Supine-Sit [place] (Bed Mobility) verbal cues;minimum assist (75% patient effort)  -HR     Comment, (Bed Mobility) Pt req Tushar x1 for supine to sit transfer and VC for hand placement. Pt desired to use HR and lift HOB but encouraged not to as he will not have that option at home. Required  assistance with movement of R LE and trunk support during triplanar movement.  -HR       Row Name 07/20/25 1016          Bed-Chair Transfer    Bed-Chair [place] (Transfers) contact guard;minimum assist (75% patient effort)  -HR     Assistive Device (Bed-Chair Transfers) walker, front-wheeled  -HR     Comment, (Bed-Chair Transfer) CGA - Jeremy during transfer, req cueing for step length and RW management. No LOB and good stability on RLE noted during transfer.  -HR       Row Name 07/20/25 1016          Sit-Stand Transfer    Sit-Stand [place] (Transfers) verbal cues;minimum assist (75% patient effort)  -HR     Assistive Device (Sit-Stand Transfers) walker, front-wheeled  -HR       Row Name 07/20/25 1016          Gait/Stairs (Locomotion)    [place] Level (Gait) contact guard;verbal cues  -HR     Assistive Device (Gait) walker, front-wheeled  -HR     Patient was able to Ambulate yes  -HR     Distance in Feet (Gait) 4  -HR     Right Sided Gait Deviations weight shift ability decreased  -HR     Comment, (Gait/Stairs) Pt ambulated 4 feet forward from EOB to recliner c CGA and cueing for RW management.  -HR               User Key  (r) = Recorded By, (t) = Taken By, (c) = Cosigned By      Initials Name Provider Type    HR Patricia Dc, PT Physical Therapist                   Obj/Interventions       Row Name 07/20/25 1020          Range of Motion Comprehensive    General Range of Motion lower extremity range of motion deficits identified  -HR     Comment, General Range of Motion R hip AROM limited by pain  -HR       Row Name 07/20/25 1020          Strength Comprehensive (MMT)    General Manual Muscle Testing (MMT) Assessment lower extremity strength deficits identified  -HR     Comment, General Manual Muscle Testing (MMT) Assessment R hip post op weakness  -HR       Row Name 07/20/25 1020          Motor Skills    Therapeutic Exercise hip;knee;ankle;other (see comments)  sitting EOB: ankle pumps, LAQ, Marches,  hip abd c pillow x15 each. Standing at RW: Marching x15  -HR       Row Name 07/20/25 1020          Balance    Balance Assessment sitting static balance;sitting dynamic balance;standing static balance;standing dynamic balance  -HR     Static Sitting Balance independent  -HR     Dynamic Sitting Balance independent  -HR     Position, Sitting Balance unsupported;sitting edge of bed  -HR     Static Standing Balance contact guard  -HR     Dynamic Standing Balance contact guard  -HR     Position/Device Used, Standing Balance supported;walker, front-wheeled  -HR     Balance Interventions sitting;standing;sit to stand;supported;static;dynamic  -HR               User Key  (r) = Recorded By, (t) = Taken By, (c) = Cosigned By      Initials Name Provider Type    HR Patricia Dc PT Physical Therapist                   Goals/Plan    No documentation.                  Clinical Impression       Row Name 07/20/25 1023          Pain    Pretreatment Pain Rating 0/10 - no pain  -HR     Posttreatment Pain Rating 2/10  -HR     Pain Location hip  -HR     Pain Side/Orientation right  -HR     Pain Management Interventions positioning techniques utilized  -HR     Response to Pain Interventions activity participation with tolerable pain;activity level improved  -HR       Row Name 07/20/25 1023          Plan of Care Review    Plan of Care Reviewed With patient;spouse  -HR     Progress improving  -HR     Outcome Evaluation Pt able to participate in PT Tx today with improved mobility and strength. He req Tushar for bed mobility to transfer from supine to sitting EOB with primary limitations being L shoulder pain and R hip weakness 2/2 R ELAINA. The Pt completed LE exercises sitting EOB with improved AROM and strength compared to yesterday. He required Tushar for STS transfer to RW and cueing for hand palcement. He then complete standing marches to prepare for ambulation. The Pt amublated from EOB to recliner 4 feet CGA and VC for sequencing and was  able to complete Stand-Sit transfer c CGA and cueing. Extra time spent on Pt/ family edu regarding safety c transfers and stair navigation at home. His overall status is improving and it is recmomended he DC home c HH.  -HR       Row Name 07/20/25 1023          Therapy Assessment/Plan (PT)    Rehab Potential (PT) good  -HR     Criteria for Skilled Interventions Met (PT) yes;meets criteria;skilled treatment is necessary  -HR       Row Name 07/20/25 1023          Positioning and Restraints    Pre-Treatment Position in bed  -HR     Post Treatment Position chair  -HR     In Chair notified nsg;sitting;call light within reach;encouraged to call for assist;exit alarm on;with family/caregiver  -HR               User Key  (r) = Recorded By, (t) = Taken By, (c) = Cosigned By      Initials Name Provider Type    HR Patricia Dc PT Physical Therapist                   Outcome Measures       Row Name 07/20/25 1027          How much help from another person do you currently need...    Turning from your back to your side while in flat bed without using bedrails? 4  -HR     Moving from lying on back to sitting on the side of a flat bed without bedrails? 3  -HR     Moving to and from a bed to a chair (including a wheelchair)? 3  -HR     Standing up from a chair using your arms (e.g., wheelchair, bedside chair)? 3  -HR     Climbing 3-5 steps with a railing? 2  -HR     To walk in hospital room? 3  -HR     AM-PAC 6 Clicks Score (PT) 18  -HR     Highest Level of Mobility Goal Walk 10 Steps or More-6  -HR       Row Name 07/20/25 1027          Functional Assessment    Outcome Measure Options AM-PAC 6 Clicks Basic Mobility (PT)  -HR               User Key  (r) = Recorded By, (t) = Taken By, (c) = Cosigned By      Initials Name Provider Type    HR Patricia Dc PT Physical Therapist                                 Physical Therapy Education       Title: PT OT SLP Therapies (Done)       Topic: Physical Therapy (Done)       Point:  Mobility training (Done)       Learning Progress Summary            Patient Acceptance, E, VU by HR at 7/20/2025 1028    Acceptance, E, VU by HR at 7/19/2025 1110    Acceptance, E,TB, VU,NR by EF at 7/18/2025 1607   Family Acceptance, E, VU by HR at 7/20/2025 1028                      Point: Home exercise program (Done)       Learning Progress Summary            Patient Acceptance, E, VU by HR at 7/20/2025 1028    Acceptance, E, VU by HR at 7/19/2025 1110    Acceptance, E,TB, VU,NR by EF at 7/18/2025 1607   Family Acceptance, E, VU by HR at 7/20/2025 1028                      Point: Body mechanics (Done)       Learning Progress Summary            Patient Acceptance, E, VU by HR at 7/20/2025 1028    Acceptance, E, VU by HR at 7/19/2025 1110    Acceptance, E,TB, VU,NR by EF at 7/18/2025 1607   Family Acceptance, E, VU by HR at 7/20/2025 1028                      Point: Precautions (Done)       Learning Progress Summary            Patient Acceptance, E, VU by HR at 7/20/2025 1028    Acceptance, E, VU by HR at 7/19/2025 1110    Acceptance, E,TB, VU,NR by EF at 7/18/2025 1607   Family Acceptance, E, VU by HR at 7/20/2025 1028                                      User Key       Initials Effective Dates Name Provider Type Discipline    EF 05/31/24 -  Denisse Merritt PT Physical Therapist PT    HR 04/29/25 -  Patricia Dc PT Physical Therapist PT                  PT Recommendation and Plan     Progress: improving  Outcome Evaluation: Pt able to participate in PT Tx today with improved mobility and strength. He req Tushar for bed mobility to transfer from supine to sitting EOB with primary limitations being L shoulder pain and R hip weakness 2/2 R ELAINA. The Pt completed LE exercises sitting EOB with improved AROM and strength compared to yesterday. He required Tushar for STS transfer to RW and cueing for hand palcement. He then complete standing marches to prepare for ambulation. The Pt amublated from EOB to recliner 4  feet CGA and VC for sequencing and was able to complete Stand-Sit transfer c CGA and cueing. Extra time spent on Pt/ family edu regarding safety c transfers and stair navigation at home. His overall status is improving and it is recmomended he DC home c HH.     Time Calculation:         PT Charges       Row Name 07/20/25 1028             Time Calculation    Start Time 0935  -HR      Stop Time 1010  -HR      Time Calculation (min) 35 min  -HR         Timed Charges    55221 - PT Therapeutic Exercise Minutes 15  -HR      09155 - PT Therapeutic Activity Minutes 25  -HR         Total Minutes    Timed Charges Total Minutes 40  -HR       Total Minutes 40  -HR                User Key  (r) = Recorded By, (t) = Taken By, (c) = Cosigned By      Initials Name Provider Type    HR Patricia Dc, PT Physical Therapist                  Therapy Charges for Today       Code Description Service Date Service Provider Modifiers Qty    21827484555 HC PT THER PROC EA 15 MIN 7/19/2025 Patricia Dc, PT GP 1    00675147925 HC PT THERAPEUTIC ACT EA 15 MIN 7/19/2025 Patricia Dc, PT GP 1    01263756806 HC PT THERAPEUTIC ACT EA 15 MIN 7/20/2025 Patricia Dc, PT GP 2            PT G-Codes  Outcome Measure Options: AM-PAC 6 Clicks Basic Mobility (PT)  AM-PAC 6 Clicks Score (PT): 18  PT Discharge Summary  Anticipated Discharge Disposition (PT): home with home health    Patricia Dc PT  7/20/2025

## 2025-07-21 LAB — PHOSPHATE SERPL-MCNC: 2.3 MG/DL (ref 2.5–4.5)

## 2025-07-21 PROCEDURE — G0378 HOSPITAL OBSERVATION PER HR: HCPCS

## 2025-07-21 PROCEDURE — 94799 UNLISTED PULMONARY SVC/PX: CPT

## 2025-07-21 PROCEDURE — 94664 DEMO&/EVAL PT USE INHALER: CPT

## 2025-07-21 PROCEDURE — 94761 N-INVAS EAR/PLS OXIMETRY MLT: CPT

## 2025-07-21 PROCEDURE — 63710000001 DIPHENHYDRAMINE PER 50 MG: Performed by: ORTHOPAEDIC SURGERY

## 2025-07-21 PROCEDURE — 97530 THERAPEUTIC ACTIVITIES: CPT

## 2025-07-21 PROCEDURE — 84100 ASSAY OF PHOSPHORUS: CPT | Performed by: HOSPITALIST

## 2025-07-21 RX ORDER — HYDROCODONE BITARTRATE AND ACETAMINOPHEN 5; 325 MG/1; MG/1
1 TABLET ORAL EVERY 6 HOURS PRN
Refills: 0 | Status: DISCONTINUED | OUTPATIENT
Start: 2025-07-21 | End: 2025-07-23 | Stop reason: HOSPADM

## 2025-07-21 RX ADMIN — DIPHENHYDRAMINE HYDROCHLORIDE 25 MG: 25 CAPSULE ORAL at 09:47

## 2025-07-21 RX ADMIN — ASPIRIN 81 MG: 81 TABLET, COATED ORAL at 09:47

## 2025-07-21 RX ADMIN — DIPHENHYDRAMINE HYDROCHLORIDE 25 MG: 25 CAPSULE ORAL at 03:44

## 2025-07-21 RX ADMIN — IPRATROPIUM BROMIDE AND ALBUTEROL SULFATE 3 ML: .5; 3 SOLUTION RESPIRATORY (INHALATION) at 07:29

## 2025-07-21 RX ADMIN — BUDESONIDE 0.5 MG: 0.5 INHALANT RESPIRATORY (INHALATION) at 19:50

## 2025-07-21 RX ADMIN — LOSARTAN POTASSIUM 100 MG: 100 TABLET, FILM COATED ORAL at 10:18

## 2025-07-21 RX ADMIN — OXYCODONE AND ACETAMINOPHEN 1 TABLET: 5; 325 TABLET ORAL at 06:01

## 2025-07-21 RX ADMIN — ASPIRIN 81 MG: 81 TABLET, COATED ORAL at 20:21

## 2025-07-21 RX ADMIN — BISACODYL 10 MG: 5 TABLET, COATED ORAL at 09:48

## 2025-07-21 RX ADMIN — CLOBETASOL PROPIONATE CREAM USP, 0.05%: 0.5 CREAM TOPICAL at 20:32

## 2025-07-21 RX ADMIN — OXYCODONE AND ACETAMINOPHEN 1 TABLET: 5; 325 TABLET ORAL at 01:56

## 2025-07-21 RX ADMIN — METOPROLOL SUCCINATE 25 MG: 25 TABLET, EXTENDED RELEASE ORAL at 20:21

## 2025-07-21 RX ADMIN — DIPHENHYDRAMINE HYDROCHLORIDE 25 MG: 25 CAPSULE ORAL at 20:21

## 2025-07-21 RX ADMIN — DOCUSATE SODIUM 100 MG: 100 CAPSULE, LIQUID FILLED ORAL at 09:48

## 2025-07-21 RX ADMIN — IPRATROPIUM BROMIDE AND ALBUTEROL SULFATE 3 ML: .5; 3 SOLUTION RESPIRATORY (INHALATION) at 19:50

## 2025-07-21 RX ADMIN — CLOBETASOL PROPIONATE CREAM USP, 0.05%: 0.5 CREAM TOPICAL at 10:18

## 2025-07-21 RX ADMIN — BUDESONIDE 0.5 MG: 0.5 INHALANT RESPIRATORY (INHALATION) at 07:30

## 2025-07-21 RX ADMIN — PANTOPRAZOLE SODIUM 40 MG: 40 TABLET, DELAYED RELEASE ORAL at 20:22

## 2025-07-21 RX ADMIN — Medication 2 PACKET: at 00:52

## 2025-07-21 RX ADMIN — ACETAMINOPHEN 1000 MG: 500 TABLET, FILM COATED ORAL at 14:57

## 2025-07-21 RX ADMIN — DOCUSATE SODIUM 100 MG: 100 CAPSULE, LIQUID FILLED ORAL at 20:23

## 2025-07-21 NOTE — PLAN OF CARE
Goal Outcome Evaluation:  Plan of Care Reviewed With: patient        Progress: improving  Outcome Evaluation: Pt remains stable. Progressing further with PT. Up with 1 assist and walker use. Voiding much better. Colace given for constipation. Percocet for pain. Benadryl for itching. Phosphorous replaced. Dressing to R hip is cdi. Slept with CPAP. Possible dc home today if cleared.

## 2025-07-21 NOTE — THERAPY TREATMENT NOTE
Patient Name: Louis Andino Jr.  : 1960    MRN: [id number]                              Today's Date: 2025       Admit Date: 2025    Visit Dx:     ICD-10-CM ICD-9-CM   1. Status post total hip replacement, right  Z96.641 V43.64   2. Primary osteoarthritis of right hip  M16.11 715.15   3. Lumbar facet arthropathy  M47.816 721.3   4. Osteoarthritis of right hip, unspecified osteoarthritis type  M16.11 715.95   5. Encounter for long-term (current) use of high-risk medication  Z79.899 V58.69     Patient Active Problem List   Diagnosis    Renal mass    Asthma    Gastroesophageal reflux disease    Hyperlipidemia    Hypertension    BPH (benign prostatic hyperplasia)    COPD (chronic obstructive pulmonary disease)    KARON (acute kidney injury)    Acute respiratory failure with hypoxia    Renal cell carcinoma    Port-A-Cath in place    Lightheadedness    Right bundle branch block (RBBB) with left anterior fascicular block (LAFB)    APC (atrial premature contractions)    Lumbar facet arthropathy    Right sided sciatica    Chronic pain syndrome    Encounter for long-term (current) use of high-risk medication    Encounter for screening for malignant neoplasm of colon    Urticaria of unknown origin    Lumbar radiculopathy    Degeneration of intervertebral disc of lumbar region with discogenic back pain    Status post total hip replacement, right    Stage 3a chronic kidney disease    Aftercare following right hip joint replacement surgery     Past Medical History:   Diagnosis Date    Abnormal ECG Dec 2024    Allergic 2025    Adhesive on pain patch    Anemia 2023    Due to surgery. Required transfusions    Ankle sprain     Multiple-both ankles over the years    Arthritis Years    Worse R. Hip and back    Asthma     Stable now    Cataract 2years    Optometrist is watching yearly    Chronic pain disorder     Right hip and back    Clotting disorder     Required blood transfusion during and after  surgery in excess of expected    Colon polyp 2025    Coronary artery disease Dec 2023    Bifascicular block    Deep vein thrombosis 12/18/2023    During cancer surgery a clot was surgically removed from the inferior vena cava which was cancer related    Emphysema/COPD     Erectile dysfunction Several years    Extremity pain 2020    Right hip and bilat shoulders    Fracture of ankle     Left ankle as a child    Fracture of wrist     Left wrist as a child    GERD (gastroesophageal reflux disease)     Headache Years    Now rare - seem to be due to sleep deprivation    Headache, tension-type 1977    Rare    HL (hearing loss)     Progressive worsening over many years    Hyperglycemia 10/12/2023    Hyperlipidemia 10/12/2023    Hypertension     Injury of neck 2023    Previous fractures noted during chiropractic visit. Probably secondary to shoulder injury.    Joint pain 2020    Right hip    Low back pain     Worse last few year    Neck pain 2020    Mild    Obesity     Pneumonia 12/20/2023    Developed while hospitalized for cancer surgery    PONV (postoperative nausea and vomiting)     Prostate disease     Rash     Allergic reactions to laundry detergent and mild generalized itching secondary to immunotherapy.    Rectal bleeding     Renal cell carcinoma 12/30/2023    Renal insufficiency 4/9/2024    Decreased renal function since nephrectomy in December, 2023, secondary to renal cancer, but not diagnosed as chronic kidney disease until recent visit. Kidney functions have been stable since surgery, but are now being considered permanent.    Shoulder injury 1980s    Injured in     Sleep apnea     USES CPAP    Visual impairment     Wear glasses for near and distant vision    Vitreous degeneration of right eye 10/12/2023    Formatting of this note might be different from the original. Retinal tear and detachment warning symptoms reviewed and patient instructed to call immediately if increasing floaters, flashes, or  decreasing peripheral vision. No retinal detachment or retinal tear noted. Recheck in 1 month with dilated exam.     Past Surgical History:   Procedure Laterality Date    ABDOMINAL SURGERY  December 18, 2023    Left Kidney and Adrenal Gland removed due to Renal Cell Carcinoma    COLONOSCOPY N/A 3/24/2025    Procedure: COLONOSCOPY to cecum and terminal ileum with cold and hot polypectomies;  Surgeon: Dolly Stephens MD;  Location: [facility] ENDOSCOPY;  Service: Gastroenterology;  Laterality: N/A;  screening//colon polyps, diverticulosis    NEPHRECTOMY Left 12/18/2023    Procedure: NEPHRECTOMY RADICAL WITH CAVAL THROMBECTOMY;  Surgeon: James Esquivel MD;  Location: [facility] OR;  Service: Urology;  Laterality: Left;    PORTACATH PLACEMENT  01/30/2024    SKIN LESION EXCISION  1990      General Information       Row Name 07/21/25 1152          Physical Therapy Time and Intention    Document Type therapy note (daily note)  -     Mode of Treatment individual therapy;physical therapy  -       Row Name 07/21/25 9985          General Information    Patient Profile Reviewed yes  -       Row Name 07/21/25 4613          Cognition    Orientation Status (Cognition) oriented x 3  -       Row Name 07/21/25 1150          Safety Issues/Impairments Affecting Functional Mobility    Safety Issues Affecting Function (Mobility) positioning of assistive device;problem-solving;safety precaution awareness;safety precautions follow-through/compliance;sequencing abilities;insight into deficits/self-awareness;judgment  -     Impairments Affecting Function (Mobility) balance;endurance/activity tolerance;strength;pain;postural/trunk control  -     Comment, Safety Issues/Impairments (Mobility) Gait belt and shoes donned  -               User Key  (r) = Recorded By, (t) = Taken By, (c) = Cosigned By      Initials Name Provider Type     Nestor Hubbard, PT Physical Therapist                   Mobility       Row Name 07/21/25 1158           Bed Mobility    Bed Mobility scooting/bridging;supine-sit;sit-supine  -     Scooting/Bridging [place] (Bed Mobility) standby assist  -     Supine-Sit [place] (Bed Mobility) minimum assist (75% patient effort);1 person assist  -     Sit-Supine [place] (Bed Mobility) 1 person assist;minimum assist (75% patient effort)  -     Assistive Device (Bed Mobility) bed rails;head of bed elevated  -       Row Name 07/21/25 1156          Sit-Stand Transfer    Sit-Stand [place] (Transfers) contact guard;1 person assist  -     Assistive Device (Sit-Stand Transfers) walker, front-wheeled  -       Row Name 07/21/25 1156          Gait/Stairs (Locomotion)    [place] Level (Gait) contact guard;1 person assist  -     Assistive Device (Gait) walker, front-wheeled  -     Patient was able to Ambulate yes  -     Distance in Feet (Gait) 60  -     Deviations/Abnormal Patterns (Gait) gait speed decreased;stride length decreased;weight shifting decreased;raven decreased  -     Bilateral Gait Deviations heel strike decreased;forward flexed posture;weight shift ability decreased  -               User Key  (r) = Recorded By, (t) = Taken By, (c) = Cosigned By      Initials Name Provider Type     Nestor Hubbard PT Physical Therapist                   Obj/Interventions       Row Name 07/21/25 1156          Motor Skills    Therapeutic Exercise other (see comments)  5 x STS  -       Row Name 07/21/25 1156          Balance    Balance Assessment sitting static balance;sitting dynamic balance;standing static balance;standing dynamic balance  -     Static Sitting Balance standby assist  -     Dynamic Sitting Balance contact guard  -     Position, Sitting Balance sitting edge of bed  -     Static Standing Balance contact guard  -     Dynamic Standing Balance contact guard  -     Position/Device Used, Standing Balance walker, front-wheeled  -     Balance Interventions  sitting;standing;sit to stand;static;minimal challenge;dynamic  -               User Key  (r) = Recorded By, (t) = Taken By, (c) = Cosigned By      Initials Name Provider Type     Nestor Hubbard, PT Physical Therapist                   Goals/Plan    No documentation.                  Clinical Impression       Row Name 07/21/25 1155          Pain    Pretreatment Pain Rating 3/10  -     Posttreatment Pain Rating 3/10  -     Pain Location extremity  -     Pain Side/Orientation right  -     Pain Management Interventions nursing notified  -       Row Name 07/21/25 1152          Plan of Care Review    Plan of Care Reviewed With patient  -     Progress improving  -     Outcome Evaluation Pt supine in bed upon entry for PT treatment session and is agreeable to participation. Pt reports that he is feeling some improvements over most recent days. Pt is able to complete supine to sit transfer with min A for LE. Once seated EOB, patient is able to complete STS with CGA. Pt is able to ambulate with CGA and use of FWW for a total of 60'. Pt does report that he feels a little bit dizzy during walking but it does start to dissipate after he begins to rest. Educated patient on precautions that are in place following his surgery and patient voices understanding. Pt is able to complete 5 x STS from the EOB with CGA and demonstrates improvements in overall strength and mobility on this date and tolerance to activity. Pt continues to make progress. Pt remains appropriate for skilled PT services to address ongoing deficits. Anticipate d/c to home with  services at the time of leaving the hospital.  -       Row Name 07/21/25 7241          Therapy Assessment/Plan (PT)    Criteria for Skilled Interventions Met (PT) yes  -     Therapy Frequency (PT) daily  -       Row Name 07/21/25 1155          Vital Signs    O2 Delivery Pre Treatment room air  -     O2 Delivery Intra Treatment room air  -     O2 Delivery Post  Treatment room air  -MH     Pre Patient Position Supine  -MH     Intra Patient Position Standing  -MH     Post Patient Position Side Lying  -MH       Row Name 07/21/25 1157          Positioning and Restraints    Pre-Treatment Position in bed  -MH     Post Treatment Position bed  -MH     In Bed exit alarm on;notified nsg;call light within reach;encouraged to call for assist;side lying left  -MH               User Key  (r) = Recorded By, (t) = Taken By, (c) = Cosigned By      Initials Name Provider Type    Nestor Prado, DANIEL Physical Therapist                   Outcome Measures       Row Name 07/21/25 1158          How much help from another person do you currently need...    Turning from your back to your side while in flat bed without using bedrails? 4  -MH     Moving from lying on back to sitting on the side of a flat bed without bedrails? 3  -MH     Moving to and from a bed to a chair (including a wheelchair)? 3  -MH     Standing up from a chair using your arms (e.g., wheelchair, bedside chair)? 3  -MH     Climbing 3-5 steps with a railing? 3  -MH     To walk in hospital room? 3  -MH     AM-PAC 6 Clicks Score (PT) 19  -MH     Highest Level of Mobility Goal Walk 10 Steps or More-6  -MH       Row Name 07/21/25 1158          Functional Assessment    Outcome Measure Options AM-PAC 6 Clicks Basic Mobility (PT)  -               User Key  (r) = Recorded By, (t) = Taken By, (c) = Cosigned By      Initials Name Provider Type    Nestor Pardo, DANIEL Physical Therapist                                 Physical Therapy Education       Title: PT OT SLP Therapies (Done)       Topic: Physical Therapy (Done)       Point: Mobility training (Done)       Learning Progress Summary            Patient Acceptance, E, VU,DU,NR by  at 7/21/2025 1158    Acceptance, E, VU by HR at 7/20/2025 1028    Acceptance, E, VU by HR at 7/19/2025 1110    Acceptance, E,TB, VU,NR by  at 7/18/2025 1607   Family Acceptance, E, VU by HR at  7/20/2025 1028                      Point: Home exercise program (Done)       Learning Progress Summary            Patient Acceptance, E, VU,DU,NR by  at 7/21/2025 1158    Acceptance, E, VU by HR at 7/20/2025 1028    Acceptance, E, VU by HR at 7/19/2025 1110    Acceptance, E,TB, VU,NR by EF at 7/18/2025 1607   Family Acceptance, E, VU by HR at 7/20/2025 1028                      Point: Body mechanics (Done)       Learning Progress Summary            Patient Acceptance, E, VU,DU,NR by  at 7/21/2025 1158    Acceptance, E, VU by HR at 7/20/2025 1028    Acceptance, E, VU by HR at 7/19/2025 1110    Acceptance, E,TB, VU,NR by EF at 7/18/2025 1607   Family Acceptance, E, VU by HR at 7/20/2025 1028                      Point: Precautions (Done)       Learning Progress Summary            Patient Acceptance, E, VU,DU,NR by  at 7/21/2025 1158    Acceptance, E, VU by HR at 7/20/2025 1028    Acceptance, E, VU by HR at 7/19/2025 1110    Acceptance, E,TB, VU,NR by EF at 7/18/2025 1607   Family Acceptance, E, VU by HR at 7/20/2025 1028                                      User Key       Initials Effective Dates Name Provider Type Discipline     05/31/24 -  Denisse Merritt, PT Physical Therapist PT     09/11/24 -  Nestor Hubbard PT Physical Therapist PT     04/29/25 -  Patricia Dc PT Physical Therapist PT                  PT Recommendation and Plan     Progress: improving  Outcome Evaluation: Pt supine in bed upon entry for PT treatment session and is agreeable to participation. Pt reports that he is feeling some improvements over most recent days. Pt is able to complete supine to sit transfer with min A for LE. Once seated EOB, patient is able to complete STS with CGA. Pt is able to ambulate with CGA and use of FWW for a total of 60'. Pt does report that he feels a little bit dizzy during walking but it does start to dissipate after he begins to rest. Educated patient on precautions that are in place following  his surgery and patient voices understanding. Pt is able to complete 5 x STS from the EOB with CGA and demonstrates improvements in overall strength and mobility on this date and tolerance to activity. Pt continues to make progress. Pt remains appropriate for skilled PT services to address ongoing deficits. Anticipate d/c to home with HH services at the time of leaving the hospital.     Time Calculation:         PT Charges       Row Name 07/21/25 1155 07/21/25 0634          Time Calculation    Start Time 0920  -MH --     Stop Time 0944  -MH --     Time Calculation (min) 24 min  -MH --     PT Received On 07/21/25  - 07/20/25  -     PT - Next Appointment 07/22/25  - --        Time Calculation- PT    Total Timed Code Minutes- PT 24 minute(s)  -MH --        Timed Charges    18839 - PT Therapeutic Activity Minutes 24  -MH --        Total Minutes    Timed Charges Total Minutes 24  -MH --      Total Minutes 24  -MH --               User Key  (r) = Recorded By, (t) = Taken By, (c) = Cosigned By      Initials Name Provider Type    Janae Rainey, PT Physical Therapist     Nestor Hubbard, PT Physical Therapist                  Therapy Charges for Today       Code Description Service Date Service Provider Modifiers Qty    [id number] HC PT THERAPEUTIC ACT EA 15 MIN 7/21/2025 Nestor Hubbard, PT GP 2            PT G-Codes  Outcome Measure Options: AM-PAC 6 Clicks Basic Mobility (PT)  AM-PAC 6 Clicks Score (PT): 19  PT Discharge Summary  Anticipated Discharge Disposition (PT): home with home health, home with assist    Nestor Hubbard, DANIEL  7/21/2025

## 2025-07-21 NOTE — CASE MANAGEMENT/SOCIAL WORK
Continued Stay Note  [facility]     Patient Name: Louis Andino Jr.  MRN: [id number]  Today's Date: 7/21/2025    Admit Date: 7/18/2025    Plan: Home with wife and [facility]   Discharge Plan       Row Name 07/21/25 1528       Plan    Plan Home with wife and [facility]    Patient/Family in Agreement with Plan yes    Plan Comments CCP met with the patient and his wife at bedside to follow up on discharge planning. The patient's discharge plan remains the same; home with wife and [facility]. Wife to transport. CD, CSW.                   Discharge Codes    No documentation.                 Expected Discharge Date and Time       Expected Discharge Date Expected Discharge Time    Jul 19, 2025

## 2025-07-21 NOTE — PLAN OF CARE
Goal Outcome Evaluation:  Plan of Care Reviewed With: patient        Progress: improving  Outcome Evaluation: Pt supine in bed upon entry for PT treatment session and is agreeable to participation. Pt reports that he is feeling some improvements over most recent days. Pt is able to complete supine to sit transfer with min A for LE. Once seated EOB, patient is able to complete STS with CGA. Pt is able to ambulate with CGA and use of FWW for a total of 60'. Pt does report that he feels a little bit dizzy during walking but it does start to dissipate after he begins to rest. Educated patient on precautions that are in place following his surgery and patient voices understanding. Pt is able to complete 5 x STS from the EOB with CGA and demonstrates improvements in overall strength and mobility on this date and tolerance to activity. Pt continues to make progress. Pt remains appropriate for skilled PT services to address ongoing deficits. Anticipate d/c to home with  services at the time of leaving the hospital.    Anticipated Discharge Disposition (PT): home with home health, home with assist

## 2025-07-21 NOTE — PLAN OF CARE
Goal Outcome Evaluation:                 [x]  Stable   []  Watcher  []  Unstable  Reason for admission: right hip sx  Significant PMH: COPD, GERD, HTN, Prostate disease, HLD, ED, HL, clotting disorder, DVT, SA, PONV  Significant 24 hour events: phosphorous replaced  Significant Assessment Findings: voiding better, rash/itching unchanged, progressing further with PT  Social: lives at home w wife   Additional Info:   Mobility Plan: assist x 1 with walker     SCD   PT   Home w/ HH tomorrow or Wednesday

## 2025-07-21 NOTE — PROGRESS NOTES
[facility] ORTHO     [phone]  [facility]  Orthopedics  [address], [address]  [place] [address] [phone]  [facility]  Orthopedics  [address]  [place] [address]     Orthopedic Progress Note    Subjective :   Patient seen and examined. Resting comfortably. No issues overnight. Pain controlled. Walked yesterday with PT. overall doing better today.  However states that today was the first time that he was able to get out of bed and walk the hallway with physical therapy.     Patient has had a known rash preoperatively.  Working with dermatology.  He has workup with a patch testing sometime in the near future.  However he thinks he noticed a difference when he came off of the Percocet and took a few Norco.  He would like to try coming off of the Percocet and changing to Norco while he is in the hospital.    Hospitalist team following along as well.    Objective :    Vital signs in last 24 hours:  Temp:  [97.5 °F (36.4 °C)-99 °F (37.2 °C)] 98.2 °F (36.8 °C)  Heart Rate:  [74-94] 94  Resp:  [16] 16  BP: (115-155)/(71-82) 153/82  Vitals:    07/21/25 0730 07/21/25 0843 07/21/25 1140 07/21/25 1207   BP:  115/72 155/76 153/82   BP Location:  Left leg Left arm Left arm   Patient Position:  Lying Lying Lying   Pulse: 78 94 83 94   Resp: 16 16 16 16   Temp:  98.8 °F (37.1 °C) 98.1 °F (36.7 °C) 98.2 °F (36.8 °C)   TempSrc:  Oral Oral Oral   SpO2: 95% 98% 91% 97%   Weight:       Height:           PHYSICAL EXAM:  Patient is calm, in no acute distress, awake and oriented x 3.  Dressing clean, dry and intact.  No signs of infection.  Swelling is appropriate.  Ecchymosis is appropriate in amount.  Homans test is negative.  Patient is neurovascularly intact distally.  Compartments are soft  EHL,FHL,TA,GS are intact  DP/TP 2+    LABS:  Results from last 7 days   Lab Units 07/20/25  0728   WBC 10*3/mm3 14.21*   HEMOGLOBIN g/dL 10.9*    HEMATOCRIT % 32.9*   PLATELETS 10*3/mm3 191     Results from last 7 days   Lab Units 07/20/25  0728   SODIUM mmol/L 140   POTASSIUM mmol/L 4.0   CHLORIDE mmol/L 105   CO2 mmol/L 26.1   BUN mg/dL 15.0   CREATININE mg/dL 1.25   GLUCOSE mg/dL 87   CALCIUM mg/dL 9.0             ASSESSMENT:  Status post right total hip arthroplasty, POD# 3    Plan:    Continue Physical Therapy, WBAT. Anterior hip precautions.   Continue SCDs, Continue with ECASA 81mg one tab PO BID x30 days for DVT prophylaxis.    We will change his Percocet to Norco.    Dispo planning for home with home health when medically stable.  Possibly tomorrow or Wednesday depending on how the patient continues to progress with physical therapy.    All findings discussed with my supervising physician.  He is in agreement with the plan.  Patient has been cleared for discharge from medical standpoint.    Follow up in Dr. Lund's office in 3 weeks.     Bryan Zamorano PA-C    Date: 7/21/2025  Time: 15:36 EDT

## 2025-07-22 LAB
ALBUMIN SERPL-MCNC: 2.7 G/DL (ref 3.5–5.2)
ALBUMIN/GLOB SERPL: 1 G/DL
ALP SERPL-CCNC: 51 U/L (ref 39–117)
ALT SERPL W P-5'-P-CCNC: 15 U/L (ref 1–41)
ANION GAP SERPL CALCULATED.3IONS-SCNC: 10.4 MMOL/L (ref 5–15)
AST SERPL-CCNC: 19 U/L (ref 1–40)
BASOPHILS # BLD MANUAL: 0 10*3/MM3 (ref 0–0.2)
BASOPHILS NFR BLD MANUAL: 0 % (ref 0–1.5)
BILIRUB SERPL-MCNC: 1.1 MG/DL (ref 0–1.2)
BUN SERPL-MCNC: 14 MG/DL (ref 8–23)
BUN/CREAT SERPL: 10.5 (ref 7–25)
CALCIUM SPEC-SCNC: 8.6 MG/DL (ref 8.6–10.5)
CHLORIDE SERPL-SCNC: 102 MMOL/L (ref 98–107)
CO2 SERPL-SCNC: 23.6 MMOL/L (ref 22–29)
CREAT SERPL-MCNC: 1.33 MG/DL (ref 0.76–1.27)
DEPRECATED RDW RBC AUTO: 47.3 FL (ref 37–54)
EGFRCR SERPLBLD CKD-EPI 2021: 59 ML/MIN/1.73
EOSINOPHIL # BLD MANUAL: 1.64 10*3/MM3 (ref 0–0.4)
EOSINOPHIL NFR BLD MANUAL: 10.9 % (ref 0.3–6.2)
ERYTHROCYTE [DISTWIDTH] IN BLOOD BY AUTOMATED COUNT: 14.4 % (ref 12.3–15.4)
GLOBULIN UR ELPH-MCNC: 2.6 GM/DL
GLUCOSE SERPL-MCNC: 96 MG/DL (ref 65–99)
HCT VFR BLD AUTO: 34.3 % (ref 37.5–51)
HGB BLD-MCNC: 11.3 G/DL (ref 13–17.7)
LYMPHOCYTES # BLD MANUAL: 1.95 10*3/MM3 (ref 0.7–3.1)
LYMPHOCYTES NFR BLD MANUAL: 2.2 % (ref 5–12)
MCH RBC QN AUTO: 29.7 PG (ref 26.6–33)
MCHC RBC AUTO-ENTMCNC: 32.9 G/DL (ref 31.5–35.7)
MCV RBC AUTO: 90.3 FL (ref 79–97)
MONOCYTES # BLD: 0.33 10*3/MM3 (ref 0.1–0.9)
NEUTROPHILS # BLD AUTO: 11.09 10*3/MM3 (ref 1.7–7)
NEUTROPHILS NFR BLD MANUAL: 73.9 % (ref 42.7–76)
NRBC BLD AUTO-RTO: 0 /100 WBC (ref 0–0.2)
PLAT MORPH BLD: NORMAL
PLATELET # BLD AUTO: 201 10*3/MM3 (ref 140–450)
PMV BLD AUTO: 9.9 FL (ref 6–12)
POTASSIUM SERPL-SCNC: 4.4 MMOL/L (ref 3.5–5.2)
PROT SERPL-MCNC: 5.3 G/DL (ref 6–8.5)
RBC # BLD AUTO: 3.8 10*6/MM3 (ref 4.14–5.8)
RBC MORPH BLD: NORMAL
SMUDGE CELLS BLD QL SMEAR: ABNORMAL
SODIUM SERPL-SCNC: 136 MMOL/L (ref 136–145)
VARIANT LYMPHS NFR BLD MANUAL: 13 % (ref 19.6–45.3)
WBC NRBC COR # BLD AUTO: 15.01 10*3/MM3 (ref 3.4–10.8)

## 2025-07-22 PROCEDURE — 94799 UNLISTED PULMONARY SVC/PX: CPT

## 2025-07-22 PROCEDURE — G0378 HOSPITAL OBSERVATION PER HR: HCPCS

## 2025-07-22 PROCEDURE — 25810000003 LACTATED RINGERS PER 1000 ML: Performed by: HOSPITALIST

## 2025-07-22 PROCEDURE — 94664 DEMO&/EVAL PT USE INHALER: CPT

## 2025-07-22 PROCEDURE — 97110 THERAPEUTIC EXERCISES: CPT

## 2025-07-22 PROCEDURE — 97530 THERAPEUTIC ACTIVITIES: CPT

## 2025-07-22 PROCEDURE — 85007 BL SMEAR W/DIFF WBC COUNT: CPT | Performed by: HOSPITALIST

## 2025-07-22 PROCEDURE — 85025 COMPLETE CBC W/AUTO DIFF WBC: CPT | Performed by: HOSPITALIST

## 2025-07-22 PROCEDURE — 63710000001 DIPHENHYDRAMINE PER 50 MG: Performed by: ORTHOPAEDIC SURGERY

## 2025-07-22 PROCEDURE — 94761 N-INVAS EAR/PLS OXIMETRY MLT: CPT

## 2025-07-22 PROCEDURE — 80053 COMPREHEN METABOLIC PANEL: CPT | Performed by: HOSPITALIST

## 2025-07-22 RX ORDER — HEPARIN SODIUM (PORCINE) LOCK FLUSH IV SOLN 100 UNIT/ML 100 UNIT/ML
5 SOLUTION INTRAVENOUS AS NEEDED
Status: DISCONTINUED | OUTPATIENT
Start: 2025-07-22 | End: 2025-07-23 | Stop reason: HOSPADM

## 2025-07-22 RX ORDER — SODIUM CHLORIDE 0.9 % (FLUSH) 0.9 %
20 SYRINGE (ML) INJECTION AS NEEDED
Status: DISCONTINUED | OUTPATIENT
Start: 2025-07-22 | End: 2025-07-23 | Stop reason: HOSPADM

## 2025-07-22 RX ORDER — POLYETHYLENE GLYCOL 3350 17 G/17G
17 POWDER, FOR SOLUTION ORAL 2 TIMES DAILY
Status: DISCONTINUED | OUTPATIENT
Start: 2025-07-22 | End: 2025-07-23 | Stop reason: HOSPADM

## 2025-07-22 RX ORDER — SODIUM CHLORIDE, SODIUM LACTATE, POTASSIUM CHLORIDE, CALCIUM CHLORIDE 600; 310; 30; 20 MG/100ML; MG/100ML; MG/100ML; MG/100ML
100 INJECTION, SOLUTION INTRAVENOUS CONTINUOUS
Status: ACTIVE | OUTPATIENT
Start: 2025-07-22 | End: 2025-07-23

## 2025-07-22 RX ORDER — SODIUM CHLORIDE 0.9 % (FLUSH) 0.9 %
10 SYRINGE (ML) INJECTION AS NEEDED
Status: DISCONTINUED | OUTPATIENT
Start: 2025-07-22 | End: 2025-07-23 | Stop reason: HOSPADM

## 2025-07-22 RX ORDER — SODIUM CHLORIDE 0.9 % (FLUSH) 0.9 %
10 SYRINGE (ML) INJECTION EVERY 12 HOURS SCHEDULED
Status: DISCONTINUED | OUTPATIENT
Start: 2025-07-22 | End: 2025-07-23 | Stop reason: HOSPADM

## 2025-07-22 RX ORDER — FAMOTIDINE 20 MG/1
20 TABLET, FILM COATED ORAL
Status: DISCONTINUED | OUTPATIENT
Start: 2025-07-22 | End: 2025-07-23 | Stop reason: HOSPADM

## 2025-07-22 RX ADMIN — HYDROCODONE BITARTRATE AND ACETAMINOPHEN 1 TABLET: 5; 325 TABLET ORAL at 15:48

## 2025-07-22 RX ADMIN — BUDESONIDE 0.5 MG: 0.5 INHALANT RESPIRATORY (INHALATION) at 20:17

## 2025-07-22 RX ADMIN — CLOBETASOL PROPIONATE CREAM USP, 0.05%: 0.5 CREAM TOPICAL at 08:11

## 2025-07-22 RX ADMIN — HYDROCODONE BITARTRATE AND ACETAMINOPHEN 1 TABLET: 5; 325 TABLET ORAL at 22:55

## 2025-07-22 RX ADMIN — IPRATROPIUM BROMIDE AND ALBUTEROL SULFATE 3 ML: .5; 3 SOLUTION RESPIRATORY (INHALATION) at 07:08

## 2025-07-22 RX ADMIN — CLOBETASOL PROPIONATE CREAM USP, 0.05%: 0.5 CREAM TOPICAL at 20:38

## 2025-07-22 RX ADMIN — HYDROCODONE BITARTRATE AND ACETAMINOPHEN 1 TABLET: 5; 325 TABLET ORAL at 08:13

## 2025-07-22 RX ADMIN — SODIUM CHLORIDE, POTASSIUM CHLORIDE, SODIUM LACTATE AND CALCIUM CHLORIDE 100 ML/HR: 600; 310; 30; 20 INJECTION, SOLUTION INTRAVENOUS at 14:28

## 2025-07-22 RX ADMIN — ASPIRIN 81 MG: 81 TABLET, COATED ORAL at 08:13

## 2025-07-22 RX ADMIN — FAMOTIDINE 20 MG: 20 TABLET, FILM COATED ORAL at 17:40

## 2025-07-22 RX ADMIN — DIPHENHYDRAMINE HYDROCHLORIDE 25 MG: 25 CAPSULE ORAL at 10:51

## 2025-07-22 RX ADMIN — DIPHENHYDRAMINE HYDROCHLORIDE 25 MG: 25 CAPSULE ORAL at 17:40

## 2025-07-22 RX ADMIN — HYDROCODONE BITARTRATE AND ACETAMINOPHEN 1 TABLET: 5; 325 TABLET ORAL at 00:13

## 2025-07-22 RX ADMIN — DIPHENHYDRAMINE HYDROCHLORIDE 25 MG: 25 CAPSULE ORAL at 23:42

## 2025-07-22 RX ADMIN — BUDESONIDE 0.5 MG: 0.5 INHALANT RESPIRATORY (INHALATION) at 07:08

## 2025-07-22 RX ADMIN — ASPIRIN 81 MG: 81 TABLET, COATED ORAL at 20:30

## 2025-07-22 RX ADMIN — Medication 10 ML: at 20:38

## 2025-07-22 RX ADMIN — DOCUSATE SODIUM 100 MG: 100 CAPSULE, LIQUID FILLED ORAL at 08:13

## 2025-07-22 RX ADMIN — PANTOPRAZOLE SODIUM 40 MG: 40 TABLET, DELAYED RELEASE ORAL at 20:30

## 2025-07-22 RX ADMIN — IPRATROPIUM BROMIDE AND ALBUTEROL SULFATE 3 ML: .5; 3 SOLUTION RESPIRATORY (INHALATION) at 20:17

## 2025-07-22 NOTE — PLAN OF CARE
Goal Outcome Evaluation:           Progress: improving  Outcome Evaluation: vss, nvi, SBA x1 with walker for short distances, voiding per BSC, 2 liquidy BMs this shift, full body rash and blisters continues but slowly improving, tolerating Norco, benadryl and pepcid to help with rash, talk about IV steroid pushed off to tomorrow, plan to DC home when medically stable, port accessed today for IVF

## 2025-07-22 NOTE — PROGRESS NOTES
[facility] ORTHO     [phone]  [facility]  Orthopedics  [address], [address]  [place] [address] [phone]  [facility]  Orthopedics  [address]  [place] [address]     Orthopedic Progress Note    Subjective :   Patient seen and examined. Resting comfortably. No issues overnight. Pain controlled.  Continues working with physical therapy.  Was changed from Percocet to [place] yesterday.  Patient feels like he is tolerating it better.  Has noticed some improvement of his rash.  Also given 1 dose of Decadron.  Considering giving another dose.  However A would like to discuss with the patient in the morning.  Continues to follow-up with A.  Plan to keep him today.  Possible anticipation for discharge tomorrow.  Plan is for discharge home with home health care.  Creatinine slightly elevated at 1.33 today.  Hemoglobin 11.3    Objective :    Vital signs in last 24 hours:  Temp:  [96.8 °F (36 °C)-98.2 °F (36.8 °C)] 97.8 °F (36.6 °C)  Heart Rate:  [78-98] 84  Resp:  [18-20] 18  BP: (113-170)/(63-96) 125/63  Vitals:    07/22/25 0952 07/22/25 0954 07/22/25 1230 07/22/25 1546   BP: 128/71  Comment: dizziness 122/75  Comment: worsening dizziness 118/70 125/63   BP Location: Left arm Left arm Left arm Left arm   Patient Position: Sitting Standing Lying Lying   Pulse:   82 84   Resp:   18 18   Temp:   97.8 °F (36.6 °C) 97.8 °F (36.6 °C)   TempSrc:   Oral Oral   SpO2:   94% 93%   Weight:       Height:           PHYSICAL EXAM:  Physical examination of the right hip.      Patient is calm, in no acute distress, awake and oriented x 3.  Dressing clean, dry and intact.  No signs of infection.  Swelling is appropriate.  Ecchymosis is appropriate in amount.  Homans test is negative.  Patient is neurovascularly intact distally.  Compartments are soft  EHL,FHL,TA,GS are intact  DP/TP 2+    Rash still present    LABS:  Results from last 7 days   Lab  Units 07/22/25  0535   WBC 10*3/mm3 15.01*   HEMOGLOBIN g/dL 11.3*   HEMATOCRIT % 34.3*   PLATELETS 10*3/mm3 201     Results from last 7 days   Lab Units 07/22/25  0535   SODIUM mmol/L 136   POTASSIUM mmol/L 4.4   CHLORIDE mmol/L 102   CO2 mmol/L 23.6   BUN mg/dL 14.0   CREATININE mg/dL 1.33*   GLUCOSE mg/dL 96   CALCIUM mg/dL 8.6               ASSESSMENT:  Status post right total hip arthroplasty, POD# 4    Plan:    Continue Physical Therapy, WBAT. Anterior hip precautions.   Continue SCDs, Continue with ECASA 81mg one tab PO BID x30 days for DVT prophylaxis.    Dispo planning for home with home health when medically stable.  Possibly plan for discharge tomorrow if cleared by the hospitalist team.  As well as he continues to progress with physical therapy.    Follow up in Dr. Lund's office in 3 weeks once discharged.  All findings discussed with my supervising physician.    Patient feels like he is also getting lightheaded.  Vital signs have remained stable.  Also reviewed his albumin level.  Discussed with the patient increased importance on proper nutrition.  Will add boost/Ensure shakes.    Bryan Zamorano PA-C    Date: 7/22/2025  Time: 17:12 EDT

## 2025-07-22 NOTE — PLAN OF CARE
Goal Outcome Evaluation:  Plan of Care Reviewed With: patient, spouse        Progress: no change  Outcome Evaluation: Pt seen for PT tx this AM and tolerated the session well. Today, pt was SBA for bed mobility with use of bedrail and HOB was flat, SBA for one STS to RW from an elevated bed height, and CGA for ambulation of 4' R side steps and 4' to the chair w/ RW. Pt had c/o increasing dizziness with each transition; see vitals below. Prior to mobility pt performed ELAINA protocol ther-ex for 10 reps, demoing good ROM. Pt performed standing MIP x10 w/ RW and CG/SBA, demoing good foot clearance, recall of sequencing and balance. Pt/spouse educated on safety/sequencing of gait/stairs, use of gait belt, HEP, positioning/icing, energy conservation, activity recs and falls prevention; they v/u. Pt/sposue report no mobility concerns or questions for return home, only concern with rash and dizziness with mobility. Discussed w/ RN. PT will cont to follow and rec HH at [place].    Anticipated Discharge Disposition (PT): home with home health, home with assist    Supine - 113/73  Sitting - 128/71  Standing - 122/75    *Increasing dizziness with each transition.

## 2025-07-22 NOTE — PROGRESS NOTES
[facility]     LOS: 0 days     Subjective:    CC:No chief complaint on file.    DIET:  Diet Order   Procedures    Diet: Regular/House; Fluid Consistency: Thin (IDDSI 0)       Objective:    Vital Signs:  Temp:  [96.8 °F (36 °C)-98.6 °F (37 °C)] 97.2 °F (36.2 °C)  Heart Rate:  [78-98] 92  Resp:  [16-20] 18  BP: (113-170)/(71-96) 122/75    SpO2:  [93 %-98 %] 95 %  on  Flow (L/min) (Oxygen Therapy):  [2] 2;   Device (Oxygen Therapy): room air  Body mass index is 38.01 kg/m².    Physical Exam  Constitutional:       General: He is not in acute distress.  Cardiovascular:      Rate and Rhythm: Normal rate and regular rhythm.   Pulmonary:      Effort: Pulmonary effort is normal.      Breath sounds: Normal breath sounds.   Abdominal:      General: There is no distension.      Palpations: Abdomen is soft.      Tenderness: There is no abdominal tenderness.   Skin:     General: Skin is warm and dry.      Comments: Rash noted on abdomen and leg, erythematous macular   Neurological:      General: No focal deficit present.      Mental Status: He is alert.   Psychiatric:         Mood and Affect: Mood normal.         Behavior: Behavior normal.         Results Review:    Glucose   Date Value Ref Range Status   07/22/2025 96 65 - 99 mg/dL Final   07/20/2025 87 65 - 99 mg/dL Final     Results from last 7 days   Lab Units 07/22/25  0535   WBC 10*3/mm3 15.01*   HEMOGLOBIN g/dL 11.3*   HEMATOCRIT % 34.3*   PLATELETS 10*3/mm3 201     Results from last 7 days   Lab Units 07/22/25  0535   SODIUM mmol/L 136   POTASSIUM mmol/L 4.4   CHLORIDE mmol/L 102   CO2 mmol/L 23.6   BUN mg/dL 14.0   CREATININE mg/dL 1.33*   CALCIUM mg/dL 8.6   BILIRUBIN mg/dL 1.1   ALK PHOS U/L 51   ALT (SGPT) U/L 15   AST (SGOT) U/L 19   GLUCOSE mg/dL 96         Results from last 7 days   Lab Units 07/20/25  0728   MAGNESIUM mg/dL 2.4         Cultures:  No results found for: \"BLOODCX\", \"URINECX\", \"WOUNDCX\", \"MRSACX\", \"RESPCX\", \"STOOLCX\"    I " have reviewed daily medications and changes in CPOE    Scheduled meds  aspirin, 81 mg, Oral, Q12H  budesonide, 0.5 mg, Nebulization, BID  Chlorhexidine Gluconate Cloth, , Apply externally, Once  clobetasol propionate, , Topical, Q12H  docusate sodium, 100 mg, Oral, BID  ipratropium-albuterol, 3 mL, Nebulization, Q6H While Awake - RT  losartan, 100 mg, Oral, Q24H  metoprolol succinate XL, 25 mg, Oral, Nightly  pantoprazole, 40 mg, Oral, Nightly        lactated ringers, 100 mL/hr      PRN meds    aluminum-magnesium hydroxide-simethicone    bisacodyl    diphenhydrAMINE    HYDROcodone-acetaminophen    HYDROmorphone **AND** naloxone    lidocaine    ondansetron ODT **OR** ondansetron    Phosphorus Replacement - Follow Nurse / BPA Driven Protocol        Status post total hip replacement, right    Stage 3a chronic kidney disease    Aftercare following right hip joint replacement surgery        Assessment/Plan:    Dizziness this am while going to the bathroom, patient never passed out, no bowel movement for 5 days, will start MiraLAX.  He has used Amitiza with success previously.    His white blood cell count is elevated he has got some eosinophilia.  He has chronic skin rash.  He took Decadron a few days ago.  Orthopedics is approved the use of a more steroids if needed.  Continue with H2 blocker, Benadryl and topical steroids for now.          Sj Toledo MD  07/22/25  11:59 EDT

## 2025-07-22 NOTE — PLAN OF CARE
Goal Outcome Evaluation:  Plan of Care Reviewed With: patient        Progress: improving  Outcome Evaluation: vss. nvi. x1 assist BRP but perfers BSC. pain managed with PO meds. dressing CDI. rash managed with cream and benadryl. plan to d/c home when medically stable.

## 2025-07-22 NOTE — THERAPY TREATMENT NOTE
Patient Name: Louis Andino Jr.  : 1960    MRN: [id number]                              Today's Date: 2025       Admit Date: 2025    Visit Dx:     ICD-10-CM ICD-9-CM   1. Status post total hip replacement, right  Z96.641 V43.64   2. Primary osteoarthritis of right hip  M16.11 715.15   3. Lumbar facet arthropathy  M47.816 721.3   4. Osteoarthritis of right hip, unspecified osteoarthritis type  M16.11 715.95   5. Encounter for long-term (current) use of high-risk medication  Z79.899 V58.69     Patient Active Problem List   Diagnosis    Renal mass    Asthma    Gastroesophageal reflux disease    Hyperlipidemia    Hypertension    BPH (benign prostatic hyperplasia)    COPD (chronic obstructive pulmonary disease)    KARON (acute kidney injury)    Acute respiratory failure with hypoxia    Renal cell carcinoma    Port-A-Cath in place    Lightheadedness    Right bundle branch block (RBBB) with left anterior fascicular block (LAFB)    APC (atrial premature contractions)    Lumbar facet arthropathy    Right sided sciatica    Chronic pain syndrome    Encounter for long-term (current) use of high-risk medication    Encounter for screening for malignant neoplasm of colon    Urticaria of unknown origin    Lumbar radiculopathy    Degeneration of intervertebral disc of lumbar region with discogenic back pain    Status post total hip replacement, right    Stage 3a chronic kidney disease    Aftercare following right hip joint replacement surgery     Past Medical History:   Diagnosis Date    Abnormal ECG Dec 2024    Allergic 2025    Adhesive on pain patch    Anemia 2023    Due to surgery. Required transfusions    Ankle sprain     Multiple-both ankles over the years    Arthritis Years    Worse R. Hip and back    Asthma     Stable now    Cataract 2years    Optometrist is watching yearly    Chronic pain disorder     Right hip and back    Clotting disorder     Required blood transfusion during and after  surgery in excess of expected    Colon polyp 2025    Coronary artery disease Dec 2023    Bifascicular block    Deep vein thrombosis 12/18/2023    During cancer surgery a clot was surgically removed from the inferior vena cava which was cancer related    Emphysema/COPD     Erectile dysfunction Several years    Extremity pain 2020    Right hip and bilat shoulders    Fracture of ankle     Left ankle as a child    Fracture of wrist     Left wrist as a child    GERD (gastroesophageal reflux disease)     Headache Years    Now rare - seem to be due to sleep deprivation    Headache, tension-type 1977    Rare    HL (hearing loss)     Progressive worsening over many years    Hyperglycemia 10/12/2023    Hyperlipidemia 10/12/2023    Hypertension     Injury of neck 2023    Previous fractures noted during chiropractic visit. Probably secondary to shoulder injury.    Joint pain 2020    Right hip    Low back pain     Worse last few year    Neck pain 2020    Mild    Obesity     Pneumonia 12/20/2023    Developed while hospitalized for cancer surgery    PONV (postoperative nausea and vomiting)     Prostate disease     Rash     Allergic reactions to laundry detergent and mild generalized itching secondary to immunotherapy.    Rectal bleeding     Renal cell carcinoma 12/30/2023    Renal insufficiency 4/9/2024    Decreased renal function since nephrectomy in December, 2023, secondary to renal cancer, but not diagnosed as chronic kidney disease until recent visit. Kidney functions have been stable since surgery, but are now being considered permanent.    Shoulder injury 1980s    Injured in     Sleep apnea     USES CPAP    Visual impairment     Wear glasses for near and distant vision    Vitreous degeneration of right eye 10/12/2023    Formatting of this note might be different from the original. Retinal tear and detachment warning symptoms reviewed and patient instructed to call immediately if increasing floaters, flashes, or  decreasing peripheral vision. No retinal detachment or retinal tear noted. Recheck in 1 month with dilated exam.     Past Surgical History:   Procedure Laterality Date    ABDOMINAL SURGERY  December 18, 2023    Left Kidney and Adrenal Gland removed due to Renal Cell Carcinoma    COLONOSCOPY N/A 3/24/2025    Procedure: COLONOSCOPY to cecum and terminal ileum with cold and hot polypectomies;  Surgeon: Dolly Stephens MD;  Location: [facility] ENDOSCOPY;  Service: Gastroenterology;  Laterality: N/A;  screening//colon polyps, diverticulosis    NEPHRECTOMY Left 12/18/2023    Procedure: NEPHRECTOMY RADICAL WITH CAVAL THROMBECTOMY;  Surgeon: James Esquivel MD;  Location: [facility] OR;  Service: Urology;  Laterality: Left;    PORTACATH PLACEMENT  01/30/2024    SKIN LESION EXCISION  1990    TOTAL HIP ARTHROPLASTY Right 7/18/2025    Procedure: TOTAL HIP ARTHROPLASTY ANTERIOR WITH HANA TABLE;  Surgeon: Yovani Lund MD;  Location: [facility] OR OSC;  Service: Orthopedics;  Laterality: Right;      General Information       Row Name 07/22/25 1038          Physical Therapy Time and Intention    Document Type therapy note (daily note)  -MG     Mode of Treatment individual therapy;physical therapy  -MG       Row Name 07/22/25 1038          General Information    Patient Profile Reviewed yes  -MG     Existing Precautions/Restrictions fall;right;hip, anterior  -MG     Barriers to Rehab none identified  -MG       Row Name 07/22/25 1038          Cognition    Orientation Status (Cognition) oriented x 4  -MG       Row Name 07/22/25 1038          Safety Issues/Impairments Affecting Functional Mobility    Safety Issues Affecting Function (Mobility) friction/shear risk;sequencing abilities;safety precaution awareness  -MG     Impairments Affecting Function (Mobility) balance;endurance/activity tolerance;strength;pain;range of motion (ROM)  -MG     Comment, Safety Issues/Impairments (Mobility) Gait belt and non-skid socks donned.   -MG               User Key  (r) = Recorded By, (t) = Taken By, (c) = Cosigned By      Initials Name Provider Type    MG Emmy Greene, PT Physical Therapist                   Mobility       Row Name 07/22/25 1038          Bed Mobility    Supine-Sit Bowman (Bed Mobility) standby assist;verbal cues  -MG     Assistive Device (Bed Mobility) bed rails  -MG     Comment, (Bed Mobility) HOB flat. Cues for sequencing and hand placement. Pt using RUE on bedrail only. Dizzy upon sitting.  -MG       Row Name 07/22/25 1038          Sit-Stand Transfer    Sit-Stand Bowman (Transfers) standby assist;verbal cues  -MG     Assistive Device (Sit-Stand Transfers) walker, front-wheeled  -MG     Comment, (Sit-Stand Transfer) x1 from elevated EOB. Increased dizziness on standing.  -MG       Row Name 07/22/25 1038          Gait/Stairs (Locomotion)    Bowman Level (Gait) contact guard;verbal cues  -MG     Assistive Device (Gait) walker, front-wheeled  -MG     Distance in Feet (Gait) 4  4' R side steps and 4' to the chair  -MG     Deviations/Abnormal Patterns (Gait) gait speed decreased;antalgic;stride length decreased  -MG     Bilateral Gait Deviations forward flexed posture;heel strike decreased  -MG     Right Sided Gait Deviations weight shift ability decreased  -MG     Comment, (Gait/Stairs) Increasing dizziness as pt standing, limiting his mobility. No overt LOB, buckling, or SOB. Cues for sequencing and posture.  -MG       Row Name 07/22/25 1038          Mobility    Extremity Weight-bearing Status right lower extremity  -MG     Right Lower Extremity (Weight-bearing Status) weight-bearing as tolerated (WBAT)  -MG               User Key  (r) = Recorded By, (t) = Taken By, (c) = Cosigned By      Initials Name Provider Type    MG Emmy Greene, PT Physical Therapist                   Obj/Interventions       Row Name 07/22/25 1041          Motor Skills    Therapeutic Exercise other (see comments)  ELAINA protocol x10. MIP  x10 w/ RW and CG/SBA, demoing good foot clearance, recall of sequencing and balance.  -MG       Row Name 07/22/25 1041          Balance    Static Sitting Balance supervision  -MG     Dynamic Sitting Balance standby assist  -MG     Position, Sitting Balance sitting edge of bed  -MG     Static Standing Balance standby assist  -MG     Dynamic Standing Balance contact guard;standby assist  -MG     Position/Device Used, Standing Balance supported;walker, front-wheeled  -MG               User Key  (r) = Recorded By, (t) = Taken By, (c) = Cosigned By      Initials Name Provider Type    MG Emmy Greene, PT Physical Therapist                   Goals/Plan    No documentation.                  Clinical Impression       Row Name 07/22/25 1041          Pain    Pretreatment Pain Rating 3/10  -MG     Posttreatment Pain Rating 4/10  -MG     Pain Location hip  -MG     Pain Side/Orientation right  -MG     Pain Management Interventions premedicated for activity;activity modification encouraged;exercise or physical activity utilized  -MG     Response to Pain Interventions activity participation with tolerable pain  -MG     Pre/Posttreatment Pain Comment Also c/o general itching d/t rash.  -MG       Row Name 07/22/25 1041          Plan of Care Review    Plan of Care Reviewed With patient;spouse  -MG     Progress no change  -MG     Outcome Evaluation Pt seen for PT tx this AM and tolerated the session well. Today, pt was SBA for bed mobility with use of bedrail and HOB was flat, SBA for one STS to RW from an elevated bed height, and CGA for ambulation of 4' R side steps and 4' to the chair w/ RW. Pt had c/o increasing dizziness with each transition; see vitals below. Prior to mobility pt performed ELAINA protocol ther-ex for 10 reps, demoing good ROM. Pt performed standing MIP x10 w/ RW and CG/SBA, demoing good foot clearance, recall of sequencing and balance. Pt/spouse educated on safety/sequencing of gait/stairs, use of gait belt, HEP,  positioning/icing, energy conservation, activity recs and falls prevention; they v/u. Pt/sposue report no mobility concerns or questions for return home, only concern with rash and dizziness with mobility. Discussed w/ RN. PT will cont to follow and rec HH at [place].  -MG       Row Name 07/22/25 1041          Therapy Assessment/Plan (PT)    Rehab Potential (PT) good  -MG     Criteria for Skilled Interventions Met (PT) yes  -MG     Therapy Frequency (PT) daily  -MG       Row Name 07/22/25 1041          Vital Signs    Pre Systolic BP Rehab 113  supine  -MG     Pre Treatment Diastolic BP 73  -MG     Intra Systolic BP Rehab 128  sitting  -MG     Intra Treatment Diastolic BP 71  -MG     Post Systolic BP Rehab 122  standing  -MG     Post Treatment Diastolic BP 75  -MG       Row Name 07/22/25 1041          Positioning and Restraints    Pre-Treatment Position in bed  -MG     Post Treatment Position chair  -MG     In Chair notified nsg;call light within reach;encouraged to call for assist;exit alarm on;with family/caregiver;sitting;waffle cushion  -MG               User Key  (r) = Recorded By, (t) = Taken By, (c) = Cosigned By      Initials Name Provider Type    MG Emmy Greene, PT Physical Therapist                   Outcome Measures       Row Name 07/22/25 1045          How much help from another person do you currently need...    Turning from your back to your side while in flat bed without using bedrails? 4  -MG     Moving from lying on back to sitting on the side of a flat bed without bedrails? 3  -MG     Moving to and from a bed to a chair (including a wheelchair)? 3  -MG     Standing up from a chair using your arms (e.g., wheelchair, bedside chair)? 3  -MG     Climbing 3-5 steps with a railing? 3  -MG     To walk in hospital room? 3  -MG     AM-PAC 6 Clicks Score (PT) 19  -MG     Highest Level of Mobility Goal Walk 10 Steps or More-6  -MG               User Key  (r) = Recorded By, (t) = Taken By, (c) = Cosigned By       Initials Name Provider Type    Emmy Decker, PT Physical Therapist                                 Physical Therapy Education       Title: PT OT SLP Therapies (Done)       Topic: Physical Therapy (Done)       Point: Mobility training (Done)       Learning Progress Summary            Patient Acceptance, E,TB,D, VU,NR by  at 7/22/2025 1045    Acceptance, E, VU,DU,NR by  at 7/21/2025 1158    Acceptance, E, VU by HR at 7/20/2025 1028    Acceptance, E, VU by HR at 7/19/2025 1110    Acceptance, E,TB, VU,NR by EF at 7/18/2025 1607   Family Acceptance, E, VU by HR at 7/20/2025 1028                      Point: Home exercise program (Done)       Learning Progress Summary            Patient Acceptance, E,TB,D, VU,NR by MG at 7/22/2025 1045    Acceptance, E, VU,DU,NR by  at 7/21/2025 1158    Acceptance, E, VU by HR at 7/20/2025 1028    Acceptance, E, VU by HR at 7/19/2025 1110    Acceptance, E,TB, VU,NR by EF at 7/18/2025 1607   Family Acceptance, E, VU by HR at 7/20/2025 1028                      Point: Body mechanics (Done)       Learning Progress Summary            Patient Acceptance, E,TB,D, VU,NR by  at 7/22/2025 1045    Acceptance, E, VU,DU,NR by  at 7/21/2025 1158    Acceptance, E, VU by HR at 7/20/2025 1028    Acceptance, E, VU by HR at 7/19/2025 1110    Acceptance, E,TB, VU,NR by EF at 7/18/2025 1607   Family Acceptance, E, VU by HR at 7/20/2025 1028                      Point: Precautions (Done)       Learning Progress Summary            Patient Acceptance, E,TB,D, VU,NR by MG at 7/22/2025 1045    Acceptance, E, VU,DU,NR by  at 7/21/2025 1158    Acceptance, E, VU by HR at 7/20/2025 1028    Acceptance, E, VU by HR at 7/19/2025 1110    Acceptance, E,TB, VU,NR by EF at 7/18/2025 1607   Family Acceptance, E, VU by HR at 7/20/2025 1028                                      User Key       Initials Effective Dates Name Provider Type Discipline    EF 05/31/24 -  Denisse Merritt, PT Physical Therapist PT     MG 05/24/22 -  Emmy Greene, PT Physical Therapist PT    MH 09/11/24 -  Nestor Hubbard, PT Physical Therapist PT    HR 04/29/25 -  Patricia Dc, PT Physical Therapist PT                  PT Recommendation and Plan     Progress: no change  Outcome Evaluation: Pt seen for PT tx this AM and tolerated the session well. Today, pt was SBA for bed mobility with use of bedrail and HOB was flat, SBA for one STS to RW from an elevated bed height, and CGA for ambulation of 4' R side steps and 4' to the chair w/ RW. Pt had c/o increasing dizziness with each transition; see vitals below. Prior to mobility pt performed ELAINA protocol ther-ex for 10 reps, demoing good ROM. Pt performed standing MIP x10 w/ RW and CG/SBA, demoing good foot clearance, recall of sequencing and balance. Pt/spouse educated on safety/sequencing of gait/stairs, use of gait belt, HEP, positioning/icing, energy conservation, activity recs and falls prevention; they v/u. Pt/sposue report no mobility concerns or questions for return home, only concern with rash and dizziness with mobility. Discussed w/ RN. PT will cont to follow and rec HH at [place].     Time Calculation:         PT Charges       Row Name 07/22/25 1045             Time Calculation    Start Time 0941  -MG      Stop Time 1014  -MG      Time Calculation (min) 33 min  -MG      PT Received On 07/22/25  -MG      PT - Next Appointment 07/23/25  -MG                User Key  (r) = Recorded By, (t) = Taken By, (c) = Cosigned By      Initials Name Provider Type    MG Emmy Greene, PT Physical Therapist                  Therapy Charges for Today       Code Description Service Date Service Provider Modifiers Qty    57065649492 HC PT THERAPEUTIC ACT EA 15 MIN 7/22/2025 Emmy Greene, PT GP 1    [id number] HC PT THER PROC EA 15 MIN 7/22/2025 Emmy Greene, PT GP 1            PT G-Codes  Outcome Measure Options: AM-PAC 6 Clicks Basic Mobility (PT)  AM-PAC 6 Clicks Score (PT): 19  PT Discharge  Summary  Anticipated Discharge Disposition (PT): home with home health, home with assist    Emmy Greene, PT  7/22/2025

## 2025-07-23 ENCOUNTER — READMISSION MANAGEMENT (OUTPATIENT)
Dept: CALL CENTER | Facility: HOSPITAL | Age: 65
End: 2025-07-23
Payer: COMMERCIAL

## 2025-07-23 VITALS
TEMPERATURE: 97.5 F | HEART RATE: 83 BPM | HEIGHT: 68 IN | WEIGHT: 250 LBS | SYSTOLIC BLOOD PRESSURE: 132 MMHG | OXYGEN SATURATION: 95 % | BODY MASS INDEX: 37.89 KG/M2 | DIASTOLIC BLOOD PRESSURE: 80 MMHG | RESPIRATION RATE: 18 BRPM

## 2025-07-23 PROCEDURE — G0378 HOSPITAL OBSERVATION PER HR: HCPCS

## 2025-07-23 PROCEDURE — 94760 N-INVAS EAR/PLS OXIMETRY 1: CPT

## 2025-07-23 PROCEDURE — 97530 THERAPEUTIC ACTIVITIES: CPT

## 2025-07-23 PROCEDURE — 94664 DEMO&/EVAL PT USE INHALER: CPT

## 2025-07-23 PROCEDURE — 63710000001 DIPHENHYDRAMINE PER 50 MG: Performed by: ORTHOPAEDIC SURGERY

## 2025-07-23 PROCEDURE — 94761 N-INVAS EAR/PLS OXIMETRY MLT: CPT

## 2025-07-23 PROCEDURE — 97110 THERAPEUTIC EXERCISES: CPT

## 2025-07-23 PROCEDURE — 94799 UNLISTED PULMONARY SVC/PX: CPT

## 2025-07-23 RX ORDER — HYDROCODONE BITARTRATE AND ACETAMINOPHEN 5; 325 MG/1; MG/1
1 TABLET ORAL
Qty: 28 TABLET | Refills: 0 | Status: SHIPPED | OUTPATIENT
Start: 2025-07-23 | End: 2025-07-28

## 2025-07-23 RX ADMIN — ASPIRIN 81 MG: 81 TABLET, COATED ORAL at 08:21

## 2025-07-23 RX ADMIN — DIPHENHYDRAMINE HYDROCHLORIDE 25 MG: 25 CAPSULE ORAL at 08:21

## 2025-07-23 RX ADMIN — IPRATROPIUM BROMIDE AND ALBUTEROL SULFATE 3 ML: .5; 3 SOLUTION RESPIRATORY (INHALATION) at 06:51

## 2025-07-23 RX ADMIN — CLOBETASOL PROPIONATE CREAM USP, 0.05%: 0.5 CREAM TOPICAL at 08:21

## 2025-07-23 RX ADMIN — IPRATROPIUM BROMIDE AND ALBUTEROL SULFATE 3 ML: .5; 3 SOLUTION RESPIRATORY (INHALATION) at 11:56

## 2025-07-23 RX ADMIN — Medication 10 ML: at 08:22

## 2025-07-23 RX ADMIN — BUDESONIDE 0.5 MG: 0.5 INHALANT RESPIRATORY (INHALATION) at 06:55

## 2025-07-23 RX ADMIN — FAMOTIDINE 20 MG: 20 TABLET, FILM COATED ORAL at 06:15

## 2025-07-23 RX ADMIN — HYDROCODONE BITARTRATE AND ACETAMINOPHEN 1 TABLET: 5; 325 TABLET ORAL at 08:21

## 2025-07-23 NOTE — DISCHARGE INSTR - ACTIVITY
Discharge and Follow up Instructions:     Total Hip Replacement Discharge Instructions:    I. ACTIVITIES:  1. Exercises:  Complete exercise program as taught by the hospital physical therapist 2 times per day  Exercise program will be advanced by the physical therapist  During the day be up ambulating every 2 hours (while awake) for short distances  Complete the ankle pump exercises at least 10 times per hour (while awake)  Elevate legs when in bed and for at least 30 minutes during the day.Use cold packs 20-30 minutes approximately 5 times per day. This should be done before and after completing your exercises and at any time you are experiencing pain/ stiffness in your operative extremity.      2. Activities of Daily Living:  No tub baths, hot tubs, or swimming pools for 4 weeks  May shower and let water run over the incision on post-operative day #5 if no drainage. Do not scrub or rub the incision. Simply let the water run over the incision and pat dry.    II. Restrictions  Continue ***anterior  hip precautions as taught at the hospital  Your surgeon will discuss with you when you will be able to drive again. Usual guidelines are you are to be off pain medications prior to driving.  Weight bearing is as tolerated  First week stay inside on even terrain. May go up and down stairs one stair at a time utilizing the hand rail once cleared by physical therapy to do so.  After one week, you may venture outside (if cleared to do so by physical therapist).    III. Precautions:  Everyone that comes near you should wash their hands  No elective dental, genital-urinary, or colon procedures or surgical procedures for 12 weeks after surgery unless absolutely necessary.   If dental work or surgical procedure is deemed absolutely necessary, you will need to contact your surgeon as you will need to take antibiotics 1 hour prior to any dental work (including teeth cleanings).  Please discuss with your surgeon prophylactic  antibiotics as the length of time this intervention will be necessary for you varies with each patient’s health history and situation.  Avoid sick people. If you must be around someone who is ill, they should wear a mask.  Avoid visits to the Emergency Room or Urgent Care. If you feel you need to go to the Emergency Room, please notify your Surgeon's office.  Stockings are to be worn for one week after surgery and are to be placed on in the morning and removed at night. Observe your skin when stocking is removed for any problems. Monitor the stockings to ensure that any swelling is not causing the stockings to become too tight. In this case, remove stockings immediately.      IV. INCISION CARE:  Wash your hands prior to dressing changes  Change the dressing as needed to keep incision clean and dry. Utilize dry gauze and paper tape. Avoid touching the side of the gauze that goes against the incision with your hands.  No creams or ointments to the incision  May remove dressing once the incision is free of drainage  Do not touch or pick at the incision  Check incision every day and notify surgeon immediately if any of the following signs or symptoms are noted:  Increase in redness  Increase in swelling around the incision and of the entire extremity  Increase in pain  Drainage oozing from the incision  Pulling apart of the edges of the incision  Increase in overall body temperature (greater than 100.5 degrees)    *** You have absorbable sutures with steristrips/ Exofin Fusion, please do not remove the steri strips/ Exofin Fusion for 14 days, you can shower on them 6 days after surgery.     ***Staples will be removed between 10-14 days postoperation.  This may be done by either the home health nurse, rehabilitation nurse or during your return visit to Dr. Lund's office.  You will then be instructed on how to care for the incision.  (Please call the office if your staples have not been removed within 14 days after  surgery).    V. Medications:   1. Anticoagulants: You will be discharged on an anticoagulant. This is a prophylactic medication that helps prevent blood clots during your post-operative period. *** You will be on *** Aspirin ***81 mg Enteric coated every 12 hours orally  for *** 30 days.   While taking the anticoagulant, you should avoid taking any additional aspirin, ibuprofen (Advil or Motrin), Aleve (Naprosyn) or other non-steroidal anti-inflammatory medications.   Notify surgeon immediately if any ethan bleeding is noted in the urine, stool, emesis, or from the nose or the incision. Blood in the stool will often appear as black rather than red. Blood in urine may appear as pink. Blood in emesis may appear as brown/black like coffee grounds.  You will need to apply pressure for longer periods of time to any cuts or abrasions to stop bleeding  Avoid alcohol while taking anticoagulants    2. Stool Softeners: You will be at greater risk of constipation after surgery due to being less mobile and the pain medications.   Take stool softeners as instructed by your surgeon while on pain medications. Over the counter Colace 100 mg 1-2 capsules twice daily.   If stools become too loose or too frequent, please decreases the dosage or stop the stool softener.  If constipation occurs despite use of stool softeners, you are to continue the stool softeners and add a laxative (Milk of Magnesia 1 ounce daily as needed).  Dulcolax oral tabs or suppository, or a fleets enema can also be utilized for constipation and can be obtained over the counter.   If above interventions are unsuccessful in inducing bowel movements, please contact your surgeon's office / family physician's office.  Drink plenty of fluids, and eat fruits and vegetables during your recovery time    3. Pain Medications utilized after surgery are narcotics and the law requires that the following information be given to all patients that are prescribed  narcotics:  CLASSIFICATION: Pain medications are called Opioids and are narcotics  LEGALITIES: It is illegal to share narcotics with others and to drive within 24 hours of taking narcotics  POTENTIAL SIDE EFFECTS: Potential side effects of opioids include: nausea, vomiting, itching, dizziness, drowsiness, dry mouth, constipation, and difficulty urinating.  POTENTIAL ADVERSE EFFECTS:   Opioid tolerance can develop with use of pain medications and this simply means that it requires more and more of the medication to control pain; however, this is seen more in patients that use opioids for longer periods of time.  Opioid dependence can develop with use of Opioids and this simply means that to stop the medication can cause withdrawal symptoms; however, this is seen with patients that use Opioids for longer periods of time.  Opioid addiction can develop with use of Opioids and the incidence of this is very unlikely in patients who take the medications as ordered and stop the medications as instructed.  Opioid overdose can be dangerous, but is unlikely when the medication is taken as ordered and stopped when ordered. It is important not to mix opioids with alcohol or with and type of sedative such as Benadryl as this can lead to over sedation and respiratory difficulty.  DOSAGE:   Pain medications will need to be taken consistently for the first week to decrease pain and promote adequate pain relief and participation in physical therapy.  After the initial surgical pain begins to resolve, you may begin to decrease the pain medication. By the end of 6 weeks, you should be off of pain medications.  Refills will not be given by the office during evening hours, on weekends, or after 6 weeks post-op.  To seek refills on pain medications during the initial 6 week post-operative period, you must call the office 48 hours in advance to request the refill. The office will then notify you when to  the prescription. DO NOT  wait until you are out of the medication to request a refill.    V. FOLLOW-UP VISITS:  You will need to follow up in the office with your surgeon in 2-3 weeks   If you have any concerns or suspected complications prior to your follow up visit, please call your surgeons office. Do not wait until your appointment time if you suspect complications. These will need to be addressed in the office promptly.

## 2025-07-23 NOTE — OUTREACH NOTE
Prep Survey      Flowsheet Row Responses   [facility] patient discharged from? [place]   Is LACE score < 7 ? No   Eligibility [facility]   Date of Admission 07/18/25   Date of Discharge 07/23/25   Discharge Disposition Home-Health Care [facility] [place]   Discharge diagnosis TOTAL HIP ARTHROPLASTY ANTERIOR WITH HANA TABLE   Does the patient have one of the following disease processes/diagnoses(primary or secondary)? Total Joint Replacement   Does the patient have Home health ordered? Yes   What is the Home health agency?  [facility]   Prep survey completed? Yes            Jeni MÉNDEZ - Registered Nurse

## 2025-07-23 NOTE — THERAPY TREATMENT NOTE
Patient Name: Louis Andino Jr.  : 1960    MRN: [id number]                              Today's Date: 2025       Admit Date: 2025    Visit Dx:     ICD-10-CM ICD-9-CM   1. Status post total hip replacement, right  Z96.641 V43.64   2. Primary osteoarthritis of right hip  M16.11 715.15   3. Lumbar facet arthropathy  M47.816 721.3   4. Osteoarthritis of right hip, unspecified osteoarthritis type  M16.11 715.95   5. Encounter for long-term (current) use of high-risk medication  Z79.899 V58.69     Patient Active Problem List   Diagnosis    Renal mass    Asthma    Gastroesophageal reflux disease    Hyperlipidemia    Hypertension    BPH (benign prostatic hyperplasia)    COPD (chronic obstructive pulmonary disease)    KARON (acute kidney injury)    Acute respiratory failure with hypoxia    Renal cell carcinoma    Port-A-Cath in place    Lightheadedness    Right bundle branch block (RBBB) with left anterior fascicular block (LAFB)    APC (atrial premature contractions)    Lumbar facet arthropathy    Right sided sciatica    Chronic pain syndrome    Encounter for long-term (current) use of high-risk medication    Encounter for screening for malignant neoplasm of colon    Urticaria of unknown origin    Lumbar radiculopathy    Degeneration of intervertebral disc of lumbar region with discogenic back pain    Status post total hip replacement, right    Stage 3a chronic kidney disease    Aftercare following right hip joint replacement surgery     Past Medical History:   Diagnosis Date    Abnormal ECG Dec 2024    Allergic 2025    Adhesive on pain patch    Anemia 2023    Due to surgery. Required transfusions    Ankle sprain     Multiple-both ankles over the years    Arthritis Years    Worse R. Hip and back    Asthma     Stable now    Cataract 2years    Optometrist is watching yearly    Chronic pain disorder     Right hip and back    Clotting disorder     Required blood transfusion during and after  surgery in excess of expected    Colon polyp 2025    Coronary artery disease Dec 2023    Bifascicular block    Deep vein thrombosis 12/18/2023    During cancer surgery a clot was surgically removed from the inferior vena cava which was cancer related    Emphysema/COPD     Erectile dysfunction Several years    Extremity pain 2020    Right hip and bilat shoulders    Fracture of ankle     Left ankle as a child    Fracture of wrist     Left wrist as a child    GERD (gastroesophageal reflux disease)     Headache Years    Now rare - seem to be due to sleep deprivation    Headache, tension-type 1977    Rare    HL (hearing loss)     Progressive worsening over many years    Hyperglycemia 10/12/2023    Hyperlipidemia 10/12/2023    Hypertension     Injury of neck 2023    Previous fractures noted during chiropractic visit. Probably secondary to shoulder injury.    Joint pain 2020    Right hip    Low back pain     Worse last few year    Neck pain 2020    Mild    Obesity     Pneumonia 12/20/2023    Developed while hospitalized for cancer surgery    PONV (postoperative nausea and vomiting)     Prostate disease     Rash     Allergic reactions to laundry detergent and mild generalized itching secondary to immunotherapy.    Rectal bleeding     Renal cell carcinoma 12/30/2023    Renal insufficiency 4/9/2024    Decreased renal function since nephrectomy in December, 2023, secondary to renal cancer, but not diagnosed as chronic kidney disease until recent visit. Kidney functions have been stable since surgery, but are now being considered permanent.    Shoulder injury 1980s    Injured in     Sleep apnea     USES CPAP    Visual impairment     Wear glasses for near and distant vision    Vitreous degeneration of right eye 10/12/2023    Formatting of this note might be different from the original. Retinal tear and detachment warning symptoms reviewed and patient instructed to call immediately if increasing floaters, flashes, or  decreasing peripheral vision. No retinal detachment or retinal tear noted. Recheck in 1 month with dilated exam.     Past Surgical History:   Procedure Laterality Date    ABDOMINAL SURGERY  December 18, 2023    Left Kidney and Adrenal Gland removed due to Renal Cell Carcinoma    COLONOSCOPY N/A 3/24/2025    Procedure: COLONOSCOPY to cecum and terminal ileum with cold and hot polypectomies;  Surgeon: Dolly Stephens MD;  Location: [facility] END[facility]COPY;  Service: Gastroenterology;  Laterality: N/A;  screening//colon polyps, diverticulosis    NEPHRECTOMY Left 12/18/2023    Procedure: NEPHRECTOMY RADICAL WITH CAVAL THROMBECTOMY;  Surgeon: James Esquivel MD;  Location: [facility] OR;  Service: Urology;  Laterality: Left;    PORTACATH PLACEMENT  01/30/2024    SKIN LESION EXCISION  1990    TOTAL HIP ARTHROPLASTY Right 7/18/2025    Procedure: TOTAL HIP ARTHROPLASTY ANTERIOR WITH HANA TABLE;  Surgeon: Yovani Lund MD;  Location: [facility] OR [facility];  Service: Orthopedics;  Laterality: Right;      General Information       Row Name 07/23/25 1127          Physical Therapy Time and Intention    Document Type therapy note (daily note)  -MG     Mode of Treatment individual therapy;physical therapy  -MG       Row Name 07/23/25 1127          General Information    Patient Profile Reviewed yes  -MG     Existing Precautions/Restrictions fall;right;hip, anterior  -MG     Barriers to Rehab none identified  -MG       Row Name 07/23/25 1127          Cognition    Orientation Status (Cognition) oriented x 4  -MG       Row Name 07/23/25 1127          Safety Issues/Impairments Affecting Functional Mobility    Safety Issues Affecting Function (Mobility) sequencing abilities  -MG     Impairments Affecting Function (Mobility) balance;endurance/activity tolerance;strength;pain;range of motion (ROM)  -MG     Comment, Safety Issues/Impairments (Mobility) Gait belt and non-skid socks donned. Spouse assisted w/ chair follow for pt safety due  to h/o dizziness with mobility.  -MG               User Key  (r) = Recorded By, (t) = Taken By, (c) = Cosigned By      Initials Name Provider Type    MG Emmy Greene, PT Physical Therapist                   Mobility       Row Name 07/23/25 1128          Bed Mobility    Supine-Sit Henry (Bed Mobility) standby assist;verbal cues  -MG     Assistive Device (Bed Mobility) bed rails  -MG     Comment, (Bed Mobility) HOB flat. Increased time to complete. Cues for hand placement.  -MG       Row Name 07/23/25 1128          Transfers    Comment, (Transfers) No dizziness today with transfers or bed mobility.  -MG       Row Name 07/23/25 1128          Sit-Stand Transfer    Sit-Stand Henry (Transfers) standby assist;verbal cues  -MG     Assistive Device (Sit-Stand Transfers) walker, front-wheeled  -MG     Comment, (Sit-Stand Transfer) x1 elevated EOB.  -MG       Row Name 07/23/25 1128          Gait/Stairs (Locomotion)    Henry Level (Gait) contact guard;standby assist;verbal cues;1 person to manage equipment  Spouse following with chair  -MG     Assistive Device (Gait) walker, front-wheeled  -MG     Patient was able to Ambulate yes  -MG     Distance in Feet (Gait) 120  -MG     Deviations/Abnormal Patterns (Gait) gait speed decreased;antalgic;stride length decreased  -MG     Bilateral Gait Deviations forward flexed posture;heel strike decreased  -MG     Right Sided Gait Deviations weight shift ability decreased  -MG     Comment, (Gait/Stairs) Step-through pattern. Cues for posture and sequencing. Limited by fatigue. No overt LOB, buckling or dizziness. Declines trialing stairs. Reports feeling comfortable with performing at home based on practice w/ MIP from yesterdays session.  -MG       Row Name 07/23/25 1128          Mobility    Extremity Weight-bearing Status right lower extremity  -MG     Right Lower Extremity (Weight-bearing Status) weight-bearing as tolerated (WBAT)  -MG               User Key  (r) =  Recorded By, (t) = Taken By, (c) = Cosigned By      Initials Name Provider Type    MG Emmy Greene, PT Physical Therapist                   Obj/Interventions       Row Name 07/23/25 1130          Motor Skills    Therapeutic Exercise other (see comments)  AP, QS, GS, HS, Habd/add x10. Edu to perform 3-4x/day until seen by HH.  -MG       Row Name 07/23/25 1130          Balance    Static Sitting Balance independent  -MG     Dynamic Sitting Balance standby assist  -MG     Position, Sitting Balance sitting edge of bed;sitting in chair  -MG     Static Standing Balance standby assist  -MG     Dynamic Standing Balance contact guard;standby assist  -MG     Position/Device Used, Standing Balance supported;walker, front-wheeled  -MG     Comment, Balance Jeremy w/ donning new gown. MaxA for donning socks.  -MG               User Key  (r) = Recorded By, (t) = Taken By, (c) = Cosigned By      Initials Name Provider Type    MG Emmy Greene, PT Physical Therapist                   Goals/Plan    No documentation.                  Clinical Impression       Row Name 07/23/25 1136          Pain    Pretreatment Pain Rating 3/10  -MG     Posttreatment Pain Rating 4/10  -MG     Pain Location hip  -MG     Pain Side/Orientation right  -MG     Pain Management Interventions premedicated for activity;activity modification encouraged;exercise or physical activity utilized;positioning techniques utilized  -MG     Response to Pain Interventions activity participation with tolerable pain  -MG       Row Name 07/23/25 1136          Plan of Care Review    Plan of Care Reviewed With patient;spouse  -MG     Progress improving  -MG     Outcome Evaluation Pt seen for PT tx this AM and tolerated the session well, hopeful for DC home later today. Today, pt was SBA for bed mobility, SBA for STS to RW and CG/SBA for ambulation of 120' w/ RW and his wife following with a chair for safety d/t history of dizziness. Pt reports feeling better this date and did  not experience any dizziness during the session. Pt declines performing stairs as he reports feeling comfortable with doing once home based on how he did MIP during yesterdays session. Pt performed ELAINA protocol ther-ex for 10 reps and was educated to perform x3-4/day until seen by HH. Educated on icing/positioning, activity recs, energy conservation, use of gait belt and stair sequencing/safety; pt/spouse v/u. Discussed w/ RN. Pt/spouse report no further questions or concerns and feel ready for DC.  -MG       Row Name 07/23/25 1136          Therapy Assessment/Plan (PT)    Rehab Potential (PT) good  -MG     Criteria for Skilled Interventions Met (PT) yes  -MG     Therapy Frequency (PT) daily  -MG       Row Name 07/23/25 1136          Positioning and Restraints    Pre-Treatment Position in bed  -MG     Post Treatment Position chair  -MG     In Chair notified nsg;sitting;call light within reach;encouraged to call for assist;exit alarm on;with family/caregiver  Tray table in front w/ lunch.  -MG               User Key  (r) = Recorded By, (t) = Taken By, (c) = Cosigned By      Initials Name Provider Type    MG Emmy Greene, PT Physical Therapist                   Outcome Measures       Row Name 07/23/25 1207 07/23/25 0821       How much help from another person do you currently need...    Turning from your back to your side while in flat bed without using bedrails? 4  -MG 4  -KM    Moving from lying on back to sitting on the side of a flat bed without bedrails? 3  -MG 3  -KM    Moving to and from a bed to a chair (including a wheelchair)? 3  -MG 3  -KM    Standing up from a chair using your arms (e.g., wheelchair, bedside chair)? 3  -MG 3  -KM    Climbing 3-5 steps with a railing? 3  -MG 3  -KM    To walk in hospital room? 3  -MG 3  -KM    AM-PAC 6 Clicks Score (PT) 19  -MG 19  -KM    Highest Level of Mobility Goal Walk 10 Steps or More-6  -MG Walk 10 Steps or More-6  -KM              User Key  (r) = Recorded By, (t) =  Taken By, (c) = Cosigned By      Initials Name Provider Type    Emmy Decker, PT Physical Therapist    Marcia Ramirez, RN Registered Nurse                                 Physical Therapy Education       Title: PT OT SLP Therapies (Done)       Topic: Physical Therapy (Done)       Point: Mobility training (Done)       Learning Progress Summary            Patient Acceptance, E,TB,D, VU,DU by MG at 7/23/2025 1207    Acceptance, E,TB,D, VU,NR by MG at 7/22/2025 1045    Acceptance, E, VU,DU,NR by  at 7/21/2025 1158    Acceptance, E, VU by HR at 7/20/2025 1028    Acceptance, E, VU by HR at 7/19/2025 1110    Acceptance, E,TB, VU,NR by EF at 7/18/2025 1607   Family Acceptance, E, VU by HR at 7/20/2025 1028   Significant Other Acceptance, E,TB,D, VU,DU by MG at 7/23/2025 1207                      Point: Home exercise program (Done)       Learning Progress Summary            Patient Acceptance, E,TB,D, VU,DU by MG at 7/23/2025 1207    Acceptance, E,TB,D, VU,NR by MG at 7/22/2025 1045    Acceptance, E, VU,DU,NR by  at 7/21/2025 1158    Acceptance, E, VU by HR at 7/20/2025 1028    Acceptance, E, VU by HR at 7/19/2025 1110    Acceptance, E,TB, VU,NR by EF at 7/18/2025 1607   Family Acceptance, E, VU by HR at 7/20/2025 1028   Significant Other Acceptance, E,TB,D, VU,DU by MG at 7/23/2025 1207                      Point: Body mechanics (Done)       Learning Progress Summary            Patient Acceptance, E,TB,D, VU,DU by MG at 7/23/2025 1207    Acceptance, E,TB,D, VU,NR by MG at 7/22/2025 1045    Acceptance, E, VU,DU,NR by  at 7/21/2025 1158    Acceptance, E, VU by HR at 7/20/2025 1028    Acceptance, E, VU by HR at 7/19/2025 1110    Acceptance, E,TB, VU,NR by EF at 7/18/2025 1607   Family Acceptance, E, VU by HR at 7/20/2025 1028   Significant Other Acceptance, E,TB,D, VU,DU by MG at 7/23/2025 1207                      Point: Precautions (Done)       Learning Progress Summary            Patient Acceptance, E,TB,D,  VU,DU by MG at 7/23/2025 1207    Acceptance, E,TB,D, VU,NR by MG at 7/22/2025 1045    Acceptance, E, VU,DU,NR by  at 7/21/2025 1158    Acceptance, E, VU by HR at 7/20/2025 1028    Acceptance, E, VU by HR at 7/19/2025 1110    Acceptance, E,TB, VU,NR by  at 7/18/2025 1607   Family Acceptance, E, VU by HR at 7/20/2025 1028   Significant Other Acceptance, E,TB,D, VU,DU by MG at 7/23/2025 1207                                      User Key       Initials Effective Dates Name Provider Type Discipline    EF 05/31/24 -  Denisse Merritt, PT Physical Therapist PT    MG 05/24/22 -  Emmy Greene, PT Physical Therapist PT     09/11/24 -  Nestor Hubbard, PT Physical Therapist PT    HR 04/29/25 -  Patricia Dc, PT Physical Therapist PT                  PT Recommendation and Plan     Progress: improving  Outcome Evaluation: Pt seen for PT tx this AM and tolerated the session well, hopeful for DC home later today. Today, pt was SBA for bed mobility, SBA for STS to RW and CG/SBA for ambulation of 120' w/ RW and his wife following with a chair for safety d/t history of dizziness. Pt reports feeling better this date and did not experience any dizziness during the session. Pt declines performing stairs as he reports feeling comfortable with doing once home based on how he did MIP during yesterdays session. Pt performed ELAINA protocol ther-ex for 10 reps and was educated to perform x3-4/day until seen by HH. Educated on icing/positioning, activity recs, energy conservation, use of gait belt and stair sequencing/safety; pt/spouse v/u. Discussed w/ RN. Pt/spouse report no further questions or concerns and feel ready for DC.     Time Calculation:         PT Charges       Row Name 07/23/25 1207             Time Calculation    Start Time 1051  -MG      Stop Time 1115  -MG      Time Calculation (min) 24 min  -MG      PT Received On 07/23/25  -MG      PT - Next Appointment 07/24/25  -MG                User Key  (r) = Recorded By, (t)  = Taken By, (c) = Cosigned By      Initials Name Provider Type    MG Emmy Greene, PT Physical Therapist                  Therapy Charges for Today       Code Description Service Date Service Provider Modifiers Qty    81474955898 HC PT THERAPEUTIC ACT EA 15 MIN 7/22/2025 Emmy Greene, PT GP 1    86382630287 HC PT THER PROC EA 15 MIN 7/22/2025 Emmy Greene, PT GP 1    33011356515 HC PT THERAPEUTIC ACT EA 15 MIN 7/23/2025 Emmy Greene, PT GP 1    91507268880 HC PT THER PROC EA 15 MIN 7/23/2025 Emmy Greene, PT GP 1            PT G-Codes  Outcome Measure Options: AM-PAC 6 Clicks Basic Mobility (PT)  AM-PAC 6 Clicks Score (PT): 19  PT Discharge Summary  Anticipated Discharge Disposition (PT): home with home health, home with assist    Emmy Greene, PT  7/23/2025

## 2025-07-23 NOTE — PLAN OF CARE
Goal Outcome Evaluation:  Plan of Care Reviewed With: patient        Progress: improving  Outcome Evaluation: vss. nvi. x1 assist BRP. large BM yesterday. dressing CDI. pain managed per PO meds. benadryl given PRN for rash. plan to d/c home possibly today.

## 2025-07-23 NOTE — PLAN OF CARE
Goal Outcome Evaluation:  Plan of Care Reviewed With: patient, spouse        Progress: improving  Outcome Evaluation: Pt seen for PT tx this AM and tolerated the session well, hopeful for DC home later today. Today, pt was SBA for bed mobility, SBA for STS to RW and CG/SBA for ambulation of 120' w/ RW and his wife following with a chair for safety d/t history of dizziness. Pt reports feeling better this date and did not experience any dizziness during the session. Pt declines performing stairs as he reports feeling comfortable with doing once home based on how he did MIP during yesterdays session. Pt performed ELAINA protocol ther-ex for 10 reps and was educated to perform x3-4/day until seen by HH. Educated on icing/positioning, activity recs, energy conservation, use of gait belt and stair sequencing/safety; pt/spouse v/u. Discussed w/ RN. Pt/spouse report no further questions or concerns and feel ready for DC.    Anticipated Discharge Disposition (PT): home with home health, home with assist

## 2025-07-24 ENCOUNTER — TRANSITIONAL CARE MANAGEMENT TELEPHONE ENCOUNTER (OUTPATIENT)
Dept: CALL CENTER | Facility: HOSPITAL | Age: 65
End: 2025-07-24
Payer: COMMERCIAL

## 2025-07-24 NOTE — CASE MANAGEMENT/SOCIAL WORK
Case Management Discharge Note      Final Note: Home with wife and [facility]. Wife to transport.    Provided Post Acute Provider List?: Refused  N/A Provider List Comment: Patient/spouse agreeable to any [facility] that can accept insurance and provide needed services timely  Provided Post Acute Provider Quality & Resource List?: Refused    Selected Continued Care - Discharged on 7/23/2025 Admission date: 7/18/2025 - Discharge disposition: Home-Health Care Svc      Destination    No services have been selected for the patient.                Durable Medical Equipment    No services have been selected for the patient.                Dialysis/Infusion    No services have been selected for the patient.                Home Medical Care Coordination complete.      Service Provider Services Address Phone Fax Patient Preferred    [facility] - [facility] [address] [place] [address] [phone] --              Therapy    No services have been selected for the patient.                Community Resources    No services have been selected for the patient.                Community & DME    No services have been selected for the patient.                    Transportation Services  Transportation: Private Transportation  Private: Car    Final Discharge Disposition Code: 06 - home with home health care

## 2025-07-24 NOTE — OUTREACH NOTE
Call Center TCM Note      Flowsheet Row Responses   [facility], [place] patient discharged from? [place]   Does the patient have one of the following disease processes/diagnoses(primary or secondary)? Total Joint Replacement   Joint surgery performed? Hip   TCM attempt successful? Yes   Call start time 0945   Call end time 0955   Discharge diagnosis TOTAL HIP ARTHROPLASTY ANTERIOR WITH HANA TABLE   Does the patient have all medications related to this admission filled (includes all antibiotics, pain medications, etc.) Yes   Is the patient taking all medications as directed (includes completed medication regime)? Yes   Is the patient able to teach back alternate methods of pain control? Reposition, Correct alignment, Short, frequent activity, Ice   Comments Hosp dc fu apt on 7/30/25   Does the patient have an appointment with their PCP within 7-14 days of discharge? Yes   What is the Home health agency?  [facility]   Has home health visited the patient within 72 hours of discharge? Yes   Home health comments PT scheduled today (7/24/25)   What DME was ordered? Lift chair from [facility] to be delivered 7/25/25   When is the first therapy visit scheduled (PO Day) including how many days per week  7/24/25   Has the patient fallen since discharge? No   Did the patient receive a copy of their discharge instructions? Yes   Nursing interventions Reviewed instructions with patient   What is the patient's perception of their functional status since discharge? Improving  [Rash present prior to surgery is still present-has been present for a year per pt]   Is the patient able to teach back signs and symptoms of infection? Temp >100.4 for 24h or longer, Incisional drainage, Blisters around incision, Increased swelling or redness around incision (not associated with surgical edema), Severe discomfort or pain, Changes in mobility, Shortness of breath or chest pain   Is the patient able to teach back  how to prevent infection? Check incision daily, Wash hands before and after touching incision, Keep incision covered if drainage, Keep incision covered if pets in house, No lotion or creams, No tub baths, hot tub or swimming, Eat well-balanced diet   Is the patient able to teach back signs and symptoms of DVT? Redness in calf, Area hot to touch, Shortness of breath or chest pain, Severe pain in calf, Swelling in calf   Is the patient able to teach back home safety measures? Ability to shower, Accessibility to necessary areas in home, Modifications to reach items, Modifications with ADLs such as dressing, cooking, toileting   If the patient is a current smoker, are they able to teach back resources for cessation? Not a smoker   Is the patient/caregiver able to teach back the hierarchy of who to call/visit for symptoms/problems? PCP, Specialist, Home health nurse, Urgent Care, ED, 911 Yes   TCM call completed? Yes   Call end time 0955            Maude SAGE - Registered Nurse    7/24/2025, 09:55 EDT

## 2025-07-30 ENCOUNTER — OFFICE VISIT (OUTPATIENT)
Dept: INTERNAL MEDICINE | Facility: CLINIC | Age: 65
End: 2025-07-30
Payer: COMMERCIAL

## 2025-07-30 VITALS
SYSTOLIC BLOOD PRESSURE: 124 MMHG | WEIGHT: 259 LBS | OXYGEN SATURATION: 98 % | HEART RATE: 82 BPM | HEIGHT: 68 IN | DIASTOLIC BLOOD PRESSURE: 86 MMHG | BODY MASS INDEX: 39.25 KG/M2

## 2025-07-30 DIAGNOSIS — R21 BLISTERING RASH: ICD-10-CM

## 2025-07-30 DIAGNOSIS — L13.9 BULLOUS DERMATITIS: Primary | ICD-10-CM

## 2025-07-30 DIAGNOSIS — L29.9 PRURITUS: ICD-10-CM

## 2025-07-30 RX ORDER — HYDROXYZINE HYDROCHLORIDE 50 MG/1
50 TABLET, FILM COATED ORAL 3 TIMES DAILY PRN
Qty: 30 TABLET | Refills: 0 | Status: SHIPPED | OUTPATIENT
Start: 2025-07-30

## 2025-07-30 NOTE — PROGRESS NOTES
Transitional Care Follow Up Visit  Subjective     Louis Andino Jr. is a 64 y.o. male who presents for a transitional care management visit.    Within 48 business hours after discharge our office contacted him via telephone to coordinate his care and needs.      I reviewed and discussed the details of that call along with the discharge summary, hospital problems, inpatient lab results, inpatient diagnostic studies, and consultation reports with Louis.     Current outpatient and discharge medications have been reconciled for the patient.  Reviewed by: Harry Hsu MD          7/23/2025     5:00 PM   Date of TCM Phone Call   [facility]   Date of Admission 7/18/2025   Date of Discharge 7/23/2025   Discharge Disposition Home-Health Care [facility]     Risk for Readmission (LACE) Score: 11 (7/23/2025  6:00 AM)      History of Present Illness   Course During Hospital Stay:  Presents to clinic today for hospital follow up    He recently had total hip arthroplasty here at [facility] which was successful however unfortunately has had progressively worsening diffuse, widespread rash that is now blistering. He has blisters all over his body as well as diffuse, widespread maculopapular rash that is extremley pruritic. He was seen by dermatology in June and had a skin biopsy after having a erythematous maculopapular rash and biopsy showed a spongiform dermatitis however did not have the blisters at this time. He has held multiple of his chronic medications including losartan, pantoprazole, norco to see if this was cause but did not change and continued to progress. He has a topical cream however does not provide any relief      The following portions of the patient's history were reviewed and updated as appropriate: allergies, current medications, past family history, past medical history, past social history, past surgical history, and problem list.    Review of Systems    Objective   /86   Pulse 82   " Ht 172.7 cm (67.99\")   Wt 117 kg (259 lb)   SpO2 98%   BMI 39.39 kg/m²   Physical Exam  Vitals reviewed.   Constitutional:       General: He is not in acute distress.     Appearance: Normal appearance. He is obese. He is ill-appearing. He is not toxic-appearing.   HENT:      Head: Normocephalic and atraumatic.   Musculoskeletal:      Right hip: No tenderness or bony tenderness. Decreased range of motion. Decreased strength.      Comments: Removed taped bandage with gauze on right anterior quadricep, surgical scar shows no erythema, discharge and appears healing appropriately    Skin:     General: Skin is warm and dry.      Findings: Erythema, petechiae and rash present.      Comments: Widespread, diffuse and generalized erythematous maculopapular rash with multiple tense blisters most notably on lower legs/feet, erosive open wounds from blisters open on feet   Neurological:      Mental Status: He is alert and oriented to person, place, and time.      Motor: Weakness present.      Gait: Gait abnormal.   Psychiatric:         Mood and Affect: Mood normal.         Behavior: Behavior normal.         Assessment & Plan   Diagnoses and all orders for this visit:    1. Bullous dermatitis (Primary)    2. Blistering rash    3. Pruritus  -     hydrOXYzine (ATARAX) 50 MG tablet; Take 1 tablet by mouth 3 (Three) Times a Day As Needed for Itching.  Dispense: 30 tablet; Refill: 0      Concerning widespread, diffuse erythematous, pruritic maculopapular rash with diffuse bullous/blisters most notably on feet. Does not appear to be on any medications to be etiology, it looks concerning to me for a bullous pemphigoid that looks different from initial rash he saw dermatology for in June. I have spoken with his dermatology office and covering physician on call to get him in tomorrow to be evaluated and consider re-biopsy. I also spoke with him regarding concern for possible adrenal insufficiency that he is being seen for " endocrinology given his hx of left adrenalectomy.     I will provide hydroxyzine for pruritus to see if this helps, will follow up derm appt he will be seen tomorrow.    In meantime hold losartan given lower BP readings     Will have him see derm tomorrow then schedule follow up appt

## 2025-07-31 ENCOUNTER — READMISSION MANAGEMENT (OUTPATIENT)
Dept: CALL CENTER | Facility: HOSPITAL | Age: 65
End: 2025-07-31
Payer: COMMERCIAL

## 2025-07-31 NOTE — OUTREACH NOTE
Total Joint Week 2 Survey      Flowsheet Row Responses   [facility] patient discharged from? [place]   Does the patient have one of the following disease processes/diagnoses(primary or secondary)? Total Joint Replacement   Joint surgery performed? Hip   Week 2 attempt successful? Yes   Call start time 1118   Call end time 1122   Has the patient been back in either the hospital or Emergency Department since discharge? No   Discharge diagnosis TOTAL HIP ARTHROPLASTY ANTERIOR WITH HANA TABLE   Does the patient have all medications related to this admission filled (includes all antibiotics, pain medications, etc.) Yes   Is the patient taking all medications as directed (includes completed medication regime)? Yes   Does the patient have a follow up appointment with their surgeon? Yes   Has the patient kept scheduled appointments due by today? N/A   What is the Home health agency?  [facility]   Has home health visited the patient within 72 hours of discharge? Yes   Has the patient began therapy sessions (either in the home or as an out patient)? Yes   Has the patient fallen since discharge? No   Did the patient receive a copy of their discharge instructions? Yes   Nursing interventions Reviewed instructions with patient   What is the patient's perception of their functional status since discharge? New symptoms unrelated to diagnosis   Is the patient able to teach back signs and symptoms of DVT? Shortness of breath or chest pain, Area hot to touch, Severe pain in calf, Swelling in calf, Redness in calf   Is the patient/caregiver able to teach back the hierarchy of who to call/visit for symptoms/problems? PCP, Specialist, Home health nurse, Urgent Care, ED, 911 Yes   Week 2 call completed? Yes   Wrap up additional comments Pt reports everything with surgery is going fine, however he has a widespread rash that he is currentl y being treated for. He also has  derm appt.   Call end  time 1122            MIGUEL SAGE - Registered Nurse

## 2025-08-11 DIAGNOSIS — E78.5 HYPERLIPIDEMIA, UNSPECIFIED HYPERLIPIDEMIA TYPE: ICD-10-CM

## 2025-08-11 DIAGNOSIS — E83.39 HYPOPHOSPHATEMIA: ICD-10-CM

## 2025-08-11 DIAGNOSIS — I10 HYPERTENSION, UNSPECIFIED TYPE: ICD-10-CM

## 2025-08-11 DIAGNOSIS — Z12.5 SCREENING FOR PROSTATE CANCER: ICD-10-CM

## 2025-08-11 DIAGNOSIS — D72.10 EOSINOPHILIA, UNSPECIFIED TYPE: Primary | ICD-10-CM

## 2025-08-11 DIAGNOSIS — R73.03 PRE-DIABETES: ICD-10-CM

## 2025-08-15 ENCOUNTER — HOSPITAL ENCOUNTER (OUTPATIENT)
Dept: PET IMAGING | Facility: HOSPITAL | Age: 65
Discharge: HOME | End: 2025-08-15
Admitting: STUDENT IN AN ORGANIZED HEALTH CARE EDUCATION/TRAINING PROGRAM
Payer: COMMERCIAL

## 2025-08-15 ENCOUNTER — HOSPITAL ENCOUNTER (OUTPATIENT)
Dept: ONCOLOGY | Facility: HOSPITAL | Age: 65
Discharge: HOME | End: 2025-08-15
Payer: COMMERCIAL

## 2025-08-15 DIAGNOSIS — N28.89 RENAL MASS: ICD-10-CM

## 2025-08-15 DIAGNOSIS — C64.9 RENAL CELL CARCINOMA, UNSPECIFIED LATERALITY: Primary | ICD-10-CM

## 2025-08-15 DIAGNOSIS — C64.2 RENAL CELL CARCINOMA OF LEFT KIDNEY: ICD-10-CM

## 2025-08-15 LAB
FERRITIN SERPL-MCNC: 104 NG/ML (ref 30–400)
IRON 24H UR-MRATE: 93 MCG/DL (ref 59–158)
IRON SATN MFR SERPL: 31 % (ref 20–50)
RETICS # AUTO: 0.17 10*6/MM3 (ref 0.02–0.13)
RETICS/RBC NFR AUTO: 4.06 % (ref 0.7–1.9)
TIBC SERPL-MCNC: 297 MCG/DL (ref 298–536)
TRANSFERRIN SERPL-MCNC: 199 MG/DL (ref 200–360)

## 2025-08-15 PROCEDURE — 84466 ASSAY OF TRANSFERRIN: CPT | Performed by: STUDENT IN AN ORGANIZED HEALTH CARE EDUCATION/TRAINING PROGRAM

## 2025-08-15 PROCEDURE — 71250 CT THORAX DX C-: CPT

## 2025-08-15 PROCEDURE — 85045 AUTOMATED RETICULOCYTE COUNT: CPT | Performed by: STUDENT IN AN ORGANIZED HEALTH CARE EDUCATION/TRAINING PROGRAM

## 2025-08-15 PROCEDURE — 83540 ASSAY OF IRON: CPT | Performed by: STUDENT IN AN ORGANIZED HEALTH CARE EDUCATION/TRAINING PROGRAM

## 2025-08-15 PROCEDURE — 25010000002 HEPARIN LOCK FLUSH PER 10 UNITS: Performed by: STUDENT IN AN ORGANIZED HEALTH CARE EDUCATION/TRAINING PROGRAM

## 2025-08-15 PROCEDURE — 74176 CT ABD & PELVIS W/O CONTRAST: CPT

## 2025-08-15 PROCEDURE — 82728 ASSAY OF FERRITIN: CPT | Performed by: STUDENT IN AN ORGANIZED HEALTH CARE EDUCATION/TRAINING PROGRAM

## 2025-08-15 RX ORDER — HEPARIN SODIUM (PORCINE) LOCK FLUSH IV SOLN 100 UNIT/ML 100 UNIT/ML
500 SOLUTION INTRAVENOUS AS NEEDED
Status: DISCONTINUED | OUTPATIENT
Start: 2025-08-15 | End: 2025-08-16 | Stop reason: HOSPADM

## 2025-08-15 RX ORDER — SODIUM CHLORIDE 0.9 % (FLUSH) 0.9 %
10 SYRINGE (ML) INJECTION AS NEEDED
OUTPATIENT
Start: 2025-08-15

## 2025-08-15 RX ORDER — SODIUM CHLORIDE 0.9 % (FLUSH) 0.9 %
10 SYRINGE (ML) INJECTION AS NEEDED
Status: DISCONTINUED | OUTPATIENT
Start: 2025-08-15 | End: 2025-08-16 | Stop reason: HOSPADM

## 2025-08-15 RX ORDER — HEPARIN SODIUM (PORCINE) LOCK FLUSH IV SOLN 100 UNIT/ML 100 UNIT/ML
500 SOLUTION INTRAVENOUS AS NEEDED
OUTPATIENT
Start: 2025-08-15

## 2025-08-15 RX ADMIN — HEPARIN 500 UNITS: 100 SYRINGE at 12:01

## 2025-08-15 RX ADMIN — Medication 10 ML: at 12:00

## 2025-08-18 ENCOUNTER — READMISSION MANAGEMENT (OUTPATIENT)
Dept: CALL CENTER | Facility: HOSPITAL | Age: 65
End: 2025-08-18
Payer: COMMERCIAL

## 2025-08-20 ENCOUNTER — OFFICE VISIT (OUTPATIENT)
Dept: INTERNAL MEDICINE | Facility: CLINIC | Age: 65
End: 2025-08-20
Payer: COMMERCIAL

## 2025-08-20 VITALS
HEIGHT: 68 IN | HEART RATE: 78 BPM | BODY MASS INDEX: 37.74 KG/M2 | SYSTOLIC BLOOD PRESSURE: 152 MMHG | DIASTOLIC BLOOD PRESSURE: 80 MMHG | OXYGEN SATURATION: 95 % | WEIGHT: 249 LBS

## 2025-08-20 DIAGNOSIS — L12.0 BULLOUS PEMPHIGOID: ICD-10-CM

## 2025-08-20 DIAGNOSIS — Z00.00 MEDICARE ANNUAL WELLNESS VISIT, SUBSEQUENT: Primary | ICD-10-CM

## 2025-08-20 DIAGNOSIS — Z79.52 LONG TERM (CURRENT) USE OF SYSTEMIC STEROIDS: ICD-10-CM

## 2025-08-20 DIAGNOSIS — R73.03 PRE-DIABETES: ICD-10-CM

## 2025-08-20 DIAGNOSIS — N18.31 STAGE 3A CHRONIC KIDNEY DISEASE: ICD-10-CM

## 2025-08-20 DIAGNOSIS — C64.2 RENAL CELL CARCINOMA OF LEFT KIDNEY: ICD-10-CM

## 2025-08-20 DIAGNOSIS — E89.6 H/O PARTIAL ADRENALECTOMY: ICD-10-CM

## 2025-08-22 ENCOUNTER — OFFICE VISIT (OUTPATIENT)
Dept: ONCOLOGY | Facility: CLINIC | Age: 65
End: 2025-08-22
Payer: COMMERCIAL

## 2025-08-22 VITALS
BODY MASS INDEX: 38.04 KG/M2 | OXYGEN SATURATION: 96 % | HEART RATE: 62 BPM | WEIGHT: 251 LBS | DIASTOLIC BLOOD PRESSURE: 98 MMHG | HEIGHT: 68 IN | SYSTOLIC BLOOD PRESSURE: 164 MMHG

## 2025-08-22 DIAGNOSIS — N17.9 AKI (ACUTE KIDNEY INJURY): ICD-10-CM

## 2025-08-22 DIAGNOSIS — C64.9 RENAL CELL CARCINOMA, UNSPECIFIED LATERALITY: Primary | ICD-10-CM

## 2025-08-22 DIAGNOSIS — L30.8 PRURITIC DERMATITIS: ICD-10-CM

## 2025-08-22 DIAGNOSIS — D64.9 ANEMIA, UNSPECIFIED TYPE: ICD-10-CM

## 2025-08-22 DIAGNOSIS — C64.2 RENAL CELL CARCINOMA OF LEFT KIDNEY: ICD-10-CM

## 2025-08-22 RX ORDER — IPRATROPIUM BROMIDE AND ALBUTEROL 20; 100 UG/1; UG/1
SPRAY, METERED RESPIRATORY (INHALATION)
COMMUNITY
Start: 2025-07-24

## 2025-08-27 ENCOUNTER — OFFICE VISIT (OUTPATIENT)
Dept: PAIN MEDICINE | Facility: CLINIC | Age: 65
End: 2025-08-27
Payer: COMMERCIAL

## 2025-08-27 ENCOUNTER — OFFICE VISIT (OUTPATIENT)
Dept: CARDIOLOGY | Age: 65
End: 2025-08-27
Payer: COMMERCIAL

## 2025-08-27 VITALS
BODY MASS INDEX: 38.34 KG/M2 | WEIGHT: 253 LBS | HEIGHT: 68 IN | SYSTOLIC BLOOD PRESSURE: 146 MMHG | HEART RATE: 84 BPM | DIASTOLIC BLOOD PRESSURE: 82 MMHG | OXYGEN SATURATION: 95 %

## 2025-08-27 VITALS
RESPIRATION RATE: 18 BRPM | WEIGHT: 251 LBS | HEIGHT: 68 IN | SYSTOLIC BLOOD PRESSURE: 155 MMHG | BODY MASS INDEX: 38.04 KG/M2 | OXYGEN SATURATION: 98 % | DIASTOLIC BLOOD PRESSURE: 89 MMHG | HEART RATE: 71 BPM | TEMPERATURE: 97.1 F

## 2025-08-27 DIAGNOSIS — I45.2 RIGHT BUNDLE BRANCH BLOCK (RBBB) WITH LEFT ANTERIOR FASCICULAR BLOCK (LAFB): Primary | ICD-10-CM

## 2025-08-27 DIAGNOSIS — M51.360 DEGENERATION OF INTERVERTEBRAL DISC OF LUMBAR REGION WITH DISCOGENIC BACK PAIN: ICD-10-CM

## 2025-08-27 DIAGNOSIS — N18.31 STAGE 3A CHRONIC KIDNEY DISEASE: ICD-10-CM

## 2025-08-27 DIAGNOSIS — M54.41 CHRONIC RIGHT-SIDED LOW BACK PAIN WITH RIGHT-SIDED SCIATICA: ICD-10-CM

## 2025-08-27 DIAGNOSIS — I10 HYPERTENSION, UNSPECIFIED TYPE: Chronic | ICD-10-CM

## 2025-08-27 DIAGNOSIS — M47.816 LUMBAR FACET ARTHROPATHY: Primary | ICD-10-CM

## 2025-08-27 DIAGNOSIS — G89.29 CHRONIC RIGHT-SIDED LOW BACK PAIN WITH RIGHT-SIDED SCIATICA: ICD-10-CM

## 2025-08-27 DIAGNOSIS — Z79.899 ENCOUNTER FOR LONG-TERM (CURRENT) USE OF HIGH-RISK MEDICATION: ICD-10-CM

## 2025-08-27 DIAGNOSIS — I95.1 ORTHOSTASIS: ICD-10-CM

## 2025-08-27 DIAGNOSIS — I49.1 APC (ATRIAL PREMATURE CONTRACTIONS): ICD-10-CM

## 2025-08-27 PROBLEM — Z47.1 AFTERCARE FOLLOWING RIGHT HIP JOINT REPLACEMENT SURGERY: Status: RESOLVED | Noted: 2025-07-20 | Resolved: 2025-08-27

## 2025-08-27 PROBLEM — Z96.641 AFTERCARE FOLLOWING RIGHT HIP JOINT REPLACEMENT SURGERY: Status: RESOLVED | Noted: 2025-07-20 | Resolved: 2025-08-27

## 2025-08-27 PROBLEM — N28.89 RENAL MASS: Status: RESOLVED | Noted: 2023-12-18 | Resolved: 2025-08-27

## 2025-08-27 PROBLEM — J96.01 ACUTE RESPIRATORY FAILURE WITH HYPOXIA: Status: RESOLVED | Noted: 2023-12-18 | Resolved: 2025-08-27

## 2025-08-27 PROBLEM — N17.9 AKI (ACUTE KIDNEY INJURY): Status: RESOLVED | Noted: 2023-12-18 | Resolved: 2025-08-27

## 2025-08-27 PROBLEM — R42 LIGHTHEADEDNESS: Status: RESOLVED | Noted: 2024-07-12 | Resolved: 2025-08-27

## 2025-08-27 PROCEDURE — 99214 OFFICE O/P EST MOD 30 MIN: CPT | Performed by: PHYSICIAN ASSISTANT

## (undated) DEVICE — SUT PROLN 4/0 RB1 D/A 36IN 8557H

## (undated) DEVICE — VI-DRAPE ISOLATION BAG: Brand: CONVERTORS

## (undated) DEVICE — ELECTRD BLD EZ CLN STD 6.5IN

## (undated) DEVICE — GLV SURG SIGNATURE ESSENTIAL PF LTX SZ7

## (undated) DEVICE — SUT SILK 0 TIES 30IN A306H

## (undated) DEVICE — PK ANT HIP 40

## (undated) DEVICE — INTENDED FOR TISSUE SEPARATION, AND OTHER PROCEDURES THAT REQUIRE A SHARP SURGICAL BLADE TO PUNCTURE OR CUT.: Brand: BARD-PARKER ® CARBON RIB-BACK BLADES

## (undated) DEVICE — SNAR POLYP SENSATION STDOVL 27 240 BX40

## (undated) DEVICE — LN SMPL CO2 SHTRM SD STREAM W/M LUER

## (undated) DEVICE — SUT SILK 2/0 30IN A305H

## (undated) DEVICE — APPL CHLORAPREP HI/LITE 26ML ORNG

## (undated) DEVICE — KT SURG TURNOVER 050

## (undated) DEVICE — SUT PROLN 2/0 CT2 30IN 8411H

## (undated) DEVICE — SLV SCD CALF HEMOFORCE DVT THERP REPROC MD

## (undated) DEVICE — SYR CONTRL LUERLOK 10CC

## (undated) DEVICE — CATHETER,URETHRAL,REDRUBBER,STRL,12FR: Brand: MEDLINE INDUSTRIES, INC.

## (undated) DEVICE — GLV SURG PREMIERPRO ORTHO LTX PF SZ8 BRN

## (undated) DEVICE — ANTIBACTERIAL UNDYED BRAIDED (POLYGLACTIN 910), SYNTHETIC ABSORBABLE SUTURE: Brand: COATED VICRYL

## (undated) DEVICE — DRSNG WND BORDR/ADHS NONADHR/GZ LF 4X10IN STRL

## (undated) DEVICE — SPNG LAP PREWSH SFTPK 18X18IN STRL PK/5

## (undated) DEVICE — DRSNG SURESITE WNDW 4X4.5

## (undated) DEVICE — GLV SURG SIGNATURE ESSENTIAL PF LTX SZ8

## (undated) DEVICE — SKIN PREP TRAY W/CHG: Brand: MEDLINE INDUSTRIES, INC.

## (undated) DEVICE — BOOT SUT XRAY DETECT STD YEL/BLU CA/50

## (undated) DEVICE — SOLUTION,WATER,IRRIGATION,1000ML,STERILE: Brand: MEDLINE

## (undated) DEVICE — CANN O2 ETCO2 FITS ALL CONN CO2 SMPL A/ 7IN DISP LF

## (undated) DEVICE — TBG PENCL TELESCP MEGADYNE SMOKE EVAC 15FT

## (undated) DEVICE — LP VESL MAXI 2.5X1MM RED 2PK

## (undated) DEVICE — LASSO POLYPECTOMY SNARE: Brand: LASSO

## (undated) DEVICE — SUT PDS2 CTX 60IN

## (undated) DEVICE — SOL IRR NACL 0.9PCT 1000ML

## (undated) DEVICE — PATIENT RETURN ELECTRODE, SINGLE-USE, CONTACT QUALITY MONITORING, ADULT, WITH 9FT CORD, FOR PATIENTS WEIGING OVER 33LBS. (15KG): Brand: MEGADYNE

## (undated) DEVICE — KT ORCA ORCAPOD DISP STRL

## (undated) DEVICE — GLV SURG BIOGEL LTX PF 8

## (undated) DEVICE — TUBING, SUCTION, 1/4" X 10', STRAIGHT: Brand: MEDLINE

## (undated) DEVICE — DRAPE SHEET ULTRAGARD: Brand: MEDLINE

## (undated) DEVICE — TRAP FLD MINIVAC MEGADYNE 100ML

## (undated) DEVICE — PK MAJ LAPAROTOMY 50

## (undated) DEVICE — UNIVERSAL STAPLER: Brand: ENDO GIA ULTRA

## (undated) DEVICE — DRAPE,REIN 53X77,STERILE: Brand: MEDLINE

## (undated) DEVICE — NEEDLE, QUINCKE, 20GX3.5": Brand: MEDLINE

## (undated) DEVICE — SYS SKIN EXOFIN WND CLS 4X22CM

## (undated) DEVICE — SUT SILK 2/0 SH 30IN K833H

## (undated) DEVICE — PROXIMATE RH ROTATING HEAD SKIN STAPLERS (35 WIDE) CONTAINS 35 STAINLESS STEEL STAPLES: Brand: PROXIMATE

## (undated) DEVICE — SHEET, DRAPE, SPLIT, STERILE: Brand: MEDLINE

## (undated) DEVICE — OPTIFOAM GENTLE SA, POSTOP, 4X8: Brand: MEDLINE

## (undated) DEVICE — SENSR O2 OXIMAX FNGR A/ 18IN NONSTR

## (undated) DEVICE — DRSNG WND GZ PAD BORDERED 4X8IN STRL

## (undated) DEVICE — MAT FLR ABS LIQUILOC 28X56IN PNK

## (undated) DEVICE — PENCL SMOKE/EVAC MEGADYNE TELESCP 10FT

## (undated) DEVICE — SUT PROLN 4/0 SH D/A 36IN 8521H

## (undated) DEVICE — ADAPT CLN BIOGUARD AIR/H2O DISP

## (undated) DEVICE — DECANTER BAG 9": Brand: MEDLINE INDUSTRIES, INC.

## (undated) DEVICE — UMBILICAL TAPE: Brand: DEROYAL

## (undated) DEVICE — Device

## (undated) DEVICE — SOL ISO/ALC 70PCT 4OZ

## (undated) DEVICE — SUT PDS 5/0 RB1 27IN Z303H